# Patient Record
Sex: MALE | Race: WHITE | NOT HISPANIC OR LATINO | Employment: UNEMPLOYED | URBAN - METROPOLITAN AREA
[De-identification: names, ages, dates, MRNs, and addresses within clinical notes are randomized per-mention and may not be internally consistent; named-entity substitution may affect disease eponyms.]

---

## 2020-09-01 ENCOUNTER — OFFICE VISIT (OUTPATIENT)
Dept: OPHTHALMOLOGY | Facility: CLINIC | Age: 40
End: 2020-09-01
Payer: COMMERCIAL

## 2020-09-01 DIAGNOSIS — H52.03 HYPEROPIA, BILATERAL: ICD-10-CM

## 2020-09-01 DIAGNOSIS — Z46.0 ENCOUNTER FOR FITTING OR ADJUSTMENT OF SPECTACLES OR CONTACT LENSES: ICD-10-CM

## 2020-09-01 DIAGNOSIS — Z01.00 ENCOUNTER FOR EYE EXAM: Primary | ICD-10-CM

## 2020-09-01 DIAGNOSIS — H52.4 BILATERAL PRESBYOPIA: ICD-10-CM

## 2020-09-01 PROCEDURE — 99202 OFFICE O/P NEW SF 15 MIN: CPT | Mod: PBBFAC | Performed by: OPTOMETRIST

## 2020-09-01 PROCEDURE — 92004 COMPRE OPH EXAM NEW PT 1/>: CPT | Mod: S$PBB,,, | Performed by: OPTOMETRIST

## 2020-09-01 PROCEDURE — 99999 PR PBB SHADOW E&M-NEW PATIENT-LVL II: CPT | Mod: PBBFAC,,, | Performed by: OPTOMETRIST

## 2020-09-01 PROCEDURE — 92015 PR REFRACTION: ICD-10-PCS | Mod: ,,, | Performed by: OPTOMETRIST

## 2020-09-01 PROCEDURE — 92310 CONTACT LENS FITTING OU: CPT | Mod: CSM,,, | Performed by: OPTOMETRIST

## 2020-09-01 PROCEDURE — 92004 PR EYE EXAM, NEW PATIENT,COMPREHESV: ICD-10-PCS | Mod: S$PBB,,, | Performed by: OPTOMETRIST

## 2020-09-01 PROCEDURE — 92310 PR CONTACT LENS FITTING (NO CHANGE): ICD-10-PCS | Mod: CSM,,, | Performed by: OPTOMETRIST

## 2020-09-01 PROCEDURE — 99999 PR PBB SHADOW E&M-NEW PATIENT-LVL II: ICD-10-PCS | Mod: PBBFAC,,, | Performed by: OPTOMETRIST

## 2020-09-01 PROCEDURE — 92015 DETERMINE REFRACTIVE STATE: CPT | Mod: ,,, | Performed by: OPTOMETRIST

## 2020-09-01 NOTE — PROGRESS NOTES
HPI     No visual complaints.  Out of contact lenses for a year.  Update glasses and contact lenses RX.  Discuss fee to update contact lenses.  New patient last eye exam 2 years ago.    Last edited by Kristina Mendoza on 9/1/2020  1:50 PM. (History)            Assessment /Plan     For exam results, see Encounter Report.    Encounter for eye exam    Encounter for fitting or adjustment of spectacles or contact lenses    Hyperopia, bilateral    Bilateral presbyopia      No pathology.    Discussed CL charges.  Dispense Final Rx for glasses  Note changes CL Rx  RTC 1 year  Discussed above and answered questions.

## 2021-09-01 ENCOUNTER — OFFICE VISIT (OUTPATIENT)
Dept: OPHTHALMOLOGY | Facility: CLINIC | Age: 41
End: 2021-09-01
Payer: COMMERCIAL

## 2021-09-01 DIAGNOSIS — Z01.00 ENCOUNTER FOR EYE EXAM: Primary | ICD-10-CM

## 2021-09-01 DIAGNOSIS — Z46.0 ENCOUNTER FOR FITTING OR ADJUSTMENT OF SPECTACLES OR CONTACT LENSES: ICD-10-CM

## 2021-09-01 DIAGNOSIS — H52.4 BILATERAL PRESBYOPIA: ICD-10-CM

## 2021-09-01 DIAGNOSIS — H52.03 HYPEROPIA, BILATERAL: ICD-10-CM

## 2021-09-01 PROCEDURE — 1159F MED LIST DOCD IN RCRD: CPT | Mod: CPTII,S$GLB,, | Performed by: OPTOMETRIST

## 2021-09-01 PROCEDURE — 92015 PR REFRACTION: ICD-10-PCS | Mod: S$GLB,,, | Performed by: OPTOMETRIST

## 2021-09-01 PROCEDURE — 1159F PR MEDICATION LIST DOCUMENTED IN MEDICAL RECORD: ICD-10-PCS | Mod: CPTII,S$GLB,, | Performed by: OPTOMETRIST

## 2021-09-01 PROCEDURE — 99999 PR PBB SHADOW E&M-EST. PATIENT-LVL II: CPT | Mod: PBBFAC,,, | Performed by: OPTOMETRIST

## 2021-09-01 PROCEDURE — 92014 COMPRE OPH EXAM EST PT 1/>: CPT | Mod: S$GLB,,, | Performed by: OPTOMETRIST

## 2021-09-01 PROCEDURE — 92310 CONTACT LENS FITTING OU: CPT | Mod: CSM,,, | Performed by: OPTOMETRIST

## 2021-09-01 PROCEDURE — 99999 PR PBB SHADOW E&M-EST. PATIENT-LVL II: ICD-10-PCS | Mod: PBBFAC,,, | Performed by: OPTOMETRIST

## 2021-09-01 PROCEDURE — 92310 PR CONTACT LENS FITTING (NO CHANGE): ICD-10-PCS | Mod: CSM,,, | Performed by: OPTOMETRIST

## 2021-09-01 PROCEDURE — 92015 DETERMINE REFRACTIVE STATE: CPT | Mod: S$GLB,,, | Performed by: OPTOMETRIST

## 2021-09-01 PROCEDURE — 92014 PR EYE EXAM, EST PATIENT,COMPREHESV: ICD-10-PCS | Mod: S$GLB,,, | Performed by: OPTOMETRIST

## 2022-05-04 ENCOUNTER — TELEPHONE (OUTPATIENT)
Dept: OPHTHALMOLOGY | Facility: CLINIC | Age: 42
End: 2022-05-04
Payer: COMMERCIAL

## 2022-05-04 NOTE — TELEPHONE ENCOUNTER
Called patient several times, no answer line was just making a lot of noise. The prescription has been updated. Patient can get prescription from MY Chart.

## 2022-05-04 NOTE — TELEPHONE ENCOUNTER
----- Message from Izzy Leavitt sent at 5/4/2022  1:37 PM CDT -----  Regarding: incomplete contact prescription  Contact: mother  Calling regarding the prescription for contacts does not the base number.  Cannot get it filled at [facility]. Taylor Alcazar at [phone]

## 2022-05-04 NOTE — TELEPHONE ENCOUNTER
I called the number listed twice with no answer no voice mail picks up ill try again after 1:00     ----- Message from Manuela Hahn sent at 5/4/2022 10:46 AM CDT -----  Contact: [employer]   Eloise is calling for contact Rx information. Please call and advise.

## 2022-07-12 ENCOUNTER — TELEPHONE (OUTPATIENT)
Dept: OPHTHALMOLOGY | Facility: CLINIC | Age: 42
End: 2022-07-12
Payer: COMMERCIAL

## 2022-07-12 NOTE — TELEPHONE ENCOUNTER
----- Message from Neyda Blanco sent at 7/12/2022  3:28 PM CDT -----  Contact: Pt Mother  .Type:  Needs Medical Advice    Who Called: Pt Mother   Would the patient rather a call back or a response via [facility]? Call   Best Call Back Number: .[phone] (home)    Additional Information: Pt mother is req a call back in regards to having his annual eye exam scheduled for [facility] elisha also she states with the RX the provider gave to the pt was never able to be filled because the rx was not complete.... Thanks AW

## 2022-07-12 NOTE — TELEPHONE ENCOUNTER
Called patient spoke with mother schedule appointment with [facility] for 09/02/2022 to a routine eye exam.

## 2022-09-02 ENCOUNTER — OFFICE VISIT (OUTPATIENT)
Dept: OPHTHALMOLOGY | Facility: CLINIC | Age: 42
End: 2022-09-02
Payer: COMMERCIAL

## 2022-09-02 DIAGNOSIS — Z46.0 ENCOUNTER FOR FITTING OR ADJUSTMENT OF SPECTACLES OR CONTACT LENSES: ICD-10-CM

## 2022-09-02 DIAGNOSIS — H52.4 BILATERAL PRESBYOPIA: ICD-10-CM

## 2022-09-02 DIAGNOSIS — H52.03 HYPEROPIA, BILATERAL: ICD-10-CM

## 2022-09-02 DIAGNOSIS — Z01.00 ENCOUNTER FOR EYE EXAM: Primary | ICD-10-CM

## 2022-09-02 PROCEDURE — 92014 COMPRE OPH EXAM EST PT 1/>: CPT | Mod: S$GLB,,, | Performed by: OPTOMETRIST

## 2022-09-02 PROCEDURE — 99999 PR PBB SHADOW E&M-EST. PATIENT-LVL I: ICD-10-PCS | Mod: PBBFAC,,, | Performed by: OPTOMETRIST

## 2022-09-02 PROCEDURE — 92014 PR EYE EXAM, EST PATIENT,COMPREHESV: ICD-10-PCS | Mod: S$GLB,,, | Performed by: OPTOMETRIST

## 2022-09-02 PROCEDURE — 1159F PR MEDICATION LIST DOCUMENTED IN MEDICAL RECORD: ICD-10-PCS | Mod: CPTII,S$GLB,, | Performed by: OPTOMETRIST

## 2022-09-02 PROCEDURE — 99999 PR PBB SHADOW E&M-EST. PATIENT-LVL I: CPT | Mod: PBBFAC,,, | Performed by: OPTOMETRIST

## 2022-09-02 PROCEDURE — 92015 PR REFRACTION: ICD-10-PCS | Mod: S$GLB,,, | Performed by: OPTOMETRIST

## 2022-09-02 PROCEDURE — 1159F MED LIST DOCD IN RCRD: CPT | Mod: CPTII,S$GLB,, | Performed by: OPTOMETRIST

## 2022-09-02 PROCEDURE — 92015 DETERMINE REFRACTIVE STATE: CPT | Mod: S$GLB,,, | Performed by: OPTOMETRIST

## 2022-09-02 NOTE — PROGRESS NOTES
HPI    Annual Eye Exam   No Visual Complaints   Last edited by Lalitha Kahn MA on 9/2/2022  1:56 PM.            Assessment /Plan     For exam results, see Encounter Report.    Encounter for eye exam    Encounter for fitting or adjustment of spectacles or contact lenses    Hyperopia, bilateral    Bilateral presbyopia      No pathology.     Discussed CL charges.  Dispense Final Rx for glasses  No changes CL Rx  RTC 1 year  Discussed above and answered questions.

## 2023-09-06 ENCOUNTER — OFFICE VISIT (OUTPATIENT)
Dept: OPHTHALMOLOGY | Facility: CLINIC | Age: 43
End: 2023-09-06
Payer: MEDICAID

## 2023-09-06 DIAGNOSIS — H52.7 REFRACTIVE ERRORS: ICD-10-CM

## 2023-09-06 DIAGNOSIS — Z01.00 ENCOUNTER FOR EYE EXAM: Primary | ICD-10-CM

## 2023-09-06 PROCEDURE — 92015 DETERMINE REFRACTIVE STATE: CPT | Mod: ,,, | Performed by: OPTOMETRIST

## 2023-09-06 PROCEDURE — 1159F PR MEDICATION LIST DOCUMENTED IN MEDICAL RECORD: ICD-10-PCS | Mod: CPTII,,, | Performed by: OPTOMETRIST

## 2023-09-06 PROCEDURE — 99213 OFFICE O/P EST LOW 20 MIN: CPT | Mod: PBBFAC | Performed by: OPTOMETRIST

## 2023-09-06 PROCEDURE — 99999 PR PBB SHADOW E&M-EST. PATIENT-LVL III: CPT | Mod: PBBFAC,,, | Performed by: OPTOMETRIST

## 2023-09-06 PROCEDURE — 92015 PR REFRACTION: ICD-10-PCS | Mod: ,,, | Performed by: OPTOMETRIST

## 2023-09-06 PROCEDURE — 92014 COMPRE OPH EXAM EST PT 1/>: CPT | Mod: S$PBB,,, | Performed by: OPTOMETRIST

## 2023-09-06 PROCEDURE — 1159F MED LIST DOCD IN RCRD: CPT | Mod: CPTII,,, | Performed by: OPTOMETRIST

## 2023-09-06 PROCEDURE — 99999 PR PBB SHADOW E&M-EST. PATIENT-LVL III: ICD-10-PCS | Mod: PBBFAC,,, | Performed by: OPTOMETRIST

## 2023-09-06 PROCEDURE — 92014 PR EYE EXAM, EST PATIENT,COMPREHESV: ICD-10-PCS | Mod: S$PBB,,, | Performed by: OPTOMETRIST

## 2023-09-06 NOTE — PROGRESS NOTES
HPI    No visual complaints  Last eye exam 09/02/2022 [facility].  Update glasses RX.  Last dilation 09/02/2022  Last edited by Kristina Mendoza MA on 9/6/2023  3:27 PM.            Assessment /Plan     For exam results, see Encounter Report.    Encounter for eye exam    Refractive errors      No pathology.    Dispense Final Rx for glasses.  RTC 1 year  Discussed above and answered questions.

## 2023-09-17 ENCOUNTER — HOSPITAL ENCOUNTER (INPATIENT)
Facility: HOSPITAL | Age: 43
LOS: 16 days | Discharge: HOME HEALTH | DRG: 217 | End: 2023-10-03
Attending: EMERGENCY MEDICINE | Admitting: HOSPITALIST
Payer: COMMERCIAL

## 2023-09-17 DIAGNOSIS — Z95.2 S/P AVR (AORTIC VALVE REPLACEMENT): Primary | ICD-10-CM

## 2023-09-17 DIAGNOSIS — Z95.1 S/P CABG X 5: ICD-10-CM

## 2023-09-17 DIAGNOSIS — I21.4 NON-ST ELEVATION MYOCARDIAL INFARCTION (NSTEMI): ICD-10-CM

## 2023-09-17 DIAGNOSIS — I21.4 NSTEMI (NON-ST ELEVATION MYOCARDIAL INFARCTION): ICD-10-CM

## 2023-09-17 DIAGNOSIS — Z01.810 PRE-OPERATIVE CARDIOVASCULAR EXAMINATION: ICD-10-CM

## 2023-09-17 DIAGNOSIS — R07.9 CHEST PAIN: ICD-10-CM

## 2023-09-17 DIAGNOSIS — Z98.890 POST-OPERATIVE STATE: ICD-10-CM

## 2023-09-17 DIAGNOSIS — F17.200 SMOKING: ICD-10-CM

## 2023-09-17 DIAGNOSIS — I21.4 NSTEMI (NON-ST ELEVATED MYOCARDIAL INFARCTION): ICD-10-CM

## 2023-09-17 DIAGNOSIS — I25.10 LEFT MAIN CORONARY ARTERY DISEASE: ICD-10-CM

## 2023-09-17 LAB
ABO + RH BLD: NORMAL
ALBUMIN SERPL BCP-MCNC: 4.1 G/DL (ref 3.5–5.2)
ALP SERPL-CCNC: 69 U/L (ref 55–135)
ALT SERPL W/O P-5'-P-CCNC: 16 U/L (ref 10–44)
ANION GAP SERPL CALC-SCNC: 15 MMOL/L (ref 8–16)
APTT PPP: 25.6 SEC (ref 21–32)
AST SERPL-CCNC: 18 U/L (ref 10–40)
BASOPHILS # BLD AUTO: 0.07 K/UL (ref 0–0.2)
BASOPHILS NFR BLD: 0.9 % (ref 0–1.9)
BILIRUB SERPL-MCNC: 0.3 MG/DL (ref 0.1–1)
BLD GP AB SCN CELLS X3 SERPL QL: NORMAL
BUN SERPL-MCNC: 14 MG/DL (ref 6–20)
CALCIUM SERPL-MCNC: 9.6 MG/DL (ref 8.7–10.5)
CHLORIDE SERPL-SCNC: 106 MMOL/L (ref 95–110)
CO2 SERPL-SCNC: 20 MMOL/L (ref 23–29)
CREAT SERPL-MCNC: 1.1 MG/DL (ref 0.5–1.4)
DIFFERENTIAL METHOD: ABNORMAL
EOSINOPHIL # BLD AUTO: 0.2 K/UL (ref 0–0.5)
EOSINOPHIL NFR BLD: 2.2 % (ref 0–8)
ERYTHROCYTE [DISTWIDTH] IN BLOOD BY AUTOMATED COUNT: 12.5 % (ref 11.5–14.5)
EST. GFR  (NO RACE VARIABLE): >60 ML/MIN/1.73 M^2
GLUCOSE SERPL-MCNC: 115 MG/DL (ref 70–110)
HCT VFR BLD AUTO: 43.5 % (ref 40–54)
HCV AB SERPL QL IA: NEGATIVE
HEP C VIRUS HOLD SPECIMEN: NORMAL
HGB BLD-MCNC: 15 G/DL (ref 14–18)
HIV 1+2 AB+HIV1 P24 AG SERPL QL IA: NEGATIVE
IMM GRANULOCYTES # BLD AUTO: 0.02 K/UL (ref 0–0.04)
IMM GRANULOCYTES NFR BLD AUTO: 0.2 % (ref 0–0.5)
INR PPP: 1 (ref 0.8–1.2)
LYMPHOCYTES # BLD AUTO: 2.4 K/UL (ref 1–4.8)
LYMPHOCYTES NFR BLD: 29.9 % (ref 18–48)
MCH RBC QN AUTO: 30.7 PG (ref 27–31)
MCHC RBC AUTO-ENTMCNC: 34.5 G/DL (ref 32–36)
MCV RBC AUTO: 89 FL (ref 82–98)
MONOCYTES # BLD AUTO: 0.7 K/UL (ref 0.3–1)
MONOCYTES NFR BLD: 8.7 % (ref 4–15)
NEUTROPHILS # BLD AUTO: 4.7 K/UL (ref 1.8–7.7)
NEUTROPHILS NFR BLD: 58.1 % (ref 38–73)
NRBC BLD-RTO: 0 /100 WBC
PLATELET # BLD AUTO: 324 K/UL (ref 150–450)
PMV BLD AUTO: 8.8 FL (ref 9.2–12.9)
POTASSIUM SERPL-SCNC: 3.7 MMOL/L (ref 3.5–5.1)
PROT SERPL-MCNC: 7.4 G/DL (ref 6–8.4)
PROTHROMBIN TIME: 10.5 SEC (ref 9–12.5)
RBC # BLD AUTO: 4.89 M/UL (ref 4.6–6.2)
SODIUM SERPL-SCNC: 141 MMOL/L (ref 136–145)
SPECIMEN OUTDATE: NORMAL
TROPONIN I SERPL DL<=0.01 NG/ML-MCNC: 0.6 NG/ML (ref 0–0.03)
TROPONIN I SERPL DL<=0.01 NG/ML-MCNC: 0.71 NG/ML (ref 0–0.03)
WBC # BLD AUTO: 8.04 K/UL (ref 3.9–12.7)

## 2023-09-17 PROCEDURE — 99285 EMERGENCY DEPT VISIT HI MDM: CPT | Mod: 25

## 2023-09-17 PROCEDURE — 83036 HEMOGLOBIN GLYCOSYLATED A1C: CPT | Performed by: EMERGENCY MEDICINE

## 2023-09-17 PROCEDURE — 80053 COMPREHEN METABOLIC PANEL: CPT | Performed by: EMERGENCY MEDICINE

## 2023-09-17 PROCEDURE — 25000003 PHARM REV CODE 250: Performed by: EMERGENCY MEDICINE

## 2023-09-17 PROCEDURE — 84484 ASSAY OF TROPONIN QUANT: CPT | Performed by: NURSE PRACTITIONER

## 2023-09-17 PROCEDURE — 25000003 PHARM REV CODE 250: Performed by: NURSE PRACTITIONER

## 2023-09-17 PROCEDURE — 93005 ELECTROCARDIOGRAM TRACING: CPT

## 2023-09-17 PROCEDURE — 96374 THER/PROPH/DIAG INJ IV PUSH: CPT

## 2023-09-17 PROCEDURE — 85730 THROMBOPLASTIN TIME PARTIAL: CPT | Performed by: EMERGENCY MEDICINE

## 2023-09-17 PROCEDURE — 93010 ELECTROCARDIOGRAM REPORT: CPT | Mod: ,,, | Performed by: INTERNAL MEDICINE

## 2023-09-17 PROCEDURE — 86900 BLOOD TYPING SEROLOGIC ABO: CPT | Performed by: NURSE PRACTITIONER

## 2023-09-17 PROCEDURE — 85025 COMPLETE CBC W/AUTO DIFF WBC: CPT | Performed by: EMERGENCY MEDICINE

## 2023-09-17 PROCEDURE — 86920 COMPATIBILITY TEST SPIN: CPT | Performed by: THORACIC SURGERY (CARDIOTHORACIC VASCULAR SURGERY)

## 2023-09-17 PROCEDURE — 87389 HIV-1 AG W/HIV-1&-2 AB AG IA: CPT | Performed by: EMERGENCY MEDICINE

## 2023-09-17 PROCEDURE — 86920 COMPATIBILITY TEST SPIN: CPT | Performed by: INTERNAL MEDICINE

## 2023-09-17 PROCEDURE — 21400001 HC TELEMETRY ROOM

## 2023-09-17 PROCEDURE — 25000242 PHARM REV CODE 250 ALT 637 W/ HCPCS: Performed by: EMERGENCY MEDICINE

## 2023-09-17 PROCEDURE — 63600175 PHARM REV CODE 636 W HCPCS: Performed by: EMERGENCY MEDICINE

## 2023-09-17 PROCEDURE — 85610 PROTHROMBIN TIME: CPT | Performed by: EMERGENCY MEDICINE

## 2023-09-17 PROCEDURE — 36415 COLL VENOUS BLD VENIPUNCTURE: CPT | Performed by: EMERGENCY MEDICINE

## 2023-09-17 PROCEDURE — 86803 HEPATITIS C AB TEST: CPT | Performed by: EMERGENCY MEDICINE

## 2023-09-17 PROCEDURE — 80061 LIPID PANEL: CPT | Performed by: EMERGENCY MEDICINE

## 2023-09-17 PROCEDURE — 93010 EKG 12-LEAD: ICD-10-PCS | Mod: ,,, | Performed by: INTERNAL MEDICINE

## 2023-09-17 PROCEDURE — 84484 ASSAY OF TROPONIN QUANT: CPT | Mod: 91 | Performed by: EMERGENCY MEDICINE

## 2023-09-17 PROCEDURE — 36415 COLL VENOUS BLD VENIPUNCTURE: CPT | Performed by: NURSE PRACTITIONER

## 2023-09-17 RX ORDER — ISOSORBIDE MONONITRATE 30 MG/1
30 TABLET, EXTENDED RELEASE ORAL DAILY
Status: DISCONTINUED | OUTPATIENT
Start: 2023-09-17 | End: 2023-09-18

## 2023-09-17 RX ORDER — ONDANSETRON 2 MG/ML
4 INJECTION INTRAMUSCULAR; INTRAVENOUS EVERY 6 HOURS PRN
Status: DISCONTINUED | OUTPATIENT
Start: 2023-09-17 | End: 2023-09-19

## 2023-09-17 RX ORDER — NAPROXEN SODIUM 220 MG/1
81 TABLET, FILM COATED ORAL DAILY
Status: DISCONTINUED | OUTPATIENT
Start: 2023-09-18 | End: 2023-09-19

## 2023-09-17 RX ORDER — ATORVASTATIN CALCIUM 40 MG/1
80 TABLET, FILM COATED ORAL DAILY
Status: DISCONTINUED | OUTPATIENT
Start: 2023-09-18 | End: 2023-10-03 | Stop reason: HOSPADM

## 2023-09-17 RX ORDER — DIPHENHYDRAMINE HCL 50 MG
50 CAPSULE ORAL ONCE
Status: COMPLETED | OUTPATIENT
Start: 2023-09-17 | End: 2023-09-17

## 2023-09-17 RX ORDER — NITROGLYCERIN 0.4 MG/1
0.4 TABLET SUBLINGUAL EVERY 5 MIN PRN
Status: DISCONTINUED | OUTPATIENT
Start: 2023-09-17 | End: 2023-09-19 | Stop reason: HOSPADM

## 2023-09-17 RX ORDER — METOPROLOL TARTRATE 25 MG/1
25 TABLET, FILM COATED ORAL 2 TIMES DAILY
Status: DISCONTINUED | OUTPATIENT
Start: 2023-09-17 | End: 2023-09-19

## 2023-09-17 RX ORDER — NITROGLYCERIN 0.4 MG/1
0.4 TABLET SUBLINGUAL
Status: COMPLETED | OUTPATIENT
Start: 2023-09-17 | End: 2023-09-17

## 2023-09-17 RX ORDER — HEPARIN SODIUM,PORCINE/D5W 25000/250
0-40 INTRAVENOUS SOLUTION INTRAVENOUS CONTINUOUS
Status: DISCONTINUED | OUTPATIENT
Start: 2023-09-17 | End: 2023-09-19 | Stop reason: ALTCHOICE

## 2023-09-17 RX ORDER — NAPROXEN SODIUM 220 MG/1
162 TABLET, FILM COATED ORAL
Status: COMPLETED | OUTPATIENT
Start: 2023-09-17 | End: 2023-09-17

## 2023-09-17 RX ADMIN — NITROGLYCERIN 0.4 MG: 0.4 TABLET SUBLINGUAL at 06:09

## 2023-09-17 RX ADMIN — METOPROLOL TARTRATE 25 MG: 25 TABLET, FILM COATED ORAL at 08:09

## 2023-09-17 RX ADMIN — ASPIRIN 81 MG CHEWABLE TABLET 162 MG: 81 TABLET CHEWABLE at 06:09

## 2023-09-17 RX ADMIN — HEPARIN SODIUM 12 UNITS/KG/HR: 10000 INJECTION, SOLUTION INTRAVENOUS at 07:09

## 2023-09-17 RX ADMIN — ISOSORBIDE MONONITRATE 30 MG: 30 TABLET, EXTENDED RELEASE ORAL at 10:09

## 2023-09-17 RX ADMIN — DIPHENHYDRAMINE HYDROCHLORIDE 50 MG: 50 CAPSULE ORAL at 08:09

## 2023-09-17 NOTE — Clinical Note
The catheter was inserted over the wire into the ostial  left coronary artery. Hemodynamics were performed.  An angiography was performed of the left coronary arteries. Multiple views were taken. Inserted over wholey wire

## 2023-09-17 NOTE — Clinical Note
The catheter was inserted over the wire into the aorta. Hemodynamics were performed.  An angiography was performed of the Aortic Root. The angiography was performed via power injection. The injected amount was 20 mL contrast at 15 mL/s.  Over J wire

## 2023-09-17 NOTE — Clinical Note
The catheter was inserted over the wire into the ostium   right coronary artery. Hemodynamics were performed.  An angiography was performed of the right coronary arteries. Multiple views were taken. Exchanged over J wire

## 2023-09-17 NOTE — ED PROVIDER NOTES
SCRIBE #1 NOTE: I, Craighead Filemon, am scribing for, and in the presence of, Elliott Castillo MD. I have scribed the entire note.       History     Chief Complaint   Patient presents with    Chest Pain     Pt c/o anterior chest pain with radiation to both arms x1 hour. Pt denies n/v. Endorses dizziness. Pt appears mildly SOB.      Review of patient's allergies indicates:  No Known Allergies      History of Present Illness     HPI    9/17/2023, 6:23 PM  History obtained from the patient      History of Present Illness: Mikie Alcazar is a 43 y.o. male patient with a PMHx of smoking who presents to the Emergency Department for evaluation of CP which onset gradually about 6 months ago and has been recurrent since. Today, his pain onset 1.5 hours ago after starting yard work. Symptoms are constant and moderate in severity. Pt says that his CP will start upon exertion and will pass when he rests for a while. Today, his associated sxs include nausea, diaphoresis, and a stabbing pain from his chest that radiates to his L arm. Pt has a chronic cough from smoking. Patient denies any v/d, SOB, HA, leg pain, and all other sxs at this time. Prior Tx includes 2 aspirin, he is not sure what mg. He mentions that before he has had his fingers and tongue go numb. No further complaints or concerns at this time.       Arrival mode: Personal vehicle    PCP: No, Primary Doctor        Past Medical History:  No past medical history on file.    Past Surgical History:  No past surgical history on file.      Family History:  Family History   Problem Relation Age of Onset    Strabismus Mother     Hypertension Father     Macular degeneration Father     Cataracts Maternal Aunt     Cataracts Maternal Uncle        Social History:  Social History     Tobacco Use    Smoking status: Every Day     Current packs/day: 1.00     Types: Cigarettes    Smokeless tobacco: Never   Substance and Sexual Activity    Alcohol use: Yes     Comment: rarely    Drug  use: No    Sexual activity: Not on file        Review of Systems     Review of Systems   Constitutional:  Positive for diaphoresis. Negative for fever.   HENT:  Negative for sore throat.    Respiratory:  Positive for cough (Chronic). Negative for shortness of breath.    Cardiovascular:  Positive for chest pain (Stabbing, radiates to L arm).   Gastrointestinal:  Positive for nausea. Negative for diarrhea and vomiting.   Genitourinary:  Negative for dysuria.   Musculoskeletal:  Negative for back pain and myalgias (leg pain).   Skin:  Negative for rash.   Neurological:  Negative for weakness and headaches.   Hematological:  Does not bruise/bleed easily.   All other systems reviewed and are negative.       Physical Exam     Initial Vitals   BP Pulse Resp Temp SpO2   09/17/23 1812 09/17/23 1812 09/17/23 1812 09/17/23 1813 09/17/23 1812   (!) 204/100 (!) 121 20 98.1 °F (36.7 °C) 100 %      MAP       --                 Physical Exam  Nursing Notes and Vital Signs Reviewed.  Constitutional: Patient is in no acute distress. Well-developed and well-nourished.  Head: Atraumatic. Normocephalic.  Eyes: PERRL. EOM intact. Conjunctivae are not pale. No scleral icterus.  ENT: Mucous membranes are moist.   Neck: Supple. Full ROM. No lymphadenopathy.  Cardiovascular: Tachycardic. No murmurs, rubs, or gallops. Distal pulses are 2+ and symmetric.  Pulmonary/Chest: No respiratory distress. Clear to auscultation bilaterally. No wheezing or rales.  Abdominal: Soft and non-distended.  There is no tenderness.  No rebound, guarding, or rigidity.   Genitourinary: No CVA tenderness  Musculoskeletal: Moves all extremities. No obvious deformities. No edema. No unilateral leg width discrepancy.   Skin: Warm and dry.  Neurological:  Alert, awake, and appropriate.  Normal speech.  No acute focal neurological deficits are appreciated.  Psychiatric: Normal affect. Good eye contact. Appropriate in content.     ED Course   Critical Care    Date/Time:  "9/17/2023 6:24 PM    Performed by: Elliott Castillo MD  Authorized by: Elliott Castillo MD  Direct patient critical care time: 15 minutes  Additional history critical care time: 10 minutes  Ordering / reviewing critical care time: 5 minutes  Documentation critical care time: 5 minutes  Consulting other physicians critical care time: 5 minutes  Consult with family critical care time: 5 minutes  Total critical care time (exclusive of procedural time) : 45 minutes  Critical care time was exclusive of separately billable procedures and treating other patients and teaching time.  Critical care was necessary to treat or prevent imminent or life-threatening deterioration of the following conditions: NSTEMI.  Critical care was time spent personally by me on the following activities: blood draw for specimens, development of treatment plan with patient or surrogate, discussions with consultants, interpretation of cardiac output measurements, evaluation of patient's response to treatment, examination of patient, obtaining history from patient or surrogate, ordering and performing treatments and interventions, ordering and review of laboratory studies, ordering and review of radiographic studies, pulse oximetry, re-evaluation of patient's condition and review of old charts.        ED Vital Signs:  Vitals:    09/17/23 1812 09/17/23 1813 09/17/23 1819 09/17/23 1829   BP: (!) 204/100   (!) 173/88   Pulse: (!) 121  (!) 115 107   Resp: 20   17   Temp:  98.1 °F (36.7 °C)     TempSrc:  Oral     SpO2: 100%   97%   Weight: 81.7 kg (180 lb 1.9 oz)      Height: 5' 8" (1.727 m)       09/17/23 1847 09/17/23 1917   BP: (!) 149/75 (!) 165/79   Pulse: 94 72   Resp: 16 16   Temp:     TempSrc:     SpO2: 98% 97%   Weight:     Height:         Abnormal Lab Results:  Labs Reviewed   CBC W/ AUTO DIFFERENTIAL - Abnormal; Notable for the following components:       Result Value    MPV 8.8 (*)     All other components within normal limits   COMPREHENSIVE " METABOLIC PANEL - Abnormal; Notable for the following components:    CO2 20 (*)     Glucose 115 (*)     All other components within normal limits   TROPONIN I - Abnormal; Notable for the following components:    Troponin I 0.600 (*)     All other components within normal limits   APTT   PROTIME-INR   HIV 1 / 2 ANTIBODY    Narrative:     Release to patient->Immediate   HEPATITIS C ANTIBODY    Narrative:     Release to patient->Immediate   HEP C VIRUS HOLD SPECIMEN    Narrative:     Release to patient->Immediate        All Lab Results:  Results for orders placed or performed during the hospital encounter of 09/17/23   CBC auto differential   Result Value Ref Range    WBC 8.04 3.90 - 12.70 K/uL    RBC 4.89 4.60 - 6.20 M/uL    Hemoglobin 15.0 14.0 - 18.0 g/dL    Hematocrit 43.5 40.0 - 54.0 %    MCV 89 82 - 98 fL    MCH 30.7 27.0 - 31.0 pg    MCHC 34.5 32.0 - 36.0 g/dL    RDW 12.5 11.5 - 14.5 %    Platelets 324 150 - 450 K/uL    MPV 8.8 (L) 9.2 - 12.9 fL    Immature Granulocytes 0.2 0.0 - 0.5 %    Gran # (ANC) 4.7 1.8 - 7.7 K/uL    Immature Grans (Abs) 0.02 0.00 - 0.04 K/uL    Lymph # 2.4 1.0 - 4.8 K/uL    Mono # 0.7 0.3 - 1.0 K/uL    Eos # 0.2 0.0 - 0.5 K/uL    Baso # 0.07 0.00 - 0.20 K/uL    nRBC 0 0 /100 WBC    Gran % 58.1 38.0 - 73.0 %    Lymph % 29.9 18.0 - 48.0 %    Mono % 8.7 4.0 - 15.0 %    Eosinophil % 2.2 0.0 - 8.0 %    Basophil % 0.9 0.0 - 1.9 %    Differential Method Automated    Comprehensive metabolic panel   Result Value Ref Range    Sodium 141 136 - 145 mmol/L    Potassium 3.7 3.5 - 5.1 mmol/L    Chloride 106 95 - 110 mmol/L    CO2 20 (L) 23 - 29 mmol/L    Glucose 115 (H) 70 - 110 mg/dL    BUN 14 6 - 20 mg/dL    Creatinine 1.1 0.5 - 1.4 mg/dL    Calcium 9.6 8.7 - 10.5 mg/dL    Total Protein 7.4 6.0 - 8.4 g/dL    Albumin 4.1 3.5 - 5.2 g/dL    Total Bilirubin 0.3 0.1 - 1.0 mg/dL    Alkaline Phosphatase 69 55 - 135 U/L    AST 18 10 - 40 U/L    ALT 16 10 - 44 U/L    eGFR >60 >60 mL/min/1.73 m^2    Anion Gap  15 8 - 16 mmol/L   APTT   Result Value Ref Range    aPTT 25.6 21.0 - 32.0 sec   Troponin I   Result Value Ref Range    Troponin I 0.600 (H) 0.000 - 0.026 ng/mL   Protime-INR   Result Value Ref Range    Prothrombin Time 10.5 9.0 - 12.5 sec    INR 1.0 0.8 - 1.2   HIV 1/2 Ag/Ab (4th Gen)   Result Value Ref Range    HIV 1/2 Ag/Ab Negative Negative   Hepatitis C Antibody   Result Value Ref Range    Hepatitis C Ab Negative Negative   HCV Virus Hold Specimen   Result Value Ref Range    HEP C Virus Hold Specimen Hold for HCV sendout          Imaging Results:  Imaging Results              X-Ray Chest AP Portable (In process)                  X-ray reviewed interpreted by ED provider as No acute cardiopulmonary process    The EKG was ordered, reviewed, and independently interpreted by the ED provider.  Interpretation time: 18:09  Rate: 122 BPM  Rhythm: sinus tachycardia  Interpretation: Normal interval. ST depression T wave inversions. Some ST elevations in V1 and V2. Does not meet STEMI criteria.    The EKG was ordered, reviewed, and independently interpreted by the ED provider.  Interpretation time: 18:40  Rate: 90 BPM  Rhythm: normal sinus rhythm with sinus arrhythmia  Interpretation: Septal infarct, age undetermined. ST & T wave abnormality, consider lateral ischemia. No STEMI.           The Emergency Provider reviewed the vital signs and test results, which are outlined above.     ED Discussion       6:32 PM: Chest pain resolved after 1 dose of nitroglycerin.     7:20 PM: Discussed pt's case with Dr. Campos (cardiology) who recommends continuing heparin drip ACS treatment and to keep npo past midnight for LHC tomorrow and to add statin Bb and Imdur. He adds that if the pt has recurring CP to give more nitroglycerin or paste.    7:34 PM: Discussed case with Supa Reeves NP (Hospital Medicine). Dr. Reeves agrees with current care and management of pt and accepts admission.   Admitting Service:   Admitting  Physician: Dr. Reeves  Admit to: IP med/tele  7:34 PM: Re-evaluated pt. I have discussed test results, shared treatment plan, and the need for admission with patient and family at bedside. Pt and family express understanding at this time and agree with all information. All questions answered. Pt and family have no further questions or concerns at this time. Pt is ready for admit.             Medical Decision Making  43 y.o. M smoker who came in with CP highly suggestive of ACS. ST depression and ST elevation in V1 and V2. It didn't meet STEMI criteria. aspirin and nitro given. His pain completely resolved after nitro. He is CP free now. Repeat EKG much improved. Troponin came back at 0.6 confirming NSTEMI. starting heparin and admitting to medicine with Cardiology consult    Amount and/or Complexity of Data Reviewed  Labs: ordered. Decision-making details documented in ED Course.  Radiology: ordered and independent interpretation performed. Decision-making details documented in ED Course.  ECG/medicine tests: ordered and independent interpretation performed. Decision-making details documented in ED Course.    Risk  OTC drugs.  Prescription drug management.  Decision regarding hospitalization.           Medical Decision Making:   Clinical Tests:   Lab Tests: Ordered and Reviewed  Radiological Study: Ordered and Reviewed  Medical Tests: Ordered and Reviewed      ED Medication(s):  Medications   heparin 25,000 units in dextrose 5% 250 mL (100 units/mL) infusion LOW INTENSITY nomogram - [facility] (12 Units/kg/hr × 81.7 kg Intravenous New Bag 9/17/23 1921)   heparin 25,000 units in dextrose 5% (100 units/ml) IV bolus from bag - ADDITIONAL PRN BOLUS - 60 units/kg (max bolus 4000 units) (has no administration in time range)   heparin 25,000 units in dextrose 5% (100 units/ml) IV bolus from bag - ADDITIONAL PRN BOLUS - 30 units/kg (max bolus 4000 units) (has no administration in time range)   nitroGLYCERIN SL tablet 0.4 mg (0.4  mg Sublingual Given 9/17/23 1823)   aspirin chewable tablet 162 mg (162 mg Oral Given 9/17/23 1836)   heparin 25,000 units in dextrose 5% (100 units/ml) IV bolus from bag INITIAL BOLUS (max bolus 4000 units) (4,000 Units Intravenous Bolus from Bag 9/17/23 1922)       New Prescriptions    No medications on file               Scribe Attestation:   Scribe #1: I performed the above scribed service and the documentation accurately describes the services I performed. I attest to the accuracy of the note.     Attending:   Physician Attestation Statement for Scribe #1: I, Elliott Castillo MD, personally performed the services described in this documentation, as scribed by Ivanna Colbert, in my presence, and it is both accurate and complete.           Clinical Impression       ICD-10-CM ICD-9-CM   1. NSTEMI (non-ST elevated myocardial infarction)  I21.4 410.70   2. Chest pain  R07.9 786.50   3. Smoking  F17.200 305.1       Disposition:   Disposition: Admitted  Condition: Fair         Elliott Castillo MD  09/17/23 1937

## 2023-09-17 NOTE — Clinical Note
The catheter was inserted over the wire into the left subclavian artery LIMA. Hemodynamics were performed.

## 2023-09-18 ENCOUNTER — CLINICAL SUPPORT (OUTPATIENT)
Dept: SMOKING CESSATION | Facility: CLINIC | Age: 43
End: 2023-09-18
Payer: COMMERCIAL

## 2023-09-18 DIAGNOSIS — F17.200 NICOTINE DEPENDENCE: Primary | ICD-10-CM

## 2023-09-18 DIAGNOSIS — F17.200 NEEDS SMOKING CESSATION EDUCATION: ICD-10-CM

## 2023-09-18 PROBLEM — I10 HYPERTENSION: Status: ACTIVE | Noted: 2023-09-18

## 2023-09-18 PROBLEM — F17.210 TOBACCO SMOKER, 1 PACK OF CIGARETTES OR LESS PER DAY: Status: ACTIVE | Noted: 2023-09-18

## 2023-09-18 PROBLEM — I25.10 CAD, MULTIPLE VESSEL: Status: ACTIVE | Noted: 2023-09-18

## 2023-09-18 PROBLEM — I35.1 NONRHEUMATIC AORTIC VALVE INSUFFICIENCY: Status: ACTIVE | Noted: 2023-09-18

## 2023-09-18 LAB
AORTIC ROOT ANNULUS: 2.71 CM
APTT PPP: 45.1 SEC (ref 21–32)
APTT PPP: 46.2 SEC (ref 21–32)
APTT PPP: 47 SEC (ref 21–32)
ASCENDING AORTA: 2.36 CM
AV INDEX (PROSTH): 0.54
AV MEAN GRADIENT: 14 MMHG
AV PEAK GRADIENT: 26 MMHG
AV REGURGITATION PRESSURE HALF TIME: 456.06 MS
AV VALVE AREA BY VELOCITY RATIO: 1.56 CM²
AV VALVE AREA: 1.53 CM²
AV VELOCITY RATIO: 0.54
BASOPHILS # BLD AUTO: 0.08 K/UL (ref 0–0.2)
BASOPHILS NFR BLD: 1.1 % (ref 0–1.9)
BILIRUB UR QL STRIP: NEGATIVE
BNP SERPL-MCNC: 226 PG/ML (ref 0–99)
BSA FOR ECHO PROCEDURE: 1.98 M2
CATH EF QUANTITATIVE: 50 %
CHOLEST SERPL-MCNC: 150 MG/DL (ref 120–199)
CHOLEST/HDLC SERPL: 4.1 {RATIO} (ref 2–5)
CLARITY UR: CLEAR
COLOR UR: COLORLESS
CV ECHO LV RWT: 0.38 CM
DIFFERENTIAL METHOD: ABNORMAL
DOP CALC AO PEAK VEL: 2.54 M/S
DOP CALC AO VTI: 54 CM
DOP CALC LVOT AREA: 2.9 CM2
DOP CALC LVOT DIAMETER: 1.91 CM
DOP CALC LVOT PEAK VEL: 1.38 M/S
DOP CALC LVOT STROKE VOLUME: 82.76 CM3
DOP CALC RVOT PEAK VEL: 0.58 M/S
DOP CALC RVOT VTI: 10.8 CM
DOP CALCLVOT PEAK VEL VTI: 28.9 CM
E WAVE DECELERATION TIME: 200.37 MSEC
E/A RATIO: 1.05
E/E' RATIO: 17.14 M/S
ECHO LV POSTERIOR WALL: 0.98 CM (ref 0.6–1.1)
EOSINOPHIL # BLD AUTO: 0.4 K/UL (ref 0–0.5)
EOSINOPHIL NFR BLD: 5.7 % (ref 0–8)
ERYTHROCYTE [DISTWIDTH] IN BLOOD BY AUTOMATED COUNT: 12.8 % (ref 11.5–14.5)
ESTIMATED AVG GLUCOSE: 105 MG/DL (ref 68–131)
FRACTIONAL SHORTENING: 16 % (ref 28–44)
GLUCOSE UR QL STRIP: ABNORMAL
HBA1C MFR BLD: 5.3 % (ref 4–5.6)
HCT VFR BLD AUTO: 38.4 % (ref 40–54)
HDLC SERPL-MCNC: 37 MG/DL (ref 40–75)
HDLC SERPL: 24.7 % (ref 20–50)
HGB BLD-MCNC: 13.2 G/DL (ref 14–18)
HGB UR QL STRIP: NEGATIVE
IMM GRANULOCYTES # BLD AUTO: 0.02 K/UL (ref 0–0.04)
IMM GRANULOCYTES NFR BLD AUTO: 0.3 % (ref 0–0.5)
INTERVENTRICULAR SEPTUM: 0.88 CM (ref 0.6–1.1)
IVC DIAMETER: 1.57 CM
IVRT: 72.31 MSEC
KETONES UR QL STRIP: NEGATIVE
LA MAJOR: 4.27 CM
LA MINOR: 4.68 CM
LA WIDTH: 3.1 CM
LDLC SERPL CALC-MCNC: 84 MG/DL (ref 63–159)
LEFT ATRIUM SIZE: 3.79 CM
LEFT ATRIUM VOLUME INDEX: 22.9 ML/M2
LEFT ATRIUM VOLUME: 44.6 CM3
LEFT INTERNAL DIMENSION IN SYSTOLE: 4.37 CM (ref 2.1–4)
LEFT VENTRICLE DIASTOLIC VOLUME INDEX: 67.09 ML/M2
LEFT VENTRICLE DIASTOLIC VOLUME: 130.82 ML
LEFT VENTRICLE MASS INDEX: 91 G/M2
LEFT VENTRICLE SYSTOLIC VOLUME INDEX: 44.2 ML/M2
LEFT VENTRICLE SYSTOLIC VOLUME: 86.21 ML
LEFT VENTRICULAR INTERNAL DIMENSION IN DIASTOLE: 5.22 CM (ref 3.5–6)
LEFT VENTRICULAR MASS: 177.53 G
LEUKOCYTE ESTERASE UR QL STRIP: NEGATIVE
LV LATERAL E/E' RATIO: 20 M/S
LV SEPTAL E/E' RATIO: 15 M/S
LVOT MG: 4.99 MMHG
LVOT MV: 1.08 CM/S
LYMPHOCYTES # BLD AUTO: 2.6 K/UL (ref 1–4.8)
LYMPHOCYTES NFR BLD: 35 % (ref 18–48)
MCH RBC QN AUTO: 31.2 PG (ref 27–31)
MCHC RBC AUTO-ENTMCNC: 34.4 G/DL (ref 32–36)
MCV RBC AUTO: 91 FL (ref 82–98)
MONOCYTES # BLD AUTO: 0.7 K/UL (ref 0.3–1)
MONOCYTES NFR BLD: 9.6 % (ref 4–15)
MV PEAK A VEL: 1.14 M/S
MV PEAK E VEL: 1.2 M/S
MV STENOSIS PRESSURE HALF TIME: 58.11 MS
MV VALVE AREA P 1/2 METHOD: 3.79 CM2
NEUTROPHILS # BLD AUTO: 3.6 K/UL (ref 1.8–7.7)
NEUTROPHILS NFR BLD: 48.3 % (ref 38–73)
NITRITE UR QL STRIP: NEGATIVE
NONHDLC SERPL-MCNC: 113 MG/DL
NRBC BLD-RTO: 0 /100 WBC
PH UR STRIP: 6 [PH] (ref 5–8)
PISA AR MAX VEL: 4.34 M/S
PISA MRMAX VEL: 4.42 M/S
PISA TR MAX VEL: 2.33 M/S
PLATELET # BLD AUTO: 300 K/UL (ref 150–450)
PMV BLD AUTO: 9.3 FL (ref 9.2–12.9)
POCT GLUCOSE: 92 MG/DL (ref 70–110)
PROT UR QL STRIP: ABNORMAL
PV MEAN GRADIENT: 1 MMHG
RA MAJOR: 3.31 CM
RA PRESSURE ESTIMATED: 3 MMHG
RA WIDTH: 2.2 CM
RBC # BLD AUTO: 4.23 M/UL (ref 4.6–6.2)
RV TB RVSP: 5 MMHG
SP GR UR STRIP: >1.03 (ref 1–1.03)
STJ: 2.59 CM
TDI LATERAL: 0.06 M/S
TDI SEPTAL: 0.08 M/S
TDI: 0.07 M/S
TR MAX PG: 22 MMHG
TR MEAN GRADIENT: 19 MMHG
TRICUSPID ANNULAR PLANE SYSTOLIC EXCURSION: 1.84 CM
TRIGL SERPL-MCNC: 145 MG/DL (ref 30–150)
TROPONIN I SERPL DL<=0.01 NG/ML-MCNC: 0.71 NG/ML (ref 0–0.03)
TV REST PULMONARY ARTERY PRESSURE: 25 MMHG
URN SPEC COLLECT METH UR: ABNORMAL
UROBILINOGEN UR STRIP-ACNC: NEGATIVE EU/DL
WBC # BLD AUTO: 7.42 K/UL (ref 3.9–12.7)
Z-SCORE OF LEFT VENTRICULAR DIMENSION IN END DIASTOLE: -0.63
Z-SCORE OF LEFT VENTRICULAR DIMENSION IN END SYSTOLE: 1.94

## 2023-09-18 PROCEDURE — 27000221 HC OXYGEN, UP TO 24 HOURS

## 2023-09-18 PROCEDURE — 84484 ASSAY OF TROPONIN QUANT: CPT | Performed by: NURSE PRACTITIONER

## 2023-09-18 PROCEDURE — 93010 ELECTROCARDIOGRAM REPORT: CPT | Mod: ,,, | Performed by: INTERNAL MEDICINE

## 2023-09-18 PROCEDURE — 99406 PT REFUSED TOBACCO CESSATION: ICD-10-PCS | Mod: S$GLB,,,

## 2023-09-18 PROCEDURE — 25000003 PHARM REV CODE 250: Performed by: INTERNAL MEDICINE

## 2023-09-18 PROCEDURE — 25500020 PHARM REV CODE 255: Performed by: INTERNAL MEDICINE

## 2023-09-18 PROCEDURE — 99152 PR MOD CONSCIOUS SEDATION, SAME PHYS, 5+ YRS, FIRST 15 MIN: ICD-10-PCS | Mod: ,,, | Performed by: INTERNAL MEDICINE

## 2023-09-18 PROCEDURE — 81003 URINALYSIS AUTO W/O SCOPE: CPT | Performed by: THORACIC SURGERY (CARDIOTHORACIC VASCULAR SURGERY)

## 2023-09-18 PROCEDURE — C1887 CATHETER, GUIDING: HCPCS | Performed by: INTERNAL MEDICINE

## 2023-09-18 PROCEDURE — 93010 EKG 12-LEAD: ICD-10-PCS | Mod: ,,, | Performed by: INTERNAL MEDICINE

## 2023-09-18 PROCEDURE — 63600175 PHARM REV CODE 636 W HCPCS: Performed by: THORACIC SURGERY (CARDIOTHORACIC VASCULAR SURGERY)

## 2023-09-18 PROCEDURE — C1894 INTRO/SHEATH, NON-LASER: HCPCS | Performed by: INTERNAL MEDICINE

## 2023-09-18 PROCEDURE — 20000000 HC ICU ROOM

## 2023-09-18 PROCEDURE — 85025 COMPLETE CBC W/AUTO DIFF WBC: CPT | Performed by: EMERGENCY MEDICINE

## 2023-09-18 PROCEDURE — 63600175 PHARM REV CODE 636 W HCPCS: Performed by: INTERNAL MEDICINE

## 2023-09-18 PROCEDURE — 99152 MOD SED SAME PHYS/QHP 5/>YRS: CPT | Performed by: INTERNAL MEDICINE

## 2023-09-18 PROCEDURE — 93005 ELECTROCARDIOGRAM TRACING: CPT

## 2023-09-18 PROCEDURE — 25000003 PHARM REV CODE 250: Performed by: NURSE PRACTITIONER

## 2023-09-18 PROCEDURE — 99152 MOD SED SAME PHYS/QHP 5/>YRS: CPT | Mod: ,,, | Performed by: INTERNAL MEDICINE

## 2023-09-18 PROCEDURE — 83880 ASSAY OF NATRIURETIC PEPTIDE: CPT | Performed by: THORACIC SURGERY (CARDIOTHORACIC VASCULAR SURGERY)

## 2023-09-18 PROCEDURE — 99900035 HC TECH TIME PER 15 MIN (STAT)

## 2023-09-18 PROCEDURE — 99223 1ST HOSP IP/OBS HIGH 75: CPT | Mod: 25,,, | Performed by: INTERNAL MEDICINE

## 2023-09-18 PROCEDURE — 63600175 PHARM REV CODE 636 W HCPCS: Performed by: NURSE PRACTITIONER

## 2023-09-18 PROCEDURE — 36215 PLACE CATHETER IN ARTERY: CPT | Mod: LT | Performed by: INTERNAL MEDICINE

## 2023-09-18 PROCEDURE — 36415 COLL VENOUS BLD VENIPUNCTURE: CPT | Performed by: EMERGENCY MEDICINE

## 2023-09-18 PROCEDURE — 36415 COLL VENOUS BLD VENIPUNCTURE: CPT | Performed by: NURSE PRACTITIONER

## 2023-09-18 PROCEDURE — 85730 THROMBOPLASTIN TIME PARTIAL: CPT | Mod: 91 | Performed by: THORACIC SURGERY (CARDIOTHORACIC VASCULAR SURGERY)

## 2023-09-18 PROCEDURE — 93454 CORONARY ARTERY ANGIO S&I: CPT | Performed by: INTERNAL MEDICINE

## 2023-09-18 PROCEDURE — 99223 PR INITIAL HOSPITAL CARE,LEVL III: ICD-10-PCS | Mod: 25,,, | Performed by: INTERNAL MEDICINE

## 2023-09-18 PROCEDURE — 93455 CORONARY ART/GRFT ANGIO S&I: CPT | Mod: 26,,, | Performed by: INTERNAL MEDICINE

## 2023-09-18 PROCEDURE — 99406 BEHAV CHNG SMOKING 3-10 MIN: CPT | Mod: S$GLB,,,

## 2023-09-18 PROCEDURE — 36415 COLL VENOUS BLD VENIPUNCTURE: CPT | Performed by: HOSPITALIST

## 2023-09-18 PROCEDURE — 85730 THROMBOPLASTIN TIME PARTIAL: CPT | Performed by: HOSPITALIST

## 2023-09-18 PROCEDURE — 63600175 PHARM REV CODE 636 W HCPCS: Performed by: EMERGENCY MEDICINE

## 2023-09-18 PROCEDURE — 99153 MOD SED SAME PHYS/QHP EA: CPT | Performed by: INTERNAL MEDICINE

## 2023-09-18 PROCEDURE — 84134 ASSAY OF PREALBUMIN: CPT | Performed by: THORACIC SURGERY (CARDIOTHORACIC VASCULAR SURGERY)

## 2023-09-18 PROCEDURE — C1769 GUIDE WIRE: HCPCS | Performed by: INTERNAL MEDICINE

## 2023-09-18 PROCEDURE — 94799 UNLISTED PULMONARY SVC/PX: CPT

## 2023-09-18 PROCEDURE — 27201423 OPTIME MED/SURG SUP & DEVICES STERILE SUPPLY: Performed by: INTERNAL MEDICINE

## 2023-09-18 PROCEDURE — 36415 COLL VENOUS BLD VENIPUNCTURE: CPT | Performed by: THORACIC SURGERY (CARDIOTHORACIC VASCULAR SURGERY)

## 2023-09-18 PROCEDURE — 99900031 HC PATIENT EDUCATION (STAT)

## 2023-09-18 PROCEDURE — 93567 NJX CAR CTH SPRVLV AORTGRPHY: CPT | Performed by: INTERNAL MEDICINE

## 2023-09-18 PROCEDURE — 93455 PR CATH PLACE/CORONARY ANGIO, IMG SUPER/INTERP, BYPASS ANGIO: ICD-10-PCS | Mod: 26,,, | Performed by: INTERNAL MEDICINE

## 2023-09-18 RX ORDER — CHLORHEXIDINE GLUCONATE ORAL RINSE 1.2 MG/ML
10 SOLUTION DENTAL
Status: DISCONTINUED | OUTPATIENT
Start: 2023-09-18 | End: 2023-09-19 | Stop reason: HOSPADM

## 2023-09-18 RX ORDER — DEXTROSE MONOHYDRATE AND SODIUM CHLORIDE 5; .9 G/100ML; G/100ML
INJECTION, SOLUTION INTRAVENOUS CONTINUOUS
Status: DISCONTINUED | OUTPATIENT
Start: 2023-09-18 | End: 2023-09-18

## 2023-09-18 RX ORDER — NITROGLYCERIN 5 MG/ML
INJECTION, SOLUTION INTRAVENOUS
Status: DISCONTINUED | OUTPATIENT
Start: 2023-09-18 | End: 2023-09-18 | Stop reason: HOSPADM

## 2023-09-18 RX ORDER — ACETAMINOPHEN 325 MG/1
650 TABLET ORAL EVERY 4 HOURS PRN
Status: DISCONTINUED | OUTPATIENT
Start: 2023-09-18 | End: 2023-09-19

## 2023-09-18 RX ORDER — HEPARIN SODIUM 1000 [USP'U]/ML
INJECTION INTRAVENOUS; SUBCUTANEOUS
Status: DISCONTINUED | OUTPATIENT
Start: 2023-09-18 | End: 2023-09-18 | Stop reason: HOSPADM

## 2023-09-18 RX ORDER — NITROGLYCERIN 20 MG/100ML
10 INJECTION INTRAVENOUS CONTINUOUS
Status: DISCONTINUED | OUTPATIENT
Start: 2023-09-18 | End: 2023-09-20

## 2023-09-18 RX ORDER — FENTANYL CITRATE 50 UG/ML
INJECTION, SOLUTION INTRAMUSCULAR; INTRAVENOUS
Status: DISCONTINUED | OUTPATIENT
Start: 2023-09-18 | End: 2023-09-18 | Stop reason: HOSPADM

## 2023-09-18 RX ORDER — METOPROLOL TARTRATE 1 MG/ML
INJECTION, SOLUTION INTRAVENOUS
Status: DISCONTINUED | OUTPATIENT
Start: 2023-09-18 | End: 2023-09-18 | Stop reason: HOSPADM

## 2023-09-18 RX ORDER — SODIUM CHLORIDE 9 MG/ML
INJECTION, SOLUTION INTRAVENOUS CONTINUOUS
Status: ACTIVE | OUTPATIENT
Start: 2023-09-18 | End: 2023-09-19

## 2023-09-18 RX ORDER — CEFAZOLIN SODIUM 2 G/50ML
2 SOLUTION INTRAVENOUS
Status: DISCONTINUED | OUTPATIENT
Start: 2023-09-18 | End: 2023-09-19 | Stop reason: HOSPADM

## 2023-09-18 RX ORDER — VERAPAMIL HYDROCHLORIDE 2.5 MG/ML
INJECTION, SOLUTION INTRAVENOUS
Status: DISCONTINUED | OUTPATIENT
Start: 2023-09-18 | End: 2023-09-18 | Stop reason: HOSPADM

## 2023-09-18 RX ORDER — MIDAZOLAM HYDROCHLORIDE 1 MG/ML
INJECTION, SOLUTION INTRAMUSCULAR; INTRAVENOUS
Status: DISCONTINUED | OUTPATIENT
Start: 2023-09-18 | End: 2023-09-18 | Stop reason: HOSPADM

## 2023-09-18 RX ORDER — TALC
6 POWDER (GRAM) TOPICAL NIGHTLY PRN
Status: DISCONTINUED | OUTPATIENT
Start: 2023-09-18 | End: 2023-10-03 | Stop reason: HOSPADM

## 2023-09-18 RX ORDER — ONDANSETRON 8 MG/1
8 TABLET, ORALLY DISINTEGRATING ORAL EVERY 8 HOURS PRN
Status: DISCONTINUED | OUTPATIENT
Start: 2023-09-18 | End: 2023-09-19

## 2023-09-18 RX ORDER — LIDOCAINE HYDROCHLORIDE 20 MG/ML
INJECTION, SOLUTION EPIDURAL; INFILTRATION; INTRACAUDAL; PERINEURAL
Status: DISCONTINUED | OUTPATIENT
Start: 2023-09-18 | End: 2023-09-18 | Stop reason: HOSPADM

## 2023-09-18 RX ORDER — NITROGLYCERIN 20 MG/100ML
INJECTION INTRAVENOUS
Status: DISCONTINUED | OUTPATIENT
Start: 2023-09-18 | End: 2023-09-19 | Stop reason: HOSPADM

## 2023-09-18 RX ADMIN — ATORVASTATIN CALCIUM 80 MG: 40 TABLET, FILM COATED ORAL at 08:09

## 2023-09-18 RX ADMIN — ASPIRIN 81 MG CHEWABLE TABLET 81 MG: 81 TABLET CHEWABLE at 08:09

## 2023-09-18 RX ADMIN — HEPARIN SODIUM 12 UNITS/KG/HR: 10000 INJECTION, SOLUTION INTRAVENOUS at 09:09

## 2023-09-18 RX ADMIN — DEXTROSE AND SODIUM CHLORIDE: 5; 900 INJECTION, SOLUTION INTRAVENOUS at 01:09

## 2023-09-18 RX ADMIN — NITROGLYCERIN 10 MCG/MIN: 20 INJECTION INTRAVENOUS at 07:09

## 2023-09-18 RX ADMIN — ISOSORBIDE MONONITRATE 30 MG: 30 TABLET, EXTENDED RELEASE ORAL at 08:09

## 2023-09-18 RX ADMIN — HEPARIN SODIUM 12 UNITS/KG/HR: 10000 INJECTION, SOLUTION INTRAVENOUS at 05:09

## 2023-09-18 RX ADMIN — SODIUM CHLORIDE: 9 INJECTION, SOLUTION INTRAVENOUS at 08:09

## 2023-09-18 RX ADMIN — Medication 6 MG: at 08:09

## 2023-09-18 RX ADMIN — METOPROLOL TARTRATE 25 MG: 25 TABLET, FILM COATED ORAL at 08:09

## 2023-09-18 NOTE — ASSESSMENT & PLAN NOTE
"Patient presents with NSTEMI. Chest pain is currently controlled. STUART score is 1. Patient is currently on NSTEMI Pathway.    EKG reviewed. Troponins reviewed and results noted-   Recent Labs   Lab 09/17/23 2134   TROPONINI 0.709*   .     Lipid panel reviewed and shows-     No results found for: "LDLCALC"  No results found for: "TRIG"      Medical management includes; Beta Blocker, Anticoagulation, High Intensity Statin and Nitrate Echo has not been performed. Latest ECHO results are as follows- No results found for this or any previous visit.  .   Consult for cardiac rehab is ordered. Patient counseled on lifestyle modifications- quit smoking, follow a low fat, low cholesterol diet and reduce exposure to stress. Cardiology is consulted. Plan of care reviewed with cardiology team. Continue to monitor patient closely and adjust therapy as needed.      "

## 2023-09-18 NOTE — PLAN OF CARE
Problem: Arrhythmia/Dysrhythmia (Cardiac Catheterization)  Goal: Stable Heart Rate and Rhythm  Outcome: Ongoing, Progressing

## 2023-09-18 NOTE — HOSPITAL COURSE
43 year old male who presented to ED for NSTEMI. Reports onset of chest pain several months ago exacerbated by activity. EKG shows ST and T Wave abnormality. EKG shows mildly reduced systolic function with a visually estimated ejection fraction of 40 - 45%. Grade II diastolic dysfunction. Seen by cardiology who plans for perform LHC  today.

## 2023-09-18 NOTE — ASSESSMENT & PLAN NOTE
Troponin 0.6->-0.709->0.708  Cont hep gtt  EKG with ST T wave abnml  LHC planned for today  All risks, benefits and treatment alternatives explained, all questions answered. Pt agreeable to proceed.   NPO

## 2023-09-18 NOTE — PHARMACY MED REC
"Admission Medication History     The home medication history was taken by Sonam Deleon.    You may go to "Admission" then "Reconcile Home Medications" tabs to review and/or act upon these items.     The home medication list has been updated by the Pharmacy department.   Please read ALL comments highlighted in yellow.   Please address this information as you see fit.    Feel free to contact us if you have any questions or require assistance.      The medications listed below were removed from the home medication list. Please reorder if appropriate:  Patient reports no longer taking the following medication(s):    TORADOL 10MG      Medications listed below were obtained from: Patient/family and Analytic software- [employer]  (Not in a hospital admission)      Sonam Deleon  [id number]      Current Outpatient Medications on File Prior to Encounter   Medication Sig Dispense Refill Last Dose                           .          "

## 2023-09-18 NOTE — CONSULTS
Food & Nutrition Education     Diet Education: Cardiac Nutrition Therapy  Time Spent: 10 minutes  Learners: Patient     Nutrition Education provided with handouts:  Cardiac-TLC Nutrition Therapy, Low-Sodium Nutrition Therapy (nutritioncaremanual.org)     Comments:  43 y.o. male w/o significant medical history on file.  Presented to the ER for evaluation of chest pain which started earlier today shortly after patient began doing yd work around the house.  Patient describes the pain as a sharp stabbing sensation that radiated to his left arm.  Patient states symptoms have been going on for few months intermittently.  Denies any previous cardiac history or any medication treatment to help resolve symptoms prior to arrival to our facility.  Associated symptoms include nausea and sweating.  Patient does report chronic cough which is likely secondary to his 1 pack per day smoking of cigarettes.  Patient states that he did take 2 aspirin prior to arrival common but is unsure of the dosing of the medication.  Denies any palpitations, shortness of breath, headache, lightheadedness, dizziness, fever, aches, chills, sweats, abdominal pain common bladder/bowel complaints, or any other symptoms at this time.  ER workup mostly unremarkable with the exception to elevated troponin of 0.60.     Dietitian educated patient on low sodium, general healthful diet r/t recent hospital diagnosis. Recommended a well-balanced diet with a variety of fresh foods, fruits and vegetables (5 cups/day), whole grains (3 oz/day), and fat-free or low-fat dairy. Discussed reading food packages, food labels, and nutrition facts labels to identify nutrient content of foods.     Discussed the importance of limiting sodium to less than 2,000 mg per day. Recommended salt free seasonings and other herbs and spices in meals to enhance flavor without additional sodium.     Discussed dietary sources of cholesterol, the importance of incorporating healthy fats  into the diet, and avoiding saturated and trans fats for heart health. For a generally healthy diet, aim for total fat less than 25-35% of calories. Discussed alternative vegetable protein options for some of her meal throughout the week (Beans, peas, and lentils)     Discussed the importance of fiber (especially soluble fiber), dietary sources, and a goal intake of >20-30g/day.    The pt remained engaged throughout entire nutrition education. The pt reports he understands how to eat a healthy diet but has chosen not to as of lately. The pt states he plans to incorporate old dietary habits back into weekly dietary pattern. RD will continue to monitor.      NFPE not performed, pt appears well nourished.  All questions and concerns answered.  Provided handout with dietitian's contact information.  *Please re-consult as needed.  Thank You!  Braeden Wade, Registration Eligible, Provisional LDN

## 2023-09-18 NOTE — PATIENT INSTRUCTIONS
Patient is ready to quit smoking and would like a smoking cessation appointment.  Patient refuses nicotine patch at this time.

## 2023-09-18 NOTE — SUBJECTIVE & OBJECTIVE
History reviewed. No pertinent past medical history.    History reviewed. No pertinent surgical history.    Review of patient's allergies indicates:  No Known Allergies    No current facility-administered medications on file prior to encounter.     Current Outpatient Medications on File Prior to Encounter   Medication Sig    [DISCONTINUED] ketorolac (TORADOL) 10 mg tablet Take 1 tablet (10 mg total) by mouth every 6 (six) hours. (Patient not taking: Reported on 9/1/2020)     Family History       Problem Relation (Age of Onset)    Cataracts Maternal Aunt, Maternal Uncle    Hypertension Father    Macular degeneration Father    Strabismus Mother          Tobacco Use    Smoking status: Every Day     Current packs/day: 1.00     Types: Cigarettes    Smokeless tobacco: Never   Substance and Sexual Activity    Alcohol use: Yes     Comment: rarely    Drug use: No    Sexual activity: Not on file     Review of Systems   Respiratory:  Negative for cough, shortness of breath and wheezing.    Cardiovascular:  Positive for chest pain. Negative for palpitations.   All other systems reviewed and are negative.    Objective:     Vital Signs (Most Recent):  Temp: 98.7 °F (37.1 °C) (09/17/23 2045)  Pulse: 63 (09/17/23 2045)  Resp: 16 (09/17/23 2045)  BP: 138/70 (09/17/23 2045)  SpO2: 96 % (09/17/23 2045) Vital Signs (24h Range):  Temp:  [98.1 °F (36.7 °C)-98.7 °F (37.1 °C)] 98.7 °F (37.1 °C)  Pulse:  [] 63  Resp:  [16-20] 16  SpO2:  [96 %-100 %] 96 %  BP: (138-204)/() 138/70     Weight: 81.7 kg (180 lb 1.9 oz)  Body mass index is 27.39 kg/m².     Physical Exam  Vitals and nursing note reviewed.   Constitutional:       General: He is awake. He is not in acute distress.     Appearance: Normal appearance. He is well-developed and well-groomed. He is not ill-appearing, toxic-appearing or diaphoretic.   HENT:      Head: Normocephalic and atraumatic.   Eyes:      Extraocular Movements: Extraocular movements intact.       Conjunctiva/sclera: Conjunctivae normal.      Pupils: Pupils are equal, round, and reactive to light.   Cardiovascular:      Rate and Rhythm: Normal rate and regular rhythm.      Pulses: Normal pulses.      Heart sounds: Normal heart sounds. No murmur heard.  Pulmonary:      Effort: Pulmonary effort is normal.      Breath sounds: Normal breath sounds.   Abdominal:      General: Bowel sounds are normal.      Palpations: Abdomen is soft.      Tenderness: There is no abdominal tenderness.   Musculoskeletal:      Cervical back: Normal range of motion and neck supple.      Comments: 5/5 strength throughout   Skin:     General: Skin is warm and dry.      Capillary Refill: Capillary refill takes less than 2 seconds.   Neurological:      General: No focal deficit present.      Mental Status: He is alert and oriented to person, place, and time. Mental status is at baseline.      GCS: GCS eye subscore is 4. GCS verbal subscore is 5. GCS motor subscore is 6.      Cranial Nerves: Cranial nerves 2-12 are intact.      Sensory: Sensation is intact.      Motor: Motor function is intact.      Coordination: Coordination is intact.   Psychiatric:         Mood and Affect: Mood normal.         Behavior: Behavior normal. Behavior is cooperative.              CRANIAL NERVES     CN III, IV, VI   Pupils are equal, round, and reactive to light.     LABS:  Recent Results (from the past 24 hour(s))   CBC auto differential    Collection Time: 09/17/23  6:27 PM   Result Value Ref Range    WBC 8.04 3.90 - 12.70 K/uL    RBC 4.89 4.60 - 6.20 M/uL    Hemoglobin 15.0 14.0 - 18.0 g/dL    Hematocrit 43.5 40.0 - 54.0 %    MCV 89 82 - 98 fL    MCH 30.7 27.0 - 31.0 pg    MCHC 34.5 32.0 - 36.0 g/dL    RDW 12.5 11.5 - 14.5 %    Platelets 324 150 - 450 K/uL    MPV 8.8 (L) 9.2 - 12.9 fL    Immature Granulocytes 0.2 0.0 - 0.5 %    Gran # (ANC) 4.7 1.8 - 7.7 K/uL    Immature Grans (Abs) 0.02 0.00 - 0.04 K/uL    Lymph # 2.4 1.0 - 4.8 K/uL    Mono # 0.7 0.3 - 1.0  K/uL    Eos # 0.2 0.0 - 0.5 K/uL    Baso # 0.07 0.00 - 0.20 K/uL    nRBC 0 0 /100 WBC    Gran % 58.1 38.0 - 73.0 %    Lymph % 29.9 18.0 - 48.0 %    Mono % 8.7 4.0 - 15.0 %    Eosinophil % 2.2 0.0 - 8.0 %    Basophil % 0.9 0.0 - 1.9 %    Differential Method Automated    Comprehensive metabolic panel    Collection Time: 09/17/23  6:27 PM   Result Value Ref Range    Sodium 141 136 - 145 mmol/L    Potassium 3.7 3.5 - 5.1 mmol/L    Chloride 106 95 - 110 mmol/L    CO2 20 (L) 23 - 29 mmol/L    Glucose 115 (H) 70 - 110 mg/dL    BUN 14 6 - 20 mg/dL    Creatinine 1.1 0.5 - 1.4 mg/dL    Calcium 9.6 8.7 - 10.5 mg/dL    Total Protein 7.4 6.0 - 8.4 g/dL    Albumin 4.1 3.5 - 5.2 g/dL    Total Bilirubin 0.3 0.1 - 1.0 mg/dL    Alkaline Phosphatase 69 55 - 135 U/L    AST 18 10 - 40 U/L    ALT 16 10 - 44 U/L    eGFR >60 >60 mL/min/1.73 m^2    Anion Gap 15 8 - 16 mmol/L   APTT    Collection Time: 09/17/23  6:27 PM   Result Value Ref Range    aPTT 25.6 21.0 - 32.0 sec   Troponin I    Collection Time: 09/17/23  6:27 PM   Result Value Ref Range    Troponin I 0.600 (H) 0.000 - 0.026 ng/mL   Protime-INR    Collection Time: 09/17/23  6:27 PM   Result Value Ref Range    Prothrombin Time 10.5 9.0 - 12.5 sec    INR 1.0 0.8 - 1.2   HIV 1/2 Ag/Ab (4th Gen)    Collection Time: 09/17/23  6:34 PM   Result Value Ref Range    HIV 1/2 Ag/Ab Negative Negative   Hepatitis C Antibody    Collection Time: 09/17/23  6:34 PM   Result Value Ref Range    Hepatitis C Ab Negative Negative   HCV Virus Hold Specimen    Collection Time: 09/17/23  6:34 PM   Result Value Ref Range    HEP C Virus Hold Specimen Hold for HCV sendout    Troponin I    Collection Time: 09/17/23  9:34 PM   Result Value Ref Range    Troponin I 0.709 (H) 0.000 - 0.026 ng/mL   Type & Screen    Collection Time: 09/17/23  9:34 PM   Result Value Ref Range    Group & Rh A POS     Indirect Shawn NEG     Specimen Outdate 09/20/2023 23:59        RADIOLOGY  X-Ray Chest AP Portable    Result Date:  9/17/2023  EXAMINATION: XR CHEST AP PORTABLE CLINICAL HISTORY: chest pain; TECHNIQUE: Single frontal view of the chest was performed. COMPARISON: None FINDINGS: Postsurgical changes along the anterior upper mid chest likely involving the mediastinumthe lungs are clear, with normal appearance of pulmonary vasculature and no pleural effusion or pneumothorax. The cardiac silhouette is prominent.  The hilar and mediastinal contours are unremarkable. Bones are intact.     No acute abnormality. Electronically signed by: Balaji Goode Date:    09/17/2023 Time:    19:57      EKG    MICROBIOLOGY    MDM     Amount and/or Complexity of Data Reviewed  Clinical lab tests: reviewed  Tests in the radiology section of CPT®: reviewed  Tests in the medicine section of CPT®: reviewed  Discussion of test results with the performing providers: yes  Decide to obtain previous medical records or to obtain history from someone other than the patient: yes  Obtain history from someone other than the patient: yes  Review and summarize past medical records: yes  Discuss the patient with other providers: yes  Independent visualization of images, tracings, or specimens: yes

## 2023-09-18 NOTE — HPI
Mikie Alcazar is a 43 y.o. male with a PMH  has no past medical history on file.  Presented to the ER for evaluation of chest pain which started earlier today shortly after patient began doing yd work around the house.  Patient describes the pain as a sharp stabbing sensation that radiated to his left arm.  Patient states symptoms have been going on for few months intermittently.  Denies any previous cardiac history or any medication treatment to help resolve symptoms prior to arrival to our facility.  Associated symptoms include nausea and sweating.  Patient does report chronic cough which is likely secondary to his 1 pack per day smoking of cigarettes.  Patient states that he did take 2 aspirin prior to arrival common but is unsure of the dosing of the medication.  Denies any palpitations, shortness of breath, headache, lightheadedness, dizziness, fever, aches, chills, sweats, abdominal pain common bladder/bowel complaints, or any other symptoms at this time.  ER workup mostly unremarkable with the exception to elevated troponin of 0.60.  Chest x-ray negative for acute cardiopulmonary findings.  EKG revealed normal sinus rhythm with a ventricular rate of 98 beats per minute and a QT/QTC of 390/497.  Patient received 162 mg aspirin and 0.4 mg sublingual nitro in ED which resolved symptoms. Cardiology consulted. Dr. Campos recommended initiation of heparin drip, beta blocker, statin, and Imdur with plans for left heart catheterization tomorrow morning.  Hospital consulted to admit patient to hospital. Patient is in agreement with treatment plan. Patient will be admitted under inpatient status.    Cardiology consulted to assist with management. Pt seen and examined today, resting in bed mother at bedside. He reports his pain started 6+ months ago and worsening in the last few weeks happening daily with associated SOB, dizziness radiating to left arm. He reports his pain is sharp and pressure-like at times. Pt  reports daily use a marijuana, h/o cocaine and other drugs 5-10 years ago. Drinks occasionally. Echo pending cont hep gtt

## 2023-09-18 NOTE — PLAN OF CARE
[facility] - Telemetry (Hospital)  Initial Discharge Assessment       Primary Care Provider: Lissette, Primary Doctor    Admission Diagnosis: Smoking [F17.200]  Non-ST elevation myocardial infarction (NSTEMI) [I21.4]  NSTEMI (non-ST elevation myocardial infarction) [I21.4]  NSTEMI (non-ST elevated myocardial infarction) [I21.4]  Chest pain [R07.9]    Admission Date: 9/17/2023  Expected Discharge Date:     Transition of Care Barriers: Underinsured    Payor: MEDICAID / Plan: [place] / Product Type: Managed Medicaid /     Extended Emergency Contact Information  Primary Emergency Contact: Taylor Alcazar   [facility]  Home Phone: [phone]  Relation: Mother    Discharge Plan A: Home with family       No Pharmacies Listed    Initial Assessment (most recent)       Adult Discharge Assessment - 09/18/23 1111          Discharge Assessment    Assessment Type Discharge Planning Assessment     Confirmed/corrected address, phone number and insurance Yes     Confirmed Demographics Correct on Facesheet     Source of Information patient;family     When was your last doctors appointment? 09/06/23   Myles Harris OD - Ophthalmology    Communicated JOLENE with patient/caregiver Date not available/Unable to determine     Reason For Admission NSTEMI (non-ST elevated myocardial infarction)     People in Home parent(s)     Facility Arrived From: home     Do you expect to return to your current living situation? Yes     Do you have help at home or someone to help you manage your care at home? Yes     Who are your caregiver(s) and their phone number(s)? Taylor Alcazar - mother     Prior to hospitilization cognitive status: Alert/Oriented     Current cognitive status: Alert/Oriented     Walking or Climbing Stairs --   independent    Dressing/Bathing --   independent    Home Accessibility wheelchair accessible     Equipment Currently Used at Home none     Readmission within 30 days? No     Patient currently being  followed by outpatient case management? No     Do you currently have service(s) that help you manage your care at home? No     Do you take prescription medications? Yes     Do you have prescription coverage? Yes     Coverage [employer]     Do you have any problems affording any of your prescribed medications? No     Is the patient taking medications as prescribed? yes     Who is going to help you get home at discharge? Taylor Sebastian mother     How do you get to doctors appointments? car, drives self     Are you on dialysis? No     Do you take coumadin? No     DME Needed Upon Discharge  none     Discharge Plan discussed with: Patient;Parent(s)     Name(s) and Number(s) Taylor hancock     Transition of Care Barriers Underinsured     Discharge Plan A Home with family                   Anticipated DC dispo: home with family   Prior Level of Function: independent with ADLs  People in home: Taylor hancock    Comments:  SW met with patient and mother at bedside to introduce role and discuss discharge planning. Patient's mother, Taylor, will be help at home and can provide transport at time of discharge. Confirmed demographics, insurance ,and emergency contacts. SW updated Patient's whiteboard with contact information and anticipated DC plan. CM discharge needs depends on hospital progress. SW will continue following to assist with other needs.

## 2023-09-18 NOTE — PLAN OF CARE
Patient updated on plan of care. Instructed  patient to use call light, call light within reach. Hourly rounding performed. Fall precautions maintained; bed alarm on. Vitals [id number]. Chart check complete. Education provided, questions encouraged. Rhythm-SR on tele box # [id number]. Awaiting heart cath-scheduled for today.      Problem: Adult Inpatient Plan of Care  Goal: Plan of Care Review  Outcome: Ongoing, Progressing

## 2023-09-18 NOTE — BRIEF OP NOTE
INPATIENT Operative Note         SUMMARY     Surgery Date: 9/18/2023     Surgeon(s) and Role:     * Chelsea Morales MD - Primary    ASSISTANT:none    Pre-op Diagnosis:  nstemi      Post-op Diagnosis: nstemi    Procedure(s) (LRB):  Left heart cath (Left)  ARTERIOGRAM, AORTIC ROOT (N/A)  Angiogram Internal Mammary (Left)  ARTERIOGRAM, SUBCLAVIAN (Left)    COMPLICATION:none    Anesthesia: RN IV Sedation    Findings/Key Components:  Severe lmca disease with timi1 flow in lad   Lcx ostial 50%   Rca prox 80%   Ef 50%   Severe ai    Lima good quality vessel for cabg .    Estimated Blood Loss: < 50 ML.         SPECIMEN: NONE    Devices/Prostetics: None    PLAN:   Routine post cath care

## 2023-09-18 NOTE — ASSESSMENT & PLAN NOTE
Patient presents with NSTEMI. Chest pain is currently controlled. STUART score is 1. Patient is currently on NSTEMI Pathway.    EKG reviewed. Troponins reviewed and results noted-   Recent Labs   Lab 09/18/23  0022   TROPONINI 0.708*   .     Lipid panel reviewed and shows-     Lab Results   Component Value Date    LDLCALC 84.0 09/17/2023     Lab Results   Component Value Date    TRIG 145 09/17/2023         Medical management includes; Beta Blocker, Anticoagulation, High Intensity Statin and Nitrate Echo has not been performed. Latest ECHO results are as follows- No results found for this or any previous visit.  .   Consult for cardiac rehab is ordered. Patient counseled on lifestyle modifications- quit smoking, follow a low fat, low cholesterol diet and reduce exposure to stress. Cardiology is consulted. Plan of care reviewed with cardiology team. Continue to monitor patient closely and adjust therapy as needed.      9/18/23  [facility] planned today. Echocardiogram shows a mildly decreased EF. Continue cardiac meds

## 2023-09-18 NOTE — CONSULTS
[facility] Telemetry ([facility])  Cardiology  Consult Note    Patient Name: Mikie Alcazar  MRN: [id number]  Admission Date: 9/17/2023  Hospital Length of Stay: 1 days  Code Status: Full Code   Attending Provider: Low Gutierrez MD   Consulting Provider: Courtney Prince NP  Primary Care Physician: Lissette, Primary Doctor  Principal Problem:NSTEMI (non-ST elevated myocardial infarction)    Patient information was obtained from patient and ER records.     Inpatient consult to Cardiology  Consult performed by: Courtney Prince NP  Consult ordered by: Supa Reeves NP        Subjective:     Chief Complaint:  Chest pain     HPI:   Mikie Alcazar is a 43 y.o. male with a PMH  has no past medical history on file.  Presented to the ER for evaluation of chest pain which started earlier today shortly after patient began doing yd work around the house.  Patient describes the pain as a sharp stabbing sensation that radiated to his left arm.  Patient states symptoms have been going on for few months intermittently.  Denies any previous cardiac history or any medication treatment to help resolve symptoms prior to arrival to our facility.  Associated symptoms include nausea and sweating.  Patient does report chronic cough which is likely secondary to his 1 pack per day smoking of cigarettes.  Patient states that he did take 2 aspirin prior to arrival common but is unsure of the dosing of the medication.  Denies any palpitations, shortness of breath, headache, lightheadedness, dizziness, fever, aches, chills, sweats, abdominal pain common bladder/bowel complaints, or any other symptoms at this time.  ER workup mostly unremarkable with the exception to elevated troponin of 0.60.  Chest x-ray negative for acute cardiopulmonary findings.  EKG revealed normal sinus rhythm with a ventricular rate of 98 beats per minute and a QT/QTC of 390/497.  Patient received 162 mg aspirin and 0.4 mg sublingual nitro in ED which resolved symptoms.  Cardiology consulted. Dr. Campos recommended initiation of heparin drip, beta blocker, statin, and Imdur with plans for left heart catheterization tomorrow morning.  Hospital consulted to admit patient to hospital. Patient is in agreement with treatment plan. Patient will be admitted under inpatient status.    Cardiology consulted to assist with management. Pt seen and examined today, resting in bed mother at bedside. He reports his pain started 6+ months ago and worsening in the last few weeks happening daily with associated SOB, dizziness radiating to left arm. He reports his pain is sharp and pressure-like at times. Pt reports daily use a marijuana, h/o cocaine and other drugs 5-10 years ago. Drinks occasionally. Echo pending cont hep gtt      History reviewed. No pertinent past medical history.    History reviewed. No pertinent surgical history.    Review of patient's allergies indicates:  No Known Allergies    No current facility-administered medications on file prior to encounter.     No current outpatient medications on file prior to encounter.     Family History       Problem Relation (Age of Onset)    Cataracts Maternal Aunt, Maternal Uncle    Hypertension Father    Macular degeneration Father    Strabismus Mother          Tobacco Use    Smoking status: Every Day     Current packs/day: 1.00     Types: Cigarettes    Smokeless tobacco: Never   Substance and Sexual Activity    Alcohol use: Yes     Comment: rarely    Drug use: No    Sexual activity: Not on file     Review of Systems   Constitutional: Positive for malaise/fatigue.   HENT: Negative.     Eyes: Negative.    Cardiovascular:  Positive for chest pain.   Respiratory:  Positive for shortness of breath.    Skin: Negative.    Musculoskeletal:  Positive for back pain and myalgias.   Gastrointestinal: Negative.    Genitourinary: Negative.    Neurological:  Positive for dizziness.   Psychiatric/Behavioral: Negative.       Objective:     Vital Signs (Most  Recent):  Temp: 97.4 °F (36.3 °C) (09/18/23 1115)  Pulse: 66 (09/18/23 1115)  Resp: 17 (09/18/23 1115)  BP: 106/63 (09/18/23 1115)  SpO2: 97 % (09/18/23 1115) Vital Signs (24h Range):  Temp:  [97.1 °F (36.2 °C)-98.7 °F (37.1 °C)] 97.4 °F (36.3 °C)  Pulse:  [] 66  Resp:  [16-20] 17  SpO2:  [94 %-100 %] 97 %  BP: (106-204)/() 106/63     Weight: 81.6 kg (180 lb)  Body mass index is 27.37 kg/m².    SpO2: 97 %       No intake or output data in the 24 hours ending 09/18/23 1207    Lines/Drains/Airways       Peripheral Intravenous Line  Duration                  Peripheral IV - Single Lumen 09/17/23 1821 20 G Anterior;Left Hand <1 day         Peripheral IV - Single Lumen 09/17/23 1825 18 G Anterior;Proximal;Right Forearm <1 day                     Physical Exam  Vitals and nursing note reviewed.   Constitutional:       Appearance: Normal appearance.   HENT:      Head: Normocephalic and atraumatic.   Eyes:      General:         Right eye: No discharge.         Left eye: No discharge.      Pupils: Pupils are equal, round, and reactive to light.   Cardiovascular:      Rate and Rhythm: Normal rate and regular rhythm.      Heart sounds: S1 normal and S2 normal. No murmur heard.     No friction rub.   Pulmonary:      Effort: Pulmonary effort is normal. No respiratory distress.      Breath sounds: Normal breath sounds. No rales.   Abdominal:      Palpations: Abdomen is soft.      Tenderness: There is no abdominal tenderness.   Musculoskeletal:      Cervical back: Neck supple.      Right lower leg: No edema.      Left lower leg: No edema.   Skin:     General: Skin is warm and dry.   Neurological:      General: No focal deficit present.      Mental Status: He is alert and oriented to person, place, and time.   Psychiatric:         Mood and Affect: Mood normal.         Behavior: Behavior normal.         Thought Content: Thought content normal.          Significant Labs: BMP:   Recent Labs   Lab 09/17/23  1827   *       K 3.7      CO2 20*   BUN 14   CREATININE 1.1   CALCIUM 9.6   , CMP   Recent Labs   Lab 09/17/23 1827      K 3.7      CO2 20*   *   BUN 14   CREATININE 1.1   CALCIUM 9.6   PROT 7.4   ALBUMIN 4.1   BILITOT 0.3   ALKPHOS 69   AST 18   ALT 16   ANIONGAP 15   , CBC   Recent Labs   Lab 09/17/23 1827 09/18/23  0531   WBC 8.04 7.42   HGB 15.0 13.2*   HCT 43.5 38.4*    300   , INR   Recent Labs   Lab 09/17/23 1827   INR 1.0   , Lipid Panel   Recent Labs   Lab 09/17/23 1827   CHOL 150   HDL 37*   LDLCALC 84.0   TRIG 145   CHOLHDL 24.7   , Troponin   Recent Labs   Lab 09/17/23 1827 09/17/23 2134 09/18/23  0022   TROPONINI 0.600* 0.709* 0.708*   , and All pertinent lab results from the last 24 hours have been reviewed.    Significant Imaging: Echocardiogram: Transthoracic echo (TTE) complete (Cupid Only):   Results for orders placed or performed during the hospital encounter of 09/17/23   Echo   Result Value Ref Range    BSA 1.98 m2    LVOT stroke volume 82.76 cm3    LVIDd 5.22 3.5 - 6.0 cm    LV Systolic Volume 86.21 mL    LV Systolic Volume Index 44.2 mL/m2    LVIDs 4.37 (A) 2.1 - 4.0 cm    LV Diastolic Volume 130.82 mL    LV Diastolic Volume Index 67.09 mL/m2    IVS 0.88 0.6 - 1.1 cm    LVOT diameter 1.91 cm    LVOT area 2.9 cm2    FS 16 (A) 28 - 44 %    Left Ventricle Relative Wall Thickness 0.38 cm    Posterior Wall 0.98 0.6 - 1.1 cm    LV mass 177.53 g    LV Mass Index 91 g/m2    MV Peak E Bob 1.20 m/s    TDI LATERAL 0.06 m/s    TDI SEPTAL 0.08 m/s    E/E' ratio 17.14 m/s    MV Peak A Bob 1.14 m/s    TR Max Bob 2.33 m/s    E/A ratio 1.05     IVRT 72.31 msec    E wave deceleration time 200.37 msec    LV SEPTAL E/E' RATIO 15.00 m/s    LV LATERAL E/E' RATIO 20.00 m/s    LVOT peak bob 1.38 m/s    Left Ventricular Outflow Tract Mean Velocity 1.08 cm/s    Left Ventricular Outflow Tract Mean Gradient 4.99 mmHg    LA size 3.79 cm    Left Atrium Minor Axis 4.68 cm    Left Atrium  Major Axis 4.27 cm    RVOT peak VTI 10.8 cm    TAPSE 1.84 cm    RA Major Axis 3.31 cm    AV regurgitation pressure 1/2 time 456.00919508368929 ms    AR Max Bob 4.34 m/s    AV mean gradient 14 mmHg    AV peak gradient 26 mmHg    Ao peak bob 2.54 m/s    Ao VTI 54.00 cm    LVOT peak VTI 28.90 cm    AV valve area 1.53 cm²    AV Velocity Ratio 0.54     AV index (prosthetic) 0.54     SARAH by Velocity Ratio 1.56 cm²    Mr max bob 4.42 m/s    MV stenosis pressure 1/2 time 58.11 ms    MV valve area p 1/2 method 3.79 cm2    TV mean gradient 19 mmHg    Triscuspid Valve Regurgitation Peak Gradient 22 mmHg    PV mean gradient 1 mmHg    RVOT peak bob 0.58 m/s    Ao root annulus 2.71 cm    STJ 2.59 cm    Ascending aorta 2.36 cm    IVC diameter 1.57 cm    Mean e' 0.07 m/s    ZLVIDS 1.94     ZLVIDD -0.63     LA Volume Index 22.9 mL/m2    LA volume 44.60 cm3    LA WIDTH 3.1 cm    RA Width 2.2 cm    TV resting pulmonary artery pressure 25 mmHg    RV TB RVSP 5 mmHg    Est. RA pres 3 mmHg    Narrative      Left Ventricle: The left ventricle is normal in size. Ventricular mass   is normal. Normal wall thickness. regional wall motion abnormalities   present. There is mildly reduced systolic function with a visually   estimated ejection fraction of 40 - 45%. Grade II diastolic dysfunction.    Right Ventricle: Normal right ventricular cavity size. Wall thickness   is normal. Right ventricle wall motion  is normal. Systolic function is   normal.    Aortic Valve: There is mild to moderate aortic regurgitation.    Pulmonary Artery: The estimated pulmonary artery systolic pressure is   25 mmHg.    IVC/SVC: Normal venous pressure at 3 mmHg.     , EKG: reviewed, Stress Test: reviewed, and X-Ray: CXR: X-Ray Chest 1 View (CXR): No results found for this visit on 09/17/23.    Assessment and Plan:     * NSTEMI (non-ST elevated myocardial infarction)  Troponin 0.6->-0.709->0.708  Cont hep gtt  EKG with ST T wave abnml  LHC planned for today  All  risks, benefits and treatment alternatives explained, all questions answered. Pt agreeable to proceed.   NPO    Hypertension  stable        VTE Risk Mitigation (From admission, onward)         Ordered     heparin 25,000 units in dextrose 5% (100 units/ml) IV bolus from bag - ADDITIONAL PRN BOLUS - 60 units/kg (max bolus 4000 units)  As needed (PRN)        Question:  Heparin Infusion Adjustment (DO NOT MODIFY ANSWER)  Answer:  [email or web address]\epic\Images\Pharmacy\HeparinInfusions\heparin LOW INTENSITY nomogram for [facility] [id number].pdf    09/17/23 1911     heparin 25,000 units in dextrose 5% (100 units/ml) IV bolus from bag - ADDITIONAL PRN BOLUS - 30 units/kg (max bolus 4000 units)  As needed (PRN)        Question:  Heparin Infusion Adjustment (DO NOT MODIFY ANSWER)  Answer:  [email or web address]\epic\Images\Pharmacy\HeparinInfusions\heparin LOW INTENSITY nomogram for [facility] [id number].pdf    09/17/23 1911     IP VTE LOW RISK PATIENT  Once         09/17/23 1941     heparin 25,000 units in dextrose 5% 250 mL (100 units/mL) infusion LOW INTENSITY nomogram - [facility]  Continuous        Question Answer Comment   Heparin Infusion Adjustment (DO NOT MODIFY ANSWER) \\[email or web address]\epic\Images\Pharmacy\HeparinInfusions\heparin LOW INTENSITY nomogram for [facility] [id number].pdf    Begin at (in units/kg/hr) 12        09/17/23 1911                Thank you for your consult. I will follow-up with patient. Please contact us if you have any additional questions.    Courtney Prince, NP  Cardiology   [facility] - Telemetry ([facility])

## 2023-09-18 NOTE — H&P (VIEW-ONLY)
[facility] Telemetry ([facility])  Cardiology  Consult Note    Patient Name: Mikie Alcazar  MRN: [id number]  Admission Date: 9/17/2023  Hospital Length of Stay: 1 days  Code Status: Full Code   Attending Provider: Low Gutierrez MD   Consulting Provider: Courtney Prince NP  Primary Care Physician: Lissette, Primary Doctor  Principal Problem:NSTEMI (non-ST elevated myocardial infarction)    Patient information was obtained from patient and ER records.     Inpatient consult to Cardiology  Consult performed by: Courtney Prince NP  Consult ordered by: Supa Reeves NP        Subjective:     Chief Complaint:  Chest pain     HPI:   Mikie Alcazar is a 43 y.o. male with a PMH  has no past medical history on file.  Presented to the ER for evaluation of chest pain which started earlier today shortly after patient began doing yd work around the house.  Patient describes the pain as a sharp stabbing sensation that radiated to his left arm.  Patient states symptoms have been going on for few months intermittently.  Denies any previous cardiac history or any medication treatment to help resolve symptoms prior to arrival to our facility.  Associated symptoms include nausea and sweating.  Patient does report chronic cough which is likely secondary to his 1 pack per day smoking of cigarettes.  Patient states that he did take 2 aspirin prior to arrival common but is unsure of the dosing of the medication.  Denies any palpitations, shortness of breath, headache, lightheadedness, dizziness, fever, aches, chills, sweats, abdominal pain common bladder/bowel complaints, or any other symptoms at this time.  ER workup mostly unremarkable with the exception to elevated troponin of 0.60.  Chest x-ray negative for acute cardiopulmonary findings.  EKG revealed normal sinus rhythm with a ventricular rate of 98 beats per minute and a QT/QTC of 390/497.  Patient received 162 mg aspirin and 0.4 mg sublingual nitro in ED which resolved symptoms.  Cardiology consulted. Dr. Campos recommended initiation of heparin drip, beta blocker, statin, and Imdur with plans for left heart catheterization tomorrow morning.  Hospital consulted to admit patient to hospital. Patient is in agreement with treatment plan. Patient will be admitted under inpatient status.    Cardiology consulted to assist with management. Pt seen and examined today, resting in bed mother at bedside. He reports his pain started 6+ months ago and worsening in the last few weeks happening daily with associated SOB, dizziness radiating to left arm. He reports his pain is sharp and pressure-like at times. Pt reports daily use a marijuana, h/o cocaine and other drugs 5-10 years ago. Drinks occasionally. Echo pending cont hep gtt      History reviewed. No pertinent past medical history.    History reviewed. No pertinent surgical history.    Review of patient's allergies indicates:  No Known Allergies    No current facility-administered medications on file prior to encounter.     No current outpatient medications on file prior to encounter.     Family History       Problem Relation (Age of Onset)    Cataracts Maternal Aunt, Maternal Uncle    Hypertension Father    Macular degeneration Father    Strabismus Mother          Tobacco Use    Smoking status: Every Day     Current packs/day: 1.00     Types: Cigarettes    Smokeless tobacco: Never   Substance and Sexual Activity    Alcohol use: Yes     Comment: rarely    Drug use: No    Sexual activity: Not on file     Review of Systems   Constitutional: Positive for malaise/fatigue.   HENT: Negative.     Eyes: Negative.    Cardiovascular:  Positive for chest pain.   Respiratory:  Positive for shortness of breath.    Skin: Negative.    Musculoskeletal:  Positive for back pain and myalgias.   Gastrointestinal: Negative.    Genitourinary: Negative.    Neurological:  Positive for dizziness.   Psychiatric/Behavioral: Negative.       Objective:     Vital Signs (Most  Recent):  Temp: 97.4 °F (36.3 °C) (09/18/23 1115)  Pulse: 66 (09/18/23 1115)  Resp: 17 (09/18/23 1115)  BP: 106/63 (09/18/23 1115)  SpO2: 97 % (09/18/23 1115) Vital Signs (24h Range):  Temp:  [97.1 °F (36.2 °C)-98.7 °F (37.1 °C)] 97.4 °F (36.3 °C)  Pulse:  [] 66  Resp:  [16-20] 17  SpO2:  [94 %-100 %] 97 %  BP: (106-204)/() 106/63     Weight: 81.6 kg (180 lb)  Body mass index is 27.37 kg/m².    SpO2: 97 %       No intake or output data in the 24 hours ending 09/18/23 1207    Lines/Drains/Airways       Peripheral Intravenous Line  Duration                  Peripheral IV - Single Lumen 09/17/23 1821 20 G Anterior;Left Hand <1 day         Peripheral IV - Single Lumen 09/17/23 1825 18 G Anterior;Proximal;Right Forearm <1 day                     Physical Exam  Vitals and nursing note reviewed.   Constitutional:       Appearance: Normal appearance.   HENT:      Head: Normocephalic and atraumatic.   Eyes:      General:         Right eye: No discharge.         Left eye: No discharge.      Pupils: Pupils are equal, round, and reactive to light.   Cardiovascular:      Rate and Rhythm: Normal rate and regular rhythm.      Heart sounds: S1 normal and S2 normal. No murmur heard.     No friction rub.   Pulmonary:      Effort: Pulmonary effort is normal. No respiratory distress.      Breath sounds: Normal breath sounds. No rales.   Abdominal:      Palpations: Abdomen is soft.      Tenderness: There is no abdominal tenderness.   Musculoskeletal:      Cervical back: Neck supple.      Right lower leg: No edema.      Left lower leg: No edema.   Skin:     General: Skin is warm and dry.   Neurological:      General: No focal deficit present.      Mental Status: He is alert and oriented to person, place, and time.   Psychiatric:         Mood and Affect: Mood normal.         Behavior: Behavior normal.         Thought Content: Thought content normal.          Significant Labs: BMP:   Recent Labs   Lab 09/17/23  1827   *       K 3.7      CO2 20*   BUN 14   CREATININE 1.1   CALCIUM 9.6   , CMP   Recent Labs   Lab 09/17/23 1827      K 3.7      CO2 20*   *   BUN 14   CREATININE 1.1   CALCIUM 9.6   PROT 7.4   ALBUMIN 4.1   BILITOT 0.3   ALKPHOS 69   AST 18   ALT 16   ANIONGAP 15   , CBC   Recent Labs   Lab 09/17/23 1827 09/18/23  0531   WBC 8.04 7.42   HGB 15.0 13.2*   HCT 43.5 38.4*    300   , INR   Recent Labs   Lab 09/17/23 1827   INR 1.0   , Lipid Panel   Recent Labs   Lab 09/17/23 1827   CHOL 150   HDL 37*   LDLCALC 84.0   TRIG 145   CHOLHDL 24.7   , Troponin   Recent Labs   Lab 09/17/23 1827 09/17/23 2134 09/18/23  0022   TROPONINI 0.600* 0.709* 0.708*   , and All pertinent lab results from the last 24 hours have been reviewed.    Significant Imaging: Echocardiogram: Transthoracic echo (TTE) complete ([facility] Only):   Results for orders placed or performed during the hospital encounter of 09/17/23   Echo   Result Value Ref Range    BSA 1.98 m2    LVOT stroke volume 82.76 cm3    LVIDd 5.22 3.5 - 6.0 cm    LV Systolic Volume 86.21 mL    LV Systolic Volume Index 44.2 mL/m2    LVIDs 4.37 (A) 2.1 - 4.0 cm    LV Diastolic Volume 130.82 mL    LV Diastolic Volume Index 67.09 mL/m2    IVS 0.88 0.6 - 1.1 cm    LVOT diameter 1.91 cm    LVOT area 2.9 cm2    FS 16 (A) 28 - 44 %    Left Ventricle Relative Wall Thickness 0.38 cm    Posterior Wall 0.98 0.6 - 1.1 cm    LV mass 177.53 g    LV Mass Index 91 g/m2    MV Peak E Bob 1.20 m/s    TDI LATERAL 0.06 m/s    TDI SEPTAL 0.08 m/s    E/E' ratio 17.14 m/s    MV Peak A Bob 1.14 m/s    TR Max Bob 2.33 m/s    E/A ratio 1.05     IVRT 72.31 msec    E wave deceleration time 200.37 msec    LV SEPTAL E/E' RATIO 15.00 m/s    LV LATERAL E/E' RATIO 20.00 m/s    LVOT peak bob 1.38 m/s    Left Ventricular Outflow Tract Mean Velocity 1.08 cm/s    Left Ventricular Outflow Tract Mean Gradient 4.99 mmHg    LA size 3.79 cm    Left Atrium Minor Axis 4.68 cm    Left Atrium  Major Axis 4.27 cm    RVOT peak VTI 10.8 cm    TAPSE 1.84 cm    RA Major Axis 3.31 cm    AV regurgitation pressure 1/2 time 456.19368834753946 ms    AR Max Bob 4.34 m/s    AV mean gradient 14 mmHg    AV peak gradient 26 mmHg    Ao peak bob 2.54 m/s    Ao VTI 54.00 cm    LVOT peak VTI 28.90 cm    AV valve area 1.53 cm²    AV Velocity Ratio 0.54     AV index (prosthetic) 0.54     SARAH by Velocity Ratio 1.56 cm²    Mr max bob 4.42 m/s    MV stenosis pressure 1/2 time 58.11 ms    MV valve area p 1/2 method 3.79 cm2    TV mean gradient 19 mmHg    Triscuspid Valve Regurgitation Peak Gradient 22 mmHg    PV mean gradient 1 mmHg    RVOT peak bob 0.58 m/s    Ao root annulus 2.71 cm    STJ 2.59 cm    Ascending aorta 2.36 cm    IVC diameter 1.57 cm    Mean e' 0.07 m/s    ZLVIDS 1.94     ZLVIDD -0.63     LA Volume Index 22.9 mL/m2    LA volume 44.60 cm3    LA WIDTH 3.1 cm    RA Width 2.2 cm    TV resting pulmonary artery pressure 25 mmHg    RV TB RVSP 5 mmHg    Est. RA pres 3 mmHg    Narrative      Left Ventricle: The left ventricle is normal in size. Ventricular mass   is normal. Normal wall thickness. regional wall motion abnormalities   present. There is mildly reduced systolic function with a visually   estimated ejection fraction of 40 - 45%. Grade II diastolic dysfunction.    Right Ventricle: Normal right ventricular cavity size. Wall thickness   is normal. Right ventricle wall motion  is normal. Systolic function is   normal.    Aortic Valve: There is mild to moderate aortic regurgitation.    Pulmonary Artery: The estimated pulmonary artery systolic pressure is   25 mmHg.    IVC/SVC: Normal venous pressure at 3 mmHg.     , EKG: reviewed, Stress Test: reviewed, and X-Ray: CXR: X-Ray Chest 1 View (CXR): No results found for this visit on 09/17/23.    Assessment and Plan:     * NSTEMI (non-ST elevated myocardial infarction)  Troponin 0.6->-0.709->0.708  Cont hep gtt  EKG with ST T wave abnml  LHC planned for today  All  risks, benefits and treatment alternatives explained, all questions answered. Pt agreeable to proceed.   NPO    Hypertension  stable        VTE Risk Mitigation (From admission, onward)         Ordered     heparin 25,000 units in dextrose 5% (100 units/ml) IV bolus from bag - ADDITIONAL PRN BOLUS - 60 units/kg (max bolus 4000 units)  As needed (PRN)        Question:  Heparin Infusion Adjustment (DO NOT MODIFY ANSWER)  Answer:  [email or web address]\epic\Images\Pharmacy\HeparinInfusions\heparin LOW INTENSITY nomogram for [facility] [id number].pdf    09/17/23 1911     heparin 25,000 units in dextrose 5% (100 units/ml) IV bolus from bag - ADDITIONAL PRN BOLUS - 30 units/kg (max bolus 4000 units)  As needed (PRN)        Question:  Heparin Infusion Adjustment (DO NOT MODIFY ANSWER)  Answer:  \\[email or web address]\epic\Images\Pharmacy\HeparinInfusions\heparin LOW INTENSITY nomogram for [facility] [id number].pdf    09/17/23 1911     IP VTE LOW RISK PATIENT  Once         09/17/23 1941     heparin 25,000 units in dextrose 5% 250 mL (100 units/mL) infusion LOW INTENSITY nomogram - [facility]  Continuous        Question Answer Comment   Heparin Infusion Adjustment (DO NOT MODIFY ANSWER) [email or web address]\epic\Images\Pharmacy\HeparinInfusions\heparin LOW INTENSITY nomogram for [facility] [id number].pdf    Begin at (in units/kg/hr) 12        09/17/23 1911                Thank you for your consult. I will follow-up with patient. Please contact us if you have any additional questions.    Courtney Prince, NP  Cardiology   [facility] - Telemetry ([facility])

## 2023-09-18 NOTE — H&P
[facility] Medicine  History & Physical    Patient Name: Mikie Alcazar  MRN: [id number]  Patient Class: IP- Inpatient  Admission Date: 9/17/2023  Attending Physician: Manjinder Reeves MD   Primary Care Provider: Lissette Primary Doctor         Patient information was obtained from patient, past medical records and ER records.     Subjective:     Principal Problem:NSTEMI (non-ST elevated myocardial infarction)    Chief Complaint:   Chief Complaint   Patient presents with    Chest Pain     Pt c/o anterior chest pain with radiation to both arms x1 hour. Pt denies n/v. Endorses dizziness. Pt appears mildly SOB.         HPI: Mikie Alcazar is a 43 y.o. male with a PMH  has no past medical history on file.  Presented to the ER for evaluation of chest pain which started earlier today shortly after patient began doing yd work around the house.  Patient describes the pain as a sharp stabbing sensation that radiated to his left arm.  Patient states symptoms have been going on for few months intermittently.  Denies any previous cardiac history or any medication treatment to help resolve symptoms prior to arrival to our facility.  Associated symptoms include nausea and sweating.  Patient does report chronic cough which is likely secondary to his 1 pack per day smoking of cigarettes.  Patient states that he did take 2 aspirin prior to arrival common but is unsure of the dosing of the medication.  Denies any palpitations, shortness of breath, headache, lightheadedness, dizziness, fever, aches, chills, sweats, abdominal pain common bladder/bowel complaints, or any other symptoms at this time.  ER workup mostly unremarkable with the exception to elevated troponin of 0.60.  Chest x-ray negative for acute cardiopulmonary findings.  EKG revealed normal sinus rhythm with a ventricular rate of 98 beats per minute and a QT/QTC of 390/497.  Patient received 162 mg aspirin and 0.4 mg sublingual nitro in ED  which resolved symptoms.  Cardiology consulted.  Dr. Campos recommended initiation of heparin drip, beta blocker, statin, and Imdur with plans for left heart catheterization tomorrow morning.  Hospital consulted to admit patient to hospital.  Patient is in agreement with treatment plan.  Patient will be admitted under inpatient status.    PCP: No, Primary Doctor                      History reviewed. No pertinent past medical history.    History reviewed. No pertinent surgical history.    Review of patient's allergies indicates:  No Known Allergies    No current facility-administered medications on file prior to encounter.     Current Outpatient Medications on File Prior to Encounter   Medication Sig    [DISCONTINUED] ketorolac (TORADOL) 10 mg tablet Take 1 tablet (10 mg total) by mouth every 6 (six) hours. (Patient not taking: Reported on 9/1/2020)     Family History       Problem Relation (Age of Onset)    Cataracts Maternal Aunt, Maternal Uncle    Hypertension Father    Macular degeneration Father    Strabismus Mother          Tobacco Use    Smoking status: Every Day     Current packs/day: 1.00     Types: Cigarettes    Smokeless tobacco: Never   Substance and Sexual Activity    Alcohol use: Yes     Comment: rarely    Drug use: No    Sexual activity: Not on file     Review of Systems   Respiratory:  Negative for cough, shortness of breath and wheezing.    Cardiovascular:  Positive for chest pain. Negative for palpitations.   All other systems reviewed and are negative.    Objective:     Vital Signs (Most Recent):  Temp: 98.7 °F (37.1 °C) (09/17/23 2045)  Pulse: 63 (09/17/23 2045)  Resp: 16 (09/17/23 2045)  BP: 138/70 (09/17/23 2045)  SpO2: 96 % (09/17/23 2045) Vital Signs (24h Range):  Temp:  [98.1 °F (36.7 °C)-98.7 °F (37.1 °C)] 98.7 °F (37.1 °C)  Pulse:  [] 63  Resp:  [16-20] 16  SpO2:  [96 %-100 %] 96 %  BP: (138-204)/() 138/70     Weight: 81.7 kg (180 lb 1.9 oz)  Body mass index is 27.39  kg/m².     Physical Exam  Vitals and nursing note reviewed.   Constitutional:       General: He is awake. He is not in acute distress.     Appearance: Normal appearance. He is well-developed and well-groomed. He is not ill-appearing, toxic-appearing or diaphoretic.   HENT:      Head: Normocephalic and atraumatic.   Eyes:      Extraocular Movements: Extraocular movements intact.      Conjunctiva/sclera: Conjunctivae normal.      Pupils: Pupils are equal, round, and reactive to light.   Cardiovascular:      Rate and Rhythm: Normal rate and regular rhythm.      Pulses: Normal pulses.      Heart sounds: Normal heart sounds. No murmur heard.  Pulmonary:      Effort: Pulmonary effort is normal.      Breath sounds: Normal breath sounds.   Abdominal:      General: Bowel sounds are normal.      Palpations: Abdomen is soft.      Tenderness: There is no abdominal tenderness.   Musculoskeletal:      Cervical back: Normal range of motion and neck supple.      Comments: 5/5 strength throughout   Skin:     General: Skin is warm and dry.      Capillary Refill: Capillary refill takes less than 2 seconds.   Neurological:      General: No focal deficit present.      Mental Status: He is alert and oriented to person, place, and time. Mental status is at baseline.      GCS: GCS eye subscore is 4. GCS verbal subscore is 5. GCS motor subscore is 6.      Cranial Nerves: Cranial nerves 2-12 are intact.      Sensory: Sensation is intact.      Motor: Motor function is intact.      Coordination: Coordination is intact.   Psychiatric:         Mood and Affect: Mood normal.         Behavior: Behavior normal. Behavior is cooperative.              CRANIAL NERVES     CN III, IV, VI   Pupils are equal, round, and reactive to light.     LABS:  Recent Results (from the past 24 hour(s))   CBC auto differential    Collection Time: 09/17/23  6:27 PM   Result Value Ref Range    WBC 8.04 3.90 - 12.70 K/uL    RBC 4.89 4.60 - 6.20 M/uL    Hemoglobin 15.0 14.0  - 18.0 g/dL    Hematocrit 43.5 40.0 - 54.0 %    MCV 89 82 - 98 fL    MCH 30.7 27.0 - 31.0 pg    MCHC 34.5 32.0 - 36.0 g/dL    RDW 12.5 11.5 - 14.5 %    Platelets 324 150 - 450 K/uL    MPV 8.8 (L) 9.2 - 12.9 fL    Immature Granulocytes 0.2 0.0 - 0.5 %    Gran # (ANC) 4.7 1.8 - 7.7 K/uL    Immature Grans (Abs) 0.02 0.00 - 0.04 K/uL    Lymph # 2.4 1.0 - 4.8 K/uL    Mono # 0.7 0.3 - 1.0 K/uL    Eos # 0.2 0.0 - 0.5 K/uL    Baso # 0.07 0.00 - 0.20 K/uL    nRBC 0 0 /100 WBC    Gran % 58.1 38.0 - 73.0 %    Lymph % 29.9 18.0 - 48.0 %    Mono % 8.7 4.0 - 15.0 %    Eosinophil % 2.2 0.0 - 8.0 %    Basophil % 0.9 0.0 - 1.9 %    Differential Method Automated    Comprehensive metabolic panel    Collection Time: 09/17/23  6:27 PM   Result Value Ref Range    Sodium 141 136 - 145 mmol/L    Potassium 3.7 3.5 - 5.1 mmol/L    Chloride 106 95 - 110 mmol/L    CO2 20 (L) 23 - 29 mmol/L    Glucose 115 (H) 70 - 110 mg/dL    BUN 14 6 - 20 mg/dL    Creatinine 1.1 0.5 - 1.4 mg/dL    Calcium 9.6 8.7 - 10.5 mg/dL    Total Protein 7.4 6.0 - 8.4 g/dL    Albumin 4.1 3.5 - 5.2 g/dL    Total Bilirubin 0.3 0.1 - 1.0 mg/dL    Alkaline Phosphatase 69 55 - 135 U/L    AST 18 10 - 40 U/L    ALT 16 10 - 44 U/L    eGFR >60 >60 mL/min/1.73 m^2    Anion Gap 15 8 - 16 mmol/L   APTT    Collection Time: 09/17/23  6:27 PM   Result Value Ref Range    aPTT 25.6 21.0 - 32.0 sec   Troponin I    Collection Time: 09/17/23  6:27 PM   Result Value Ref Range    Troponin I 0.600 (H) 0.000 - 0.026 ng/mL   Protime-INR    Collection Time: 09/17/23  6:27 PM   Result Value Ref Range    Prothrombin Time 10.5 9.0 - 12.5 sec    INR 1.0 0.8 - 1.2   HIV 1/2 Ag/Ab (4th Gen)    Collection Time: 09/17/23  6:34 PM   Result Value Ref Range    HIV 1/2 Ag/Ab Negative Negative   Hepatitis C Antibody    Collection Time: 09/17/23  6:34 PM   Result Value Ref Range    Hepatitis C Ab Negative Negative   HCV Virus Hold Specimen    Collection Time: 09/17/23  6:34 PM   Result Value Ref Range    HEP C  "Virus Hold Specimen Hold for HCV sendout    Troponin I    Collection Time: 09/17/23  9:34 PM   Result Value Ref Range    Troponin I 0.709 (H) 0.000 - 0.026 ng/mL   Type & Screen    Collection Time: 09/17/23  9:34 PM   Result Value Ref Range    Group & Rh A POS     Indirect Shawn NEG     Specimen Outdate 09/20/2023 23:59        RADIOLOGY  X-Ray Chest AP Portable    Result Date: 9/17/2023  EXAMINATION: XR CHEST AP PORTABLE CLINICAL HISTORY: chest pain; TECHNIQUE: Single frontal view of the chest was performed. COMPARISON: None FINDINGS: Postsurgical changes along the anterior upper mid chest likely involving the mediastinumthe lungs are clear, with normal appearance of pulmonary vasculature and no pleural effusion or pneumothorax. The cardiac silhouette is prominent.  The hilar and mediastinal contours are unremarkable. Bones are intact.     No acute abnormality. Electronically signed by: Balaji Goode Date:    09/17/2023 Time:    19:57      EKG    MICROBIOLOGY    MDM     Amount and/or Complexity of Data Reviewed  Clinical lab tests: reviewed  Tests in the radiology section of CPT®: reviewed  Tests in the medicine section of CPT®: reviewed  Discussion of test results with the performing providers: yes  Decide to obtain previous medical records or to obtain history from someone other than the patient: yes  Obtain history from someone other than the patient: yes  Review and summarize past medical records: yes  Discuss the patient with other providers: yes  Independent visualization of images, tracings, or specimens: yes          Assessment/Plan:     * NSTEMI (non-ST elevated myocardial infarction)  Patient presents with NSTEMI. Chest pain is currently controlled. STUART score is 1. Patient is currently on NSTEMI Pathway.    EKG reviewed. Troponins reviewed and results noted-   Recent Labs   Lab 09/17/23 2134   TROPONINI 0.709*   .     Lipid panel reviewed and shows-     No results found for: "LDLCALC"  No results found " "for: "TRIG"      Medical management includes; Beta Blocker, Anticoagulation, High Intensity Statin and Nitrate Echo has not been performed. Latest ECHO results are as follows- No results found for this or any previous visit.  .   Consult for cardiac rehab is ordered. Patient counseled on lifestyle modifications- quit smoking, follow a low fat, low cholesterol diet and reduce exposure to stress. Cardiology is consulted. Plan of care reviewed with cardiology team. Continue to monitor patient closely and adjust therapy as needed.          VTE Risk Mitigation (From admission, onward)         Ordered     heparin 25,000 units in dextrose 5% (100 units/ml) IV bolus from bag - ADDITIONAL PRN BOLUS - 60 units/kg (max bolus 4000 units)  As needed (PRN)        Question:  Heparin Infusion Adjustment (DO NOT MODIFY ANSWER)  Answer:  [email or web address]\[email or web address] LOW INTENSITY nomogram for [facility] [id number]    09/17/23 1911     heparin 25,000 units in dextrose 5% (100 units/ml) IV bolus from bag - ADDITIONAL PRN BOLUS - 30 units/kg (max bolus 4000 units)  As needed (PRN)        Question:  Heparin Infusion Adjustment (DO NOT MODIFY ANSWER)  Answer:  \[email or web address]\[email or web address]\[email or web address] LOW INTENSITY nomogram for [facility] [id number]    09/17/23 1911     IP VTE LOW RISK PATIENT  Once         09/17/23 1941     heparin 25,000 units in dextrose 5% 250 mL (100 units/mL) infusion LOW INTENSITY nomogram - [facility]  Continuous        Question Answer Comment   Heparin Infusion Adjustment (DO NOT MODIFY ANSWER) \\[email or web address]\[email or web address]\heparin LOW INTENSITY nomogram for [facility] [id number]    Begin at (in units/kg/hr) 12        09/17/23 1911                 //Core Measures   -DVT proph: SCDs, heparin  -Code status Full    -Surrogate:none    Components of this note were documented using a voice recognition system and are subject to errors not corrected at the time the document was " proof read. Please contact the author for any clarifications.       Supa Reeves NP  Department of Hospital Medicine  [facility] - Telemetry ([facility])

## 2023-09-18 NOTE — SUBJECTIVE & OBJECTIVE
History reviewed. No pertinent past medical history.    History reviewed. No pertinent surgical history.    Review of patient's allergies indicates:  No Known Allergies    No current facility-administered medications on file prior to encounter.     No current outpatient medications on file prior to encounter.     Family History       Problem Relation (Age of Onset)    Cataracts Maternal Aunt, Maternal Uncle    Hypertension Father    Macular degeneration Father    Strabismus Mother          Tobacco Use    Smoking status: Every Day     Current packs/day: 1.00     Types: Cigarettes    Smokeless tobacco: Never   Substance and Sexual Activity    Alcohol use: Yes     Comment: rarely    Drug use: No    Sexual activity: Not on file     Review of Systems   Constitutional: Positive for malaise/fatigue.   HENT: Negative.     Eyes: Negative.    Cardiovascular:  Positive for chest pain.   Respiratory:  Positive for shortness of breath.    Skin: Negative.    Musculoskeletal:  Positive for back pain and myalgias.   Gastrointestinal: Negative.    Genitourinary: Negative.    Neurological:  Positive for dizziness.   Psychiatric/Behavioral: Negative.       Objective:     Vital Signs (Most Recent):  Temp: 97.4 °F (36.3 °C) (09/18/23 1115)  Pulse: 66 (09/18/23 1115)  Resp: 17 (09/18/23 1115)  BP: 106/63 (09/18/23 1115)  SpO2: 97 % (09/18/23 1115) Vital Signs (24h Range):  Temp:  [97.1 °F (36.2 °C)-98.7 °F (37.1 °C)] 97.4 °F (36.3 °C)  Pulse:  [] 66  Resp:  [16-20] 17  SpO2:  [94 %-100 %] 97 %  BP: (106-204)/() 106/63     Weight: 81.6 kg (180 lb)  Body mass index is 27.37 kg/m².    SpO2: 97 %       No intake or output data in the 24 hours ending 09/18/23 1207    Lines/Drains/Airways       Peripheral Intravenous Line  Duration                  Peripheral IV - Single Lumen 09/17/23 1821 20 G Anterior;Left Hand <1 day         Peripheral IV - Single Lumen 09/17/23 1825 18 G Anterior;Proximal;Right Forearm <1 day                      Physical Exam  Vitals and nursing note reviewed.   Constitutional:       Appearance: Normal appearance.   HENT:      Head: Normocephalic and atraumatic.   Eyes:      General:         Right eye: No discharge.         Left eye: No discharge.      Pupils: Pupils are equal, round, and reactive to light.   Cardiovascular:      Rate and Rhythm: Normal rate and regular rhythm.      Heart sounds: S1 normal and S2 normal. No murmur heard.     No friction rub.   Pulmonary:      Effort: Pulmonary effort is normal. No respiratory distress.      Breath sounds: Normal breath sounds. No rales.   Abdominal:      Palpations: Abdomen is soft.      Tenderness: There is no abdominal tenderness.   Musculoskeletal:      Cervical back: Neck supple.      Right lower leg: No edema.      Left lower leg: No edema.   Skin:     General: Skin is warm and dry.   Neurological:      General: No focal deficit present.      Mental Status: He is alert and oriented to person, place, and time.   Psychiatric:         Mood and Affect: Mood normal.         Behavior: Behavior normal.         Thought Content: Thought content normal.          Significant Labs: BMP:   Recent Labs   Lab 09/17/23 1827   *      K 3.7      CO2 20*   BUN 14   CREATININE 1.1   CALCIUM 9.6   , CMP   Recent Labs   Lab 09/17/23 1827      K 3.7      CO2 20*   *   BUN 14   CREATININE 1.1   CALCIUM 9.6   PROT 7.4   ALBUMIN 4.1   BILITOT 0.3   ALKPHOS 69   AST 18   ALT 16   ANIONGAP 15   , CBC   Recent Labs   Lab 09/17/23 1827 09/18/23  0531   WBC 8.04 7.42   HGB 15.0 13.2*   HCT 43.5 38.4*    300   , INR   Recent Labs   Lab 09/17/23 1827   INR 1.0   , Lipid Panel   Recent Labs   Lab 09/17/23 1827   CHOL 150   HDL 37*   LDLCALC 84.0   TRIG 145   CHOLHDL 24.7   , Troponin   Recent Labs   Lab 09/17/23 1827 09/17/23  2134 09/18/23  0022   TROPONINI 0.600* 0.709* 0.708*   , and All pertinent lab results from the last 24 hours have been  reviewed.    Significant Imaging: Echocardiogram: Transthoracic echo (TTE) complete ([facility] Only):   Results for orders placed or performed during the hospital encounter of 09/17/23   Echo   Result Value Ref Range    BSA 1.98 m2    LVOT stroke volume 82.76 cm3    LVIDd 5.22 3.5 - 6.0 cm    LV Systolic Volume 86.21 mL    LV Systolic Volume Index 44.2 mL/m2    LVIDs 4.37 (A) 2.1 - 4.0 cm    LV Diastolic Volume 130.82 mL    LV Diastolic Volume Index 67.09 mL/m2    IVS 0.88 0.6 - 1.1 cm    LVOT diameter 1.91 cm    LVOT area 2.9 cm2    FS 16 (A) 28 - 44 %    Left Ventricle Relative Wall Thickness 0.38 cm    Posterior Wall 0.98 0.6 - 1.1 cm    LV mass 177.53 g    LV Mass Index 91 g/m2    MV Peak E Bob 1.20 m/s    TDI LATERAL 0.06 m/s    TDI SEPTAL 0.08 m/s    E/E' ratio 17.14 m/s    MV Peak A Bob 1.14 m/s    TR Max Bob 2.33 m/s    E/A ratio 1.05     IVRT 72.31 msec    E wave deceleration time 200.37 msec    LV SEPTAL E/E' RATIO 15.00 m/s    LV LATERAL E/E' RATIO 20.00 m/s    LVOT peak bob 1.38 m/s    Left Ventricular Outflow Tract Mean Velocity 1.08 cm/s    Left Ventricular Outflow Tract Mean Gradient 4.99 mmHg    LA size 3.79 cm    Left Atrium Minor Axis 4.68 cm    Left Atrium Major Axis 4.27 cm    RVOT peak VTI 10.8 cm    TAPSE 1.84 cm    RA Major Axis 3.31 cm    AV regurgitation pressure 1/2 time [id number] ms    AR Max Bob 4.34 m/s    AV mean gradient 14 mmHg    AV peak gradient 26 mmHg    Ao peak bob 2.54 m/s    Ao VTI 54.00 cm    LVOT peak VTI 28.90 cm    AV valve area 1.53 cm²    AV Velocity Ratio 0.54     AV index (prosthetic) 0.54     SARAH by Velocity Ratio 1.56 cm²    Mr max bob 4.42 m/s    MV stenosis pressure 1/2 time 58.11 ms    MV valve area p 1/2 method 3.79 cm2    TV mean gradient 19 mmHg    Triscuspid Valve Regurgitation Peak Gradient 22 mmHg    PV mean gradient 1 mmHg    RVOT peak bob 0.58 m/s    Ao root annulus 2.71 cm    STJ 2.59 cm    Ascending aorta 2.36 cm    IVC diameter 1.57 cm    Mean e'  0.07 m/s    ZLVIDS 1.94     ZLVIDD -0.63     LA Volume Index 22.9 mL/m2    LA volume 44.60 cm3    LA WIDTH 3.1 cm    RA Width 2.2 cm    TV resting pulmonary artery pressure 25 mmHg    RV TB RVSP 5 mmHg    Est. RA pres 3 mmHg    Narrative      Left Ventricle: The left ventricle is normal in size. Ventricular mass   is normal. Normal wall thickness. regional wall motion abnormalities   present. There is mildly reduced systolic function with a visually   estimated ejection fraction of 40 - 45%. Grade II diastolic dysfunction.    Right Ventricle: Normal right ventricular cavity size. Wall thickness   is normal. Right ventricle wall motion  is normal. Systolic function is   normal.    Aortic Valve: There is mild to moderate aortic regurgitation.    Pulmonary Artery: The estimated pulmonary artery systolic pressure is   25 mmHg.    IVC/SVC: Normal venous pressure at 3 mmHg.     , EKG: reviewed, Stress Test: reviewed, and X-Ray: CXR: X-Ray Chest 1 View (CXR): No results found for this visit on 09/17/23.

## 2023-09-18 NOTE — SUBJECTIVE & OBJECTIVE
Interval History: plans for stress test today    Review of Systems   Respiratory:  Negative for cough, shortness of breath and wheezing.    Cardiovascular:  Negative for chest pain (improved) and palpitations.   All other systems reviewed and are negative.    Objective:     Vital Signs (Most Recent):  Temp: 97.3 °F (36.3 °C) (09/18/23 1524)  Pulse: 83 (09/18/23 1524)  Resp: 18 (09/18/23 1524)  BP: 113/63 (09/18/23 1524)  SpO2: (!) 94 % (09/18/23 1524) Vital Signs (24h Range):  Temp:  [97.1 °F (36.2 °C)-98.7 °F (37.1 °C)] 97.3 °F (36.3 °C)  Pulse:  [] 83  Resp:  [16-20] 18  SpO2:  [94 %-100 %] 94 %  BP: (106-204)/() 113/63     Weight: 81.6 kg (180 lb)  Body mass index is 27.37 kg/m².    Intake/Output Summary (Last 24 hours) at 9/18/2023 1550  Last data filed at 9/18/2023 1339  Gross per 24 hour   Intake --   Output 250 ml   Net -250 ml         Physical Exam  Vitals and nursing note reviewed.   Constitutional:       General: He is awake. He is not in acute distress.     Appearance: Normal appearance. He is well-developed and well-groomed. He is not ill-appearing, toxic-appearing or diaphoretic.   HENT:      Head: Normocephalic and atraumatic.   Eyes:      Extraocular Movements: Extraocular movements intact.      Conjunctiva/sclera: Conjunctivae normal.      Pupils: Pupils are equal, round, and reactive to light.   Cardiovascular:      Rate and Rhythm: Normal rate and regular rhythm.      Pulses: Normal pulses.      Heart sounds: Normal heart sounds. No murmur heard.  Pulmonary:      Effort: Pulmonary effort is normal.      Breath sounds: Normal breath sounds.   Abdominal:      General: Bowel sounds are normal.      Palpations: Abdomen is soft.      Tenderness: There is no abdominal tenderness.   Musculoskeletal:      Cervical back: Normal range of motion and neck supple.      Comments: 5/5 strength throughout   Skin:     General: Skin is warm and dry.      Capillary Refill: Capillary refill takes less than  2 seconds.   Neurological:      General: No focal deficit present.      Mental Status: He is alert and oriented to person, place, and time. Mental status is at baseline.      GCS: GCS eye subscore is 4. GCS verbal subscore is 5. GCS motor subscore is 6.      Cranial Nerves: Cranial nerves 2-12 are intact.      Sensory: Sensation is intact.      Motor: Motor function is intact.      Coordination: Coordination is intact.   Psychiatric:         Mood and Affect: Mood normal.         Behavior: Behavior normal. Behavior is cooperative.            Significant Labs: All pertinent labs within the past 24 hours have been reviewed.  CBC:   Recent Labs   Lab 09/17/23  1827 09/18/23  0531   WBC 8.04 7.42   HGB 15.0 13.2*   HCT 43.5 38.4*    300     CMP:   Recent Labs   Lab 09/17/23  1827      K 3.7      CO2 20*   *   BUN 14   CREATININE 1.1   CALCIUM 9.6   PROT 7.4   ALBUMIN 4.1   BILITOT 0.3   ALKPHOS 69   AST 18   ALT 16   ANIONGAP 15       Significant Imaging: I have reviewed all pertinent imaging results/findings within the past 24 hours.

## 2023-09-18 NOTE — INTERVAL H&P NOTE
The patient has been examined and the H&P has been reviewed:    I concur with the findings and no changes have occurred since H&P was written.    Procedure risks, benefits and alternative options discussed and understood by patient/family.          Active Hospital Problems    Diagnosis  POA    *NSTEMI (non-ST elevated myocardial infarction) [I21.4]  Unknown    Hypertension [I10]  Unknown      Resolved Hospital Problems   No resolved problems to display.

## 2023-09-18 NOTE — HPI
Mikie Alcazar is a 43 y.o. male with a PMH  has no past medical history on file.  Presented to the ER for evaluation of chest pain which started earlier today shortly after patient began doing yd work around the house.  Patient describes the pain as a sharp stabbing sensation that radiated to his left arm.  Patient states symptoms have been going on for few months intermittently.  Denies any previous cardiac history or any medication treatment to help resolve symptoms prior to arrival to our facility.  Associated symptoms include nausea and sweating.  Patient does report chronic cough which is likely secondary to his 1 pack per day smoking of cigarettes.  Patient states that he did take 2 aspirin prior to arrival common but is unsure of the dosing of the medication.  Denies any palpitations, shortness of breath, headache, lightheadedness, dizziness, fever, aches, chills, sweats, abdominal pain common bladder/bowel complaints, or any other symptoms at this time.  ER workup mostly unremarkable with the exception to elevated troponin of 0.60.  Chest x-ray negative for acute cardiopulmonary findings.  EKG revealed normal sinus rhythm with a ventricular rate of 98 beats per minute and a QT/QTC of 390/497.  Patient received 162 mg aspirin and 0.4 mg sublingual nitro in ED which resolved symptoms.  Cardiology consulted.  Dr. Campos recommended initiation of heparin drip, beta blocker, statin, and Imdur with plans for left heart catheterization tomorrow morning.  Hospital consulted to admit patient to hospital.  Patient is in agreement with treatment plan.  Patient will be admitted under inpatient status.    PCP: No, Primary Doctor

## 2023-09-18 NOTE — PROGRESS NOTES
[facility] Medicine  Progress Note    Patient Name: Mikie Alcazar  MRN: [id number]  Patient Class: IP- Inpatient   Admission Date: 9/17/2023  Length of Stay: 1 days  Attending Physician: Low Gutierrez MD  Primary Care Provider: Lissette, Primary Doctor    Subjective:     Principal Problem:NSTEMI (non-ST elevated myocardial infarction)        HPI:  Mikie Alcazar is a 43 y.o. male with a PMH  has no past medical history on file.  Presented to the ER for evaluation of chest pain which started earlier today shortly after patient began doing yd work around the house.  Patient describes the pain as a sharp stabbing sensation that radiated to his left arm.  Patient states symptoms have been going on for few months intermittently.  Denies any previous cardiac history or any medication treatment to help resolve symptoms prior to arrival to our facility.  Associated symptoms include nausea and sweating.  Patient does report chronic cough which is likely secondary to his 1 pack per day smoking of cigarettes.  Patient states that he did take 2 aspirin prior to arrival common but is unsure of the dosing of the medication.  Denies any palpitations, shortness of breath, headache, lightheadedness, dizziness, fever, aches, chills, sweats, abdominal pain common bladder/bowel complaints, or any other symptoms at this time.  ER workup mostly unremarkable with the exception to elevated troponin of 0.60.  Chest x-ray negative for acute cardiopulmonary findings.  EKG revealed normal sinus rhythm with a ventricular rate of 98 beats per minute and a QT/QTC of 390/497.  Patient received 162 mg aspirin and 0.4 mg sublingual nitro in ED which resolved symptoms.  Cardiology consulted.  Dr. Campos recommended initiation of heparin drip, beta blocker, statin, and Imdur with plans for left heart catheterization tomorrow morning.  Hospital consulted to admit patient to hospital.  Patient is in agreement with treatment plan.   Patient will be admitted under inpatient status.    PCP: Lissette, Primary Doctor                      Overview/Hospital Course:  43 year old male who presented to ED for NSTEMI. Reports onset of chest pain several months ago exacerbated by activity. EKG shows ST and T Wave abnormality. EKG shows mildly reduced systolic function with a visually estimated ejection fraction of 40 - 45%. Grade II diastolic dysfunction. Seen by cardiology who plans for perform LHC  today.       Interval History: plans for stress test today    Review of Systems   Respiratory:  Negative for cough, shortness of breath and wheezing.    Cardiovascular:  Negative for chest pain (improved) and palpitations.   All other systems reviewed and are negative.    Objective:     Vital Signs (Most Recent):  Temp: 97.3 °F (36.3 °C) (09/18/23 1524)  Pulse: 83 (09/18/23 1524)  Resp: 18 (09/18/23 1524)  BP: 113/63 (09/18/23 1524)  SpO2: (!) 94 % (09/18/23 1524) Vital Signs (24h Range):  Temp:  [97.1 °F (36.2 °C)-98.7 °F (37.1 °C)] 97.3 °F (36.3 °C)  Pulse:  [] 83  Resp:  [16-20] 18  SpO2:  [94 %-100 %] 94 %  BP: (106-204)/() 113/63     Weight: 81.6 kg (180 lb)  Body mass index is 27.37 kg/m².    Intake/Output Summary (Last 24 hours) at 9/18/2023 1550  Last data filed at 9/18/2023 1339  Gross per 24 hour   Intake --   Output 250 ml   Net -250 ml         Physical Exam  Vitals and nursing note reviewed.   Constitutional:       General: He is awake. He is not in acute distress.     Appearance: Normal appearance. He is well-developed and well-groomed. He is not ill-appearing, toxic-appearing or diaphoretic.   HENT:      Head: Normocephalic and atraumatic.   Eyes:      Extraocular Movements: Extraocular movements intact.      Conjunctiva/sclera: Conjunctivae normal.      Pupils: Pupils are equal, round, and reactive to light.   Cardiovascular:      Rate and Rhythm: Normal rate and regular rhythm.      Pulses: Normal pulses.      Heart sounds: Normal heart  sounds. No murmur heard.  Pulmonary:      Effort: Pulmonary effort is normal.      Breath sounds: Normal breath sounds.   Abdominal:      General: Bowel sounds are normal.      Palpations: Abdomen is soft.      Tenderness: There is no abdominal tenderness.   Musculoskeletal:      Cervical back: Normal range of motion and neck supple.      Comments: 5/5 strength throughout   Skin:     General: Skin is warm and dry.      Capillary Refill: Capillary refill takes less than 2 seconds.   Neurological:      General: No focal deficit present.      Mental Status: He is alert and oriented to person, place, and time. Mental status is at baseline.      GCS: GCS eye subscore is 4. GCS verbal subscore is 5. GCS motor subscore is 6.      Cranial Nerves: Cranial nerves 2-12 are intact.      Sensory: Sensation is intact.      Motor: Motor function is intact.      Coordination: Coordination is intact.   Psychiatric:         Mood and Affect: Mood normal.         Behavior: Behavior normal. Behavior is cooperative.            Significant Labs: All pertinent labs within the past 24 hours have been reviewed.  CBC:   Recent Labs   Lab 09/17/23  1827 09/18/23  0531   WBC 8.04 7.42   HGB 15.0 13.2*   HCT 43.5 38.4*    300     CMP:   Recent Labs   Lab 09/17/23  1827      K 3.7      CO2 20*   *   BUN 14   CREATININE 1.1   CALCIUM 9.6   PROT 7.4   ALBUMIN 4.1   BILITOT 0.3   ALKPHOS 69   AST 18   ALT 16   ANIONGAP 15       Significant Imaging: I have reviewed all pertinent imaging results/findings within the past 24 hours.      Assessment/Plan:      * NSTEMI (non-ST elevated myocardial infarction)  Patient presents with NSTEMI. Chest pain is currently controlled. STUART score is 1. Patient is currently on NSTEMI Pathway.    EKG reviewed. Troponins reviewed and results noted-   Recent Labs   Lab 09/18/23  0022   TROPONINI 0.708*   .     Lipid panel reviewed and shows-     Lab Results   Component Value Date    LDLCALC 84.0  09/17/2023     Lab Results   Component Value Date    TRIG 145 09/17/2023         Medical management includes; Beta Blocker, Anticoagulation, High Intensity Statin and Nitrate Echo has not been performed. Latest ECHO results are as follows- No results found for this or any previous visit.  .   Consult for cardiac rehab is ordered. Patient counseled on lifestyle modifications- quit smoking, follow a low fat, low cholesterol diet and reduce exposure to stress. Cardiology is consulted. Plan of care reviewed with cardiology team. Continue to monitor patient closely and adjust therapy as needed.      9/18/23  [facility] planned today. Echocardiogram shows a mildly decreased EF. Continue cardiac meds    Hypertension  Elevated on admission. Has significantly improved after started BB. Currently normotensive.      VTE Risk Mitigation (From admission, onward)         Ordered     heparin 25,000 units in dextrose 5% (100 units/ml) IV bolus from bag - ADDITIONAL PRN BOLUS - 60 units/kg (max bolus 4000 units)  As needed (PRN)        Question:  Heparin Infusion Adjustment (DO NOT MODIFY ANSWER)  Answer:  \[email or web address]\epic\Images\Pharmacy\HeparinInfusions\heparin LOW INTENSITY nomogram for [facility] [id number].pdf    09/17/23 1911     heparin 25,000 units in dextrose 5% (100 units/ml) IV bolus from bag - ADDITIONAL PRN BOLUS - 30 units/kg (max bolus 4000 units)  As needed (PRN)        Question:  Heparin Infusion Adjustment (DO NOT MODIFY ANSWER)  Answer:  \\[email or web address]\epic\Images\Pharmacy\HeparinInfusions\heparin LOW INTENSITY nomogram for [facility] [id number].pdf    09/17/23 1911     IP VTE LOW RISK PATIENT  Once         09/17/23 1941     heparin 25,000 units in dextrose 5% 250 mL (100 units/mL) infusion LOW INTENSITY nomogram - [facility]  Continuous        Question Answer Comment   Heparin Infusion Adjustment (DO NOT MODIFY ANSWER) \\[email or web address]\epic\Images\Pharmacy\HeparinInfusions\heparin LOW INTENSITY nomogram for [facility] [id number].pdf    Begin at (in units/kg/hr)  12        09/17/23 1911                Discharge Planning   JOLENE:      Code Status: Full Code   Is the patient medically ready for discharge?:     Reason for patient still in hospital (select all that apply): Treatment  Discharge Plan A: Home with family        Isabel Farah NP  Department of Hospital Medicine   [facility] ([facility])

## 2023-09-19 ENCOUNTER — ANESTHESIA (OUTPATIENT)
Dept: SURGERY | Facility: HOSPITAL | Age: 43
DRG: 217 | End: 2023-09-19
Payer: COMMERCIAL

## 2023-09-19 ENCOUNTER — ANESTHESIA EVENT (OUTPATIENT)
Dept: SURGERY | Facility: HOSPITAL | Age: 43
DRG: 217 | End: 2023-09-19
Payer: COMMERCIAL

## 2023-09-19 LAB
ALBUMIN SERPL BCP-MCNC: 2.9 G/DL (ref 3.5–5.2)
ALLENS TEST: ABNORMAL
ALLENS TEST: ABNORMAL
ALP SERPL-CCNC: 27 U/L (ref 55–135)
ALT SERPL W/O P-5'-P-CCNC: 9 U/L (ref 10–44)
ANION GAP SERPL CALC-SCNC: 10 MMOL/L (ref 8–16)
ANION GAP SERPL CALC-SCNC: 12 MMOL/L (ref 8–16)
APTT PPP: 43.6 SEC (ref 21–32)
APTT PPP: 46.1 SEC (ref 21–32)
APTT PPP: 92.2 SEC (ref 21–32)
AST SERPL-CCNC: 41 U/L (ref 10–40)
BASOPHILS # BLD AUTO: 0.04 K/UL (ref 0–0.2)
BASOPHILS # BLD AUTO: 0.04 K/UL (ref 0–0.2)
BASOPHILS NFR BLD: 0.6 % (ref 0–1.9)
BASOPHILS NFR BLD: 0.6 % (ref 0–1.9)
BILIRUB SERPL-MCNC: 1.4 MG/DL (ref 0.1–1)
BLD PROD TYP BPU: NORMAL
BLOOD UNIT EXPIRATION DATE: NORMAL
BLOOD UNIT TYPE CODE: 6200
BLOOD UNIT TYPE: NORMAL
BRPFT: ABNORMAL
BUN SERPL-MCNC: 8 MG/DL (ref 6–20)
BUN SERPL-MCNC: 8 MG/DL (ref 6–20)
CALCIUM SERPL-MCNC: 8 MG/DL (ref 8.7–10.5)
CALCIUM SERPL-MCNC: 8.2 MG/DL (ref 8.7–10.5)
CHLORIDE SERPL-SCNC: 108 MMOL/L (ref 95–110)
CHLORIDE SERPL-SCNC: 112 MMOL/L (ref 95–110)
CO2 SERPL-SCNC: 22 MMOL/L (ref 23–29)
CO2 SERPL-SCNC: 24 MMOL/L (ref 23–29)
CODING SYSTEM: NORMAL
CREAT SERPL-MCNC: 0.9 MG/DL (ref 0.5–1.4)
CREAT SERPL-MCNC: 1 MG/DL (ref 0.5–1.4)
CROSSMATCH INTERPRETATION: NORMAL
DELSYS: ABNORMAL
DELSYS: ABNORMAL
DIFFERENTIAL METHOD: ABNORMAL
DIFFERENTIAL METHOD: ABNORMAL
DISPENSE STATUS: NORMAL
EOSINOPHIL # BLD AUTO: 0.2 K/UL (ref 0–0.5)
EOSINOPHIL # BLD AUTO: 0.2 K/UL (ref 0–0.5)
EOSINOPHIL NFR BLD: 2.8 % (ref 0–8)
EOSINOPHIL NFR BLD: 2.8 % (ref 0–8)
ERYTHROCYTE [DISTWIDTH] IN BLOOD BY AUTOMATED COUNT: 12.6 % (ref 11.5–14.5)
ERYTHROCYTE [DISTWIDTH] IN BLOOD BY AUTOMATED COUNT: 12.6 % (ref 11.5–14.5)
ERYTHROCYTE [DISTWIDTH] IN BLOOD BY AUTOMATED COUNT: 17.2 % (ref 11.5–14.5)
ERYTHROCYTE [DISTWIDTH] IN BLOOD BY AUTOMATED COUNT: 17.7 % (ref 11.5–14.5)
ERYTHROCYTE [SEDIMENTATION RATE] IN BLOOD BY WESTERGREN METHOD: 16 MM/H
ERYTHROCYTE [SEDIMENTATION RATE] IN BLOOD BY WESTERGREN METHOD: 24 MM/H
EST. GFR  (NO RACE VARIABLE): >60 ML/MIN/1.73 M^2
EST. GFR  (NO RACE VARIABLE): >60 ML/MIN/1.73 M^2
FEF 25 75 LLN: 2.04
FEF 25 75 PRE REF: 17 %
FEF 25 75 REF: 3.64
FEV1 FVC LLN: 70
FEV1 FVC PRE REF: 69 %
FEV1 FVC REF: 80
FEV1 LLN: 3
FEV1 PRE REF: 43.4 %
FEV1 REF: 3.81
FIBRINOGEN PPP-MCNC: 78 MG/DL (ref 182–400)
FIBRINOGEN PPP-MCNC: 98 MG/DL (ref 182–400)
FIO2: 100
FIO2: 60
FVC LLN: 3.76
FVC PRE REF: 62.6 %
FVC REF: 4.77
GLUCOSE SERPL-MCNC: 123 MG/DL (ref 70–110)
GLUCOSE SERPL-MCNC: 157 MG/DL (ref 70–110)
GLUCOSE SERPL-MCNC: 157 MG/DL (ref 70–110)
GLUCOSE SERPL-MCNC: 159 MG/DL (ref 70–110)
GLUCOSE SERPL-MCNC: 163 MG/DL (ref 70–110)
GLUCOSE SERPL-MCNC: 165 MG/DL (ref 70–110)
GLUCOSE SERPL-MCNC: 172 MG/DL (ref 70–110)
GLUCOSE SERPL-MCNC: 187 MG/DL (ref 70–110)
GLUCOSE SERPL-MCNC: 189 MG/DL (ref 70–110)
GLUCOSE SERPL-MCNC: 198 MG/DL (ref 70–110)
GLUCOSE SERPL-MCNC: 210 MG/DL (ref 70–110)
GLUCOSE SERPL-MCNC: 217 MG/DL (ref 70–110)
GLUCOSE SERPL-MCNC: 218 MG/DL (ref 70–110)
GLUCOSE SERPL-MCNC: 222 MG/DL (ref 70–110)
GLUCOSE SERPL-MCNC: 233 MG/DL (ref 70–110)
GLUCOSE SERPL-MCNC: 240 MG/DL (ref 70–110)
GLUCOSE SERPL-MCNC: 93 MG/DL (ref 70–110)
GLUCOSE SERPL-MCNC: 98 MG/DL (ref 70–110)
HCO3 UR-SCNC: 20.8 MMOL/L (ref 24–28)
HCO3 UR-SCNC: 21.9 MMOL/L (ref 24–28)
HCO3 UR-SCNC: 22.2 MMOL/L (ref 24–28)
HCO3 UR-SCNC: 23.3 MMOL/L (ref 24–28)
HCO3 UR-SCNC: 24 MMOL/L (ref 24–28)
HCO3 UR-SCNC: 24 MMOL/L (ref 24–28)
HCO3 UR-SCNC: 24.3 MMOL/L (ref 24–28)
HCO3 UR-SCNC: 24.4 MMOL/L (ref 24–28)
HCO3 UR-SCNC: 25.1 MMOL/L (ref 24–28)
HCO3 UR-SCNC: 25.2 MMOL/L (ref 24–28)
HCO3 UR-SCNC: 25.5 MMOL/L (ref 24–28)
HCO3 UR-SCNC: 25.8 MMOL/L (ref 24–28)
HCO3 UR-SCNC: 26 MMOL/L (ref 24–28)
HCO3 UR-SCNC: 26.2 MMOL/L (ref 24–28)
HCO3 UR-SCNC: 27.7 MMOL/L (ref 24–28)
HCO3 UR-SCNC: 31.4 MMOL/L (ref 24–28)
HCT VFR BLD AUTO: 26.6 % (ref 40–54)
HCT VFR BLD AUTO: 30.9 % (ref 40–54)
HCT VFR BLD AUTO: 35.2 % (ref 40–54)
HCT VFR BLD AUTO: 35.2 % (ref 40–54)
HCT VFR BLD CALC: 20 %PCV (ref 36–54)
HCT VFR BLD CALC: 24 %PCV (ref 36–54)
HCT VFR BLD CALC: 25 %PCV (ref 36–54)
HCT VFR BLD CALC: 26 %PCV (ref 36–54)
HCT VFR BLD CALC: 26 %PCV (ref 36–54)
HCT VFR BLD CALC: 27 %PCV (ref 36–54)
HCT VFR BLD CALC: 28 %PCV (ref 36–54)
HCT VFR BLD CALC: 28 %PCV (ref 36–54)
HCT VFR BLD CALC: 30 %PCV (ref 36–54)
HCT VFR BLD CALC: 32 %PCV (ref 36–54)
HGB BLD-MCNC: 10.8 G/DL (ref 14–18)
HGB BLD-MCNC: 12.2 G/DL (ref 14–18)
HGB BLD-MCNC: 12.2 G/DL (ref 14–18)
HGB BLD-MCNC: 9.1 G/DL (ref 14–18)
IMM GRANULOCYTES # BLD AUTO: 0.01 K/UL (ref 0–0.04)
IMM GRANULOCYTES # BLD AUTO: 0.01 K/UL (ref 0–0.04)
IMM GRANULOCYTES NFR BLD AUTO: 0.1 % (ref 0–0.5)
IMM GRANULOCYTES NFR BLD AUTO: 0.1 % (ref 0–0.5)
INR PPP: 1.8 (ref 0.8–1.2)
INR PPP: 2.8 (ref 0.8–1.2)
LYMPHOCYTES # BLD AUTO: 1.9 K/UL (ref 1–4.8)
LYMPHOCYTES # BLD AUTO: 1.9 K/UL (ref 1–4.8)
LYMPHOCYTES NFR BLD: 26.4 % (ref 18–48)
LYMPHOCYTES NFR BLD: 26.4 % (ref 18–48)
MAGNESIUM SERPL-MCNC: 4.4 MG/DL (ref 1.6–2.6)
MCH RBC QN AUTO: 29.3 PG (ref 27–31)
MCH RBC QN AUTO: 29.3 PG (ref 27–31)
MCH RBC QN AUTO: 31.3 PG (ref 27–31)
MCH RBC QN AUTO: 31.3 PG (ref 27–31)
MCHC RBC AUTO-ENTMCNC: 34.2 G/DL (ref 32–36)
MCHC RBC AUTO-ENTMCNC: 34.7 G/DL (ref 32–36)
MCHC RBC AUTO-ENTMCNC: 34.7 G/DL (ref 32–36)
MCHC RBC AUTO-ENTMCNC: 35 G/DL (ref 32–36)
MCV RBC AUTO: 84 FL (ref 82–98)
MCV RBC AUTO: 86 FL (ref 82–98)
MCV RBC AUTO: 90 FL (ref 82–98)
MCV RBC AUTO: 90 FL (ref 82–98)
MIN VOL: 8.22
MODE: ABNORMAL
MODE: ABNORMAL
MONOCYTES # BLD AUTO: 0.5 K/UL (ref 0.3–1)
MONOCYTES # BLD AUTO: 0.5 K/UL (ref 0.3–1)
MONOCYTES NFR BLD: 7.1 % (ref 4–15)
MONOCYTES NFR BLD: 7.1 % (ref 4–15)
NEUTROPHILS # BLD AUTO: 4.5 K/UL (ref 1.8–7.7)
NEUTROPHILS # BLD AUTO: 4.5 K/UL (ref 1.8–7.7)
NEUTROPHILS NFR BLD: 63 % (ref 38–73)
NEUTROPHILS NFR BLD: 63 % (ref 38–73)
NRBC BLD-RTO: 0 /100 WBC
NRBC BLD-RTO: 0 /100 WBC
NUM UNITS TRANS PACKED RBC: NORMAL
PCO2 BLDA: 43.4 MMHG (ref 35–45)
PCO2 BLDA: 45.7 MMHG (ref 35–45)
PCO2 BLDA: 45.8 MMHG (ref 35–45)
PCO2 BLDA: 46.1 MMHG (ref 35–45)
PCO2 BLDA: 46.1 MMHG (ref 35–45)
PCO2 BLDA: 46.2 MMHG (ref 35–45)
PCO2 BLDA: 46.4 MMHG (ref 35–45)
PCO2 BLDA: 47 MMHG (ref 35–45)
PCO2 BLDA: 48 MMHG (ref 35–45)
PCO2 BLDA: 48.9 MMHG (ref 35–45)
PCO2 BLDA: 49.2 MMHG (ref 35–45)
PCO2 BLDA: 50.5 MMHG (ref 35–45)
PCO2 BLDA: 52.4 MMHG (ref 35–45)
PCO2 BLDA: 56.6 MMHG (ref 35–45)
PCO2 BLDA: 58 MMHG (ref 35–45)
PCO2 BLDA: 64 MMHG (ref 35–45)
PEEP: 5
PEEP: 5
PEF LLN: 7.4
PEF PRE REF: 42.8 %
PEF REF: 9.59
PH SMN: 7.21 [PH] (ref 7.35–7.45)
PH SMN: 7.23 [PH] (ref 7.35–7.45)
PH SMN: 7.24 [PH] (ref 7.35–7.45)
PH SMN: 7.28 [PH] (ref 7.35–7.45)
PH SMN: 7.29 [PH] (ref 7.35–7.45)
PH SMN: 7.31 [PH] (ref 7.35–7.45)
PH SMN: 7.32 [PH] (ref 7.35–7.45)
PH SMN: 7.33 [PH] (ref 7.35–7.45)
PH SMN: 7.35 [PH] (ref 7.35–7.45)
PH SMN: 7.35 [PH] (ref 7.35–7.45)
PH SMN: 7.36 [PH] (ref 7.35–7.45)
PH SMN: 7.39 [PH] (ref 7.35–7.45)
PIP: 25
PLATELET # BLD AUTO: 277 K/UL (ref 150–450)
PLATELET # BLD AUTO: 277 K/UL (ref 150–450)
PLATELET # BLD AUTO: 41 K/UL (ref 150–450)
PLATELET # BLD AUTO: 74 K/UL (ref 150–450)
PLATELET BLD QL SMEAR: ABNORMAL
PLATELET BLD QL SMEAR: NORMAL
PMV BLD AUTO: 8.7 FL (ref 9.2–12.9)
PMV BLD AUTO: 9.1 FL (ref 9.2–12.9)
PMV BLD AUTO: 9.1 FL (ref 9.2–12.9)
PMV BLD AUTO: 9.2 FL (ref 9.2–12.9)
PO2 BLDA: 132 MMHG (ref 80–100)
PO2 BLDA: 205 MMHG (ref 80–100)
PO2 BLDA: 224 MMHG (ref 80–100)
PO2 BLDA: 246 MMHG (ref 80–100)
PO2 BLDA: 258 MMHG (ref 80–100)
PO2 BLDA: 266 MMHG (ref 80–100)
PO2 BLDA: 277 MMHG (ref 80–100)
PO2 BLDA: 279 MMHG (ref 80–100)
PO2 BLDA: 280 MMHG (ref 80–100)
PO2 BLDA: 312 MMHG (ref 80–100)
PO2 BLDA: 319 MMHG (ref 80–100)
PO2 BLDA: 349 MMHG (ref 80–100)
PO2 BLDA: 374 MMHG (ref 80–100)
PO2 BLDA: 384 MMHG (ref 80–100)
PO2 BLDA: 48 MMHG (ref 40–60)
PO2 BLDA: 54 MMHG (ref 40–60)
POC ACTIVATED CLOTTING TIME K: 107 SEC (ref 74–137)
POC ACTIVATED CLOTTING TIME K: 143 SEC (ref 74–137)
POC ACTIVATED CLOTTING TIME K: 173 SEC (ref 74–137)
POC ACTIVATED CLOTTING TIME K: 570 SEC (ref 74–137)
POC ACTIVATED CLOTTING TIME K: 576 SEC (ref 74–137)
POC ACTIVATED CLOTTING TIME K: 582 SEC (ref 74–137)
POC ACTIVATED CLOTTING TIME K: 588 SEC (ref 74–137)
POC ACTIVATED CLOTTING TIME K: 636 SEC (ref 74–137)
POC ACTIVATED CLOTTING TIME K: 660 SEC (ref 74–137)
POC ACTIVATED CLOTTING TIME K: 708 SEC (ref 74–137)
POC ACTIVATED CLOTTING TIME K: 756 SEC (ref 74–137)
POC ACTIVATED CLOTTING TIME K: 786 SEC (ref 74–137)
POC ACTIVATED CLOTTING TIME K: 798 SEC (ref 74–137)
POC ACTIVATED CLOTTING TIME K: 799 SEC (ref 74–137)
POC BE: -1 MMOL/L
POC BE: -2 MMOL/L
POC BE: -2 MMOL/L
POC BE: -3 MMOL/L
POC BE: -3 MMOL/L
POC BE: -4 MMOL/L
POC BE: -5 MMOL/L
POC BE: -7 MMOL/L
POC BE: 0 MMOL/L
POC BE: 1 MMOL/L
POC BE: 6 MMOL/L
POC IONIZED CALCIUM: 0.9 MMOL/L (ref 1.06–1.42)
POC IONIZED CALCIUM: 0.92 MMOL/L (ref 1.06–1.42)
POC IONIZED CALCIUM: 0.95 MMOL/L (ref 1.06–1.42)
POC IONIZED CALCIUM: 0.97 MMOL/L (ref 1.06–1.42)
POC IONIZED CALCIUM: 0.98 MMOL/L (ref 1.06–1.42)
POC IONIZED CALCIUM: 1.09 MMOL/L (ref 1.06–1.42)
POC IONIZED CALCIUM: 1.1 MMOL/L (ref 1.06–1.42)
POC IONIZED CALCIUM: 1.11 MMOL/L (ref 1.06–1.42)
POC IONIZED CALCIUM: 1.13 MMOL/L (ref 1.06–1.42)
POC IONIZED CALCIUM: 1.13 MMOL/L (ref 1.06–1.42)
POC IONIZED CALCIUM: 1.17 MMOL/L (ref 1.06–1.42)
POC IONIZED CALCIUM: 1.18 MMOL/L (ref 1.06–1.42)
POC IONIZED CALCIUM: 1.22 MMOL/L (ref 1.06–1.42)
POC IONIZED CALCIUM: 1.28 MMOL/L (ref 1.06–1.42)
POC SATURATED O2: 100 % (ref 95–100)
POC SATURATED O2: 80 % (ref 95–100)
POC SATURATED O2: 80 % (ref 95–100)
POC SATURATED O2: 99 % (ref 95–100)
POCT GLUCOSE: 245 MG/DL (ref 70–110)
POCT GLUCOSE: 270 MG/DL (ref 70–110)
POCT GLUCOSE: 287 MG/DL (ref 70–110)
POCT GLUCOSE: 295 MG/DL (ref 70–110)
POOLED CRYOPPT GVN BPU: NORMAL
POOLED CRYOPPT GVN BPU: NORMAL
POTASSIUM BLD-SCNC: 3.6 MMOL/L (ref 3.5–5.1)
POTASSIUM BLD-SCNC: 3.8 MMOL/L (ref 3.5–5.1)
POTASSIUM BLD-SCNC: 3.8 MMOL/L (ref 3.5–5.1)
POTASSIUM BLD-SCNC: 3.9 MMOL/L (ref 3.5–5.1)
POTASSIUM BLD-SCNC: 4 MMOL/L (ref 3.5–5.1)
POTASSIUM BLD-SCNC: 4.4 MMOL/L (ref 3.5–5.1)
POTASSIUM BLD-SCNC: 4.8 MMOL/L (ref 3.5–5.1)
POTASSIUM BLD-SCNC: 4.9 MMOL/L (ref 3.5–5.1)
POTASSIUM BLD-SCNC: 5 MMOL/L (ref 3.5–5.1)
POTASSIUM BLD-SCNC: 5.1 MMOL/L (ref 3.5–5.1)
POTASSIUM BLD-SCNC: 5.3 MMOL/L (ref 3.5–5.1)
POTASSIUM BLD-SCNC: 5.6 MMOL/L (ref 3.5–5.1)
POTASSIUM BLD-SCNC: 5.7 MMOL/L (ref 3.5–5.1)
POTASSIUM BLD-SCNC: 5.8 MMOL/L (ref 3.5–5.1)
POTASSIUM BLD-SCNC: 5.9 MMOL/L (ref 3.5–5.1)
POTASSIUM BLD-SCNC: 6.1 MMOL/L (ref 3.5–5.1)
POTASSIUM SERPL-SCNC: 3.8 MMOL/L (ref 3.5–5.1)
POTASSIUM SERPL-SCNC: 4 MMOL/L (ref 3.5–5.1)
PRE FEF 25 75: 0.62 L/S (ref 2.04–5.7)
PRE FET 100: 11.52 SEC
PRE FEV1 FVC: 55.33 % (ref 69.51–89.23)
PRE FEV1: 1.65 L (ref 3–4.58)
PRE FVC: 2.99 L (ref 3.76–5.79)
PRE PEF: 4.1 L/S (ref 7.4–11.77)
PREALB SERPL-MCNC: 17 MG/DL (ref 20–43)
PROT SERPL-MCNC: 3.7 G/DL (ref 6–8.4)
PROTHROMBIN TIME: 18 SEC (ref 9–12.5)
PROTHROMBIN TIME: 27.8 SEC (ref 9–12.5)
RBC # BLD AUTO: 3.11 M/UL (ref 4.6–6.2)
RBC # BLD AUTO: 3.68 M/UL (ref 4.6–6.2)
RBC # BLD AUTO: 3.9 M/UL (ref 4.6–6.2)
RBC # BLD AUTO: 3.9 M/UL (ref 4.6–6.2)
SAMPLE: ABNORMAL
SAMPLE: NORMAL
SITE: ABNORMAL
SITE: ABNORMAL
SODIUM BLD-SCNC: 134 MMOL/L (ref 136–145)
SODIUM BLD-SCNC: 136 MMOL/L (ref 136–145)
SODIUM BLD-SCNC: 137 MMOL/L (ref 136–145)
SODIUM BLD-SCNC: 139 MMOL/L (ref 136–145)
SODIUM BLD-SCNC: 140 MMOL/L (ref 136–145)
SODIUM BLD-SCNC: 141 MMOL/L (ref 136–145)
SODIUM BLD-SCNC: 143 MMOL/L (ref 136–145)
SODIUM BLD-SCNC: 147 MMOL/L (ref 136–145)
SODIUM SERPL-SCNC: 140 MMOL/L (ref 136–145)
SODIUM SERPL-SCNC: 148 MMOL/L (ref 136–145)
SP02: 100
UNIT NUMBER: NORMAL
VT: 500
VT: 500
WBC # BLD AUTO: 15.24 K/UL (ref 3.9–12.7)
WBC # BLD AUTO: 20.18 K/UL (ref 3.9–12.7)
WBC # BLD AUTO: 7.09 K/UL (ref 3.9–12.7)
WBC # BLD AUTO: 7.09 K/UL (ref 3.9–12.7)

## 2023-09-19 PROCEDURE — P9045 ALBUMIN (HUMAN), 5%, 250 ML: HCPCS | Mod: JZ,JG | Performed by: THORACIC SURGERY (CARDIOTHORACIC VASCULAR SURGERY)

## 2023-09-19 PROCEDURE — P9016 RBC LEUKOCYTES REDUCED: HCPCS | Performed by: INTERNAL MEDICINE

## 2023-09-19 PROCEDURE — 63600175 PHARM REV CODE 636 W HCPCS: Performed by: NURSE ANESTHETIST, CERTIFIED REGISTERED

## 2023-09-19 PROCEDURE — 80053 COMPREHEN METABOLIC PANEL: CPT | Performed by: THORACIC SURGERY (CARDIOTHORACIC VASCULAR SURGERY)

## 2023-09-19 PROCEDURE — 25000003 PHARM REV CODE 250: Performed by: THORACIC SURGERY (CARDIOTHORACIC VASCULAR SURGERY)

## 2023-09-19 PROCEDURE — 99233 PR SUBSEQUENT HOSPITAL CARE,LEVL III: ICD-10-PCS | Mod: 25,,, | Performed by: INTERNAL MEDICINE

## 2023-09-19 PROCEDURE — 27100171 HC OXYGEN HIGH FLOW UP TO 24 HOURS

## 2023-09-19 PROCEDURE — 99233 SBSQ HOSP IP/OBS HIGH 50: CPT | Mod: 25,,, | Performed by: INTERNAL MEDICINE

## 2023-09-19 PROCEDURE — 63600175 PHARM REV CODE 636 W HCPCS: Performed by: THORACIC SURGERY (CARDIOTHORACIC VASCULAR SURGERY)

## 2023-09-19 PROCEDURE — 85730 THROMBOPLASTIN TIME PARTIAL: CPT | Performed by: THORACIC SURGERY (CARDIOTHORACIC VASCULAR SURGERY)

## 2023-09-19 PROCEDURE — 84295 ASSAY OF SERUM SODIUM: CPT

## 2023-09-19 PROCEDURE — 33533 PR CABG, ARTERIAL, SINGLE: ICD-10-PCS | Mod: 51,,, | Performed by: THORACIC SURGERY (CARDIOTHORACIC VASCULAR SURGERY)

## 2023-09-19 PROCEDURE — 83735 ASSAY OF MAGNESIUM: CPT | Performed by: THORACIC SURGERY (CARDIOTHORACIC VASCULAR SURGERY)

## 2023-09-19 PROCEDURE — 93005 ELECTROCARDIOGRAM TRACING: CPT

## 2023-09-19 PROCEDURE — 37000009 HC ANESTHESIA EA ADD 15 MINS: Performed by: THORACIC SURGERY (CARDIOTHORACIC VASCULAR SURGERY)

## 2023-09-19 PROCEDURE — 93010 ELECTROCARDIOGRAM REPORT: CPT | Mod: ,,, | Performed by: INTERNAL MEDICINE

## 2023-09-19 PROCEDURE — 85610 PROTHROMBIN TIME: CPT | Mod: 91 | Performed by: THORACIC SURGERY (CARDIOTHORACIC VASCULAR SURGERY)

## 2023-09-19 PROCEDURE — 85384 FIBRINOGEN ACTIVITY: CPT | Mod: 91 | Performed by: THORACIC SURGERY (CARDIOTHORACIC VASCULAR SURGERY)

## 2023-09-19 PROCEDURE — 88305 TISSUE EXAM BY PATHOLOGIST: CPT | Performed by: PATHOLOGY

## 2023-09-19 PROCEDURE — 33533 CABG ARTERIAL SINGLE: CPT | Mod: 51,,, | Performed by: THORACIC SURGERY (CARDIOTHORACIC VASCULAR SURGERY)

## 2023-09-19 PROCEDURE — 25000003 PHARM REV CODE 250: Performed by: INTERNAL MEDICINE

## 2023-09-19 PROCEDURE — P9016 RBC LEUKOCYTES REDUCED: HCPCS | Performed by: THORACIC SURGERY (CARDIOTHORACIC VASCULAR SURGERY)

## 2023-09-19 PROCEDURE — 94761 N-INVAS EAR/PLS OXIMETRY MLT: CPT

## 2023-09-19 PROCEDURE — C1729 CATH, DRAINAGE: HCPCS | Performed by: THORACIC SURGERY (CARDIOTHORACIC VASCULAR SURGERY)

## 2023-09-19 PROCEDURE — 33521 PR CABG, ARTERY-VEIN, FOUR: ICD-10-PCS | Mod: ,,, | Performed by: THORACIC SURGERY (CARDIOTHORACIC VASCULAR SURGERY)

## 2023-09-19 PROCEDURE — 27201423 OPTIME MED/SURG SUP & DEVICES STERILE SUPPLY: Performed by: THORACIC SURGERY (CARDIOTHORACIC VASCULAR SURGERY)

## 2023-09-19 PROCEDURE — 25000003 PHARM REV CODE 250: Performed by: NURSE ANESTHETIST, CERTIFIED REGISTERED

## 2023-09-19 PROCEDURE — 33508 ENDOSCOPIC VEIN HARVEST: CPT | Mod: 59,,, | Performed by: THORACIC SURGERY (CARDIOTHORACIC VASCULAR SURGERY)

## 2023-09-19 PROCEDURE — 85014 HEMATOCRIT: CPT

## 2023-09-19 PROCEDURE — C1769 GUIDE WIRE: HCPCS | Performed by: THORACIC SURGERY (CARDIOTHORACIC VASCULAR SURGERY)

## 2023-09-19 PROCEDURE — C1887 CATHETER, GUIDING: HCPCS | Performed by: THORACIC SURGERY (CARDIOTHORACIC VASCULAR SURGERY)

## 2023-09-19 PROCEDURE — 36000712 HC OR TIME LEV V 1ST 15 MIN: Performed by: THORACIC SURGERY (CARDIOTHORACIC VASCULAR SURGERY)

## 2023-09-19 PROCEDURE — P9012 CRYOPRECIPITATE EACH UNIT: HCPCS | Performed by: THORACIC SURGERY (CARDIOTHORACIC VASCULAR SURGERY)

## 2023-09-19 PROCEDURE — 93010 EKG 12-LEAD: ICD-10-PCS | Mod: ,,, | Performed by: INTERNAL MEDICINE

## 2023-09-19 PROCEDURE — 27200966 HC CLOSED SUCTION SYSTEM

## 2023-09-19 PROCEDURE — 33405 REPLACEMENT AORTIC VALVE OPN: CPT | Mod: ,,, | Performed by: THORACIC SURGERY (CARDIOTHORACIC VASCULAR SURGERY)

## 2023-09-19 PROCEDURE — 99900026 HC AIRWAY MAINTENANCE (STAT)

## 2023-09-19 PROCEDURE — 33521 CABG ARTERY-VEIN FOUR: CPT | Mod: ,,, | Performed by: THORACIC SURGERY (CARDIOTHORACIC VASCULAR SURGERY)

## 2023-09-19 PROCEDURE — 33405 PR REPLACE AORT VALV,PROSTH VALV: ICD-10-PCS | Mod: ,,, | Performed by: THORACIC SURGERY (CARDIOTHORACIC VASCULAR SURGERY)

## 2023-09-19 PROCEDURE — 82803 BLOOD GASES ANY COMBINATION: CPT

## 2023-09-19 PROCEDURE — 20000000 HC ICU ROOM

## 2023-09-19 PROCEDURE — 94640 AIRWAY INHALATION TREATMENT: CPT

## 2023-09-19 PROCEDURE — C1768 GRAFT, VASCULAR: HCPCS | Performed by: THORACIC SURGERY (CARDIOTHORACIC VASCULAR SURGERY)

## 2023-09-19 PROCEDURE — 82330 ASSAY OF CALCIUM: CPT

## 2023-09-19 PROCEDURE — 37799 UNLISTED PX VASCULAR SURGERY: CPT

## 2023-09-19 PROCEDURE — 85025 COMPLETE CBC W/AUTO DIFF WBC: CPT | Performed by: INTERNAL MEDICINE

## 2023-09-19 PROCEDURE — 36415 COLL VENOUS BLD VENIPUNCTURE: CPT | Performed by: THORACIC SURGERY (CARDIOTHORACIC VASCULAR SURGERY)

## 2023-09-19 PROCEDURE — 25000242 PHARM REV CODE 250 ALT 637 W/ HCPCS: Performed by: THORACIC SURGERY (CARDIOTHORACIC VASCULAR SURGERY)

## 2023-09-19 PROCEDURE — A4216 STERILE WATER/SALINE, 10 ML: HCPCS | Performed by: NURSE ANESTHETIST, CERTIFIED REGISTERED

## 2023-09-19 PROCEDURE — S0017 INJECTION, AMINOCAPROIC ACID: HCPCS | Performed by: NURSE ANESTHETIST, CERTIFIED REGISTERED

## 2023-09-19 PROCEDURE — 37000008 HC ANESTHESIA 1ST 15 MINUTES: Performed by: THORACIC SURGERY (CARDIOTHORACIC VASCULAR SURGERY)

## 2023-09-19 PROCEDURE — 36000713 HC OR TIME LEV V EA ADD 15 MIN: Performed by: THORACIC SURGERY (CARDIOTHORACIC VASCULAR SURGERY)

## 2023-09-19 PROCEDURE — 27200667 HC PACEMAKER, TEMPORARY MONITORING, PER SHIFT

## 2023-09-19 PROCEDURE — 80048 BASIC METABOLIC PNL TOTAL CA: CPT | Mod: XB | Performed by: INTERNAL MEDICINE

## 2023-09-19 PROCEDURE — C1894 INTRO/SHEATH, NON-LASER: HCPCS | Performed by: THORACIC SURGERY (CARDIOTHORACIC VASCULAR SURGERY)

## 2023-09-19 PROCEDURE — 33508 PR ENDOSCOPY W/VIDEO-ASST VEIN HARVEST,CABG: ICD-10-PCS | Mod: 59,,, | Performed by: THORACIC SURGERY (CARDIOTHORACIC VASCULAR SURGERY)

## 2023-09-19 PROCEDURE — 86965 POOLING BLOOD PLATELETS: CPT | Performed by: THORACIC SURGERY (CARDIOTHORACIC VASCULAR SURGERY)

## 2023-09-19 PROCEDURE — C9290 INJ, BUPIVACAINE LIPOSOME: HCPCS | Performed by: THORACIC SURGERY (CARDIOTHORACIC VASCULAR SURGERY)

## 2023-09-19 PROCEDURE — 94002 VENT MGMT INPAT INIT DAY: CPT

## 2023-09-19 PROCEDURE — 88305 TISSUE EXAM BY PATHOLOGIST: CPT | Mod: 26,,, | Performed by: PATHOLOGY

## 2023-09-19 PROCEDURE — 99900035 HC TECH TIME PER 15 MIN (STAT)

## 2023-09-19 PROCEDURE — C9399 UNCLASSIFIED DRUGS OR BIOLOG: HCPCS | Performed by: THORACIC SURGERY (CARDIOTHORACIC VASCULAR SURGERY)

## 2023-09-19 PROCEDURE — 85027 COMPLETE CBC AUTOMATED: CPT | Mod: 91 | Performed by: THORACIC SURGERY (CARDIOTHORACIC VASCULAR SURGERY)

## 2023-09-19 PROCEDURE — 27800903 OPTIME MED/SURG SUP & DEVICES OTHER IMPLANTS: Performed by: THORACIC SURGERY (CARDIOTHORACIC VASCULAR SURGERY)

## 2023-09-19 PROCEDURE — 88305 TISSUE EXAM BY PATHOLOGIST: ICD-10-PCS | Mod: 26,,, | Performed by: PATHOLOGY

## 2023-09-19 PROCEDURE — 84132 ASSAY OF SERUM POTASSIUM: CPT

## 2023-09-19 DEVICE — IMPLANTABLE DEVICE: Type: IMPLANTABLE DEVICE | Site: HEART | Status: FUNCTIONAL

## 2023-09-19 DEVICE — DEVICE COR KNOT MINI COMBO KIT: Type: IMPLANTABLE DEVICE | Site: HEART | Status: FUNCTIONAL

## 2023-09-19 RX ORDER — VASOPRESSIN IN DEXTROSE 5 % 25/250 ML
0.04 PLASTIC BAG, INJECTION (ML) INTRAVENOUS CONTINUOUS
Status: DISCONTINUED | OUTPATIENT
Start: 2023-09-19 | End: 2023-09-21

## 2023-09-19 RX ORDER — ALBUMIN HUMAN 50 G/1000ML
SOLUTION INTRAVENOUS CONTINUOUS PRN
Status: DISCONTINUED | OUTPATIENT
Start: 2023-09-19 | End: 2023-09-19 | Stop reason: HOSPADM

## 2023-09-19 RX ORDER — AMINOCAPROIC ACID 250 MG/ML
INJECTION, SOLUTION INTRAVENOUS
Status: DISCONTINUED | OUTPATIENT
Start: 2023-09-19 | End: 2023-09-19

## 2023-09-19 RX ORDER — BUPIVACAINE HYDROCHLORIDE 2.5 MG/ML
INJECTION, SOLUTION EPIDURAL; INFILTRATION; INTRACAUDAL
Status: DISCONTINUED | OUTPATIENT
Start: 2023-09-19 | End: 2023-09-19 | Stop reason: HOSPADM

## 2023-09-19 RX ORDER — DOCUSATE SODIUM 100 MG/1
100 CAPSULE, LIQUID FILLED ORAL 2 TIMES DAILY
Status: DISCONTINUED | OUTPATIENT
Start: 2023-09-19 | End: 2023-10-03 | Stop reason: HOSPADM

## 2023-09-19 RX ORDER — CALCIUM GLUCONATE 20 MG/ML
2 INJECTION, SOLUTION INTRAVENOUS
Status: DISCONTINUED | OUTPATIENT
Start: 2023-09-19 | End: 2023-10-03 | Stop reason: HOSPADM

## 2023-09-19 RX ORDER — OXYCODONE HYDROCHLORIDE 5 MG/1
10 TABLET ORAL EVERY 4 HOURS PRN
Status: DISCONTINUED | OUTPATIENT
Start: 2023-09-19 | End: 2023-09-21

## 2023-09-19 RX ORDER — PROPOFOL 10 MG/ML
VIAL (ML) INTRAVENOUS
Status: DISCONTINUED | OUTPATIENT
Start: 2023-09-19 | End: 2023-09-19

## 2023-09-19 RX ORDER — ADENOSINE 3 MG/ML
INJECTION INTRAVENOUS
Status: DISCONTINUED | OUTPATIENT
Start: 2023-09-19 | End: 2023-09-19 | Stop reason: HOSPADM

## 2023-09-19 RX ORDER — IPRATROPIUM BROMIDE AND ALBUTEROL SULFATE 2.5; .5 MG/3ML; MG/3ML
3 SOLUTION RESPIRATORY (INHALATION) EVERY 4 HOURS
Status: COMPLETED | OUTPATIENT
Start: 2023-09-19 | End: 2023-09-20

## 2023-09-19 RX ORDER — OXYCODONE HYDROCHLORIDE 5 MG/1
5 TABLET ORAL EVERY 4 HOURS PRN
Status: DISCONTINUED | OUTPATIENT
Start: 2023-09-19 | End: 2023-09-21

## 2023-09-19 RX ORDER — ONDANSETRON 2 MG/ML
INJECTION INTRAMUSCULAR; INTRAVENOUS
Status: DISCONTINUED | OUTPATIENT
Start: 2023-09-19 | End: 2023-09-19 | Stop reason: SDUPTHER

## 2023-09-19 RX ORDER — CLOPIDOGREL BISULFATE 75 MG/1
75 TABLET ORAL DAILY
Status: DISCONTINUED | OUTPATIENT
Start: 2023-09-20 | End: 2023-09-20

## 2023-09-19 RX ORDER — DEXMEDETOMIDINE HYDROCHLORIDE 4 UG/ML
0-1.4 INJECTION INTRAVENOUS CONTINUOUS
Status: DISCONTINUED | OUTPATIENT
Start: 2023-09-19 | End: 2023-09-21

## 2023-09-19 RX ORDER — SODIUM BICARBONATE 1 MEQ/ML
SYRINGE (ML) INTRAVENOUS
Status: DISCONTINUED | OUTPATIENT
Start: 2023-09-19 | End: 2023-09-19

## 2023-09-19 RX ORDER — POTASSIUM CHLORIDE 20 MEQ/1
20 TABLET, EXTENDED RELEASE ORAL EVERY 12 HOURS
Status: DISCONTINUED | OUTPATIENT
Start: 2023-09-20 | End: 2023-09-24

## 2023-09-19 RX ORDER — HEPARIN SODIUM 1000 [USP'U]/ML
INJECTION, SOLUTION INTRAVENOUS; SUBCUTANEOUS
Status: DISCONTINUED | OUTPATIENT
Start: 2023-09-19 | End: 2023-09-19 | Stop reason: HOSPADM

## 2023-09-19 RX ORDER — PANTOPRAZOLE SODIUM 40 MG/10ML
40 INJECTION, POWDER, LYOPHILIZED, FOR SOLUTION INTRAVENOUS DAILY
Status: DISCONTINUED | OUTPATIENT
Start: 2023-09-20 | End: 2023-09-20

## 2023-09-19 RX ORDER — ALBUMIN HUMAN 50 G/1000ML
25 SOLUTION INTRAVENOUS
Status: DISCONTINUED | OUTPATIENT
Start: 2023-09-19 | End: 2023-10-03 | Stop reason: HOSPADM

## 2023-09-19 RX ORDER — POTASSIUM CHLORIDE 14.9 MG/ML
20 INJECTION INTRAVENOUS
Status: DISCONTINUED | OUTPATIENT
Start: 2023-09-19 | End: 2023-10-03 | Stop reason: HOSPADM

## 2023-09-19 RX ORDER — PAPAVERINE HYDROCHLORIDE 30 MG/ML
INJECTION INTRAMUSCULAR; INTRAVENOUS
Status: DISCONTINUED | OUTPATIENT
Start: 2023-09-19 | End: 2023-09-19 | Stop reason: HOSPADM

## 2023-09-19 RX ORDER — POLYETHYLENE GLYCOL 3350 17 G/17G
17 POWDER, FOR SOLUTION ORAL DAILY
Status: DISCONTINUED | OUTPATIENT
Start: 2023-09-20 | End: 2023-10-03 | Stop reason: HOSPADM

## 2023-09-19 RX ORDER — VASOPRESSIN IN DEXTROSE 5 % 25/250 ML
0.01 PLASTIC BAG, INJECTION (ML) INTRAVENOUS CONTINUOUS
Status: DISCONTINUED | OUTPATIENT
Start: 2023-09-19 | End: 2023-09-19

## 2023-09-19 RX ORDER — ADHESIVE BANDAGE
5 BANDAGE TOPICAL 2 TIMES DAILY
Status: DISCONTINUED | OUTPATIENT
Start: 2023-09-20 | End: 2023-10-03 | Stop reason: HOSPADM

## 2023-09-19 RX ORDER — MAGNESIUM SULFATE HEPTAHYDRATE 40 MG/ML
INJECTION, SOLUTION INTRAVENOUS
Status: DISCONTINUED | OUTPATIENT
Start: 2023-09-19 | End: 2023-09-19 | Stop reason: SDUPTHER

## 2023-09-19 RX ORDER — FUROSEMIDE 10 MG/ML
40 INJECTION INTRAMUSCULAR; INTRAVENOUS 2 TIMES DAILY
Status: DISCONTINUED | OUTPATIENT
Start: 2023-09-20 | End: 2023-09-24

## 2023-09-19 RX ORDER — NICARDIPINE HYDROCHLORIDE 0.2 MG/ML
0-15 INJECTION INTRAVENOUS CONTINUOUS
Status: DISCONTINUED | OUTPATIENT
Start: 2023-09-19 | End: 2023-09-21

## 2023-09-19 RX ORDER — FOLIC ACID 1 MG/1
1 TABLET ORAL DAILY
Status: DISCONTINUED | OUTPATIENT
Start: 2023-09-21 | End: 2023-10-03 | Stop reason: HOSPADM

## 2023-09-19 RX ORDER — SODIUM CHLORIDE, SODIUM LACTATE, POTASSIUM CHLORIDE, CALCIUM CHLORIDE 600; 310; 30; 20 MG/100ML; MG/100ML; MG/100ML; MG/100ML
1000 INJECTION, SOLUTION INTRAVENOUS
Status: DISCONTINUED | OUTPATIENT
Start: 2023-09-19 | End: 2023-10-03 | Stop reason: HOSPADM

## 2023-09-19 RX ORDER — ACETAMINOPHEN 10 MG/ML
1000 INJECTION, SOLUTION INTRAVENOUS EVERY 8 HOURS
Status: COMPLETED | OUTPATIENT
Start: 2023-09-19 | End: 2023-09-20

## 2023-09-19 RX ORDER — KETAMINE HCL IN 0.9 % NACL 50 MG/5 ML
SYRINGE (ML) INTRAVENOUS
Status: DISCONTINUED | OUTPATIENT
Start: 2023-09-19 | End: 2023-09-19

## 2023-09-19 RX ORDER — METOPROLOL TARTRATE 1 MG/ML
INJECTION, SOLUTION INTRAVENOUS
Status: DISCONTINUED | OUTPATIENT
Start: 2023-09-19 | End: 2023-09-19

## 2023-09-19 RX ORDER — CYANOCOBALAMIN (VITAMIN B-12) 250 MCG
1000 TABLET ORAL DAILY
Status: DISCONTINUED | OUTPATIENT
Start: 2023-09-21 | End: 2023-10-03 | Stop reason: HOSPADM

## 2023-09-19 RX ORDER — CEFAZOLIN SODIUM 2 G/50ML
2 SOLUTION INTRAVENOUS
Status: COMPLETED | OUTPATIENT
Start: 2023-09-20 | End: 2023-09-20

## 2023-09-19 RX ORDER — CALCIUM GLUCONATE 20 MG/ML
3 INJECTION, SOLUTION INTRAVENOUS
Status: DISCONTINUED | OUTPATIENT
Start: 2023-09-19 | End: 2023-10-03 | Stop reason: HOSPADM

## 2023-09-19 RX ORDER — MAGNESIUM SULFATE HEPTAHYDRATE 40 MG/ML
4 INJECTION, SOLUTION INTRAVENOUS
Status: DISCONTINUED | OUTPATIENT
Start: 2023-09-19 | End: 2023-10-03 | Stop reason: HOSPADM

## 2023-09-19 RX ORDER — METOPROLOL TARTRATE 25 MG/1
25 TABLET, FILM COATED ORAL 2 TIMES DAILY
Status: DISCONTINUED | OUTPATIENT
Start: 2023-09-20 | End: 2023-09-27

## 2023-09-19 RX ORDER — CALCIUM GLUCONATE 20 MG/ML
1 INJECTION, SOLUTION INTRAVENOUS
Status: DISCONTINUED | OUTPATIENT
Start: 2023-09-19 | End: 2023-10-03 | Stop reason: HOSPADM

## 2023-09-19 RX ORDER — CHLORHEXIDINE GLUCONATE ORAL RINSE 1.2 MG/ML
10 SOLUTION DENTAL 2 TIMES DAILY
Status: DISPENSED | OUTPATIENT
Start: 2023-09-19 | End: 2023-09-24

## 2023-09-19 RX ORDER — MUPIROCIN 20 MG/G
OINTMENT TOPICAL 2 TIMES DAILY
Status: DISCONTINUED | OUTPATIENT
Start: 2023-09-19 | End: 2023-09-19 | Stop reason: SDUPTHER

## 2023-09-19 RX ORDER — MIDAZOLAM HYDROCHLORIDE 1 MG/ML
INJECTION, SOLUTION INTRAMUSCULAR; INTRAVENOUS
Status: DISCONTINUED | OUTPATIENT
Start: 2023-09-19 | End: 2023-09-19

## 2023-09-19 RX ORDER — ASPIRIN 81 MG/1
81 TABLET ORAL DAILY
Status: DISCONTINUED | OUTPATIENT
Start: 2023-09-20 | End: 2023-10-03 | Stop reason: HOSPADM

## 2023-09-19 RX ORDER — DEXTROSE, SODIUM CHLORIDE, SODIUM LACTATE, POTASSIUM CHLORIDE, AND CALCIUM CHLORIDE 5; .6; .31; .03; .02 G/100ML; G/100ML; G/100ML; G/100ML; G/100ML
INJECTION, SOLUTION INTRAVENOUS CONTINUOUS
Status: ACTIVE | OUTPATIENT
Start: 2023-09-19 | End: 2023-09-20

## 2023-09-19 RX ORDER — ACETAMINOPHEN 10 MG/ML
1000 INJECTION, SOLUTION INTRAVENOUS ONCE
Status: ACTIVE | OUTPATIENT
Start: 2023-09-19 | End: 2023-09-20

## 2023-09-19 RX ORDER — POTASSIUM CHLORIDE 29.8 MG/ML
40 INJECTION INTRAVENOUS
Status: DISCONTINUED | OUTPATIENT
Start: 2023-09-19 | End: 2023-10-03 | Stop reason: HOSPADM

## 2023-09-19 RX ORDER — ROCURONIUM BROMIDE 10 MG/ML
INJECTION, SOLUTION INTRAVENOUS
Status: DISCONTINUED | OUTPATIENT
Start: 2023-09-19 | End: 2023-09-19

## 2023-09-19 RX ORDER — LANOLIN ALCOHOL/MO/W.PET/CERES
1 CREAM (GRAM) TOPICAL DAILY
Status: DISCONTINUED | OUTPATIENT
Start: 2023-09-20 | End: 2023-10-03 | Stop reason: HOSPADM

## 2023-09-19 RX ORDER — HEPARIN SODIUM 1000 [USP'U]/ML
INJECTION, SOLUTION INTRAVENOUS; SUBCUTANEOUS
Status: DISCONTINUED | OUTPATIENT
Start: 2023-09-19 | End: 2023-09-19

## 2023-09-19 RX ORDER — CEFAZOLIN SODIUM 1 G/3ML
INJECTION, POWDER, FOR SOLUTION INTRAMUSCULAR; INTRAVENOUS
Status: DISCONTINUED | OUTPATIENT
Start: 2023-09-19 | End: 2023-09-19

## 2023-09-19 RX ORDER — ONDANSETRON 2 MG/ML
INJECTION INTRAMUSCULAR; INTRAVENOUS
Status: DISCONTINUED | OUTPATIENT
Start: 2023-09-19 | End: 2023-09-19

## 2023-09-19 RX ORDER — INDOCYANINE GREEN AND WATER 25 MG
KIT INJECTION
Status: DISCONTINUED | OUTPATIENT
Start: 2023-09-19 | End: 2023-09-19 | Stop reason: HOSPADM

## 2023-09-19 RX ORDER — HYDROCODONE BITARTRATE AND ACETAMINOPHEN 500; 5 MG/1; MG/1
TABLET ORAL
Status: DISCONTINUED | OUTPATIENT
Start: 2023-09-19 | End: 2023-10-03 | Stop reason: HOSPADM

## 2023-09-19 RX ORDER — PROTAMINE SULFATE 10 MG/ML
INJECTION, SOLUTION INTRAVENOUS
Status: DISCONTINUED | OUTPATIENT
Start: 2023-09-19 | End: 2023-09-19

## 2023-09-19 RX ORDER — POTASSIUM CHLORIDE 14.9 MG/ML
60 INJECTION INTRAVENOUS
Status: DISCONTINUED | OUTPATIENT
Start: 2023-09-19 | End: 2023-10-03 | Stop reason: HOSPADM

## 2023-09-19 RX ORDER — FENTANYL CITRATE 50 UG/ML
INJECTION, SOLUTION INTRAMUSCULAR; INTRAVENOUS
Status: DISCONTINUED | OUTPATIENT
Start: 2023-09-19 | End: 2023-09-19

## 2023-09-19 RX ORDER — ASCORBIC ACID 500 MG
500 TABLET ORAL 2 TIMES DAILY
Status: DISCONTINUED | OUTPATIENT
Start: 2023-09-20 | End: 2023-10-03 | Stop reason: HOSPADM

## 2023-09-19 RX ORDER — AMIODARONE HYDROCHLORIDE 200 MG/1
200 TABLET ORAL ONCE
Status: COMPLETED | OUTPATIENT
Start: 2023-09-19 | End: 2023-09-19

## 2023-09-19 RX ORDER — FENTANYL CITRATE 50 UG/ML
25 INJECTION, SOLUTION INTRAMUSCULAR; INTRAVENOUS
Status: DISCONTINUED | OUTPATIENT
Start: 2023-09-19 | End: 2023-09-21

## 2023-09-19 RX ORDER — METOCLOPRAMIDE HYDROCHLORIDE 5 MG/ML
5 INJECTION INTRAMUSCULAR; INTRAVENOUS EVERY 6 HOURS PRN
Status: DISCONTINUED | OUTPATIENT
Start: 2023-09-19 | End: 2023-10-03 | Stop reason: HOSPADM

## 2023-09-19 RX ORDER — FENTANYL CITRATE 50 UG/ML
12.5 INJECTION, SOLUTION INTRAMUSCULAR; INTRAVENOUS
Status: DISCONTINUED | OUTPATIENT
Start: 2023-09-19 | End: 2023-09-21

## 2023-09-19 RX ORDER — ONDANSETRON 2 MG/ML
4 INJECTION INTRAMUSCULAR; INTRAVENOUS EVERY 12 HOURS PRN
Status: DISCONTINUED | OUTPATIENT
Start: 2023-09-19 | End: 2023-10-03 | Stop reason: HOSPADM

## 2023-09-19 RX ADMIN — VANCOMYCIN HYDROCHLORIDE 1000 MG: 1 INJECTION, POWDER, LYOPHILIZED, FOR SOLUTION INTRAVENOUS at 07:09

## 2023-09-19 RX ADMIN — METOPROLOL TARTRATE 25 MG: 25 TABLET, FILM COATED ORAL at 06:09

## 2023-09-19 RX ADMIN — PROTAMINE SULFATE 350 MG: 10 INJECTION, SOLUTION INTRAVENOUS at 04:09

## 2023-09-19 RX ADMIN — ROCURONIUM BROMIDE 50 MG: 10 INJECTION, SOLUTION INTRAVENOUS at 10:09

## 2023-09-19 RX ADMIN — CEFAZOLIN 2 G: 330 INJECTION, POWDER, FOR SOLUTION INTRAMUSCULAR; INTRAVENOUS at 08:09

## 2023-09-19 RX ADMIN — AMIODARONE HYDROCHLORIDE 200 MG: 200 TABLET ORAL at 06:09

## 2023-09-19 RX ADMIN — AMINOCAPROIC ACID 5 G: 250 INJECTION, SOLUTION INTRAVENOUS at 04:09

## 2023-09-19 RX ADMIN — INSULIN HUMAN 3 UNITS: 100 INJECTION, SOLUTION PARENTERAL at 11:09

## 2023-09-19 RX ADMIN — IPRATROPIUM BROMIDE AND ALBUTEROL SULFATE 3 ML: 2.5; .5 SOLUTION RESPIRATORY (INHALATION) at 11:09

## 2023-09-19 RX ADMIN — SODIUM CHLORIDE, SODIUM LACTATE, POTASSIUM CHLORIDE, AND CALCIUM CHLORIDE: .6; .31; .03; .02 INJECTION, SOLUTION INTRAVENOUS at 07:09

## 2023-09-19 RX ADMIN — EPINEPHRINE 0.04 MCG/KG/MIN: 1 INJECTION INTRAMUSCULAR; INTRAVENOUS; SUBCUTANEOUS at 01:09

## 2023-09-19 RX ADMIN — PROTAMINE SULFATE 50 MG: 10 INJECTION, SOLUTION INTRAVENOUS at 04:09

## 2023-09-19 RX ADMIN — ALBUMIN HUMAN: 50 SOLUTION INTRAVENOUS at 04:09

## 2023-09-19 RX ADMIN — FENTANYL CITRATE 50 MCG: 50 INJECTION, SOLUTION INTRAMUSCULAR; INTRAVENOUS at 08:09

## 2023-09-19 RX ADMIN — Medication 20 MG: at 11:09

## 2023-09-19 RX ADMIN — DEXMEDETOMIDINE HYDROCHLORIDE 0.8 MCG/KG/HR: 4 INJECTION INTRAVENOUS at 08:09

## 2023-09-19 RX ADMIN — ONDANSETRON 4 MG: 2 INJECTION INTRAMUSCULAR; INTRAVENOUS at 07:09

## 2023-09-19 RX ADMIN — VASOPRESSIN 0.04 UNITS/MIN: 20 INJECTION INTRAVENOUS at 03:09

## 2023-09-19 RX ADMIN — ROCURONIUM BROMIDE 50 MG: 10 INJECTION, SOLUTION INTRAVENOUS at 12:09

## 2023-09-19 RX ADMIN — FENTANYL CITRATE 25 MCG: 50 INJECTION INTRAMUSCULAR; INTRAVENOUS at 09:09

## 2023-09-19 RX ADMIN — POTASSIUM CHLORIDE 20 MEQ: 200 INJECTION, SOLUTION INTRAVENOUS at 09:09

## 2023-09-19 RX ADMIN — SODIUM CHLORIDE, SODIUM LACTATE, POTASSIUM CHLORIDE, AND CALCIUM CHLORIDE: .6; .31; .03; .02 INJECTION, SOLUTION INTRAVENOUS at 08:09

## 2023-09-19 RX ADMIN — AMINOCAPROIC ACID 5 G: 250 INJECTION, SOLUTION INTRAVENOUS at 08:09

## 2023-09-19 RX ADMIN — SODIUM BICARBONATE 150 MEQ: 84 INJECTION INTRAVENOUS at 04:09

## 2023-09-19 RX ADMIN — PROPOFOL 20 MG: 10 INJECTION, EMULSION INTRAVENOUS at 07:09

## 2023-09-19 RX ADMIN — ROCURONIUM BROMIDE 50 MG: 10 INJECTION, SOLUTION INTRAVENOUS at 05:09

## 2023-09-19 RX ADMIN — CEFAZOLIN 2 G: 330 INJECTION, POWDER, FOR SOLUTION INTRAMUSCULAR; INTRAVENOUS at 04:09

## 2023-09-19 RX ADMIN — IPRATROPIUM BROMIDE AND ALBUTEROL SULFATE 3 ML: 2.5; .5 SOLUTION RESPIRATORY (INHALATION) at 07:09

## 2023-09-19 RX ADMIN — INSULIN HUMAN 3 UNITS: 100 INJECTION, SOLUTION PARENTERAL at 12:09

## 2023-09-19 RX ADMIN — FENTANYL CITRATE 50 MCG: 50 INJECTION, SOLUTION INTRAMUSCULAR; INTRAVENOUS at 09:09

## 2023-09-19 RX ADMIN — HEPARIN SODIUM 30000 UNITS: 1000 INJECTION, SOLUTION INTRAVENOUS; SUBCUTANEOUS at 09:09

## 2023-09-19 RX ADMIN — METOPROLOL TARTRATE 2.5 MG: 1 INJECTION, SOLUTION INTRAVENOUS at 08:09

## 2023-09-19 RX ADMIN — SODIUM CHLORIDE 1250 MG: 9 INJECTION, SOLUTION INTRAVENOUS at 08:09

## 2023-09-19 RX ADMIN — MAGNESIUM SULFATE HEPTAHYDRATE 2 G: 2 INJECTION, SOLUTION INTRAVENOUS at 08:09

## 2023-09-19 RX ADMIN — HEPARIN SODIUM 10000 UNITS: 1000 INJECTION, SOLUTION INTRAVENOUS; SUBCUTANEOUS at 10:09

## 2023-09-19 RX ADMIN — INSULIN HUMAN 5 UNITS: 100 INJECTION, SOLUTION PARENTERAL at 02:09

## 2023-09-19 RX ADMIN — PROPOFOL 30 MG: 10 INJECTION, EMULSION INTRAVENOUS at 07:09

## 2023-09-19 RX ADMIN — VASOPRESSIN 0.08 UNITS/MIN: 0.2 INJECTION INTRAVENOUS at 06:09

## 2023-09-19 RX ADMIN — FENTANYL CITRATE 50 MCG: 50 INJECTION, SOLUTION INTRAMUSCULAR; INTRAVENOUS at 07:09

## 2023-09-19 RX ADMIN — CALCIUM CHLORIDE 1 G: 100 INJECTION, SOLUTION INTRAVENOUS at 04:09

## 2023-09-19 RX ADMIN — ROCURONIUM BROMIDE 100 MG: 10 INJECTION, SOLUTION INTRAVENOUS at 07:09

## 2023-09-19 RX ADMIN — DEXMEDETOMIDINE HYDROCHLORIDE 0.4 MCG/KG/HR: 4 INJECTION INTRAVENOUS at 06:09

## 2023-09-19 RX ADMIN — MIDAZOLAM 2 MG: 1 INJECTION INTRAMUSCULAR; INTRAVENOUS at 07:09

## 2023-09-19 RX ADMIN — ALBUMIN (HUMAN) 25 G: 2.5 SOLUTION INTRAVENOUS at 09:09

## 2023-09-19 RX ADMIN — PROPOFOL 100 MG: 10 INJECTION, EMULSION INTRAVENOUS at 07:09

## 2023-09-19 RX ADMIN — NOREPINEPHRINE BITARTRATE 0.06 MCG/KG/MIN: 1 INJECTION, SOLUTION, CONCENTRATE INTRAVENOUS at 08:09

## 2023-09-19 RX ADMIN — DEXMEDETOMIDINE 0.4 MCG/KG/HR: 200 INJECTION, SOLUTION INTRAVENOUS at 11:09

## 2023-09-19 RX ADMIN — EPINEPHRINE 0.06 MCG/KG/MIN: 1 INJECTION INTRAMUSCULAR; INTRAVENOUS; SUBCUTANEOUS at 07:09

## 2023-09-19 RX ADMIN — VASOPRESSIN 0.04 UNITS/MIN: 0.2 INJECTION INTRAVENOUS at 11:09

## 2023-09-19 RX ADMIN — SODIUM CHLORIDE 1 UNITS/HR: 9 INJECTION, SOLUTION INTRAVENOUS at 07:09

## 2023-09-19 RX ADMIN — ACETAMINOPHEN 1000 MG: 10 INJECTION INTRAVENOUS at 09:09

## 2023-09-19 RX ADMIN — SODIUM CHLORIDE, SODIUM LACTATE, POTASSIUM CHLORIDE, CALCIUM CHLORIDE AND DEXTROSE MONOHYDRATE: 5; 600; 310; 30; 20 INJECTION, SOLUTION INTRAVENOUS at 06:09

## 2023-09-19 RX ADMIN — ALBUMIN (HUMAN) 12.5 G: 2.5 SOLUTION INTRAVENOUS at 06:09

## 2023-09-19 RX ADMIN — CHLORHEXIDINE GLUCONATE 0.12% ORAL RINSE 10 ML: 1.2 LIQUID ORAL at 08:09

## 2023-09-19 RX ADMIN — SODIUM CHLORIDE 5 UNITS/HR: 9 INJECTION, SOLUTION INTRAVENOUS at 02:09

## 2023-09-19 RX ADMIN — ROCURONIUM BROMIDE 50 MG: 10 INJECTION, SOLUTION INTRAVENOUS at 04:09

## 2023-09-19 NOTE — PROGRESS NOTES
The patient is a 43-year-old male who was worked up for complaints of chest pain.  Patient's workup showed severe multivessel coronary artery disease with critical stenosis of the left main and RCA.  Patient also has severe aortic valve regurgitation on aortogram.  Patient's ejection fraction was estimated to be 45%.  The patient is a candidate for urgent coronary artery bypass grafting and aortic valve replacement.  The risks and benefits of surgery were explained to the patient and his mother.  The risks include but not limited to death, stroke, bleeding, infection, prolonged intubation, prolonged hospitalization, renal failure myocardial infarction and heart failure.  The patient understand the risks and benefits of surgery and has agreed to proceed with urgent aortic valve replacement and coronary artery bypass grafting.  The patient has elected to have a bioprosthetic valve inserted.  The patient understands that in young patients a bioprosthetic valve has a limited lifespan.  However, the patient will like to avoid chronic anticoagulation.  In addition, due to the patient's reduced ejection fraction in the setting of severe critical coronary artery disease the patient may need a left ventricular assist device or intra-aortic balloon pump to reduce the risk of post cardiotomy cardiogenic shock.

## 2023-09-19 NOTE — HOSPITAL COURSE
9/19: to OR today for multi-vessel CABG and AVR. Remained intubated post-op. On Epi 0.08 and Vasopressin 0.08 for hemodynamic support, precedex for sedation.  9/20: decent amount of bloody output from Cts overnight, slowing this morning- receiving products this morning; insulin gtt just turned off; likely to be extubated soon  9/21: extubated yesterday morning; CT x 3 removed this morning by Dr. Douglas. Patient is on room air- has productive cough of moderately thick, tan sputum. Some surgical site pain, using pillow. Hemodynamics acceptable, off pressors since yesterday. Stable for floor

## 2023-09-19 NOTE — ANESTHESIA PROCEDURE NOTES
Monitor FELICITA    Diagnosis: AI  Patient location during procedure: OR  Procedure start time: 9/19/2023 8:10 AM  Procedure end time: 9/19/2023 8:20 AM  Surgery related to: Heart    Staffing  Authorizing Provider: Gonzalo Gentile MD  Performing Provider: Gonzalo Gentile MD    Staffing  Anesthesiologist Present  Yes  Setup & Induction  Patient preparation: bite block inserted  Probe Insertion: easy      Findings    Post attempted AV repair patient had moderate to severe eccentric AI.  Post AV replacement patient had well functioning valve with washing jets and no perivalvular leaks.    Post CPB EF improved to 50% on epinephrine and vasopressin infusions.  Impression     Left Ventricle - Moderately reduced LV function.  EF 40% with no regional WMA's.  No LVE  Right Ventricle - Normal size and contractility.  Aortic Valve - Severe aortic insufficiency.  Moderate aortic stenosis.  SARAH 1.2 cm2  Aortic Root - Small to normal caliber without calcification.  Sino-tubular diameter 2.0 cm  Mitral Valve - Normal structure and function.  Trace MR  Tricuspid Valve - Normal structure and function  Atria - Normal size  Atrial septum - Normal appearance    Other Findings    Probe Removal     FELICITA probe removed without event.No blood on removal of probe.

## 2023-09-19 NOTE — HPI
The patient is a 43-year-old male tobacco abuser who presented to the ER and was worked up for chest pain.  Patient has been having chest discomfort and low exercise levels for the past few months.  Patient denies any past medical history.  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  The patient does not have details of the procedure.      The patient was worked up with an echocardiogram that shows ejection fraction of 45% and moderate aortic regurgitation.  Cardiac catheterization shows severe coronary artery disease with critical left main stenosis and severe aortic regurgitation.  The patient is currently  in the ICU and is pain-free.

## 2023-09-19 NOTE — CONSULTS
[facility] - Intensive Care [facility])  Cardiothoracic Surgery  Consult Note    Patient Name: Mikie Alcazar  MRN: [id number]  Admission Date: 9/17/2023  Attending Physician: Elliott Wright MD  Referring Provider: Self, Aaareferral    Patient information was obtained from patient, past medical records and ER records.     Consults  Subjective:     Principal Problem: NSTEMI (non-ST elevated myocardial infarction)    History of Present Illness: The patient is a 43-year-old male active tobacco abuser who presented to the ER and was worked up for complaints of chest pain.  Patient had been having chest discomfort at low exercise levels for the past few months.  Patient denies any past medical history.  However, the patient volunteered that he had a thoracic tumor surgically removed through a right thoracotomy while he was a child.  The patient could not provide details of the procedure.      The patient was worked up with an echocardiogram that shows ejection fraction of 45% and moderate aortic regurgitation.  Cardiac catheterization shows severe coronary artery disease with critical left main stenosis and severe aortic regurgitation.  The patient is currently  in the ICU and is pain-free.      No current facility-administered medications on file prior to encounter.     No current outpatient medications on file prior to encounter.       Review of patient's allergies indicates:  No Known Allergies    Past Medical History:   Diagnosis Date    Hypertension 9/18/2023     History reviewed. No pertinent surgical history.  Family History       Problem Relation (Age of Onset)    Cataracts Maternal Aunt, Maternal Uncle    Hypertension Father    Macular degeneration Father    Strabismus Mother          Tobacco Use    Smoking status: Every Day     Current packs/day: 1.00     Types: Cigarettes    Smokeless tobacco: Never   Substance and Sexual Activity    Alcohol use: Yes     Comment: rarely    Drug use: No    Sexual activity: Not on  file     Review of Systems   Constitutional:  Positive for activity change.   HENT: Negative.     Eyes: Negative.    Respiratory:  Positive for chest tightness and shortness of breath.    Cardiovascular:  Positive for chest pain. Negative for leg swelling.   Gastrointestinal: Negative.    Endocrine: Negative.    Genitourinary: Negative.    Musculoskeletal: Negative.    Allergic/Immunologic: Negative.    Neurological: Negative.    Hematological: Negative.    Psychiatric/Behavioral: Negative.       Objective:     Vital Signs (Most Recent):  Temp: 97.7 °F (36.5 °C) (09/18/23 1923)  Pulse: 65 (09/18/23 1923)  Resp: 13 (09/18/23 1923)  BP: 118/61 (09/18/23 1923)  SpO2: 95 % (09/18/23 1923) Vital Signs (24h Range):  Temp:  [97.1 °F (36.2 °C)-98.7 °F (37.1 °C)] 97.7 °F (36.5 °C)  Pulse:  [63-83] 65  Resp:  [13-18] 13  SpO2:  [94 %-97 %] 95 %  BP: (106-138)/(61-79) 118/61     Weight: 81.6 kg (180 lb)  Body mass index is 27.37 kg/m².    SpO2: 95 %        Intake/Output - Last 3 Shifts         09/16 0700  09/17 0659 09/17 0700  09/18 0659 09/18 0700  09/19 0659    Urine (mL/kg/hr)   450 (0.4)    Total Output   450    Net   -450                    Lines/Drains/Airways       Peripheral Intravenous Line  Duration                  Peripheral IV - Single Lumen 09/17/23 1821 20 G Anterior;Left Hand 1 day         Peripheral IV - Single Lumen 09/17/23 1825 18 G Anterior;Proximal;Right Forearm 1 day                     STS Risk Score:   Operative Mortality 2.14%  Morbidity & Mortality 15.6%  Stroke 1.49%  Renal Failure 1.63%  Reoperation 5.39%  Prolonged Ventilation 10.2%  Deep Sternal Wound Infection 0.157%  Long Hospital Stay (>14 days) 4.34%  Short Hospital Stay (<6 days)* 46.8%       Physical Exam  Constitutional:       Appearance: Normal appearance.   HENT:      Head: Normocephalic and atraumatic.      Nose: Nose normal.   Eyes:      Extraocular Movements: Extraocular movements intact.      Conjunctiva/sclera: Conjunctivae normal.       Pupils: Pupils are equal, round, and reactive to light.   Cardiovascular:      Rate and Rhythm: Normal rate and regular rhythm.      Pulses: Normal pulses.      Heart sounds: Normal heart sounds.   Pulmonary:      Effort: Pulmonary effort is normal.      Breath sounds: Normal breath sounds.   Abdominal:      General: Abdomen is flat. Bowel sounds are normal.      Palpations: Abdomen is soft.   Skin:     General: Skin is warm and dry.   Neurological:      General: No focal deficit present.      Mental Status: He is alert and oriented to person, place, and time.   Psychiatric:         Behavior: Behavior normal.            Significant Labs:  All pertinent labs from the last 24 hours have been reviewed.    Significant Diagnostics:  I have reviewed all pertinent imaging results/findings within the past 24 hours.      Assessment/Plan:     NYHA Score: NYHA IV: inability to carry on any physical activity without discomfort    Left main coronary artery disease  The patient is a 43-year-old male with critical left main coronary artery disease and severe aortic regurgitation by cardiac catheterization.  The patient is a candidate for urgent coronary artery bypass grafting and aortic valve replacement.  The risks and benefits of surgery were explained to the patient.  The patient understands the risks and benefits of surgery and has agreed to proceed with urgent aortic valve replacement and coronary artery bypass grafting.  The type of aortic valve to be used was discussed with the patient.  The patient stated a preference for a bioprosthetic valve in order to avoid the need for chronic anticoagulation.  The patient understands that a bioprosthetic valve especially in a young patient has a limited lifespan.  Patient's surgery has been scheduled for 09/19/2023.        Thank you for your consult. I will follow-up with patient. Please contact us if you have any additional questions.    Nishant Douglas MD  Cardiothoracic  Surgery  [facility] - Intensive Care ([facility])

## 2023-09-19 NOTE — ANESTHESIA PROCEDURE NOTES
Intubation    Date/Time: 9/19/2023 7:39 AM    Performed by: Lei Mendoza CRNA  Authorized by: Lei Mendoza CRNA    Intubation:     Induction:  Intravenous    Intubated:  Postinduction    Mask Ventilation:  Easy with oral airway    Attempts:  1    Attempted By:  CRNA    Method of Intubation:  Direct    Blade:  Finnegan 2    Laryngeal View Grade: Grade IIA - cords partially seen      Difficult Airway Encountered?: No      Complications:  None    Airway Device:  Oral endotracheal tube    Airway Device Size:  8.0    Style/Cuff Inflation:  Cuffed    Inflation Amount (mL):  6    Tube secured:  22    Secured at:  The lips    Placement Verified By:  Capnometry    Complicating Factors:  None    Findings Post-Intubation:  BS equal bilateral

## 2023-09-19 NOTE — ASSESSMENT & PLAN NOTE
With HTN and multivessel CAD awaiting eval for CABG  Will need smoking cessation education and planning to optimize comorbid conditions

## 2023-09-19 NOTE — OP NOTE
[facility] - Intensive Care ([facility])  Cardiothoracic Surgery  Operative Note    SUMMARY     Date of Procedure: 9/19/2023     Procedure: Procedure(s) (LRB):  CORONARY ARTERY BYPASS GRAFT (CABG) (N/A)  REPLACEMENT-VALVE-AORTIC (N/A)  SURGICAL PROCUREMENT, VEIN, ENDOSCOPIC (Left)  BLOCK, NERVE, INTERCOSTAL, 2 OR MORE (N/A)  ECHOCARDIOGRAM,TRANSESOPHAGEAL (N/A)      Coronary artery bypass grafting x5 with LIMA to LAD and a reverse saphenous vein graft to diagonal, reverse saphenous vein graft to OM branch and sequence reverse saphenous vein graft to PDA and MYLES    Aortic valve replacement with a 19 mm Saint Dany mechanical valve.    Surgeon(s) and Role:     * Nishant Douglas MD - Primary    Assisting Surgeon: None    Pre-Operative Diagnosis: Left main coronary artery disease [I25.10]    Post-Operative Diagnosis: Post-Op Diagnosis Codes:     * Left main coronary artery disease [I25.10]    Anesthesia: General    Operative Findings (including complications, if any):  The patient had evidence of previous anterior and superior mediastinal surgery with well developed adhesions.  Intraop epiaortic ultrasound was performed which showed the aorta was free of intraluminal plaques and mobile atheroma.  Pre and postop intraop FELICITA shows patient ejection fraction was improved postop.  Aortic valve was well seated without any paravalvular leaks and with good leaflet motion on FELICITA..  The patient's coronaries were significantly calcified.  Intraop indocyanine green angiogram performed shows the grafts were open and perfusing patient's myocardium The patient's aortic valve was trileaflet with apparent sclerosis and shortening of the right coronary leaflet.      Description of Technical Procedures:  The patient was prepped and draped sterilely.  A median sternotomy incision was then performed which was carried down sharply to the sternum was encountered.  In light of the patient's thoracic surgery and on a preop CT scan there was evidence  of prior mediastinal surgery with adherence of the brachiocephalic vein to the posterior table of the sternum.  , The decision was then made to use a oscillating saw to open the mediastinum.      The anterior table of the sternum was then divided with the oscillating saw.  The inferior part of the posterior table of the sternum was then opened with the oscillating saw.  The 2 halves of the sternum were then elevated with retractors.  The rest of the posterior table in the area of the superior part of the anterior mediastinum was then divided under direct vision.  It is apparent that the patient's thymus had been removed and this has been replaced with well vascularized adhesions.  The left half of the sternum was then gently elevated while the adhesions between the mediastinum and the posterior table of the sternum were then lysed.  A mammary retractor was then placed.  The left internal mammary artery was then dissected down as a pedicle.  While this was being performed a 2nd member of the team was harvesting the greater saphenous vein from the patient's left leg using an endoscopic vein harvesting technique.  The patient was then given heparin.  Next the pericardium was then opened.    The innominate vein was then dissected free and retracted away from the aorta.  An epiaortic ultrasound was then performed which showed the aorta was free of intraluminal atherosclerotic plaques.  The aortic cannula pursestring was then placed in the distal ascending aorta.  The venous pursestring was placed in the right atrial appendage.  And the retrograde cardioplegia pursestring was placed in the free wall of the right atrium.  And the antegrade cardioplegia pursestring was placed in the mid ascending aorta.  Therapeutic ACT was then ascertained.  The patient was then cannulated with a 21 Burkinan aortic cannula.  The IVC was cannulated with a 3 stage venous cannula.  The antegrade cardioplegia cannula was placed in the mid  ascending aorta.  And the retrograde cardioplegia cannula was placed in the coronary sinus.  The patient was then started on cardiopulmonary bypass.  The aorta was then crossclamped.  The heart was then arrested with antegrade warm cardioplegia.  Throughout the procedure the patient was given continuous retrograde flow blood and supplemented with antegrade cold blood cardioplegia    Attention was 1st drawn to the inferior wall of the heart.  The PDA was identified.  The proximal 3rd of the PDA was severely calcified and unsuitable for bypass.  However a soft section distal to the calcified segment was identified.  The proximal anastomosis of the reverse sequence saphenous vein graft was then performed to the PDA as an side-to-side anastomosis using 7 0 continuous Prolene sutures..  Next attention was drawn to the MYLES.  The MYLES was opened.  The distal anastomosis of the reverse saphenous vein graft was then performed to the MYLES as an end-to-side anastomosis using 7 0 continuous Prolene sutures..  An intraop indocyanine green angiogram was then performed which showed that both anastomosis was wide open and perfusing the myocardium.  The vein graft was then connected to a perfusion cannula.  And this was used to perfused the myocardium continuously throughout the procedure.  Next attention was drawn to the OM branch.  Which was identified and opened.  The distal anastomosis of the reverse saphenous vein graft was then performed to the OM using 7 0 continuous Prolene sutures as an end-to-side anastomosis.  An indocyanine angiogram showed that the anastomosis was open with well perfused myocardium.  This vein graft was then attached to a perfusion cannula.  And this was perfused continuously.  Next attention was drawn to the diagonal.  The distal anastomosis of the reverse saphenous vein graft was then performed to the diagonal as an end-to-side anastomosis using 7 0 continuous Prolene sutures.  An indocyanine green  angiogram was then performed which showed that the anastomosis was wide open and perfusing the myocardium.      Attention was then drawn to the aorta.  The aorta was opened with a J shaped incision.  Inspection of the aortic valve showed the valve is trileaflet with apparent shortening of the free edge of the right coronary leaflet.  In addition, the patient has a small aortic annulus which sized to about 19 mm.  The size of the annulus was estimated to preclude the insertion of an bioprosthetic aortic valve the will have an adequate effective orifice area to avoid patient prosthetic valve mismatch.  Since the Lee ism of regurgitation was assessed to be the shortened free margin of the right coronary leaflet, the decision was made to attempt a repair by lengthening the free edge of the right coronary leaflet.  The free edge was length deficit was measured to be about 0.75-1 cm.  The right coronary leaflet was then incised vertically in the center of the leaflets.  A triangular shaped patch of bovine pericardial patch was then sutured to the right coronary leaflet such that the free edge was lengthened by about 1 cm.  The aorta was then closed with 4 0 Prolene sutures in 2 layers.    Next attention was then drawn to the LAD.  The LAD was identified in the mid LAD region.  An LAD arteriotomy was then performed in the mid LAD.  The LIMA was then anastomosed to the LAD as an end-to-side anastomosis using 8 0 continuous Prolene sutures.  An indocyanine green angiogram was performed which showed the LAD was wide open with perfusion of the anterior wall of the myocardium.  By this time the patient had been rewarming.  The patient was given hotshot cardioplegia.  Ventilation was then restarted.  The patient was then weaned from cardiopulmonary bypass without any problems.  An intraop julien was then used to interrogate the aortic valve.  The aortic valve had a persistent regurgitation which was now eccentric but still  severe in magnitude.  The repair apparently was unsuccessful.  The decision was then made to replace the aortic valve.    The aorta was then crossclamped.  The heart was arrested with antegrade cardioplegia.  The aorta was then reopened.  The aortic leaflets were then resected.  The aortic valve annulus was sized to a 19 mm mechanical valve.  Pledgeted valve sutures were then placed.  The valve sutures were then passed through the valve sewing ring.  The valve was then seated.  The valve sutures were then secured with cor knots.  The valve was tested and rotated and found to be functioning properly.  The aorta was then closed with 4 0 Prolene in 2 layers.  BioGlue was then applied to the suture line.  By this time the patient was rewarming.  Ventilation was restarted.  The patient was then weaned from cardiopulmonary bypass without any problems.    The patient was then decannulated.  Surgical sites were inspected and were free of bleeding.  Heparin was then reversed with protamine.  The patient's sternum was then closed with figure-of-eight sternal wires.  The fascia was closed with 1. Vicryl sutures.  And the skin was closed with 4 0 Monocryl sutures.    Significant Surgical Tasks Conducted by the Assistant(s), if Applicable:  Endoscopic vein harvesting    Estimated Blood Loss (EBL): * No values recorded between 9/19/2023  8:37 AM and 9/19/2023  5:16 PM *           Implants:   Implant Name Type Inv. Item Serial No.  Lot No. LRB No. Used Action   [employer] GRAFT MARKER   N/A  [id number]  3 Implanted   RAJ/WIRE TEMPORARY CARDIAC PACING WIRE   N/A  [id number]  4 Implanted   GRAFT VAS GUARD 2X9CM BOVINE - SN/A  GRAFT VAS GUARD 2X9CM BOVINE N/A [employer] [id number]  1 Implanted   VALVE AORTIC HI PROFILE HEART - [id number]  VALVE AORTIC HI PROFILE HEART [id number] [employer] N/A  1 Implanted   DEVICE COR KNOT MINI COMBO KIT - SN/A  DEVICE COR KNOT MINI COMBO KIT N/A [employer] [id number]  1  Implanted   COR-KNOT QUICK LOAD UNIT   N/A  [id number]  1 Implanted       Specimens:   Specimen (24h ago, onward)       Start     Ordered    09/19/23 1614  Specimen to Pathology, Surgery Cardiovascular  Once        Comments: Pre-op Diagnosis: Left main coronary artery disease [I25.10]Procedure(s):CORONARY ARTERY BYPASS GRAFT (CABG)REPLACEMENT-VALVE-AORTICSURGICAL PROCUREMENT, VEIN, ENDOSCOPICBLOCK, NERVE, INTERCOSTAL, 2 OR MOREECHOCARDIOGRAM,TRANSESOPHAGEAL Number of specimens: 1Name of specimens:    AORTIC VALVE LEAFLETS - perm     References:    Click here for ordering Quick Tip   Question Answer Comment   Procedure Type: Cardiovascular    Specimen Class: Routine/Screening    Which provider would you like to cc? JUAREZ HARVEY    Release to patient Immediate        09/19/23 1614                            Condition: Stable    Disposition: ICU - intubated and hemodynamically stable.    Attestation: I performed the procedure.

## 2023-09-19 NOTE — ANESTHESIA PROCEDURE NOTES
[place] [facility]    Diagnosis: CAD  Patient location during procedure: done in OR  Procedure start time: 9/19/2023 7:48 AM  Timeout: 9/19/2023 7:48 AM  Procedure end time: 9/19/2023 6:00 PM    Staffing  Authorizing Provider: Gonzalo Gentile MD  Performing Provider: Gonzalo Gentile MD    Staffing  Performed by: Gonzalo Gentile MD  Authorized by: Gonzalo Gentile MD    Anesthesiologist was present at the time of the procedure.  Preanesthetic Checklist  Completed: patient identified, IV checked, site marked, risks and benefits discussed, surgical consent, monitors and equipment checked, pre-op evaluation, timeout performed and anesthesia consent given  [place] [facility]  Skin Prep: chlorhexidine gluconate  Local Infiltration: none  Location: right,  internal jugular vein  Vessel Caliber: large, patent, compressibility normal  Vascular Doppler:  not done  Introducer: 9.5 Fr, manometry not used.  Device: CCO/Oximetric Catheter   Catheter Size: 7.5 Fr  Catheter placement by yes. Heme positive aspiration all ports. PAC floated with balloon up not wedgedSterile sheath used  Locked at: 49 cm.Insertion Attempts: 1  Indication: intravenous therapy, hemodynamic monitoring  Ultrasound Guidance  Needle advanced into vessel with real time Ultrasound guidance.  Guidewire confirmed in vessel.  Sterile sheath used.  Assessment  Central Line Bundle Protocol followed. Hand hygiene before procedure, surgical cap worn, surgical mask worn, sterile surgical gloves worn, large sterile drape used.  Verification: ultrasound and blood return  Dressing: sutured in place and taped and tegaderm  Additional Notes  Biopatch applied

## 2023-09-19 NOTE — SUBJECTIVE & OBJECTIVE
Objective:     Vital Signs (Most Recent):  Temp: 99.1 °F (37.3 °C) (09/19/23 1823)  Pulse: 99 (09/19/23 1823)  Resp: (!) 0 (09/19/23 1823)  BP: (!) 88/39 (09/19/23 1823)  SpO2: 100 % (09/19/23 1823) Vital Signs (24h Range):  Temp:  [97.5 °F (36.4 °C)-99.1 °F (37.3 °C)] 99.1 °F (37.3 °C)  Pulse:  [] 99  Resp:  [0-28] 0  SpO2:  [93 %-100 %] 100 %  BP: ()/(39-92) 88/39     Weight: 81.6 kg (180 lb)  Body mass index is 27.37 kg/m².      Intake/Output Summary (Last 24 hours) at 9/19/2023 1825  Last data filed at 9/19/2023 1721  Gross per 24 hour   Intake 6944.77 ml   Output 3150 ml   Net 3794.77 ml        Physical Exam  Vitals reviewed.   Constitutional:       Appearance: He is ill-appearing.      Interventions: He is sedated, intubated and restrained.   HENT:      Head: Normocephalic and atraumatic.      Mouth/Throat:      Mouth: Mucous membranes are moist.      Pharynx: Oropharynx is clear.   Eyes:      General: No scleral icterus.     Conjunctiva/sclera: Conjunctivae normal.   Cardiovascular:      Rate and Rhythm: Regular rhythm. Tachycardia present.      Pulses: Normal pulses.      Comments: Mediastinal incision with vac in place. Mediastinal CT x 2, pleural CT x 1; [place] R IJ, R femoral A-line  Pulmonary:      Effort: He is intubated.      Breath sounds: No wheezing or rales.      Comments: Synchronous with vent. Mechanical breath sounds but clear  Abdominal:      General: There is no distension.      Palpations: Abdomen is soft.   Musculoskeletal:      Cervical back: Neck supple.      Right lower leg: No edema.      Comments: LLE with ACE wrap dressing in place, DARON x 2- bloody output   Lymphadenopathy:      Cervical: No cervical adenopathy.   Skin:     General: Skin is warm and dry.   Neurological:      Comments: Sedated- unable to assess           Review of Systems   Unable to perform ROS: Intubated       Vents:  Vent Mode: A/C (09/19/23 1823)  Set Rate: 24 BPM (09/19/23 1823)  Vt Set: 500 mL (09/19/23  1823)  PEEP/CPAP: 5 cmH20 (09/19/23 1823)  Oxygen Concentration (%): 60 (09/19/23 1823)  Peak Airway Pressure: 25 cmH20 (09/19/23 1823)  Plateau Pressure: 0 cmH20 (09/19/23 1823)  Total Ve: 12.3 L/m (09/19/23 1823)  F/VT Ratio<105 (RSBI): (!) 0 (09/19/23 1823)    Lines/Drains/Airways       Central Venous Catheter Line  Duration             Pulmonary Artery Catheter Assessment  09/19/23 0748 <1 day              Drain  Duration                  Chest Tube 09/19/23 1618 Tube - 3 Left Pleural <1 day         Closed/Suction Drain 09/19/23 1030 Tube - 1 Left;Proximal Leg Bulb 19 Fr. <1 day         Closed/Suction Drain 09/19/23 1032 Tube - 2 Left;Distal Leg Bulb 19 Fr. <1 day         Urethral Catheter 09/19/23 0730 Silicone;Temperature probe;Straight-tip 16 Fr. <1 day         Y Chest Tube 1 and 2 09/19/23 1617 1 Right Mediastinal 2 Left Mediastinal 24 Fr. <1 day              Airway  Duration                  Airway - Non-Surgical 09/19/23 0739 <1 day              Arterial Line  Duration             Arterial Line 09/19/23 0733 Left Radial <1 day    Arterial Line 09/19/23 1619 Right Femoral <1 day              Peripheral Intravenous Line  Duration                  Peripheral IV - Single Lumen 09/17/23 1821 20 G Anterior;Left Hand 2 days         Peripheral IV - Single Lumen 09/17/23 1825 18 G Anterior;Proximal;Right Forearm 2 days         Peripheral IV - Single Lumen 09/19/23 0745 18 G Right Hand <1 day                    Significant Labs:    CBC/Anemia Profile:  Recent Labs   Lab 09/19/23 0343 09/19/23  1647 09/19/23 1813   WBC 7.09  7.09 15.24* 20.18*   HGB 12.2*  12.2* 9.1* 10.8*   HCT 35.2*  35.2* 26.6* 30.9*     277 41* 74*   MCV 90  90 86 84   RDW 12.6  12.6 17.2* 17.7*        Chemistries:  Recent Labs   Lab 09/17/23 1827 09/19/23 0343    140   K 3.7 3.8    108   CO2 20* 22*   BUN 14 8   CREATININE 1.1 0.9   CALCIUM 9.6 8.2*   ALBUMIN 4.1  --    PROT 7.4  --    BILITOT 0.3  --    ALKPHOS 69   --    ALT 16  --    AST 18  --        All pertinent labs within the past 24 hours have been reviewed.    Significant Imaging:  I have reviewed all pertinent imaging results/findings within the past 24 hours.

## 2023-09-19 NOTE — ANESTHESIA PREPROCEDURE EVALUATION
09/19/2023  Mikie Alcazar is a 43 y.o., male.    Patient Active Problem List   Diagnosis    NSTEMI (non-ST elevated myocardial infarction)    Hypertension    Left main coronary artery disease    Tobacco smoker, 1 pack of cigarettes or less per day    Nonrheumatic aortic valve insufficiency     Pre-op Assessment    I have reviewed the Patient Summary Reports.     I have reviewed the Nursing Notes. I have reviewed the NPO Status.   I have reviewed the Medications.     Review of Systems  Anesthesia Hx:  Denies Family Hx of Anesthesia complications.   Denies Personal Hx of Anesthesia complications.   Social:  Smoker    Hematology/Oncology:     Oncology Normal    -- Anemia:   EENT/Dental:EENT/Dental Normal   Cardiovascular:   Hypertension Valvular problems/Murmurs, AI Past MI (NSTEMI) CAD   ECG has been reviewed.    Pulmonary:  Pulmonary Normal    Renal/:  Renal/ Normal     Hepatic/GI:  Hepatic/GI Normal    Musculoskeletal:  Musculoskeletal Normal    Neurological:  Neurology Normal    Endocrine:  Endocrine Normal    Dermatological:  Skin Normal    Psych:  Psychiatric Normal           Physical Exam  General: Alert and Oriented    Airway:  Mallampati: II   Mouth Opening: Normal  TM Distance: Normal  Tongue: Normal  Neck ROM: Normal ROM        Anesthesia Plan  Type of Anesthesia, risks & benefits discussed:    Anesthesia Type: Gen ETT  Intra-op Monitoring Plan: Standard ASA Monitors, Art Line, Central Line, FELICITA and CO  Induction:  IV  Informed Consent: Informed consent signed with the Patient and all parties understand the risks and agree with anesthesia plan.  All questions answered.   ASA Score: 3  Day of Surgery Review of History & Physical: I have interviewed and examined the patient. I have reviewed the patient's H&P dated:     Ready For Surgery From Anesthesia Perspective.     .

## 2023-09-19 NOTE — SUBJECTIVE & OBJECTIVE
Past Medical History:   Diagnosis Date    Hypertension 9/18/2023       History reviewed. No pertinent surgical history.    Review of patient's allergies indicates:  No Known Allergies    Family History       Problem Relation (Age of Onset)    Cataracts Maternal Aunt, Maternal Uncle    Hypertension Father    Macular degeneration Father    Strabismus Mother          Tobacco Use    Smoking status: Every Day     Current packs/day: 1.00     Types: Cigarettes    Smokeless tobacco: Never   Substance and Sexual Activity    Alcohol use: Yes     Comment: rarely    Drug use: No    Sexual activity: Not on file         Review of Systems   Constitutional:  Negative for chills, fatigue and fever.   HENT:  Negative for trouble swallowing.    Respiratory:  Negative for shortness of breath.    Cardiovascular:  Negative for chest pain, palpitations and leg swelling.   Gastrointestinal:  Negative for nausea.   Genitourinary: Negative.    Skin:  Negative for wound.   Neurological:  Negative for dizziness, syncope and headaches.   Psychiatric/Behavioral:  The patient is not nervous/anxious.      Objective:     Vital Signs (Most Recent):  Temp: 97.7 °F (36.5 °C) (09/18/23 1923)  Pulse: 65 (09/18/23 1923)  Resp: 13 (09/18/23 1923)  BP: 118/61 (09/18/23 1923)  SpO2: 95 % (09/18/23 1923) Vital Signs (24h Range):  Temp:  [97.1 °F (36.2 °C)-98.7 °F (37.1 °C)] 97.7 °F (36.5 °C)  Pulse:  [63-90] 65  Resp:  [13-18] 13  SpO2:  [94 %-98 %] 95 %  BP: (106-162)/(61-88) 118/61     Weight: 81.6 kg (180 lb)  Body mass index is 27.37 kg/m².      Intake/Output Summary (Last 24 hours) at 9/18/2023 1933  Last data filed at 9/18/2023 1715  Gross per 24 hour   Intake --   Output 450 ml   Net -450 ml        Physical Exam  Vitals and nursing note reviewed.   Constitutional:       General: He is sleeping. He is not in acute distress.     Appearance: He is normal weight.   HENT:      Head: Atraumatic.   Eyes:      Conjunctiva/sclera: Conjunctivae normal.   Neck:       Vascular: No JVD.   Cardiovascular:      Rate and Rhythm: Normal rate and regular rhythm.      Pulses:           Radial pulses are 2+ on the right side and 2+ on the left side.        Dorsalis pedis pulses are 2+ on the right side and 2+ on the left side.   Pulmonary:      Effort: Pulmonary effort is normal.      Breath sounds: Normal breath sounds.   Abdominal:      General: Bowel sounds are normal. There is no distension.      Palpations: Abdomen is soft.   Musculoskeletal:      Right lower leg: No edema.      Left lower leg: No edema.   Skin:     General: Skin is warm and dry.      Capillary Refill: Capillary refill takes less than 2 seconds.          Neurological:      Mental Status: He is easily aroused.      GCS: GCS eye subscore is 3. GCS verbal subscore is 5. GCS motor subscore is 6.   Psychiatric:         Behavior: Behavior is cooperative.          Vents:       Lines/Drains/Airways       Peripheral Intravenous Line  Duration                  Peripheral IV - Single Lumen 09/17/23 1821 20 G Anterior;Left Hand 1 day         Peripheral IV - Single Lumen 09/17/23 1825 18 G Anterior;Proximal;Right Forearm 1 day                    Significant Labs:    CBC/Anemia Profile:  Recent Labs   Lab 09/17/23 1827 09/18/23  0531   WBC 8.04 7.42   HGB 15.0 13.2*   HCT 43.5 38.4*    300   MCV 89 91   RDW 12.5 12.8        Chemistries:  Recent Labs   Lab 09/17/23 1827      K 3.7      CO2 20*   BUN 14   CREATININE 1.1   CALCIUM 9.6   ALBUMIN 4.1   PROT 7.4   BILITOT 0.3   ALKPHOS 69   ALT 16   AST 18       All pertinent labs within the past 24 hours have been reviewed.    Significant Imaging:   I have reviewed all pertinent imaging results/findings within the past 24 hours.

## 2023-09-19 NOTE — ASSESSMENT & PLAN NOTE
Normotensive on exam  Will avoid hypotension with severe AI and multivessel CAD  ICU hemodynamic monitoring

## 2023-09-19 NOTE — PLAN OF CARE
POC reviewed with pt. Pt admitted to unit this shift post heart cath for CABG workup, CABG and AVR planned for am. Heparin restarted and then to be stopped along with fluids at 0600 this am. Pt educated on procedure to occur this am. Pre-op orders completed and pt prepped for OR. Report to be given to day shift RN who will assume care.

## 2023-09-19 NOTE — ASSESSMENT & PLAN NOTE
Resume heparin drip per cardiology plan  tridil infusion, discussed with Dr Morales goal CP free, normotension (avoid hypotension), and keep HR < 80  Await  CTS consult  ICU hemodynamic monitoring

## 2023-09-19 NOTE — CONSULTS
[facility] ([facility])  Critical Care Medicine  Consult Note    Patient Name: Mikie Alcazar  MRN: [id number]  Admission Date: 9/17/2023  Hospital Length of Stay: 1 days  Code Status: Full Code  Attending Physician: Low Gutierrez MD   Primary Care Provider: No, Primary Doctor   Principal Problem: NSTEMI (non-ST elevated myocardial infarction)      Subjective:     HPI:  43 year old male with known medical issues of HTN and everyday tobacco and marijuana smoking  Presented to ED on 9/17 with CP, initially reported started while mowing grass but later endorsed intermittent CP x 6m  Troponin 0.6  Admitted for NSTEMI with heparin infusion  Troponin remained flat (max 0.70)  9/18 seen by cardiology and taken for LHC revealing severe AI, multivessel CAD, 50% EF, grade II diastolic dysfunction  CTS consulted to evaluate for CABG  Transferred post cath to ICU on tridil infusion    Critical Care team consulted to assume medical management while in ICU      Hospital/ICU Course:  Seen and examined on ICU arrival, sleeping, arouses easily. Denies CP or SOB.       Past Medical History:   Diagnosis Date    Hypertension 9/18/2023       History reviewed. No pertinent surgical history.    Review of patient's allergies indicates:  No Known Allergies    Family History       Problem Relation (Age of Onset)    Cataracts Maternal Aunt, Maternal Uncle    Hypertension Father    Macular degeneration Father    Strabismus Mother          Tobacco Use    Smoking status: Every Day     Current packs/day: 1.00     Types: Cigarettes    Smokeless tobacco: Never   Substance and Sexual Activity    Alcohol use: Yes     Comment: rarely    Drug use: No    Sexual activity: Not on file         Review of Systems   Constitutional:  Negative for chills, fatigue and fever.   HENT:  Negative for trouble swallowing.    Respiratory:  Negative for shortness of breath.    Cardiovascular:  Negative for chest pain, palpitations and leg swelling.    Gastrointestinal:  Negative for nausea.   Genitourinary: Negative.    Skin:  Negative for wound.   Neurological:  Negative for dizziness, syncope and headaches.   Psychiatric/Behavioral:  The patient is not nervous/anxious.      Objective:     Vital Signs (Most Recent):  Temp: 97.7 °F (36.5 °C) (09/18/23 1923)  Pulse: 65 (09/18/23 1923)  Resp: 13 (09/18/23 1923)  BP: 118/61 (09/18/23 1923)  SpO2: 95 % (09/18/23 1923) Vital Signs (24h Range):  Temp:  [97.1 °F (36.2 °C)-98.7 °F (37.1 °C)] 97.7 °F (36.5 °C)  Pulse:  [63-90] 65  Resp:  [13-18] 13  SpO2:  [94 %-98 %] 95 %  BP: (106-162)/(61-88) 118/61     Weight: 81.6 kg (180 lb)  Body mass index is 27.37 kg/m².      Intake/Output Summary (Last 24 hours) at 9/18/2023 1933  Last data filed at 9/18/2023 1715  Gross per 24 hour   Intake --   Output 450 ml   Net -450 ml        Physical Exam  Vitals and nursing note reviewed.   Constitutional:       General: He is sleeping. He is not in acute distress.     Appearance: He is normal weight.   HENT:      Head: Atraumatic.   Eyes:      Conjunctiva/sclera: Conjunctivae normal.   Neck:      Vascular: No JVD.   Cardiovascular:      Rate and Rhythm: Normal rate and regular rhythm.      Pulses:           Radial pulses are 2+ on the right side and 2+ on the left side.        Dorsalis pedis pulses are 2+ on the right side and 2+ on the left side.   Pulmonary:      Effort: Pulmonary effort is normal.      Breath sounds: Normal breath sounds.   Abdominal:      General: Bowel sounds are normal. There is no distension.      Palpations: Abdomen is soft.   Musculoskeletal:      Right lower leg: No edema.      Left lower leg: No edema.   Skin:     General: Skin is warm and dry.      Capillary Refill: Capillary refill takes less than 2 seconds.          Neurological:      Mental Status: He is easily aroused.      GCS: GCS eye subscore is 3. GCS verbal subscore is 5. GCS motor subscore is 6.   Psychiatric:         Behavior: Behavior is  cooperative.          Vents:       Lines/Drains/Airways       Peripheral Intravenous Line  Duration                  Peripheral IV - Single Lumen 09/17/23 1821 20 G Anterior;Left Hand 1 day         Peripheral IV - Single Lumen 09/17/23 1825 18 G Anterior;Proximal;Right Forearm 1 day                    Significant Labs:    CBC/Anemia Profile:  Recent Labs   Lab 09/17/23 1827 09/18/23  0531   WBC 8.04 7.42   HGB 15.0 13.2*   HCT 43.5 38.4*    300   MCV 89 91   RDW 12.5 12.8        Chemistries:  Recent Labs   Lab 09/17/23 1827      K 3.7      CO2 20*   BUN 14   CREATININE 1.1   CALCIUM 9.6   ALBUMIN 4.1   PROT 7.4   BILITOT 0.3   ALKPHOS 69   ALT 16   AST 18       All pertinent labs within the past 24 hours have been reviewed.    Significant Imaging:   I have reviewed all pertinent imaging results/findings within the past 24 hours.      Assessment/Plan:     Cardiac/Vascular  * NSTEMI (non-ST elevated myocardial infarction)  Resume heparin drip per cardiology plan  tridil infusion, discussed with Dr Morales goal CP free, normotension (avoid hypotension), and keep HR < 80  Await  CTS consult  ICU hemodynamic monitoring    CAD, multiple vessel  Post C eval  CTS consulted to evaluate for CABG  BB, ASA, statin    Hypertension  Normotensive on exam  Will avoid hypotension with severe AI and multivessel CAD  ICU hemodynamic monitoring    Other  Tobacco smoker, 1 pack of cigarettes or less per day  With HTN and multivessel CAD awaiting eval for CABG  Will need smoking cessation education and planning to optimize comorbid conditions        Critical Care Daily Checklist:    A: Awake: RASS Goal/Actual Goal:    Actual:     B: Spontaneous Breathing Trial Performed?     C: SAT & SBT Coordinated?  n/a                      D: Delirium: CAM-ICU     E: Early Mobility Performed? Yes   F: Feeding Goal:    Status:     Current Diet Order   Procedures    Diet NPO      AS: Analgesia/Sedation prn   T: Thromboembolic  Prophylaxis Heparin infusion   H: HOB > 300 Yes   U: Stress Ulcer Prophylaxis (if needed) pepcid   G: Glucose Control Monitor, no DM history   B: Bowel Function     I: Indwelling Catheter (Lines & [place]) Necessity reviewed   D: De-escalation of Antimicrobials/Pharmacotherapies reviewed    Plan for the day/ETD As above    Code Status:  Family/Goals of Care: Full Code  Goal home on discharge     Thank you for your consult. Critical Care team will take over medical management while in ICU     PHILLIP Busch-BC  Critical Care Medicine  [facility] ([facility])

## 2023-09-19 NOTE — SUBJECTIVE & OBJECTIVE
No current facility-administered medications on file prior to encounter.     No current outpatient medications on file prior to encounter.       Review of patient's allergies indicates:  No Known Allergies    Past Medical History:   Diagnosis Date    Hypertension 9/18/2023     History reviewed. No pertinent surgical history.  Family History       Problem Relation (Age of Onset)    Cataracts Maternal Aunt, Maternal Uncle    Hypertension Father    Macular degeneration Father    Strabismus Mother          Tobacco Use    Smoking status: Every Day     Current packs/day: 1.00     Types: Cigarettes    Smokeless tobacco: Never   Substance and Sexual Activity    Alcohol use: Yes     Comment: rarely    Drug use: No    Sexual activity: Not on file     Review of Systems   Constitutional:  Positive for activity change.   HENT: Negative.     Eyes: Negative.    Respiratory:  Positive for chest tightness and shortness of breath.    Cardiovascular:  Positive for chest pain. Negative for leg swelling.   Gastrointestinal: Negative.    Endocrine: Negative.    Genitourinary: Negative.    Musculoskeletal: Negative.    Allergic/Immunologic: Negative.    Neurological: Negative.    Hematological: Negative.    Psychiatric/Behavioral: Negative.       Objective:     Vital Signs (Most Recent):  Temp: 97.7 °F (36.5 °C) (09/18/23 1923)  Pulse: 65 (09/18/23 1923)  Resp: 13 (09/18/23 1923)  BP: 118/61 (09/18/23 1923)  SpO2: 95 % (09/18/23 1923) Vital Signs (24h Range):  Temp:  [97.1 °F (36.2 °C)-98.7 °F (37.1 °C)] 97.7 °F (36.5 °C)  Pulse:  [63-83] 65  Resp:  [13-18] 13  SpO2:  [94 %-97 %] 95 %  BP: (106-138)/(61-79) 118/61     Weight: 81.6 kg (180 lb)  Body mass index is 27.37 kg/m².    SpO2: 95 %        Intake/Output - Last 3 Shifts         09/16 0700  09/17 0659 09/17 0700 09/18 0659 09/18 0700 09/19 0659    Urine (mL/kg/hr)   450 (0.4)    Total Output   450    Net   -450                    Lines/Drains/Airways       Peripheral Intravenous  Line  Duration                  Peripheral IV - Single Lumen 09/17/23 1821 20 G Anterior;Left Hand 1 day         Peripheral IV - Single Lumen 09/17/23 1825 18 G Anterior;Proximal;Right Forearm 1 day                     STS Risk Score: to be calculated     Physical Exam  Constitutional:       Appearance: Normal appearance.   HENT:      Head: Normocephalic and atraumatic.      Nose: Nose normal.   Eyes:      Extraocular Movements: Extraocular movements intact.      Conjunctiva/sclera: Conjunctivae normal.      Pupils: Pupils are equal, round, and reactive to light.   Cardiovascular:      Rate and Rhythm: Normal rate and regular rhythm.      Pulses: Normal pulses.      Heart sounds: Normal heart sounds.   Pulmonary:      Effort: Pulmonary effort is normal.      Breath sounds: Normal breath sounds.   Abdominal:      General: Abdomen is flat. Bowel sounds are normal.      Palpations: Abdomen is soft.   Skin:     General: Skin is warm and dry.   Neurological:      General: No focal deficit present.      Mental Status: He is alert and oriented to person, place, and time.   Psychiatric:         Behavior: Behavior normal.            Significant Labs:  All pertinent labs from the last 24 hours have been reviewed.    Significant Diagnostics:  I have reviewed all pertinent imaging results/findings within the past 24 hours.

## 2023-09-19 NOTE — PROGRESS NOTES
[facility] ([facility])  Critical Care Medicine  Progress Note    Patient Name: Mikie Alcazar  MRN: [id number]  Admission Date: 9/17/2023  Hospital Length of Stay: 2 days  Code Status: Full Code  Attending Provider: Elliott Wright MD  Primary Care Provider: Lissette, Primary Doctor   Principal Problem: NSTEMI (non-ST elevated myocardial infarction)    Subjective:     HPI:  43 year old male with known medical issues of HTN and everyday tobacco and marijuana smoking  Presented to ED on 9/17 with CP, initially reported started while mowing grass but later endorsed intermittent CP x 6m  Troponin 0.6  Admitted for NSTEMI with heparin infusion  Troponin remained flat (max 0.70)  9/18 seen by cardiology and taken for [facility] revealing severe AI, multivessel CAD, 50% EF, grade II diastolic dysfunction  CTS consulted to evaluate for CABG  Transferred post cath to ICU on tridil infusion    Critical Care team consulted to assume medical management while in ICU      Hospital/ICU Course:  9/19: to OR today for multi-vessel CABG and AVR. Remained intubated post-op. On Epi 0.08 and Vasopressin 0.08 for hemodynamic support, precedex for sedation.         Objective:     Vital Signs (Most Recent):  Temp: 99.1 °F (37.3 °C) (09/19/23 1823)  Pulse: 99 (09/19/23 1823)  Resp: (!) 0 (09/19/23 1823)  BP: (!) 88/39 (09/19/23 1823)  SpO2: 100 % (09/19/23 1823) Vital Signs (24h Range):  Temp:  [97.5 °F (36.4 °C)-99.1 °F (37.3 °C)] 99.1 °F (37.3 °C)  Pulse:  [] 99  Resp:  [0-28] 0  SpO2:  [93 %-100 %] 100 %  BP: ()/(39-92) 88/39     Weight: 81.6 kg (180 lb)  Body mass index is 27.37 kg/m².      Intake/Output Summary (Last 24 hours) at 9/19/2023 1825  Last data filed at 9/19/2023 1721  Gross per 24 hour   Intake 6944.77 ml   Output 3150 ml   Net 3794.77 ml        Physical Exam  Vitals reviewed.   Constitutional:       Appearance: He is ill-appearing.      Interventions: He is sedated, intubated and restrained.   HENT:      Head:  Normocephalic and atraumatic.      Mouth/Throat:      Mouth: Mucous membranes are moist.      Pharynx: Oropharynx is clear.   Eyes:      General: No scleral icterus.     Conjunctiva/sclera: Conjunctivae normal.   Cardiovascular:      Rate and Rhythm: Regular rhythm. Tachycardia present.      Pulses: Normal pulses.      Comments: Mediastinal incision with vac in place. Mediastinal CT x 2, pleural CT x 1; [place]land R IJ, R femoral A-line  Pulmonary:      Effort: He is intubated.      Breath sounds: No wheezing or rales.      Comments: Synchronous with vent. Mechanical breath sounds but clear  Abdominal:      General: There is no distension.      Palpations: Abdomen is soft.   Musculoskeletal:      Cervical back: Neck supple.      Right lower leg: No edema.      Comments: LLE with ACE wrap dressing in place, DARON x 2- bloody output   Lymphadenopathy:      Cervical: No cervical adenopathy.   Skin:     General: Skin is warm and dry.   Neurological:      Comments: Sedated- unable to assess           Review of Systems   Unable to perform ROS: Intubated       Vents:  Vent Mode: A/C (09/19/23 1823)  Set Rate: 24 BPM (09/19/23 1823)  Vt Set: 500 mL (09/19/23 1823)  PEEP/CPAP: 5 cmH20 (09/19/23 1823)  Oxygen Concentration (%): 60 (09/19/23 1823)  Peak Airway Pressure: 25 cmH20 (09/19/23 1823)  Plateau Pressure: 0 cmH20 (09/19/23 1823)  Total Ve: 12.3 L/m (09/19/23 1823)  F/VT Ratio<105 (RSBI): (!) 0 (09/19/23 1823)    Lines/Drains/Airways       Central Venous Catheter Line  Duration             Pulmonary Artery Catheter Assessment  09/19/23 0748 <1 day              Drain  Duration                  Chest Tube 09/19/23 1618 Tube - 3 Left Pleural <1 day         Closed/Suction Drain 09/19/23 1030 Tube - 1 Left;Proximal Leg Bulb 19 Fr. <1 day         Closed/Suction Drain 09/19/23 1032 Tube - 2 Left;Distal Leg Bulb 19 Fr. <1 day         Urethral Catheter 09/19/23 0730 Silicone;Temperature probe;Straight-tip 16 Fr. <1 day         Y Chest  Tube 1 and 2 09/19/23 1617 1 Right Mediastinal 2 Left Mediastinal 24 Fr. <1 day              Airway  Duration                  Airway - Non-Surgical 09/19/23 0739 <1 day              Arterial Line  Duration             Arterial Line 09/19/23 0733 Left Radial <1 day    Arterial Line 09/19/23 1619 Right Femoral <1 day              Peripheral Intravenous Line  Duration                  Peripheral IV - Single Lumen 09/17/23 1821 20 G Anterior;Left Hand 2 days         Peripheral IV - Single Lumen 09/17/23 1825 18 G Anterior;Proximal;Right Forearm 2 days         Peripheral IV - Single Lumen 09/19/23 0745 18 G Right Hand <1 day                    Significant Labs:    CBC/Anemia Profile:  Recent Labs   Lab 09/19/23  0343 09/19/23  1647 09/19/23  1813   WBC 7.09  7.09 15.24* 20.18*   HGB 12.2*  12.2* 9.1* 10.8*   HCT 35.2*  35.2* 26.6* 30.9*     277 41* 74*   MCV 90  90 86 84   RDW 12.6  12.6 17.2* 17.7*        Chemistries:  Recent Labs   Lab 09/17/23  1827 09/19/23  0343    140   K 3.7 3.8    108   CO2 20* 22*   BUN 14 8   CREATININE 1.1 0.9   CALCIUM 9.6 8.2*   ALBUMIN 4.1  --    PROT 7.4  --    BILITOT 0.3  --    ALKPHOS 69  --    ALT 16  --    AST 18  --        All pertinent labs within the past 24 hours have been reviewed.    Significant Imaging:  I have reviewed all pertinent imaging results/findings within the past 24 hours.      ABG  Recent Labs   Lab 09/19/23 1816   PH 7.244*   PO2 349*   PCO2 64.0*   HCO3 27.7   BE 0     Assessment/Plan:     Cardiac/Vascular  * NSTEMI (non-ST elevated myocardial infarction)  [facility] 9/18 with multi-vessel disease including critical left main disease   CTS consulted   Taken urgently for CABG and AVR 9/19   See problem: CAD     Nonrheumatic aortic valve insufficiency  S/p AVR 9/19  Post op plan per CTS    CAD, multiple vessel  [facility] 9/18 with multi-vessel disease including left main  S/p CABG and AVR with Dr. Douglas 9/19, awaiting op note   Vent settings  reviewed, VAP prevention bundle in place- follow protocol for extubation   Hemodynamic support - Vasopressin and Epi   Pain control   ASA, plavix, statin  Serial labs- replace electrolytes and give blood products as indicated   Monitor fluid volume status closely   Glucose control- goal 140-180, insulin infusion if needed   Plan per CTS     Hypertension  Holding anti-hypertensives with use of vasoactive agents post-op   Resume as appropriate     Other  Tobacco smoker, 1 pack of cigarettes or less per day  Smoking cessation counseling when appropriate     DVT prophylaxis: chemical when ok with CTS  GI prophylaxis: protonix  Code status: Full   Disposition: cont ICU care     Pritesh Harrison NP  Critical Care Medicine  [facility] (Hospital)

## 2023-09-19 NOTE — NURSING
Patient arrived to ICU bed 19 from OR. Accompanied by OR team. Dr. Douglas at bedside. Placed on bedside monitor. All tubes and lines intact. Midline chest incision is dry, and intact. Vital signs are stable. Safety and fall prevention measures are in place. Resting quietly on the ventilator. Assuming care at this time.

## 2023-09-19 NOTE — ASSESSMENT & PLAN NOTE
[facility] 9/18 with multi-vessel disease including critical left main disease   CTS consulted   Taken urgently for CABG and AVR 9/19   See problem: CAD

## 2023-09-19 NOTE — ASSESSMENT & PLAN NOTE
[facility] 9/18 with multi-vessel disease including left main  S/p CABG and AVR with Dr. Douglas 9/19, awaiting op note   Vent settings reviewed, VAP prevention bundle in place- follow protocol for extubation   Hemodynamic support - Vasopressin and Epi   Pain control   ASA, plavix, statin  Serial labs- replace electrolytes and give blood products as indicated   Monitor fluid volume status closely   Glucose control- goal 140-180, insulin infusion if needed   Plan per CTS

## 2023-09-19 NOTE — HPI
43 year old male with known medical issues of HTN and everyday tobacco and marijuana smoking  Presented to ED on 9/17 with CP, initially reported started while mowing grass but later endorsed intermittent CP x 6m  Troponin 0.6  Admitted for NSTEMI with heparin infusion  Troponin remained flat (max 0.70)  9/18 seen by cardiology and taken for LHC revealing severe AI, multivessel CAD, 50% EF, grade II diastolic dysfunction  CTS consulted to evaluate for CABG  Transferred post cath to ICU on tridil infusion    Critical Care team consulted to assume medical management while in ICU

## 2023-09-19 NOTE — ASSESSMENT & PLAN NOTE
The patient is a 43-year-old male with critical left main coronary artery disease and severe aortic regurgitation by cardiac catheterization.  The patient is a candidate for urgent coronary artery bypass grafting and aortic valve replacement.  The risks and benefits of surgery were explained to the patient.  The patient understands the risks and benefits of surgery and has agreed to proceed with urgent aortic valve replacement and coronary artery bypass grafting.  The type of aortic valve to be used was discussed with the patient.  The patient stated a preference for a bioprosthetic valve in order to avoid the need for chronic anticoagulation.  The patient understands that a bioprosthetic valve especially in a young patient has a limited lifespan.

## 2023-09-19 NOTE — TRANSFER OF CARE
"Anesthesia Transfer of Care Note    Patient: Mikie Alcazar    Procedure(s) Performed: Procedure(s) (LRB):  CORONARY ARTERY BYPASS GRAFT (CABG) (N/A)  REPLACEMENT-VALVE-AORTIC (N/A)  SURGICAL PROCUREMENT, VEIN, ENDOSCOPIC (Left)  BLOCK, NERVE, INTERCOSTAL, 2 OR MORE (N/A)  ECHOCARDIOGRAM,TRANSESOPHAGEAL (N/A)    Patient location: ICU    Anesthesia Type: general    Transport from OR: Continuos invasive BP monitoring in transport. Continuous SpO2 monitoring in transport. Continuous ECG monitoring in transport. Upon arrival to PACU/ICU, patient attached to ventilator and auscultated to confirm bilateral breath sounds and adequate TV. Transported from OR intubated on 100% O2  with adequate ventilation controlled by transport ventilator    Post pain: adequate analgesia    Post assessment: no apparent anesthetic complications and tolerated procedure well    Post vital signs: stable    Level of consciousness: sedated    Nausea/Vomiting: no nausea/vomiting    Complications: none    Transfer of care protocol was followed      Last vitals:   Visit Vitals  BP (!) 169/92   Pulse 91   Temp 36.7 °C (98 °F)   Resp 19   Ht 5' 8" (1.727 m)   Wt 81.6 kg (180 lb)   SpO2 96%   BMI 27.37 kg/m²     "

## 2023-09-19 NOTE — EICU
43 year old male admitted with non ST elevation myocardial infarction.ECHO revealed EF 45% and moderate aortic regurgitation.Cardiac catheterization revealed severe coronary  artery disease and critical left main disease and severe aortic regurgitation.patient scheduled for CABG and aortic valve replacement on 9/19/2023.    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  Echo Left Ventricle: The left ventricle is normal in size. Ventricular mass is normal. Normal wall thickness. regional wall motion abnormalities present. There is mildly reduced systolic function with a visually estimated ejection fraction of 40 - 45%. Grade II diastolic dysfunction.Right Ventricle: Normal right ventricular cavity size. Wall thickness is normal. Right ventricle wall motion  is normal. Systolic function is normal.  Aortic Valve: There is mild to moderate aortic regurgitation    Cardiac catheterization The Ost RCA to Prox RCA lesion was 80% stenosed.    The Mid LM to Ost LAD lesion was 99% stenosed.    The Ost Cx to Prox Cx lesion was 70% stenosed.    The 1st Diag lesion was 80% stenosed.    The Prox LAD to Mid LAD lesion was 50% stenosed.    The ejection fraction was calculated to be 50%.    The estimated blood loss was none.    There was three vessel coronary artery disease. There was left main disease.    There was moderate (3+) aortic regurgitation.      Assessment and plan:  1.NSTEMI-await CABG with aortic valve replacement,on heparin and nitroglycerin infusion,ASA,antilipid and metoprolol,CT surgery recommendations,  2.HTN-maintain strict control.  3.Aortic regurgitation-?etiology-bioprosthetic valve replacement planned.  4.Tobacco  use-cessation counseling .

## 2023-09-20 PROBLEM — Z95.2 S/P AVR (AORTIC VALVE REPLACEMENT): Status: ACTIVE | Noted: 2023-09-20

## 2023-09-20 PROBLEM — Z95.1 S/P CABG X 5: Status: ACTIVE | Noted: 2023-09-20

## 2023-09-20 LAB
ALBUMIN SERPL BCP-MCNC: 3.8 G/DL (ref 3.5–5.2)
ALBUMIN SERPL BCP-MCNC: 4 G/DL (ref 3.5–5.2)
ALLENS TEST: ABNORMAL
ALP SERPL-CCNC: 20 U/L (ref 55–135)
ALP SERPL-CCNC: 28 U/L (ref 55–135)
ALT SERPL W/O P-5'-P-CCNC: 11 U/L (ref 10–44)
ALT SERPL W/O P-5'-P-CCNC: 15 U/L (ref 10–44)
ANION GAP SERPL CALC-SCNC: 8 MMOL/L (ref 8–16)
ANION GAP SERPL CALC-SCNC: 9 MMOL/L (ref 8–16)
ANISOCYTOSIS BLD QL SMEAR: SLIGHT
APTT PPP: 48.7 SEC (ref 21–32)
AST SERPL-CCNC: 47 U/L (ref 10–40)
AST SERPL-CCNC: 51 U/L (ref 10–40)
BASOPHILS # BLD AUTO: 0.02 K/UL (ref 0–0.2)
BASOPHILS # BLD AUTO: 0.03 K/UL (ref 0–0.2)
BASOPHILS NFR BLD: 0.2 % (ref 0–1.9)
BASOPHILS NFR BLD: 0.2 % (ref 0–1.9)
BILIRUB SERPL-MCNC: 1.1 MG/DL (ref 0.1–1)
BILIRUB SERPL-MCNC: 1.2 MG/DL (ref 0.1–1)
BUN SERPL-MCNC: 13 MG/DL (ref 6–20)
BUN SERPL-MCNC: 9 MG/DL (ref 6–20)
BURR CELLS BLD QL SMEAR: ABNORMAL
CALCIUM SERPL-MCNC: 7.7 MG/DL (ref 8.7–10.5)
CALCIUM SERPL-MCNC: 8.2 MG/DL (ref 8.7–10.5)
CHLORIDE SERPL-SCNC: 103 MMOL/L (ref 95–110)
CHLORIDE SERPL-SCNC: 110 MMOL/L (ref 95–110)
CO2 SERPL-SCNC: 26 MMOL/L (ref 23–29)
CO2 SERPL-SCNC: 26 MMOL/L (ref 23–29)
CREAT SERPL-MCNC: 0.9 MG/DL (ref 0.5–1.4)
CREAT SERPL-MCNC: 1.2 MG/DL (ref 0.5–1.4)
DACRYOCYTES BLD QL SMEAR: ABNORMAL
DELSYS: ABNORMAL
DIFFERENTIAL METHOD: ABNORMAL
DIFFERENTIAL METHOD: ABNORMAL
EOSINOPHIL # BLD AUTO: 0 K/UL (ref 0–0.5)
EOSINOPHIL # BLD AUTO: 0 K/UL (ref 0–0.5)
EOSINOPHIL NFR BLD: 0 % (ref 0–8)
EOSINOPHIL NFR BLD: 0 % (ref 0–8)
ERYTHROCYTE [DISTWIDTH] IN BLOOD BY AUTOMATED COUNT: 15.9 % (ref 11.5–14.5)
ERYTHROCYTE [DISTWIDTH] IN BLOOD BY AUTOMATED COUNT: 17.2 % (ref 11.5–14.5)
ERYTHROCYTE [DISTWIDTH] IN BLOOD BY AUTOMATED COUNT: 17.2 % (ref 11.5–14.5)
ERYTHROCYTE [SEDIMENTATION RATE] IN BLOOD BY WESTERGREN METHOD: 24 MM/H
EST. GFR  (NO RACE VARIABLE): >60 ML/MIN/1.73 M^2
EST. GFR  (NO RACE VARIABLE): >60 ML/MIN/1.73 M^2
FIO2: 40
GLUCOSE SERPL-MCNC: 156 MG/DL (ref 70–110)
GLUCOSE SERPL-MCNC: 221 MG/DL (ref 70–110)
GLUCOSE SERPL-MCNC: 221 MG/DL (ref 70–110)
HCO3 UR-SCNC: 26.8 MMOL/L (ref 24–28)
HCT VFR BLD AUTO: 17.1 % (ref 40–54)
HCT VFR BLD AUTO: 17.1 % (ref 40–54)
HCT VFR BLD AUTO: 24.1 % (ref 40–54)
HCT VFR BLD CALC: 16 %PCV (ref 36–54)
HGB BLD-MCNC: 6 G/DL (ref 14–18)
HGB BLD-MCNC: 6 G/DL (ref 14–18)
HGB BLD-MCNC: 8.6 G/DL (ref 14–18)
IMM GRANULOCYTES # BLD AUTO: 0.04 K/UL (ref 0–0.04)
IMM GRANULOCYTES # BLD AUTO: 0.06 K/UL (ref 0–0.04)
IMM GRANULOCYTES NFR BLD AUTO: 0.3 % (ref 0–0.5)
IMM GRANULOCYTES NFR BLD AUTO: 0.6 % (ref 0–0.5)
INR PPP: 1.3 (ref 0.8–1.2)
LYMPHOCYTES # BLD AUTO: 0.9 K/UL (ref 1–4.8)
LYMPHOCYTES # BLD AUTO: 1.6 K/UL (ref 1–4.8)
LYMPHOCYTES NFR BLD: 12.6 % (ref 18–48)
LYMPHOCYTES NFR BLD: 9.9 % (ref 18–48)
MAGNESIUM SERPL-MCNC: 2.3 MG/DL (ref 1.6–2.6)
MAGNESIUM SERPL-MCNC: 2.7 MG/DL (ref 1.6–2.6)
MCH RBC QN AUTO: 28.8 PG (ref 27–31)
MCH RBC QN AUTO: 28.8 PG (ref 27–31)
MCH RBC QN AUTO: 29.8 PG (ref 27–31)
MCHC RBC AUTO-ENTMCNC: 35.1 G/DL (ref 32–36)
MCHC RBC AUTO-ENTMCNC: 35.1 G/DL (ref 32–36)
MCHC RBC AUTO-ENTMCNC: 35.7 G/DL (ref 32–36)
MCV RBC AUTO: 82 FL (ref 82–98)
MCV RBC AUTO: 82 FL (ref 82–98)
MCV RBC AUTO: 83 FL (ref 82–98)
MODE: ABNORMAL
MONOCYTES # BLD AUTO: 0.7 K/UL (ref 0.3–1)
MONOCYTES # BLD AUTO: 1.1 K/UL (ref 0.3–1)
MONOCYTES NFR BLD: 7.3 % (ref 4–15)
MONOCYTES NFR BLD: 8.6 % (ref 4–15)
NEUTROPHILS # BLD AUTO: 10 K/UL (ref 1.8–7.7)
NEUTROPHILS # BLD AUTO: 7.8 K/UL (ref 1.8–7.7)
NEUTROPHILS NFR BLD: 78.3 % (ref 38–73)
NEUTROPHILS NFR BLD: 82 % (ref 38–73)
NRBC BLD-RTO: 0 /100 WBC
NRBC BLD-RTO: 0 /100 WBC
OVALOCYTES BLD QL SMEAR: ABNORMAL
PCO2 BLDA: 41.1 MMHG (ref 35–45)
PEEP: 5
PH SMN: 7.42 [PH] (ref 7.35–7.45)
PLATELET # BLD AUTO: 74 K/UL (ref 150–450)
PLATELET # BLD AUTO: 74 K/UL (ref 150–450)
PLATELET # BLD AUTO: 84 K/UL (ref 150–450)
PLATELET BLD QL SMEAR: ABNORMAL
PMV BLD AUTO: 10.4 FL (ref 9.2–12.9)
PMV BLD AUTO: 10.5 FL (ref 9.2–12.9)
PMV BLD AUTO: 10.5 FL (ref 9.2–12.9)
PO2 BLDA: 133 MMHG (ref 80–100)
POC BE: 2 MMOL/L
POC IONIZED CALCIUM: 1.14 MMOL/L (ref 1.06–1.42)
POC SATURATED O2: 99 % (ref 95–100)
POCT GLUCOSE: 113 MG/DL (ref 70–110)
POCT GLUCOSE: 124 MG/DL (ref 70–110)
POCT GLUCOSE: 149 MG/DL (ref 70–110)
POCT GLUCOSE: 154 MG/DL (ref 70–110)
POCT GLUCOSE: 159 MG/DL (ref 70–110)
POCT GLUCOSE: 196 MG/DL (ref 70–110)
POCT GLUCOSE: 225 MG/DL (ref 70–110)
POCT GLUCOSE: 240 MG/DL (ref 70–110)
POCT GLUCOSE: 281 MG/DL (ref 70–110)
POCT GLUCOSE: 300 MG/DL (ref 70–110)
POCT GLUCOSE: 97 MG/DL (ref 70–110)
POTASSIUM BLD-SCNC: 4.3 MMOL/L (ref 3.5–5.1)
POTASSIUM SERPL-SCNC: 4.4 MMOL/L (ref 3.5–5.1)
POTASSIUM SERPL-SCNC: 4.4 MMOL/L (ref 3.5–5.1)
PROT SERPL-MCNC: 4.5 G/DL (ref 6–8.4)
PROT SERPL-MCNC: 4.9 G/DL (ref 6–8.4)
PROTHROMBIN TIME: 13.7 SEC (ref 9–12.5)
RBC # BLD AUTO: 2.08 M/UL (ref 4.6–6.2)
RBC # BLD AUTO: 2.08 M/UL (ref 4.6–6.2)
RBC # BLD AUTO: 2.89 M/UL (ref 4.6–6.2)
SAMPLE: ABNORMAL
SITE: ABNORMAL
SODIUM BLD-SCNC: 145 MMOL/L (ref 136–145)
SODIUM SERPL-SCNC: 138 MMOL/L (ref 136–145)
SODIUM SERPL-SCNC: 144 MMOL/L (ref 136–145)
VT: 500
WBC # BLD AUTO: 12.82 K/UL (ref 3.9–12.7)
WBC # BLD AUTO: 9.46 K/UL (ref 3.9–12.7)
WBC # BLD AUTO: 9.46 K/UL (ref 3.9–12.7)

## 2023-09-20 PROCEDURE — 27100171 HC OXYGEN HIGH FLOW UP TO 24 HOURS

## 2023-09-20 PROCEDURE — 94003 VENT MGMT INPAT SUBQ DAY: CPT

## 2023-09-20 PROCEDURE — 20000000 HC ICU ROOM

## 2023-09-20 PROCEDURE — P9016 RBC LEUKOCYTES REDUCED: HCPCS | Performed by: INTERNAL MEDICINE

## 2023-09-20 PROCEDURE — 85014 HEMATOCRIT: CPT

## 2023-09-20 PROCEDURE — P9045 ALBUMIN (HUMAN), 5%, 250 ML: HCPCS | Mod: JZ,JG | Performed by: THORACIC SURGERY (CARDIOTHORACIC VASCULAR SURGERY)

## 2023-09-20 PROCEDURE — 85610 PROTHROMBIN TIME: CPT | Performed by: THORACIC SURGERY (CARDIOTHORACIC VASCULAR SURGERY)

## 2023-09-20 PROCEDURE — 27200667 HC PACEMAKER, TEMPORARY MONITORING, PER SHIFT

## 2023-09-20 PROCEDURE — 85025 COMPLETE CBC W/AUTO DIFF WBC: CPT | Performed by: INTERNAL MEDICINE

## 2023-09-20 PROCEDURE — 94640 AIRWAY INHALATION TREATMENT: CPT

## 2023-09-20 PROCEDURE — 99233 SBSQ HOSP IP/OBS HIGH 50: CPT | Mod: ,,, | Performed by: INTERNAL MEDICINE

## 2023-09-20 PROCEDURE — C9399 UNCLASSIFIED DRUGS OR BIOLOG: HCPCS | Performed by: THORACIC SURGERY (CARDIOTHORACIC VASCULAR SURGERY)

## 2023-09-20 PROCEDURE — 85025 COMPLETE CBC W/AUTO DIFF WBC: CPT | Mod: 91 | Performed by: THORACIC SURGERY (CARDIOTHORACIC VASCULAR SURGERY)

## 2023-09-20 PROCEDURE — 25000003 PHARM REV CODE 250: Performed by: THORACIC SURGERY (CARDIOTHORACIC VASCULAR SURGERY)

## 2023-09-20 PROCEDURE — 37799 UNLISTED PX VASCULAR SURGERY: CPT

## 2023-09-20 PROCEDURE — 25000242 PHARM REV CODE 250 ALT 637 W/ HCPCS: Performed by: THORACIC SURGERY (CARDIOTHORACIC VASCULAR SURGERY)

## 2023-09-20 PROCEDURE — 82330 ASSAY OF CALCIUM: CPT

## 2023-09-20 PROCEDURE — 85730 THROMBOPLASTIN TIME PARTIAL: CPT | Performed by: THORACIC SURGERY (CARDIOTHORACIC VASCULAR SURGERY)

## 2023-09-20 PROCEDURE — 94761 N-INVAS EAR/PLS OXIMETRY MLT: CPT

## 2023-09-20 PROCEDURE — 36430 TRANSFUSION BLD/BLD COMPNT: CPT

## 2023-09-20 PROCEDURE — 99900035 HC TECH TIME PER 15 MIN (STAT)

## 2023-09-20 PROCEDURE — 84132 ASSAY OF SERUM POTASSIUM: CPT

## 2023-09-20 PROCEDURE — 84295 ASSAY OF SERUM SODIUM: CPT

## 2023-09-20 PROCEDURE — 99233 PR SUBSEQUENT HOSPITAL CARE,LEVL III: ICD-10-PCS | Mod: ,,, | Performed by: INTERNAL MEDICINE

## 2023-09-20 PROCEDURE — 63600175 PHARM REV CODE 636 W HCPCS: Performed by: THORACIC SURGERY (CARDIOTHORACIC VASCULAR SURGERY)

## 2023-09-20 PROCEDURE — 82803 BLOOD GASES ANY COMBINATION: CPT

## 2023-09-20 PROCEDURE — 83735 ASSAY OF MAGNESIUM: CPT | Performed by: INTERNAL MEDICINE

## 2023-09-20 PROCEDURE — 80053 COMPREHEN METABOLIC PANEL: CPT | Mod: 91 | Performed by: THORACIC SURGERY (CARDIOTHORACIC VASCULAR SURGERY)

## 2023-09-20 RX ORDER — WARFARIN 2.5 MG/1
2.5 TABLET ORAL DAILY
Status: DISCONTINUED | OUTPATIENT
Start: 2023-09-20 | End: 2023-09-22

## 2023-09-20 RX ORDER — INSULIN ASPART 100 [IU]/ML
0-10 INJECTION, SOLUTION INTRAVENOUS; SUBCUTANEOUS EVERY 6 HOURS PRN
Status: DISCONTINUED | OUTPATIENT
Start: 2023-09-20 | End: 2023-10-03 | Stop reason: HOSPADM

## 2023-09-20 RX ORDER — PANTOPRAZOLE SODIUM 40 MG/1
40 TABLET, DELAYED RELEASE ORAL DAILY
Status: DISCONTINUED | OUTPATIENT
Start: 2023-09-20 | End: 2023-10-03 | Stop reason: HOSPADM

## 2023-09-20 RX ORDER — GLUCAGON 1 MG
1 KIT INJECTION
Status: DISCONTINUED | OUTPATIENT
Start: 2023-09-20 | End: 2023-10-03 | Stop reason: HOSPADM

## 2023-09-20 RX ORDER — SODIUM CHLORIDE FOR INHALATION 3 %
4 VIAL, NEBULIZER (ML) INHALATION EVERY 12 HOURS
Status: DISCONTINUED | OUTPATIENT
Start: 2023-09-20 | End: 2023-09-27

## 2023-09-20 RX ORDER — ACETAMINOPHEN 325 MG/1
650 TABLET ORAL EVERY 6 HOURS
Status: DISPENSED | OUTPATIENT
Start: 2023-09-20 | End: 2023-09-23

## 2023-09-20 RX ADMIN — FERROUS SULFATE TAB 325 MG (65 MG ELEMENTAL FE) 1 EACH: 325 (65 FE) TAB at 02:09

## 2023-09-20 RX ADMIN — FUROSEMIDE 40 MG: 10 INJECTION, SOLUTION INTRAMUSCULAR; INTRAVENOUS at 08:09

## 2023-09-20 RX ADMIN — ALBUMIN (HUMAN) 25 G: 2.5 SOLUTION INTRAVENOUS at 04:09

## 2023-09-20 RX ADMIN — FENTANYL CITRATE 25 MCG: 50 INJECTION INTRAMUSCULAR; INTRAVENOUS at 09:09

## 2023-09-20 RX ADMIN — FENTANYL CITRATE 25 MCG: 50 INJECTION INTRAMUSCULAR; INTRAVENOUS at 04:09

## 2023-09-20 RX ADMIN — CHLORHEXIDINE GLUCONATE 0.12% ORAL RINSE 10 ML: 1.2 LIQUID ORAL at 08:09

## 2023-09-20 RX ADMIN — METOCLOPRAMIDE 5 MG: 5 INJECTION, SOLUTION INTRAMUSCULAR; INTRAVENOUS at 08:09

## 2023-09-20 RX ADMIN — FENTANYL CITRATE 25 MCG: 50 INJECTION INTRAMUSCULAR; INTRAVENOUS at 11:09

## 2023-09-20 RX ADMIN — ONDANSETRON 2 G: 2 INJECTION INTRAMUSCULAR; INTRAVENOUS at 08:09

## 2023-09-20 RX ADMIN — DEXMEDETOMIDINE HYDROCHLORIDE 1.4 MCG/KG/HR: 4 INJECTION INTRAVENOUS at 12:09

## 2023-09-20 RX ADMIN — CLOPIDOGREL BISULFATE 75 MG: 75 TABLET ORAL at 02:09

## 2023-09-20 RX ADMIN — FENTANYL CITRATE 25 MCG: 50 INJECTION INTRAMUSCULAR; INTRAVENOUS at 01:09

## 2023-09-20 RX ADMIN — MAGNESIUM SULFATE HEPTAHYDRATE 4 G: 40 INJECTION, SOLUTION INTRAVENOUS at 05:09

## 2023-09-20 RX ADMIN — DEXMEDETOMIDINE HYDROCHLORIDE 1.4 MCG/KG/HR: 4 INJECTION INTRAVENOUS at 06:09

## 2023-09-20 RX ADMIN — ACETAMINOPHEN 650 MG: 325 TABLET ORAL at 11:09

## 2023-09-20 RX ADMIN — IPRATROPIUM BROMIDE AND ALBUTEROL SULFATE 3 ML: 2.5; .5 SOLUTION RESPIRATORY (INHALATION) at 04:09

## 2023-09-20 RX ADMIN — ASPIRIN 81 MG: 81 TABLET, COATED ORAL at 02:09

## 2023-09-20 RX ADMIN — ONDANSETRON 4 MG: 2 INJECTION INTRAMUSCULAR; INTRAVENOUS at 08:09

## 2023-09-20 RX ADMIN — ACETAMINOPHEN 1000 MG: 10 INJECTION INTRAVENOUS at 06:09

## 2023-09-20 RX ADMIN — DEXMEDETOMIDINE HYDROCHLORIDE 0.7 MCG/KG/HR: 4 INJECTION INTRAVENOUS at 09:09

## 2023-09-20 RX ADMIN — DOCUSATE SODIUM 100 MG: 100 CAPSULE, LIQUID FILLED ORAL at 02:09

## 2023-09-20 RX ADMIN — VASOPRESSIN 0.04 UNITS/MIN: 0.2 INJECTION INTRAVENOUS at 10:09

## 2023-09-20 RX ADMIN — ALBUMIN (HUMAN) 25 G: 2.5 SOLUTION INTRAVENOUS at 12:09

## 2023-09-20 RX ADMIN — FENTANYL CITRATE 25 MCG: 50 INJECTION INTRAMUSCULAR; INTRAVENOUS at 07:09

## 2023-09-20 RX ADMIN — VASOPRESSIN 0.04 UNITS/MIN: 0.2 INJECTION INTRAVENOUS at 07:09

## 2023-09-20 RX ADMIN — MAGNESIUM SULFATE HEPTAHYDRATE 2 G: 40 INJECTION, SOLUTION INTRAVENOUS at 06:09

## 2023-09-20 RX ADMIN — WARFARIN SODIUM 2.5 MG: 2.5 TABLET ORAL at 05:09

## 2023-09-20 RX ADMIN — DEXMEDETOMIDINE HYDROCHLORIDE 1.4 MCG/KG/HR: 4 INJECTION INTRAVENOUS at 03:09

## 2023-09-20 RX ADMIN — ACETAMINOPHEN 650 MG: 325 TABLET ORAL at 05:09

## 2023-09-20 RX ADMIN — FENTANYL CITRATE 12.5 MCG: 50 INJECTION INTRAMUSCULAR; INTRAVENOUS at 01:09

## 2023-09-20 RX ADMIN — IPRATROPIUM BROMIDE AND ALBUTEROL SULFATE 3 ML: 2.5; .5 SOLUTION RESPIRATORY (INHALATION) at 07:09

## 2023-09-20 RX ADMIN — SODIUM CHLORIDE 30 MG/ML INHALATION SOLUTION 4 ML: 30 SOLUTION INHALANT at 07:09

## 2023-09-20 RX ADMIN — PANTOPRAZOLE SODIUM 40 MG: 40 TABLET, DELAYED RELEASE ORAL at 02:09

## 2023-09-20 RX ADMIN — IPRATROPIUM BROMIDE AND ALBUTEROL SULFATE 3 ML: 2.5; .5 SOLUTION RESPIRATORY (INHALATION) at 11:09

## 2023-09-20 RX ADMIN — OXYCODONE HYDROCHLORIDE 10 MG: 5 TABLET ORAL at 06:09

## 2023-09-20 RX ADMIN — OXYCODONE HYDROCHLORIDE 5 MG: 5 TABLET ORAL at 02:09

## 2023-09-20 RX ADMIN — ONDANSETRON 1 G: 2 INJECTION INTRAMUSCULAR; INTRAVENOUS at 01:09

## 2023-09-20 RX ADMIN — CEFAZOLIN SODIUM 2 G: 2 SOLUTION INTRAVENOUS at 08:09

## 2023-09-20 RX ADMIN — CEFAZOLIN SODIUM 2 G: 2 SOLUTION INTRAVENOUS at 12:09

## 2023-09-20 NOTE — HOSPITAL COURSE
Cardiology consulted to assist with management. Pt seen and examined today, resting in bed mother at bedside. He reports his pain started 6+ months ago and worsening in the last few weeks happening daily with associated SOB, dizziness radiating to left arm. He reports his pain is sharp and pressure-like at times. Pt reports daily use a marijuana, h/o cocaine and other drugs 5-10 years ago. Drinks occasionally. Echo pending cont hep gtt    09/19/2023. Admitted for NSTEMI/ACS. Echo showed RWMA  The cath done yesterday showed   Severe lmca disease with timi1 flow in lad   Lcx ostial 50%   Rca prox 80%   Ef 50%   Severe AI  09/19/23 s/p CABG x5 and aortic valve replacement with a 19 mm Saint Dany mechanical valve by Dr. Douglas.  In ICU and intubated. Om epi and Vasopressin gtt.    9/20/23  Pt seen and examined today, POD 1 CABGx5 pt still intubated on exam this am, weaning epi. Labs reviewed, H/H 6.0 and 17.1. Plans to extubated today    9/21/23 Pt seen and examined today extubated feels ok chest soreness, chest tubes removed. Labs reviewed, chart reviewed.    9/22/23 Pt seen and examined today, sitting up in bedside chair. Feels good. Walked with PT/OT. Labs reviewed, chart reviewed    09/23 ambulating well. No chest pain dyspnea, the lab reviewed.     09/24. On coumadin rx and heparin bridge. Non chest pain dyspnea, the lab reviewed. VSS    9/25/23-Patient seen and examined today, resting in bed. No AEON. Pain controlled. Working with PT/OT. Labs stable. INR 1.2, on heparin bridge.    9/26/23-Patient seen and examined today, sitting up in bedside chair. Feels ok. More fatigued/weak today. Pain controlled. Labs stable. INR 1.2, remains on heparin bridge.    9/27/23-Patient seen and examined today, resting in bed. Feeling better today. Worked with PT/OT earlier. No AEON. Labs reviewed, INR 1.6.     9/28/23-Patient seen and examined today, resting in bed. Ambulating well. Pain controlled. INR 2.3.     9/29/23-Patient  seen and examined today, lying in bed. Feels ok. Does admit to some fatigue. Pain controlled. Ambulating well with PT/OT. Labs reviewed/stable. INR 1.8.    9/30/23- Pt has no complaints. Continue beta blockers and ambulation.     10/1/23- POD # 12, CABG x5. With AVR, pt currently bridging with heparin and coumadin.     10/2/23 POD 13, CABGx5 and AVR, awaiting therapeutic INR. Recovering well. Labs reviewed chart reviewed    10/3/23 POD 14, CABGx5 and AVR, awaiting DC today with HH, INR 2.5

## 2023-09-20 NOTE — PT/OT/SLP PROGRESS
Physical Therapy      Patient Name:  Mikie Alcazar   MRN:  [id number]    PT eval orders received. Chart review completed. Patient s/p CABG and AVR. Intubated in ICU. Will initiate evaluation s/p extubation.    Vera Ann, PT  9/20/2023  [id number]

## 2023-09-20 NOTE — PROGRESS NOTES
[facility] - Intensive Care ([facility])  Cardiothoracic Surgery  Progress Note    Patient Name: Mikie Alcazar  MRN: [id number]  Admission Date: 9/17/2023  Hospital Length of Stay: 3 days  Code Status: Full Code   Attending Physician: Elliott Wright MD   Referring Provider: Self, Aaareferral  Principal Problem:NSTEMI (non-ST elevated myocardial infarction)            Subjective:     Post-Op Info:  Procedure(s) (LRB):  CORONARY ARTERY BYPASS GRAFT (CABG) (N/A)  REPLACEMENT-VALVE-AORTIC (N/A)  SURGICAL PROCUREMENT, VEIN, ENDOSCOPIC (Left)  BLOCK, NERVE, INTERCOSTAL, 2 OR MORE (N/A)  ECHOCARDIOGRAM,TRANSESOPHAGEAL (N/A)   1 Day Post-Op     Interval History:  The patient is postop day 1 status post coronary artery bypass grafting x5 and aortic valve replacement with a 19 mm Saint Dany mechanical valve    ROS  Medications:  Continuous Infusions:   dexmedeTOMIDine (Precedex) infusion (titrating) 1.4 mcg/kg/hr (09/20/23 0610)    dextrose 5% lactated ringers 25 mL/hr at 09/20/23 0600    EPINEPHrine 0.02 mcg/kg/min (09/20/23 0600)    insulin regular 1 units/mL infusion orderable (CTS POST-OP) 6.7 Units/hr (09/20/23 0600)    niCARdipine      nitroGLYCERIN Stopped (09/19/23 1035)    vasopressin 0.04 Units/min (09/20/23 0600)     Scheduled Meds:   acetaminophen  1,000 mg Intravenous Once    albuterol-ipratropium  3 mL Nebulization Q4H    ascorbic acid (vitamin C)  500 mg Oral BID    aspirin  81 mg Oral Daily    atorvastatin  80 mg Oral Daily    ceFAZolin (ANCEF) IVPB  2 g Intravenous Q8H    chlorhexidine  10 mL Mouth/Throat BID    clopidogreL  75 mg Oral Daily    [START ON 9/21/2023] cyanocobalamin  1,000 mcg Oral Daily    docusate sodium  100 mg Oral BID    ferrous sulfate  1 tablet Oral Daily    [START ON 9/21/2023] folic acid  1 mg Oral Daily    furosemide (LASIX) injection  40 mg Intravenous BID    magnesium hydroxide 400 mg/5 ml  5 mL Oral BID    metoprolol tartrate  25 mg Oral BID    pantoprazole  40  mg Intravenous Daily    polyethylene glycol  17 g Oral Daily    potassium chloride  20 mEq Oral Q12H    sodium chloride 3%  4 mL Nebulization Q12H     PRN Meds:0.9%  NaCl infusion (for blood administration), albumin human 5%, calcium gluconate IVPB, calcium gluconate IVPB, calcium gluconate IVPB, dextrose 10%, dextrose 10%, fentaNYL, fentaNYL, influenza, insulin regular 1 units/mL infusion orderable (CTS POST-OP), lactated ringers, magnesium sulfate IVPB, melatonin, metoclopramide HCl, ondansetron, oxyCODONE, oxyCODONE, pneumoc 20-leslee conj-dip cr(PF), potassium chloride in water, potassium chloride in water, potassium chloride in water     Objective:     Vital Signs (Most Recent):  Temp: (!) 102 °F (38.9 °C) (09/20/23 0645)  Pulse: 89 (09/20/23 0645)  Resp: (!) 24 (09/20/23 0645)  BP: 106/62 (09/20/23 0600)  SpO2: 99 % (09/20/23 0645) Vital Signs (24h Range):  Temp:  [99.1 °F (37.3 °C)-102 °F (38.9 °C)] 102 °F (38.9 °C)  Pulse:  [] 89  Resp:  [13-28] 24  SpO2:  [99 %-100 %] 99 %  BP: ()/(39-72) 106/62  Arterial Line BP: ()/(43-72) 105/53     Weight: 85.9 kg (189 lb 6 oz)  Body mass index is 28.79 kg/m².    SpO2: 99 %       Intake/Output - Last 3 Shifts         09/18 0700 09/19 0659 09/19 0700 09/20 0659 09/20 0700 09/21 0659    I.V. (mL/kg) 1523.8 (18.7) 3044.7 (35.4)     Blood  1340     IV Piggyback  4550.1     Total Intake(mL/kg) 1523.8 (18.7) 8934.8 (104)     Urine (mL/kg/hr) 1100 (0.6) 5235 (2.5)     Drains  15     Other  0     Chest Tube  1630     Total Output 1100 6880     Net +423.8 +2054.8                    Lines/Drains/Airways       Central Venous Catheter Line  Duration             Introducer 09/19/23 0748 Internal Jugular Right <1 day    Pulmonary Artery Catheter Assessment  09/19/23 0748 Internal Jugular Right <1 day              Drain  Duration                  Urethral Catheter 09/19/23 0730 Silicone;Temperature probe;Straight-tip 16 Fr. 1 day         Chest Tube 09/19/23 1618  Tube - 3 Left Pleural <1 day         Closed/Suction Drain 09/19/23 1030 Tube - 1 Left;Proximal Leg Bulb 19 Fr. <1 day         Closed/Suction Drain 09/19/23 1032 Tube - 2 Left;Distal Leg Bulb 19 Fr. <1 day         Y Chest Tube 1 and 2 09/19/23 1617 1 Right Mediastinal 2 Left Mediastinal 24 Fr. <1 day              Airway  Duration                  Airway - Non-Surgical 09/19/23 0739 <1 day              Arterial Line  Duration             Arterial Line 09/19/23 1619 Right Femoral <1 day              Line  Duration                  Pacer Wires 09/19/23  1 day              Peripheral Intravenous Line  Duration                  Peripheral IV - Single Lumen 09/17/23 1825 18 G Anterior;Proximal;Right Forearm 2 days         Peripheral IV - Single Lumen 09/19/23 0745 18 G Right Hand <1 day                     Physical Exam  Constitutional:       Appearance: Normal appearance.   HENT:      Head: Normocephalic and atraumatic.      Nose: Nose normal.   Cardiovascular:      Rate and Rhythm: Normal rate and regular rhythm.      Pulses: Normal pulses.      Heart sounds: Normal heart sounds.   Pulmonary:      Breath sounds: Normal breath sounds.   Abdominal:      General: Abdomen is flat.      Palpations: Abdomen is soft.   Musculoskeletal:      Right lower leg: No edema.      Left lower leg: No edema.   Skin:     General: Skin is warm and dry.   Neurological:      Mental Status: He is alert.      Comments: Patient is intubated and sedated            Significant Labs:  All pertinent labs from the last 24 hours have been reviewed.    Significant Diagnostics:  I have reviewed all pertinent imaging results/findings within the past 24 hours.    Assessment/Plan:     S/P CABG x 5  The patient is postop day 1 status post coronary artery bypass grafting x5 and aortic valve replacement with a 19 mm Saint Dany mechanical valve.  Overall the patient is doing well.      Neuro:  Patient is awake appropriate and follows commands.  Patient is  currently sedated with Precedex.  Will wean Precedex to off. Cardiac:  Patient is hemodynamically stable.  With excellent cardiac output and cardiac index.  Patient is on low-dose vaso active meds.  Wean drips to off.  Will start metoprolol once drips are off.  Respiratory:  Patient is being weaned to extubation.  Patient is an active smoker Start nebs and Mucomyst.  Continue pulmonary toileting.  Continue incentive spirometer.    GI:  Patient is currently NPO.  Will start p.o. intake once extubated.    Renal:  Patient has good urine output and creatinine is 1.2.  Will start Lasix for diuresis.  Endocrine:  Patient required insulin drip for glucose controlled.  Will convert to sliding scale.    Id:  Patient had a T-max of 102°.  Most likely due to stress of surgery.  Will continue to follow.  White count is 9.  Patient is on manny op antibiotics.  Heme: Hematocrit is 17.  Patient is being transfused.  Platelet count is 74.  No evidence of active bleeding.  Will follow platelet count.  Will start patient on Coumadin anticoagulation for mechanical valve.  Activities:  Patient is currently in bed.  Will advance activities as tolerated once extubated.  Line tubes and drains:  Patient has an ETT, right IJ [place] and Cordis, right groin A-line, chest tubes, Ugalde catheter, pacer wires, and saphenectomy site DARON.    CAD, multiple vessel  The patient is a 43-year-old male with critical left main coronary artery disease and severe aortic regurgitation by cardiac catheterization.  The patient is a candidate for urgent coronary artery bypass grafting and aortic valve replacement.  The risks and benefits of surgery were explained to the patient.  The patient understands the risks and benefits of surgery and has agreed to proceed with urgent aortic valve replacement and coronary artery bypass grafting.  The type of aortic valve to be used was discussed with the patient.  The patient stated a preference for a bioprosthetic valve in  order to avoid the need for chronic anticoagulation.  The patient understands that a bioprosthetic valve especially in a young patient has a limited lifespan.        Nishant Douglas MD  Cardiothoracic Surgery  [facility] - Intensive Care ([facility])

## 2023-09-20 NOTE — ASSESSMENT & PLAN NOTE
The patient is postop day 1 status post coronary artery bypass grafting x5 and aortic valve replacement with a 19 mm Saint Dany mechanical valve.  Overall the patient is doing well.      Neuro:  Patient is awake appropriate and follows commands.  Patient is currently sedated with Precedex.  Will wean Precedex to off. Cardiac:  Patient is hemodynamically stable.  With excellent cardiac output and cardiac index.  Patient is on low-dose vaso active meds.  Wean drips to off.  Will start metoprolol once drips are off.  Respiratory:  Patient is being weaned to extubation.  Patient is an active smoker Start nebs and Mucomyst.  Continue pulmonary toileting.  Continue incentive spirometer.    GI:  Patient is currently NPO.  Will start p.o. intake once extubated.    Renal:  Patient has good urine output and creatinine is 1.2.  Will start Lasix for diuresis.  Endocrine:  Patient required insulin drip for glucose controlled.  Will convert to sliding scale.    Id:  Patient had a T-max of 102°.  Most likely due to stress of surgery.  Will continue to follow.  White count is 9.  Patient is on manny op antibiotics.  Heme: Hematocrit is 17.  Patient is being transfused.  Platelet count is 74.  No evidence of active bleeding.  Will follow platelet count.  Will start patient on Coumadin anticoagulation for mechanical valve.  Activities:  Patient is currently in bed.  Will advance activities as tolerated once extubated.  Line tubes and drains:  Patient has an ETT, right IJ [place] and Cord[place], right groin A-line, chest tubes, Ugalde catheter, pacer wires, and saphenectomy site DARON.

## 2023-09-20 NOTE — CONSULTS
Clinical Pharmacy: Coumadin Consult Note    Coumadin consult day # 1  Indication: mechanical AVR (9/19)  New start  Goal INR: 2.0-3.0 (no additional risk factors like Afib)   Today's INR: 1.3   Hgb this AM = 6.0 --> received 2 units pRBCs. Repeat Hgb = 8.6  PLT = 84  Dr. Douglas wants to start warfarin 2.5 mg daily tonight  Daily INR ordered  Pt has hx of CAD & NSTEMI s/p CABG x 5 vessels 9/19 --> will continue low dose aspirin but stop clopidogrel.    Pharmacy will monitor daily INR & make dosage adjustments as needed.    Thank you for allowing us to participate in this patient's care.   Katherine McArdle, Pharm.D. 9/20/2023 3:09 PM

## 2023-09-20 NOTE — ASSESSMENT & PLAN NOTE
Troponin 0.6->-0.709->0.708  Cont hep gtt  EKG with ST T wave abnml  LHC planned for today  All risks, benefits and treatment alternatives explained, all questions answered. Pt agreeable to proceed.   NPO    9/20/23  S/P CABG x5 AVR

## 2023-09-20 NOTE — SUBJECTIVE & OBJECTIVE
Objective:     Vital Signs (Most Recent):  Temp: (!) 101.7 °F (38.7 °C) (09/20/23 0815)  Pulse: 89 (09/20/23 0815)  Resp: 20 (09/20/23 0815)  BP: 116/65 (09/20/23 0800)  SpO2: 100 % (09/20/23 0815) Vital Signs (24h Range):  Temp:  [99.1 °F (37.3 °C)-102 °F (38.9 °C)] 101.7 °F (38.7 °C)  Pulse:  [] 89  Resp:  [6-28] 20  SpO2:  [93 %-100 %] 100 %  BP: ()/(39-72) 116/65  Arterial Line BP: ()/(43-72) 120/59     Weight: 85.9 kg (189 lb 6 oz)  Body mass index is 28.79 kg/m².      Intake/Output Summary (Last 24 hours) at 9/20/2023 0828  Last data filed at 9/20/2023 0645  Gross per 24 hour   Intake 8684.79 ml   Output 6880 ml   Net 1804.79 ml        Physical Exam  Vitals reviewed.   Constitutional:       Interventions: He is sedated, intubated and restrained.   HENT:      Head: Normocephalic and atraumatic.      Mouth/Throat:      Mouth: Mucous membranes are moist.      Pharynx: Oropharynx is clear.   Eyes:      Conjunctiva/sclera: Conjunctivae normal.      Pupils: Pupils are equal, round, and reactive to light.   Cardiovascular:      Comments: Paced. Mediastinal CT x2, pleural CT x1. Pacer in place. Mediastinal incision with VAC in place  Pulmonary:      Effort: He is intubated.      Comments: Synchronous with vent. Mechanical breath sounds but overall clear.  Abdominal:      General: There is no distension.      Palpations: Abdomen is soft.   Musculoskeletal:      Cervical back: Neck supple.      Comments: LLE dressings in place, DARON x2   Skin:     General: Skin is warm and dry.   Neurological:      Mental Status: He is easily aroused.      Comments: Sedated/intubated- unable to assess             Review of Systems   Unable to perform ROS: Intubated       Vents:  Vent Mode: A/C (09/20/23 0733)  Set Rate: 14 BPM (09/20/23 0733)  Vt Set: 500 mL (09/20/23 0733)  PEEP/CPAP: 5 cmH20 (09/20/23 0733)  Oxygen Concentration (%): 30 (09/20/23 0815)  Peak Airway Pressure: 19 cmH20 (09/20/23 0733)  Plateau  Pressure: 0 cmH20 (09/20/23 0733)  Total Ve: 10.2 L/m (09/20/23 0733)  Negative Inspiratory Force (cm H2O): 0 (09/20/23 0506)  F/VT Ratio<105 (RSBI): (!) 11.47 (09/20/23 0733)    Lines/Drains/Airways       Central Venous Catheter Line  Duration             Introducer 09/19/23 0748 Internal Jugular Right 1 day    Pulmonary Artery Catheter Assessment  09/19/23 0748 Internal Jugular Right 1 day              Drain  Duration                  Urethral Catheter 09/19/23 0730 Silicone;Temperature probe;Straight-tip 16 Fr. 1 day         Chest Tube 09/19/23 1618 Tube - 3 Left Pleural <1 day         Closed/Suction Drain 09/19/23 1030 Tube - 1 Left;Proximal Leg Bulb 19 Fr. <1 day         Closed/Suction Drain 09/19/23 1032 Tube - 2 Left;Distal Leg Bulb 19 Fr. <1 day         Y Chest Tube 1 and 2 09/19/23 1617 1 Right Mediastinal 2 Left Mediastinal 24 Fr. <1 day              Airway  Duration                  Airway - Non-Surgical 09/19/23 0739 1 day              Arterial Line  Duration             Arterial Line 09/19/23 1619 Right Femoral <1 day              Line  Duration                  Pacer Wires 09/19/23  1 day              Peripheral Intravenous Line  Duration                  Peripheral IV - Single Lumen 09/17/23 1825 18 G Anterior;Proximal;Right Forearm 2 days         Peripheral IV - Single Lumen 09/19/23 0745 18 G Right Hand 1 day                    Significant Labs:    CBC/Anemia Profile:  Recent Labs   Lab 09/19/23  1647 09/19/23  1813 09/19/23  1816 09/19/23  1904 09/20/23  0414 09/20/23  0415   WBC 15.24* 20.18*  --   --  9.46  9.46  --    HGB 9.1* 10.8*  --   --  6.0*  6.0*  --    HCT 26.6* 30.9*   < > 25* 17.1*  17.1* 16*   PLT 41* 74*  --   --  74*  74*  --    MCV 86 84  --   --  82  82  --    RDW 17.2* 17.7*  --   --  17.2*  17.2*  --     < > = values in this interval not displayed.        Chemistries:  Recent Labs   Lab 09/19/23  0343 09/19/23  1813 09/20/23  0414    148* 144   K 3.8 4.0 4.4     112* 110   CO2 22* 24 26   BUN 8 8 9   CREATININE 0.9 1.0 1.2   CALCIUM 8.2* 8.0* 7.7*   ALBUMIN  --  2.9* 3.8   PROT  --  3.7* 4.5*   BILITOT  --  1.4* 1.2*   ALKPHOS  --  27* 20*   ALT  --  9* 11   AST  --  41* 51*   MG  --  4.4* 2.7*       All pertinent labs within the past 24 hours have been reviewed.    Significant Imaging:  I have reviewed all pertinent imaging results/findings within the past 24 hours.

## 2023-09-20 NOTE — PROGRESS NOTES
8-beat run of vtach on cardiac monitor. VSS at this time. AM electrolytes WNL. Dr. Douglas notified. New orders for 2 gm Mg IVPB over 1 hour.

## 2023-09-20 NOTE — ASSESSMENT & PLAN NOTE
[facility] 9/18 with multi-vessel disease including left main  S/p CABG x 5 and AVR with Dr. Douglas 9/19  Vent settings reviewed, VAP prevention bundle in place  Extubated this morning   Hemodynamic support - Vasopressin and Epi   Pain control   ASA, plavix, statin  Serial labs- replace electrolytes and give blood products as indicated   Monitor fluid volume status closely   Glucose control- goal 140-180, insulin infusion off- Accu checks AcHS with SSI   Plan per CTS

## 2023-09-20 NOTE — ASSESSMENT & PLAN NOTE
09/19/23  S/p CABG x5 and mechanical AVR today  -continue icu supportive care  -continue asa plavix statin BB and lasix  -will f/u

## 2023-09-20 NOTE — ASSESSMENT & PLAN NOTE
09/19/23  S/p CABG x5 and mechanical AVR today  -continue icu supportive care  -continue asa plavix statin BB and lasix  -will f/u    9/20/23  Cont ASA, plavix, statin, BB, lasix  Management per CTS

## 2023-09-20 NOTE — PLAN OF CARE
Nutrition Recs: 9/20  1. Recommend pt diet order is advanced to Cardiac diet when medically appropriate.   2. Recommend pt receive Boost plus with all meals once PO diet is advanced.   3. Recommend to consider alternative nutrition support if the pt PO diet order cannot be advanced within 24 hr.   4. Weigh daily.    Goals:   1. Pt diet order will be advanced within 24 hr.   2. Pt will consume >50% of EEN and EPN prior to RD follow up.  Nutrition Goal Status: new  Communication of RD Recs: other (comment) (POC: Sticky Note)  Braeden Wade, Registration Eligible, Provisional LDN

## 2023-09-20 NOTE — PROGRESS NOTES
[facility] ([facility])  Critical Care Medicine  Progress Note    Patient Name: Mikie Alcazar  MRN: [id number]  Admission Date: 9/17/2023  Hospital Length of Stay: 3 days  Code Status: Full Code  Attending Provider: Elliott Wright MD  Primary Care Provider: No, Primary Doctor   Principal Problem: NSTEMI (non-ST elevated myocardial infarction)    Subjective:     HPI:  43 year old male with known medical issues of HTN and everyday tobacco and marijuana smoking  Presented to ED on 9/17 with CP, initially reported started while mowing grass but later endorsed intermittent CP x 6m  Troponin 0.6  Admitted for NSTEMI with heparin infusion  Troponin remained flat (max 0.70)  9/18 seen by cardiology and taken for [facility] revealing severe AI, multivessel CAD, 50% EF, grade II diastolic dysfunction  CTS consulted to evaluate for CABG  Transferred post cath to ICU on tridil infusion    Critical Care team consulted to assume medical management while in ICU      Hospital/ICU Course:  9/19: to OR today for multi-vessel CABG and AVR. Remained intubated post-op. On Epi 0.08 and Vasopressin 0.08 for hemodynamic support, precedex for sedation.  9/20: decent amount of bloody output from Cts overnight, slowing this morning- receiving products this morning; insulin gtt just turned off; likely to be extubated soon        Objective:     Vital Signs (Most Recent):  Temp: (!) 101.7 °F (38.7 °C) (09/20/23 0815)  Pulse: 89 (09/20/23 0815)  Resp: 20 (09/20/23 0815)  BP: 116/65 (09/20/23 0800)  SpO2: 100 % (09/20/23 0815) Vital Signs (24h Range):  Temp:  [99.1 °F (37.3 °C)-102 °F (38.9 °C)] 101.7 °F (38.7 °C)  Pulse:  [] 89  Resp:  [6-28] 20  SpO2:  [93 %-100 %] 100 %  BP: ()/(39-72) 116/65  Arterial Line BP: ()/(43-72) 120/59     Weight: 85.9 kg (189 lb 6 oz)  Body mass index is 28.79 kg/m².      Intake/Output Summary (Last 24 hours) at 9/20/2023 0828  Last data filed at 9/20/2023 0645  Gross per 24 hour   Intake  8684.79 ml   Output 6880 ml   Net 1804.79 ml        Physical Exam  Vitals reviewed.   Constitutional:       Interventions: He is sedated, intubated and restrained.   HENT:      Head: Normocephalic and atraumatic.      Mouth/Throat:      Mouth: Mucous membranes are moist.      Pharynx: Oropharynx is clear.   Eyes:      Conjunctiva/sclera: Conjunctivae normal.      Pupils: Pupils are equal, round, and reactive to light.   Cardiovascular:      Comments: Paced. Mediastinal CT x2, pleural CT x1. Pacer in place. Mediastinal incision with VAC in place  Pulmonary:      Effort: He is intubated.      Comments: Synchronous with vent. Mechanical breath sounds but overall clear.  Abdominal:      General: There is no distension.      Palpations: Abdomen is soft.   Musculoskeletal:      Cervical back: Neck supple.      Comments: LLE dressings in place, DARON x2   Skin:     General: Skin is warm and dry.   Neurological:      Mental Status: He is easily aroused.      Comments: Sedated/intubated- unable to assess             Review of Systems   Unable to perform ROS: Intubated       Vents:  Vent Mode: A/C (09/20/23 0733)  Set Rate: 14 BPM (09/20/23 0733)  Vt Set: 500 mL (09/20/23 0733)  PEEP/CPAP: 5 cmH20 (09/20/23 0733)  Oxygen Concentration (%): 30 (09/20/23 0815)  Peak Airway Pressure: 19 cmH20 (09/20/23 0733)  Plateau Pressure: 0 cmH20 (09/20/23 0733)  Total Ve: 10.2 L/m (09/20/23 0733)  Negative Inspiratory Force (cm H2O): 0 (09/20/23 0506)  F/VT Ratio<105 (RSBI): (!) 11.47 (09/20/23 0733)    Lines/Drains/Airways       Central Venous Catheter Line  Duration             Introducer 09/19/23 0748 Internal Jugular Right 1 day    Pulmonary Artery Catheter Assessment  09/19/23 0748 Internal Jugular Right 1 day              Drain  Duration                  Urethral Catheter 09/19/23 0730 Silicone;Temperature probe;Straight-tip 16 Fr. 1 day         Chest Tube 09/19/23 1618 Tube - 3 Left Pleural <1 day         Closed/Suction Drain 09/19/23  1030 Tube - 1 Left;Proximal Leg Bulb 19 Fr. <1 day         Closed/Suction Drain 09/19/23 1032 Tube - 2 Left;Distal Leg Bulb 19 Fr. <1 day         Y Chest Tube 1 and 2 09/19/23 1617 1 Right Mediastinal 2 Left Mediastinal 24 Fr. <1 day              Airway  Duration                  Airway - Non-Surgical 09/19/23 0739 1 day              Arterial Line  Duration             Arterial Line 09/19/23 1619 Right Femoral <1 day              Line  Duration                  Pacer Wires 09/19/23  1 day              Peripheral Intravenous Line  Duration                  Peripheral IV - Single Lumen 09/17/23 1825 18 G Anterior;Proximal;Right Forearm 2 days         Peripheral IV - Single Lumen 09/19/23 0745 18 G Right Hand 1 day                    Significant Labs:    CBC/Anemia Profile:  Recent Labs   Lab 09/19/23  1647 09/19/23  1813 09/19/23  1816 09/19/23  1904 09/20/23  0414 09/20/23  0415   WBC 15.24* 20.18*  --   --  9.46  9.46  --    HGB 9.1* 10.8*  --   --  6.0*  6.0*  --    HCT 26.6* 30.9*   < > 25* 17.1*  17.1* 16*   PLT 41* 74*  --   --  74*  74*  --    MCV 86 84  --   --  82  82  --    RDW 17.2* 17.7*  --   --  17.2*  17.2*  --     < > = values in this interval not displayed.        Chemistries:  Recent Labs   Lab 09/19/23  0343 09/19/23  1813 09/20/23  0414    148* 144   K 3.8 4.0 4.4    112* 110   CO2 22* 24 26   BUN 8 8 9   CREATININE 0.9 1.0 1.2   CALCIUM 8.2* 8.0* 7.7*   ALBUMIN  --  2.9* 3.8   PROT  --  3.7* 4.5*   BILITOT  --  1.4* 1.2*   ALKPHOS  --  27* 20*   ALT  --  9* 11   AST  --  41* 51*   MG  --  4.4* 2.7*       All pertinent labs within the past 24 hours have been reviewed.    Significant Imaging:  I have reviewed all pertinent imaging results/findings within the past 24 hours.      ABG  Recent Labs   Lab 09/20/23  0415   PH 7.423   PO2 133*   PCO2 41.1   HCO3 26.8   BE 2     Assessment/Plan:     Cardiac/Vascular  * NSTEMI (non-ST elevated myocardial infarction)  [facility] 9/18 with  multi-vessel disease including critical left main disease   CTS consulted   Taken urgently for CABG x5 and AVR 9/19   See problem: CAD     Nonrheumatic aortic valve insufficiency  S/p AVR 9/19  Post op plan per CTS    CAD, multiple vessel  [facility] 9/18 with multi-vessel disease including left main  S/p CABG x 5 and AVR with Dr. Douglas 9/19  Vent settings reviewed, VAP prevention bundle in place  Extubated this morning   Hemodynamic support - Vasopressin and Epi   Pain control   ASA, plavix, statin  Serial labs- replace electrolytes and give blood products as indicated   Monitor fluid volume status closely   Glucose control- goal 140-180, insulin infusion off- Accu checks AcHS with SSI   Plan per CTS     Hypertension  Holding anti-hypertensives with use of vasoactive agents post-op   Resume as appropriate     Other  Tobacco smoker, 1 pack of cigarettes or less per day  Smoking cessation counseling when appropriate       DVT prophylaxis: per CTS  GI prophylaxis: protonix  Code status: Full  Disposition: cont ICU care    Pritesh Harrison NP  Critical Care Medicine  [facility] ([facility])

## 2023-09-20 NOTE — PROGRESS NOTES
[facility] ([facility])  Cardiology  Progress Note    Patient Name: Mikie Alcazar  MRN: [id number]  Admission Date: 9/17/2023  Hospital Length of Stay: 3 days  Code Status: Full Code   Attending Physician: Elliott Wright MD   Primary Care Physician: Lissette, Primary Doctor  Expected Discharge Date:   Principal Problem:NSTEMI (non-ST elevated myocardial infarction)    Subjective:     Hospital Course:   Cardiology consulted to assist with management. Pt seen and examined today, resting in bed mother at bedside. He reports his pain started 6+ months ago and worsening in the last few weeks happening daily with associated SOB, dizziness radiating to left arm. He reports his pain is sharp and pressure-like at times. Pt reports daily use a marijuana, h/o cocaine and other drugs 5-10 years ago. Drinks occasionally. Echo pending cont hep gtt    09/19/2023. Admitted for NSTEMI/ACS. Echo showed RWMA  The cath done yesterday showed   Severe lmca disease with timi1 flow in lad   Lcx ostial 50%   Rca prox 80%   Ef 50%   Severe AI  Today s/p CABG x5 and mechanical; AVR by Dr. Douglas.  In ICU and intubated. Om epi and Vasopressin gtt.    9/20/23  Pt seen and examined today, POD 1 CABGx5 pt still intubated on exam this am, weaning epi. Labs reviewed, H/H 6.0 and 17.1. Plans to extubated today          Review of Systems   Reason unable to perform ROS: intubated.     Objective:     Vital Signs (Most Recent):  Temp: 99.3 °F (37.4 °C) (09/20/23 1133)  Pulse: 89 (09/20/23 1133)  Resp: (!) 24 (09/20/23 1133)  BP: 132/67 (09/20/23 1100)  SpO2: 99 % (09/20/23 1133) Vital Signs (24h Range):  Temp:  [99.1 °F (37.3 °C)-102 °F (38.9 °C)] 99.3 °F (37.4 °C)  Pulse:  [] 89  Resp:  [6-29] 24  SpO2:  [93 %-100 %] 99 %  BP: ()/(39-72) 132/67  Arterial Line BP: ()/(43-72) 123/63     Weight: 85.9 kg (189 lb 6 oz)  Body mass index is 28.79 kg/m².     SpO2: 99 %         Intake/Output Summary (Last 24 hours) at 9/20/2023  1240  Last data filed at 9/20/2023 1100  Gross per 24 hour   Intake 8313.27 ml   Output 8403 ml   Net -89.73 ml       Lines/Drains/Airways       Central Venous Catheter Line  Duration             Introducer 09/19/23 0748 Internal Jugular Right 1 day    Pulmonary Artery Catheter Assessment  09/19/23 0748 Internal Jugular Right 1 day              Drain  Duration                  Closed/Suction Drain 09/19/23 1030 Tube - 1 Left;Proximal Leg Bulb 19 Fr. 1 day         Closed/Suction Drain 09/19/23 1032 Tube - 2 Left;Distal Leg Bulb 19 Fr. 1 day         Urethral Catheter 09/19/23 0730 Silicone;Temperature probe;Straight-tip 16 Fr. 1 day         Chest Tube 09/19/23 1618 Tube - 3 Left Pleural <1 day         Y Chest Tube 1 and 2 09/19/23 1617 1 Right Mediastinal 2 Left Mediastinal 24 Fr. <1 day              Arterial Line  Duration             Arterial Line 09/19/23 1619 Right Femoral <1 day              Line  Duration                  Pacer Wires 09/19/23  1 day              Peripheral Intravenous Line  Duration                  Peripheral IV - Single Lumen 09/17/23 1825 18 G Anterior;Proximal;Right Forearm 2 days         Peripheral IV - Single Lumen 09/19/23 0745 18 G Right Hand 1 day                       Physical Exam  Vitals and nursing note reviewed.   Constitutional:       Appearance: He is ill-appearing.      Comments: intubated   HENT:      Head: Normocephalic and atraumatic.   Eyes:      General:         Right eye: No discharge.         Left eye: No discharge.      Pupils: Pupils are equal, round, and reactive to light.   Cardiovascular:      Rate and Rhythm: Normal rate and regular rhythm.      Heart sounds: S1 normal and S2 normal. No murmur heard.     No friction rub.      Comments: Chest tubes in place  Pulmonary:      Effort: Pulmonary effort is normal. No respiratory distress.      Breath sounds: Normal breath sounds. No rales.      Comments: intubated  Abdominal:      Palpations: Abdomen is soft.       "Tenderness: There is no abdominal tenderness.   Musculoskeletal:      Cervical back: Neck supple.      Right lower leg: No edema.      Left lower leg: No edema.   Skin:     General: Skin is warm and dry.      Comments: Sternotomy C/D/I   Neurological:      General: No focal deficit present.      Mental Status: He is oriented to person, place, and time.   Psychiatric:         Mood and Affect: Mood normal.         Behavior: Behavior normal.         Thought Content: Thought content normal.            Significant Labs: BMP:   Recent Labs   Lab 09/19/23 0343 09/19/23 1813 09/20/23 0414   GLU 98 189* 221*    148* 144   K 3.8 4.0 4.4    112* 110   CO2 22* 24 26   BUN 8 8 9   CREATININE 0.9 1.0 1.2   CALCIUM 8.2* 8.0* 7.7*   MG  --  4.4* 2.7*   , CMP   Recent Labs   Lab 09/19/23 0343 09/19/23 1813 09/20/23 0414    148* 144   K 3.8 4.0 4.4    112* 110   CO2 22* 24 26   GLU 98 189* 221*   BUN 8 8 9   CREATININE 0.9 1.0 1.2   CALCIUM 8.2* 8.0* 7.7*   PROT  --  3.7* 4.5*   ALBUMIN  --  2.9* 3.8   BILITOT  --  1.4* 1.2*   ALKPHOS  --  27* 20*   AST  --  41* 51*   ALT  --  9* 11   ANIONGAP 10 12 8   , CBC   Recent Labs   Lab 09/19/23 1647 09/19/23 1813 09/19/23 1816 09/20/23 0414 09/20/23 0415   WBC 15.24* 20.18*  --  9.46  9.46  --    HGB 9.1* 10.8*  --  6.0*  6.0*  --    HCT 26.6* 30.9*   < > 17.1*  17.1* 16*   PLT 41* 74*  --  74*  74*  --     < > = values in this interval not displayed.   , INR   Recent Labs   Lab 09/19/23 1647 09/19/23 1813 09/20/23 0414   INR 2.8* 1.8* 1.3*   , Lipid Panel No results for input(s): "CHOL", "HDL", "LDLCALC", "TRIG", "CHOLHDL" in the last 48 hours., Troponin No results for input(s): "TROPONINI" in the last 48 hours., and All pertinent lab results from the last 24 hours have been reviewed.    Significant Imaging: Echocardiogram: Transthoracic echo (TTE) complete ([facility] Only):   Results for orders placed or performed during the hospital encounter of " 09/17/23   Echo   Result Value Ref Range    BSA 1.98 m2    LVOT stroke volume 82.76 cm3    LVIDd 5.22 3.5 - 6.0 cm    LV Systolic Volume 86.21 mL    LV Systolic Volume Index 44.2 mL/m2    LVIDs 4.37 (A) 2.1 - 4.0 cm    LV Diastolic Volume 130.82 mL    LV Diastolic Volume Index 67.09 mL/m2    IVS 0.88 0.6 - 1.1 cm    LVOT diameter 1.91 cm    LVOT area 2.9 cm2    FS 16 (A) 28 - 44 %    Left Ventricle Relative Wall Thickness 0.38 cm    Posterior Wall 0.98 0.6 - 1.1 cm    LV mass 177.53 g    LV Mass Index 91 g/m2    MV Peak E Bob 1.20 m/s    TDI LATERAL 0.06 m/s    TDI SEPTAL 0.08 m/s    E/E' ratio 17.14 m/s    MV Peak A Bob 1.14 m/s    TR Max Bob 2.33 m/s    E/A ratio 1.05     IVRT 72.31 msec    E wave deceleration time 200.37 msec    LV SEPTAL E/E' RATIO 15.00 m/s    LV LATERAL E/E' RATIO 20.00 m/s    LVOT peak bob 1.38 m/s    Left Ventricular Outflow Tract Mean Velocity 1.08 cm/s    Left Ventricular Outflow Tract Mean Gradient 4.99 mmHg    LA size 3.79 cm    Left Atrium Minor Axis 4.68 cm    Left Atrium Major Axis 4.27 cm    RVOT peak VTI 10.8 cm    TAPSE 1.84 cm    RA Major Axis 3.31 cm    AV regurgitation pressure 1/2 time [id number] ms    AR Max Bob 4.34 m/s    AV mean gradient 14 mmHg    AV peak gradient 26 mmHg    Ao peak bob 2.54 m/s    Ao VTI 54.00 cm    LVOT peak VTI 28.90 cm    AV valve area 1.53 cm²    AV Velocity Ratio 0.54     AV index (prosthetic) 0.54     SARAH by Velocity Ratio 1.56 cm²    Mr max bob 4.42 m/s    MV stenosis pressure 1/2 time 58.11 ms    MV valve area p 1/2 method 3.79 cm2    TV mean gradient 19 mmHg    Triscuspid Valve Regurgitation Peak Gradient 22 mmHg    PV mean gradient 1 mmHg    RVOT peak bob 0.58 m/s    Ao root annulus 2.71 cm    STJ 2.59 cm    Ascending aorta 2.36 cm    IVC diameter 1.57 cm    Mean e' 0.07 m/s    ZLVIDS 1.94     ZLVIDD -0.63     LA Volume Index 22.9 mL/m2    LA volume 44.60 cm3    LA WIDTH 3.1 cm    RA Width 2.2 cm    TV resting pulmonary artery pressure  25 mmHg    RV TB RVSP 5 mmHg    Est. RA pres 3 mmHg    Narrative      Left Ventricle: The left ventricle is normal in size. Ventricular mass   is normal. Normal wall thickness. regional wall motion abnormalities   present. There is mildly reduced systolic function with a visually   estimated ejection fraction of 40 - 45%. Grade II diastolic dysfunction.    Right Ventricle: Normal right ventricular cavity size. Wall thickness   is normal. Right ventricle wall motion  is normal. Systolic function is   normal.    Aortic Valve: There is mild to moderate aortic regurgitation.    Pulmonary Artery: The estimated pulmonary artery systolic pressure is   25 mmHg.    IVC/SVC: Normal venous pressure at 3 mmHg.     , EKG: reviewed, Stress Test: reviewed, and X-Ray: CXR: X-Ray Chest 1 View (CXR):   Results for orders placed or performed during the hospital encounter of 09/17/23   X-Ray Chest 1 View    Narrative    EXAMINATION:  XR CHEST 1 VIEW    CLINICAL HISTORY:  Pre Op;    TECHNIQUE:  Single frontal view of the chest was performed.    COMPARISON:  09/17/2023.    FINDINGS:  The lungs are clear, with normal appearance of pulmonary vasculature and no pleural effusion or pneumothorax.    The cardiac silhouette is normal in size. The hilar and mediastinal contours are unremarkable.    Bones are intact.      Impression    No acute abnormality.      Electronically signed by: Mukund Louie  Date:    09/18/2023  Time:    21:09     Assessment and Plan:         * NSTEMI (non-ST elevated myocardial infarction)  Troponin 0.6->-0.709->0.708  Cont hep gtt  EKG with ST T wave abnml  LHC planned for today  All risks, benefits and treatment alternatives explained, all questions answered. Pt agreeable to proceed.   NPO    9/20/23  S/P CABG x5 AVR    S/P CABG x 5  Cont management per CTS    Tobacco smoker, 1 pack of cigarettes or less per day  Smoking cessation    CAD, multiple vessel  09/19/23  S/p CABG x5 and mechanical AVR  today  -continue icu supportive care  -continue asa plavix statin BB and lasix  -will f/u    9/20/23  Cont ASA, plavix, statin, BB, lasix  Management per CTS    Hypertension  stable        VTE Risk Mitigation (From admission, onward)         Ordered     warfarin (COUMADIN) tablet 2.5 mg  Daily         09/20/23 0838     IP VTE LOW RISK PATIENT  Once         09/17/23 1941                Courtney Prince, NP  Cardiology  [facility] ([facility])

## 2023-09-20 NOTE — SUBJECTIVE & OBJECTIVE
Interval History:  The patient is postop day 1 status post coronary artery bypass grafting x5 and aortic valve replacement with a 19 mm [place] mechanical valve    ROS  Medications:  Continuous Infusions:   dexmedeTOMIDine (Precedex) infusion (titrating) 1.4 mcg/kg/hr (09/20/23 0610)    dextrose 5% lactated ringers 25 mL/hr at 09/20/23 0600    EPINEPHrine 0.02 mcg/kg/min (09/20/23 0600)    insulin regular 1 units/mL infusion orderable (CTS POST-OP) 6.7 Units/hr (09/20/23 0600)    niCARdipine      nitroGLYCERIN Stopped (09/19/23 1035)    vasopressin 0.04 Units/min (09/20/23 0600)     Scheduled Meds:   acetaminophen  1,000 mg Intravenous Once    albuterol-ipratropium  3 mL Nebulization Q4H    ascorbic acid (vitamin C)  500 mg Oral BID    aspirin  81 mg Oral Daily    atorvastatin  80 mg Oral Daily    ceFAZolin (ANCEF) IVPB  2 g Intravenous Q8H    chlorhexidine  10 mL Mouth/Throat BID    clopidogreL  75 mg Oral Daily    [START ON 9/21/2023] cyanocobalamin  1,000 mcg Oral Daily    docusate sodium  100 mg Oral BID    ferrous sulfate  1 tablet Oral Daily    [START ON 9/21/2023] folic acid  1 mg Oral Daily    furosemide (LASIX) injection  40 mg Intravenous BID    magnesium hydroxide 400 mg/5 ml  5 mL Oral BID    metoprolol tartrate  25 mg Oral BID    pantoprazole  40 mg Intravenous Daily    polyethylene glycol  17 g Oral Daily    potassium chloride  20 mEq Oral Q12H    sodium chloride 3%  4 mL Nebulization Q12H     PRN Meds:0.9%  NaCl infusion (for blood administration), albumin human 5%, calcium gluconate IVPB, calcium gluconate IVPB, calcium gluconate IVPB, dextrose 10%, dextrose 10%, fentaNYL, fentaNYL, influenza, insulin regular 1 units/mL infusion orderable (CTS POST-OP), lactated ringers, magnesium sulfate IVPB, melatonin, metoclopramide HCl, ondansetron, oxyCODONE, oxyCODONE, pneumoc 20-leslee conj-dip cr(PF), potassium chloride in water, potassium chloride in water, potassium chloride in water     Objective:      Vital Signs (Most Recent):  Temp: (!) 102 °F (38.9 °C) (09/20/23 0645)  Pulse: 89 (09/20/23 0645)  Resp: (!) 24 (09/20/23 0645)  BP: 106/62 (09/20/23 0600)  SpO2: 99 % (09/20/23 0645) Vital Signs (24h Range):  Temp:  [99.1 °F (37.3 °C)-102 °F (38.9 °C)] 102 °F (38.9 °C)  Pulse:  [] 89  Resp:  [13-28] 24  SpO2:  [99 %-100 %] 99 %  BP: ()/(39-72) 106/62  Arterial Line BP: ()/(43-72) 105/53     Weight: 85.9 kg (189 lb 6 oz)  Body mass index is 28.79 kg/m².    SpO2: 99 %       Intake/Output - Last 3 Shifts         09/18 0700 09/19 0659 09/19 0700 09/20 0659 09/20 0700 09/21 0659    I.V. (mL/kg) 1523.8 (18.7) 3044.7 (35.4)     Blood  1340     IV Piggyback  4550.1     Total Intake(mL/kg) 1523.8 (18.7) 8934.8 (104)     Urine (mL/kg/hr) 1100 (0.6) 5235 (2.5)     Drains  15     Other  0     Chest Tube  1630     Total Output 1100 6880     Net +423.8 +2054.8                    Lines/Drains/Airways       Central Venous Catheter Line  Duration             Introducer 09/19/23 0748 Internal Jugular Right <1 day    Pulmonary Artery Catheter Assessment  09/19/23 0748 Internal Jugular Right <1 day              Drain  Duration                  Urethral Catheter 09/19/23 0730 Silicone;Temperature probe;Straight-tip 16 Fr. 1 day         Chest Tube 09/19/23 1618 Tube - 3 Left Pleural <1 day         Closed/Suction Drain 09/19/23 1030 Tube - 1 Left;Proximal Leg Bulb 19 Fr. <1 day         Closed/Suction Drain 09/19/23 1032 Tube - 2 Left;Distal Leg Bulb 19 Fr. <1 day         Y Chest Tube 1 and 2 09/19/23 1617 1 Right Mediastinal 2 Left Mediastinal 24 Fr. <1 day              Airway  Duration                  Airway - Non-Surgical 09/19/23 0739 <1 day              Arterial Line  Duration             Arterial Line 09/19/23 1619 Right Femoral <1 day              Line  Duration                  Pacer Wires 09/19/23  1 day              Peripheral Intravenous Line  Duration                  Peripheral IV - Single Lumen  09/17/23 1825 18 G Anterior;Proximal;Right Forearm 2 days         Peripheral IV - Single Lumen 09/19/23 0745 18 G Right Hand <1 day                     Physical Exam  Constitutional:       Appearance: Normal appearance.   HENT:      Head: Normocephalic and atraumatic.      Nose: Nose normal.   Cardiovascular:      Rate and Rhythm: Normal rate and regular rhythm.      Pulses: Normal pulses.      Heart sounds: Normal heart sounds.   Pulmonary:      Breath sounds: Normal breath sounds.   Abdominal:      General: Abdomen is flat.      Palpations: Abdomen is soft.   Musculoskeletal:      Right lower leg: No edema.      Left lower leg: No edema.   Skin:     General: Skin is warm and dry.   Neurological:      Mental Status: He is alert.      Comments: Patient is intubated and sedated            Significant Labs:  All pertinent labs from the last 24 hours have been reviewed.    Significant Diagnostics:  I have reviewed all pertinent imaging results/findings within the past 24 hours.

## 2023-09-20 NOTE — PLAN OF CARE
Patient remained safe and stable for the duration of the shift. See assessment flowsheets. Currently resting in bed with safety and fall prevention measures in place. Plan of care discussed with the patient and family. All tubes and lines remained patent and intact. Vital signs are stable. Attempted to wean pressors. Continued vasopressin and epinephrine gtts. Remains 100% atrial paced. Encouraged to call should needs arise.

## 2023-09-20 NOTE — SUBJECTIVE & OBJECTIVE
Review of Systems   Unable to perform ROS: Intubated     Objective:     Vital Signs (Most Recent):  Temp: 99.5 °F (37.5 °C) (09/19/23 1909)  Pulse: 93 (09/19/23 1909)  Resp: (!) 24 (09/19/23 1909)  BP: (!) 120/59 (09/19/23 1900)  SpO2: 100 % (09/19/23 1909) Vital Signs (24h Range):  Temp:  [97.5 °F (36.4 °C)-99.5 °F (37.5 °C)] 99.5 °F (37.5 °C)  Pulse:  [] 93  Resp:  [12-28] 24  SpO2:  [93 %-100 %] 100 %  BP: ()/(39-92) 120/59  Arterial Line BP: (93)/(47) 93/47     Weight: 81.6 kg (180 lb)  Body mass index is 27.37 kg/m².     SpO2: 100 %         Intake/Output Summary (Last 24 hours) at 9/19/2023 1919  Last data filed at 9/19/2023 1800  Gross per 24 hour   Intake 6944.77 ml   Output 4440 ml   Net 2504.77 ml       Lines/Drains/Airways       Central Venous Catheter Line  Duration             Pulmonary Artery Catheter Assessment  09/19/23 0748 <1 day              Drain  Duration                  Chest Tube 09/19/23 1618 Tube - 3 Left Pleural <1 day         Closed/Suction Drain 09/19/23 1030 Tube - 1 Left;Proximal Leg Bulb 19 Fr. <1 day         Closed/Suction Drain 09/19/23 1032 Tube - 2 Left;Distal Leg Bulb 19 Fr. <1 day         Urethral Catheter 09/19/23 0730 Silicone;Temperature probe;Straight-tip 16 Fr. <1 day         Y Chest Tube 1 and 2 09/19/23 1617 1 Right Mediastinal 2 Left Mediastinal 24 Fr. <1 day              Airway  Duration                  Airway - Non-Surgical 09/19/23 0739 <1 day              Arterial Line  Duration             Arterial Line 09/19/23 0733 Left Radial <1 day    Arterial Line 09/19/23 1619 Right Femoral <1 day              Peripheral Intravenous Line  Duration                  Peripheral IV - Single Lumen 09/17/23 1821 20 G Anterior;Left Hand 2 days         Peripheral IV - Single Lumen 09/17/23 1825 18 G Anterior;Proximal;Right Forearm 2 days         Peripheral IV - Single Lumen 09/19/23 0745 18 G Right Hand <1 day                       Physical Exam  Constitutional:        "Interventions: He is intubated.   HENT:      Head: Normocephalic.   Neck:      Thyroid: No thyromegaly.      Vascular: Normal carotid pulses. No carotid bruit or JVD.   Cardiovascular:      Rate and Rhythm: Normal rate and regular rhythm. No extrasystoles are present.     Chest Wall: PMI is not displaced.      Pulses: Normal pulses.      Heart sounds: Normal heart sounds. No murmur heard.     No gallop. No S3 sounds.   Pulmonary:      Effort: No respiratory distress. He is intubated.      Breath sounds: Normal air entry. No stridor.   Abdominal:      General: Bowel sounds are normal.      Palpations: Abdomen is soft.      Tenderness: There is no abdominal tenderness. There is no rebound.   Skin:     Findings: No rash.            Significant Labs: ABG:   Recent Labs   Lab 09/19/23  1645 09/19/23 1816 09/19/23 1904   PH 7.352 7.244* 7.324*   PCO2 56.6* 64.0* 46.2*   HCO3 31.4* 27.7 24.0   POCSATURATED 100 100 99   BE 6 0 -2   , Blood Culture: No results for input(s): "LABBLOO" in the last 48 hours., BMP:   Recent Labs   Lab 09/19/23 0343 09/19/23 1813   GLU 98 189*    148*   K 3.8 4.0    112*   CO2 22* 24   BUN 8 8   CREATININE 0.9 1.0   CALCIUM 8.2* 8.0*   MG  --  4.4*   , CMP   Recent Labs   Lab 09/19/23 0343 09/19/23 1813    148*   K 3.8 4.0    112*   CO2 22* 24   GLU 98 189*   BUN 8 8   CREATININE 0.9 1.0   CALCIUM 8.2* 8.0*   PROT  --  3.7*   ALBUMIN  --  2.9*   BILITOT  --  1.4*   ALKPHOS  --  27*   AST  --  41*   ALT  --  9*   ANIONGAP 10 12   , CBC   Recent Labs   Lab 09/19/23  0343 09/19/23  0824 09/19/23  1647 09/19/23 1813 09/19/23 1816 09/19/23  1904   WBC 7.09  7.09  --  15.24* 20.18*  --   --    HGB 12.2*  12.2*  --  9.1* 10.8*  --   --    HCT 35.2*  35.2*   < > 26.6* 30.9*   < > 25*     277  --  41* 74*  --   --     < > = values in this interval not displayed.   , INR   Recent Labs   Lab 09/19/23  1647 09/19/23  1813   INR 2.8* 1.8*   , Lipid Panel No results " "for input(s): "CHOL", "HDL", "LDLCALC", "TRIG", "CHOLHDL" in the last 48 hours., and Troponin   Recent Labs   Lab 09/17/23  2134 09/18/23  0022   TROPONINI 0.709* 0.708*       Significant Imaging: EKG:    "

## 2023-09-20 NOTE — PLAN OF CARE
Pt remains intubated s/p CABG/AVR, sedated w/max precedex gtt for goal RASS 0. Pt able to follow commands. BP controlled w/vaso gtt. CO/CI 2.4-2.9/4.6-5.6. Epi and vaso gtts able to be weaned down over shift. A-paced on monitor. 1 run of Vtach this AM, see prev note. Tmax 102.0. Total 1L albumin given this shift. 1u cryo given in PM; critical H/H 6.0/17.1 this AM; 1 of 2 ordered PRBC transfused. Total OP to CTs ~1.6L since surgery; OP trending down overnight, now 30-60mL/hr and beginning to lighten. 20mEq KCl and 2g Mg given. Voids per mercedes; avg 200mL/hr UOP, but slowing to avg 100/hr this AM. Insulin gtt started, CBGs trending down, last result 154. Pain controlled w/PRN fentanyl x 2. Incisions WNL, Provena in place. Q2 turns; heels floated. CHG bath completed. Fall precautions in place, bed alarm on.   POC discussed w/pt's mother at bs, verbalized understanding.

## 2023-09-20 NOTE — SUBJECTIVE & OBJECTIVE
Review of Systems   Reason unable to perform ROS: intubated.     Objective:     Vital Signs (Most Recent):  Temp: 99.3 °F (37.4 °C) (09/20/23 1133)  Pulse: 89 (09/20/23 1133)  Resp: (!) 24 (09/20/23 1133)  BP: 132/67 (09/20/23 1100)  SpO2: 99 % (09/20/23 1133) Vital Signs (24h Range):  Temp:  [99.1 °F (37.3 °C)-102 °F (38.9 °C)] 99.3 °F (37.4 °C)  Pulse:  [] 89  Resp:  [6-29] 24  SpO2:  [93 %-100 %] 99 %  BP: ()/(39-72) 132/67  Arterial Line BP: ()/(43-72) 123/63     Weight: 85.9 kg (189 lb 6 oz)  Body mass index is 28.79 kg/m².     SpO2: 99 %         Intake/Output Summary (Last 24 hours) at 9/20/2023 1240  Last data filed at 9/20/2023 1100  Gross per 24 hour   Intake 8313.27 ml   Output 8403 ml   Net -89.73 ml       Lines/Drains/Airways       Central Venous Catheter Line  Duration             Introducer 09/19/23 0748 Internal Jugular Right 1 day    Pulmonary Artery Catheter Assessment  09/19/23 0748 Internal Jugular Right 1 day              Drain  Duration                  Closed/Suction Drain 09/19/23 1030 Tube - 1 Left;Proximal Leg Bulb 19 Fr. 1 day         Closed/Suction Drain 09/19/23 1032 Tube - 2 Left;Distal Leg Bulb 19 Fr. 1 day         Urethral Catheter 09/19/23 0730 Silicone;Temperature probe;Straight-tip 16 Fr. 1 day         Chest Tube 09/19/23 1618 Tube - 3 Left Pleural <1 day         Y Chest Tube 1 and 2 09/19/23 1617 1 Right Mediastinal 2 Left Mediastinal 24 Fr. <1 day              Arterial Line  Duration             Arterial Line 09/19/23 1619 Right Femoral <1 day              Line  Duration                  Pacer Wires 09/19/23  1 day              Peripheral Intravenous Line  Duration                  Peripheral IV - Single Lumen 09/17/23 1825 18 G Anterior;Proximal;Right Forearm 2 days         Peripheral IV - Single Lumen 09/19/23 0745 18 G Right Hand 1 day                       Physical Exam  Vitals and nursing note reviewed.   Constitutional:       Appearance: He is  ill-appearing.      Comments: intubated   HENT:      Head: Normocephalic and atraumatic.   Eyes:      General:         Right eye: No discharge.         Left eye: No discharge.      Pupils: Pupils are equal, round, and reactive to light.   Cardiovascular:      Rate and Rhythm: Normal rate and regular rhythm.      Heart sounds: S1 normal and S2 normal. No murmur heard.     No friction rub.      Comments: Chest tubes in place  Pulmonary:      Effort: Pulmonary effort is normal. No respiratory distress.      Breath sounds: Normal breath sounds. No rales.      Comments: intubated  Abdominal:      Palpations: Abdomen is soft.      Tenderness: There is no abdominal tenderness.   Musculoskeletal:      Cervical back: Neck supple.      Right lower leg: No edema.      Left lower leg: No edema.   Skin:     General: Skin is warm and dry.      Comments: Sternotomy C/D/I   Neurological:      General: No focal deficit present.      Mental Status: He is oriented to person, place, and time.   Psychiatric:         Mood and Affect: Mood normal.         Behavior: Behavior normal.         Thought Content: Thought content normal.            Significant Labs: BMP:   Recent Labs   Lab 09/19/23  0343 09/19/23  1813 09/20/23  0414   GLU 98 189* 221*    148* 144   K 3.8 4.0 4.4    112* 110   CO2 22* 24 26   BUN 8 8 9   CREATININE 0.9 1.0 1.2   CALCIUM 8.2* 8.0* 7.7*   MG  --  4.4* 2.7*   , CMP   Recent Labs   Lab 09/19/23  0343 09/19/23  1813 09/20/23  0414    148* 144   K 3.8 4.0 4.4    112* 110   CO2 22* 24 26   GLU 98 189* 221*   BUN 8 8 9   CREATININE 0.9 1.0 1.2   CALCIUM 8.2* 8.0* 7.7*   PROT  --  3.7* 4.5*   ALBUMIN  --  2.9* 3.8   BILITOT  --  1.4* 1.2*   ALKPHOS  --  27* 20*   AST  --  41* 51*   ALT  --  9* 11   ANIONGAP 10 12 8   , CBC   Recent Labs   Lab 09/19/23  1647 09/19/23 1813 09/19/23  1816 09/20/23  0414 09/20/23  0415   WBC 15.24* 20.18*  --  9.46  9.46  --    HGB 9.1* 10.8*  --  6.0*  6.0*  --   "  HCT 26.6* 30.9*   < > 17.1*  17.1* 16*   PLT 41* 74*  --  74*  74*  --     < > = values in this interval not displayed.   , INR   Recent Labs   Lab 09/19/23  1647 09/19/23  1813 09/20/23  0414   INR 2.8* 1.8* 1.3*   , Lipid Panel No results for input(s): "CHOL", "HDL", "LDLCALC", "TRIG", "CHOLHDL" in the last 48 hours., Troponin No results for input(s): "TROPONINI" in the last 48 hours., and All pertinent lab results from the last 24 hours have been reviewed.    Significant Imaging: Echocardiogram: Transthoracic echo (TTE) complete ([facility] Only):   Results for orders placed or performed during the hospital encounter of 09/17/23   Echo   Result Value Ref Range    BSA 1.98 m2    LVOT stroke volume 82.76 cm3    LVIDd 5.22 3.5 - 6.0 cm    LV Systolic Volume 86.21 mL    LV Systolic Volume Index 44.2 mL/m2    LVIDs 4.37 (A) 2.1 - 4.0 cm    LV Diastolic Volume 130.82 mL    LV Diastolic Volume Index 67.09 mL/m2    IVS 0.88 0.6 - 1.1 cm    LVOT diameter 1.91 cm    LVOT area 2.9 cm2    FS 16 (A) 28 - 44 %    Left Ventricle Relative Wall Thickness 0.38 cm    Posterior Wall 0.98 0.6 - 1.1 cm    LV mass 177.53 g    LV Mass Index 91 g/m2    MV Peak E Bob 1.20 m/s    TDI LATERAL 0.06 m/s    TDI SEPTAL 0.08 m/s    E/E' ratio 17.14 m/s    MV Peak A Bob 1.14 m/s    TR Max Bob 2.33 m/s    E/A ratio 1.05     IVRT 72.31 msec    E wave deceleration time 200.37 msec    LV SEPTAL E/E' RATIO 15.00 m/s    LV LATERAL E/E' RATIO 20.00 m/s    LVOT peak bob 1.38 m/s    Left Ventricular Outflow Tract Mean Velocity 1.08 cm/s    Left Ventricular Outflow Tract Mean Gradient 4.99 mmHg    LA size 3.79 cm    Left Atrium Minor Axis 4.68 cm    Left Atrium Major Axis 4.27 cm    RVOT peak VTI 10.8 cm    TAPSE 1.84 cm    RA Major Axis 3.31 cm    AV regurgitation pressure 1/2 time [id number] ms    AR Max Bob 4.34 m/s    AV mean gradient 14 mmHg    AV peak gradient 26 mmHg    Ao peak bob 2.54 m/s    Ao VTI 54.00 cm    LVOT peak VTI 28.90 cm    AV " valve area 1.53 cm²    AV Velocity Ratio 0.54     AV index (prosthetic) 0.54     SARAH by Velocity Ratio 1.56 cm²    Mr max arnel 4.42 m/s    MV stenosis pressure 1/2 time 58.11 ms    MV valve area p 1/2 method 3.79 cm2    TV mean gradient 19 mmHg    Triscuspid Valve Regurgitation Peak Gradient 22 mmHg    PV mean gradient 1 mmHg    RVOT peak arnel 0.58 m/s    Ao root annulus 2.71 cm    STJ 2.59 cm    Ascending aorta 2.36 cm    IVC diameter 1.57 cm    Mean e' 0.07 m/s    ZLVIDS 1.94     ZLVIDD -0.63     LA Volume Index 22.9 mL/m2    LA volume 44.60 cm3    LA WIDTH 3.1 cm    RA Width 2.2 cm    TV resting pulmonary artery pressure 25 mmHg    RV TB RVSP 5 mmHg    Est. RA pres 3 mmHg    Narrative      Left Ventricle: The left ventricle is normal in size. Ventricular mass   is normal. Normal wall thickness. regional wall motion abnormalities   present. There is mildly reduced systolic function with a visually   estimated ejection fraction of 40 - 45%. Grade II diastolic dysfunction.    Right Ventricle: Normal right ventricular cavity size. Wall thickness   is normal. Right ventricle wall motion  is normal. Systolic function is   normal.    Aortic Valve: There is mild to moderate aortic regurgitation.    Pulmonary Artery: The estimated pulmonary artery systolic pressure is   25 mmHg.    IVC/SVC: Normal venous pressure at 3 mmHg.     , EKG: reviewed, Stress Test: reviewed, and X-Ray: CXR: X-Ray Chest 1 View (CXR):   Results for orders placed or performed during the hospital encounter of 09/17/23   X-Ray Chest 1 View    Narrative    EXAMINATION:  XR CHEST 1 VIEW    CLINICAL HISTORY:  Pre Op;    TECHNIQUE:  Single frontal view of the chest was performed.    COMPARISON:  09/17/2023.    FINDINGS:  The lungs are clear, with normal appearance of pulmonary vasculature and no pleural effusion or pneumothorax.    The cardiac silhouette is normal in size. The hilar and mediastinal contours are unremarkable.    Bones are intact.       Impression    No acute abnormality.      Electronically signed by: Mukund Louie  Date:    09/18/2023  Time:    21:09

## 2023-09-20 NOTE — PROGRESS NOTES
[facility] - Intensive Care ([facility])  Cardiology  Progress Note    Patient Name: Mikie Alcazar  MRN: [id number]  Admission Date: 9/17/2023  Hospital Length of Stay: 2 days  Code Status: Full Code   Attending Physician: Elliott Wright MD   Primary Care Physician: Lissette, Primary Doctor  Expected Discharge Date:   Principal Problem:NSTEMI (non-ST elevated myocardial infarction)    Subjective:     Hospital Course:   Cardiology consulted to assist with management. Pt seen and examined today, resting in bed mother at bedside. He reports his pain started 6+ months ago and worsening in the last few weeks happening daily with associated SOB, dizziness radiating to left arm. He reports his pain is sharp and pressure-like at times. Pt reports daily use a marijuana, h/o cocaine and other drugs 5-10 years ago. Drinks occasionally. Echo pending cont hep gtt    09/19/2023. Admitted for NSTEMI/ACS. Echo showed RWMA  The cath done yesterday showed   Severe lmca disease with timi1 flow in lad   Lcx ostial 50%   Rca prox 80%   Ef 50%   Severe AI  Today s/p CABG x5 and mechanical; AVR by Dr. Douglas.  In ICU and intubated. Om epi and Vasopressin gtt.            Review of Systems   Unable to perform ROS: Intubated     Objective:     Vital Signs (Most Recent):  Temp: 99.5 °F (37.5 °C) (09/19/23 1909)  Pulse: 93 (09/19/23 1909)  Resp: (!) 24 (09/19/23 1909)  BP: (!) 120/59 (09/19/23 1900)  SpO2: 100 % (09/19/23 1909) Vital Signs (24h Range):  Temp:  [97.5 °F (36.4 °C)-99.5 °F (37.5 °C)] 99.5 °F (37.5 °C)  Pulse:  [] 93  Resp:  [12-28] 24  SpO2:  [93 %-100 %] 100 %  BP: ()/(39-92) 120/59  Arterial Line BP: (93)/(47) 93/47     Weight: 81.6 kg (180 lb)  Body mass index is 27.37 kg/m².     SpO2: 100 %         Intake/Output Summary (Last 24 hours) at 9/19/2023 1919  Last data filed at 9/19/2023 1800  Gross per 24 hour   Intake 6944.77 ml   Output 4440 ml   Net 2504.77 ml       Lines/Drains/Airways       Central Venous Catheter Line   Duration             Pulmonary Artery Catheter Assessment  09/19/23 0748 <1 day              Drain  Duration                  Chest Tube 09/19/23 1618 Tube - 3 Left Pleural <1 day         Closed/Suction Drain 09/19/23 1030 Tube - 1 Left;Proximal Leg Bulb 19 Fr. <1 day         Closed/Suction Drain 09/19/23 1032 Tube - 2 Left;Distal Leg Bulb 19 Fr. <1 day         Urethral Catheter 09/19/23 0730 Silicone;Temperature probe;Straight-tip 16 Fr. <1 day         Y Chest Tube 1 and 2 09/19/23 1617 1 Right Mediastinal 2 Left Mediastinal 24 Fr. <1 day              Airway  Duration                  Airway - Non-Surgical 09/19/23 0739 <1 day              Arterial Line  Duration             Arterial Line 09/19/23 0733 Left Radial <1 day    Arterial Line 09/19/23 1619 Right Femoral <1 day              Peripheral Intravenous Line  Duration                  Peripheral IV - Single Lumen 09/17/23 1821 20 G Anterior;Left Hand 2 days         Peripheral IV - Single Lumen 09/17/23 1825 18 G Anterior;Proximal;Right Forearm 2 days         Peripheral IV - Single Lumen 09/19/23 0745 18 G Right Hand <1 day                       Physical Exam  Constitutional:       Interventions: He is intubated.   HENT:      Head: Normocephalic.   Neck:      Thyroid: No thyromegaly.      Vascular: Normal carotid pulses. No carotid bruit or JVD.   Cardiovascular:      Rate and Rhythm: Normal rate and regular rhythm. No extrasystoles are present.     Chest Wall: PMI is not displaced.      Pulses: Normal pulses.      Heart sounds: Normal heart sounds. No murmur heard.     No gallop. No S3 sounds.   Pulmonary:      Effort: No respiratory distress. He is intubated.      Breath sounds: Normal air entry. No stridor.   Abdominal:      General: Bowel sounds are normal.      Palpations: Abdomen is soft.      Tenderness: There is no abdominal tenderness. There is no rebound.   Skin:     Findings: No rash.            Significant Labs: ABG:   Recent Labs   Lab  "09/19/23 1645 09/19/23 1816 09/19/23  1904   PH 7.352 7.244* 7.324*   PCO2 56.6* 64.0* 46.2*   HCO3 31.4* 27.7 24.0   POCSATURATED 100 100 99   BE 6 0 -2   , Blood Culture: No results for input(s): "LABBLOO" in the last 48 hours., BMP:   Recent Labs   Lab 09/19/23 0343 09/19/23 1813   GLU 98 189*    148*   K 3.8 4.0    112*   CO2 22* 24   BUN 8 8   CREATININE 0.9 1.0   CALCIUM 8.2* 8.0*   MG  --  4.4*   , CMP   Recent Labs   Lab 09/19/23 0343 09/19/23 1813    148*   K 3.8 4.0    112*   CO2 22* 24   GLU 98 189*   BUN 8 8   CREATININE 0.9 1.0   CALCIUM 8.2* 8.0*   PROT  --  3.7*   ALBUMIN  --  2.9*   BILITOT  --  1.4*   ALKPHOS  --  27*   AST  --  41*   ALT  --  9*   ANIONGAP 10 12   , CBC   Recent Labs   Lab 09/19/23 0343 09/19/23  0824 09/19/23 1647 09/19/23 1813 09/19/23 1816 09/19/23  1904   WBC 7.09  7.09  --  15.24* 20.18*  --   --    HGB 12.2*  12.2*  --  9.1* 10.8*  --   --    HCT 35.2*  35.2*   < > 26.6* 30.9*   < > 25*     277  --  41* 74*  --   --     < > = values in this interval not displayed.   , INR   Recent Labs   Lab 09/19/23 1647 09/19/23 1813   INR 2.8* 1.8*   , Lipid Panel No results for input(s): "CHOL", "HDL", "LDLCALC", "TRIG", "CHOLHDL" in the last 48 hours., and Troponin   Recent Labs   Lab 09/17/23  2134 09/18/23  0022   TROPONINI 0.709* 0.708*       Significant Imaging: EKG:      Assessment and Plan:       * NSTEMI (non-ST elevated myocardial infarction)  Troponin 0.6->-0.709->0.708  Cont hep gtt  EKG with ST T wave abnml  C planned for today  All risks, benefits and treatment alternatives explained, all questions answered. Pt agreeable to proceed.   NPO    CAD, multiple vessel  09/19/23  S/p CABG x5 and mechanical AVR today  -continue icu supportive care  -continue asa plavix statin BB and lasix  -will f/u    Hypertension  stable        VTE Risk Mitigation (From admission, onward)         Ordered     IP VTE LOW RISK PATIENT  Once         " 09/17/23 1941                Fahad Albarran MD  Cardiology  [facility] ([facility])

## 2023-09-20 NOTE — PT/OT/SLP PROGRESS
Occupational Therapy      Patient Name:  Mikie Alcazar   MRN:  [id number]    OT eval orders received. Chart review completed. Patient s/p CABG and AVR. Intubated in ICU. Will initiate evaluation s/p extubation.    Kelsi Schulz OT  9/20/2023  0646

## 2023-09-20 NOTE — CONSULTS
[facility] - Intensive Care ([facility])  Adult Nutrition  Consult Note    SUMMARY     Recommendations  1. Recommend pt diet order is advanced to Cardiac diet when medically appropriate.   2. Recommend pt receive Boost plus with all meals once PO diet is advanced.   3. Recommend to consider alternative nutrition support if the pt PO diet order cannot be advanced within 24 hr.   4. Weigh daily.    Goals:   1. Pt diet order will be advanced within 24 hr.   2. Pt will consume >50% of EEN and EPN prior to RD follow up.  Nutrition Goal Status: new  Communication of RD Recs: other (comment) (POC: Sticky Note)    Assessment and Plan    Nutrition Problem  Inadequate PO intake    Related to (etiology):   Decreased ability to consume sufficient nutrition.    Signs and Symptoms (as evidenced by):   NPO x 4 days    Interventions(treatment strategy):  1. Decreased Fat/ Sodium modified diet  2. Commercial Beverage  3. Collaboration with other providers    Nutrition Diagnosis Status:   New       Malnutrition Assessment     Skin (Micronutrient): pallor, dry (Raphael = 19)                                 Reason for Assessment    Reason For Assessment: consult  Diagnosis: cardiac disease (NSTEMI (non-ST elevated myocardial infarction), S/P CABG x 5)  Relevant Medical History: NSTEMI (non-ST elevated myocardial infarction), HTN, CAD, Tobacco smoker, 1 pack of cigarettes or less per day, Nonrheumatic aortic valve insufficiency, S/P CABG x 5, S/P AVR (aortic valve replacement)  Interdisciplinary Rounds: did not attend  General Information Comments:   9/20: 43 y.o male admitted w/ current principal problem of  NSTEMI. Pt currently has NPO diet order per s/p post-op x 1 day CABG x 5 and recent extubation. The pt has been NPO x 4 days. NFPE not currently appropriate. RD will assess need for NFPE during RD follow up. The pt is currently charted to weigh 189 lbs 6 oz, BMI 28.79 (Overweight). Pt denies feeling nauseated but currently in pain. The  "pt LBM was 9/17. RD will continue to follow.  Nutrition Discharge Planning: Cardiac diet    Wt Readings from Last 20 Encounters:   09/20/23 85.9 kg (189 lb 6 oz)   11/13/15 91.6 kg (202 lb)   ]    Nutrition Risk Screen    Nutrition Risk Screen: no indicators present    Nutrition/Diet History    Spiritual, Cultural Beliefs, Alevism Practices, Values that Affect Care: no  Food Allergies: NKFA  Factors Affecting Nutritional Intake: NPO, pain    Anthropometrics    Temp: 99 °F (37.2 °C)  Height Method: Stated  Height: 5' 8" (172.7 cm)  Height (inches): 68 in  Weight Method: Bed Scale  Weight: 85.9 kg (189 lb 6 oz)  Weight (lb): 189.38 lb  Ideal Body Weight (IBW), Male: 154 lb  % Ideal Body Weight, Male (lb): 122.97 %  BMI (Calculated): 28.8  BMI Grade: 25 - 29.9 - overweight       Lab/Procedures/Meds  BMP  Lab Results   Component Value Date     09/20/2023    K 4.4 09/20/2023     09/20/2023    CO2 26 09/20/2023    BUN 9 09/20/2023    CREATININE 1.2 09/20/2023    CALCIUM 7.7 (L) 09/20/2023    ANIONGAP 8 09/20/2023    EGFRNORACEVR >60 09/20/2023     Lab Results   Component Value Date    CALCIUM 7.7 (L) 09/20/2023     Recent Labs   Lab 09/20/23  1122   POCTGLUCOSE 159*     Lab Results   Component Value Date    ALT 11 09/20/2023    AST 51 (H) 09/20/2023    ALKPHOS 20 (L) 09/20/2023    BILITOT 1.2 (H) 09/20/2023       Pertinent Labs Reviewed: reviewed  Pertinent Medications Reviewed: reviewed  Scheduled Meds:   acetaminophen  650 mg Oral Q6H    albuterol-ipratropium  3 mL Nebulization Q4H    ascorbic acid (vitamin C)  500 mg Oral BID    aspirin  81 mg Oral Daily    atorvastatin  80 mg Oral Daily    chlorhexidine  10 mL Mouth/Throat BID    [START ON 9/21/2023] cyanocobalamin  1,000 mcg Oral Daily    docusate sodium  100 mg Oral BID    ferrous sulfate  1 tablet Oral Daily    [START ON 9/21/2023] folic acid  1 mg Oral Daily    furosemide (LASIX) injection  40 mg Intravenous BID    magnesium hydroxide 400 mg/5 ml  5 " mL Oral BID    metoprolol tartrate  25 mg Oral BID    pantoprazole  40 mg Oral Daily    polyethylene glycol  17 g Oral Daily    potassium chloride  20 mEq Oral Q12H    sodium chloride 3%  4 mL Nebulization Q12H    warfarin  2.5 mg Oral Daily     Continuous Infusions:   dexmedeTOMIDine (Precedex) infusion (titrating) Stopped (09/20/23 1000)    dextrose 5% lactated ringers 25 mL/hr at 09/20/23 0900    EPINEPHrine 0.01 mcg/kg/min (09/20/23 1500)    niCARdipine      vasopressin 0.04 Units/min (09/20/23 1500)     PRN Meds:.0.9%  NaCl infusion (for blood administration), albumin human 5%, calcium gluconate IVPB, calcium gluconate IVPB, calcium gluconate IVPB, dextrose 10%, fentaNYL, fentaNYL, glucagon (human recombinant), influenza, insulin aspart U-100, lactated ringers, magnesium sulfate IVPB, melatonin, metoclopramide HCl, ondansetron, oxyCODONE, oxyCODONE, pneumoc 20-leslee conj-dip cr(PF), potassium chloride in water, potassium chloride in water, potassium chloride in water      Estimated/Assessed Needs    Weight Used For Calorie Calculations: 85.9 kg (189 lb 6 oz)  Energy Calorie Requirements (kcal): 4827-6863 (MSJ: AF 1.2-1.4 kcal/kg per s/p post day 1 CABG x 5)  Energy Need Method: Kcal/kg  Protein Requirements: 69-86 (0.8-1.0 g/kg  per s/p post day 1 CABG x 5)  Weight Used For Protein Calculations: 85.9 kg (189 lb 6 oz)  Fluid Requirements (mL): 7658-3504  Estimated Fluid Requirement Method: RDA Method  RDA Method (mL): 2062  CHO Requirement: 258-301      Nutrition Prescription Ordered    Current Diet Order: NPO    Evaluation of Received Nutrient/Fluid Intake    I/O: +1584 Since admit  Energy Calories Required: not meeting needs  Protein Required: not meeting needs  Fluid Required: meeting needs  Tolerance: not tolerating  % Intake of Estimated Energy Needs: 0 - 25 %  % Meal Intake: NPO x 4 days    Nutrition Risk    Level of Risk/Frequency of Follow-up: high (x2 weekly)       Monitor and Evaluation    Food and  Nutrient Intake: energy intake, food and beverage intake  Food and Nutrient Adminstration: diet order  Knowledge/Beliefs/Attitudes: food and nutrition knowledge/skill, beliefs and attitudes  Physical Activity and Function: nutrition-related ADLs and IADLs, factors affecting access to physical activity  Anthropometric Measurements: weight, body mass index  Biochemical Data, Medical Tests and Procedures: glucose/endocrine profile, inflammatory profile, lipid profile  Nutrition-Focused Physical Findings: overall appearance, skin       Nutrition Follow-Up    RD Follow-up?: Yes  Braeden Wade, Registration Eligible, Provisional LDN

## 2023-09-20 NOTE — ASSESSMENT & PLAN NOTE
[facility] 9/18 with multi-vessel disease including critical left main disease   CTS consulted   Taken urgently for CABG x5 and AVR 9/19   See problem: CAD

## 2023-09-21 LAB
ALBUMIN SERPL BCP-MCNC: 3.8 G/DL (ref 3.5–5.2)
ALP SERPL-CCNC: 34 U/L (ref 55–135)
ALT SERPL W/O P-5'-P-CCNC: 16 U/L (ref 10–44)
ANION GAP SERPL CALC-SCNC: 8 MMOL/L (ref 8–16)
AST SERPL-CCNC: 38 U/L (ref 10–40)
BASOPHILS # BLD AUTO: 0.04 K/UL (ref 0–0.2)
BASOPHILS NFR BLD: 0.2 % (ref 0–1.9)
BILIRUB SERPL-MCNC: 1 MG/DL (ref 0.1–1)
BLD PROD TYP BPU: NORMAL
BLOOD UNIT EXPIRATION DATE: NORMAL
BLOOD UNIT TYPE CODE: 6200
BLOOD UNIT TYPE: NORMAL
BUN SERPL-MCNC: 15 MG/DL (ref 6–20)
CALCIUM SERPL-MCNC: 8.7 MG/DL (ref 8.7–10.5)
CHLORIDE SERPL-SCNC: 99 MMOL/L (ref 95–110)
CO2 SERPL-SCNC: 29 MMOL/L (ref 23–29)
CODING SYSTEM: NORMAL
CREAT SERPL-MCNC: 0.9 MG/DL (ref 0.5–1.4)
CROSSMATCH INTERPRETATION: NORMAL
DIFFERENTIAL METHOD: ABNORMAL
DISPENSE STATUS: NORMAL
EOSINOPHIL # BLD AUTO: 0 K/UL (ref 0–0.5)
EOSINOPHIL NFR BLD: 0.1 % (ref 0–8)
ERYTHROCYTE [DISTWIDTH] IN BLOOD BY AUTOMATED COUNT: 16.5 % (ref 11.5–14.5)
EST. GFR  (NO RACE VARIABLE): >60 ML/MIN/1.73 M^2
GLUCOSE SERPL-MCNC: 113 MG/DL (ref 70–110)
HCT VFR BLD AUTO: 24.7 % (ref 40–54)
HGB BLD-MCNC: 8.5 G/DL (ref 14–18)
IMM GRANULOCYTES # BLD AUTO: 0.1 K/UL (ref 0–0.04)
IMM GRANULOCYTES NFR BLD AUTO: 0.6 % (ref 0–0.5)
INR PPP: 1.5 (ref 0.8–1.2)
LYMPHOCYTES # BLD AUTO: 1 K/UL (ref 1–4.8)
LYMPHOCYTES NFR BLD: 5.8 % (ref 18–48)
MAGNESIUM SERPL-MCNC: 2.6 MG/DL (ref 1.6–2.6)
MCH RBC QN AUTO: 29.3 PG (ref 27–31)
MCHC RBC AUTO-ENTMCNC: 34.4 G/DL (ref 32–36)
MCV RBC AUTO: 85 FL (ref 82–98)
MONOCYTES # BLD AUTO: 1.1 K/UL (ref 0.3–1)
MONOCYTES NFR BLD: 5.9 % (ref 4–15)
NEUTROPHILS # BLD AUTO: 15.6 K/UL (ref 1.8–7.7)
NEUTROPHILS NFR BLD: 87.4 % (ref 38–73)
NRBC BLD-RTO: 0 /100 WBC
NUM UNITS TRANS PACKED RBC: NORMAL
PLATELET # BLD AUTO: 89 K/UL (ref 150–450)
PMV BLD AUTO: 10.2 FL (ref 9.2–12.9)
POCT GLUCOSE: 113 MG/DL (ref 70–110)
POCT GLUCOSE: 121 MG/DL (ref 70–110)
POTASSIUM SERPL-SCNC: 4.3 MMOL/L (ref 3.5–5.1)
PROT SERPL-MCNC: 5.3 G/DL (ref 6–8.4)
PROTHROMBIN TIME: 15.3 SEC (ref 9–12.5)
RBC # BLD AUTO: 2.9 M/UL (ref 4.6–6.2)
SODIUM SERPL-SCNC: 136 MMOL/L (ref 136–145)
WBC # BLD AUTO: 17.82 K/UL (ref 3.9–12.7)

## 2023-09-21 PROCEDURE — 51702 INSERT TEMP BLADDER CATH: CPT

## 2023-09-21 PROCEDURE — 25000003 PHARM REV CODE 250: Performed by: INTERNAL MEDICINE

## 2023-09-21 PROCEDURE — 63600175 PHARM REV CODE 636 W HCPCS: Mod: JG

## 2023-09-21 PROCEDURE — 85025 COMPLETE CBC W/AUTO DIFF WBC: CPT | Performed by: THORACIC SURGERY (CARDIOTHORACIC VASCULAR SURGERY)

## 2023-09-21 PROCEDURE — 27200667 HC PACEMAKER, TEMPORARY MONITORING, PER SHIFT

## 2023-09-21 PROCEDURE — 36573 INSJ PICC RS&I 5 YR+: CPT

## 2023-09-21 PROCEDURE — 25000003 PHARM REV CODE 250: Performed by: THORACIC SURGERY (CARDIOTHORACIC VASCULAR SURGERY)

## 2023-09-21 PROCEDURE — 99233 PR SUBSEQUENT HOSPITAL CARE,LEVL III: ICD-10-PCS | Mod: ,,, | Performed by: INTERNAL MEDICINE

## 2023-09-21 PROCEDURE — 94640 AIRWAY INHALATION TREATMENT: CPT

## 2023-09-21 PROCEDURE — 85610 PROTHROMBIN TIME: CPT | Performed by: INTERNAL MEDICINE

## 2023-09-21 PROCEDURE — 27000221 HC OXYGEN, UP TO 24 HOURS

## 2023-09-21 PROCEDURE — P9045 ALBUMIN (HUMAN), 5%, 250 ML: HCPCS | Mod: JG

## 2023-09-21 PROCEDURE — 80053 COMPREHEN METABOLIC PANEL: CPT | Performed by: THORACIC SURGERY (CARDIOTHORACIC VASCULAR SURGERY)

## 2023-09-21 PROCEDURE — 83735 ASSAY OF MAGNESIUM: CPT | Performed by: INTERNAL MEDICINE

## 2023-09-21 PROCEDURE — C1751 CATH, INF, PER/CENT/MIDLINE: HCPCS

## 2023-09-21 PROCEDURE — 94761 N-INVAS EAR/PLS OXIMETRY MLT: CPT

## 2023-09-21 PROCEDURE — 21400001 HC TELEMETRY ROOM

## 2023-09-21 PROCEDURE — 63600175 PHARM REV CODE 636 W HCPCS: Performed by: THORACIC SURGERY (CARDIOTHORACIC VASCULAR SURGERY)

## 2023-09-21 PROCEDURE — 99233 SBSQ HOSP IP/OBS HIGH 50: CPT | Mod: ,,, | Performed by: INTERNAL MEDICINE

## 2023-09-21 PROCEDURE — 25000003 PHARM REV CODE 250: Performed by: NURSE PRACTITIONER

## 2023-09-21 PROCEDURE — 25000242 PHARM REV CODE 250 ALT 637 W/ HCPCS: Performed by: THORACIC SURGERY (CARDIOTHORACIC VASCULAR SURGERY)

## 2023-09-21 RX ORDER — GUAIFENESIN 600 MG/1
1200 TABLET, EXTENDED RELEASE ORAL 2 TIMES DAILY
Status: DISCONTINUED | OUTPATIENT
Start: 2023-09-21 | End: 2023-10-03 | Stop reason: HOSPADM

## 2023-09-21 RX ORDER — MAG HYDROX/ALUMINUM HYD/SIMETH 200-200-20
30 SUSPENSION, ORAL (FINAL DOSE FORM) ORAL
Status: DISCONTINUED | OUTPATIENT
Start: 2023-09-21 | End: 2023-09-21

## 2023-09-21 RX ORDER — MAG HYDROX/ALUMINUM HYD/SIMETH 200-200-20
30 SUSPENSION, ORAL (FINAL DOSE FORM) ORAL EVERY 6 HOURS PRN
Status: DISCONTINUED | OUTPATIENT
Start: 2023-09-21 | End: 2023-10-03 | Stop reason: HOSPADM

## 2023-09-21 RX ORDER — TRAMADOL HYDROCHLORIDE 50 MG/1
50 TABLET ORAL EVERY 6 HOURS PRN
Status: DISCONTINUED | OUTPATIENT
Start: 2023-09-21 | End: 2023-10-03 | Stop reason: HOSPADM

## 2023-09-21 RX ADMIN — DOCUSATE SODIUM 100 MG: 100 CAPSULE, LIQUID FILLED ORAL at 09:09

## 2023-09-21 RX ADMIN — METOPROLOL TARTRATE 25 MG: 25 TABLET, FILM COATED ORAL at 08:09

## 2023-09-21 RX ADMIN — SODIUM CHLORIDE 30 MG/ML INHALATION SOLUTION 4 ML: 30 SOLUTION INHALANT at 07:09

## 2023-09-21 RX ADMIN — ACETAMINOPHEN 650 MG: 325 TABLET ORAL at 05:09

## 2023-09-21 RX ADMIN — OXYCODONE HYDROCHLORIDE AND ACETAMINOPHEN 500 MG: 500 TABLET ORAL at 08:09

## 2023-09-21 RX ADMIN — FENTANYL CITRATE 25 MCG: 50 INJECTION INTRAMUSCULAR; INTRAVENOUS at 06:09

## 2023-09-21 RX ADMIN — FUROSEMIDE 40 MG: 10 INJECTION, SOLUTION INTRAMUSCULAR; INTRAVENOUS at 09:09

## 2023-09-21 RX ADMIN — OXYCODONE HYDROCHLORIDE AND ACETAMINOPHEN 500 MG: 500 TABLET ORAL at 09:09

## 2023-09-21 RX ADMIN — ACETAMINOPHEN 650 MG: 325 TABLET ORAL at 02:09

## 2023-09-21 RX ADMIN — FENTANYL CITRATE 25 MCG: 50 INJECTION INTRAMUSCULAR; INTRAVENOUS at 05:09

## 2023-09-21 RX ADMIN — DOCUSATE SODIUM 100 MG: 100 CAPSULE, LIQUID FILLED ORAL at 08:09

## 2023-09-21 RX ADMIN — OXYCODONE HYDROCHLORIDE 10 MG: 5 TABLET ORAL at 04:09

## 2023-09-21 RX ADMIN — FOLIC ACID 1 MG: 1 TABLET ORAL at 09:09

## 2023-09-21 RX ADMIN — POLYETHYLENE GLYCOL 3350 17 G: 17 POWDER, FOR SOLUTION ORAL at 09:09

## 2023-09-21 RX ADMIN — TRAMADOL HYDROCHLORIDE 50 MG: 50 TABLET, COATED ORAL at 09:09

## 2023-09-21 RX ADMIN — WARFARIN SODIUM 2.5 MG: 2.5 TABLET ORAL at 04:09

## 2023-09-21 RX ADMIN — MAGNESIUM HYDROXIDE 400 MG: 400 SUSPENSION ORAL at 08:09

## 2023-09-21 RX ADMIN — GUAIFENESIN 1200 MG: 600 TABLET, EXTENDED RELEASE ORAL at 08:09

## 2023-09-21 RX ADMIN — TRAMADOL HYDROCHLORIDE 50 MG: 50 TABLET, COATED ORAL at 06:09

## 2023-09-21 RX ADMIN — ATORVASTATIN CALCIUM 80 MG: 40 TABLET, FILM COATED ORAL at 09:09

## 2023-09-21 RX ADMIN — FENTANYL CITRATE 25 MCG: 50 INJECTION INTRAMUSCULAR; INTRAVENOUS at 04:09

## 2023-09-21 RX ADMIN — PANTOPRAZOLE SODIUM 40 MG: 40 TABLET, DELAYED RELEASE ORAL at 09:09

## 2023-09-21 RX ADMIN — FENTANYL CITRATE 25 MCG: 50 INJECTION INTRAMUSCULAR; INTRAVENOUS at 03:09

## 2023-09-21 RX ADMIN — ONDANSETRON 4 MG: 2 INJECTION INTRAMUSCULAR; INTRAVENOUS at 09:09

## 2023-09-21 RX ADMIN — FERROUS SULFATE TAB 325 MG (65 MG ELEMENTAL FE) 1 EACH: 325 (65 FE) TAB at 09:09

## 2023-09-21 RX ADMIN — ASPIRIN 81 MG: 81 TABLET, COATED ORAL at 09:09

## 2023-09-21 RX ADMIN — ALUMINUM HYDROXIDE, MAGNESIUM HYDROXIDE, AND DIMETHICONE 30 ML: 200; 20; 200 SUSPENSION ORAL at 04:09

## 2023-09-21 RX ADMIN — FUROSEMIDE 40 MG: 10 INJECTION, SOLUTION INTRAMUSCULAR; INTRAVENOUS at 08:09

## 2023-09-21 RX ADMIN — MAGNESIUM HYDROXIDE 400 MG: 400 SUSPENSION ORAL at 09:09

## 2023-09-21 RX ADMIN — POTASSIUM CHLORIDE 20 MEQ: 1500 TABLET, EXTENDED RELEASE ORAL at 08:09

## 2023-09-21 RX ADMIN — CHLORHEXIDINE GLUCONATE 0.12% ORAL RINSE 10 ML: 1.2 LIQUID ORAL at 08:09

## 2023-09-21 RX ADMIN — POTASSIUM CHLORIDE 20 MEQ: 1500 TABLET, EXTENDED RELEASE ORAL at 09:09

## 2023-09-21 RX ADMIN — METOCLOPRAMIDE 5 MG: 5 INJECTION, SOLUTION INTRAMUSCULAR; INTRAVENOUS at 03:09

## 2023-09-21 RX ADMIN — CYANOCOBALAMIN TAB 1000 MCG 1000 MCG: 1000 TAB at 09:09

## 2023-09-21 RX ADMIN — METOPROLOL TARTRATE 25 MG: 25 TABLET, FILM COATED ORAL at 09:09

## 2023-09-21 NOTE — PLAN OF CARE
Spoke with mother at bedside in regards to SSI and medicare application. Advised that is not something case management does in the hospital. Advised Taylor REHMAN can email the SSI dept that handles patient assistance with disability applications.

## 2023-09-21 NOTE — ANESTHESIA POSTPROCEDURE EVALUATION
Anesthesia Post Evaluation    Patient: Mikie Alcazar    Procedure(s) Performed: Procedure(s) (LRB):  CORONARY ARTERY BYPASS GRAFT (CABG) (N/A)  REPLACEMENT-VALVE-AORTIC (N/A)  SURGICAL PROCUREMENT, VEIN, ENDOSCOPIC (Left)  BLOCK, NERVE, INTERCOSTAL, 2 OR MORE (N/A)  ECHOCARDIOGRAM,TRANSESOPHAGEAL (N/A)    Final Anesthesia Type: general      Patient location during evaluation: ICU  Patient participation: Yes- Able to Participate  Level of consciousness: awake  Post-procedure vital signs: reviewed and stable  Pain management: adequate  Airway patency: patent    PONV status at discharge: No PONV  Anesthetic complications: no      Cardiovascular status: hemodynamically stable  Respiratory status: unassisted  Hydration status: euvolemic  Follow-up not needed.          Vitals Value Taken Time   /69 09/21/23 1302   Temp 37 °C (98.6 °F) 09/21/23 0814   Pulse 89 09/21/23 1324   Resp 23 09/21/23 1324   SpO2 99 % 09/21/23 1324   Vitals shown include unvalidated device data.      No case tracking events are documented in the log.      Pain/Zia Score: Pain Rating Prior to Med Admin: 7 (9/21/2023  9:48 AM)  Pain Rating Post Med Admin: 3 (9/21/2023 10:29 AM)

## 2023-09-21 NOTE — PROCEDURES
"Mikie Alcazar is a 43 y.o. male patient.    Temp: 99 °F (37.2 °C) (09/21/23 0600)  Pulse: 98 (09/21/23 0752)  Resp: 18 (09/21/23 0752)  BP: 134/71 (09/21/23 0600)  SpO2: 95 % (09/21/23 0752)  Weight: 82.6 kg (182 lb 1.6 oz) (09/21/23 0600)  Height: 5' 8" (172.7 cm) (09/20/23 1500)    PICC  Date/Time: 9/21/2023 9:30 AM  Performed by: Michael Parish RN  Consent Done: Yes  Time out: Immediately prior to procedure a time out was called to verify the correct patient, procedure, equipment, support staff and site/side marked as required  Indications: med administration and vascular access  Anesthesia: local infiltration  Local anesthetic: lidocaine 1% without epinephrine  Anesthetic Total (mL): 2  Preparation: skin prepped with chlorhexidine (without alcohol)  Skin prep agent dried: skin prep agent completely dried prior to procedure  Sterile barriers: all five maximum sterile barriers used - cap, mask, sterile gown, sterile gloves, and large sterile sheet  Hand hygiene: hand hygiene performed prior to central venous catheter insertion  Location details: left basilic  Catheter type: double lumen  Catheter size: 4 Fr  Catheter Length: 39cm    Ultrasound guidance: yes  Vessel Caliber: large and patent, compressibility normal  Needle advanced into vessel with real time Ultrasound guidance.  Guidewire confirmed in vessel.  Sterile sheath used.  Number of attempts: 1  Post-procedure: blood return through all ports, chlorhexidine patch and sterile dressing applied  Estimated blood loss (mL): 0  Specimens: No  Implants: No  Comments: Awaiting xray for confirmation of placement           Michael Parish RN  9/21/2023    "

## 2023-09-21 NOTE — SUBJECTIVE & OBJECTIVE
Review of Systems   Constitutional: Positive for malaise/fatigue.   HENT: Negative.     Eyes: Negative.    Cardiovascular:  Positive for chest pain.   Respiratory: Negative.     Skin: Negative.    Musculoskeletal: Negative.    Gastrointestinal: Negative.    Genitourinary: Negative.    Neurological:  Positive for weakness.   Psychiatric/Behavioral: Negative.       Objective:     Vital Signs (Most Recent):  Temp: 98.6 °F (37 °C) (09/21/23 0800)  Pulse: 95 (09/21/23 0900)  Resp: 18 (09/21/23 0948)  BP: 129/65 (09/21/23 0900)  SpO2: (!) 93 % (09/21/23 0900) Vital Signs (24h Range):  Temp:  [97.7 °F (36.5 °C)-99.1 °F (37.3 °C)] 98.6 °F (37 °C)  Pulse:  [] 95  Resp:  [8-36] 18  SpO2:  [92 %-100 %] 93 %  BP: ()/(54-74) 129/65  Arterial Line BP: ()/(48-69) 125/60     Weight: 82.6 kg (182 lb 1.6 oz)  Body mass index is 27.69 kg/m².     SpO2: (!) 93 %         Intake/Output Summary (Last 24 hours) at 9/21/2023 1325  Last data filed at 9/21/2023 1029  Gross per 24 hour   Intake 1410.65 ml   Output 2585 ml   Net -1174.35 ml       Lines/Drains/Airways       Peripherally Inserted Central Catheter Line  Duration             PICC Double Lumen 09/21/23 0930 left basilic <1 day              Central Venous Catheter Line  Duration             Introducer 09/19/23 0748 Internal Jugular Right 2 days    Pulmonary Artery Catheter Assessment  09/19/23 0748 Internal Jugular Right 2 days              Drain  Duration                  Urethral Catheter 09/19/23 0730 Silicone;Temperature probe;Straight-tip 16 Fr. 2 days              Line  Duration                  Pacer Wires 09/19/23  2 days              Peripheral Intravenous Line  Duration                  Peripheral IV - Single Lumen 09/17/23 1825 18 G Anterior;Proximal;Right Forearm 3 days         Peripheral IV - Single Lumen 09/19/23 0745 18 G Right Hand 2 days                       Physical Exam  Vitals and nursing note reviewed.   Constitutional:       Appearance:  Normal appearance.   HENT:      Head: Normocephalic and atraumatic.   Eyes:      General:         Right eye: No discharge.         Left eye: No discharge.      Pupils: Pupils are equal, round, and reactive to light.   Cardiovascular:      Rate and Rhythm: Normal rate and regular rhythm.      Heart sounds: S1 normal and S2 normal. No murmur heard.     No friction rub.   Pulmonary:      Effort: Pulmonary effort is normal. No respiratory distress.      Breath sounds: Normal breath sounds. No rales.   Abdominal:      Palpations: Abdomen is soft.      Tenderness: There is no abdominal tenderness.   Musculoskeletal:      Cervical back: Neck supple.      Right lower leg: No edema.      Left lower leg: No edema.   Skin:     General: Skin is warm and dry.      Comments: Sternotomy site C/D/I   Neurological:      General: No focal deficit present.      Mental Status: He is alert and oriented to person, place, and time.   Psychiatric:         Mood and Affect: Mood normal.         Behavior: Behavior normal.         Thought Content: Thought content normal.            Significant Labs: BMP:   Recent Labs   Lab 09/20/23  0414 09/20/23  1652 09/21/23  0512   * 156* 113*    138 136   K 4.4 4.4 4.3    103 99   CO2 26 26 29   BUN 9 13 15   CREATININE 1.2 0.9 0.9   CALCIUM 7.7* 8.2* 8.7   MG 2.7* 2.3 2.6   , CMP   Recent Labs   Lab 09/20/23 0414 09/20/23  1652 09/21/23  0512    138 136   K 4.4 4.4 4.3    103 99   CO2 26 26 29   * 156* 113*   BUN 9 13 15   CREATININE 1.2 0.9 0.9   CALCIUM 7.7* 8.2* 8.7   PROT 4.5* 4.9* 5.3*   ALBUMIN 3.8 4.0 3.8   BILITOT 1.2* 1.1* 1.0   ALKPHOS 20* 28* 34*   AST 51* 47* 38   ALT 11 15 16   ANIONGAP 8 9 8   , CBC   Recent Labs   Lab 09/20/23  0414 09/20/23  0415 09/20/23  1418 09/21/23  0643   WBC 9.46  9.46  --  12.82* 17.82*   HGB 6.0*  6.0*  --  8.6* 8.5*   HCT 17.1*  17.1*   < > 24.1* 24.7*   PLT 74*  74*  --  84* 89*    < > = values in this interval not  "displayed.   , INR   Recent Labs   Lab 09/19/23  1813 09/20/23  0414 09/21/23  0512   INR 1.8* 1.3* 1.5*   , Lipid Panel No results for input(s): "CHOL", "HDL", "LDLCALC", "TRIG", "CHOLHDL" in the last 48 hours., Troponin No results for input(s): "TROPONINI" in the last 48 hours., and All pertinent lab results from the last 24 hours have been reviewed.    Significant Imaging: Cardiac Cath: reviewed, Echocardiogram: Transthoracic echo (TTE) complete (Cupid Only):   Results for orders placed or performed during the hospital encounter of 09/17/23   Echo   Result Value Ref Range    BSA 1.98 m2    LVOT stroke volume 82.76 cm3    LVIDd 5.22 3.5 - 6.0 cm    LV Systolic Volume 86.21 mL    LV Systolic Volume Index 44.2 mL/m2    LVIDs 4.37 (A) 2.1 - 4.0 cm    LV Diastolic Volume 130.82 mL    LV Diastolic Volume Index 67.09 mL/m2    IVS 0.88 0.6 - 1.1 cm    LVOT diameter 1.91 cm    LVOT area 2.9 cm2    FS 16 (A) 28 - 44 %    Left Ventricle Relative Wall Thickness 0.38 cm    Posterior Wall 0.98 0.6 - 1.1 cm    LV mass 177.53 g    LV Mass Index 91 g/m2    MV Peak E Bob 1.20 m/s    TDI LATERAL 0.06 m/s    TDI SEPTAL 0.08 m/s    E/E' ratio 17.14 m/s    MV Peak A Bob 1.14 m/s    TR Max Bob 2.33 m/s    E/A ratio 1.05     IVRT 72.31 msec    E wave deceleration time 200.37 msec    LV SEPTAL E/E' RATIO 15.00 m/s    LV LATERAL E/E' RATIO 20.00 m/s    LVOT peak bob 1.38 m/s    Left Ventricular Outflow Tract Mean Velocity 1.08 cm/s    Left Ventricular Outflow Tract Mean Gradient 4.99 mmHg    LA size 3.79 cm    Left Atrium Minor Axis 4.68 cm    Left Atrium Major Axis 4.27 cm    RVOT peak VTI 10.8 cm    TAPSE 1.84 cm    RA Major Axis 3.31 cm    AV regurgitation pressure 1/2 time [id number] ms    AR Max Bob 4.34 m/s    AV mean gradient 14 mmHg    AV peak gradient 26 mmHg    Ao peak bob 2.54 m/s    Ao VTI 54.00 cm    LVOT peak VTI 28.90 cm    AV valve area 1.53 cm²    AV Velocity Ratio 0.54     AV index (prosthetic) 0.54     SARAH by " Velocity Ratio 1.56 cm²    Mr max arnel 4.42 m/s    MV stenosis pressure 1/2 time 58.11 ms    MV valve area p 1/2 method 3.79 cm2    TV mean gradient 19 mmHg    Triscuspid Valve Regurgitation Peak Gradient 22 mmHg    PV mean gradient 1 mmHg    RVOT peak arnel 0.58 m/s    Ao root annulus 2.71 cm    STJ 2.59 cm    Ascending aorta 2.36 cm    IVC diameter 1.57 cm    Mean e' 0.07 m/s    ZLVIDS 1.94     ZLVIDD -0.63     LA Volume Index 22.9 mL/m2    LA volume 44.60 cm3    LA WIDTH 3.1 cm    RA Width 2.2 cm    TV resting pulmonary artery pressure 25 mmHg    RV TB RVSP 5 mmHg    Est. RA pres 3 mmHg    Narrative      Left Ventricle: The left ventricle is normal in size. Ventricular mass   is normal. Normal wall thickness. regional wall motion abnormalities   present. There is mildly reduced systolic function with a visually   estimated ejection fraction of 40 - 45%. Grade II diastolic dysfunction.    Right Ventricle: Normal right ventricular cavity size. Wall thickness   is normal. Right ventricle wall motion  is normal. Systolic function is   normal.    Aortic Valve: There is mild to moderate aortic regurgitation.    Pulmonary Artery: The estimated pulmonary artery systolic pressure is   25 mmHg.    IVC/SVC: Normal venous pressure at 3 mmHg.     , EKG: reviewed, Stress Test: reviewed, and X-Ray: CXR: X-Ray Chest 1 View (CXR):   Results for orders placed or performed during the hospital encounter of 09/17/23   X-Ray Chest 1 View    Narrative    EXAMINATION:  XR CHEST 1 VIEW    CLINICAL HISTORY:  PICC placement;    TECHNIQUE:  Single frontal view of the chest was performed.    COMPARISON:  09/21/2023    FINDINGS:  Left-sided PICC line tip overlies the SVC in good position.  In comparison to the prior study, there is no adverse interval changes      Impression    In comparison to the prior study, there is no adverse interval changes      Electronically signed by: Elliott España MD  Date:    09/21/2023  Time:    10:10

## 2023-09-21 NOTE — SUBJECTIVE & OBJECTIVE
Objective:     Vital Signs (Most Recent):  Temp: 98 °F (36.7 °C) (09/21/23 1500)  Pulse: 89 (09/21/23 1500)  Resp: (!) 39 (09/21/23 1500)  BP: 115/65 (09/21/23 1500)  SpO2: 100 % (09/21/23 1300) Vital Signs (24h Range):  Temp:  [97.7 °F (36.5 °C)-99 °F (37.2 °C)] 98 °F (36.7 °C)  Pulse:  [] 89  Resp:  [8-39] 39  SpO2:  [92 %-100 %] 100 %  BP: ()/(58-74) 115/65  Arterial Line BP: ()/(50-69) 125/60     Weight: 82.6 kg (182 lb 1.6 oz)  Body mass index is 27.69 kg/m².      Intake/Output Summary (Last 24 hours) at 9/21/2023 1631  Last data filed at 9/21/2023 1449  Gross per 24 hour   Intake 1368.09 ml   Output 3070 ml   Net -1701.91 ml        Physical Exam  Vitals reviewed.   HENT:      Head: Normocephalic and atraumatic.      Mouth/Throat:      Mouth: Mucous membranes are moist.      Pharynx: Oropharynx is clear.   Eyes:      Extraocular Movements: Extraocular movements intact.      Conjunctiva/sclera: Conjunctivae normal.   Cardiovascular:      Rate and Rhythm: Normal rate and regular rhythm.      Pulses: Normal pulses.      Comments: Mechanical valve. Mid sternal incision with vac in place   Pulmonary:      Effort: Pulmonary effort is normal.   Abdominal:      General: Bowel sounds are normal.      Palpations: Abdomen is soft.   Musculoskeletal:         General: No deformity.      Cervical back: Neck supple.      Right lower leg: No edema.      Left lower leg: No edema.      Comments: Left leg dressings in place.    Neurological:      General: No focal deficit present.      Mental Status: He is alert and oriented to person, place, and time.   Psychiatric:         Mood and Affect: Mood normal.         Thought Content: Thought content normal.           Review of Systems   Constitutional:  Negative for chills and fever.   HENT:  Negative for congestion and sore throat.    Eyes:  Negative for photophobia and visual disturbance.   Respiratory:  Positive for cough. Negative for shortness of breath.     Cardiovascular:  Negative for chest pain and leg swelling.        Surgical site pain- midsternal   Gastrointestinal:  Negative for nausea and vomiting.   Endocrine: Negative for polyphagia and polyuria.   Genitourinary:  Negative for difficulty urinating and dysuria.   Musculoskeletal:  Negative for arthralgias and back pain.   Neurological:  Negative for dizziness and headaches.   Psychiatric/Behavioral:  Negative for sleep disturbance. The patient is not nervous/anxious.        Lines/Drains/Airways       Peripherally Inserted Central Catheter Line  Duration             PICC Double Lumen 09/21/23 0930 left basilic <1 day              Line  Duration                  Pacer Wires 09/19/23  2 days              Peripheral Intravenous Line  Duration                  Peripheral IV - Single Lumen 09/17/23 1825 18 G Anterior;Proximal;Right Forearm 3 days         Peripheral IV - Single Lumen 09/19/23 0745 18 G Right Hand 2 days                    Significant Labs:    CBC/Anemia Profile:  Recent Labs   Lab 09/20/23  0414 09/20/23  0415 09/20/23  1418 09/21/23  0643   WBC 9.46  9.46  --  12.82* 17.82*   HGB 6.0*  6.0*  --  8.6* 8.5*   HCT 17.1*  17.1* 16* 24.1* 24.7*   PLT 74*  74*  --  84* 89*   MCV 82  82  --  83 85   RDW 17.2*  17.2*  --  15.9* 16.5*        Chemistries:  Recent Labs   Lab 09/20/23  0414 09/20/23  1652 09/21/23  0512    138 136   K 4.4 4.4 4.3    103 99   CO2 26 26 29   BUN 9 13 15   CREATININE 1.2 0.9 0.9   CALCIUM 7.7* 8.2* 8.7   ALBUMIN 3.8 4.0 3.8   PROT 4.5* 4.9* 5.3*   BILITOT 1.2* 1.1* 1.0   ALKPHOS 20* 28* 34*   ALT 11 15 16   AST 51* 47* 38   MG 2.7* 2.3 2.6       All pertinent labs within the past 24 hours have been reviewed.    Significant Imaging:  I have reviewed all pertinent imaging results/findings within the past 24 hours.

## 2023-09-21 NOTE — PROGRESS NOTES
Clinical Pharmacy: Coumadin Progress Note    Coumadin consult day # 2  Indication: mechanical AVR (9/19)  New start  Goal INR: 2.0-3.0 (no additional risk factors like Afib)   Today's INR: 1.5 (up from 1.3)   H&H stable  PLT = 89, trending up  Dr. Douglas wants to continue warfarin 2.5 mg daily   Daily INR ordered  Added Coumadin restriction to diet order  Pt has hx of CAD & NSTEMI s/p CABG x 5 vessels 9/19 --> will continue low dose aspirin but stop clopidogrel.  No major DDIs     Pharmacy will monitor daily INR & make dosage adjustments as needed.     Thank you for allowing us to participate in this patient's care.   Katherine McArdle, Pharm.D. 9/21/2023 1:25 PM

## 2023-09-21 NOTE — ASSESSMENT & PLAN NOTE
[facility] 9/18 with multi-vessel disease including critical left main disease   CTS consulted   Taken urgently for CABG x5 and AVR 9/19   See problem: CAD

## 2023-09-21 NOTE — SUBJECTIVE & OBJECTIVE
Interval History: The patient is postop day 2 status post coronary artery bypass grafting x5 and aortic valve replacement with a 19 mm Saint Dany mechanical valve.    ROS  Medications:  Continuous Infusions:   dexmedeTOMIDine (Precedex) infusion (titrating) Stopped (09/20/23 1000)    EPINEPHrine Stopped (09/20/23 1953)    niCARdipine      vasopressin Stopped (09/20/23 2150)     Scheduled Meds:   acetaminophen  650 mg Oral Q6H    ascorbic acid (vitamin C)  500 mg Oral BID    aspirin  81 mg Oral Daily    atorvastatin  80 mg Oral Daily    chlorhexidine  10 mL Mouth/Throat BID    cyanocobalamin  1,000 mcg Oral Daily    docusate sodium  100 mg Oral BID    ferrous sulfate  1 tablet Oral Daily    folic acid  1 mg Oral Daily    furosemide (LASIX) injection  40 mg Intravenous BID    magnesium hydroxide 400 mg/5 ml  5 mL Oral BID    metoprolol tartrate  25 mg Oral BID    pantoprazole  40 mg Oral Daily    polyethylene glycol  17 g Oral Daily    potassium chloride  20 mEq Oral Q12H    sodium chloride 3%  4 mL Nebulization Q12H    warfarin  2.5 mg Oral Daily     PRN Meds:0.9%  NaCl infusion (for blood administration), albumin human 5%, calcium gluconate IVPB, calcium gluconate IVPB, calcium gluconate IVPB, dextrose 10%, fentaNYL, fentaNYL, glucagon (human recombinant), influenza, insulin aspart U-100, lactated ringers, magnesium sulfate IVPB, melatonin, metoclopramide HCl, ondansetron, oxyCODONE, oxyCODONE, pneumoc 20-leslee conj-dip cr(PF), potassium chloride in water, potassium chloride in water, potassium chloride in water     Objective:     Vital Signs (Most Recent):  Temp: 99 °F (37.2 °C) (09/21/23 0600)  Pulse: 98 (09/21/23 0752)  Resp: 18 (09/21/23 0752)  BP: 134/71 (09/21/23 0600)  SpO2: 95 % (09/21/23 0752) Vital Signs (24h Range):  Temp:  [97.7 °F (36.5 °C)-101.3 °F (38.5 °C)] 99 °F (37.2 °C)  Pulse:  [] 98  Resp:  [8-36] 18  SpO2:  [92 %-100 %] 95 %  BP: ()/(52-71) 134/71  Arterial Line BP: ()/(43-69)  125/62     Weight: 82.6 kg (182 lb 1.6 oz)  Body mass index is 27.69 kg/m².    SpO2: 95 %       Intake/Output - Last 3 Shifts         09/19 0700 09/20 0659 09/20 0700 09/21 0659 09/21 0700 09/22 0659    P.O.  1100     I.V. (mL/kg) 3044.7 (35.4) 608.4 (7.4)     Blood 1340 275     IV Piggyback 4550.1 190.8     Total Intake(mL/kg) 8934.8 (104) 2174.3 (26.3)     Urine (mL/kg/hr) 5235 (2.5) 3425 (1.7)     Drains 15 423     Other 0      Chest Tube 1630 620     Total Output 6880 4468     Net +2054.8 -2293.8                    Lines/Drains/Airways       Central Venous Catheter Line  Duration             Introducer 09/19/23 0748 Internal Jugular Right 2 days    Pulmonary Artery Catheter Assessment  09/19/23 0748 Internal Jugular Right 2 days              Drain  Duration                  Urethral Catheter 09/19/23 0730 Silicone;Temperature probe;Straight-tip 16 Fr. 2 days         Chest Tube 09/19/23 1618 Tube - 3 Left Pleural 1 day         Y Chest Tube 1 and 2 09/19/23 1617 1 Right Mediastinal 2 Left Mediastinal 24 Fr. 1 day              Arterial Line  Duration             Arterial Line 09/19/23 1619 Right Femoral 1 day              Line  Duration                  Pacer Wires 09/19/23  2 days              Peripheral Intravenous Line  Duration                  Peripheral IV - Single Lumen 09/17/23 1825 18 G Anterior;Proximal;Right Forearm 3 days         Peripheral IV - Single Lumen 09/19/23 0745 18 G Right Hand 2 days                     Physical Exam  Constitutional:       Appearance: Normal appearance.   HENT:      Head: Normocephalic and atraumatic.      Nose: Nose normal.   Cardiovascular:      Rate and Rhythm: Normal rate and regular rhythm.   Pulmonary:      Effort: Pulmonary effort is normal.      Breath sounds: Normal breath sounds.   Abdominal:      General: Abdomen is flat.      Palpations: Abdomen is soft.   Musculoskeletal:      Right lower leg: No edema.      Left lower leg: No edema.   Skin:     General: Skin  is warm and dry.   Neurological:      General: No focal deficit present.      Mental Status: He is alert and oriented to person, place, and time.   Psychiatric:         Behavior: Behavior normal.            Significant Labs:  All pertinent labs from the last 24 hours have been reviewed.    Significant Diagnostics:  I have reviewed all pertinent imaging results/findings within the past 24 hours.

## 2023-09-21 NOTE — PT/OT/SLP PROGRESS
Occupational Therapy      Patient Name:  Mikie Alcazar   MRN:  [id number]    OT eval orders received. Chart review completed. Presented to room from [id number]. Obtained PLOF. After interview, PICC nurse presented to room and evaluation ceased. Will return for completion later this AM.    PLOF/HISTORY: PT LIVES WITH MOTHER IN 1 STORY HOUSE NO STEPS TO ENTER, AMB INDEP COMMUNITY DISTANCES, DRIVES, DOES NOT WORK, INDEP WITH ADL'S.  NO DME USE AT HOME PTA    Kelsi Schulz OT  9/21/2023

## 2023-09-21 NOTE — PROGRESS NOTES
[facility] ([facility])  Critical Care Medicine  Progress Note    Patient Name: Mikie Alcazar  MRN: [id number]  Admission Date: 9/17/2023  Hospital Length of Stay: 4 days  Code Status: Full Code  Attending Provider: Nishant Douglas MD  Primary Care Provider: Lissette, Primary Doctor   Principal Problem: NSTEMI (non-ST elevated myocardial infarction)    Subjective:     HPI:  43 year old male with known medical issues of HTN and everyday tobacco and marijuana smoking  Presented to ED on 9/17 with CP, initially reported started while mowing grass but later endorsed intermittent CP x 6m  Troponin 0.6  Admitted for NSTEMI with heparin infusion  Troponin remained flat (max 0.70)  9/18 seen by cardiology and taken for [facility] revealing severe AI, multivessel CAD, 50% EF, grade II diastolic dysfunction  CTS consulted to evaluate for CABG  Transferred post cath to ICU on tridil infusion    Critical Care team consulted to assume medical management while in ICU      Hospital/ICU Course:  9/19: to OR today for multi-vessel CABG and AVR. Remained intubated post-op. On Epi 0.08 and Vasopressin 0.08 for hemodynamic support, precedex for sedation.  9/20: decent amount of bloody output from Cts overnight, slowing this morning- receiving products this morning; insulin gtt just turned off; likely to be extubated soon  9/21: extubated yesterday morning; CT x 3 removed this morning by Dr. Douglas. Patient is on room air- has productive cough of moderately thick, tan sputum. Some surgical site pain, using pillow. Hemodynamics acceptable, off pressors since yesterday. Stable for floor        Objective:     Vital Signs (Most Recent):  Temp: 98 °F (36.7 °C) (09/21/23 1500)  Pulse: 89 (09/21/23 1500)  Resp: (!) 39 (09/21/23 1500)  BP: 115/65 (09/21/23 1500)  SpO2: 100 % (09/21/23 1300) Vital Signs (24h Range):  Temp:  [97.7 °F (36.5 °C)-99 °F (37.2 °C)] 98 °F (36.7 °C)  Pulse:  [] 89  Resp:  [8-39] 39  SpO2:  [92 %-100 %] 100 %  BP:  ()/(58-74) 115/65  Arterial Line BP: ()/(50-69) 125/60     Weight: 82.6 kg (182 lb 1.6 oz)  Body mass index is 27.69 kg/m².      Intake/Output Summary (Last 24 hours) at 9/21/2023 1631  Last data filed at 9/21/2023 1449  Gross per 24 hour   Intake 1368.09 ml   Output 3070 ml   Net -1701.91 ml        Physical Exam  Vitals reviewed.   HENT:      Head: Normocephalic and atraumatic.      Mouth/Throat:      Mouth: Mucous membranes are moist.      Pharynx: Oropharynx is clear.   Eyes:      Extraocular Movements: Extraocular movements intact.      Conjunctiva/sclera: Conjunctivae normal.   Cardiovascular:      Rate and Rhythm: Normal rate and regular rhythm.      Pulses: Normal pulses.      Comments: Mechanical valve. Mid sternal incision with vac in place   Pulmonary:      Effort: Pulmonary effort is normal.   Abdominal:      General: Bowel sounds are normal.      Palpations: Abdomen is soft.   Musculoskeletal:         General: No deformity.      Cervical back: Neck supple.      Right lower leg: No edema.      Left lower leg: No edema.      Comments: Left leg dressings in place.    Neurological:      General: No focal deficit present.      Mental Status: He is alert and oriented to person, place, and time.   Psychiatric:         Mood and Affect: Mood normal.         Thought Content: Thought content normal.           Review of Systems   Constitutional:  Negative for chills and fever.   HENT:  Negative for congestion and sore throat.    Eyes:  Negative for photophobia and visual disturbance.   Respiratory:  Positive for cough. Negative for shortness of breath.    Cardiovascular:  Negative for chest pain and leg swelling.        Surgical site pain- midsternal   Gastrointestinal:  Negative for nausea and vomiting.   Endocrine: Negative for polyphagia and polyuria.   Genitourinary:  Negative for difficulty urinating and dysuria.   Musculoskeletal:  Negative for arthralgias and back pain.   Neurological:  Negative  for dizziness and headaches.   Psychiatric/Behavioral:  Negative for sleep disturbance. The patient is not nervous/anxious.        Lines/Drains/Airways       Peripherally Inserted Central Catheter Line  Duration             PICC Double Lumen 09/21/23 0930 left basilic <1 day              Line  Duration                  Pacer Wires 09/19/23  2 days              Peripheral Intravenous Line  Duration                  Peripheral IV - Single Lumen 09/17/23 1825 18 G Anterior;Proximal;Right Forearm 3 days         Peripheral IV - Single Lumen 09/19/23 0745 18 G Right Hand 2 days                    Significant Labs:    CBC/Anemia Profile:  Recent Labs   Lab 09/20/23  0414 09/20/23  0415 09/20/23  1418 09/21/23  0643   WBC 9.46  9.46  --  12.82* 17.82*   HGB 6.0*  6.0*  --  8.6* 8.5*   HCT 17.1*  17.1* 16* 24.1* 24.7*   PLT 74*  74*  --  84* 89*   MCV 82  82  --  83 85   RDW 17.2*  17.2*  --  15.9* 16.5*        Chemistries:  Recent Labs   Lab 09/20/23  0414 09/20/23  1652 09/21/23  0512    138 136   K 4.4 4.4 4.3    103 99   CO2 26 26 29   BUN 9 13 15   CREATININE 1.2 0.9 0.9   CALCIUM 7.7* 8.2* 8.7   ALBUMIN 3.8 4.0 3.8   PROT 4.5* 4.9* 5.3*   BILITOT 1.2* 1.1* 1.0   ALKPHOS 20* 28* 34*   ALT 11 15 16   AST 51* 47* 38   MG 2.7* 2.3 2.6       All pertinent labs within the past 24 hours have been reviewed.    Significant Imaging:  I have reviewed all pertinent imaging results/findings within the past 24 hours.      ABG  Recent Labs   Lab 09/20/23 0415   PH 7.423   PO2 133*   PCO2 41.1   HCO3 26.8   BE 2     Assessment/Plan:     Cardiac/Vascular  * NSTEMI (non-ST elevated myocardial infarction)  [facility] 9/18 with multi-vessel disease including critical left main disease   CTS consulted   Taken urgently for CABG x5 and AVR 9/19   See problem: CAD     Nonrheumatic aortic valve insufficiency  S/p AVR 9/19  coumadin  Post op plan per CTS    CAD, multiple vessel  [facility] 9/18 with multi-vessel disease including left  main  S/p CABG x 5 and AVR with Dr. Douglas 9/19  Pain control   ASA, plavix, statin, BB  Serial labs- replace electrolytes and give blood products as indicated   Monitor fluid volume status closely   Glucose control- goal 140-180  Plan per CTS     Hypertension  Holding anti-hypertensives post-op  Resume as appropriate     Other  Tobacco smoker, 1 pack of cigarettes or less per day  Patient reports he will not continue to smoke   Encouraged ongoing cessation      DVT prophylaxis: coumadin  GI prophylaxis: not indicated   Code status: Full   Disposition: transferring out of ICU. Dr. Douglas accepts primary care of patient. Critical care team will sign off.        Pritesh Harrison NP  Critical Care Medicine  [facility] ([facility])

## 2023-09-21 NOTE — PT/OT/SLP PROGRESS
Physical Therapy      Patient Name:  Mikie Alcazar   MRN:  [id number]    RE-ATTEMPTED COMPLETION OF P.T. FAYE, NURSE PING REQUESTS HOLD FOR NOW DUE TO PT WITH LOW BP, DIAPHORETIC, WILL CONTINUE EFFORTS    PLOF/HISTORY: PT LIVES WITH MOTHER IN 1 STORY HOUSE NO STEPS TO ENTER, AMB INDEP COMMUNITY DISTANCES, DRIVES, DOES NOT WORK, INDEP WITH ADL'S.  NO DME USE AT HOME PTA    Vera Ann, PT  9/21/2023  1100

## 2023-09-21 NOTE — PROGRESS NOTES
[facility] ([facility])  Cardiothoracic Surgery  Progress Note    Patient Name: Mikie Alcazar  MRN: [id number]  Admission Date: 9/17/2023  Hospital Length of Stay: 4 days  Code Status: Full Code   Attending Physician: Elliott Wright MD   Referring Provider: Self, Aaareferral  Principal Problem:NSTEMI (non-ST elevated myocardial infarction)            Subjective:     Post-Op Info:  Procedure(s) (LRB):  CORONARY ARTERY BYPASS GRAFT (CABG) (N/A)  REPLACEMENT-VALVE-AORTIC (N/A)  SURGICAL PROCUREMENT, VEIN, ENDOSCOPIC (Left)  BLOCK, NERVE, INTERCOSTAL, 2 OR MORE (N/A)  ECHOCARDIOGRAM,TRANSESOPHAGEAL (N/A)   2 Days Post-Op     Interval History: The patient is postop day 2 status post coronary artery bypass grafting x5 and aortic valve replacement with a 19 mm Saint Dany mechanical valve.    ROS  Medications:  Continuous Infusions:   dexmedeTOMIDine (Precedex) infusion (titrating) Stopped (09/20/23 1000)    EPINEPHrine Stopped (09/20/23 1953)    niCARdipine      vasopressin Stopped (09/20/23 2150)     Scheduled Meds:   acetaminophen  650 mg Oral Q6H    ascorbic acid (vitamin C)  500 mg Oral BID    aspirin  81 mg Oral Daily    atorvastatin  80 mg Oral Daily    chlorhexidine  10 mL Mouth/Throat BID    cyanocobalamin  1,000 mcg Oral Daily    docusate sodium  100 mg Oral BID    ferrous sulfate  1 tablet Oral Daily    folic acid  1 mg Oral Daily    furosemide (LASIX) injection  40 mg Intravenous BID    magnesium hydroxide 400 mg/5 ml  5 mL Oral BID    metoprolol tartrate  25 mg Oral BID    pantoprazole  40 mg Oral Daily    polyethylene glycol  17 g Oral Daily    potassium chloride  20 mEq Oral Q12H    sodium chloride 3%  4 mL Nebulization Q12H    warfarin  2.5 mg Oral Daily     PRN Meds:0.9%  NaCl infusion (for blood administration), albumin human 5%, calcium gluconate IVPB, calcium gluconate IVPB, calcium gluconate IVPB, dextrose 10%, fentaNYL, fentaNYL, glucagon (human recombinant),  influenza, insulin aspart U-100, lactated ringers, magnesium sulfate IVPB, melatonin, metoclopramide HCl, ondansetron, oxyCODONE, oxyCODONE, pneumoc 20-leslee conj-dip cr(PF), potassium chloride in water, potassium chloride in water, potassium chloride in water     Objective:     Vital Signs (Most Recent):  Temp: 99 °F (37.2 °C) (09/21/23 0600)  Pulse: 98 (09/21/23 0752)  Resp: 18 (09/21/23 0752)  BP: 134/71 (09/21/23 0600)  SpO2: 95 % (09/21/23 0752) Vital Signs (24h Range):  Temp:  [97.7 °F (36.5 °C)-101.3 °F (38.5 °C)] 99 °F (37.2 °C)  Pulse:  [] 98  Resp:  [8-36] 18  SpO2:  [92 %-100 %] 95 %  BP: ()/(52-71) 134/71  Arterial Line BP: ()/(43-69) 125/62     Weight: 82.6 kg (182 lb 1.6 oz)  Body mass index is 27.69 kg/m².    SpO2: 95 %       Intake/Output - Last 3 Shifts         09/19 0700 09/20 0659 09/20 0700 09/21 0659 09/21 0700 09/22 0659    P.O.  1100     I.V. (mL/kg) 3044.7 (35.4) 608.4 (7.4)     Blood 1340 275     IV Piggyback 4550.1 190.8     Total Intake(mL/kg) 8934.8 (104) 2174.3 (26.3)     Urine (mL/kg/hr) 5235 (2.5) 3425 (1.7)     Drains 15 423     Other 0      Chest Tube 1630 620     Total Output 6880 4468     Net +2054.8 -2293.8                    Lines/Drains/Airways       Central Venous Catheter Line  Duration             Introducer 09/19/23 0748 Internal Jugular Right 2 days    Pulmonary Artery Catheter Assessment  09/19/23 0748 Internal Jugular Right 2 days              Drain  Duration                  Urethral Catheter 09/19/23 0730 Silicone;Temperature probe;Straight-tip 16 Fr. 2 days         Chest Tube 09/19/23 1618 Tube - 3 Left Pleural 1 day         Y Chest Tube 1 and 2 09/19/23 1617 1 Right Mediastinal 2 Left Mediastinal 24 Fr. 1 day              Arterial Line  Duration             Arterial Line 09/19/23 1619 Right Femoral 1 day              Line  Duration                  Pacer Wires 09/19/23  2 days              Peripheral Intravenous Line  Duration                   Peripheral IV - Single Lumen 09/17/23 1825 18 G Anterior;Proximal;Right Forearm 3 days         Peripheral IV - Single Lumen 09/19/23 0745 18 G Right Hand 2 days                     Physical Exam  Constitutional:       Appearance: Normal appearance.   HENT:      Head: Normocephalic and atraumatic.      Nose: Nose normal.   Cardiovascular:      Rate and Rhythm: Normal rate and regular rhythm.   Pulmonary:      Effort: Pulmonary effort is normal.      Breath sounds: Normal breath sounds.   Abdominal:      General: Abdomen is flat.      Palpations: Abdomen is soft.   Musculoskeletal:      Right lower leg: No edema.      Left lower leg: No edema.   Skin:     General: Skin is warm and dry.   Neurological:      General: No focal deficit present.      Mental Status: He is alert and oriented to person, place, and time.   Psychiatric:         Behavior: Behavior normal.            Significant Labs:  All pertinent labs from the last 24 hours have been reviewed.    Significant Diagnostics:  I have reviewed all pertinent imaging results/findings within the past 24 hours.    Assessment/Plan:     S/P CABG x 5  09/20/2023  The patient is postop day 1 status post coronary artery bypass grafting x5 and aortic valve replacement with a 19 mm Saint Dany mechanical valve.  Overall the patient is doing well.      Neuro:  Patient is awake appropriate and follows commands.  Patient is currently sedated with Precedex.  Will wean Precedex to off.   Cardiac:  Patient is hemodynamically stable.  With excellent cardiac output and cardiac index.  Patient is on low-dose vaso active meds.  Wean drips to off.  Will start metoprolol once drips are off.  Respiratory:  Patient is being weaned to extubation.  Patient is an active smoker Start nebs and Mucomyst.  Continue pulmonary toileting.  Continue incentive spirometer.    GI:  Patient is currently NPO.  Will start p.o. intake once extubated.    Renal:  Patient has good urine output and creatinine is  1.2.  Will start Lasix for diuresis.  Endocrine:  Patient required insulin drip for glucose controlled.  Will convert to sliding scale.    Id:  Patient had a T-max of 102°.  Most likely due to stress of surgery.  Will continue to follow.  White count is 9.  Patient is on manny op antibiotics.  Heme: Hematocrit is 17.  Patient is being transfused.  Platelet count is 74.  No evidence of active bleeding.  Will follow platelet count.  Will start patient on Coumadin anticoagulation for mechanical valve.  Activities:  Patient is currently in bed.  Will advance activities as tolerated once extubated.  Line tubes and drains:  Patient has an ETT, right IJ [place] and Cordis, right groin A-line, chest tubes, Ugalde catheter, pacer wires, and saphenectomy site DARON.    09/21/2023     The patient is postop day 2 status post coronary artery bypass grafting x5 and aortic valve replacement with a 19 mm Saint Dany mechanical valve.  Overall the patient is doing well.    Neuro:  Patient is awake alert and oriented x3.  Pain is well controlled with current pain management.  Cardiac:  The patient is off all drips.  Patient is hemodynamically stable.  Continue metoprolol.  Respiratory:  Patient was extubated yesterday.  Continue pulmonary toileting.  Continue incentive spirometer.   GI:  Patient is tolerating p.o. intake.  Patient had episodes of nausea yesterday which have abated.  Advance patient's diet as tolerated.    Renal:  Patient has good urine output.  Creatinine is 0.9.  Continue Lasix for diuresis.  Endocrine:  Glucose is controlled with sliding scale insulin.    Id:  T-max is 101.8 patient is currently afebrile.  White count is 17.  Most likely due to stress of surgery.  Continue to follow.  Heme:  Hematocrit is 24.5.  Platelet count is 89.  INR is 1.5.  Patient is started on Coumadin for anticoagulation of mechanical valve.  Activities:  Patient will be out of bed to chair.  Advance activities as tolerated.  Line tubes and  drains:  Patient has pacer wires, right IJ Cordis and Ugalde catheter.  Will place PICC line and discontinue Cordis.      CAD, multiple vessel  The patient is a 43-year-old male with critical left main coronary artery disease and severe aortic regurgitation by cardiac catheterization.  The patient is a candidate for urgent coronary artery bypass grafting and aortic valve replacement.  The risks and benefits of surgery were explained to the patient.  The patient understands the risks and benefits of surgery and has agreed to proceed with urgent aortic valve replacement and coronary artery bypass grafting.  The type of aortic valve to be used was discussed with the patient.  The patient stated a preference for a bioprosthetic valve in order to avoid the need for chronic anticoagulation.  The patient understands that a bioprosthetic valve especially in a young patient has a limited lifespan.        Nishant Douglas MD  Cardiothoracic Surgery  [facility] - Intensive Care ([facility])

## 2023-09-21 NOTE — PROGRESS NOTES
[facility] ([facility])  Cardiology  Progress Note    Patient Name: Mikie Alcazar  MRN: [id number]  Admission Date: 9/17/2023  Hospital Length of Stay: 4 days  Code Status: Full Code   Attending Physician: Elliott Wright MD   Primary Care Physician: Lissette, Primary Doctor  Expected Discharge Date:   Principal Problem:NSTEMI (non-ST elevated myocardial infarction)    Subjective:     Hospital Course:   Cardiology consulted to assist with management. Pt seen and examined today, resting in bed mother at bedside. He reports his pain started 6+ months ago and worsening in the last few weeks happening daily with associated SOB, dizziness radiating to left arm. He reports his pain is sharp and pressure-like at times. Pt reports daily use a marijuana, h/o cocaine and other drugs 5-10 years ago. Drinks occasionally. Echo pending cont hep gtt    09/19/2023. Admitted for NSTEMI/ACS. Echo showed RWMA  The cath done yesterday showed   Severe lmca disease with timi1 flow in lad   Lcx ostial 50%   Rca prox 80%   Ef 50%   Severe AI  Today s/p CABG x5 and mechanical; AVR by Dr. Douglas.  In ICU and intubated. Om epi and Vasopressin gtt.    9/20/23  Pt seen and examined today, POD 1 CABGx5 pt still intubated on exam this am, weaning epi. Labs reviewed, H/H 6.0 and 17.1. Plans to extubated today    9/21/23 Pt seen and examined today extubated feels ok chest soreness, chest tubes removed. Labs reviewed, chart reviewed.          Review of Systems   Constitutional: Positive for malaise/fatigue.   HENT: Negative.     Eyes: Negative.    Cardiovascular:  Positive for chest pain.   Respiratory: Negative.     Skin: Negative.    Musculoskeletal: Negative.    Gastrointestinal: Negative.    Genitourinary: Negative.    Neurological:  Positive for weakness.   Psychiatric/Behavioral: Negative.       Objective:     Vital Signs (Most Recent):  Temp: 98.6 °F (37 °C) (09/21/23 0800)  Pulse: 95 (09/21/23 0900)  Resp: 18 (09/21/23 0948)  BP:  129/65 (09/21/23 0900)  SpO2: (!) 93 % (09/21/23 0900) Vital Signs (24h Range):  Temp:  [97.7 °F (36.5 °C)-99.1 °F (37.3 °C)] 98.6 °F (37 °C)  Pulse:  [] 95  Resp:  [8-36] 18  SpO2:  [92 %-100 %] 93 %  BP: ()/(54-74) 129/65  Arterial Line BP: ()/(48-69) 125/60     Weight: 82.6 kg (182 lb 1.6 oz)  Body mass index is 27.69 kg/m².     SpO2: (!) 93 %         Intake/Output Summary (Last 24 hours) at 9/21/2023 1325  Last data filed at 9/21/2023 1029  Gross per 24 hour   Intake 1410.65 ml   Output 2585 ml   Net -1174.35 ml       Lines/Drains/Airways       Peripherally Inserted Central Catheter Line  Duration             PICC Double Lumen 09/21/23 0930 left basilic <1 day              Central Venous Catheter Line  Duration             Introducer 09/19/23 0748 Internal Jugular Right 2 days    Pulmonary Artery Catheter Assessment  09/19/23 0748 Internal Jugular Right 2 days              Drain  Duration                  Urethral Catheter 09/19/23 0730 Silicone;Temperature probe;Straight-tip 16 Fr. 2 days              Line  Duration                  Pacer Wires 09/19/23  2 days              Peripheral Intravenous Line  Duration                  Peripheral IV - Single Lumen 09/17/23 1825 18 G Anterior;Proximal;Right Forearm 3 days         Peripheral IV - Single Lumen 09/19/23 0745 18 G Right Hand 2 days                       Physical Exam  Vitals and nursing note reviewed.   Constitutional:       Appearance: Normal appearance.   HENT:      Head: Normocephalic and atraumatic.   Eyes:      General:         Right eye: No discharge.         Left eye: No discharge.      Pupils: Pupils are equal, round, and reactive to light.   Cardiovascular:      Rate and Rhythm: Normal rate and regular rhythm.      Heart sounds: S1 normal and S2 normal. No murmur heard.     No friction rub.   Pulmonary:      Effort: Pulmonary effort is normal. No respiratory distress.      Breath sounds: Normal breath sounds. No rales.  "  Abdominal:      Palpations: Abdomen is soft.      Tenderness: There is no abdominal tenderness.   Musculoskeletal:      Cervical back: Neck supple.      Right lower leg: No edema.      Left lower leg: No edema.   Skin:     General: Skin is warm and dry.      Comments: Sternotomy site C/D/I   Neurological:      General: No focal deficit present.      Mental Status: He is alert and oriented to person, place, and time.   Psychiatric:         Mood and Affect: Mood normal.         Behavior: Behavior normal.         Thought Content: Thought content normal.            Significant Labs: BMP:   Recent Labs   Lab 09/20/23  0414 09/20/23  1652 09/21/23  0512   * 156* 113*    138 136   K 4.4 4.4 4.3    103 99   CO2 26 26 29   BUN 9 13 15   CREATININE 1.2 0.9 0.9   CALCIUM 7.7* 8.2* 8.7   MG 2.7* 2.3 2.6   , CMP   Recent Labs   Lab 09/20/23  0414 09/20/23  1652 09/21/23  0512    138 136   K 4.4 4.4 4.3    103 99   CO2 26 26 29   * 156* 113*   BUN 9 13 15   CREATININE 1.2 0.9 0.9   CALCIUM 7.7* 8.2* 8.7   PROT 4.5* 4.9* 5.3*   ALBUMIN 3.8 4.0 3.8   BILITOT 1.2* 1.1* 1.0   ALKPHOS 20* 28* 34*   AST 51* 47* 38   ALT 11 15 16   ANIONGAP 8 9 8   , CBC   Recent Labs   Lab 09/20/23  0414 09/20/23  0415 09/20/23  1418 09/21/23  0643   WBC 9.46  9.46  --  12.82* 17.82*   HGB 6.0*  6.0*  --  8.6* 8.5*   HCT 17.1*  17.1*   < > 24.1* 24.7*   PLT 74*  74*  --  84* 89*    < > = values in this interval not displayed.   , INR   Recent Labs   Lab 09/19/23  1813 09/20/23  0414 09/21/23  0512   INR 1.8* 1.3* 1.5*   , Lipid Panel No results for input(s): "CHOL", "HDL", "LDLCALC", "TRIG", "CHOLHDL" in the last 48 hours., Troponin No results for input(s): "TROPONINI" in the last 48 hours., and All pertinent lab results from the last 24 hours have been reviewed.    Significant Imaging: Cardiac Cath: reviewed, Echocardiogram: Transthoracic echo (TTE) complete (Cupid Only):   Results for orders placed or " performed during the hospital encounter of 09/17/23   Echo   Result Value Ref Range    BSA 1.98 m2    LVOT stroke volume 82.76 cm3    LVIDd 5.22 3.5 - 6.0 cm    LV Systolic Volume 86.21 mL    LV Systolic Volume Index 44.2 mL/m2    LVIDs 4.37 (A) 2.1 - 4.0 cm    LV Diastolic Volume 130.82 mL    LV Diastolic Volume Index 67.09 mL/m2    IVS 0.88 0.6 - 1.1 cm    LVOT diameter 1.91 cm    LVOT area 2.9 cm2    FS 16 (A) 28 - 44 %    Left Ventricle Relative Wall Thickness 0.38 cm    Posterior Wall 0.98 0.6 - 1.1 cm    LV mass 177.53 g    LV Mass Index 91 g/m2    MV Peak E Bob 1.20 m/s    TDI LATERAL 0.06 m/s    TDI SEPTAL 0.08 m/s    E/E' ratio 17.14 m/s    MV Peak A Bob 1.14 m/s    TR Max Bob 2.33 m/s    E/A ratio 1.05     IVRT 72.31 msec    E wave deceleration time 200.37 msec    LV SEPTAL E/E' RATIO 15.00 m/s    LV LATERAL E/E' RATIO 20.00 m/s    LVOT peak bob 1.38 m/s    Left Ventricular Outflow Tract Mean Velocity 1.08 cm/s    Left Ventricular Outflow Tract Mean Gradient 4.99 mmHg    LA size 3.79 cm    Left Atrium Minor Axis 4.68 cm    Left Atrium Major Axis 4.27 cm    RVOT peak VTI 10.8 cm    TAPSE 1.84 cm    RA Major Axis 3.31 cm    AV regurgitation pressure 1/2 time 456.13537840488359 ms    AR Max Bob 4.34 m/s    AV mean gradient 14 mmHg    AV peak gradient 26 mmHg    Ao peak bob 2.54 m/s    Ao VTI 54.00 cm    LVOT peak VTI 28.90 cm    AV valve area 1.53 cm²    AV Velocity Ratio 0.54     AV index (prosthetic) 0.54     SARAH by Velocity Ratio 1.56 cm²    Mr max bob 4.42 m/s    MV stenosis pressure 1/2 time 58.11 ms    MV valve area p 1/2 method 3.79 cm2    TV mean gradient 19 mmHg    Triscuspid Valve Regurgitation Peak Gradient 22 mmHg    PV mean gradient 1 mmHg    RVOT peak bob 0.58 m/s    Ao root annulus 2.71 cm    STJ 2.59 cm    Ascending aorta 2.36 cm    IVC diameter 1.57 cm    Mean e' 0.07 m/s    ZLVIDS 1.94     ZLVIDD -0.63     LA Volume Index 22.9 mL/m2    LA volume 44.60 cm3    LA WIDTH 3.1 cm    RA Width 2.2  cm    TV resting pulmonary artery pressure 25 mmHg    RV TB RVSP 5 mmHg    Est. RA pres 3 mmHg    Narrative      Left Ventricle: The left ventricle is normal in size. Ventricular mass   is normal. Normal wall thickness. regional wall motion abnormalities   present. There is mildly reduced systolic function with a visually   estimated ejection fraction of 40 - 45%. Grade II diastolic dysfunction.    Right Ventricle: Normal right ventricular cavity size. Wall thickness   is normal. Right ventricle wall motion  is normal. Systolic function is   normal.    Aortic Valve: There is mild to moderate aortic regurgitation.    Pulmonary Artery: The estimated pulmonary artery systolic pressure is   25 mmHg.    IVC/SVC: Normal venous pressure at 3 mmHg.     , EKG: reviewed, Stress Test: reviewed, and X-Ray: CXR: X-Ray Chest 1 View (CXR):   Results for orders placed or performed during the hospital encounter of 09/17/23   X-Ray Chest 1 View    Narrative    EXAMINATION:  XR CHEST 1 VIEW    CLINICAL HISTORY:  PICC placement;    TECHNIQUE:  Single frontal view of the chest was performed.    COMPARISON:  09/21/2023    FINDINGS:  Left-sided PICC line tip overlies the SVC in good position.  In comparison to the prior study, there is no adverse interval changes      Impression    In comparison to the prior study, there is no adverse interval changes      Electronically signed by: Elliott España MD  Date:    09/21/2023  Time:    10:10     Assessment and Plan:         * NSTEMI (non-ST elevated myocardial infarction)  Troponin 0.6->-0.709->0.708  Cont hep gtt  EKG with ST T wave abnml  LHC planned for today  All risks, benefits and treatment alternatives explained, all questions answered. Pt agreeable to proceed.   NPO    9/20/23  S/P CABG x5 AVR    S/P CABG x 5  Cont management per CTS    Tobacco smoker, 1 pack of cigarettes or less per day  Smoking cessation    CAD, multiple vessel  09/19/23  S/p CABG x5 and mechanical AVR  today  -continue icu supportive care  -continue asa plavix statin BB and lasix  -will f/u    9/20/23  Cont ASA, plavix, statin, BB, lasix  Management per CTS    Hypertension  stable        VTE Risk Mitigation (From admission, onward)         Ordered     warfarin (COUMADIN) tablet 2.5 mg  Daily         09/20/23 0838     IP VTE LOW RISK PATIENT  Once         09/17/23 1941                Courtney Prince, NP  Cardiology  [facility] ([facility])

## 2023-09-21 NOTE — PLAN OF CARE
Transfer to floor. Ugalde and all lines removed. Pain controlled. Diet encouraged. Out of bed as tolerated. Sternal precautions maintained. Incisional precautions with wound vac secured. Call light in hand.

## 2023-09-21 NOTE — PROGRESS NOTES
Care assumed. Dr Douglas at bedside and removed chest tubes this am. Fem art line removed. PICC line placed. Pain controlled. Update to mom. Chart and labs reviewed. POC with ICU team done. Pt supine. Hob elevated. PM secured and checked. Ugalde to gravity. Call light in hand.

## 2023-09-21 NOTE — PROGRESS NOTES
Picc line placed. R IJ swan with cath removed and site wnl. Dressing applied. Ugalde cath removed. Out of bed to chair without difficulty. Pain controlled. Diet encouraged.

## 2023-09-21 NOTE — ASSESSMENT & PLAN NOTE
09/20/2023  The patient is postop day 1 status post coronary artery bypass grafting x5 and aortic valve replacement with a 19 mm [place] mechanical valve.  Overall the patient is doing well.      Neuro:  Patient is awake appropriate and follows commands.  Patient is currently sedated with Precedex.  Will wean Precedex to off.   Cardiac:  Patient is hemodynamically stable.  With excellent cardiac output and cardiac index.  Patient is on low-dose vaso active meds.  Wean drips to off.  Will start metoprolol once drips are off.  Respiratory:  Patient is being weaned to extubation.  Patient is an active smoker Start nebs and Mucomyst.  Continue pulmonary toileting.  Continue incentive spirometer.    GI:  Patient is currently NPO.  Will start p.o. intake once extubated.    Renal:  Patient has good urine output and creatinine is 1.2.  Will start Lasix for diuresis.  Endocrine:  Patient required insulin drip for glucose controlled.  Will convert to sliding scale.    Id:  Patient had a T-max of 102°.  Most likely due to stress of surgery.  Will continue to follow.  White count is 9.  Patient is on manny op antibiotics.  Heme: Hematocrit is 17.  Patient is being transfused.  Platelet count is 74.  No evidence of active bleeding.  Will follow platelet count.  Will start patient on Coumadin anticoagulation for mechanical valve.  Activities:  Patient is currently in bed.  Will advance activities as tolerated once extubated.  Line tubes and drains:  Patient has an ETT, right IJ [place] and Cordis, right groin A-line, chest tubes, Ugalde catheter, pacer wires, and saphenectomy site DARON.    09/21/2023     The patient is postop day 2 status post coronary artery bypass grafting x5 and aortic valve replacement with a 19 mm [place] Dany mechanical valve.  Overall the patient is doing well.    Neuro:  Patient is awake alert and oriented x3.  Pain is well controlled with current pain management.  Cardiac:  The patient is off all drips.   Patient is hemodynamically stable.  Continue metoprolol.  Respiratory:  Patient was extubated yesterday.  Continue pulmonary toileting.  Continue incentive spirometer.   GI:  Patient is tolerating p.o. intake.  Patient had episodes of nausea yesterday which have abated.  Advance patient's diet as tolerated.    Renal:  Patient has good urine output.  Creatinine is 0.9.  Continue Lasix for diuresis.  Endocrine:  Glucose is controlled with sliding scale insulin.    Id:  T-max is 101.8 patient is currently afebrile.  White count is 17.  Most likely due to stress of surgery.  Continue to follow.  Heme:  Hematocrit is 24.5.  Platelet count is 89.  INR is 1.5.  Patient is started on Coumadin for anticoagulation of mechanical valve.  Activities:  Patient will be out of bed to chair.  Advance activities as tolerated.  Line tubes and drains:  Patient has pacer wires, right IJ Cordis and Ugalde catheter.  Will place PICC line and discontinue Cordis.

## 2023-09-21 NOTE — ASSESSMENT & PLAN NOTE
[facility] 9/18 with multi-vessel disease including left main  S/p CABG x 5 and AVR with Dr. Douglas 9/19  Pain control   ASA, plavix, statin, BB  Serial labs- replace electrolytes and give blood products as indicated   Monitor fluid volume status closely   Glucose control- goal 140-180  Plan per CTS

## 2023-09-21 NOTE — PT/OT/SLP PROGRESS
Occupational Therapy      Patient Name:  Mikie Alcazar   MRN:  [id number]    Returned to room at 1100 for completion of evaluation and nurse, jose Myles. Patient diaphoretic and hypotensive. Unable to tolerate activity. Will continue efforts.    Kelsi Schulz OT  9/21/2023

## 2023-09-21 NOTE — PT/OT/SLP PROGRESS
Physical Therapy      Patient Name:  Mikie Alcazar   MRN:  [id number]    KEEGAN MCKINNEY INITIATED THIS AM VIA CHART REVIEW AND PT INTERVIEW, NURSE PING REPORTS PT ABOUT TO HAVE PICC LINE PLACED, WILL ASSESS PT LATER TODAY TO COMPLETE KEEGAN Ann, PT  9/21/2023  0901

## 2023-09-21 NOTE — PLAN OF CARE
Problem: Adult Inpatient Plan of Care  Goal: Plan of Care Review  Outcome: Ongoing, Progressing  Flowsheets (Taken 9/21/2023 0721)  Plan of Care Reviewed With: patient  Goal: Patient-Specific Goal (Individualized)  Outcome: Ongoing, Progressing  Flowsheets (Taken 9/21/2023 0721)  Anxieties, Fears or Concerns: none  Goal: Absence of Hospital-Acquired Illness or Injury  Outcome: Ongoing, Progressing  Intervention: Identify and Manage Fall Risk  Flowsheets (Taken 9/21/2023 0721)  Safety Promotion/Fall Prevention:   side rails raised x 3   nonskid shoes/socks when out of bed   medications reviewed  Goal: Optimal Comfort and Wellbeing  Outcome: Ongoing, Progressing  Goal: Readiness for Transition of Care  Outcome: Ongoing, Progressing      Pt AAO x4 all night, minor anxiety secondary to pain, PRN pain meds given as ordered. HR stable A paced via pacemaker with no complications, intermittent ST secondary to pain, Pressors X2 weaned off over night, BP stable without pressors, CT X3 with serosanguious drainage all shift (see I&O), Midline prevena remains in place with not complications, R IJ with swan in place not complications, R femoral art line with blood return and appropriate waveform all night, DARON x2 removed by MD at beginning of shift, adequate urine output throughout the night, LASIX dose X1 given as ordered, Zofran and Reglan given as ordered for nausea, pt repositioned Q2 hours, plan of care discussed with patient, will continue and update as needed.       sitting

## 2023-09-21 NOTE — HOSPITAL COURSE
09/21/2023   The patient is postop day 2 status post coronary artery bypass grafting x5 and aortic valve replacement with a 19 mm [place] Dany mechanical valve.    09/22/2023   The patient is postop day 3 status post coronary artery bypass grafting x5 and aortic valve replacement with a 19 mm [place] mechanical valve.    09/23/2023   The patient is postop day 4 status post coronary artery bypass grafting x5 and aortic valve replacement with a 19 mm [place]any mechanical valve.    09/24/2023   The patient is postop day 5 status post coronary artery bypass grafting x5 and aortic valve replacement with a 19 mm Saint Dany mechanical valve.    09/25/2023   The patient is postop day 6 status post coronary artery bypass grafting x5 and aortic valve replacement with [place] Dany 19 mm mechanical valve.    09/26/2023   The patient is postop day 7 status post coronary artery bypass grafting x5 and aortic valve replacement with a 19 mm [place]any mechanical valve.    09/27/2023   The patient is postop day 8 status post coronary artery bypass grafting x5 and aortic valve replacement with a 19 mm Saint Dany mechanical valve.    09/28/2023   The patient is postop day 9 status post coronary artery bypass grafting x5 and aortic valve replacement with a 19 mm Saint Dany mechanical valve.    09/29/2023   The patient is postop day 10 status post coronary artery bypass grafting x5 and aortic valve replacement with a 19 mm Saint Dany mechanical valve.    09/30/2023   The patient is postop day 11 status post coronary bypass grafting x5 and aortic valve replacement with a 19 mm Saint Dany mechanical valve.    10/01/2023   Patient is postop day 12 status post coronary artery bypass grafting x5 and aortic valve replacement with a 19 mm [place] Dany mechanical valve    10/02/2023   The patient is postop day 13 status post coronary artery bypass grafting x5 and aortic valve replacement with a 19 mm Saint Dany mechanical valve.    10/03/2023    The patient is postop day 14 status post coronary artery bypass grafting x5 and aortic valve replacement with a 19 mm Saint Dany mechanical valve.

## 2023-09-21 NOTE — PROGRESS NOTES
Pharmacy Brief Progress Note: Coumadin Education    Patient educated on warfarin indication, side effects, and drug interactions. Discussed importance of medication compliance and INR monitoring and reviewed signs of abnormal bleeding. Patient given warfarin educational handouts. Patient expressed understanding and had no further questions.    Thank you for allowing us to participate in this patient's care.   Katherine McArdle, Pharm.D. 9/21/2023 3:20 PM

## 2023-09-22 LAB
ALBUMIN SERPL BCP-MCNC: 3.5 G/DL (ref 3.5–5.2)
ALP SERPL-CCNC: 147 U/L (ref 55–135)
ALP SERPL-CCNC: 147 U/L (ref 55–135)
ALP SERPL-CCNC: 70 U/L (ref 55–135)
ALT SERPL W/O P-5'-P-CCNC: 22 U/L (ref 10–44)
ALT SERPL W/O P-5'-P-CCNC: 32 U/L (ref 10–44)
ALT SERPL W/O P-5'-P-CCNC: 32 U/L (ref 10–44)
ANION GAP SERPL CALC-SCNC: 11 MMOL/L (ref 8–16)
ANION GAP SERPL CALC-SCNC: 11 MMOL/L (ref 8–16)
ANION GAP SERPL CALC-SCNC: 7 MMOL/L (ref 8–16)
AST SERPL-CCNC: 36 U/L (ref 10–40)
AST SERPL-CCNC: 48 U/L (ref 10–40)
AST SERPL-CCNC: 48 U/L (ref 10–40)
BASOPHILS # BLD AUTO: 0.02 K/UL (ref 0–0.2)
BASOPHILS NFR BLD: 0.1 % (ref 0–1.9)
BILIRUB SERPL-MCNC: 0.7 MG/DL (ref 0.1–1)
BILIRUB SERPL-MCNC: 0.7 MG/DL (ref 0.1–1)
BILIRUB SERPL-MCNC: 0.8 MG/DL (ref 0.1–1)
BUN SERPL-MCNC: 19 MG/DL (ref 6–20)
BUN SERPL-MCNC: 21 MG/DL (ref 6–20)
BUN SERPL-MCNC: 21 MG/DL (ref 6–20)
CALCIUM SERPL-MCNC: 8.4 MG/DL (ref 8.7–10.5)
CALCIUM SERPL-MCNC: 8.5 MG/DL (ref 8.7–10.5)
CALCIUM SERPL-MCNC: 8.5 MG/DL (ref 8.7–10.5)
CHLORIDE SERPL-SCNC: 97 MMOL/L (ref 95–110)
CHLORIDE SERPL-SCNC: 97 MMOL/L (ref 95–110)
CHLORIDE SERPL-SCNC: 99 MMOL/L (ref 95–110)
CO2 SERPL-SCNC: 29 MMOL/L (ref 23–29)
CO2 SERPL-SCNC: 29 MMOL/L (ref 23–29)
CO2 SERPL-SCNC: 31 MMOL/L (ref 23–29)
CREAT SERPL-MCNC: 0.9 MG/DL (ref 0.5–1.4)
DIFFERENTIAL METHOD: ABNORMAL
EOSINOPHIL # BLD AUTO: 0 K/UL (ref 0–0.5)
EOSINOPHIL NFR BLD: 0.1 % (ref 0–8)
ERYTHROCYTE [DISTWIDTH] IN BLOOD BY AUTOMATED COUNT: 16.2 % (ref 11.5–14.5)
ERYTHROCYTE [DISTWIDTH] IN BLOOD BY AUTOMATED COUNT: 16.2 % (ref 11.5–14.5)
EST. GFR  (NO RACE VARIABLE): >60 ML/MIN/1.73 M^2
FINAL PATHOLOGIC DIAGNOSIS: NORMAL
GLUCOSE SERPL-MCNC: 107 MG/DL (ref 70–110)
GLUCOSE SERPL-MCNC: 95 MG/DL (ref 70–110)
GLUCOSE SERPL-MCNC: 95 MG/DL (ref 70–110)
GROSS: NORMAL
HCT VFR BLD AUTO: 24.8 % (ref 40–54)
HCT VFR BLD AUTO: 24.8 % (ref 40–54)
HGB BLD-MCNC: 8.4 G/DL (ref 14–18)
HGB BLD-MCNC: 8.4 G/DL (ref 14–18)
IMM GRANULOCYTES # BLD AUTO: 0.1 K/UL (ref 0–0.04)
IMM GRANULOCYTES NFR BLD AUTO: 0.6 % (ref 0–0.5)
INR PPP: 3.4 (ref 0.8–1.2)
LYMPHOCYTES # BLD AUTO: 1.3 K/UL (ref 1–4.8)
LYMPHOCYTES NFR BLD: 8 % (ref 18–48)
Lab: NORMAL
MAGNESIUM SERPL-MCNC: 2.2 MG/DL (ref 1.6–2.6)
MAGNESIUM SERPL-MCNC: 2.3 MG/DL (ref 1.6–2.6)
MAGNESIUM SERPL-MCNC: 2.3 MG/DL (ref 1.6–2.6)
MCH RBC QN AUTO: 29.3 PG (ref 27–31)
MCH RBC QN AUTO: 29.3 PG (ref 27–31)
MCHC RBC AUTO-ENTMCNC: 33.9 G/DL (ref 32–36)
MCHC RBC AUTO-ENTMCNC: 33.9 G/DL (ref 32–36)
MCV RBC AUTO: 86 FL (ref 82–98)
MCV RBC AUTO: 86 FL (ref 82–98)
MONOCYTES # BLD AUTO: 1 K/UL (ref 0.3–1)
MONOCYTES NFR BLD: 6.1 % (ref 4–15)
NEUTROPHILS # BLD AUTO: 13.8 K/UL (ref 1.8–7.7)
NEUTROPHILS NFR BLD: 85.1 % (ref 38–73)
NRBC BLD-RTO: 0 /100 WBC
PLATELET # BLD AUTO: 114 K/UL (ref 150–450)
PLATELET # BLD AUTO: 114 K/UL (ref 150–450)
PMV BLD AUTO: 10.9 FL (ref 9.2–12.9)
PMV BLD AUTO: 10.9 FL (ref 9.2–12.9)
POCT GLUCOSE: 101 MG/DL (ref 70–110)
POCT GLUCOSE: 94 MG/DL (ref 70–110)
POTASSIUM SERPL-SCNC: 4.1 MMOL/L (ref 3.5–5.1)
POTASSIUM SERPL-SCNC: 4.1 MMOL/L (ref 3.5–5.1)
POTASSIUM SERPL-SCNC: 4.9 MMOL/L (ref 3.5–5.1)
PROT SERPL-MCNC: 5.5 G/DL (ref 6–8.4)
PROT SERPL-MCNC: 6 G/DL (ref 6–8.4)
PROT SERPL-MCNC: 6 G/DL (ref 6–8.4)
PROTHROMBIN TIME: 32.9 SEC (ref 9–12.5)
RBC # BLD AUTO: 2.87 M/UL (ref 4.6–6.2)
RBC # BLD AUTO: 2.87 M/UL (ref 4.6–6.2)
SODIUM SERPL-SCNC: 137 MMOL/L (ref 136–145)
WBC # BLD AUTO: 16.22 K/UL (ref 3.9–12.7)
WBC # BLD AUTO: 16.22 K/UL (ref 3.9–12.7)

## 2023-09-22 PROCEDURE — 94640 AIRWAY INHALATION TREATMENT: CPT

## 2023-09-22 PROCEDURE — 83735 ASSAY OF MAGNESIUM: CPT | Performed by: INTERNAL MEDICINE

## 2023-09-22 PROCEDURE — 25000003 PHARM REV CODE 250: Performed by: INTERNAL MEDICINE

## 2023-09-22 PROCEDURE — 80053 COMPREHEN METABOLIC PANEL: CPT | Mod: 91 | Performed by: THORACIC SURGERY (CARDIOTHORACIC VASCULAR SURGERY)

## 2023-09-22 PROCEDURE — 99232 SBSQ HOSP IP/OBS MODERATE 35: CPT | Mod: ,,, | Performed by: INTERNAL MEDICINE

## 2023-09-22 PROCEDURE — 97162 PT EVAL MOD COMPLEX 30 MIN: CPT

## 2023-09-22 PROCEDURE — 25000003 PHARM REV CODE 250: Performed by: THORACIC SURGERY (CARDIOTHORACIC VASCULAR SURGERY)

## 2023-09-22 PROCEDURE — 85025 COMPLETE CBC W/AUTO DIFF WBC: CPT | Performed by: INTERNAL MEDICINE

## 2023-09-22 PROCEDURE — 25000242 PHARM REV CODE 250 ALT 637 W/ HCPCS: Performed by: THORACIC SURGERY (CARDIOTHORACIC VASCULAR SURGERY)

## 2023-09-22 PROCEDURE — 85610 PROTHROMBIN TIME: CPT | Performed by: INTERNAL MEDICINE

## 2023-09-22 PROCEDURE — 25000003 PHARM REV CODE 250: Performed by: NURSE PRACTITIONER

## 2023-09-22 PROCEDURE — 99900035 HC TECH TIME PER 15 MIN (STAT)

## 2023-09-22 PROCEDURE — 97530 THERAPEUTIC ACTIVITIES: CPT

## 2023-09-22 PROCEDURE — 63600175 PHARM REV CODE 636 W HCPCS: Performed by: THORACIC SURGERY (CARDIOTHORACIC VASCULAR SURGERY)

## 2023-09-22 PROCEDURE — 27000221 HC OXYGEN, UP TO 24 HOURS

## 2023-09-22 PROCEDURE — 94761 N-INVAS EAR/PLS OXIMETRY MLT: CPT

## 2023-09-22 PROCEDURE — 83735 ASSAY OF MAGNESIUM: CPT | Mod: 91 | Performed by: INTERNAL MEDICINE

## 2023-09-22 PROCEDURE — 21400001 HC TELEMETRY ROOM

## 2023-09-22 PROCEDURE — 99232 PR SUBSEQUENT HOSPITAL CARE,LEVL II: ICD-10-PCS | Mod: ,,, | Performed by: INTERNAL MEDICINE

## 2023-09-22 PROCEDURE — 80053 COMPREHEN METABOLIC PANEL: CPT | Performed by: THORACIC SURGERY (CARDIOTHORACIC VASCULAR SURGERY)

## 2023-09-22 PROCEDURE — 97166 OT EVAL MOD COMPLEX 45 MIN: CPT

## 2023-09-22 RX ADMIN — PANTOPRAZOLE SODIUM 40 MG: 40 TABLET, DELAYED RELEASE ORAL at 09:09

## 2023-09-22 RX ADMIN — DOCUSATE SODIUM 100 MG: 100 CAPSULE, LIQUID FILLED ORAL at 09:09

## 2023-09-22 RX ADMIN — FUROSEMIDE 40 MG: 10 INJECTION, SOLUTION INTRAMUSCULAR; INTRAVENOUS at 09:09

## 2023-09-22 RX ADMIN — POTASSIUM CHLORIDE 20 MEQ: 1500 TABLET, EXTENDED RELEASE ORAL at 08:09

## 2023-09-22 RX ADMIN — ACETAMINOPHEN 650 MG: 325 TABLET ORAL at 12:09

## 2023-09-22 RX ADMIN — FOLIC ACID 1 MG: 1 TABLET ORAL at 09:09

## 2023-09-22 RX ADMIN — MAGNESIUM SULFATE HEPTAHYDRATE 4 G: 40 INJECTION, SOLUTION INTRAVENOUS at 06:09

## 2023-09-22 RX ADMIN — TRAMADOL HYDROCHLORIDE 50 MG: 50 TABLET, COATED ORAL at 10:09

## 2023-09-22 RX ADMIN — POLYETHYLENE GLYCOL 3350 17 G: 17 POWDER, FOR SOLUTION ORAL at 09:09

## 2023-09-22 RX ADMIN — OXYCODONE HYDROCHLORIDE AND ACETAMINOPHEN 500 MG: 500 TABLET ORAL at 08:09

## 2023-09-22 RX ADMIN — METOPROLOL TARTRATE 25 MG: 25 TABLET, FILM COATED ORAL at 09:09

## 2023-09-22 RX ADMIN — OXYCODONE HYDROCHLORIDE AND ACETAMINOPHEN 500 MG: 500 TABLET ORAL at 09:09

## 2023-09-22 RX ADMIN — SODIUM CHLORIDE 30 MG/ML INHALATION SOLUTION 4 ML: 30 SOLUTION INHALANT at 08:09

## 2023-09-22 RX ADMIN — POTASSIUM CHLORIDE 20 MEQ: 1500 TABLET, EXTENDED RELEASE ORAL at 09:09

## 2023-09-22 RX ADMIN — METOPROLOL TARTRATE 25 MG: 25 TABLET, FILM COATED ORAL at 08:09

## 2023-09-22 RX ADMIN — GUAIFENESIN 1200 MG: 600 TABLET, EXTENDED RELEASE ORAL at 09:09

## 2023-09-22 RX ADMIN — FUROSEMIDE 40 MG: 10 INJECTION, SOLUTION INTRAMUSCULAR; INTRAVENOUS at 08:09

## 2023-09-22 RX ADMIN — MAGNESIUM HYDROXIDE 400 MG: 400 SUSPENSION ORAL at 08:09

## 2023-09-22 RX ADMIN — CYANOCOBALAMIN TAB 1000 MCG 1000 MCG: 1000 TAB at 09:09

## 2023-09-22 RX ADMIN — ACETAMINOPHEN 650 MG: 325 TABLET ORAL at 06:09

## 2023-09-22 RX ADMIN — CHLORHEXIDINE GLUCONATE 0.12% ORAL RINSE 10 ML: 1.2 LIQUID ORAL at 09:09

## 2023-09-22 RX ADMIN — DOCUSATE SODIUM 100 MG: 100 CAPSULE, LIQUID FILLED ORAL at 08:09

## 2023-09-22 RX ADMIN — SODIUM CHLORIDE 30 MG/ML INHALATION SOLUTION 4 ML: 30 SOLUTION INHALANT at 07:09

## 2023-09-22 RX ADMIN — GUAIFENESIN 1200 MG: 600 TABLET, EXTENDED RELEASE ORAL at 08:09

## 2023-09-22 RX ADMIN — MAGNESIUM HYDROXIDE 400 MG: 400 SUSPENSION ORAL at 09:09

## 2023-09-22 RX ADMIN — ASPIRIN 81 MG: 81 TABLET, COATED ORAL at 09:09

## 2023-09-22 RX ADMIN — CHLORHEXIDINE GLUCONATE 0.12% ORAL RINSE 10 ML: 1.2 LIQUID ORAL at 08:09

## 2023-09-22 RX ADMIN — FERROUS SULFATE TAB 325 MG (65 MG ELEMENTAL FE) 1 EACH: 325 (65 FE) TAB at 09:09

## 2023-09-22 RX ADMIN — ATORVASTATIN CALCIUM 80 MG: 40 TABLET, FILM COATED ORAL at 09:09

## 2023-09-22 NOTE — SUBJECTIVE & OBJECTIVE
Review of Systems   Constitutional: Positive for malaise/fatigue.   HENT: Negative.     Eyes: Negative.    Cardiovascular: Negative.    Respiratory: Negative.     Skin: Negative.    Musculoskeletal: Negative.    Gastrointestinal: Negative.    Genitourinary: Negative.    Neurological: Negative.    Psychiatric/Behavioral: Negative.       Objective:     Vital Signs (Most Recent):  Temp: 97.5 °F (36.4 °C) (09/22/23 1139)  Pulse: 89 (09/22/23 1139)  Resp: 18 (09/22/23 1139)  BP: 133/86 (09/22/23 1139)  SpO2: 97 % (09/22/23 1139) Vital Signs (24h Range):  Temp:  [97.3 °F (36.3 °C)-98 °F (36.7 °C)] 97.5 °F (36.4 °C)  Pulse:  [] 89  Resp:  [13-39] 18  SpO2:  [93 %-100 %] 97 %  BP: (103-133)/(56-86) 133/86     Weight: 82.6 kg (182 lb 1.6 oz)  Body mass index is 27.69 kg/m².     SpO2: 97 %         Intake/Output Summary (Last 24 hours) at 9/22/2023 1240  Last data filed at 9/22/2023 0802  Gross per 24 hour   Intake 360 ml   Output 2450 ml   Net -2090 ml       Lines/Drains/Airways       Peripherally Inserted Central Catheter Line  Duration             PICC Double Lumen 09/21/23 0930 left basilic 1 day              Line  Duration                  Pacer Wires 09/19/23  3 days                       Physical Exam  Vitals and nursing note reviewed.   Constitutional:       Appearance: Normal appearance.   HENT:      Head: Normocephalic and atraumatic.   Eyes:      General:         Right eye: No discharge.         Left eye: No discharge.      Pupils: Pupils are equal, round, and reactive to light.   Cardiovascular:      Rate and Rhythm: Normal rate and regular rhythm.      Heart sounds: S1 normal and S2 normal. No murmur heard.     No friction rub.      Comments: Sternotomy site c/d/i  Pulmonary:      Effort: Pulmonary effort is normal. No respiratory distress.      Breath sounds: Normal breath sounds. No rales.   Abdominal:      Palpations: Abdomen is soft.      Tenderness: There is no abdominal tenderness.  "  Musculoskeletal:      Cervical back: Neck supple.      Right lower leg: No edema.      Left lower leg: No edema.   Skin:     General: Skin is warm and dry.   Neurological:      General: No focal deficit present.      Mental Status: He is alert and oriented to person, place, and time.   Psychiatric:         Mood and Affect: Mood normal.         Behavior: Behavior normal.         Thought Content: Thought content normal.            Significant Labs: BMP:   Recent Labs   Lab 09/20/23  1652 09/21/23  0512 09/22/23  0436   * 113* 107    136 137   K 4.4 4.3 4.9    99 99   CO2 26 29 31*   BUN 13 15 19   CREATININE 0.9 0.9 0.9   CALCIUM 8.2* 8.7 8.4*   MG 2.3 2.6 2.2   , CMP   Recent Labs   Lab 09/20/23  1652 09/21/23  0512 09/22/23  0436    136 137   K 4.4 4.3 4.9    99 99   CO2 26 29 31*   * 113* 107   BUN 13 15 19   CREATININE 0.9 0.9 0.9   CALCIUM 8.2* 8.7 8.4*   PROT 4.9* 5.3* 5.5*   ALBUMIN 4.0 3.8 3.5   BILITOT 1.1* 1.0 0.8   ALKPHOS 28* 34* 70   AST 47* 38 36   ALT 15 16 22   ANIONGAP 9 8 7*   , CBC   Recent Labs   Lab 09/20/23  1418 09/21/23  0643 09/22/23  0436   WBC 12.82* 17.82* 16.22*  16.22*   HGB 8.6* 8.5* 8.4*  8.4*   HCT 24.1* 24.7* 24.8*  24.8*   PLT 84* 89* 114*  114*   , INR   Recent Labs   Lab 09/21/23  0512 09/22/23  0436   INR 1.5* 3.4*   , Lipid Panel No results for input(s): "CHOL", "HDL", "LDLCALC", "TRIG", "CHOLHDL" in the last 48 hours., Troponin No results for input(s): "TROPONINI" in the last 48 hours., and All pertinent lab results from the last 24 hours have been reviewed.    Significant Imaging: Echocardiogram: Transthoracic echo (TTE) complete (Cupid Only):   Results for orders placed or performed during the hospital encounter of 09/17/23   Echo   Result Value Ref Range    BSA 1.98 m2    LVOT stroke volume 82.76 cm3    LVIDd 5.22 3.5 - 6.0 cm    LV Systolic Volume 86.21 mL    LV Systolic Volume Index 44.2 mL/m2    LVIDs 4.37 (A) 2.1 - 4.0 cm    LV " Diastolic Volume 130.82 mL    LV Diastolic Volume Index 67.09 mL/m2    IVS 0.88 0.6 - 1.1 cm    LVOT diameter 1.91 cm    LVOT area 2.9 cm2    FS 16 (A) 28 - 44 %    Left Ventricle Relative Wall Thickness 0.38 cm    Posterior Wall 0.98 0.6 - 1.1 cm    LV mass 177.53 g    LV Mass Index 91 g/m2    MV Peak E Bob 1.20 m/s    TDI LATERAL 0.06 m/s    TDI SEPTAL 0.08 m/s    E/E' ratio 17.14 m/s    MV Peak A Bob 1.14 m/s    TR Max Bob 2.33 m/s    E/A ratio 1.05     IVRT 72.31 msec    E wave deceleration time 200.37 msec    LV SEPTAL E/E' RATIO 15.00 m/s    LV LATERAL E/E' RATIO 20.00 m/s    LVOT peak bob 1.38 m/s    Left Ventricular Outflow Tract Mean Velocity 1.08 cm/s    Left Ventricular Outflow Tract Mean Gradient 4.99 mmHg    LA size 3.79 cm    Left Atrium Minor Axis 4.68 cm    Left Atrium Major Axis 4.27 cm    RVOT peak VTI 10.8 cm    TAPSE 1.84 cm    RA Major Axis 3.31 cm    AV regurgitation pressure 1/2 time [id number] ms    AR Max Bob 4.34 m/s    AV mean gradient 14 mmHg    AV peak gradient 26 mmHg    Ao peak bob 2.54 m/s    Ao VTI 54.00 cm    LVOT peak VTI 28.90 cm    AV valve area 1.53 cm²    AV Velocity Ratio 0.54     AV index (prosthetic) 0.54     SARAH by Velocity Ratio 1.56 cm²    Mr max bob 4.42 m/s    MV stenosis pressure 1/2 time 58.11 ms    MV valve area p 1/2 method 3.79 cm2    TV mean gradient 19 mmHg    Triscuspid Valve Regurgitation Peak Gradient 22 mmHg    PV mean gradient 1 mmHg    RVOT peak bob 0.58 m/s    Ao root annulus 2.71 cm    STJ 2.59 cm    Ascending aorta 2.36 cm    IVC diameter 1.57 cm    Mean e' 0.07 m/s    ZLVIDS 1.94     ZLVIDD -0.63     LA Volume Index 22.9 mL/m2    LA volume 44.60 cm3    LA WIDTH 3.1 cm    RA Width 2.2 cm    TV resting pulmonary artery pressure 25 mmHg    RV TB RVSP 5 mmHg    Est. RA pres 3 mmHg    Narrative      Left Ventricle: The left ventricle is normal in size. Ventricular mass   is normal. Normal wall thickness. regional wall motion abnormalities   present.  There is mildly reduced systolic function with a visually   estimated ejection fraction of 40 - 45%. Grade II diastolic dysfunction.    Right Ventricle: Normal right ventricular cavity size. Wall thickness   is normal. Right ventricle wall motion  is normal. Systolic function is   normal.    Aortic Valve: There is mild to moderate aortic regurgitation.    Pulmonary Artery: The estimated pulmonary artery systolic pressure is   25 mmHg.    IVC/SVC: Normal venous pressure at 3 mmHg.     , EKG: reviewed, Stress Test: reviewed, and X-Ray: CXR: X-Ray Chest 1 View (CXR):   Results for orders placed or performed during the hospital encounter of 09/17/23   X-Ray Chest 1 View    Narrative    EXAMINATION:  XR CHEST 1 VIEW    CLINICAL HISTORY:  PICC placement;    TECHNIQUE:  Single frontal view of the chest was performed.    COMPARISON:  09/21/2023    FINDINGS:  Left-sided PICC line tip overlies the SVC in good position.  In comparison to the prior study, there is no adverse interval changes      Impression    In comparison to the prior study, there is no adverse interval changes      Electronically signed by: Elliott España MD  Date:    09/21/2023  Time:    10:10

## 2023-09-22 NOTE — PLAN OF CARE
P.T. EVAL COMPLETE.  PT CURRENTLY REQUIRES SBA FOR SIT<>STAND AND GAIT.  P.T. RECOMMENDS HHPT AT D/C

## 2023-09-22 NOTE — PT/OT/SLP EVAL
Physical Therapy Evaluation and Treatment    Patient Name: Mikie Alcazar   MRN: [id number]  Recent Surgery: Procedure(s) (LRB):  CORONARY ARTERY BYPASS GRAFT (CABG) (N/A)  REPLACEMENT-VALVE-AORTIC (N/A)  SURGICAL PROCUREMENT, VEIN, ENDOSCOPIC (Left)  BLOCK, NERVE, INTERCOSTAL, 2 OR MORE (N/A)  ECHOCARDIOGRAM,TRANSESOPHAGEAL (N/A) 3 Days Post-Op    Recommendations:     Discharge Recommendations: home health PT (WITH ASSIST FROM MOTHER AS NEEDED)   Discharge Equipment Recommendations: shower chair   Barriers to discharge: None    Assessment:     Mikie Alcazar is a 43 y.o. male admitted with a medical diagnosis of NSTEMI (non-ST elevated myocardial infarction). He presents with the following impairments/functional limitations: weakness, impaired endurance, impaired functional mobility, gait instability, impaired balance, decreased safety awareness, decreased coordination, decreased upper extremity function.    Rehab Prognosis: Good; patient would benefit from acute PT services to address these deficits and reach maximum level of function.    Plan:     During this hospitalization, patient to be seen 3 x/week to address the above listed problems via gait training, therapeutic exercises, therapeutic activities    Plan of Care Expires: 10/06/23    Subjective     Chief Complaint: NONE, EAGER TO WALK  Patient Comments/Goals:   Pain/Comfort:  Pain Rating 1: 0/10    Social History:  Living Environment: Patient lives with their mother in a single story home with number of outside stair(s): 0  Prior Level of Function: Prior to admission, patient was independent, driving and not working, and ambulated community distances using no AD  Equipment Used at Home: none  DME owned (not currently used): none  Assistance Upon Discharge:  MOTHER    Objective:     Communicated with NURSE JASMINE prior to session. Patient found up in chair with telemetry, peripheral IV, oxygen, external pacer, wound vac upon PT entry to room.    General  "Precautions: Standard, fall, sternal   Orthopedic Precautions: N/A   Braces: N/A    Respiratory Status: Nasal cannula, flow 2 L/min    Exams:  Cognition: Patient is oriented to Person, Place, Time, Situation  RLE ROM: WFL  RLE Strength: WFL  LLE ROM: WFL  LLE Strength: WFL  Postural Exam: Patient presented with the following abnormalities:    -       No postural abnormalities identified  Sensation:    -       Intact    Functional Mobility:  Gait belt applied - Yes  Bed Mobility  Scooting: stand by assistance  Transfers  Sit to Stand: stand by assistance with no AD  Gait  Patient ambulated 420' with no AD and stand by assistance.GOOD EFFORT, CUES FOR UPRIGHT POSTURE AND DEEP BREATHING, 1 SMALL STANDING REST, NO LOB, SLOW STEADY PACE. All lines remained intact throughout ambulation trail and portable Supplemental O2 2L utilized.  Balance  Sitting: stand by assistance  Standing: stand by assistance  PT EDUCATED IN AND PERFORMED BLE THEREX X 15 REPS: HIP FLEX/EXT, LAQ WITH QUAD SET, AP'S    Therapeutic Activities and Exercises:   PT AND MOTHER EDUCATION:  - ROLE OF P.T. AND POC IN ACUTE CARE HOSPITAL SETTING  - STERNAL PRECAUTIONS, IMPORTANCE OF ACTIVITY PACING  - ENCOURAGED TO INCREASE TIME OOB IN CHAIR TO TOLERANCE   - TO CONTINUE THERAPUETIC EXERCISES THROUGHOUT THE DAY TO INCREASE ACTIVITY TOLERANCE AND DECREASE RISK FOR PNEUMONIA AND BLOOD CLOTS: HIP FLEX/EXT, HIP ABD/ADD, QUAD SET, HEEL SLIDE, AP  - RISK FOR FALLS DUE TO GENERALIZED WEAKNESS, EDUCATED ON "CALL DON'T FALL", ENCOURAGED TO CALL FOR ASSISTANCE WITH ALL NEEDS SUCH AS BED<>CHAIR TRANSFERS OR TRIPS TO BATHROOM, PT AGREEABLE TO SAFETY PRECAUTIONS    AM-PAC 6 CLICK MOBILITY  Total Score:15    Patient left up in chair with all lines intact, call button in reach, and family present.    GOALS:   Multidisciplinary Problems       Physical Therapy Goals          Problem: Physical Therapy    Goal Priority Disciplines Outcome Goal Variances Interventions "   Physical Therapy Goal     PT, PT/OT      Description: LTG'S TO BE MET IN 14 DAYS (10-6-23)  PT WILL BE ARELY FOR BED MOBILITY  PT WILL BE ARELY FOR BED<>CHAIR TF'S  PT WILL  FEET NO AD ARELY  PT WILL INC AMPAC SCORE BY 2 POINTS TO PROGRESS GROSS FUNC MOBILITY                         History:     Past Medical History:   Diagnosis Date    Hypertension 9/18/2023       Past Surgical History:   Procedure Laterality Date    ANGIOGRAPHY OF INTERNAL MAMMARY VESSEL Left 9/18/2023    Procedure: Angiogram Internal Mammary;  Surgeon: Chelsea Morales MD;  Location: [facility] CATH LAB;  Service: Cardiology;  Laterality: Left;    AORTIC VALVE REPLACEMENT N/A 9/19/2023    Procedure: REPLACEMENT-VALVE-AORTIC;  Surgeon: Nishant Douglas MD;  Location: [facility] OR;  Service: Cardiothoracic;  Laterality: N/A;  19 mm MECHANICAL AORTIC VALVE    ARTERIOGRAPHY OF AORTIC ROOT N/A 9/18/2023    Procedure: ARTERIOGRAM, AORTIC ROOT;  Surgeon: Chelsea Morales MD;  Location: [facility] CATH LAB;  Service: Cardiology;  Laterality: N/A;    ARTERIOGRAPHY OF SUBCLAVIAN ARTERY Left 9/18/2023    Procedure: ARTERIOGRAM, SUBCLAVIAN;  Surgeon: Chelsea Morales MD;  Location: [facility] CATH LAB;  Service: Cardiology;  Laterality: Left;    CORONARY ARTERY BYPASS GRAFT (CABG) N/A 9/19/2023    Procedure: CORONARY ARTERY BYPASS GRAFT (CABG);  Surgeon: Nishant Douglas MD;  Location: [facility] OR;  Service: Cardiothoracic;  Laterality: N/A;  5-VESSEL WITH EPI-AORTIC ULTRASOUND    ECHOCARDIOGRAM,TRANSESOPHAGEAL N/A 9/19/2023    Procedure: ECHOCARDIOGRAM,TRANSESOPHAGEAL;  Surgeon: Nishant Douglas MD;  Location: [facility] OR;  Service: Cardiothoracic;  Laterality: N/A;    ENDOSCOPIC HARVEST OF VEIN Left 9/19/2023    Procedure: SURGICAL PROCUREMENT, VEIN, ENDOSCOPIC;  Surgeon: Nishant Douglas MD;  Location: [facility] OR;  Service: Cardiothoracic;  Laterality: Left;    INJECTION OF ANESTHETIC AGENT AROUND MULTIPLE INTERCOSTAL NERVES N/A 9/19/2023    Procedure: BLOCK, NERVE, INTERCOSTAL,  2 OR MORE;  Surgeon: Nishant Douglas MD;  Location: [facility] OR;  Service: Cardiothoracic;  Laterality: N/A;  PARA-STERNAL NERVE BLOCK    LEFT HEART CATHETERIZATION Left 9/18/2023    Procedure: Left heart cath;  Surgeon: Chelsea Morales MD;  Location: [facility] CATH LAB;  Service: Cardiology;  Laterality: Left;       Time Tracking:     PT Received On: 09/22/23  PT Start Time: 0720  PT Stop Time: 0800  PT Total Time (min): 40 min     Billable Minutes: Evaluation 15 and Therapeutic Activity 25    9/22/2023

## 2023-09-22 NOTE — PROGRESS NOTES
[facility] - Telemetry ([facility])  Cardiology  Progress Note    Patient Name: Mikie Alcazar  MRN: [id number]  Admission Date: 9/17/2023  Hospital Length of Stay: 5 days  Code Status: Full Code   Attending Physician: Nishant Douglas MD   Primary Care Physician: No, Primary Doctor  Expected Discharge Date:   Principal Problem:NSTEMI (non-ST elevated myocardial infarction)    Subjective:     Hospital Course:   Cardiology consulted to assist with management. Pt seen and examined today, resting in bed mother at bedside. He reports his pain started 6+ months ago and worsening in the last few weeks happening daily with associated SOB, dizziness radiating to left arm. He reports his pain is sharp and pressure-like at times. Pt reports daily use a marijuana, h/o cocaine and other drugs 5-10 years ago. Drinks occasionally. Echo pending cont hep gtt    09/19/2023. Admitted for NSTEMI/ACS. Echo showed RWMA  The cath done yesterday showed   Severe lmca disease with timi1 flow in lad   Lcx ostial 50%   Rca prox 80%   Ef 50%   Severe AI  Today s/p CABG x5 and mechanical; AVR by Dr. Douglas.  In ICU and intubated. Om epi and Vasopressin gtt.    9/20/23  Pt seen and examined today, POD 1 CABGx5 pt still intubated on exam this am, weaning epi. Labs reviewed, H/H 6.0 and 17.1. Plans to extubated today    9/21/23 Pt seen and examined today extubated feels ok chest soreness, chest tubes removed. Labs reviewed, chart reviewed.    9/22/23 Pt seen and examined today, sitting up in bedside chair. Feels good. Walked with PT/OT. Labs reviewed, chart reviewed          Review of Systems   Constitutional: Positive for malaise/fatigue.   HENT: Negative.     Eyes: Negative.    Cardiovascular: Negative.    Respiratory: Negative.     Skin: Negative.    Musculoskeletal: Negative.    Gastrointestinal: Negative.    Genitourinary: Negative.    Neurological: Negative.    Psychiatric/Behavioral: Negative.       Objective:     Vital Signs (Most  Recent):  Temp: 97.5 °F (36.4 °C) (09/22/23 1139)  Pulse: 89 (09/22/23 1139)  Resp: 18 (09/22/23 1139)  BP: 133/86 (09/22/23 1139)  SpO2: 97 % (09/22/23 1139) Vital Signs (24h Range):  Temp:  [97.3 °F (36.3 °C)-98 °F (36.7 °C)] 97.5 °F (36.4 °C)  Pulse:  [] 89  Resp:  [13-39] 18  SpO2:  [93 %-100 %] 97 %  BP: (103-133)/(56-86) 133/86     Weight: 82.6 kg (182 lb 1.6 oz)  Body mass index is 27.69 kg/m².     SpO2: 97 %         Intake/Output Summary (Last 24 hours) at 9/22/2023 1240  Last data filed at 9/22/2023 0802  Gross per 24 hour   Intake 360 ml   Output 2450 ml   Net -2090 ml       Lines/Drains/Airways       Peripherally Inserted Central Catheter Line  Duration             PICC Double Lumen 09/21/23 0930 left basilic 1 day              Line  Duration                  Pacer Wires 09/19/23  3 days                       Physical Exam  Vitals and nursing note reviewed.   Constitutional:       Appearance: Normal appearance.   HENT:      Head: Normocephalic and atraumatic.   Eyes:      General:         Right eye: No discharge.         Left eye: No discharge.      Pupils: Pupils are equal, round, and reactive to light.   Cardiovascular:      Rate and Rhythm: Normal rate and regular rhythm.      Heart sounds: S1 normal and S2 normal. No murmur heard.     No friction rub.      Comments: Sternotomy site c/d/i  Pulmonary:      Effort: Pulmonary effort is normal. No respiratory distress.      Breath sounds: Normal breath sounds. No rales.   Abdominal:      Palpations: Abdomen is soft.      Tenderness: There is no abdominal tenderness.   Musculoskeletal:      Cervical back: Neck supple.      Right lower leg: No edema.      Left lower leg: No edema.   Skin:     General: Skin is warm and dry.   Neurological:      General: No focal deficit present.      Mental Status: He is alert and oriented to person, place, and time.   Psychiatric:         Mood and Affect: Mood normal.         Behavior: Behavior normal.         Thought  "Content: Thought content normal.            Significant Labs: BMP:   Recent Labs   Lab 09/20/23  1652 09/21/23  0512 09/22/23  0436   * 113* 107    136 137   K 4.4 4.3 4.9    99 99   CO2 26 29 31*   BUN 13 15 19   CREATININE 0.9 0.9 0.9   CALCIUM 8.2* 8.7 8.4*   MG 2.3 2.6 2.2   , CMP   Recent Labs   Lab 09/20/23  1652 09/21/23  0512 09/22/23  0436    136 137   K 4.4 4.3 4.9    99 99   CO2 26 29 31*   * 113* 107   BUN 13 15 19   CREATININE 0.9 0.9 0.9   CALCIUM 8.2* 8.7 8.4*   PROT 4.9* 5.3* 5.5*   ALBUMIN 4.0 3.8 3.5   BILITOT 1.1* 1.0 0.8   ALKPHOS 28* 34* 70   AST 47* 38 36   ALT 15 16 22   ANIONGAP 9 8 7*   , CBC   Recent Labs   Lab 09/20/23  1418 09/21/23  0643 09/22/23  0436   WBC 12.82* 17.82* 16.22*  16.22*   HGB 8.6* 8.5* 8.4*  8.4*   HCT 24.1* 24.7* 24.8*  24.8*   PLT 84* 89* 114*  114*   , INR   Recent Labs   Lab 09/21/23  0512 09/22/23  0436   INR 1.5* 3.4*   , Lipid Panel No results for input(s): "CHOL", "HDL", "LDLCALC", "TRIG", "CHOLHDL" in the last 48 hours., Troponin No results for input(s): "TROPONINI" in the last 48 hours., and All pertinent lab results from the last 24 hours have been reviewed.    Significant Imaging: Echocardiogram: Transthoracic echo (TTE) complete (Cupid Only):   Results for orders placed or performed during the hospital encounter of 09/17/23   Echo   Result Value Ref Range    BSA 1.98 m2    LVOT stroke volume 82.76 cm3    LVIDd 5.22 3.5 - 6.0 cm    LV Systolic Volume 86.21 mL    LV Systolic Volume Index 44.2 mL/m2    LVIDs 4.37 (A) 2.1 - 4.0 cm    LV Diastolic Volume 130.82 mL    LV Diastolic Volume Index 67.09 mL/m2    IVS 0.88 0.6 - 1.1 cm    LVOT diameter 1.91 cm    LVOT area 2.9 cm2    FS 16 (A) 28 - 44 %    Left Ventricle Relative Wall Thickness 0.38 cm    Posterior Wall 0.98 0.6 - 1.1 cm    LV mass 177.53 g    LV Mass Index 91 g/m2    MV Peak E Bob 1.20 m/s    TDI LATERAL 0.06 m/s    TDI SEPTAL 0.08 m/s    E/E' ratio 17.14 m/s "    MV Peak A Bob 1.14 m/s    TR Max Bob 2.33 m/s    E/A ratio 1.05     IVRT 72.31 msec    E wave deceleration time 200.37 msec    LV SEPTAL E/E' RATIO 15.00 m/s    LV LATERAL E/E' RATIO 20.00 m/s    LVOT peak bob 1.38 m/s    Left Ventricular Outflow Tract Mean Velocity 1.08 cm/s    Left Ventricular Outflow Tract Mean Gradient 4.99 mmHg    LA size 3.79 cm    Left Atrium Minor Axis 4.68 cm    Left Atrium Major Axis 4.27 cm    RVOT peak VTI 10.8 cm    TAPSE 1.84 cm    RA Major Axis 3.31 cm    AV regurgitation pressure 1/2 time [id number] ms    AR Max Bob 4.34 m/s    AV mean gradient 14 mmHg    AV peak gradient 26 mmHg    Ao peak bob 2.54 m/s    Ao VTI 54.00 cm    LVOT peak VTI 28.90 cm    AV valve area 1.53 cm²    AV Velocity Ratio 0.54     AV index (prosthetic) 0.54     SARAH by Velocity Ratio 1.56 cm²    Mr max bob 4.42 m/s    MV stenosis pressure 1/2 time 58.11 ms    MV valve area p 1/2 method 3.79 cm2    TV mean gradient 19 mmHg    Triscuspid Valve Regurgitation Peak Gradient 22 mmHg    PV mean gradient 1 mmHg    RVOT peak bob 0.58 m/s    Ao root annulus 2.71 cm    STJ 2.59 cm    Ascending aorta 2.36 cm    IVC diameter 1.57 cm    Mean e' 0.07 m/s    ZLVIDS 1.94     ZLVIDD -0.63     LA Volume Index 22.9 mL/m2    LA volume 44.60 cm3    LA WIDTH 3.1 cm    RA Width 2.2 cm    TV resting pulmonary artery pressure 25 mmHg    RV TB RVSP 5 mmHg    Est. RA pres 3 mmHg    Narrative      Left Ventricle: The left ventricle is normal in size. Ventricular mass   is normal. Normal wall thickness. regional wall motion abnormalities   present. There is mildly reduced systolic function with a visually   estimated ejection fraction of 40 - 45%. Grade II diastolic dysfunction.    Right Ventricle: Normal right ventricular cavity size. Wall thickness   is normal. Right ventricle wall motion  is normal. Systolic function is   normal.    Aortic Valve: There is mild to moderate aortic regurgitation.    Pulmonary Artery: The  estimated pulmonary artery systolic pressure is   25 mmHg.    IVC/SVC: Normal venous pressure at 3 mmHg.     , EKG: reviewed, Stress Test: reviewed, and X-Ray: CXR: X-Ray Chest 1 View (CXR):   Results for orders placed or performed during the hospital encounter of 09/17/23   X-Ray Chest 1 View    Narrative    EXAMINATION:  XR CHEST 1 VIEW    CLINICAL HISTORY:  PICC placement;    TECHNIQUE:  Single frontal view of the chest was performed.    COMPARISON:  09/21/2023    FINDINGS:  Left-sided PICC line tip overlies the SVC in good position.  In comparison to the prior study, there is no adverse interval changes      Impression    In comparison to the prior study, there is no adverse interval changes      Electronically signed by: Elliott España MD  Date:    09/21/2023  Time:    10:10     Assessment and Plan:       * NSTEMI (non-ST elevated myocardial infarction)  Troponin 0.6->-0.709->0.708  Cont hep gtt  EKG with ST T wave abnml  LHC planned for today  All risks, benefits and treatment alternatives explained, all questions answered. Pt agreeable to proceed.   NPO    9/20/23  S/P CABG x5 AVR    S/P CABG x 5  Cont management per CTS    Tobacco smoker, 1 pack of cigarettes or less per day  Smoking cessation    CAD, multiple vessel  09/19/23  S/p CABG x5 and mechanical AVR today  -continue icu supportive care  -continue asa plavix statin BB and lasix  -will f/u    9/20/23  Cont ASA, plavix, statin, BB, lasix  Management per CTS    Hypertension  stable        VTE Risk Mitigation (From admission, onward)         Ordered     warfarin (COUMADIN) tablet 2.5 mg  Daily         09/20/23 0838     IP VTE LOW RISK PATIENT  Once         09/17/23 1941                Courtney Prince NP  Cardiology  [facility] - Telemetry ([facility])

## 2023-09-22 NOTE — PLAN OF CARE
OT mathew completed. Sit>stand SBA, functional mobility x200ft x2 trials with SBA, step>pivot to bedside chair with SBA. Recommending HHOT at d/c.

## 2023-09-22 NOTE — PLAN OF CARE
Patient and mother updated on plan of care. Instructed patient to use call light for assistance, call light in reach. Hourly rounding performed, fall and safety precautions maintained; bed alarm on. Patient OOB and up in chair frequently. Midline incision CDI with wound vac in place, no air leaks observed. Vitals q 4hours. Education provided, questions answered/encouraged. IV's and lines CDI, medication administered as ordered. Chart check complete. Sinus rhythm/ A-paced on demand on tele box #[id number].    Problem: Adult Inpatient Plan of Care  Goal: Plan of Care Review  Outcome: Ongoing, Progressing

## 2023-09-22 NOTE — PROGRESS NOTES
Clinical Pharmacy: Coumadin Progress Note    Coumadin consult day # 3  Indication: mechanical AVR (9/19)  New start  Goal INR: 2.0-3.0 (no additional risk factors like Afib)   Today's INR: 3.4 (up from 1.5), supertherapeutic   H&H stable  PLT = 114, trending up    Daily INR ordered  Added Coumadin restriction to diet order  Pt has hx of CAD & NSTEMI s/p CABG x 5 vessels 9/19 --> will continue low dose aspirin but stop clopidogrel.    No major DDIs     Plan:  - Hold warfarin today due to more than a 2-fold increase in INR in 24 hours.  - Pharmacy will continue to monitor INR daily and make adjustments.

## 2023-09-22 NOTE — PT/OT/SLP EVAL
"Occupational Therapy Evaluation and Treatment    Name: Mikie Alcazar  MRN: [id number]  Admitting Diagnosis: NSTEMI (non-ST elevated myocardial infarction)  Recent Surgery: Procedure(s) (LRB):  CORONARY ARTERY BYPASS GRAFT (CABG) (N/A)  REPLACEMENT-VALVE-AORTIC (N/A)  SURGICAL PROCUREMENT, VEIN, ENDOSCOPIC (Left)  BLOCK, NERVE, INTERCOSTAL, 2 OR MORE (N/A)  ECHOCARDIOGRAM,TRANSESOPHAGEAL (N/A) 3 Days Post-Op    Recommendations:     Discharge Recommendations: home health OT (24/7 SPV and A)  Level of Assistance Recommended: 24 hours significant assistance  Discharge Equipment Recommendations: shower chair  Barriers to discharge: None    Assessment:     Mikie Alcazar is a 43 y.o. male with a medical diagnosis of NSTEMI (non-ST elevated myocardial infarction). He presents with performance deficits affecting function including weakness, impaired endurance, impaired self care skills, impaired functional mobility, impaired balance, impaired cardiopulmonary response to activity, decreased safety awareness (sternal precautions).    Rehab Prognosis: Good; patient would benefit from acute OT services to address these deficits and reach maximum level of function.    Plan:     Patient to be seen 2 x/week to address the above listed problems via self-care/home management, therapeutic activities, therapeutic exercises  Plan of Care Expires: 10/06/23  Plan of Care Reviewed with: patient, mother    Subjective     Chief Complaint: Reported "I am ready to try walking."  Patient Comments/Goals: get better  Pain/Comfort:  Pain Rating 1: 0/10  Pain Rating Post-Intervention 1: 0/10    Social History:  Living Environment: Patient lives with their mother in a single story home with number of outside stair(s): no steps/stairs to enter.  Prior Level of Function: Prior to admission, patient was independent with ADLs and community distance ambulation.  Roles and Routines: Patient was driving and not working prior to admission.  Equipment Used " at Home: none  DME owned (not currently used): none  Assistance Upon Discharge:  mother    Objective:     Communicated with nurse, Consuelo, prior to session. Patient found up in chair with peripheral IV, oxygen, telemetry, external fixator, wound vac upon OT entry to room.    General Precautions: Standard, fall, sternal   Orthopedic Precautions: N/A   Braces: N/A    Respiratory Status: Room air    Occupational Performance    Bed Mobility:   OOB in chair at start and end of treatment session.    Functional Mobility/Transfers:  Sit <> Stand Transfer with stand by assistance with no AD  Bed <> Chair Transfer using Step Transfer technique with stand by assistance with no AD  Functional Mobility: Patient completed x200ft x2 trials functional mobility without AD and SBA to increase dynamic standing balance and activity tolerance needed for ADL completion.    Activities of Daily Living:  Grooming: set up assistance  Upper Body Dressing: minimum assistance    Cognitive/Visual Perceptual:  Cognitive/Psychosocial Skills:    -     Oriented to: Person, Place, Time, Situation  -     Follows Commands/attention: Follows two-step commands    Physical Exam:  Balance:    -     Sitting: supervision  -     Standing: stand by assistance  Upper Extremity Range of Motion:     -       Right Upper Extremity: WFL except shdr to 90* 2/2 sternal precautions  -       Left Upper Extremity: WFL except shdr to 90* 2/2 sternal precautions   Strength:    -       Right Upper Extremity: Deficits: fair  -       Left Upper Extremity: Deficits: fair    AMPAC 6 Click ADL:  AMPAC Total Score: 19    Treatment & Education:  Therapist provided facilitation and instruction of proper body mechanics, energy conservation, and fall prevention strategies during tasks listed above  Patient educated on role of OT, POC, and goals for therapy  Patient educated on importance of OOB activities with staff member assistance and sitting OOB majority of the  day  Educated on sternal precautions and their functional implications. Will require reinforcement.    Patient left up in chair with all lines intact, call button in reach, chair alarm on, and mother present.    GOALS:   Multidisciplinary Problems       Occupational Therapy Goals          Problem: Occupational Therapy    Goal Priority Disciplines Outcome Interventions   Occupational Therapy Goal     OT, PT/OT Ongoing, Progressing    Description: Goals to be met by: 10/6/23     Patient will increase functional independence with ADLs by performing:    Toileting from toilet with Supervision for hygiene and clothing management.   Toilet transfer to toilet with Supervision  Demonstrates 100% functional compliance with sternal precautions.                         History:     Past Medical History:   Diagnosis Date    Hypertension 9/18/2023         Past Surgical History:   Procedure Laterality Date    ANGIOGRAPHY OF INTERNAL MAMMARY VESSEL Left 9/18/2023    Procedure: Angiogram Internal Mammary;  Surgeon: Chelsea Morales MD;  Location: [facility] CATH LAB;  Service: Cardiology;  Laterality: Left;    AORTIC VALVE REPLACEMENT N/A 9/19/2023    Procedure: REPLACEMENT-VALVE-AORTIC;  Surgeon: Nishant Douglas MD;  Location: [facility] OR;  Service: Cardiothoracic;  Laterality: N/A;  19 mm MECHANICAL AORTIC VALVE    ARTERIOGRAPHY OF AORTIC ROOT N/A 9/18/2023    Procedure: ARTERIOGRAM, AORTIC ROOT;  Surgeon: Cheslea Morales MD;  Location: [facility] CATH LAB;  Service: Cardiology;  Laterality: N/A;    ARTERIOGRAPHY OF SUBCLAVIAN ARTERY Left 9/18/2023    Procedure: ARTERIOGRAM, SUBCLAVIAN;  Surgeon: Chelsea Morales MD;  Location: [facility] CATH LAB;  Service: Cardiology;  Laterality: Left;    CORONARY ARTERY BYPASS GRAFT (CABG) N/A 9/19/2023    Procedure: CORONARY ARTERY BYPASS GRAFT (CABG);  Surgeon: Nishant Douglas MD;  Location: [facility] OR;  Service: Cardiothoracic;  Laterality: N/A;  5-VESSEL WITH EPI-AORTIC ULTRASOUND     ECHOCARDIOGRAM,TRANSESOPHAGEAL N/A 9/19/2023    Procedure: ECHOCARDIOGRAM,TRANSESOPHAGEAL;  Surgeon: Nishant Douglas MD;  Location: [facility] OR;  Service: Cardiothoracic;  Laterality: N/A;    ENDOSCOPIC HARVEST OF VEIN Left 9/19/2023    Procedure: SURGICAL PROCUREMENT, VEIN, ENDOSCOPIC;  Surgeon: Nishant Douglas MD;  Location: [facility] OR;  Service: Cardiothoracic;  Laterality: Left;    INJECTION OF ANESTHETIC AGENT AROUND MULTIPLE INTERCOSTAL NERVES N/A 9/19/2023    Procedure: BLOCK, NERVE, INTERCOSTAL, 2 OR MORE;  Surgeon: Nishant Douglas MD;  Location: [facility] OR;  Service: Cardiothoracic;  Laterality: N/A;  PARA-STERNAL NERVE BLOCK    LEFT HEART CATHETERIZATION Left 9/18/2023    Procedure: Left heart cath;  Surgeon: Chelsea Morales MD;  Location: [facility] CATH LAB;  Service: Cardiology;  Laterality: Left;       Time Tracking:     OT Date of Treatment: 09/22/23  OT Start Time: 0715  OT Stop Time: 0740  OT Total Time (min): 25 min    Billable Minutes: Evaluation 15 and Therapeutic Activity 10    Kelsi Schulz, OT  9/22/2023

## 2023-09-23 LAB
BASOPHILS # BLD AUTO: 0.04 K/UL (ref 0–0.2)
BASOPHILS NFR BLD: 0.3 % (ref 0–1.9)
DIFFERENTIAL METHOD: ABNORMAL
EOSINOPHIL # BLD AUTO: 0.1 K/UL (ref 0–0.5)
EOSINOPHIL NFR BLD: 0.7 % (ref 0–8)
ERYTHROCYTE [DISTWIDTH] IN BLOOD BY AUTOMATED COUNT: 16.3 % (ref 11.5–14.5)
HCT VFR BLD AUTO: 27 % (ref 40–54)
HGB BLD-MCNC: 8.9 G/DL (ref 14–18)
IMM GRANULOCYTES # BLD AUTO: 0.07 K/UL (ref 0–0.04)
IMM GRANULOCYTES NFR BLD AUTO: 0.6 % (ref 0–0.5)
INR PPP: 1.9 (ref 0.8–1.2)
LYMPHOCYTES # BLD AUTO: 1.9 K/UL (ref 1–4.8)
LYMPHOCYTES NFR BLD: 15.3 % (ref 18–48)
MAGNESIUM SERPL-MCNC: 2.5 MG/DL (ref 1.6–2.6)
MAGNESIUM SERPL-MCNC: 2.7 MG/DL (ref 1.6–2.6)
MCH RBC QN AUTO: 28.9 PG (ref 27–31)
MCHC RBC AUTO-ENTMCNC: 33 G/DL (ref 32–36)
MCV RBC AUTO: 88 FL (ref 82–98)
MONOCYTES # BLD AUTO: 1.3 K/UL (ref 0.3–1)
MONOCYTES NFR BLD: 10.1 % (ref 4–15)
NEUTROPHILS # BLD AUTO: 9.2 K/UL (ref 1.8–7.7)
NEUTROPHILS NFR BLD: 73 % (ref 38–73)
NRBC BLD-RTO: 0 /100 WBC
PLATELET # BLD AUTO: 186 K/UL (ref 150–450)
PLATELET BLD QL SMEAR: ABNORMAL
PMV BLD AUTO: 10.2 FL (ref 9.2–12.9)
POCT GLUCOSE: 107 MG/DL (ref 70–110)
POCT GLUCOSE: 109 MG/DL (ref 70–110)
POCT GLUCOSE: 115 MG/DL (ref 70–110)
POCT GLUCOSE: 95 MG/DL (ref 70–110)
PROTHROMBIN TIME: 20 SEC (ref 9–12.5)
RBC # BLD AUTO: 3.08 M/UL (ref 4.6–6.2)
WBC # BLD AUTO: 12.59 K/UL (ref 3.9–12.7)

## 2023-09-23 PROCEDURE — 83735 ASSAY OF MAGNESIUM: CPT | Performed by: INTERNAL MEDICINE

## 2023-09-23 PROCEDURE — 21400001 HC TELEMETRY ROOM

## 2023-09-23 PROCEDURE — 94799 UNLISTED PULMONARY SVC/PX: CPT

## 2023-09-23 PROCEDURE — 99232 SBSQ HOSP IP/OBS MODERATE 35: CPT | Mod: ,,, | Performed by: INTERNAL MEDICINE

## 2023-09-23 PROCEDURE — 25000242 PHARM REV CODE 250 ALT 637 W/ HCPCS: Performed by: THORACIC SURGERY (CARDIOTHORACIC VASCULAR SURGERY)

## 2023-09-23 PROCEDURE — 94640 AIRWAY INHALATION TREATMENT: CPT

## 2023-09-23 PROCEDURE — 63600175 PHARM REV CODE 636 W HCPCS: Performed by: THORACIC SURGERY (CARDIOTHORACIC VASCULAR SURGERY)

## 2023-09-23 PROCEDURE — 97116 GAIT TRAINING THERAPY: CPT | Mod: CQ

## 2023-09-23 PROCEDURE — 85610 PROTHROMBIN TIME: CPT | Performed by: INTERNAL MEDICINE

## 2023-09-23 PROCEDURE — 25000003 PHARM REV CODE 250: Performed by: INTERNAL MEDICINE

## 2023-09-23 PROCEDURE — 85025 COMPLETE CBC W/AUTO DIFF WBC: CPT | Performed by: INTERNAL MEDICINE

## 2023-09-23 PROCEDURE — 99232 PR SUBSEQUENT HOSPITAL CARE,LEVL II: ICD-10-PCS | Mod: ,,, | Performed by: INTERNAL MEDICINE

## 2023-09-23 PROCEDURE — 94761 N-INVAS EAR/PLS OXIMETRY MLT: CPT

## 2023-09-23 PROCEDURE — 25000003 PHARM REV CODE 250: Performed by: THORACIC SURGERY (CARDIOTHORACIC VASCULAR SURGERY)

## 2023-09-23 PROCEDURE — 25000003 PHARM REV CODE 250: Performed by: NURSE PRACTITIONER

## 2023-09-23 RX ORDER — BISACODYL 5 MG
10 TABLET, DELAYED RELEASE (ENTERIC COATED) ORAL DAILY PRN
Status: DISCONTINUED | OUTPATIENT
Start: 2023-09-23 | End: 2023-09-25

## 2023-09-23 RX ORDER — WARFARIN 1 MG/1
1 TABLET ORAL DAILY
Status: DISCONTINUED | OUTPATIENT
Start: 2023-09-23 | End: 2023-09-25

## 2023-09-23 RX ORDER — LUBIPROSTONE 24 UG/1
24 CAPSULE ORAL 2 TIMES DAILY WITH MEALS
Status: DISCONTINUED | OUTPATIENT
Start: 2023-09-23 | End: 2023-09-25

## 2023-09-23 RX ADMIN — FOLIC ACID 1 MG: 1 TABLET ORAL at 08:09

## 2023-09-23 RX ADMIN — LUBIPROSTONE 24 MCG: 24 CAPSULE, GELATIN COATED ORAL at 09:09

## 2023-09-23 RX ADMIN — TRAMADOL HYDROCHLORIDE 50 MG: 50 TABLET, COATED ORAL at 08:09

## 2023-09-23 RX ADMIN — OXYCODONE HYDROCHLORIDE AND ACETAMINOPHEN 500 MG: 500 TABLET ORAL at 08:09

## 2023-09-23 RX ADMIN — SODIUM CHLORIDE 30 MG/ML INHALATION SOLUTION 4 ML: 30 SOLUTION INHALANT at 07:09

## 2023-09-23 RX ADMIN — FERROUS SULFATE TAB 325 MG (65 MG ELEMENTAL FE) 1 EACH: 325 (65 FE) TAB at 08:09

## 2023-09-23 RX ADMIN — ASPIRIN 81 MG: 81 TABLET, COATED ORAL at 08:09

## 2023-09-23 RX ADMIN — CYANOCOBALAMIN TAB 1000 MCG 1000 MCG: 1000 TAB at 08:09

## 2023-09-23 RX ADMIN — FUROSEMIDE 40 MG: 10 INJECTION, SOLUTION INTRAMUSCULAR; INTRAVENOUS at 08:09

## 2023-09-23 RX ADMIN — MAGNESIUM HYDROXIDE 400 MG: 400 SUSPENSION ORAL at 08:09

## 2023-09-23 RX ADMIN — ACETAMINOPHEN 650 MG: 325 TABLET ORAL at 11:09

## 2023-09-23 RX ADMIN — METOPROLOL TARTRATE 25 MG: 25 TABLET, FILM COATED ORAL at 08:09

## 2023-09-23 RX ADMIN — DOCUSATE SODIUM 100 MG: 100 CAPSULE, LIQUID FILLED ORAL at 08:09

## 2023-09-23 RX ADMIN — WARFARIN SODIUM 1 MG: 1 TABLET ORAL at 04:09

## 2023-09-23 RX ADMIN — POTASSIUM CHLORIDE 20 MEQ: 1500 TABLET, EXTENDED RELEASE ORAL at 08:09

## 2023-09-23 RX ADMIN — GUAIFENESIN 1200 MG: 600 TABLET, EXTENDED RELEASE ORAL at 08:09

## 2023-09-23 RX ADMIN — ACETAMINOPHEN 650 MG: 325 TABLET ORAL at 05:09

## 2023-09-23 RX ADMIN — PANTOPRAZOLE SODIUM 40 MG: 40 TABLET, DELAYED RELEASE ORAL at 08:09

## 2023-09-23 RX ADMIN — POLYETHYLENE GLYCOL 3350 17 G: 17 POWDER, FOR SOLUTION ORAL at 08:09

## 2023-09-23 RX ADMIN — ACETAMINOPHEN 650 MG: 325 TABLET ORAL at 12:09

## 2023-09-23 RX ADMIN — LUBIPROSTONE 24 MCG: 24 CAPSULE, GELATIN COATED ORAL at 04:09

## 2023-09-23 RX ADMIN — CHLORHEXIDINE GLUCONATE 0.12% ORAL RINSE 10 ML: 1.2 LIQUID ORAL at 08:09

## 2023-09-23 RX ADMIN — ATORVASTATIN CALCIUM 80 MG: 40 TABLET, FILM COATED ORAL at 08:09

## 2023-09-23 NOTE — PT/OT/SLP PROGRESS
Physical Therapy Treatment    Patient Name:  Mikie Alcazar   MRN:  [id number]    Recommendations:     Discharge Recommendations: home health PT  Discharge Equipment Recommendations: shower chair  Barriers to discharge: None    Assessment:     Mikie Alcazar is a 43 y.o. male admitted with a medical diagnosis of NSTEMI (non-ST elevated myocardial infarction).  He presents with the following impairments/functional limitations: weakness, impaired endurance, impaired self care skills, impaired functional mobility, gait instability, impaired balance, decreased upper extremity function, decreased coordination (sternal precautions).    Pt was able to complete gait training without the use of supplemental oxygen on this date. Pt also reports an improvement in B LE edema and discomfort throughout ambulation trial    Rehab Prognosis: Good; patient would benefit from acute skilled PT services to address these deficits and reach maximum level of function.    Recent Surgery: Procedure(s) (LRB):  CORONARY ARTERY BYPASS GRAFT (CABG) (N/A)  REPLACEMENT-VALVE-AORTIC (N/A)  SURGICAL PROCUREMENT, VEIN, ENDOSCOPIC (Left)  BLOCK, NERVE, INTERCOSTAL, 2 OR MORE (N/A)  ECHOCARDIOGRAM,TRANSESOPHAGEAL (N/A) 4 Days Post-Op    Plan:     During this hospitalization, patient to be seen 3 x/week to address the identified rehab impairments via gait training, therapeutic activities, therapeutic exercises and progress toward the following goals:    Plan of Care Expires:  10/06/23    Subjective     Chief Complaint: B ankle edema and mild pain during standing activities  Patient/Family Comments/goals: agreeable to PT tx  Pain/Comfort:  Pain Rating 1: 0/10 (pt reports he feels better since pacer wires are removed)      Objective:     Communicated with pt's nurse prior to session.  Patient found HOB elevated with peripheral IV, telemetry, wound vac upon PT entry to room.     General Precautions: Standard, fall, sternal  Orthopedic Precautions:  N/A  Braces: N/A  Respiratory Status: Room air     Functional Mobility:  Bed Mobility:     Supine <> Sit: stand by assistance  Transfers:     Sit to Stand from EOB: stand by assistance with no AD  Gait: Pt ambulates approximately 400' with SBA to CGA throughout the hallway with no AD and O2 doffed. Pt required intermittent standing rest breaks due to impaired CV endurance. No lightheadedness or dizziness noted. Pt demos fatigue following gait trial.  Balance: Good      AM-PAC 6 CLICK MOBILITY  Turning over in bed (including adjusting bedclothes, sheets and blankets)?: 3  Sitting down on and standing up from a chair with arms (e.g., wheelchair, bedside commode, etc.): 4  Moving from lying on back to sitting on the side of the bed?: 4  Moving to and from a bed to a chair (including a wheelchair)?: 4  Need to walk in hospital room?: 4  Climbing 3-5 steps with a railing?: 1  Basic Mobility Total Score: 20       Treatment & Education:  Pt instructed for sternal precautions, appropriate gait mechanics and rest breaks throughout ambulation trial, and overall safety techniques.    Patient left HOB elevated with all lines intact, call button in reach, and pt's nurse notified.    GOALS:   Multidisciplinary Problems       Physical Therapy Goals          Problem: Physical Therapy    Goal Priority Disciplines Outcome Goal Variances Interventions   Physical Therapy Goal     PT, PT/OT      Description: LTG'S TO BE MET IN 14 DAYS (10-6-23)  PT WILL BE ARELY FOR BED MOBILITY  PT WILL BE ARELY FOR BED<>CHAIR TF'S  PT WILL  FEET NO AD ARELY  PT WILL INC AMPAC SCORE BY 2 POINTS TO PROGRESS GROSS FUNC MOBILITY                         Time Tracking:     PT Received On: 09/23/23  PT Start Time: 1026     PT Stop Time: 1038  PT Total Time (min): 12 min     Billable Minutes: Gait Training 12    Treatment Type: Treatment  PT/PTA: PTA     Number of PTA visits since last PT visit: 1 09/23/2023

## 2023-09-23 NOTE — PROGRESS NOTES
'[place] - Telemetry ([facility])  Cardiology  Progress Note    Patient Name: Mikie Alcazar  MRN: [id number]  Admission Date: 9/17/2023  Hospital Length of Stay: 6 days  Code Status: Full Code   Attending Physician: Nishant Douglas MD   Primary Care Physician: Lissette, Primary Doctor  Expected Discharge Date:   Principal Problem:NSTEMI (non-ST elevated myocardial infarction)    Subjective:     Hospital Course:   Cardiology consulted to assist with management. Pt seen and examined today, resting in bed mother at bedside. He reports his pain started 6+ months ago and worsening in the last few weeks happening daily with associated SOB, dizziness radiating to left arm. He reports his pain is sharp and pressure-like at times. Pt reports daily use a marijuana, h/o cocaine and other drugs 5-10 years ago. Drinks occasionally. Echo pending cont hep gtt    09/19/2023. Admitted for NSTEMI/ACS. Echo showed RWMA  The cath done yesterday showed   Severe lmca disease with timi1 flow in lad   Lcx ostial 50%   Rca prox 80%   Ef 50%   Severe AI  09/19/23 s/p CABG x5 and aortic valve replacement with a 19 mm [place] Dany mechanical valve by Dr. Douglas.  In ICU and intubated. Om epi and Vasopressin gtt.    9/20/23  Pt seen and examined today, POD 1 CABGx5 pt still intubated on exam this am, weaning epi. Labs reviewed, H/H 6.0 and 17.1. Plans to extubated today    9/21/23 Pt seen and examined today extubated feels ok chest soreness, chest tubes removed. Labs reviewed, chart reviewed.    9/22/23 Pt seen and examined today, sitting up in bedside chair. Feels good. Walked with PT/OT. Labs reviewed, chart reviewed    09/23 ambulating well. No chest pain dyspnea, the lab reviewed.         ROS  Objective:     Vital Signs (Most Recent):  Temp: 97.7 °F (36.5 °C) (09/23/23 1211)  Pulse: 89 (09/23/23 1305)  Resp: 18 (09/23/23 1211)  BP: 126/65 (09/23/23 1211)  SpO2: 95 % (09/23/23 1211) Vital Signs (24h Range):  Temp:  [97.3 °F (36.3 °C)-99.1 °F (37.3  "°C)] 97.7 °F (36.5 °C)  Pulse:  [] 89  Resp:  [16-20] 18  SpO2:  [92 %-98 %] 95 %  BP: (104-141)/(61-73) 126/65     Weight: 82.6 kg (182 lb 1.6 oz)  Body mass index is 27.69 kg/m².     SpO2: 95 %         Intake/Output Summary (Last 24 hours) at 9/23/2023 1358  Last data filed at 9/23/2023 1333  Gross per 24 hour   Intake 240 ml   Output 2825 ml   Net -2585 ml       Lines/Drains/Airways       Peripherally Inserted Central Catheter Line  Duration             PICC Double Lumen 09/21/23 0930 left basilic 2 days              Line  Duration                  Pacer Wires 09/19/23  4 days                       Physical Exam  Vitals and nursing note reviewed.   Constitutional:       Appearance: Normal appearance.   HENT:      Head: Normocephalic and atraumatic.   Eyes:      General:         Right eye: No discharge.         Left eye: No discharge.      Pupils: Pupils are equal, round, and reactive to light.   Cardiovascular:      Rate and Rhythm: Normal rate and regular rhythm.      Heart sounds: S1 normal and S2 normal. Murmur heard.      Harsh midsystolic murmur is present at the upper right sternal border radiating to the neck. Mechanical click +      No friction rub.      Comments: Sternotomy site c/d/i  Pulmonary:      Effort: Pulmonary effort is normal. No respiratory distress.      Breath sounds: Normal breath sounds. No rales.   Abdominal:      Palpations: Abdomen is soft.      Tenderness: There is no abdominal tenderness.   Musculoskeletal:      Cervical back: Neck supple.      Right lower leg: No edema.      Left lower leg: No edema.   Skin:     General: Skin is warm and dry.   Neurological:      General: No focal deficit present.      Mental Status: He is alert and oriented to person, place, and time.   Psychiatric:         Mood and Affect: Mood normal.         Behavior: Behavior normal.         Thought Content: Thought content normal.            Significant Labs: ABG: No results for input(s): "PH", "PCO2", " ""HCO3", "POCSATURATED", "BE" in the last 48 hours., Blood Culture: No results for input(s): "LABBLOO" in the last 48 hours., BMP:   Recent Labs   Lab 09/22/23  0436 09/22/23  1711 09/23/23  0500    95  95  --     137  137  --    K 4.9 4.1  4.1  --    CL 99 97  97  --    CO2 31* 29  29  --    BUN 19 21*  21*  --    CREATININE 0.9 0.9  0.9  --    CALCIUM 8.4* 8.5*  8.5*  --    MG 2.2 2.3  2.3 2.7*   , CMP   Recent Labs   Lab 09/22/23  0436 09/22/23  1711    137  137   K 4.9 4.1  4.1   CL 99 97  97   CO2 31* 29  29    95  95   BUN 19 21*  21*   CREATININE 0.9 0.9  0.9   CALCIUM 8.4* 8.5*  8.5*   PROT 5.5* 6.0  6.0   ALBUMIN 3.5 3.5  3.5   BILITOT 0.8 0.7  0.7   ALKPHOS 70 147*  147*   AST 36 48*  48*   ALT 22 32  32   ANIONGAP 7* 11  11   , CBC   Recent Labs   Lab 09/22/23  0436 09/23/23  0500   WBC 16.22*  16.22* 12.59   HGB 8.4*  8.4* 8.9*   HCT 24.8*  24.8* 27.0*   *  114* 186   , INR   Recent Labs   Lab 09/22/23  0436 09/23/23  0500   INR 3.4* 1.9*   , Lipid Panel No results for input(s): "CHOL", "HDL", "LDLCALC", "TRIG", "CHOLHDL" in the last 48 hours., and Troponin No results for input(s): "TROPONINI" in the last 48 hours.    Significant Imaging: EKG:      Assessment and Plan:       * NSTEMI (non-ST elevated myocardial infarction)  Troponin 0.6->-0.709->0.708  Cont hep gtt  EKG with ST T wave abnml  C planned for today  All risks, benefits and treatment alternatives explained, all questions answered. Pt agreeable to proceed.   NPO    9/20/23  S/P CABG x5 AVR    S/P CABG x 5  Cont management per CTS    09/23   Continue asa statin plavix lasix and metoprolol  On coumadin for mechanical AVR    Tobacco smoker, 1 pack of cigarettes or less per day  Smoking cessation    CAD, multiple vessel  09/19/23  S/p CABG x5 and mechanical AVR today  -continue icu supportive care  -continue asa plavix statin BB and lasix  -will f/u    9/20/23  Cont ASA, plavix, statin, " BB, lasix  Management per CTS    Hypertension  stable        VTE Risk Mitigation (From admission, onward)         Ordered     WARFARIN PLACEHOLDER-- DO NOT ADMINISTER  Daily         09/22/23 1256     IP VTE LOW RISK PATIENT  Once         09/17/23 1941                Fahad Albarran MD  Cardiology  [place] - Telemetry ([facility])

## 2023-09-23 NOTE — PROGRESS NOTES
Clinical Pharmacy: Coumadin Progress Note     Coumadin consult day # 3  Indication: mechanical AVR (9/19)  New start  INR = 1.9 trended down from 3.4   Dose was held yesterday bc of the supratherapeutic INR  Patient has been counseled this encounter  Will restart at a lower dose of 1 mg po daily due to supratherapeutic INR after 2 doses of 2.5 mg po daily dosing  DDI's -aspirin can increase risk of bleeding with coumadin   Pharmacy will continue to monitor INR daily and make adjustments.   /Lalitha Mendez [facility] 9/23/2023 2:30 PM

## 2023-09-23 NOTE — SUBJECTIVE & OBJECTIVE
Interval History: The patient is postop day 4 status post coronary artery bypass grafting x5 and aortic valve replacement with a 19 mm Saint Dany mechanical valve.    ROS  Medications:  Continuous Infusions:  Scheduled Meds:   acetaminophen  650 mg Oral Q6H    ascorbic acid (vitamin C)  500 mg Oral BID    aspirin  81 mg Oral Daily    atorvastatin  80 mg Oral Daily    chlorhexidine  10 mL Mouth/Throat BID    cyanocobalamin  1,000 mcg Oral Daily    docusate sodium  100 mg Oral BID    ferrous sulfate  1 tablet Oral Daily    folic acid  1 mg Oral Daily    furosemide (LASIX) injection  40 mg Intravenous BID    guaiFENesin  1,200 mg Oral BID    magnesium hydroxide 400 mg/5 ml  5 mL Oral BID    metoprolol tartrate  25 mg Oral BID    pantoprazole  40 mg Oral Daily    polyethylene glycol  17 g Oral Daily    potassium chloride  20 mEq Oral Q12H    sodium chloride 3%  4 mL Nebulization Q12H    [START ON 9/25/2024] warfarin  0.5 mg Oral Daily     PRN Meds:0.9%  NaCl infusion (for blood administration), albumin human 5%, aluminum-magnesium hydroxide-simethicone, calcium gluconate IVPB, calcium gluconate IVPB, calcium gluconate IVPB, dextrose 10%, glucagon (human recombinant), influenza, insulin aspart U-100, lactated ringers, magnesium sulfate IVPB, melatonin, metoclopramide HCl, ondansetron, pneumoc 20-leslee conj-dip cr(PF), potassium chloride in water, potassium chloride in water, potassium chloride in water, traMADoL     Objective:     Vital Signs (Most Recent):  Temp: 97.3 °F (36.3 °C) (09/23/23 0709)  Pulse: 90 (09/23/23 0720)  Resp: 18 (09/23/23 0720)  BP: 128/72 (09/23/23 0709)  SpO2: 95 % (09/23/23 0720) Vital Signs (24h Range):  Temp:  [97.3 °F (36.3 °C)-99.1 °F (37.3 °C)] 97.3 °F (36.3 °C)  Pulse:  [] 90  Resp:  [16-20] 18  SpO2:  [92 %-98 %] 95 %  BP: (104-141)/(61-86) 128/72     Weight: 82.6 kg (182 lb 1.6 oz)  Body mass index is 27.69 kg/m².    SpO2: 95 %       Intake/Output - Last 3 Shifts         09/21  0700  09/22 0659 09/22 0700  09/23 0659 09/23 0700  09/24 0659    P.O. 60 400     I.V. (mL/kg)       Blood       IV Piggyback       Total Intake(mL/kg) 60 (0.7) 400 (4.8)     Urine (mL/kg/hr) 2250 (1.1) 3950 (2) 100 (0.5)    Drains       Chest Tube       Total Output 2250 3950 100    Net -2190 -3550 -100                   Lines/Drains/Airways       Peripherally Inserted Central Catheter Line  Duration             PICC Double Lumen 09/21/23 0930 left basilic 1 day              Line  Duration                  Pacer Wires 09/19/23  4 days                     Physical Exam  Constitutional:       Appearance: Normal appearance.   HENT:      Head: Normocephalic and atraumatic.      Nose: Nose normal.   Cardiovascular:      Rate and Rhythm: Normal rate and regular rhythm.      Pulses: Normal pulses.      Heart sounds: Normal heart sounds.   Pulmonary:      Effort: Pulmonary effort is normal.      Breath sounds: Normal breath sounds.   Abdominal:      General: Abdomen is flat. Bowel sounds are normal.      Palpations: Abdomen is soft.   Musculoskeletal:      Right lower leg: Edema present.      Left lower leg: Edema present.   Skin:     General: Skin is dry.   Neurological:      General: No focal deficit present.      Mental Status: He is alert and oriented to person, place, and time.   Psychiatric:         Behavior: Behavior normal.            Significant Labs:  All pertinent labs from the last 24 hours have been reviewed.    Significant Diagnostics:  I have reviewed all pertinent imaging results/findings within the past 24 hours.

## 2023-09-23 NOTE — SUBJECTIVE & OBJECTIVE
ROS  Objective:     Vital Signs (Most Recent):  Temp: 97.7 °F (36.5 °C) (09/23/23 1211)  Pulse: 89 (09/23/23 1305)  Resp: 18 (09/23/23 1211)  BP: 126/65 (09/23/23 1211)  SpO2: 95 % (09/23/23 1211) Vital Signs (24h Range):  Temp:  [97.3 °F (36.3 °C)-99.1 °F (37.3 °C)] 97.7 °F (36.5 °C)  Pulse:  [] 89  Resp:  [16-20] 18  SpO2:  [92 %-98 %] 95 %  BP: (104-141)/(61-73) 126/65     Weight: 82.6 kg (182 lb 1.6 oz)  Body mass index is 27.69 kg/m².     SpO2: 95 %         Intake/Output Summary (Last 24 hours) at 9/23/2023 1358  Last data filed at 9/23/2023 1333  Gross per 24 hour   Intake 240 ml   Output 2825 ml   Net -2585 ml       Lines/Drains/Airways       Peripherally Inserted Central Catheter Line  Duration             PICC Double Lumen 09/21/23 0930 left basilic 2 days              Line  Duration                  Pacer Wires 09/19/23  4 days                       Physical Exam  Vitals and nursing note reviewed.   Constitutional:       Appearance: Normal appearance.   HENT:      Head: Normocephalic and atraumatic.   Eyes:      General:         Right eye: No discharge.         Left eye: No discharge.      Pupils: Pupils are equal, round, and reactive to light.   Cardiovascular:      Rate and Rhythm: Normal rate and regular rhythm.      Heart sounds: S1 normal and S2 normal. Murmur heard.      Harsh midsystolic murmur is present at the upper right sternal border radiating to the neck. Mechanical click +      No friction rub.      Comments: Sternotomy site c/d/i  Pulmonary:      Effort: Pulmonary effort is normal. No respiratory distress.      Breath sounds: Normal breath sounds. No rales.   Abdominal:      Palpations: Abdomen is soft.      Tenderness: There is no abdominal tenderness.   Musculoskeletal:      Cervical back: Neck supple.      Right lower leg: No edema.      Left lower leg: No edema.   Skin:     General: Skin is warm and dry.   Neurological:      General: No focal deficit present.      Mental Status:  "He is alert and oriented to person, place, and time.   Psychiatric:         Mood and Affect: Mood normal.         Behavior: Behavior normal.         Thought Content: Thought content normal.            Significant Labs: ABG: No results for input(s): "PH", "PCO2", "HCO3", "POCSATURATED", "BE" in the last 48 hours., Blood Culture: No results for input(s): "LABBLOO" in the last 48 hours., BMP:   Recent Labs   Lab 09/22/23  0436 09/22/23  1711 09/23/23  0500    95  95  --     137  137  --    K 4.9 4.1  4.1  --    CL 99 97  97  --    CO2 31* 29  29  --    BUN 19 21*  21*  --    CREATININE 0.9 0.9  0.9  --    CALCIUM 8.4* 8.5*  8.5*  --    MG 2.2 2.3  2.3 2.7*   , CMP   Recent Labs   Lab 09/22/23  0436 09/22/23  1711    137  137   K 4.9 4.1  4.1   CL 99 97  97   CO2 31* 29  29    95  95   BUN 19 21*  21*   CREATININE 0.9 0.9  0.9   CALCIUM 8.4* 8.5*  8.5*   PROT 5.5* 6.0  6.0   ALBUMIN 3.5 3.5  3.5   BILITOT 0.8 0.7  0.7   ALKPHOS 70 147*  147*   AST 36 48*  48*   ALT 22 32  32   ANIONGAP 7* 11  11   , CBC   Recent Labs   Lab 09/22/23  0436 09/23/23  0500   WBC 16.22*  16.22* 12.59   HGB 8.4*  8.4* 8.9*   HCT 24.8*  24.8* 27.0*   *  114* 186   , INR   Recent Labs   Lab 09/22/23  0436 09/23/23  0500   INR 3.4* 1.9*   , Lipid Panel No results for input(s): "CHOL", "HDL", "LDLCALC", "TRIG", "CHOLHDL" in the last 48 hours., and Troponin No results for input(s): "TROPONINI" in the last 48 hours.    Significant Imaging: EKG:    "

## 2023-09-23 NOTE — SUBJECTIVE & OBJECTIVE
Interval History: The patient is postop day 3 status post coronary artery bypass grafting x5 and aortic valve replacement with a 19 mm [place] mechanical valve.    ROS  Medications:  Continuous Infusions:  Scheduled Meds:   acetaminophen  650 mg Oral Q6H    ascorbic acid (vitamin C)  500 mg Oral BID    aspirin  81 mg Oral Daily    atorvastatin  80 mg Oral Daily    chlorhexidine  10 mL Mouth/Throat BID    cyanocobalamin  1,000 mcg Oral Daily    docusate sodium  100 mg Oral BID    ferrous sulfate  1 tablet Oral Daily    folic acid  1 mg Oral Daily    furosemide (LASIX) injection  40 mg Intravenous BID    guaiFENesin  1,200 mg Oral BID    magnesium hydroxide 400 mg/5 ml  5 mL Oral BID    metoprolol tartrate  25 mg Oral BID    pantoprazole  40 mg Oral Daily    polyethylene glycol  17 g Oral Daily    potassium chloride  20 mEq Oral Q12H    sodium chloride 3%  4 mL Nebulization Q12H    [START ON 9/25/2024] warfarin  0.5 mg Oral Daily     PRN Meds:0.9%  NaCl infusion (for blood administration), albumin human 5%, aluminum-magnesium hydroxide-simethicone, calcium gluconate IVPB, calcium gluconate IVPB, calcium gluconate IVPB, dextrose 10%, glucagon (human recombinant), influenza, insulin aspart U-100, lactated ringers, magnesium sulfate IVPB, melatonin, metoclopramide HCl, ondansetron, pneumoc 20-leslee conj-dip cr(PF), potassium chloride in water, potassium chloride in water, potassium chloride in water, traMADoL     Objective:     Vital Signs (Most Recent):  Temp: 97.3 °F (36.3 °C) (09/23/23 0709)  Pulse: 90 (09/23/23 0720)  Resp: 18 (09/23/23 0720)  BP: 128/72 (09/23/23 0709)  SpO2: 95 % (09/23/23 0720) Vital Signs (24h Range):  Temp:  [97.3 °F (36.3 °C)-99.1 °F (37.3 °C)] 97.3 °F (36.3 °C)  Pulse:  [] 90  Resp:  [16-20] 18  SpO2:  [92 %-98 %] 95 %  BP: (104-141)/(61-86) 128/72     Weight: 82.6 kg (182 lb 1.6 oz)  Body mass index is 27.69 kg/m².    SpO2: 95 %       Intake/Output - Last 3 Shifts         09/21  0700  09/22 0659 09/22 0700  09/23 0659 09/23 0700  09/24 0659    P.O. 60 400     I.V. (mL/kg)       Blood       IV Piggyback       Total Intake(mL/kg) 60 (0.7) 400 (4.8)     Urine (mL/kg/hr) 2250 (1.1) 3950 (2) 100 (0.5)    Drains       Chest Tube       Total Output 2250 3950 100    Net -2190 -3550 -100                   Lines/Drains/Airways       Peripherally Inserted Central Catheter Line  Duration             PICC Double Lumen 09/21/23 0930 left basilic 1 day              Line  Duration                  Pacer Wires 09/19/23  4 days                     Physical Exam  Constitutional:       Appearance: Normal appearance.   HENT:      Head: Normocephalic and atraumatic.      Nose: Nose normal.   Cardiovascular:      Rate and Rhythm: Normal rate and regular rhythm.   Pulmonary:      Effort: Pulmonary effort is normal.      Breath sounds: Normal breath sounds.   Abdominal:      General: Abdomen is flat. Bowel sounds are normal.      Palpations: Abdomen is soft.   Musculoskeletal:      Right lower leg: Edema present.      Left lower leg: Edema present.   Skin:     General: Skin is warm and dry.   Neurological:      General: No focal deficit present.      Mental Status: He is alert and oriented to person, place, and time.   Psychiatric:         Behavior: Behavior normal.            Significant Labs:  All pertinent labs from the last 24 hours have been reviewed.    Significant Diagnostics:  I have reviewed all pertinent imaging results/findings within the past 24 hours.

## 2023-09-23 NOTE — ASSESSMENT & PLAN NOTE
Cont management per CTS    09/23   Continue asa statin plavix lasix and metoprolol  On coumadin for mechanical AVR

## 2023-09-23 NOTE — PROGRESS NOTES
[facility] - Telemetry ([facility])  Cardiothoracic Surgery  Progress Note    Patient Name: Mikie Alcazar  MRN: [id number]  Admission Date: 9/17/2023  Hospital Length of Stay: 6 days  Code Status: Full Code   Attending Physician: Nishant Douglas MD   Referring Provider: Self, Aaareferral  Principal Problem:NSTEMI (non-ST elevated myocardial infarction)            Subjective:     Post-Op Info:  Procedure(s) (LRB):  CORONARY ARTERY BYPASS GRAFT (CABG) (N/A)  REPLACEMENT-VALVE-AORTIC (N/A)  SURGICAL PROCUREMENT, VEIN, ENDOSCOPIC (Left)  BLOCK, NERVE, INTERCOSTAL, 2 OR MORE (N/A)  ECHOCARDIOGRAM,TRANSESOPHAGEAL (N/A)   4 Days Post-Op     Interval History: The patient is postop day 3 status post coronary artery bypass grafting x5 and aortic valve replacement with a 19 mm [place] mechanical valve.    ROS  Medications:  Continuous Infusions:  Scheduled Meds:   acetaminophen  650 mg Oral Q6H    ascorbic acid (vitamin C)  500 mg Oral BID    aspirin  81 mg Oral Daily    atorvastatin  80 mg Oral Daily    chlorhexidine  10 mL Mouth/Throat BID    cyanocobalamin  1,000 mcg Oral Daily    docusate sodium  100 mg Oral BID    ferrous sulfate  1 tablet Oral Daily    folic acid  1 mg Oral Daily    furosemide (LASIX) injection  40 mg Intravenous BID    guaiFENesin  1,200 mg Oral BID    magnesium hydroxide 400 mg/5 ml  5 mL Oral BID    metoprolol tartrate  25 mg Oral BID    pantoprazole  40 mg Oral Daily    polyethylene glycol  17 g Oral Daily    potassium chloride  20 mEq Oral Q12H    sodium chloride 3%  4 mL Nebulization Q12H    [START ON 9/25/2024] warfarin  0.5 mg Oral Daily     PRN Meds:0.9%  NaCl infusion (for blood administration), albumin human 5%, aluminum-magnesium hydroxide-simethicone, calcium gluconate IVPB, calcium gluconate IVPB, calcium gluconate IVPB, dextrose 10%, glucagon (human recombinant), influenza, insulin aspart U-100, lactated ringers, magnesium sulfate IVPB, melatonin, metoclopramide HCl,  ondansetron, pneumoc 20-leslee conj-dip cr(PF), potassium chloride in water, potassium chloride in water, potassium chloride in water, traMADoL     Objective:     Vital Signs (Most Recent):  Temp: 97.3 °F (36.3 °C) (09/23/23 0709)  Pulse: 90 (09/23/23 0720)  Resp: 18 (09/23/23 0720)  BP: 128/72 (09/23/23 0709)  SpO2: 95 % (09/23/23 0720) Vital Signs (24h Range):  Temp:  [97.3 °F (36.3 °C)-99.1 °F (37.3 °C)] 97.3 °F (36.3 °C)  Pulse:  [] 90  Resp:  [16-20] 18  SpO2:  [92 %-98 %] 95 %  BP: (104-141)/(61-86) 128/72     Weight: 82.6 kg (182 lb 1.6 oz)  Body mass index is 27.69 kg/m².    SpO2: 95 %       Intake/Output - Last 3 Shifts         09/21 0700  09/22 0659 09/22 0700 09/23 0659 09/23 0700  09/24 0659    P.O. 60 400     I.V. (mL/kg)       Blood       IV Piggyback       Total Intake(mL/kg) 60 (0.7) 400 (4.8)     Urine (mL/kg/hr) 2250 (1.1) 3950 (2) 100 (0.5)    Drains       Chest Tube       Total Output 2250 3950 100    Net -2190 -3550 -100                   Lines/Drains/Airways       Peripherally Inserted Central Catheter Line  Duration             PICC Double Lumen 09/21/23 0930 left basilic 1 day              Line  Duration                  Pacer Wires 09/19/23  4 days                     Physical Exam  Constitutional:       Appearance: Normal appearance.   HENT:      Head: Normocephalic and atraumatic.      Nose: Nose normal.   Cardiovascular:      Rate and Rhythm: Normal rate and regular rhythm.   Pulmonary:      Effort: Pulmonary effort is normal.      Breath sounds: Normal breath sounds.   Abdominal:      General: Abdomen is flat. Bowel sounds are normal.      Palpations: Abdomen is soft.   Musculoskeletal:      Right lower leg: Edema present.      Left lower leg: Edema present.   Skin:     General: Skin is warm and dry.   Neurological:      General: No focal deficit present.      Mental Status: He is alert and oriented to person, place, and time.   Psychiatric:         Behavior: Behavior normal.             Significant Labs:  All pertinent labs from the last 24 hours have been reviewed.    Significant Diagnostics:  I have reviewed all pertinent imaging results/findings within the past 24 hours.    Assessment/Plan:     S/P CABG x 5  09/20/2023  The patient is postop day 1 status post coronary artery bypass grafting x5 and aortic valve replacement with a 19 mm Saint Dany mechanical valve.  Overall the patient is doing well.      Neuro:  Patient is awake appropriate and follows commands.  Patient is currently sedated with Precedex.  Will wean Precedex to off.   Cardiac:  Patient is hemodynamically stable.  With excellent cardiac output and cardiac index.  Patient is on low-dose vaso active meds.  Wean drips to off.  Will start metoprolol once drips are off.  Respiratory:  Patient is being weaned to extubation.  Patient is an active smoker Start nebs and Mucomyst.  Continue pulmonary toileting.  Continue incentive spirometer.    GI:  Patient is currently NPO.  Will start p.o. intake once extubated.    Renal:  Patient has good urine output and creatinine is 1.2.  Will start Lasix for diuresis.  Endocrine:  Patient required insulin drip for glucose controlled.  Will convert to sliding scale.    Id:  Patient had a T-max of 102°.  Most likely due to stress of surgery.  Will continue to follow.  White count is 9.  Patient is on manny op antibiotics.  Heme: Hematocrit is 17.  Patient is being transfused.  Platelet count is 74.  No evidence of active bleeding.  Will follow platelet count.  Will start patient on Coumadin anticoagulation for mechanical valve.  Activities:  Patient is currently in bed.  Will advance activities as tolerated once extubated.  Line tubes and drains:  Patient has an ETT, right IJ [place] and Cordis, right groin A-line, chest tubes, Ugalde catheter, pacer wires, and saphenectomy site DARON.    09/21/2023     The patient is postop day 2 status post coronary artery bypass grafting x5 and aortic valve  replacement with a 19 mm Saint Dany mechanical valve.  Overall the patient is doing well.    Neuro:  Patient is awake alert and oriented x3.  Pain is well controlled with current pain management.  Cardiac:  The patient is off all drips.  Patient is hemodynamically stable.  Continue metoprolol.  Respiratory:  Patient was extubated yesterday.  Continue pulmonary toileting.  Continue incentive spirometer.   GI:  Patient is tolerating p.o. intake.  Patient had episodes of nausea yesterday which have abated.  Advance patient's diet as tolerated.    Renal:  Patient has good urine output.  Creatinine is 0.9.  Continue Lasix for diuresis.  Endocrine:  Glucose is controlled with sliding scale insulin.    Id:  T-max is 101.8 patient is currently afebrile.  White count is 17.  Most likely due to stress of surgery.  Continue to follow.  Heme:  Hematocrit is 24.5.  Platelet count is 89.  INR is 1.5.  Patient is started on Coumadin for anticoagulation of mechanical valve.  Activities:  Patient will be out of bed to chair.  Advance activities as tolerated.  Line tubes and drains:  Patient has pacer wires, right IJ Cordis and Ugalde catheter.  Will place PICC line and discontinue Cordis.    09/22/2023   The patient is postop day 3 status post coronary artery bypass grafting x5 and aortic valve replacement with a 19 mm Saint Dany mechanical valve.  Overall the patient is doing well.  Patient has been transferred to telemetry.    Neuro:  Patient is awake alert and oriented x3.  Pain is well controlled with current pain regimen.    Cardiac:  Patient is hemodynamically stable.  Continue metoprolol.    Respiratory:  Continue pulmonary toileting.  Continue incentive spirometer.    GI:  Patient is tolerating p.o. intake.  And tolerating a regular diet.    Renal:  Patient has good urine output.  Creatinine is 0.9.  Continue Lasix.    Endocrine:  Glucose is well controlled.    Id:  Patient is afebrile.  White count is 16.  Continue to  follow.    Heme:  Hematocrit is 24 and platelet count is 114.  INR is 3.4.  Patient is currently on Coumadin for anticoagulation.  Activities:  Patient is out of bed and ambulating in the hallways.  Continue to advance as tolerated.  Line tubes and drains:  Patient has a PICC line and pacer wires.      CAD, multiple vessel  The patient is a 43-year-old male with critical left main coronary artery disease and severe aortic regurgitation by cardiac catheterization.  The patient is a candidate for urgent coronary artery bypass grafting and aortic valve replacement.  The risks and benefits of surgery were explained to the patient.  The patient understands the risks and benefits of surgery and has agreed to proceed with urgent aortic valve replacement and coronary artery bypass grafting.  The type of aortic valve to be used was discussed with the patient.  The patient stated a preference for a bioprosthetic valve in order to avoid the need for chronic anticoagulation.  The patient understands that a bioprosthetic valve especially in a young patient has a limited lifespan.        Nishant Douglas MD  Cardiothoracic Surgery  [facility])

## 2023-09-23 NOTE — PLAN OF CARE
Patient updated on plan of care. Instructed patient to use call light for assistance, call light in reach. Hourly rounding performed, fall and safety precautions maintained. Pacer wires removed today by MD. Vitals q 4hours. Education provided, questions answered/encouraged. IV's and lines CDI, medication administered as ordered. Chart check complete. Sinus rhythm on tele box #5125.    Problem: Adult Inpatient Plan of Care  Goal: Plan of Care Review  Outcome: Ongoing, Progressing

## 2023-09-23 NOTE — PLAN OF CARE
Pt oriented x4 VSS  Plan of care reviewed. pt verbalizes understanding.  Patient moving/ turning with assistance.  Patient a paced on monitor. External pacer present. Wound vac intact  Bed low, side rails up x2, wheels locked, call light in reach.  Pt instructed to call for assistance

## 2023-09-23 NOTE — ASSESSMENT & PLAN NOTE
09/20/2023  The patient is postop day 1 status post coronary artery bypass grafting x5 and aortic valve replacement with a 19 mm Saint Dany mechanical valve.  Overall the patient is doing well.      Neuro:  Patient is awake appropriate and follows commands.  Patient is currently sedated with Precedex.  Will wean Precedex to off.   Cardiac:  Patient is hemodynamically stable.  With excellent cardiac output and cardiac index.  Patient is on low-dose vaso active meds.  Wean drips to off.  Will start metoprolol once drips are off.  Respiratory:  Patient is being weaned to extubation.  Patient is an active smoker Start nebs and Mucomyst.  Continue pulmonary toileting.  Continue incentive spirometer.    GI:  Patient is currently NPO.  Will start p.o. intake once extubated.    Renal:  Patient has good urine output and creatinine is 1.2.  Will start Lasix for diuresis.  Endocrine:  Patient required insulin drip for glucose controlled.  Will convert to sliding scale.    Id:  Patient had a T-max of 102°.  Most likely due to stress of surgery.  Will continue to follow.  White count is 9.  Patient is on manny op antibiotics.  Heme: Hematocrit is 17.  Patient is being transfused.  Platelet count is 74.  No evidence of active bleeding.  Will follow platelet count.  Will start patient on Coumadin anticoagulation for mechanical valve.  Activities:  Patient is currently in bed.  Will advance activities as tolerated once extubated.  Line tubes and drains:  Patient has an ETT, right IJ [place] and Cordis, right groin A-line, chest tubes, Ugalde catheter, pacer wires, and saphenectomy site DARON.    09/21/2023     The patient is postop day 2 status post coronary artery bypass grafting x5 and aortic valve replacement with a 19 mm Saint Dany mechanical valve.  Overall the patient is doing well.    Neuro:  Patient is awake alert and oriented x3.  Pain is well controlled with current pain management.  Cardiac:  The patient is off all drips.   Patient is hemodynamically stable.  Continue metoprolol.  Respiratory:  Patient was extubated yesterday.  Continue pulmonary toileting.  Continue incentive spirometer.   GI:  Patient is tolerating p.o. intake.  Patient had episodes of nausea yesterday which have abated.  Advance patient's diet as tolerated.    Renal:  Patient has good urine output.  Creatinine is 0.9.  Continue Lasix for diuresis.  Endocrine:  Glucose is controlled with sliding scale insulin.    Id:  T-max is 101.8 patient is currently afebrile.  White count is 17.  Most likely due to stress of surgery.  Continue to follow.  Heme:  Hematocrit is 24.5.  Platelet count is 89.  INR is 1.5.  Patient is started on Coumadin for anticoagulation of mechanical valve.  Activities:  Patient will be out of bed to chair.  Advance activities as tolerated.  Line tubes and drains:  Patient has pacer wires, right IJ Cordis and Ugalde catheter.  Will place PICC line and discontinue Cordis.    09/22/2023   The patient is postop day 3 status post coronary artery bypass grafting x5 and aortic valve replacement with a 19 mm Saint Dany mechanical valve.  Overall the patient is doing well.  Patient has been transferred to telemetry.    Neuro:  Patient is awake alert and oriented x3.  Pain is well controlled with current pain regimen.    Cardiac:  Patient is hemodynamically stable.  Continue metoprolol.    Respiratory:  Continue pulmonary toileting.  Continue incentive spirometer.    GI:  Patient is tolerating p.o. intake.  And tolerating a regular diet.    Renal:  Patient has good urine output.  Creatinine is 0.9.  Continue Lasix.    Endocrine:  Glucose is well controlled.    Id:  Patient is afebrile.  White count is 16.  Continue to follow.    Heme:  Hematocrit is 24 and platelet count is 114.  INR is 3.4.  Patient is currently on Coumadin for anticoagulation.  Activities:  Patient is out of bed and ambulating in the hallways.  Continue to advance as tolerated.  Line tubes  and drains:  Patient has a PICC line and pacer wires.    09/23/2023   The patient is postop day 4 status post coronary artery bypass grafting x5 and aortic valve replacement with a 19 mm Saint Dany mechanical valve.  Overall the patient is doing well.  Anticipate discharge to home with home health in the next 48-72 hours.    Neuro:  Patient is awake alert and oriented x3 pain is well controlled with pain management.  Neuro exam is nonfocal.    Cardiac:  Patient is hemodynamically stable.  Continue metoprolol.    Respiratory:  Continue pulmonary toileting.  Continue incentive spirometer.  GI patient is tolerating p.o. intake.  Will increase bowel regimen for bowel movements.  Renal: Patient has good urine output.  Creatinine is 0.9.  Patient is still appears fluid overloaded.  Continue Lasix.  Endocrine: Glucose is well controlled.    Id:  Patient is afebrile.  White count is down to 12.  Continue to follow.    Heme:  Hematocrit is 27.  Platelet count is 186 and INR is 1.9.  INR goal is between 2.5 and 3.  Continue Coumadin.    Activities:  Patient is out of bed and ambulating in the hallways.  Advance as tolerated.  Line tubes and drains:  Patient has a PICC line.

## 2023-09-23 NOTE — ASSESSMENT & PLAN NOTE
09/20/2023  The patient is postop day 1 status post coronary artery bypass grafting x5 and aortic valve replacement with a 19 mm Saint Dany mechanical valve.  Overall the patient is doing well.      Neuro:  Patient is awake appropriate and follows commands.  Patient is currently sedated with Precedex.  Will wean Precedex to off.   Cardiac:  Patient is hemodynamically stable.  With excellent cardiac output and cardiac index.  Patient is on low-dose vaso active meds.  Wean drips to off.  Will start metoprolol once drips are off.  Respiratory:  Patient is being weaned to extubation.  Patient is an active smoker Start nebs and Mucomyst.  Continue pulmonary toileting.  Continue incentive spirometer.    GI:  Patient is currently NPO.  Will start p.o. intake once extubated.    Renal:  Patient has good urine output and creatinine is 1.2.  Will start Lasix for diuresis.  Endocrine:  Patient required insulin drip for glucose controlled.  Will convert to sliding scale.    Id:  Patient had a T-max of 102°.  Most likely due to stress of surgery.  Will continue to follow.  White count is 9.  Patient is on manny op antibiotics.  Heme: Hematocrit is 17.  Patient is being transfused.  Platelet count is 74.  No evidence of active bleeding.  Will follow platelet count.  Will start patient on Coumadin anticoagulation for mechanical valve.  Activities:  Patient is currently in bed.  Will advance activities as tolerated once extubated.  Line tubes and drains:  Patient has an ETT, right IJ [place] and Cordis, right groin A-line, chest tubes, Ugalde catheter, pacer wires, and saphenectomy site DARON.    09/21/2023     The patient is postop day 2 status post coronary artery bypass grafting x5 and aortic valve replacement with a 19 mm Saint Dany mechanical valve.  Overall the patient is doing well.    Neuro:  Patient is awake alert and oriented x3.  Pain is well controlled with current pain management.  Cardiac:  The patient is off all drips.   Patient is hemodynamically stable.  Continue metoprolol.  Respiratory:  Patient was extubated yesterday.  Continue pulmonary toileting.  Continue incentive spirometer.   GI:  Patient is tolerating p.o. intake.  Patient had episodes of nausea yesterday which have abated.  Advance patient's diet as tolerated.    Renal:  Patient has good urine output.  Creatinine is 0.9.  Continue Lasix for diuresis.  Endocrine:  Glucose is controlled with sliding scale insulin.    Id:  T-max is 101.8 patient is currently afebrile.  White count is 17.  Most likely due to stress of surgery.  Continue to follow.  Heme:  Hematocrit is 24.5.  Platelet count is 89.  INR is 1.5.  Patient is started on Coumadin for anticoagulation of mechanical valve.  Activities:  Patient will be out of bed to chair.  Advance activities as tolerated.  Line tubes and drains:  Patient has pacer wires, right IJ Cordis and Ugalde catheter.  Will place PICC line and discontinue Cordis.    09/22/2023   The patient is postop day 3 status post coronary artery bypass grafting x5 and aortic valve replacement with a 19 mm Saint Dany mechanical valve.  Overall the patient is doing well.  Patient has been transferred to telemetry.    Neuro:  Patient is awake alert and oriented x3.  Pain is well controlled with current pain regimen.    Cardiac:  Patient is hemodynamically stable.  Continue metoprolol.    Respiratory:  Continue pulmonary toileting.  Continue incentive spirometer.    GI:  Patient is tolerating p.o. intake.  And tolerating a regular diet.    Renal:  Patient has good urine output.  Creatinine is 0.9.  Continue Lasix.    Endocrine:  Glucose is well controlled.    Id:  Patient is afebrile.  White count is 16.  Continue to follow.    Heme:  Hematocrit is 24 and platelet count is 114.  INR is 3.4.  Patient is currently on Coumadin for anticoagulation.  Activities:  Patient is out of bed and ambulating in the hallways.  Continue to advance as tolerated.  Line tubes  and drains:  Patient has a PICC line and pacer wires.

## 2023-09-23 NOTE — PROGRESS NOTES
[facility] - Telemetry ([facility])  Cardiothoracic Surgery  Progress Note    Patient Name: Mikie Alcazar  MRN: [id number]  Admission Date: 9/17/2023  Hospital Length of Stay: 6 days  Code Status: Full Code   Attending Physician: Nishant Douglas MD   Referring Provider: Self, Aaareferral  Principal Problem:NSTEMI (non-ST elevated myocardial infarction)            Subjective:     Post-Op Info:  Procedure(s) (LRB):  CORONARY ARTERY BYPASS GRAFT (CABG) (N/A)  REPLACEMENT-VALVE-AORTIC (N/A)  SURGICAL PROCUREMENT, VEIN, ENDOSCOPIC (Left)  BLOCK, NERVE, INTERCOSTAL, 2 OR MORE (N/A)  ECHOCARDIOGRAM,TRANSESOPHAGEAL (N/A)   4 Days Post-Op     Interval History: The patient is postop day 4 status post coronary artery bypass grafting x5 and aortic valve replacement with a 19 mm Saint Dany mechanical valve.    ROS  Medications:  Continuous Infusions:  Scheduled Meds:   acetaminophen  650 mg Oral Q6H    ascorbic acid (vitamin C)  500 mg Oral BID    aspirin  81 mg Oral Daily    atorvastatin  80 mg Oral Daily    chlorhexidine  10 mL Mouth/Throat BID    cyanocobalamin  1,000 mcg Oral Daily    docusate sodium  100 mg Oral BID    ferrous sulfate  1 tablet Oral Daily    folic acid  1 mg Oral Daily    furosemide (LASIX) injection  40 mg Intravenous BID    guaiFENesin  1,200 mg Oral BID    magnesium hydroxide 400 mg/5 ml  5 mL Oral BID    metoprolol tartrate  25 mg Oral BID    pantoprazole  40 mg Oral Daily    polyethylene glycol  17 g Oral Daily    potassium chloride  20 mEq Oral Q12H    sodium chloride 3%  4 mL Nebulization Q12H    [START ON 9/25/2024] warfarin  0.5 mg Oral Daily     PRN Meds:0.9%  NaCl infusion (for blood administration), albumin human 5%, aluminum-magnesium hydroxide-simethicone, calcium gluconate IVPB, calcium gluconate IVPB, calcium gluconate IVPB, dextrose 10%, glucagon (human recombinant), influenza, insulin aspart U-100, lactated ringers, magnesium sulfate IVPB, melatonin, metoclopramide HCl,  ondansetron, pneumoc 20-leslee conj-dip cr(PF), potassium chloride in water, potassium chloride in water, potassium chloride in water, traMADoL     Objective:     Vital Signs (Most Recent):  Temp: 97.3 °F (36.3 °C) (09/23/23 0709)  Pulse: 90 (09/23/23 0720)  Resp: 18 (09/23/23 0720)  BP: 128/72 (09/23/23 0709)  SpO2: 95 % (09/23/23 0720) Vital Signs (24h Range):  Temp:  [97.3 °F (36.3 °C)-99.1 °F (37.3 °C)] 97.3 °F (36.3 °C)  Pulse:  [] 90  Resp:  [16-20] 18  SpO2:  [92 %-98 %] 95 %  BP: (104-141)/(61-86) 128/72     Weight: 82.6 kg (182 lb 1.6 oz)  Body mass index is 27.69 kg/m².    SpO2: 95 %       Intake/Output - Last 3 Shifts         09/21 0700  09/22 0659 09/22 0700 09/23 0659 09/23 0700  09/24 0659    P.O. 60 400     I.V. (mL/kg)       Blood       IV Piggyback       Total Intake(mL/kg) 60 (0.7) 400 (4.8)     Urine (mL/kg/hr) 2250 (1.1) 3950 (2) 100 (0.5)    Drains       Chest Tube       Total Output 2250 3950 100    Net -2190 -3550 -100                   Lines/Drains/Airways       Peripherally Inserted Central Catheter Line  Duration             PICC Double Lumen 09/21/23 0930 left basilic 1 day              Line  Duration                  Pacer Wires 09/19/23  4 days                     Physical Exam  Constitutional:       Appearance: Normal appearance.   HENT:      Head: Normocephalic and atraumatic.      Nose: Nose normal.   Cardiovascular:      Rate and Rhythm: Normal rate and regular rhythm.      Pulses: Normal pulses.      Heart sounds: Normal heart sounds.   Pulmonary:      Effort: Pulmonary effort is normal.      Breath sounds: Normal breath sounds.   Abdominal:      General: Abdomen is flat. Bowel sounds are normal.      Palpations: Abdomen is soft.   Musculoskeletal:      Right lower leg: Edema present.      Left lower leg: Edema present.   Skin:     General: Skin is dry.   Neurological:      General: No focal deficit present.      Mental Status: He is alert and oriented to person, place, and  time.   Psychiatric:         Behavior: Behavior normal.            Significant Labs:  All pertinent labs from the last 24 hours have been reviewed.    Significant Diagnostics:  I have reviewed all pertinent imaging results/findings within the past 24 hours.    Assessment/Plan:     S/P CABG x 5  09/20/2023  The patient is postop day 1 status post coronary artery bypass grafting x5 and aortic valve replacement with a 19 mm Saint Dany mechanical valve.  Overall the patient is doing well.      Neuro:  Patient is awake appropriate and follows commands.  Patient is currently sedated with Precedex.  Will wean Precedex to off.   Cardiac:  Patient is hemodynamically stable.  With excellent cardiac output and cardiac index.  Patient is on low-dose vaso active meds.  Wean drips to off.  Will start metoprolol once drips are off.  Respiratory:  Patient is being weaned to extubation.  Patient is an active smoker Start nebs and Mucomyst.  Continue pulmonary toileting.  Continue incentive spirometer.    GI:  Patient is currently NPO.  Will start p.o. intake once extubated.    Renal:  Patient has good urine output and creatinine is 1.2.  Will start Lasix for diuresis.  Endocrine:  Patient required insulin drip for glucose controlled.  Will convert to sliding scale.    Id:  Patient had a T-max of 102°.  Most likely due to stress of surgery.  Will continue to follow.  White count is 9.  Patient is on manny op antibiotics.  Heme: Hematocrit is 17.  Patient is being transfused.  Platelet count is 74.  No evidence of active bleeding.  Will follow platelet count.  Will start patient on Coumadin anticoagulation for mechanical valve.  Activities:  Patient is currently in bed.  Will advance activities as tolerated once extubated.  Line tubes and drains:  Patient has an ETT, right IJ [place] and Cordis, right groin A-line, chest tubes, Ugalde catheter, pacer wires, and saphenectomy site DARON.    09/21/2023     The patient is postop day 2 status post  coronary artery bypass grafting x5 and aortic valve replacement with a 19 mm Saint Dany mechanical valve.  Overall the patient is doing well.    Neuro:  Patient is awake alert and oriented x3.  Pain is well controlled with current pain management.  Cardiac:  The patient is off all drips.  Patient is hemodynamically stable.  Continue metoprolol.  Respiratory:  Patient was extubated yesterday.  Continue pulmonary toileting.  Continue incentive spirometer.   GI:  Patient is tolerating p.o. intake.  Patient had episodes of nausea yesterday which have abated.  Advance patient's diet as tolerated.    Renal:  Patient has good urine output.  Creatinine is 0.9.  Continue Lasix for diuresis.  Endocrine:  Glucose is controlled with sliding scale insulin.    Id:  T-max is 101.8 patient is currently afebrile.  White count is 17.  Most likely due to stress of surgery.  Continue to follow.  Heme:  Hematocrit is 24.5.  Platelet count is 89.  INR is 1.5.  Patient is started on Coumadin for anticoagulation of mechanical valve.  Activities:  Patient will be out of bed to chair.  Advance activities as tolerated.  Line tubes and drains:  Patient has pacer wires, right IJ Cordis and Ugalde catheter.  Will place PICC line and discontinue Cordis.    09/22/2023   The patient is postop day 3 status post coronary artery bypass grafting x5 and aortic valve replacement with a 19 mm Saint Dany mechanical valve.  Overall the patient is doing well.  Patient has been transferred to telemetry.    Neuro:  Patient is awake alert and oriented x3.  Pain is well controlled with current pain regimen.    Cardiac:  Patient is hemodynamically stable.  Continue metoprolol.    Respiratory:  Continue pulmonary toileting.  Continue incentive spirometer.    GI:  Patient is tolerating p.o. intake.  And tolerating a regular diet.    Renal:  Patient has good urine output.  Creatinine is 0.9.  Continue Lasix.    Endocrine:  Glucose is well controlled.    Id:  Patient  is afebrile.  White count is 16.  Continue to follow.    Heme:  Hematocrit is 24 and platelet count is 114.  INR is 3.4.  Patient is currently on Coumadin for anticoagulation.  Activities:  Patient is out of bed and ambulating in the hallways.  Continue to advance as tolerated.  Line tubes and drains:  Patient has a PICC line and pacer wires.    09/23/2023   The patient is postop day 4 status post coronary artery bypass grafting x5 and aortic valve replacement with a 19 mm Saint Dany mechanical valve.  Overall the patient is doing well.  Anticipate discharge to home with home health in the next 48-72 hours.    Neuro:  Patient is awake alert and oriented x3 pain is well controlled with pain management.  Neuro exam is nonfocal.    Cardiac:  Patient is hemodynamically stable.  Continue metoprolol.    Respiratory:  Continue pulmonary toileting.  Continue incentive spirometer.  GI patient is tolerating p.o. intake.  Will increase bowel regimen for bowel movements.  Renal: Patient has good urine output.  Creatinine is 0.9.  Patient is still appears fluid overloaded.  Continue Lasix.  Endocrine: Glucose is well controlled.    Id:  Patient is afebrile.  White count is down to 12.  Continue to follow.    Heme:  Hematocrit is 27.  Platelet count is 186 and INR is 1.9.  INR goal is between 2.5 and 3.  Continue Coumadin.    Activities:  Patient is out of bed and ambulating in the hallways.  Advance as tolerated.  Line tubes and drains:  Patient has a PICC line.        CAD, multiple vessel  The patient is a 43-year-old male with critical left main coronary artery disease and severe aortic regurgitation by cardiac catheterization.  The patient is a candidate for urgent coronary artery bypass grafting and aortic valve replacement.  The risks and benefits of surgery were explained to the patient.  The patient understands the risks and benefits of surgery and has agreed to proceed with urgent aortic valve replacement and coronary  artery bypass grafting.  The type of aortic valve to be used was discussed with the patient.  The patient stated a preference for a bioprosthetic valve in order to avoid the need for chronic anticoagulation.  The patient understands that a bioprosthetic valve especially in a young patient has a limited lifespan.        Nishant Douglas MD  Cardiothoracic Surgery  [facility])

## 2023-09-24 LAB
APTT PPP: 31.1 SEC (ref 21–32)
APTT PPP: 31.8 SEC (ref 21–32)
APTT PPP: 34.5 SEC (ref 21–32)
BASOPHILS # BLD AUTO: 0.03 K/UL (ref 0–0.2)
BASOPHILS NFR BLD: 0.3 % (ref 0–1.9)
DIFFERENTIAL METHOD: ABNORMAL
EOSINOPHIL # BLD AUTO: 0.1 K/UL (ref 0–0.5)
EOSINOPHIL NFR BLD: 1.4 % (ref 0–8)
ERYTHROCYTE [DISTWIDTH] IN BLOOD BY AUTOMATED COUNT: 16.2 % (ref 11.5–14.5)
HCT VFR BLD AUTO: 27.6 % (ref 40–54)
HGB BLD-MCNC: 9.1 G/DL (ref 14–18)
IMM GRANULOCYTES # BLD AUTO: 0.09 K/UL (ref 0–0.04)
IMM GRANULOCYTES NFR BLD AUTO: 1 % (ref 0–0.5)
INR PPP: 1.5 (ref 0.8–1.2)
LYMPHOCYTES # BLD AUTO: 1.8 K/UL (ref 1–4.8)
LYMPHOCYTES NFR BLD: 20.1 % (ref 18–48)
MAGNESIUM SERPL-MCNC: 2.3 MG/DL (ref 1.6–2.6)
MAGNESIUM SERPL-MCNC: 2.3 MG/DL (ref 1.6–2.6)
MCH RBC QN AUTO: 29 PG (ref 27–31)
MCHC RBC AUTO-ENTMCNC: 33 G/DL (ref 32–36)
MCV RBC AUTO: 88 FL (ref 82–98)
MONOCYTES # BLD AUTO: 1.3 K/UL (ref 0.3–1)
MONOCYTES NFR BLD: 14 % (ref 4–15)
NEUTROPHILS # BLD AUTO: 5.8 K/UL (ref 1.8–7.7)
NEUTROPHILS NFR BLD: 63.2 % (ref 38–73)
NRBC BLD-RTO: 0 /100 WBC
PLATELET # BLD AUTO: 239 K/UL (ref 150–450)
PMV BLD AUTO: 9.8 FL (ref 9.2–12.9)
POCT GLUCOSE: 111 MG/DL (ref 70–110)
POCT GLUCOSE: 112 MG/DL (ref 70–110)
POCT GLUCOSE: 120 MG/DL (ref 70–110)
POCT GLUCOSE: 122 MG/DL (ref 70–110)
POCT GLUCOSE: 83 MG/DL (ref 70–110)
PROTHROMBIN TIME: 15.5 SEC (ref 9–12.5)
RBC # BLD AUTO: 3.14 M/UL (ref 4.6–6.2)
WBC # BLD AUTO: 9.17 K/UL (ref 3.9–12.7)

## 2023-09-24 PROCEDURE — 25000242 PHARM REV CODE 250 ALT 637 W/ HCPCS: Performed by: THORACIC SURGERY (CARDIOTHORACIC VASCULAR SURGERY)

## 2023-09-24 PROCEDURE — 25000003 PHARM REV CODE 250: Performed by: INTERNAL MEDICINE

## 2023-09-24 PROCEDURE — 85025 COMPLETE CBC W/AUTO DIFF WBC: CPT | Performed by: INTERNAL MEDICINE

## 2023-09-24 PROCEDURE — 85610 PROTHROMBIN TIME: CPT | Performed by: INTERNAL MEDICINE

## 2023-09-24 PROCEDURE — 97116 GAIT TRAINING THERAPY: CPT | Mod: CQ

## 2023-09-24 PROCEDURE — 99232 PR SUBSEQUENT HOSPITAL CARE,LEVL II: ICD-10-PCS | Mod: ,,, | Performed by: INTERNAL MEDICINE

## 2023-09-24 PROCEDURE — 99232 SBSQ HOSP IP/OBS MODERATE 35: CPT | Mod: ,,, | Performed by: INTERNAL MEDICINE

## 2023-09-24 PROCEDURE — 97530 THERAPEUTIC ACTIVITIES: CPT

## 2023-09-24 PROCEDURE — 94640 AIRWAY INHALATION TREATMENT: CPT

## 2023-09-24 PROCEDURE — 94664 DEMO&/EVAL PT USE INHALER: CPT

## 2023-09-24 PROCEDURE — 27000646 HC AEROBIKA DEVICE

## 2023-09-24 PROCEDURE — 85730 THROMBOPLASTIN TIME PARTIAL: CPT | Mod: 91 | Performed by: THORACIC SURGERY (CARDIOTHORACIC VASCULAR SURGERY)

## 2023-09-24 PROCEDURE — 94799 UNLISTED PULMONARY SVC/PX: CPT

## 2023-09-24 PROCEDURE — 25000003 PHARM REV CODE 250: Performed by: NURSE PRACTITIONER

## 2023-09-24 PROCEDURE — 21400001 HC TELEMETRY ROOM

## 2023-09-24 PROCEDURE — 94761 N-INVAS EAR/PLS OXIMETRY MLT: CPT

## 2023-09-24 PROCEDURE — 97530 THERAPEUTIC ACTIVITIES: CPT | Mod: CQ

## 2023-09-24 PROCEDURE — 99900035 HC TECH TIME PER 15 MIN (STAT)

## 2023-09-24 PROCEDURE — 63600175 PHARM REV CODE 636 W HCPCS: Performed by: THORACIC SURGERY (CARDIOTHORACIC VASCULAR SURGERY)

## 2023-09-24 PROCEDURE — 83735 ASSAY OF MAGNESIUM: CPT | Performed by: INTERNAL MEDICINE

## 2023-09-24 PROCEDURE — 25000003 PHARM REV CODE 250: Performed by: THORACIC SURGERY (CARDIOTHORACIC VASCULAR SURGERY)

## 2023-09-24 RX ORDER — HEPARIN SODIUM 10000 [USP'U]/100ML
500 INJECTION, SOLUTION INTRAVENOUS CONTINUOUS
Status: DISCONTINUED | OUTPATIENT
Start: 2023-09-24 | End: 2023-09-25

## 2023-09-24 RX ADMIN — METOPROLOL TARTRATE 25 MG: 25 TABLET, FILM COATED ORAL at 08:09

## 2023-09-24 RX ADMIN — WARFARIN SODIUM 1 MG: 1 TABLET ORAL at 04:09

## 2023-09-24 RX ADMIN — LUBIPROSTONE 24 MCG: 24 CAPSULE, GELATIN COATED ORAL at 08:09

## 2023-09-24 RX ADMIN — LUBIPROSTONE 24 MCG: 24 CAPSULE, GELATIN COATED ORAL at 04:09

## 2023-09-24 RX ADMIN — TRAMADOL HYDROCHLORIDE 50 MG: 50 TABLET, COATED ORAL at 05:09

## 2023-09-24 RX ADMIN — DOCUSATE SODIUM 100 MG: 100 CAPSULE, LIQUID FILLED ORAL at 08:09

## 2023-09-24 RX ADMIN — FERROUS SULFATE TAB 325 MG (65 MG ELEMENTAL FE) 1 EACH: 325 (65 FE) TAB at 08:09

## 2023-09-24 RX ADMIN — MAGNESIUM HYDROXIDE 400 MG: 400 SUSPENSION ORAL at 08:09

## 2023-09-24 RX ADMIN — TRAMADOL HYDROCHLORIDE 50 MG: 50 TABLET, COATED ORAL at 02:09

## 2023-09-24 RX ADMIN — SODIUM CHLORIDE 30 MG/ML INHALATION SOLUTION 4 ML: 30 SOLUTION INHALANT at 07:09

## 2023-09-24 RX ADMIN — GUAIFENESIN 1200 MG: 600 TABLET, EXTENDED RELEASE ORAL at 08:09

## 2023-09-24 RX ADMIN — TRAMADOL HYDROCHLORIDE 50 MG: 50 TABLET, COATED ORAL at 08:09

## 2023-09-24 RX ADMIN — OXYCODONE HYDROCHLORIDE AND ACETAMINOPHEN 500 MG: 500 TABLET ORAL at 08:09

## 2023-09-24 RX ADMIN — ASPIRIN 81 MG: 81 TABLET, COATED ORAL at 08:09

## 2023-09-24 RX ADMIN — MAGNESIUM SULFATE HEPTAHYDRATE 4 G: 40 INJECTION, SOLUTION INTRAVENOUS at 06:09

## 2023-09-24 RX ADMIN — CHLORHEXIDINE GLUCONATE 0.12% ORAL RINSE 10 ML: 1.2 LIQUID ORAL at 08:09

## 2023-09-24 RX ADMIN — PANTOPRAZOLE SODIUM 40 MG: 40 TABLET, DELAYED RELEASE ORAL at 08:09

## 2023-09-24 RX ADMIN — ATORVASTATIN CALCIUM 80 MG: 40 TABLET, FILM COATED ORAL at 08:09

## 2023-09-24 RX ADMIN — MAGNESIUM SULFATE HEPTAHYDRATE 4 G: 40 INJECTION, SOLUTION INTRAVENOUS at 05:09

## 2023-09-24 RX ADMIN — HEPARIN SODIUM 500 UNITS/HR: 10000 INJECTION, SOLUTION INTRAVENOUS at 10:09

## 2023-09-24 RX ADMIN — POLYETHYLENE GLYCOL 3350 17 G: 17 POWDER, FOR SOLUTION ORAL at 08:09

## 2023-09-24 RX ADMIN — FOLIC ACID 1 MG: 1 TABLET ORAL at 08:09

## 2023-09-24 RX ADMIN — CYANOCOBALAMIN TAB 1000 MCG 1000 MCG: 1000 TAB at 08:09

## 2023-09-24 NOTE — PLAN OF CARE
PATIENT WAS SITTING UPON ARRIVAL WITH FAMILY IN THE ROOM    REVIEWED STERNAL PRECAUTIONS WITH PATIENT    SIT<-->STAND SBA    AMBULATED WITH NO (ADDED) WITH CONVERSATION 400' WITH REPORTS OF FEELING GOOD BUT ALSO REPORTING THAT HE GETS REALLY TIRED AFTER HE MOVES.     PTA SUGGESTED FOR HIM TO SIT AND REST DUE TO HIM MAY HAVING TO PERFORM MULTIPLE T/F'S T/O THE DAY AND HIM NEEDING TO CONSERVE SOME OF HIS ENERGY FOR FUTURE USE.

## 2023-09-24 NOTE — SUBJECTIVE & OBJECTIVE
Interval History:  Patient is postop day 5 status post coronary artery bypass grafting x5 and aortic valve replacement with a 19 mm Saint Dany mechanical valve.    ROS  Medications:  Continuous Infusions:  Scheduled Meds:   ascorbic acid (vitamin C)  500 mg Oral BID    aspirin  81 mg Oral Daily    atorvastatin  80 mg Oral Daily    chlorhexidine  10 mL Mouth/Throat BID    cyanocobalamin  1,000 mcg Oral Daily    docusate sodium  100 mg Oral BID    ferrous sulfate  1 tablet Oral Daily    folic acid  1 mg Oral Daily    guaiFENesin  1,200 mg Oral BID    lubiprostone  24 mcg Oral BID WM    magnesium hydroxide 400 mg/5 ml  5 mL Oral BID    metoprolol tartrate  25 mg Oral BID    pantoprazole  40 mg Oral Daily    polyethylene glycol  17 g Oral Daily    sodium chloride 3%  4 mL Nebulization Q12H    warfarin  1 mg Oral Daily     PRN Meds:0.9%  NaCl infusion (for blood administration), albumin human 5%, aluminum-magnesium hydroxide-simethicone, bisacodyL, calcium gluconate IVPB, calcium gluconate IVPB, calcium gluconate IVPB, dextrose 10%, glucagon (human recombinant), influenza, insulin aspart U-100, lactated ringers, magnesium sulfate IVPB, melatonin, metoclopramide HCl, ondansetron, pneumoc 20-leslee conj-dip cr(PF), potassium chloride in water, potassium chloride in water, potassium chloride in water, traMADoL     Objective:     Vital Signs (Most Recent):  Temp: 97.7 °F (36.5 °C) (09/24/23 0727)  Pulse: 109 (09/24/23 0911)  Resp: 16 (09/24/23 0731)  BP: 115/67 (09/24/23 0727)  SpO2: 95 % (09/24/23 0731) Vital Signs (24h Range):  Temp:  [97.3 °F (36.3 °C)-99 °F (37.2 °C)] 97.7 °F (36.5 °C)  Pulse:  [] 109  Resp:  [14-20] 16  SpO2:  [91 %-97 %] 95 %  BP: (114-144)/(60-84) 115/67     Weight: 82.6 kg (182 lb 1.6 oz)  Body mass index is 27.69 kg/m².    SpO2: 95 %       Intake/Output - Last 3 Shifts         09/22 0700 09/23 0659 09/23 0700 09/24 0659 09/24 0700 09/25 0659    P.O. 400 240     Total Intake(mL/kg) 400 (4.8)  240 (2.9)     Urine (mL/kg/hr) 3950 (2) 1250 (0.6) 180 (0.9)    Stool  0     Total Output 3950 1250 180    Net -3550 -1010 -180           Urine Occurrence  1 x     Stool Occurrence  1 x             Lines/Drains/Airways       Peripherally Inserted Central Catheter Line  Duration             PICC Double Lumen 09/21/23 0930 left basilic 2 days              Line  Duration                  Pacer Wires 09/19/23  5 days                     Physical Exam  Constitutional:       Appearance: Normal appearance.   HENT:      Head: Normocephalic and atraumatic.   Cardiovascular:      Rate and Rhythm: Tachycardia present.      Heart sounds: Normal heart sounds.   Pulmonary:      Effort: Pulmonary effort is normal.      Breath sounds: Normal breath sounds.   Abdominal:      General: Abdomen is flat. Bowel sounds are normal.      Palpations: Abdomen is soft.   Musculoskeletal:      Right lower leg: No edema.      Left lower leg: No edema.   Skin:     General: Skin is warm and dry.   Neurological:      General: No focal deficit present.      Mental Status: He is alert and oriented to person, place, and time.   Psychiatric:         Behavior: Behavior normal.            Significant Labs:  All pertinent labs from the last 24 hours have been reviewed.    Significant Diagnostics:  I have reviewed all pertinent imaging results/findings within the past 24 hours.

## 2023-09-24 NOTE — ASSESSMENT & PLAN NOTE
Cont management per CTS    09/23   Continue asa statin plavix lasix and metoprolol  On coumadin for mechanical AVR    09/24  Continue ASA statin BB  Continue supportive care

## 2023-09-24 NOTE — PT/OT/SLP PROGRESS
Occupational Therapy  Treatment    Mikie Alcazar   MRN: [id number]   Admitting Diagnosis: NSTEMI (non-ST elevated myocardial infarction)    OT Date of Treatment: 09/24/23   OT Start Time: 0825  OT Stop Time: 0855  OT Total Time (min): 30 min    Billable Minutes:  Therapeutic Activity 30 minutes    OT/JOHN: OT          General Precautions: Standard, fall, sternal  Orthopedic Precautions: N/A  Braces: N/A  Respiratory Status: Room air         Subjective:  Communicated with nurse and epic chart review prior to session.  Objective:  Patient found with: peripheral IV, telemetry, wound vac     Functional Mobility:    Transfers:  MOD (I) WITH FUNCTIONAL MOBILITY    Functional Ambulation: PT AMBULATED 450 WITH MIN A     Activities of Daily Living:   UE MIN A   AND MAX VC'S   Balance:   Static Sit: GOOD: Takes MODERATE challenges from all directions  Dynamic Sit: GOOD-: Incosistently Maintains balance through MODERATE excursions of active trunk movement,     Static Stand: FAIR+: Takes MINIMAL challenges from all directions  Dynamic stand: FAIR+: Needs CLOSE SUPERVISION during gait and is able to right self with minor LOB    Therapeutic Activities and Exercises:  Patient educated on role of OT in acute setting and benefits of participation. Educated on techniques to use to increase independence and decrease fall risk with functional transfers. Educated on importance of OOB activity and calling for A to transfer back to bed. . Patient stated understanding and in agreement with POC.      AM-PAC 6 CLICK ADL   How much help from another person does this patient currently need?   1 = Unable, Total/Dependent Assistance  2 = A lot, Maximum/Moderate Assistance  3 = A little, Minimum/Contact Guard/Supervision  4 = None, Modified [place]/Independent    Putting on and taking off regular lower body clothing? : 3  Bathing (including washing, rinsing, drying)?: 3  Toileting, which includes using toilet, bedpan, or urinal? : 3  Putting  "on and taking off regular upper body clothing?: 3  Taking care of personal grooming such as brushing teeth?: 4  Eating meals?: 4  Daily Activity Total Score: 20     AM-PAC Raw Score CMS "G-Code Modifier Level of Impairment Assistance   6 % Total / Unable   7 - 8 CM 80 - 100% Maximal Assist   9-13 CL 60 - 80% Moderate Assist   14 - 19 CK 40 - 60% Moderate Assist   20 - 22 CJ 20 - 40% Minimal Assist   23 CI 1-20% SBA / CGA   24 CH 0% Independent/ Mod I       Patient left up in chair with all lines intact, call button in reach, NURSE notified, and SPOUSE present    ASSESSMENT:  Mikie Alcazar is a 43 y.o. male with a medical diagnosis of NSTEMI (non-ST elevated myocardial infarction) and presents with DEBILITY    Rehab identified problem list/impairments:  impaired balance, weakness, decreased safety awareness, impaired endurance, impaired self care skills, impaired functional mobility, decreased upper extremity function, gait instability, decreased lower extremity function    Rehab potential is good.    Activity tolerance: Good    Discharge recommendations: home health PT   Barriers to discharge:      Equipment recommendations: shower chair    GOALS:   Multidisciplinary Problems       Occupational Therapy Goals          Problem: Occupational Therapy    Goal Priority Disciplines Outcome Interventions   Occupational Therapy Goal     OT, PT/OT Ongoing, Progressing    Description: Goals to be met by: 10/6/23     Patient will increase functional independence with ADLs by performing:    Toileting from toilet with Supervision for hygiene and clothing management.   Toilet transfer to toilet with Supervision  Demonstrates 100% functional compliance with sternal precautions.                         Plan:  Patient to be seen 2 x/week to address the above listed problems via self-care/home management, therapeutic activities, therapeutic exercises  Plan of Care expires: 10/06/23  Plan of Care reviewed with: patient     "     09/24/2023

## 2023-09-24 NOTE — PT/OT/SLP PROGRESS
Physical Therapy  Treatment    Mikie Alcazar   MRN: [id number]   Admitting Diagnosis: NSTEMI (non-ST elevated myocardial infarction)       PT Start Time: 0800     PT Stop Time: 0825    PT Total Time (min): 25 min       Billable Minutes:  Gait Training 15 and Therapeutic Activity 10    Treatment Type: Treatment  PT/PTA: PTA     Number of PTA visits since last PT visit: 2       General Precautions: Standard, fall, sternal  Orthopedic Precautions: N/A  Braces: N/A    Spiritual, Cultural Beliefs, Jain Practices, Values that Affect Care: no    Subjective:  Communicated with LEO prior to session.      Pain/Comfort  Pain Rating 1: 0/10  Pain Rating Post-Intervention 1: 0/10    Treatment and Education:    PATIENT WAS SITTING UPON ARRIVAL WITH FAMILY IN THE ROOM    REVIEWED STERNAL PRECAUTIONS WITH PATIENT    SIT<-->STAND SBA    AMBULATED WITH NO (ADDED) WITH CONVERSATION 400' WITH REPORTS OF FEELING GOOD BUT ALSO REPORTING THAT HE GETS REALLY TIRED AFTER HE MOVES.     PTA SUGGESTED FOR HIM TO SIT AND REST DUE TO HIM MAY HAVING TO PERFORM MULTIPLE T/F'S T/O THE DAY AND HIM NEEDING TO CONSERVE SOME OF HIS ENERGY FOR FUTURE USE.      AM-PAC 6 CLICK MOBILITY  How much help from another person does this patient currently need?   1 = Unable, Total/Dependent Assistance  2 = A lot, Maximum/Moderate Assistance  3 = A little, Minimum/Contact Guard/Supervision  4 = None, Modified [place]/Independent    Turning over in bed (including adjusting bedclothes, sheets and blankets)?:  (NA)  Sitting down on and standing up from a chair with arms (e.g., wheelchair, bedside commode, etc.): 4  Moving from lying on back to sitting on the side of the bed?:  (NA)  Moving to and from a bed to a chair (including a wheelchair)?:  (NA)  Need to walk in hospital room?: 4  Climbing 3-5 steps with a railing?:  (NA)    AM-PAC Raw Score CMS G-Code Modifier Level of Impairment Assistance   6 % Total / Unable   7 - 9 CM 80 - 100%  Maximal Assist   10 - 14 CL 60 - 80% Moderate Assist   15 - 19 CK 40 - 60% Moderate Assist   20 - 22 CJ 20 - 40% Minimal Assist   23 CI 1-20% SBA / CGA   24 CH 0% Independent/ Mod I     Patient left up in chair with all lines intact, call button in reach, and MOM present.    Assessment:  Mikie Alcazar is a 43 y.o. male with a medical diagnosis of NSTEMI (non-ST elevated myocardial infarction) and presents with .    Rehab identified problem list/impairments: weakness, gait instability, decreased upper extremity function, decreased ROM, decreased lower extremity function, impaired endurance, decreased safety awareness, impaired self care skills, impaired functional mobility    Rehab potential is good.    Activity tolerance: Good    Discharge recommendations: home health PT      Barriers to discharge:      Equipment recommendations: shower chair     GOALS:   Multidisciplinary Problems       Physical Therapy Goals          Problem: Physical Therapy    Goal Priority Disciplines Outcome Goal Variances Interventions   Physical Therapy Goal     PT, PT/OT      Description: LTG'S TO BE MET IN 14 DAYS (10-6-23)  PT WILL BE AERLY FOR BED MOBILITY  PT WILL BE ARELY FOR BED<>CHAIR TF'S  PT WILL  FEET NO AD ARELY  PT WILL INC AMPAC SCORE BY 2 POINTS TO PROGRESS GROSS FUNC MOBILITY                         PLAN:    Patient to be seen 3 x/week to address the above listed problems via gait training, therapeutic activities, therapeutic exercises  Plan of Care expires: 10/06/23  Plan of Care reviewed with: patient         09/24/2023

## 2023-09-24 NOTE — ASSESSMENT & PLAN NOTE
09/20/2023  The patient is postop day 1 status post coronary artery bypass grafting x5 and aortic valve replacement with a 19 mm Saint Dany mechanical valve.  Overall the patient is doing well.      Neuro:  Patient is awake appropriate and follows commands.  Patient is currently sedated with Precedex.  Will wean Precedex to off.   Cardiac:  Patient is hemodynamically stable.  With excellent cardiac output and cardiac index.  Patient is on low-dose vaso active meds.  Wean drips to off.  Will start metoprolol once drips are off.  Respiratory:  Patient is being weaned to extubation.  Patient is an active smoker Start nebs and Mucomyst.  Continue pulmonary toileting.  Continue incentive spirometer.    GI:  Patient is currently NPO.  Will start p.o. intake once extubated.    Renal:  Patient has good urine output and creatinine is 1.2.  Will start Lasix for diuresis.  Endocrine:  Patient required insulin drip for glucose controlled.  Will convert to sliding scale.    Id:  Patient had a T-max of 102°.  Most likely due to stress of surgery.  Will continue to follow.  White count is 9.  Patient is on manny op antibiotics.  Heme: Hematocrit is 17.  Patient is being transfused.  Platelet count is 74.  No evidence of active bleeding.  Will follow platelet count.  Will start patient on Coumadin anticoagulation for mechanical valve.  Activities:  Patient is currently in bed.  Will advance activities as tolerated once extubated.  Line tubes and drains:  Patient has an ETT, right IJ Kunkle and Cordis, right groin A-line, chest tubes, Ugalde catheter, pacer wires, and saphenectomy site DARON.    09/21/2023     The patient is postop day 2 status post coronary artery bypass grafting x5 and aortic valve replacement with a 19 mm Saint Dany mechanical valve.  Overall the patient is doing well.    Neuro:  Patient is awake alert and oriented x3.  Pain is well controlled with current pain management.  Cardiac:  The patient is off all drips.   Patient is hemodynamically stable.  Continue metoprolol.  Respiratory:  Patient was extubated yesterday.  Continue pulmonary toileting.  Continue incentive spirometer.   GI:  Patient is tolerating p.o. intake.  Patient had episodes of nausea yesterday which have abated.  Advance patient's diet as tolerated.    Renal:  Patient has good urine output.  Creatinine is 0.9.  Continue Lasix for diuresis.  Endocrine:  Glucose is controlled with sliding scale insulin.    Id:  T-max is 101.8 patient is currently afebrile.  White count is 17.  Most likely due to stress of surgery.  Continue to follow.  Heme:  Hematocrit is 24.5.  Platelet count is 89.  INR is 1.5.  Patient is started on Coumadin for anticoagulation of mechanical valve.  Activities:  Patient will be out of bed to chair.  Advance activities as tolerated.  Line tubes and drains:  Patient has pacer wires, right IJ Cordis and Ugalde catheter.  Will place PICC line and discontinue Cordis.    09/22/2023   The patient is postop day 3 status post coronary artery bypass grafting x5 and aortic valve replacement with a 19 mm Saint Dany mechanical valve.  Overall the patient is doing well.  Patient has been transferred to telemetry.    Neuro:  Patient is awake alert and oriented x3.  Pain is well controlled with current pain regimen.    Cardiac:  Patient is hemodynamically stable.  Continue metoprolol.    Respiratory:  Continue pulmonary toileting.  Continue incentive spirometer.    GI:  Patient is tolerating p.o. intake.  And tolerating a regular diet.    Renal:  Patient has good urine output.  Creatinine is 0.9.  Continue Lasix.    Endocrine:  Glucose is well controlled.    Id:  Patient is afebrile.  White count is 16.  Continue to follow.    Heme:  Hematocrit is 24 and platelet count is 114.  INR is 3.4.  Patient is currently on Coumadin for anticoagulation.  Activities:  Patient is out of bed and ambulating in the hallways.  Continue to advance as tolerated.  Line tubes  and drains:  Patient has a PICC line and pacer wires.    09/23/2023   The patient is postop day 4 status post coronary artery bypass grafting x5 and aortic valve replacement with a 19 mm Saint Dany mechanical valve.  Overall the patient is doing well.  Anticipate discharge to home with home health in the next 48-72 hours.    Neuro:  Patient is awake alert and oriented x3 pain is well controlled with pain management.  Neuro exam is nonfocal.    Cardiac:  Patient is hemodynamically stable.  Continue metoprolol.    Respiratory:  Continue pulmonary toileting.  Continue incentive spirometer.  GI patient is tolerating p.o. intake.  Will increase bowel regimen for bowel movements.  Renal: Patient has good urine output.  Creatinine is 0.9.  Patient is still appears fluid overloaded.  Continue Lasix.  Endocrine: Glucose is well controlled.    Id:  Patient is afebrile.  White count is down to 12.  Continue to follow.    Heme:  Hematocrit is 27.  Platelet count is 186 and INR is 1.9.  INR goal is between 2.5 and 3.  Continue Coumadin.    Activities:  Patient is out of bed and ambulating in the hallways.  Advance as tolerated.  Line tubes and drains:  Patient has a PICC line.      09/24/2023   The patient is postop day 5 status post coronary artery bypass grafting x5 and aortic valve replacement with a 19 mm Saint Dany mechanical valve.  Overall the patient is doing well.  Anticipate discharge home in the next 24-48 hours.    Neuro:  Patient is awake alert and oriented x3 pain is well controlled with current pain management.  Neuro exam is nonfocal.    Cardiac:  Patient is hemodynamically stable.  Continue metoprolol  Respiratory:  Continue pulmonary toileting.  Continue incentive spirometer.  Patient continues to have sputum production.  GI:  Patient is tolerating a regular diet.  Patient has had bowel movements.  Renal: Patient has good urine output.  Creatinine is stable.  Patient is appears to be euvolemic.  Will  discontinue Lasix.    Endocrine:  Glucose is well controlled.  Id: Patient is afebrile white count normal.  Heme:  Hematocrit is 27 and platelet count is 229.  INR is subtherapeutic 1.5.  Continue Coumadin.  Goal is a INR level of 2.5-3.  Activities: Patient is out of bed and ambulating in the hallways.  Advance as tolerated.    Line tubes and drains:  Patient has a PICC line.

## 2023-09-24 NOTE — PLAN OF CARE
Problem: Adult Inpatient Plan of Care  Goal: Plan of Care Review  Outcome: Ongoing, Progressing  Goal: Absence of Hospital-Acquired Illness or Injury  Outcome: Ongoing, Progressing  Goal: Optimal Comfort and Wellbeing  Outcome: Ongoing, Progressing  Goal: Readiness for Transition of Care  Outcome: Ongoing, Progressing     Problem: Fall Injury Risk  Goal: Absence of Fall and Fall-Related Injury  Outcome: Ongoing, Progressing     Problem: Skin Injury Risk Increased  Goal: Skin Health and Integrity  Outcome: Ongoing, Progressing

## 2023-09-24 NOTE — PROGRESS NOTES
[facility] - Telemetry ([facility]  Cardiology  Progress Note    Patient Name: Mikie Alcazar  MRN: [id number]  Admission Date: 9/17/2023  Hospital Length of Stay: 7 days  Code Status: Full Code   Attending Physician: Nishant Douglas MD   Primary Care Physician: Lissette, Primary Doctor  Expected Discharge Date:   Principal Problem:NSTEMI (non-ST elevated myocardial infarction)    Subjective:     Hospital Course:   Cardiology consulted to assist with management. Pt seen and examined today, resting in bed mother at bedside. He reports his pain started 6+ months ago and worsening in the last few weeks happening daily with associated SOB, dizziness radiating to left arm. He reports his pain is sharp and pressure-like at times. Pt reports daily use a marijuana, h/o cocaine and other drugs 5-10 years ago. Drinks occasionally. Echo pending cont hep gtt    09/19/2023. Admitted for NSTEMI/ACS. Echo showed RWMA  The cath done yesterday showed   Severe lmca disease with timi1 flow in lad   Lcx ostial 50%   Rca prox 80%   Ef 50%   Severe AI  09/19/23 s/p CABG x5 and aortic valve replacement with a 19 mm Saint Dany mechanical valve by Dr. oDuglas.  In ICU and intubated. Om epi and Vasopressin gtt.    9/20/23  Pt seen and examined today, POD 1 CABGx5 pt still intubated on exam this am, weaning epi. Labs reviewed, H/H 6.0 and 17.1. Plans to extubated today    9/21/23 Pt seen and examined today extubated feels ok chest soreness, chest tubes removed. Labs reviewed, chart reviewed.    9/22/23 Pt seen and examined today, sitting up in bedside chair. Feels good. Walked with PT/OT. Labs reviewed, chart reviewed    09/23 ambulating well. No chest pain dyspnea, the lab reviewed.     09/24. On coumadin rx and heparin bridge. Non chest pain dyspnea, the lab reviewed. VSS        ROS  Objective:     Vital Signs (Most Recent):  Temp: 98.9 °F (37.2 °C) (09/24/23 1224)  Pulse: 95 (09/24/23 1224)  Resp: 16 (09/24/23 1224)  BP: 124/63 (09/24/23 1224)  SpO2:  97 % (09/24/23 1224) Vital Signs (24h Range):  Temp:  [97.3 °F (36.3 °C)-99 °F (37.2 °C)] 98.9 °F (37.2 °C)  Pulse:  [] 95  Resp:  [14-20] 16  SpO2:  [91 %-97 %] 97 %  BP: (114-144)/(60-84) 124/63     Weight: 75.5 kg (166 lb 7.2 oz)  Body mass index is 25.31 kg/m².     SpO2: 97 %         Intake/Output Summary (Last 24 hours) at 9/24/2023 1351  Last data filed at 9/24/2023 1138  Gross per 24 hour   Intake 275.06 ml   Output 983 ml   Net -707.94 ml       Lines/Drains/Airways       Peripherally Inserted Central Catheter Line  Duration             PICC Double Lumen 09/21/23 0930 left basilic 3 days                       Physical Exam  Vitals and nursing note reviewed.   Constitutional:       Appearance: Normal appearance.   HENT:      Head: Normocephalic and atraumatic.   Eyes:      General:         Right eye: No discharge.         Left eye: No discharge.      Pupils: Pupils are equal, round, and reactive to light.   Cardiovascular:      Rate and Rhythm: Normal rate and regular rhythm.      Heart sounds: S1 normal and S2 normal. Murmur heard.      Harsh midsystolic murmur is present at the upper right sternal border radiating to the neck. Mechanical click +      No friction rub.      Comments: Sternotomy site c/d/i  Pulmonary:      Effort: Pulmonary effort is normal. No respiratory distress.      Breath sounds: Normal breath sounds. No rales.   Abdominal:      Palpations: Abdomen is soft.      Tenderness: There is no abdominal tenderness.   Musculoskeletal:      Cervical back: Neck supple.      Right lower leg: No edema.      Left lower leg: No edema.   Skin:     General: Skin is warm and dry.   Neurological:      General: No focal deficit present.      Mental Status: He is alert and oriented to person, place, and time.   Psychiatric:         Mood and Affect: Mood normal.         Behavior: Behavior normal.         Thought Content: Thought content normal.            Significant Labs: ABG: No results for input(s):  ""PH", "PCO2", "HCO3", "POCSATURATED", "BE" in the last 48 hours., Blood Culture: No results for input(s): "LABBLOO" in the last 48 hours., BMP:   Recent Labs   Lab 09/22/23  1711 09/23/23  0500 09/23/23  1637 09/24/23  0443   GLU 95  95  --   --   --      137  --   --   --    K 4.1  4.1  --   --   --    CL 97  97  --   --   --    CO2 29  29  --   --   --    BUN 21*  21*  --   --   --    CREATININE 0.9  0.9  --   --   --    CALCIUM 8.5*  8.5*  --   --   --    MG 2.3  2.3 2.7* 2.5 2.3   , CMP   Recent Labs   Lab 09/22/23  1711     137   K 4.1  4.1   CL 97  97   CO2 29  29   GLU 95  95   BUN 21*  21*   CREATININE 0.9  0.9   CALCIUM 8.5*  8.5*   PROT 6.0  6.0   ALBUMIN 3.5  3.5   BILITOT 0.7  0.7   ALKPHOS 147*  147*   AST 48*  48*   ALT 32  32   ANIONGAP 11  11   , CBC   Recent Labs   Lab 09/23/23  0500 09/24/23  0443   WBC 12.59 9.17   HGB 8.9* 9.1*   HCT 27.0* 27.6*    239   , INR   Recent Labs   Lab 09/23/23  0500 09/24/23  0443   INR 1.9* 1.5*   , Lipid Panel No results for input(s): "CHOL", "HDL", "LDLCALC", "TRIG", "CHOLHDL" in the last 48 hours., and Troponin No results for input(s): "TROPONINI" in the last 48 hours.    Significant Imaging: EKG: .     Assessment and Plan:       * NSTEMI (non-ST elevated myocardial infarction)  Troponin 0.6->-0.709->0.708  Cont hep gtt  EKG with ST T wave abnml  LHC planned for today  All risks, benefits and treatment alternatives explained, all questions answered. Pt agreeable to proceed.   NPO    9/20/23  S/P CABG x5 AVR    S/P AVR (aortic valve replacement)  09/24  On coumadin Rx and heparin gtt as bridge    S/P CABG x 5  Cont management per CTS    09/23   Continue asa statin plavix lasix and metoprolol  On coumadin for mechanical AVR    09/24  Continue ASA statin BB  Continue supportive care    Tobacco smoker, 1 pack of cigarettes or less per day  Smoking cessation    CAD, multiple vessel  09/19/23  S/p CABG x5 and mechanical AVR " today  -continue icu supportive care  -continue asa plavix statin BB and lasix  -will f/u    9/20/23  Cont ASA, plavix, statin, BB, lasix  Management per CTS    Hypertension  stable        VTE Risk Mitigation (From admission, onward)         Ordered     heparin 25,000 units in dextrose 5% 250 mL (100 units/mL) infusion (heparin infusion - NO NOMOGRAM)  Continuous        See Hyperspace for full Linked Orders Report.    09/24/23 0927     warfarin (COUMADIN) tablet 1 mg  Daily         09/23/23 1431     IP VTE LOW RISK PATIENT  Once         09/17/23 1941                Fahad Albarran MD  Cardiology  [facility] - Telemetry ([facility])

## 2023-09-24 NOTE — SUBJECTIVE & OBJECTIVE
ROS  Objective:     Vital Signs (Most Recent):  Temp: 98.9 °F (37.2 °C) (09/24/23 1224)  Pulse: 95 (09/24/23 1224)  Resp: 16 (09/24/23 1224)  BP: 124/63 (09/24/23 1224)  SpO2: 97 % (09/24/23 1224) Vital Signs (24h Range):  Temp:  [97.3 °F (36.3 °C)-99 °F (37.2 °C)] 98.9 °F (37.2 °C)  Pulse:  [] 95  Resp:  [14-20] 16  SpO2:  [91 %-97 %] 97 %  BP: (114-144)/(60-84) 124/63     Weight: 75.5 kg (166 lb 7.2 oz)  Body mass index is 25.31 kg/m².     SpO2: 97 %         Intake/Output Summary (Last 24 hours) at 9/24/2023 1351  Last data filed at 9/24/2023 1138  Gross per 24 hour   Intake 275.06 ml   Output 983 ml   Net -707.94 ml       Lines/Drains/Airways       Peripherally Inserted Central Catheter Line  Duration             PICC Double Lumen 09/21/23 0930 left basilic 3 days                       Physical Exam  Vitals and nursing note reviewed.   Constitutional:       Appearance: Normal appearance.   HENT:      Head: Normocephalic and atraumatic.   Eyes:      General:         Right eye: No discharge.         Left eye: No discharge.      Pupils: Pupils are equal, round, and reactive to light.   Cardiovascular:      Rate and Rhythm: Normal rate and regular rhythm.      Heart sounds: S1 normal and S2 normal. Murmur heard.      Harsh midsystolic murmur is present at the upper right sternal border radiating to the neck. Mechanical click +      No friction rub.      Comments: Sternotomy site c/d/i  Pulmonary:      Effort: Pulmonary effort is normal. No respiratory distress.      Breath sounds: Normal breath sounds. No rales.   Abdominal:      Palpations: Abdomen is soft.      Tenderness: There is no abdominal tenderness.   Musculoskeletal:      Cervical back: Neck supple.      Right lower leg: No edema.      Left lower leg: No edema.   Skin:     General: Skin is warm and dry.   Neurological:      General: No focal deficit present.      Mental Status: He is alert and oriented to person, place, and time.   Psychiatric:     "     Mood and Affect: Mood normal.         Behavior: Behavior normal.         Thought Content: Thought content normal.            Significant Labs: ABG: No results for input(s): "PH", "PCO2", "HCO3", "POCSATURATED", "BE" in the last 48 hours., Blood Culture: No results for input(s): "LABBLOO" in the last 48 hours., BMP:   Recent Labs   Lab 09/22/23  1711 09/23/23  0500 09/23/23  1637 09/24/23  0443   GLU 95  95  --   --   --      137  --   --   --    K 4.1  4.1  --   --   --    CL 97  97  --   --   --    CO2 29  29  --   --   --    BUN 21*  21*  --   --   --    CREATININE 0.9  0.9  --   --   --    CALCIUM 8.5*  8.5*  --   --   --    MG 2.3  2.3 2.7* 2.5 2.3   , CMP   Recent Labs   Lab 09/22/23  1711     137   K 4.1  4.1   CL 97  97   CO2 29  29   GLU 95  95   BUN 21*  21*   CREATININE 0.9  0.9   CALCIUM 8.5*  8.5*   PROT 6.0  6.0   ALBUMIN 3.5  3.5   BILITOT 0.7  0.7   ALKPHOS 147*  147*   AST 48*  48*   ALT 32  32   ANIONGAP 11  11   , CBC   Recent Labs   Lab 09/23/23  0500 09/24/23  0443   WBC 12.59 9.17   HGB 8.9* 9.1*   HCT 27.0* 27.6*    239   , INR   Recent Labs   Lab 09/23/23  0500 09/24/23  0443   INR 1.9* 1.5*   , Lipid Panel No results for input(s): "CHOL", "HDL", "LDLCALC", "TRIG", "CHOLHDL" in the last 48 hours., and Troponin No results for input(s): "TROPONINI" in the last 48 hours.    Significant Imaging: EKG: .   "

## 2023-09-24 NOTE — PROGRESS NOTES
Clinical Pharmacy: Coumadin Progress Note     Coumadin consult day # 5  Indication: mechanical AVR (9/19)  INR today = 1.5 trended down from 1.9 yesterday  Doses Received:  2.5 mg on 9/20 & 9/21 >INR went from 1.5 on 9/21 to 3.4 on 9/22  1 mg recd on 9/23 > INR went from 1.9 on 9/23 to 1.5 on 9/24  Will continue 1 mg po daily today  Heparin gtt with no nomogram added today for bridging per Dr. Douglas  Pharmacy will continue to monitor INR daily and make adjustments.

## 2023-09-24 NOTE — PROGRESS NOTES
[facility] - Telemetry ([facility])  Cardiothoracic Surgery  Progress Note    Patient Name: Mikie Alcazar  MRN: [id number]  Admission Date: 9/17/2023  Hospital Length of Stay: 7 days  Code Status: Full Code   Attending Physician: Nishant Douglas MD   Referring Provider: Self, Aaareferral  Principal Problem:NSTEMI (non-ST elevated myocardial infarction)            Subjective:     Post-Op Info:  Procedure(s) (LRB):  CORONARY ARTERY BYPASS GRAFT (CABG) (N/A)  REPLACEMENT-VALVE-AORTIC (N/A)  SURGICAL PROCUREMENT, VEIN, ENDOSCOPIC (Left)  BLOCK, NERVE, INTERCOSTAL, 2 OR MORE (N/A)  ECHOCARDIOGRAM,TRANSESOPHAGEAL (N/A)   5 Days Post-Op     Interval History:  Patient is postop day 5 status post coronary artery bypass grafting x5 and aortic valve replacement with a 19 mm Saint Dany mechanical valve.    ROS  Medications:  Continuous Infusions:  Scheduled Meds:   ascorbic acid (vitamin C)  500 mg Oral BID    aspirin  81 mg Oral Daily    atorvastatin  80 mg Oral Daily    chlorhexidine  10 mL Mouth/Throat BID    cyanocobalamin  1,000 mcg Oral Daily    docusate sodium  100 mg Oral BID    ferrous sulfate  1 tablet Oral Daily    folic acid  1 mg Oral Daily    guaiFENesin  1,200 mg Oral BID    lubiprostone  24 mcg Oral BID WM    magnesium hydroxide 400 mg/5 ml  5 mL Oral BID    metoprolol tartrate  25 mg Oral BID    pantoprazole  40 mg Oral Daily    polyethylene glycol  17 g Oral Daily    sodium chloride 3%  4 mL Nebulization Q12H    warfarin  1 mg Oral Daily     PRN Meds:0.9%  NaCl infusion (for blood administration), albumin human 5%, aluminum-magnesium hydroxide-simethicone, bisacodyL, calcium gluconate IVPB, calcium gluconate IVPB, calcium gluconate IVPB, dextrose 10%, glucagon (human recombinant), influenza, insulin aspart U-100, lactated ringers, magnesium sulfate IVPB, melatonin, metoclopramide HCl, ondansetron, pneumoc 20-leslee conj-dip cr(PF), potassium chloride in water, potassium chloride in water, potassium  chloride in water, traMADoL     Objective:     Vital Signs (Most Recent):  Temp: 97.7 °F (36.5 °C) (09/24/23 0727)  Pulse: 109 (09/24/23 0911)  Resp: 16 (09/24/23 0731)  BP: 115/67 (09/24/23 0727)  SpO2: 95 % (09/24/23 0731) Vital Signs (24h Range):  Temp:  [97.3 °F (36.3 °C)-99 °F (37.2 °C)] 97.7 °F (36.5 °C)  Pulse:  [] 109  Resp:  [14-20] 16  SpO2:  [91 %-97 %] 95 %  BP: (114-144)/(60-84) 115/67     Weight: 82.6 kg (182 lb 1.6 oz)  Body mass index is 27.69 kg/m².    SpO2: 95 %       Intake/Output - Last 3 Shifts         09/22 0700 09/23 0659 09/23 0700 09/24 0659 09/24 0700 09/25 0659    P.O. 400 240     Total Intake(mL/kg) 400 (4.8) 240 (2.9)     Urine (mL/kg/hr) 3950 (2) 1250 (0.6) 180 (0.9)    Stool  0     Total Output 3950 1250 180    Net -3550 -1010 -180           Urine Occurrence  1 x     Stool Occurrence  1 x             Lines/Drains/Airways       Peripherally Inserted Central Catheter Line  Duration             PICC Double Lumen 09/21/23 0930 left basilic 2 days              Line  Duration                  Pacer Wires 09/19/23  5 days                     Physical Exam  Constitutional:       Appearance: Normal appearance.   HENT:      Head: Normocephalic and atraumatic.   Cardiovascular:      Rate and Rhythm: Tachycardia present.      Heart sounds: Normal heart sounds.   Pulmonary:      Effort: Pulmonary effort is normal.      Breath sounds: Normal breath sounds.   Abdominal:      General: Abdomen is flat. Bowel sounds are normal.      Palpations: Abdomen is soft.   Musculoskeletal:      Right lower leg: No edema.      Left lower leg: No edema.   Skin:     General: Skin is warm and dry.   Neurological:      General: No focal deficit present.      Mental Status: He is alert and oriented to person, place, and time.   Psychiatric:         Behavior: Behavior normal.            Significant Labs:  All pertinent labs from the last 24 hours have been reviewed.    Significant Diagnostics:  I have reviewed  all pertinent imaging results/findings within the past 24 hours.    Assessment/Plan:     S/P CABG x 5  09/20/2023  The patient is postop day 1 status post coronary artery bypass grafting x5 and aortic valve replacement with a 19 mm Saint Dany mechanical valve.  Overall the patient is doing well.      Neuro:  Patient is awake appropriate and follows commands.  Patient is currently sedated with Precedex.  Will wean Precedex to off.   Cardiac:  Patient is hemodynamically stable.  With excellent cardiac output and cardiac index.  Patient is on low-dose vaso active meds.  Wean drips to off.  Will start metoprolol once drips are off.  Respiratory:  Patient is being weaned to extubation.  Patient is an active smoker Start nebs and Mucomyst.  Continue pulmonary toileting.  Continue incentive spirometer.    GI:  Patient is currently NPO.  Will start p.o. intake once extubated.    Renal:  Patient has good urine output and creatinine is 1.2.  Will start Lasix for diuresis.  Endocrine:  Patient required insulin drip for glucose controlled.  Will convert to sliding scale.    Id:  Patient had a T-max of 102°.  Most likely due to stress of surgery.  Will continue to follow.  White count is 9.  Patient is on manny op antibiotics.  Heme: Hematocrit is 17.  Patient is being transfused.  Platelet count is 74.  No evidence of active bleeding.  Will follow platelet count.  Will start patient on Coumadin anticoagulation for mechanical valve.  Activities:  Patient is currently in bed.  Will advance activities as tolerated once extubated.  Line tubes and drains:  Patient has an ETT, right IJ [place] and Cordis, right groin A-line, chest tubes, Ugalde catheter, pacer wires, and saphenectomy site DARON.    09/21/2023     The patient is postop day 2 status post coronary artery bypass grafting x5 and aortic valve replacement with a 19 mm Saint Dany mechanical valve.  Overall the patient is doing well.    Neuro:  Patient is awake alert and oriented x3.   Pain is well controlled with current pain management.  Cardiac:  The patient is off all drips.  Patient is hemodynamically stable.  Continue metoprolol.  Respiratory:  Patient was extubated yesterday.  Continue pulmonary toileting.  Continue incentive spirometer.   GI:  Patient is tolerating p.o. intake.  Patient had episodes of nausea yesterday which have abated.  Advance patient's diet as tolerated.    Renal:  Patient has good urine output.  Creatinine is 0.9.  Continue Lasix for diuresis.  Endocrine:  Glucose is controlled with sliding scale insulin.    Id:  T-max is 101.8 patient is currently afebrile.  White count is 17.  Most likely due to stress of surgery.  Continue to follow.  Heme:  Hematocrit is 24.5.  Platelet count is 89.  INR is 1.5.  Patient is started on Coumadin for anticoagulation of mechanical valve.  Activities:  Patient will be out of bed to chair.  Advance activities as tolerated.  Line tubes and drains:  Patient has pacer wires, right IJ Cordis and Ugalde catheter.  Will place PICC line and discontinue Cordis.    09/22/2023   The patient is postop day 3 status post coronary artery bypass grafting x5 and aortic valve replacement with a 19 mm Saint Dany mechanical valve.  Overall the patient is doing well.  Patient has been transferred to telemetry.    Neuro:  Patient is awake alert and oriented x3.  Pain is well controlled with current pain regimen.    Cardiac:  Patient is hemodynamically stable.  Continue metoprolol.    Respiratory:  Continue pulmonary toileting.  Continue incentive spirometer.    GI:  Patient is tolerating p.o. intake.  And tolerating a regular diet.    Renal:  Patient has good urine output.  Creatinine is 0.9.  Continue Lasix.    Endocrine:  Glucose is well controlled.    Id:  Patient is afebrile.  White count is 16.  Continue to follow.    Heme:  Hematocrit is 24 and platelet count is 114.  INR is 3.4.  Patient is currently on Coumadin for anticoagulation.  Activities:   Patient is out of bed and ambulating in the hallways.  Continue to advance as tolerated.  Line tubes and drains:  Patient has a PICC line and pacer wires.    09/23/2023   The patient is postop day 4 status post coronary artery bypass grafting x5 and aortic valve replacement with a 19 mm Saint Dany mechanical valve.  Overall the patient is doing well.  Anticipate discharge to home with home health in the next 48-72 hours.    Neuro:  Patient is awake alert and oriented x3 pain is well controlled with pain management.  Neuro exam is nonfocal.    Cardiac:  Patient is hemodynamically stable.  Continue metoprolol.    Respiratory:  Continue pulmonary toileting.  Continue incentive spirometer.  GI patient is tolerating p.o. intake.  Will increase bowel regimen for bowel movements.  Renal: Patient has good urine output.  Creatinine is 0.9.  Patient is still appears fluid overloaded.  Continue Lasix.  Endocrine: Glucose is well controlled.    Id:  Patient is afebrile.  White count is down to 12.  Continue to follow.    Heme:  Hematocrit is 27.  Platelet count is 186 and INR is 1.9.  INR goal is between 2.5 and 3.  Continue Coumadin.    Activities:  Patient is out of bed and ambulating in the hallways.  Advance as tolerated.  Line tubes and drains:  Patient has a PICC line.      09/24/2023   The patient is postop day 5 status post coronary artery bypass grafting x5 and aortic valve replacement with a 19 mm Saint Dany mechanical valve.  Overall the patient is doing well.  Anticipate discharge home in the next 24-48 hours.    Neuro:  Patient is awake alert and oriented x3 pain is well controlled with current pain management.  Neuro exam is nonfocal.    Cardiac:  Patient is hemodynamically stable.  Continue metoprolol  Respiratory:  Continue pulmonary toileting.  Continue incentive spirometer.  Patient continues to have sputum production.  GI:  Patient is tolerating a regular diet.  Patient has had bowel movements.  Renal:  Patient has good urine output.  Creatinine is stable.  Patient is appears to be euvolemic.  Will discontinue Lasix.    Endocrine:  Glucose is well controlled.  Id: Patient is afebrile white count normal.  Heme:  Hematocrit is 27 and platelet count is 229.  INR is subtherapeutic 1.5.  Continue Coumadin.  Will add heparin drip until patient's INR is therapeutic Goal is a INR level of 2.5-3.  Activities: Patient is out of bed and ambulating in the hallways.  Advance as tolerated.    Line tubes and drains:  Patient has a PICC line.      CAD, multiple vessel  The patient is a 43-year-old male with critical left main coronary artery disease and severe aortic regurgitation by cardiac catheterization.  The patient is a candidate for urgent coronary artery bypass grafting and aortic valve replacement.  The risks and benefits of surgery were explained to the patient.  The patient understands the risks and benefits of surgery and has agreed to proceed with urgent aortic valve replacement and coronary artery bypass grafting.  The type of aortic valve to be used was discussed with the patient.  The patient stated a preference for a bioprosthetic valve in order to avoid the need for chronic anticoagulation.  The patient understands that a bioprosthetic valve especially in a young patient has a limited lifespan.        Nishant Douglas MD  Cardiothoracic Surgery  '[place] [facility])

## 2023-09-24 NOTE — PLAN OF CARE
Patient updated on plan of care. Instructed patient to use call light for assistance, call light in reach. Hourly rounding performed, fall and safety precautions maintained; bed alarm decline, patient ambulating independently to bathroom. Vitals q 4hours. Education provided, questions answered/encouraged. IV's and midline incision CDI, medication administered as ordered. Heparin gtt initiated. Chart check complete.  Sinus tach on tele box #[id number].    Problem: Adult Inpatient Plan of Care  Goal: Plan of Care Review  Outcome: Ongoing, Progressing

## 2023-09-24 NOTE — NURSING
Patient started on Heparin gtt. Q6hr x 4 occurrences aPTT ordered; first aPTT collected at 1430. Next aptt showed next collection 1800, notified Dr. Douglas that order needs to be modified to reflect q6hr. Dr. Douglas verbal okay to modify aPTT order.

## 2023-09-25 LAB
APTT PPP: 30.7 SEC (ref 21–32)
APTT PPP: 30.8 SEC (ref 21–32)
APTT PPP: 32.1 SEC (ref 21–32)
APTT PPP: 34.5 SEC (ref 21–32)
BASOPHILS # BLD AUTO: 0.03 K/UL (ref 0–0.2)
BASOPHILS NFR BLD: 0.4 % (ref 0–1.9)
DIFFERENTIAL METHOD: ABNORMAL
EOSINOPHIL # BLD AUTO: 0.3 K/UL (ref 0–0.5)
EOSINOPHIL NFR BLD: 3.1 % (ref 0–8)
ERYTHROCYTE [DISTWIDTH] IN BLOOD BY AUTOMATED COUNT: 16.5 % (ref 11.5–14.5)
HCT VFR BLD AUTO: 27 % (ref 40–54)
HGB BLD-MCNC: 9 G/DL (ref 14–18)
IMM GRANULOCYTES # BLD AUTO: 0.07 K/UL (ref 0–0.04)
IMM GRANULOCYTES NFR BLD AUTO: 0.8 % (ref 0–0.5)
INR PPP: 1.2 (ref 0.8–1.2)
LYMPHOCYTES # BLD AUTO: 1.9 K/UL (ref 1–4.8)
LYMPHOCYTES NFR BLD: 22.7 % (ref 18–48)
MAGNESIUM SERPL-MCNC: 2.2 MG/DL (ref 1.6–2.6)
MAGNESIUM SERPL-MCNC: 2.6 MG/DL (ref 1.6–2.6)
MCH RBC QN AUTO: 29.1 PG (ref 27–31)
MCHC RBC AUTO-ENTMCNC: 33.3 G/DL (ref 32–36)
MCV RBC AUTO: 87 FL (ref 82–98)
MONOCYTES # BLD AUTO: 1.2 K/UL (ref 0.3–1)
MONOCYTES NFR BLD: 14.3 % (ref 4–15)
NEUTROPHILS # BLD AUTO: 5 K/UL (ref 1.8–7.7)
NEUTROPHILS NFR BLD: 58.7 % (ref 38–73)
NRBC BLD-RTO: 0 /100 WBC
PLATELET # BLD AUTO: 298 K/UL (ref 150–450)
PMV BLD AUTO: 9.1 FL (ref 9.2–12.9)
POCT GLUCOSE: 103 MG/DL (ref 70–110)
POCT GLUCOSE: 113 MG/DL (ref 70–110)
POCT GLUCOSE: 123 MG/DL (ref 70–110)
PROTHROMBIN TIME: 12.8 SEC (ref 9–12.5)
RBC # BLD AUTO: 3.09 M/UL (ref 4.6–6.2)
WBC # BLD AUTO: 8.52 K/UL (ref 3.9–12.7)

## 2023-09-25 PROCEDURE — 85025 COMPLETE CBC W/AUTO DIFF WBC: CPT | Performed by: INTERNAL MEDICINE

## 2023-09-25 PROCEDURE — 99900035 HC TECH TIME PER 15 MIN (STAT)

## 2023-09-25 PROCEDURE — 25000003 PHARM REV CODE 250: Performed by: INTERNAL MEDICINE

## 2023-09-25 PROCEDURE — 85730 THROMBOPLASTIN TIME PARTIAL: CPT | Mod: 91 | Performed by: THORACIC SURGERY (CARDIOTHORACIC VASCULAR SURGERY)

## 2023-09-25 PROCEDURE — 27000646 HC AEROBIKA DEVICE

## 2023-09-25 PROCEDURE — 94664 DEMO&/EVAL PT USE INHALER: CPT

## 2023-09-25 PROCEDURE — 94640 AIRWAY INHALATION TREATMENT: CPT

## 2023-09-25 PROCEDURE — 99232 SBSQ HOSP IP/OBS MODERATE 35: CPT | Mod: ,,, | Performed by: INTERNAL MEDICINE

## 2023-09-25 PROCEDURE — 83735 ASSAY OF MAGNESIUM: CPT | Mod: 91 | Performed by: INTERNAL MEDICINE

## 2023-09-25 PROCEDURE — 99232 PR SUBSEQUENT HOSPITAL CARE,LEVL II: ICD-10-PCS | Mod: ,,, | Performed by: INTERNAL MEDICINE

## 2023-09-25 PROCEDURE — 25000003 PHARM REV CODE 250: Performed by: THORACIC SURGERY (CARDIOTHORACIC VASCULAR SURGERY)

## 2023-09-25 PROCEDURE — 21400001 HC TELEMETRY ROOM

## 2023-09-25 PROCEDURE — 97116 GAIT TRAINING THERAPY: CPT | Mod: CQ

## 2023-09-25 PROCEDURE — 63600175 PHARM REV CODE 636 W HCPCS: Performed by: THORACIC SURGERY (CARDIOTHORACIC VASCULAR SURGERY)

## 2023-09-25 PROCEDURE — 25000003 PHARM REV CODE 250: Performed by: NURSE PRACTITIONER

## 2023-09-25 PROCEDURE — 85610 PROTHROMBIN TIME: CPT | Performed by: INTERNAL MEDICINE

## 2023-09-25 PROCEDURE — 94799 UNLISTED PULMONARY SVC/PX: CPT

## 2023-09-25 PROCEDURE — 94761 N-INVAS EAR/PLS OXIMETRY MLT: CPT

## 2023-09-25 PROCEDURE — 97530 THERAPEUTIC ACTIVITIES: CPT | Mod: CQ

## 2023-09-25 PROCEDURE — 25000242 PHARM REV CODE 250 ALT 637 W/ HCPCS: Performed by: THORACIC SURGERY (CARDIOTHORACIC VASCULAR SURGERY)

## 2023-09-25 RX ORDER — ACETAMINOPHEN 325 MG/1
650 TABLET ORAL EVERY 6 HOURS PRN
Status: DISCONTINUED | OUTPATIENT
Start: 2023-09-25 | End: 2023-09-26

## 2023-09-25 RX ORDER — WARFARIN 2.5 MG/1
2.5 TABLET ORAL DAILY
Status: DISCONTINUED | OUTPATIENT
Start: 2023-09-25 | End: 2023-09-26

## 2023-09-25 RX ORDER — HEPARIN SODIUM,PORCINE/D5W 25000/250
0-40 INTRAVENOUS SOLUTION INTRAVENOUS CONTINUOUS
Status: DISCONTINUED | OUTPATIENT
Start: 2023-09-25 | End: 2023-09-25

## 2023-09-25 RX ORDER — HEPARIN SODIUM,PORCINE/D5W 25000/250
0-40 INTRAVENOUS SOLUTION INTRAVENOUS CONTINUOUS
Status: DISCONTINUED | OUTPATIENT
Start: 2023-09-25 | End: 2023-10-03 | Stop reason: HOSPADM

## 2023-09-25 RX ADMIN — FERROUS SULFATE TAB 325 MG (65 MG ELEMENTAL FE) 1 EACH: 325 (65 FE) TAB at 08:09

## 2023-09-25 RX ADMIN — MAGNESIUM SULFATE HEPTAHYDRATE 2 G: 40 INJECTION, SOLUTION INTRAVENOUS at 06:09

## 2023-09-25 RX ADMIN — MAGNESIUM HYDROXIDE 400 MG: 400 SUSPENSION ORAL at 08:09

## 2023-09-25 RX ADMIN — CYANOCOBALAMIN TAB 1000 MCG 1000 MCG: 1000 TAB at 08:09

## 2023-09-25 RX ADMIN — ACETAMINOPHEN 650 MG: 325 TABLET ORAL at 03:09

## 2023-09-25 RX ADMIN — METOPROLOL TARTRATE 25 MG: 25 TABLET, FILM COATED ORAL at 08:09

## 2023-09-25 RX ADMIN — OXYCODONE HYDROCHLORIDE AND ACETAMINOPHEN 500 MG: 500 TABLET ORAL at 08:09

## 2023-09-25 RX ADMIN — HEPARIN SODIUM 8 UNITS/KG/HR: 10000 INJECTION, SOLUTION INTRAVENOUS at 10:09

## 2023-09-25 RX ADMIN — OXYCODONE HYDROCHLORIDE AND ACETAMINOPHEN 500 MG: 500 TABLET ORAL at 09:09

## 2023-09-25 RX ADMIN — MAGNESIUM HYDROXIDE 400 MG: 400 SUSPENSION ORAL at 09:09

## 2023-09-25 RX ADMIN — MAGNESIUM SULFATE HEPTAHYDRATE 2 G: 40 INJECTION, SOLUTION INTRAVENOUS at 05:09

## 2023-09-25 RX ADMIN — GUAIFENESIN 1200 MG: 600 TABLET, EXTENDED RELEASE ORAL at 08:09

## 2023-09-25 RX ADMIN — FOLIC ACID 1 MG: 1 TABLET ORAL at 08:09

## 2023-09-25 RX ADMIN — ASPIRIN 81 MG: 81 TABLET, COATED ORAL at 08:09

## 2023-09-25 RX ADMIN — TRAMADOL HYDROCHLORIDE 50 MG: 50 TABLET, COATED ORAL at 06:09

## 2023-09-25 RX ADMIN — METOPROLOL TARTRATE 25 MG: 25 TABLET, FILM COATED ORAL at 09:09

## 2023-09-25 RX ADMIN — GUAIFENESIN 1200 MG: 600 TABLET, EXTENDED RELEASE ORAL at 09:09

## 2023-09-25 RX ADMIN — PANTOPRAZOLE SODIUM 40 MG: 40 TABLET, DELAYED RELEASE ORAL at 08:09

## 2023-09-25 RX ADMIN — POLYETHYLENE GLYCOL 3350 17 G: 17 POWDER, FOR SOLUTION ORAL at 08:09

## 2023-09-25 RX ADMIN — WARFARIN SODIUM 2.5 MG: 2.5 TABLET ORAL at 04:09

## 2023-09-25 RX ADMIN — ACETAMINOPHEN 650 MG: 325 TABLET ORAL at 10:09

## 2023-09-25 RX ADMIN — SODIUM CHLORIDE 30 MG/ML INHALATION SOLUTION 4 ML: 30 SOLUTION INHALANT at 07:09

## 2023-09-25 RX ADMIN — DOCUSATE SODIUM 100 MG: 100 CAPSULE, LIQUID FILLED ORAL at 08:09

## 2023-09-25 RX ADMIN — ATORVASTATIN CALCIUM 80 MG: 40 TABLET, FILM COATED ORAL at 08:09

## 2023-09-25 NOTE — ASSESSMENT & PLAN NOTE
09/19/23  S/p CABG x5 and mechanical AVR today  -continue icu supportive care  -continue asa plavix statin BB and lasix  -will f/u    9/20/23  Cont ASA, plavix, statin, BB, lasix  Management per CTS    See plan under CABG

## 2023-09-25 NOTE — PLAN OF CARE
SW meet with patient at bedside after messaged by nurse. Patient was inquired about short-term disability and having PCP follow up scheduled.  SW stated that disability would most likely be via PCP's office and that establishing with PCP would be most helpful.   Patient stated he wished to [facility] next door upon DC. SW stated she would set him up with a hospital follow up appointment and establish care.   Patient inquired about activation code for [facility] set up. SW stated she could send code to Patient's phone to set up with [facility]. SW generated new activation code and sent text to Patient's phone.    SW will continue to follow and assist as needed.

## 2023-09-25 NOTE — PROGRESS NOTES
Clinical Pharmacy Progress Note: Coumadin Dosing and Monitoring     Indication: mechanical AVR (9/19)  New Start  Goal INR: 2.0-3.0     Lab Results   Component Value Date    INR 1.2 09/25/2023    INR 1.5 (H) 09/24/2023    INR 1.9 (H) 09/23/2023       Patient has been educated by pharmacist this admission.     Recent dosing history:   Pt received Warfarin 2.5 mg on 9/20 & 9/21. INR jumped to 3.4 on 9/22 so dose was held.   Pt received Warfarin 1 mg on 9/23 & 9/24. INR continues to trend down.     Drug interactions: Melatonin, Aspirin, Tylenol, and Tramadol each may increase the risk of bleeding while on warfarin    Lab order for daily PT/INR? Yes  Coumadin diet ordered? Yes    Bridge? Heparin drip minimal intensity nomogram    Plan:   - Give warfarin 2.5 mg today. INR is sub-therapeutic  - Pt had poor dietary intake on the days prior to INR of 3.4. MD reports pt's dietary intake has improved which may allow him to tolerate higher doses of warfarin now.   - Pharmacy will continue to monitor daily PT/INR. Dose adjustments will be made accordingly.      Thank you for allowing us to participate in this patient's care.      Charmaine Oh, Liza 09/25/2023 12:19 PM

## 2023-09-25 NOTE — PT/OT/SLP PROGRESS
"Physical Therapy  Treatment    Mikie Alcazar   MRN: [id number]   Admitting Diagnosis: NSTEMI (non-ST elevated myocardial infarction)    PT Received On: 09/25/23  PT Start Time: 0750     PT Stop Time: 0815    PT Total Time (min): 25 min       Billable Minutes:  Gait Training 15 and Therapeutic Activity 10    Treatment Type: Treatment  PT/PTA: PTA     Number of PTA visits since last PT visit: 3       General Precautions: Standard, fall, sternal  Orthopedic Precautions: N/A  Braces: N/A  Respiratory Status: Room air    Spiritual, Cultural Beliefs, Catholic Practices, Values that Affect Care: no    Subjective:  Communicated with patient's nurse, Vianca, and completed Epic chart review prior to session.  "I wanted to walk farther yesterday but they told me to save my energy."    Pain/Comfort  Pain Rating 1: 3/10 (INTERMITTENT)  Location - Side 1: Left  Location - Orientation 1: lateral  Location 1: chest  Pain Addressed 1: Other (see comments) (ACTIVITY PACING)  Pain Rating Post-Intervention 1: 0/10    Objective:   Patient found with: peripheral IV, telemetry, wound vac    Reviewed sternal precautions and re enforced importance of compliance.     Supine > sit EOB: Modified Independent    Forward scoot towards EOB: Modified Independent    STS from EOB No AD: SPV    800ft No AD SPV    Stand pivot T/F to EOB No AD: SPV  Patient declined sitting up in chair at this time.     Standing AROM TE to BLE x15 reps: calf raises, alt. MIP    Sit > supine: Modified Independent    Educated patient on importance of increased tolerance to upright position and direct impact on CV endurance and strength. Patient encouraged to sit up in chair/ EOB, for a minimum of 2 consecutive hours, 3x per day. Encouraged patient to perform AROM TE to BLE throughout the day within all available planes of motion. Also encouraged patient to request assistance from nursing staff to ambulate a minimum of 2x more to promote recovery of CV endurance. Re " enforced importance of utilizing call light to meet needs in room and not attempt to get up without staff assistance. Patient verbalized understanding and agreed to comply.       AM-PAC 6 CLICK MOBILITY  How much help from another person does this patient currently need?   1 = Unable, Total/Dependent Assistance  2 = A lot, Maximum/Moderate Assistance  3 = A little, Minimum/Contact Guard/Supervision  4 = None, Modified [place]/Independent    Turning over in bed (including adjusting bedclothes, sheets and blankets)?: 4  Sitting down on and standing up from a chair with arms (e.g., wheelchair, bedside commode, etc.): 4  Moving from lying on back to sitting on the side of the bed?: 4  Moving to and from a bed to a chair (including a wheelchair)?: 4  Need to walk in hospital room?: 4  Climbing 3-5 steps with a railing?: 1 (NT)  Basic Mobility Total Score: 21    AM-PAC Raw Score CMS G-Code Modifier Level of Impairment Assistance   6 % Total / Unable   7 - 9 CM 80 - 100% Maximal Assist   10 - 14 CL 60 - 80% Moderate Assist   15 - 19 CK 40 - 60% Moderate Assist   20 - 22 CJ 20 - 40% Minimal Assist   23 CI 1-20% SBA / CGA   24 CH 0% Independent/ Mod I     Patient left HOB elevated with call button in reach and family member present.    Assessment:  Mikie Alcazar is a 43 y.o. male with a medical diagnosis of NSTEMI (non-ST elevated myocardial infarction) and presents with overall decline in functional mobility. Patient would continue to benefit from skilled PT to address functional limitations listed below in order to return to PLOF/decrease caregiver burden. Patient was able to demonstrate good activity tolerance during today's session by significantly increasing gait distance. He was sufficiently challenged by standing strengthening exercises and well fatigued by end of session. Intermittent VC given for patient compliance with sternal precautions. Progressing well towards goals established within PT POC.      Rehab identified problem list/impairments: weakness, impaired self care skills, decreased upper extremity function, pain, impaired cardiopulmonary response to activity, other (comment) (STERNAL PRECAUTIONS)    Rehab potential is good.    Activity tolerance: Good    Discharge recommendations: home health PT      Barriers to discharge:      Equipment recommendations: shower chair     GOALS:   Multidisciplinary Problems       Physical Therapy Goals          Problem: Physical Therapy    Goal Priority Disciplines Outcome Goal Variances Interventions   Physical Therapy Goal     PT, PT/OT      Description: LTG'S TO BE MET IN 14 DAYS (10-6-23)  PT WILL BE ARELY FOR BED MOBILITY  PT WILL BE ARE[facility] FOR BED<>CHAIR TF'S  PT WILL  FEET NO AD ARELY  PT WILL INC AMPAC SCORE BY 2 POINTS TO PROGRESS GROSS FUNC MOBILITY                         PLAN:    Patient to be seen 3 x/week to address the above listed problems via gait training, therapeutic activities, therapeutic exercises  Plan of Care expires: 10/06/23  Plan of Care reviewed with: patient         09/25/2023

## 2023-09-25 NOTE — ASSESSMENT & PLAN NOTE
09/20/2023  The patient is postop day 1 status post coronary artery bypass grafting x5 and aortic valve replacement with a 19 mm Saint Dany mechanical valve.  Overall the patient is doing well.      Neuro:  Patient is awake appropriate and follows commands.  Patient is currently sedated with Precedex.  Will wean Precedex to off.   Cardiac:  Patient is hemodynamically stable.  With excellent cardiac output and cardiac index.  Patient is on low-dose vaso active meds.  Wean drips to off.  Will start metoprolol once drips are off.  Respiratory:  Patient is being weaned to extubation.  Patient is an active smoker Start nebs and Mucomyst.  Continue pulmonary toileting.  Continue incentive spirometer.    GI:  Patient is currently NPO.  Will start p.o. intake once extubated.    Renal:  Patient has good urine output and creatinine is 1.2.  Will start Lasix for diuresis.  Endocrine:  Patient required insulin drip for glucose controlled.  Will convert to sliding scale.    Id:  Patient had a T-max of 102°.  Most likely due to stress of surgery.  Will continue to follow.  White count is 9.  Patient is on manny op antibiotics.  Heme: Hematocrit is 17.  Patient is being transfused.  Platelet count is 74.  No evidence of active bleeding.  Will follow platelet count.  Will start patient on Coumadin anticoagulation for mechanical valve.  Activities:  Patient is currently in bed.  Will advance activities as tolerated once extubated.  Line tubes and drains:  Patient has an ETT, right IJ [place] and Cordis, right groin A-line, chest tubes, Ugalde catheter, pacer wires, and saphenectomy site DARON.    09/21/2023     The patient is postop day 2 status post coronary artery bypass grafting x5 and aortic valve replacement with a 19 mm Saint Dany mechanical valve.  Overall the patient is doing well.    Neuro:  Patient is awake alert and oriented x3.  Pain is well controlled with current pain management.  Cardiac:  The patient is off all drips.   Patient is hemodynamically stable.  Continue metoprolol.  Respiratory:  Patient was extubated yesterday.  Continue pulmonary toileting.  Continue incentive spirometer.   GI:  Patient is tolerating p.o. intake.  Patient had episodes of nausea yesterday which have abated.  Advance patient's diet as tolerated.    Renal:  Patient has good urine output.  Creatinine is 0.9.  Continue Lasix for diuresis.  Endocrine:  Glucose is controlled with sliding scale insulin.    Id:  T-max is 101.8 patient is currently afebrile.  White count is 17.  Most likely due to stress of surgery.  Continue to follow.  Heme:  Hematocrit is 24.5.  Platelet count is 89.  INR is 1.5.  Patient is started on Coumadin for anticoagulation of mechanical valve.  Activities:  Patient will be out of bed to chair.  Advance activities as tolerated.  Line tubes and drains:  Patient has pacer wires, right IJ Cordis and Ugalde catheter.  Will place PICC line and discontinue Cordis.    09/22/2023   The patient is postop day 3 status post coronary artery bypass grafting x5 and aortic valve replacement with a 19 mm Saint Dany mechanical valve.  Overall the patient is doing well.  Patient has been transferred to telemetry.    Neuro:  Patient is awake alert and oriented x3.  Pain is well controlled with current pain regimen.    Cardiac:  Patient is hemodynamically stable.  Continue metoprolol.    Respiratory:  Continue pulmonary toileting.  Continue incentive spirometer.    GI:  Patient is tolerating p.o. intake.  And tolerating a regular diet.    Renal:  Patient has good urine output.  Creatinine is 0.9.  Continue Lasix.    Endocrine:  Glucose is well controlled.    Id:  Patient is afebrile.  White count is 16.  Continue to follow.    Heme:  Hematocrit is 24 and platelet count is 114.  INR is 3.4.  Patient is currently on Coumadin for anticoagulation.  Activities:  Patient is out of bed and ambulating in the hallways.  Continue to advance as tolerated.  Line tubes  and drains:  Patient has a PICC line and pacer wires.    09/23/2023   The patient is postop day 4 status post coronary artery bypass grafting x5 and aortic valve replacement with a 19 mm Saint Dany mechanical valve.  Overall the patient is doing well.  Anticipate discharge to home with home health in the next 48-72 hours.    Neuro:  Patient is awake alert and oriented x3 pain is well controlled with pain management.  Neuro exam is nonfocal.    Cardiac:  Patient is hemodynamically stable.  Continue metoprolol.    Respiratory:  Continue pulmonary toileting.  Continue incentive spirometer.  GI patient is tolerating p.o. intake.  Will increase bowel regimen for bowel movements.  Renal: Patient has good urine output.  Creatinine is 0.9.  Patient is still appears fluid overloaded.  Continue Lasix.  Endocrine: Glucose is well controlled.    Id:  Patient is afebrile.  White count is down to 12.  Continue to follow.    Heme:  Hematocrit is 27.  Platelet count is 186 and INR is 1.9.  INR goal is between 2.5 and 3.  Continue Coumadin.    Activities:  Patient is out of bed and ambulating in the hallways.  Advance as tolerated.  Line tubes and drains:  Patient has a PICC line.      09/24/2023   The patient is postop day 5 status post coronary artery bypass grafting x5 and aortic valve replacement with a 19 mm Saint Dany mechanical valve.  Overall the patient is doing well.  Anticipate discharge home in the next 24-48 hours.    Neuro:  Patient is awake alert and oriented x3 pain is well controlled with current pain management.  Neuro exam is nonfocal.    Cardiac:  Patient is hemodynamically stable.  Continue metoprolol  Respiratory:  Continue pulmonary toileting.  Continue incentive spirometer.  Patient continues to have sputum production.  GI:  Patient is tolerating a regular diet.  Patient has had bowel movements.  Renal: Patient has good urine output.  Creatinine is stable.  Patient is appears to be euvolemic.  Will  discontinue Lasix.    Endocrine:  Glucose is well controlled.  Id: Patient is afebrile white count normal.  Heme:  Hematocrit is 27 and platelet count is 229.  INR is subtherapeutic 1.5.  Continue Coumadin.  Goal is a INR level of 2.5-3.  Activities: Patient is out of bed and ambulating in the hallways.  Advance as tolerated.    Line tubes and drains:  Patient has a PICC line.    09/25/2023   The patient is postop day 5 status post coronary artery bypass grafting x5 and aortic valve replacement with a 19 mm Saint Dany mechanical valve.  Overall the patient is doing well.  Patient is awaiting attainment of a therapeutic INR.  Anticipate discharge in the next 48-72 hours.    Neuro:  Patient is awake alert and oriented x3.  Pain is well controlled current pain management.  Neuro exam is nonfocal.    Cardiac:  Patient is stable.  Continue metoprolol.    Respiratory:  Patient has good sats on room air.  Continue pulmonary toileting.  Continue incentive spirometer.    GI:  Patient is tolerating a regular diet having bowel movements.    Renal:  Patient has good urine output.    Endocrine:  Glucose is well controlled.    Id:  Patient is afebrile white count is normal.    Heme:  Hematocrit is 27 and platelet count is 298.  INR is now normal at 1.2.  Patient is on bridging heparin.  Will titrate heparin to an APTT greater between 40 and 50.  Therapeutic goal of INR is 2.5-3.  Activities: Patient is out of bed and ambulating in the hallways.    Line tubes and drains:  Patient has a PICC line.

## 2023-09-25 NOTE — PROGRESS NOTES
[facility] - Telemetry ([facility])  Adult Nutrition  Progress Note    SUMMARY       Recommendations    Recommendation/Intervention:   1. Recommend pt continues on Cardiac, Coumadin restricted diet   2. Weekly weights     Goals:   1. Pt diet order will be advanced within 24 hr. (Resolved)   2. Pt will continue to consume >75% of EEN and EPN prior to RD follow up  Nutrition Goal Status: resolved/ new   Communication of RD Recs: other (comment) (POC: Sticky Note)    Assessment and Plan    Nutrition Problem  Inadequate PO intake (Resolved)   Excessive mineral (sodium) intake      Related to (etiology):   Decreased ability to consume sufficient nutrition.  Lack of prior exposure to accurate nutrition related information      Signs and Symptoms (as evidenced by):   NPO x 4 days  Demonstrated inability to apply food/nutrition related knowledge as pt was consuming high sodium foods      Interventions(treatment strategy):  1. Decreased Fat/ Sodium modified diet  2. Commercial Beverage  3. Collaboration with other providers     Nutrition Diagnosis Status:   Resolved/ New        Malnutrition Assessment     Skin (Micronutrient): dry, wounds unhealed, edema       Fluid Accumulation (Malnutrition):  (1+ trace)                         Reason for Assessment    Reason For Assessment: consult  Diagnosis: cardiac disease (NSTEMI (non-ST elevated myocardial infarction), S/P CABG x 5)  Relevant Medical History: NSTEMI (non-ST elevated myocardial infarction), HTN, CAD, Tobacco smoker, 1 pack of cigarettes or less per day, Nonrheumatic aortic valve insufficiency, S/P CABG x 5, S/P AVR (aortic valve replacement)  Interdisciplinary Rounds: did not attend  General Information Comments:   43 y.o male admitted w/ current principal problem of  NSTEMI. Pt currently has NPO diet order per s/p S/P 1 day CABG x 5 and recent extubation. The pt has been NPO x 4 days. NFPE not currently appropriate. RD will assess need for NFPE during RD follow up. The pt  "is currently charted to weigh 189 lbs 6 oz, BMI 28.79 (Overweight). Pt denies feeling nauseated but currently in pain. The pt LBM was 9/17. RD will continue to follow.    Nutrition Discharge Planning: Cardiac, Coumadin restricted diet    Follow up:  9/25/23: RD follow up. Visited pt at bedside, pt family member present. Pt reported that he has a great appetite and likes the food, confirmed 100% PO intake of meals. Requested diet nutrition education, provided and discussed "Vitamin K and Medication" and "Heart Healthy Nutrition Therapy" with pt and pt family member, they expressed previous dietary knowledge and understanding and appreciation of nutrition education provided,  RD contact information included on handouts, all questions/concerns answered at this time.  Pt confirmed he is not experiencing any N/V/D, no abdominal pain, denies chewing/swallowing difficulties, no cultural/spiritual/[employer] beliefs that refrain any foods, reported he takes iron supplements at home d/t anemia, UBW between 170-180 PTA and general normal wt. Pt appeared well nourished, no NFPE warranted at this time. Reviewed chart: LBM 9/24; Skin: dry, incision chest/ L leg WNL; Raphael score: 22 (no risk); Edema: 1+ trace bilateral leg/ hand/ wrist, generalized. Labs, meds, weight reviewed. Note warfarin, heparin in D5W, ferrous sulfate. Weight charted 9/25 177 lbs (BMI 27.05, Overweight). RD will continue to monitor.     Nutrition Risk Screen    Nutrition Risk Screen: no indicators present    Nutrition/Diet History    Spiritual, Cultural Beliefs, [employer]al Practices, Values that Affect Care: no  Food Allergies: NKFA  Factors Affecting Nutritional Intake: NPO, pain    Anthropometrics    Temp: 98.1 °F (36.7 °C)  Height Method: Stated  Height: 5' 8" (172.7 cm)  Height (inches): 68 in  Weight Method: Bed Scale  Weight: 80.7 kg (177 lb 14.6 oz)  Weight (lb): 177.91 lb  Ideal Body Weight (IBW), Male: 154 lb  % Ideal Body Weight, Male (lb): 122.97 " %  BMI (Calculated): 27.1  BMI Grade: 25 - 29.9 - overweight     Wt Readings from Last 15 Encounters:   09/25/23 80.7 kg (177 lb 14.6 oz)   11/13/15 91.6 kg (202 lb)     Lab/Procedures/Meds    Pertinent Labs Reviewed: reviewed  Pertinent Medications Reviewed: reviewed  BMP  Lab Results   Component Value Date     09/22/2023     09/22/2023    K 4.1 09/22/2023    K 4.1 09/22/2023    CL 97 09/22/2023    CL 97 09/22/2023    CO2 29 09/22/2023    CO2 29 09/22/2023    BUN 21 (H) 09/22/2023    BUN 21 (H) 09/22/2023    CREATININE 0.9 09/22/2023    CREATININE 0.9 09/22/2023    CALCIUM 8.5 (L) 09/22/2023    CALCIUM 8.5 (L) 09/22/2023    ANIONGAP 11 09/22/2023    ANIONGAP 11 09/22/2023    EGFRNORACEVR >60 09/22/2023    EGFRNORACEVR >60 09/22/2023     Lab Results   Component Value Date    ALT 32 09/22/2023    ALT 32 09/22/2023    AST 48 (H) 09/22/2023    AST 48 (H) 09/22/2023    ALKPHOS 147 (H) 09/22/2023    ALKPHOS 147 (H) 09/22/2023    BILITOT 0.7 09/22/2023    BILITOT 0.7 09/22/2023     Scheduled Meds:   ascorbic acid (vitamin C)  500 mg Oral BID    aspirin  81 mg Oral Daily    atorvastatin  80 mg Oral Daily    cyanocobalamin  1,000 mcg Oral Daily    docusate sodium  100 mg Oral BID    ferrous sulfate  1 tablet Oral Daily    folic acid  1 mg Oral Daily    guaiFENesin  1,200 mg Oral BID    magnesium hydroxide 400 mg/5 ml  5 mL Oral BID    metoprolol tartrate  25 mg Oral BID    pantoprazole  40 mg Oral Daily    polyethylene glycol  17 g Oral Daily    sodium chloride 3%  4 mL Nebulization Q12H    warfarin  2.5 mg Oral Daily     Continuous Infusions:   heparin (porcine) in D5W 8 Units/kg/hr (09/25/23 1004)     PRN Meds:.0.9%  NaCl infusion (for blood administration), acetaminophen, albumin human 5%, aluminum-magnesium hydroxide-simethicone, calcium gluconate IVPB, calcium gluconate IVPB, calcium gluconate IVPB, dextrose 10%, glucagon (human recombinant), influenza, insulin aspart U-100, lactated ringers, magnesium  sulfate IVPB, melatonin, metoclopramide HCl, ondansetron, pneumoc 20-leslee conj-dip cr(PF), potassium chloride in water, potassium chloride in water, potassium chloride in water, traMADoL    Physical Findings/Assessment         Estimated/Assessed Needs    Weight Used For Calorie Calculations: 80.7 kg (177 lb 14.6 oz)  Energy Calorie Requirements (kcal): 9377-3156 kcals (MSJ x 1.2-1.4 AF (CABG)  Energy Need Method: Stafford-St Jeor  Protein Requirements: 64-81 g (0.8-1.0 g/kg ABW (CABG, maintenance)  Weight Used For Protein Calculations: 80.7 kg (177 lb 14.6 oz)  Fluid Requirements (mL): 0026-5578 mL (1 mL/kcal)  Estimated Fluid Requirement Method: RDA Method  RDA Method (mL): 1999  CHO Requirement: 249-292 g (0826-0233 kcals/8)      Nutrition Prescription Ordered    Current Diet Order: Cardiac, Coumadin restricted diet     Evaluation of Received Nutrient/Fluid Intake  I/O: (Net since admit)   9/20: +1584 mL   9/25: -6183.2 mL    Energy Calories Required: meeting needs  Protein Required: meeting needs  Fluid Required: not meeting needs  Total Fluid Intake (mL): 562  Tolerance: tolerating  % Intake of Estimated Energy Needs: 75 - 100 %  % Meal Intake: 100%     Nutrition Risk    Level of Risk/Frequency of Follow-up: Low (F/u x 1 weekly)     Monitor and Evaluation    Food and Nutrient Intake: energy intake, food and beverage intake  Food and Nutrient Adminstration: diet order  Knowledge/Beliefs/Attitudes: food and nutrition knowledge/skill, beliefs and attitudes  Physical Activity and Function: nutrition-related ADLs and IADLs, factors affecting access to physical activity  Anthropometric Measurements: weight, body mass index  Biochemical Data, Medical Tests and Procedures: glucose/endocrine profile, inflammatory profile, lipid profile  Nutrition-Focused Physical Findings: overall appearance, skin     Nutrition Follow-Up    RD Follow-up?: Yes  Nina Rapp, Registration Eligible, Provisional LDN

## 2023-09-25 NOTE — SUBJECTIVE & OBJECTIVE
Interval History: The patient is postop day 6 status post coronary artery bypass grafting x5 and aortic valve replacement with Saint Dany 19 mm mechanical valve.    ROS  Medications:  Continuous Infusions:   heparin (porcine) in D5W 8 Units/kg/hr (09/25/23 1004)     Scheduled Meds:   ascorbic acid (vitamin C)  500 mg Oral BID    aspirin  81 mg Oral Daily    atorvastatin  80 mg Oral Daily    cyanocobalamin  1,000 mcg Oral Daily    docusate sodium  100 mg Oral BID    ferrous sulfate  1 tablet Oral Daily    folic acid  1 mg Oral Daily    guaiFENesin  1,200 mg Oral BID    magnesium hydroxide 400 mg/5 ml  5 mL Oral BID    metoprolol tartrate  25 mg Oral BID    pantoprazole  40 mg Oral Daily    polyethylene glycol  17 g Oral Daily    sodium chloride 3%  4 mL Nebulization Q12H    warfarin  1 mg Oral Daily     PRN Meds:0.9%  NaCl infusion (for blood administration), acetaminophen, albumin human 5%, aluminum-magnesium hydroxide-simethicone, calcium gluconate IVPB, calcium gluconate IVPB, calcium gluconate IVPB, dextrose 10%, glucagon (human recombinant), influenza, insulin aspart U-100, lactated ringers, magnesium sulfate IVPB, melatonin, metoclopramide HCl, ondansetron, pneumoc 20-leslee conj-dip cr(PF), potassium chloride in water, potassium chloride in water, potassium chloride in water, traMADoL     Objective:     Vital Signs (Most Recent):  Temp: 98.2 °F (36.8 °C) (09/25/23 0717)  Pulse: 101 (09/25/23 0952)  Resp: 18 (09/25/23 0728)  BP: 111/68 (09/25/23 0717)  SpO2: 97 % (09/25/23 0728) Vital Signs (24h Range):  Temp:  [98 °F (36.7 °C)-98.9 °F (37.2 °C)] 98.2 °F (36.8 °C)  Pulse:  [] 101  Resp:  [16-19] 18  SpO2:  [93 %-100 %] 97 %  BP: (104-143)/(63-68) 111/68     Weight: 80.7 kg (177 lb 14.6 oz)  Body mass index is 27.05 kg/m².    SpO2: 97 %       Intake/Output - Last 3 Shifts         09/23 0700 09/24 0659 09/24 0700 09/25 0659 09/25 0700 09/26 0659    P.O. 240 480     I.V. (mL/kg)  82 (1)     Total  Intake(mL/kg) 240 (2.9) 562 (7)     Urine (mL/kg/hr) 1250 (0.6) 180 (0.1)     Emesis/NG output  0     Stool 0      Total Output 1250 180     Net -1010 +382            Urine Occurrence 1 x 3 x     Stool Occurrence 1 x 1 x     Emesis Occurrence  1 x             Lines/Drains/Airways       Peripherally Inserted Central Catheter Line  Duration             PICC Double Lumen 09/21/23 0930 left basilic 4 days                     Physical Exam  Constitutional:       Appearance: Normal appearance.   HENT:      Head: Normocephalic and atraumatic.      Nose: Nose normal.   Cardiovascular:      Rate and Rhythm: Normal rate and regular rhythm.      Heart sounds: Normal heart sounds.   Pulmonary:      Effort: Pulmonary effort is normal.      Breath sounds: Normal breath sounds.   Abdominal:      General: Abdomen is flat. Bowel sounds are normal.      Palpations: Abdomen is soft.   Musculoskeletal:      Right lower leg: No edema.      Left lower leg: No edema.   Skin:     General: Skin is warm and dry.   Neurological:      General: No focal deficit present.      Mental Status: He is alert and oriented to person, place, and time.   Psychiatric:         Behavior: Behavior normal.            Significant Labs:  All pertinent labs from the last 24 hours have been reviewed.    Significant Diagnostics:  I have reviewed all pertinent imaging results/findings within the past 24 hours.

## 2023-09-25 NOTE — PLAN OF CARE
Discharge Instructions for Cardioversion    Your healthcare provider performed a procedure called cardioversion. Your healthcare provider used a controlled electric shock or a medicine to briefly stop all electrical activity in your heart. This helped restore your hearts normal rhythm. Here are some instructions to follow while you recover. Home care   Because cardioversion typically requires sedation, you won't be able to drive home. You will need a ride. Wait at least 24 hours before driving a car or operating heavy machinery after receiving sedating medicines.  Dont be alarmed if the skin on your chest is irritated or feels like it is sunburned. Your healthcare provider may prescribe a soothing lotion to relieve this discomfort. These minor symptoms will go away in a few days.  Ask your healthcare provider about medicines to keep your heart rhythm steady.  If you were prescribed medicine, take it as instructed by your healthcare provider. Dont skip doses or take double doses. Cardioversion requires blood thinners for at least 4 weeks to prevent a delayed risk of stroke when treating atrial fibrillation or atrial flutter. Be sure you discuss which medicine you are taking to prevent stroke. Ask when you need to have your medicine levels checked, and whether you may be able to stop taking it in the future or whether it is recommended that you take it for life. Some of these blood-thinning medicines will have the dose adjusted and interact with other medicines or foods. Your healthcare team will give you full instructions on what to watch out for. Report bleeding or symptoms of stroke immediately to your healthcare team and seek emergency medical attention.  Learn to take your own pulse. Keep a record of your results. Ask your healthcare provider when you should seek emergency medical attention. He or she will tell you which pulse rate reading is dangerous.       Keep in mind this procedure may Nutrition recommendations 9/25:  1. Recommend pt continues on Cardiac, Coumadin restricted diet   2. Weekly weights   3. Collaboration with other providers     Goals:   1. Pt diet order will be advanced within 24 hr. (Resolved)   2. Pt will continue to consume >75% of EEN and EPN prior to RD follow up    Nina Rapp, Registration Eligible, Provisional LDN   need to be repeated if the abnormal heart rhythm returns. After the procedure, your healthcare provider will tell you if the treatment worked or if you will need further treatments or medication. Follow-up Care  Make a follow-up appointment, or as directed. Call 911  Call 911 right away if you have:     Chest pain     Shortness of breath     Loss of vision, speech, or strength or coordination in any body part    When to call your healthcare provider  Call your healthcare provider right away if you:     Feel faint, dizzy, or lightheaded     Have chest pain with increased activity     Have irregular heartbeat or fast pulse     Have bleeding issues from blood-thinning medicines. DISCHARGE SUMMARY from Nurse    PATIENT INSTRUCTIONS:    After general anesthesia or intravenous sedation, for 24 hours or while taking prescription Narcotics:  · Limit your activities  · Do not drive and operate hazardous machinery  · Do not make important personal or business decisions  · Do  not drink alcoholic beverages  · If you have not urinated within 8 hours after discharge, please contact your surgeon on call. Report the following to your surgeon:  · Excessive pain, swelling, redness or odor of or around the surgical area  · Temperature over 100.5  · Nausea and vomiting lasting longer than 4 hours or if unable to take medications  · Any signs of decreased circulation or nerve impairment to extremity: change in color, persistent  numbness, tingling, coldness or increase pain  · Any questions    What to do at Home:  Recommended activity: Activity as tolerated and no driving for today. If you experience any of the following symptoms shortness of breath or feel like your heart is racing, please follow up with Dr. Gamboa People office. *  Please give a list of your current medications to your Primary Care Provider.     *  Please update this list whenever your medications are discontinued, doses are      changed, or new medications (including over-the-counter products) are added. *  Please carry medication information at all times in case of emergency situations. These are general instructions for a healthy lifestyle:    No smoking/ No tobacco products/ Avoid exposure to second hand smoke  Surgeon General's Warning:  Quitting smoking now greatly reduces serious risk to your health. Obesity, smoking, and sedentary lifestyle greatly increases your risk for illness    A healthy diet, regular physical exercise & weight monitoring are important for maintaining a healthy lifestyle    You may be retaining fluid if you have a history of heart failure or if you experience any of the following symptoms:  Weight gain of 3 pounds or more overnight or 5 pounds in a week, increased swelling in our hands or feet or shortness of breath while lying flat in bed. Please call your doctor as soon as you notice any of these symptoms; do not wait until your next office visit. The discharge information has been reviewed with the patient. The patient verbalized understanding. Discharge medications reviewed with the patient and appropriate educational materials and side effects teaching were provided.   ___________________________________________________________________________________________________________________________________

## 2023-09-25 NOTE — SUBJECTIVE & OBJECTIVE
Review of Systems   Constitutional: Positive for malaise/fatigue.   HENT: Negative.     Eyes: Negative.    Cardiovascular:  Positive for chest pain (incisional (controlled)).   Respiratory: Negative.     Endocrine: Negative.    Hematologic/Lymphatic: Negative.    Musculoskeletal: Negative.    Gastrointestinal: Negative.    Genitourinary: Negative.    Neurological: Negative.    Psychiatric/Behavioral: Negative.       Objective:     Vital Signs (Most Recent):  Temp: 98.2 °F (36.8 °C) (09/25/23 0717)  Pulse: 90 (09/25/23 0728)  Resp: 18 (09/25/23 0728)  BP: 111/68 (09/25/23 0717)  SpO2: 97 % (09/25/23 0728) Vital Signs (24h Range):  Temp:  [98 °F (36.7 °C)-98.9 °F (37.2 °C)] 98.2 °F (36.8 °C)  Pulse:  [] 90  Resp:  [16-19] 18  SpO2:  [93 %-100 %] 97 %  BP: (104-143)/(63-68) 111/68     Weight: 80.7 kg (177 lb 14.6 oz)  Body mass index is 27.05 kg/m².     SpO2: 97 %         Intake/Output Summary (Last 24 hours) at 9/25/2023 0915  Last data filed at 9/24/2023 1809  Gross per 24 hour   Intake 562 ml   Output --   Net 562 ml       Lines/Drains/Airways       Peripherally Inserted Central Catheter Line  Duration             PICC Double Lumen 09/21/23 0930 left basilic 3 days                       Physical Exam  Vitals and nursing note reviewed.   Constitutional:       General: He is not in acute distress.     Appearance: Normal appearance. He is well-developed. He is not diaphoretic.   HENT:      Head: Normocephalic and atraumatic.   Eyes:      General:         Right eye: No discharge.         Left eye: No discharge.      Pupils: Pupils are equal, round, and reactive to light.   Neck:      Thyroid: No thyromegaly.      Vascular: No JVD.      Trachea: No tracheal deviation.   Cardiovascular:      Rate and Rhythm: Normal rate and regular rhythm.      Heart sounds: Normal heart sounds, S1 normal and S2 normal. No murmur heard.     Comments: Sternotomy site healing well  C/D/I; no bleeding erythema or  "drainage    +mechanical click  Pulmonary:      Effort: Pulmonary effort is normal. No respiratory distress.      Breath sounds: Normal breath sounds. No wheezing or rales.   Abdominal:      General: There is no distension.      Tenderness: There is no rebound.   Musculoskeletal:      Cervical back: Neck supple.      Right lower leg: No edema.      Left lower leg: No edema.   Skin:     General: Skin is warm and dry.      Findings: No erythema.   Neurological:      General: No focal deficit present.      Mental Status: He is alert and oriented to person, place, and time.   Psychiatric:         Mood and Affect: Mood normal.         Behavior: Behavior normal.            Significant Labs: CMP No results for input(s): "NA", "K", "CL", "CO2", "GLU", "BUN", "CREATININE", "CALCIUM", "PROT", "ALBUMIN", "BILITOT", "ALKPHOS", "AST", "ALT", "ANIONGAP", "ESTGFRAFRICA", "EGFRNONAA" in the last 48 hours., CBC   Recent Labs   Lab 09/24/23  0443 09/25/23  0323   WBC 9.17 8.52   HGB 9.1* 9.0*   HCT 27.6* 27.0*    298   , Troponin No results for input(s): "TROPONINI" in the last 48 hours., and All pertinent lab results from the last 24 hours have been reviewed.    Significant Imaging: Echocardiogram: Transthoracic echo (TTE) complete (Cupid Only):   Results for orders placed or performed during the hospital encounter of 09/17/23   Echo   Result Value Ref Range    BSA 1.98 m2    LVOT stroke volume 82.76 cm3    LVIDd 5.22 3.5 - 6.0 cm    LV Systolic Volume 86.21 mL    LV Systolic Volume Index 44.2 mL/m2    LVIDs 4.37 (A) 2.1 - 4.0 cm    LV Diastolic Volume 130.82 mL    LV Diastolic Volume Index 67.09 mL/m2    IVS 0.88 0.6 - 1.1 cm    LVOT diameter 1.91 cm    LVOT area 2.9 cm2    FS 16 (A) 28 - 44 %    Left Ventricle Relative Wall Thickness 0.38 cm    Posterior Wall 0.98 0.6 - 1.1 cm    LV mass 177.53 g    LV Mass Index 91 g/m2    MV Peak E Bob 1.20 m/s    TDI LATERAL 0.06 m/s    TDI SEPTAL 0.08 m/s    E/E' ratio 17.14 m/s    MV " Peak A Bob 1.14 m/s    TR Max Bob 2.33 m/s    E/A ratio 1.05     IVRT 72.31 msec    E wave deceleration time 200.37 msec    LV SEPTAL E/E' RATIO 15.00 m/s    LV LATERAL E/E' RATIO 20.00 m/s    LVOT peak bob 1.38 m/s    Left Ventricular Outflow Tract Mean Velocity 1.08 cm/s    Left Ventricular Outflow Tract Mean Gradient 4.99 mmHg    LA size 3.79 cm    Left Atrium Minor Axis 4.68 cm    Left Atrium Major Axis 4.27 cm    RVOT peak VTI 10.8 cm    TAPSE 1.84 cm    RA Major Axis 3.31 cm    AV regurgitation pressure 1/2 time 456.14341566758735 ms    AR Max Bob 4.34 m/s    AV mean gradient 14 mmHg    AV peak gradient 26 mmHg    Ao peak bob 2.54 m/s    Ao VTI 54.00 cm    LVOT peak VTI 28.90 cm    AV valve area 1.53 cm²    AV Velocity Ratio 0.54     AV index (prosthetic) 0.54     SARAH by Velocity Ratio 1.56 cm²    Mr max bob 4.42 m/s    MV stenosis pressure 1/2 time 58.11 ms    MV valve area p 1/2 method 3.79 cm2    TV mean gradient 19 mmHg    Triscuspid Valve Regurgitation Peak Gradient 22 mmHg    PV mean gradient 1 mmHg    RVOT peak bob 0.58 m/s    Ao root annulus 2.71 cm    STJ 2.59 cm    Ascending aorta 2.36 cm    IVC diameter 1.57 cm    Mean e' 0.07 m/s    ZLVIDS 1.94     ZLVIDD -0.63     LA Volume Index 22.9 mL/m2    LA volume 44.60 cm3    LA WIDTH 3.1 cm    RA Width 2.2 cm    TV resting pulmonary artery pressure 25 mmHg    RV TB RVSP 5 mmHg    Est. RA pres 3 mmHg    Narrative      Left Ventricle: The left ventricle is normal in size. Ventricular mass   is normal. Normal wall thickness. regional wall motion abnormalities   present. There is mildly reduced systolic function with a visually   estimated ejection fraction of 40 - 45%. Grade II diastolic dysfunction.    Right Ventricle: Normal right ventricular cavity size. Wall thickness   is normal. Right ventricle wall motion  is normal. Systolic function is   normal.    Aortic Valve: There is mild to moderate aortic regurgitation.    Pulmonary Artery: The estimated  pulmonary artery systolic pressure is   25 mmHg.    IVC/SVC: Normal venous pressure at 3 mmHg.     , EKG: Reviewed, and X-Ray: CXR: X-Ray Chest 1 View (CXR):   Results for orders placed or performed during the hospital encounter of 09/17/23   X-Ray Chest 1 View    Narrative    EXAMINATION:  XR CHEST 1 VIEW    CLINICAL HISTORY:  PICC placement;    TECHNIQUE:  Single frontal view of the chest was performed.    COMPARISON:  09/21/2023    FINDINGS:  Left-sided PICC line tip overlies the SVC in good position.  In comparison to the prior study, there is no adverse interval changes      Impression    In comparison to the prior study, there is no adverse interval changes      Electronically signed by: Elliott España MD  Date:    09/21/2023  Time:    10:10    and X-Ray Chest PA and Lateral (CXR): No results found for this visit on 09/17/23.

## 2023-09-25 NOTE — PT/OT/SLP PROGRESS
Physical Therapy      Patient Name:  Mikie Alcazar   MRN:  [id number]    0725 a.m.  Patient meeting with MD at this time. Will follow up at next available opportunity.

## 2023-09-25 NOTE — ASSESSMENT & PLAN NOTE
Cont management per CTS    09/23   Continue asa statin plavix lasix and metoprolol  On coumadin for mechanical AVR    09/24  Continue ASA statin BB  Continue supportive care    9/25/23  -Progressing well  -Continue ASA, statin, BB  -IS usage and ambulation

## 2023-09-25 NOTE — PROGRESS NOTES
[facility] - Telemetry ([facility])  Cardiology  Progress Note    Patient Name: Mikie Alcazar  MRN: [id number]  Admission Date: 9/17/2023  Hospital Length of Stay: 8 days  Code Status: Full Code   Attending Physician: Nishant Douglas MD   Primary Care Physician: Lissette, Primary Doctor  Expected Discharge Date:   Principal Problem:NSTEMI (non-ST elevated myocardial infarction)    Subjective:     Hospital Course:   Cardiology consulted to assist with management. Pt seen and examined today, resting in bed mother at bedside. He reports his pain started 6+ months ago and worsening in the last few weeks happening daily with associated SOB, dizziness radiating to left arm. He reports his pain is sharp and pressure-like at times. Pt reports daily use a marijuana, h/o cocaine and other drugs 5-10 years ago. Drinks occasionally. Echo pending cont hep gtt    09/19/2023. Admitted for NSTEMI/ACS. Echo showed RWMA  The cath done yesterday showed   Severe lmca disease with timi1 flow in lad   Lcx ostial 50%   Rca prox 80%   Ef 50%   Severe AI  09/19/23 s/p CABG x5 and aortic valve replacement with a 19 mm [place] mechanical valve by Dr. Douglas.  In ICU and intubated. Om epi and Vasopressin gtt.    9/20/23  Pt seen and examined today, POD 1 CABGx5 pt still intubated on exam this am, weaning epi. Labs reviewed, H/H 6.0 and 17.1. Plans to extubated today    9/21/23 Pt seen and examined today extubated feels ok chest soreness, chest tubes removed. Labs reviewed, chart reviewed.    9/22/23 Pt seen and examined today, sitting up in bedside chair. Feels good. Walked with PT/OT. Labs reviewed, chart reviewed    09/23 ambulating well. No chest pain dyspnea, the lab reviewed.     09/24. On coumadin rx and heparin bridge. Non chest pain dyspnea, the lab reviewed. VSS    9/25/23-Patient seen and examined today, resting in bed. No AEON. Pain controlled. Working with PT/OT. Labs stable. INR 1.2, on heparin bridge.          Review of Systems    Constitutional: Positive for malaise/fatigue.   HENT: Negative.     Eyes: Negative.    Cardiovascular:  Positive for chest pain (incisional (controlled)).   Respiratory: Negative.     Endocrine: Negative.    Hematologic/Lymphatic: Negative.    Musculoskeletal: Negative.    Gastrointestinal: Negative.    Genitourinary: Negative.    Neurological: Negative.    Psychiatric/Behavioral: Negative.       Objective:     Vital Signs (Most Recent):  Temp: 98.2 °F (36.8 °C) (09/25/23 0717)  Pulse: 90 (09/25/23 0728)  Resp: 18 (09/25/23 0728)  BP: 111/68 (09/25/23 0717)  SpO2: 97 % (09/25/23 0728) Vital Signs (24h Range):  Temp:  [98 °F (36.7 °C)-98.9 °F (37.2 °C)] 98.2 °F (36.8 °C)  Pulse:  [] 90  Resp:  [16-19] 18  SpO2:  [93 %-100 %] 97 %  BP: (104-143)/(63-68) 111/68     Weight: 80.7 kg (177 lb 14.6 oz)  Body mass index is 27.05 kg/m².     SpO2: 97 %         Intake/Output Summary (Last 24 hours) at 9/25/2023 0915  Last data filed at 9/24/2023 1809  Gross per 24 hour   Intake 562 ml   Output --   Net 562 ml       Lines/Drains/Airways       Peripherally Inserted Central Catheter Line  Duration             PICC Double Lumen 09/21/23 0930 left basilic 3 days                       Physical Exam  Vitals and nursing note reviewed.   Constitutional:       General: He is not in acute distress.     Appearance: Normal appearance. He is well-developed. He is not diaphoretic.   HENT:      Head: Normocephalic and atraumatic.   Eyes:      General:         Right eye: No discharge.         Left eye: No discharge.      Pupils: Pupils are equal, round, and reactive to light.   Neck:      Thyroid: No thyromegaly.      Vascular: No JVD.      Trachea: No tracheal deviation.   Cardiovascular:      Rate and Rhythm: Normal rate and regular rhythm.      Heart sounds: Normal heart sounds, S1 normal and S2 normal. No murmur heard.     Comments: Sternotomy site healing well  C/D/I; no bleeding erythema or drainage    +mechanical  "click  Pulmonary:      Effort: Pulmonary effort is normal. No respiratory distress.      Breath sounds: Normal breath sounds. No wheezing or rales.   Abdominal:      General: There is no distension.      Tenderness: There is no rebound.   Musculoskeletal:      Cervical back: Neck supple.      Right lower leg: No edema.      Left lower leg: No edema.   Skin:     General: Skin is warm and dry.      Findings: No erythema.   Neurological:      General: No focal deficit present.      Mental Status: He is alert and oriented to person, place, and time.   Psychiatric:         Mood and Affect: Mood normal.         Behavior: Behavior normal.            Significant Labs: CMP No results for input(s): "NA", "K", "CL", "CO2", "GLU", "BUN", "CREATININE", "CALCIUM", "PROT", "ALBUMIN", "BILITOT", "ALKPHOS", "AST", "ALT", "ANIONGAP", "ESTGFRAFRICA", "EGFRNONAA" in the last 48 hours., CBC   Recent Labs   Lab 09/24/23  0443 09/25/23  0323   WBC 9.17 8.52   HGB 9.1* 9.0*   HCT 27.6* 27.0*    298   , Troponin No results for input(s): "TROPONINI" in the last 48 hours., and All pertinent lab results from the last 24 hours have been reviewed.    Significant Imaging: Echocardiogram: Transthoracic echo (TTE) complete (Cupid Only):   Results for orders placed or performed during the hospital encounter of 09/17/23   Echo   Result Value Ref Range    BSA 1.98 m2    LVOT stroke volume 82.76 cm3    LVIDd 5.22 3.5 - 6.0 cm    LV Systolic Volume 86.21 mL    LV Systolic Volume Index 44.2 mL/m2    LVIDs 4.37 (A) 2.1 - 4.0 cm    LV Diastolic Volume 130.82 mL    LV Diastolic Volume Index 67.09 mL/m2    IVS 0.88 0.6 - 1.1 cm    LVOT diameter 1.91 cm    LVOT area 2.9 cm2    FS 16 (A) 28 - 44 %    Left Ventricle Relative Wall Thickness 0.38 cm    Posterior Wall 0.98 0.6 - 1.1 cm    LV mass 177.53 g    LV Mass Index 91 g/m2    MV Peak E Bob 1.20 m/s    TDI LATERAL 0.06 m/s    TDI SEPTAL 0.08 m/s    E/E' ratio 17.14 m/s    MV Peak A Bob 1.14 m/s    TR " Max Bob 2.33 m/s    E/A ratio 1.05     IVRT 72.31 msec    E wave deceleration time 200.37 msec    LV SEPTAL E/E' RATIO 15.00 m/s    LV LATERAL E/E' RATIO 20.00 m/s    LVOT peak bob 1.38 m/s    Left Ventricular Outflow Tract Mean Velocity 1.08 cm/s    Left Ventricular Outflow Tract Mean Gradient 4.99 mmHg    LA size 3.79 cm    Left Atrium Minor Axis 4.68 cm    Left Atrium Major Axis 4.27 cm    RVOT peak VTI 10.8 cm    TAPSE 1.84 cm    RA Major Axis 3.31 cm    AV regurgitation pressure 1/2 time [id number] ms    AR Max Bob 4.34 m/s    AV mean gradient 14 mmHg    AV peak gradient 26 mmHg    Ao peak bob 2.54 m/s    Ao VTI 54.00 cm    LVOT peak VTI 28.90 cm    AV valve area 1.53 cm²    AV Velocity Ratio 0.54     AV index (prosthetic) 0.54     SARAH by Velocity Ratio 1.56 cm²    Mr max bob 4.42 m/s    MV stenosis pressure 1/2 time 58.11 ms    MV valve area p 1/2 method 3.79 cm2    TV mean gradient 19 mmHg    Triscuspid Valve Regurgitation Peak Gradient 22 mmHg    PV mean gradient 1 mmHg    RVOT peak bob 0.58 m/s    Ao root annulus 2.71 cm    STJ 2.59 cm    Ascending aorta 2.36 cm    IVC diameter 1.57 cm    Mean e' 0.07 m/s    ZLVIDS 1.94     ZLVIDD -0.63     LA Volume Index 22.9 mL/m2    LA volume 44.60 cm3    LA WIDTH 3.1 cm    RA Width 2.2 cm    TV resting pulmonary artery pressure 25 mmHg    RV TB RVSP 5 mmHg    Est. RA pres 3 mmHg    Narrative      Left Ventricle: The left ventricle is normal in size. Ventricular mass   is normal. Normal wall thickness. regional wall motion abnormalities   present. There is mildly reduced systolic function with a visually   estimated ejection fraction of 40 - 45%. Grade II diastolic dysfunction.    Right Ventricle: Normal right ventricular cavity size. Wall thickness   is normal. Right ventricle wall motion  is normal. Systolic function is   normal.    Aortic Valve: There is mild to moderate aortic regurgitation.    Pulmonary Artery: The estimated pulmonary artery systolic  pressure is   25 mmHg.    IVC/SVC: Normal venous pressure at 3 mmHg.     , EKG: Reviewed, and X-Ray: CXR: X-Ray Chest 1 View (CXR):   Results for orders placed or performed during the hospital encounter of 09/17/23   X-Ray Chest 1 View    Narrative    EXAMINATION:  XR CHEST 1 VIEW    CLINICAL HISTORY:  PICC placement;    TECHNIQUE:  Single frontal view of the chest was performed.    COMPARISON:  09/21/2023    FINDINGS:  Left-sided PICC line tip overlies the SVC in good position.  In comparison to the prior study, there is no adverse interval changes      Impression    In comparison to the prior study, there is no adverse interval changes      Electronically signed by: Elliott sEpaña MD  Date:    09/21/2023  Time:    10:10    and X-Ray Chest PA and Lateral (CXR): No results found for this visit on 09/17/23.    Assessment and Plan:   Patient who presents with multivessel CAD, severe AI, recovering well s/p CABG x 5 and AVR. Continue current meds/mgmt.     * NSTEMI (non-ST elevated myocardial infarction)  Troponin 0.6->-0.709->0.708  Cont hep gtt  EKG with ST T wave abnml  LHC planned for today  All risks, benefits and treatment alternatives explained, all questions answered. Pt agreeable to proceed.   NPO    9/20/23  S/P CABG x5 AVR    S/P AVR (aortic valve replacement)  09/24  On coumadin Rx and heparin gtt as bridge    S/P CABG x 5  Cont management per CTS    09/23   Continue asa statin plavix lasix and metoprolol  On coumadin for mechanical AVR    09/24  Continue ASA statin BB  Continue supportive care    9/25/23  -Progressing well  -Continue ASA, statin, BB  -IS usage and ambulation    Nonrheumatic aortic valve insufficiency  -s/p mechanical AVR    Tobacco smoker, 1 pack of cigarettes or less per day  Smoking cessation    CAD, multiple vessel  09/19/23  S/p CABG x5 and mechanical AVR today  -continue icu supportive care  -continue asa plavix statin BB and lasix  -will f/u    9/20/23  Cont ASA, plavix, statin, BB,  lasix  Management per CTS    See plan under CABG    Hypertension  stable        VTE Risk Mitigation (From admission, onward)         Ordered     heparin 25,000 units in dextrose 5% 250 ml (100 units/mL) infusion MINIMAL INTENSITY nomogram - [facility]  Continuous        Question Answer Comment   Heparin Infusion Adjustment (DO NOT MODIFY ANSWER) [email or web address] MINIMAL  INTENSITY nomogram for [facility] [email or web address]    Begin at (in units/kg/hr) 8        09/25/23 0855     warfarin (COUMADIN) tablet 1 mg  Daily         09/23/23 1431     IP VTE LOW RISK PATIENT  Once         09/17/23 1941                Natty Borrego PA-C  Cardiology  [facility] - Telemetry ([facility])

## 2023-09-25 NOTE — PROGRESS NOTES
[facility] - Telemetry ([facility])  Cardiothoracic Surgery  Progress Note    Patient Name: Mikie Alcazar  MRN: [id number]  Admission Date: 9/17/2023  Hospital Length of Stay: 8 days  Code Status: Full Code   Attending Physician: Nishant Douglas MD   Referring Provider: Self, Aaareferral  Principal Problem:NSTEMI (non-ST elevated myocardial infarction)            Subjective:     Post-Op Info:  Procedure(s) (LRB):  CORONARY ARTERY BYPASS GRAFT (CABG) (N/A)  REPLACEMENT-VALVE-AORTIC (N/A)  SURGICAL PROCUREMENT, VEIN, ENDOSCOPIC (Left)  BLOCK, NERVE, INTERCOSTAL, 2 OR MORE (N/A)  ECHOCARDIOGRAM,TRANSESOPHAGEAL (N/A)   6 Days Post-Op     Interval History: The patient is postop day 6 status post coronary artery bypass grafting x5 and aortic valve replacement with Saint Dany 19 mm mechanical valve.    ROS  Medications:  Continuous Infusions:   heparin (porcine) in D5W 8 Units/kg/hr (09/25/23 1004)     Scheduled Meds:   ascorbic acid (vitamin C)  500 mg Oral BID    aspirin  81 mg Oral Daily    atorvastatin  80 mg Oral Daily    cyanocobalamin  1,000 mcg Oral Daily    docusate sodium  100 mg Oral BID    ferrous sulfate  1 tablet Oral Daily    folic acid  1 mg Oral Daily    guaiFENesin  1,200 mg Oral BID    magnesium hydroxide 400 mg/5 ml  5 mL Oral BID    metoprolol tartrate  25 mg Oral BID    pantoprazole  40 mg Oral Daily    polyethylene glycol  17 g Oral Daily    sodium chloride 3%  4 mL Nebulization Q12H    warfarin  1 mg Oral Daily     PRN Meds:0.9%  NaCl infusion (for blood administration), acetaminophen, albumin human 5%, aluminum-magnesium hydroxide-simethicone, calcium gluconate IVPB, calcium gluconate IVPB, calcium gluconate IVPB, dextrose 10%, glucagon (human recombinant), influenza, insulin aspart U-100, lactated ringers, magnesium sulfate IVPB, melatonin, metoclopramide HCl, ondansetron, pneumoc 20-leslee conj-dip cr(PF), potassium chloride in water, potassium chloride in water, potassium chloride in  water, traMADoL     Objective:     Vital Signs (Most Recent):  Temp: 98.2 °F (36.8 °C) (09/25/23 0717)  Pulse: 101 (09/25/23 0952)  Resp: 18 (09/25/23 0728)  BP: 111/68 (09/25/23 0717)  SpO2: 97 % (09/25/23 0728) Vital Signs (24h Range):  Temp:  [98 °F (36.7 °C)-98.9 °F (37.2 °C)] 98.2 °F (36.8 °C)  Pulse:  [] 101  Resp:  [16-19] 18  SpO2:  [93 %-100 %] 97 %  BP: (104-143)/(63-68) 111/68     Weight: 80.7 kg (177 lb 14.6 oz)  Body mass index is 27.05 kg/m².    SpO2: 97 %       Intake/Output - Last 3 Shifts         09/23 0700 09/24 0659 09/24 0700 09/25 0659 09/25 0700 09/26 0659    P.O. 240 480     I.V. (mL/kg)  82 (1)     Total Intake(mL/kg) 240 (2.9) 562 (7)     Urine (mL/kg/hr) 1250 (0.6) 180 (0.1)     Emesis/NG output  0     Stool 0      Total Output 1250 180     Net -1010 +382            Urine Occurrence 1 x 3 x     Stool Occurrence 1 x 1 x     Emesis Occurrence  1 x             Lines/Drains/Airways       Peripherally Inserted Central Catheter Line  Duration             PICC Double Lumen 09/21/23 0930 left basilic 4 days                     Physical Exam  Constitutional:       Appearance: Normal appearance.   HENT:      Head: Normocephalic and atraumatic.      Nose: Nose normal.   Cardiovascular:      Rate and Rhythm: Normal rate and regular rhythm.      Heart sounds: Normal heart sounds.   Pulmonary:      Effort: Pulmonary effort is normal.      Breath sounds: Normal breath sounds.   Abdominal:      General: Abdomen is flat. Bowel sounds are normal.      Palpations: Abdomen is soft.   Musculoskeletal:      Right lower leg: No edema.      Left lower leg: No edema.   Skin:     General: Skin is warm and dry.   Neurological:      General: No focal deficit present.      Mental Status: He is alert and oriented to person, place, and time.   Psychiatric:         Behavior: Behavior normal.            Significant Labs:  All pertinent labs from the last 24 hours have been reviewed.    Significant Diagnostics:  I  have reviewed all pertinent imaging results/findings within the past 24 hours.    Assessment/Plan:     S/P CABG x 5  09/20/2023  The patient is postop day 1 status post coronary artery bypass grafting x5 and aortic valve replacement with a 19 mm Saint Dany mechanical valve.  Overall the patient is doing well.      Neuro:  Patient is awake appropriate and follows commands.  Patient is currently sedated with Precedex.  Will wean Precedex to off.   Cardiac:  Patient is hemodynamically stable.  With excellent cardiac output and cardiac index.  Patient is on low-dose vaso active meds.  Wean drips to off.  Will start metoprolol once drips are off.  Respiratory:  Patient is being weaned to extubation.  Patient is an active smoker Start nebs and Mucomyst.  Continue pulmonary toileting.  Continue incentive spirometer.    GI:  Patient is currently NPO.  Will start p.o. intake once extubated.    Renal:  Patient has good urine output and creatinine is 1.2.  Will start Lasix for diuresis.  Endocrine:  Patient required insulin drip for glucose controlled.  Will convert to sliding scale.    Id:  Patient had a T-max of 102°.  Most likely due to stress of surgery.  Will continue to follow.  White count is 9.  Patient is on manny op antibiotics.  Heme: Hematocrit is 17.  Patient is being transfused.  Platelet count is 74.  No evidence of active bleeding.  Will follow platelet count.  Will start patient on Coumadin anticoagulation for mechanical valve.  Activities:  Patient is currently in bed.  Will advance activities as tolerated once extubated.  Line tubes and drains:  Patient has an ETT, right IJ [place] and Cordis, right groin A-line, chest tubes, Ugalde catheter, pacer wires, and saphenectomy site DARON.    09/21/2023     The patient is postop day 2 status post coronary artery bypass grafting x5 and aortic valve replacement with a 19 mm Saint Dany mechanical valve.  Overall the patient is doing well.    Neuro:  Patient is awake alert  and oriented x3.  Pain is well controlled with current pain management.  Cardiac:  The patient is off all drips.  Patient is hemodynamically stable.  Continue metoprolol.  Respiratory:  Patient was extubated yesterday.  Continue pulmonary toileting.  Continue incentive spirometer.   GI:  Patient is tolerating p.o. intake.  Patient had episodes of nausea yesterday which have abated.  Advance patient's diet as tolerated.    Renal:  Patient has good urine output.  Creatinine is 0.9.  Continue Lasix for diuresis.  Endocrine:  Glucose is controlled with sliding scale insulin.    Id:  T-max is 101.8 patient is currently afebrile.  White count is 17.  Most likely due to stress of surgery.  Continue to follow.  Heme:  Hematocrit is 24.5.  Platelet count is 89.  INR is 1.5.  Patient is started on Coumadin for anticoagulation of mechanical valve.  Activities:  Patient will be out of bed to chair.  Advance activities as tolerated.  Line tubes and drains:  Patient has pacer wires, right IJ Cordis and Ugalde catheter.  Will place PICC line and discontinue Cordis.    09/22/2023   The patient is postop day 3 status post coronary artery bypass grafting x5 and aortic valve replacement with a 19 mm Saint Dany mechanical valve.  Overall the patient is doing well.  Patient has been transferred to telemetry.    Neuro:  Patient is awake alert and oriented x3.  Pain is well controlled with current pain regimen.    Cardiac:  Patient is hemodynamically stable.  Continue metoprolol.    Respiratory:  Continue pulmonary toileting.  Continue incentive spirometer.    GI:  Patient is tolerating p.o. intake.  And tolerating a regular diet.    Renal:  Patient has good urine output.  Creatinine is 0.9.  Continue Lasix.    Endocrine:  Glucose is well controlled.    Id:  Patient is afebrile.  White count is 16.  Continue to follow.    Heme:  Hematocrit is 24 and platelet count is 114.  INR is 3.4.  Patient is currently on Coumadin for  anticoagulation.  Activities:  Patient is out of bed and ambulating in the hallways.  Continue to advance as tolerated.  Line tubes and drains:  Patient has a PICC line and pacer wires.    09/23/2023   The patient is postop day 4 status post coronary artery bypass grafting x5 and aortic valve replacement with a 19 mm Saint Dany mechanical valve.  Overall the patient is doing well.  Anticipate discharge to home with home health in the next 48-72 hours.    Neuro:  Patient is awake alert and oriented x3 pain is well controlled with pain management.  Neuro exam is nonfocal.    Cardiac:  Patient is hemodynamically stable.  Continue metoprolol.    Respiratory:  Continue pulmonary toileting.  Continue incentive spirometer.  GI patient is tolerating p.o. intake.  Will increase bowel regimen for bowel movements.  Renal: Patient has good urine output.  Creatinine is 0.9.  Patient is still appears fluid overloaded.  Continue Lasix.  Endocrine: Glucose is well controlled.    Id:  Patient is afebrile.  White count is down to 12.  Continue to follow.    Heme:  Hematocrit is 27.  Platelet count is 186 and INR is 1.9.  INR goal is between 2.5 and 3.  Continue Coumadin.    Activities:  Patient is out of bed and ambulating in the hallways.  Advance as tolerated.  Line tubes and drains:  Patient has a PICC line.      09/24/2023   The patient is postop day 5 status post coronary artery bypass grafting x5 and aortic valve replacement with a 19 mm Saint Dany mechanical valve.  Overall the patient is doing well.  Anticipate discharge home in the next 24-48 hours.    Neuro:  Patient is awake alert and oriented x3 pain is well controlled with current pain management.  Neuro exam is nonfocal.    Cardiac:  Patient is hemodynamically stable.  Continue metoprolol  Respiratory:  Continue pulmonary toileting.  Continue incentive spirometer.  Patient continues to have sputum production.  GI:  Patient is tolerating a regular diet.  Patient has had  bowel movements.  Renal: Patient has good urine output.  Creatinine is stable.  Patient is appears to be euvolemic.  Will discontinue Lasix.    Endocrine:  Glucose is well controlled.  Id: Patient is afebrile white count normal.  Heme:  Hematocrit is 27 and platelet count is 229.  INR is subtherapeutic 1.5.  Continue Coumadin.  Goal is a INR level of 2.5-3.  Activities: Patient is out of bed and ambulating in the hallways.  Advance as tolerated.    Line tubes and drains:  Patient has a PICC line.    09/25/2023   The patient is postop day 5 status post coronary artery bypass grafting x5 and aortic valve replacement with a 19 mm Saint Dany mechanical valve.  Overall the patient is doing well.  Patient is awaiting attainment of a therapeutic INR.  Anticipate discharge in the next 48-72 hours.    Neuro:  Patient is awake alert and oriented x3.  Pain is well controlled current pain management.  Neuro exam is nonfocal.    Cardiac:  Patient is stable.  Continue metoprolol.    Respiratory:  Patient has good sats on room air.  Continue pulmonary toileting.  Continue incentive spirometer.    GI:  Patient is tolerating a regular diet having bowel movements.    Renal:  Patient has good urine output.    Endocrine:  Glucose is well controlled.    Id:  Patient is afebrile white count is normal.    Heme:  Hematocrit is 27 and platelet count is 298.  INR is now normal at 1.2.  Patient is on bridging heparin.  Will titrate heparin to an APTT greater between 40 and 50.  Therapeutic goal of INR is 2.5-3.  Activities: Patient is out of bed and ambulating in the hallways.    Line tubes and drains:  Patient has a PICC line.      CAD, multiple vessel  The patient is a 43-year-old male with critical left main coronary artery disease and severe aortic regurgitation by cardiac catheterization.  The patient is a candidate for urgent coronary artery bypass grafting and aortic valve replacement.  The risks and benefits of surgery were explained  to the patient.  The patient understands the risks and benefits of surgery and has agreed to proceed with urgent aortic valve replacement and coronary artery bypass grafting.  The type of aortic valve to be used was discussed with the patient.  The patient stated a preference for a bioprosthetic valve in order to avoid the need for chronic anticoagulation.  The patient understands that a bioprosthetic valve especially in a young patient has a limited lifespan.        Nishant Douglas MD  Cardiothoracic Surgery  [facility])

## 2023-09-25 NOTE — PLAN OF CARE
POC reviewed with pt. Pt verbalizes understanding of POC. No questions at this time.  AAOx4. NADN.  NSR on cardiac monitor.  Pt remains free of falls. OOB independently.  Provena in place.  POCT glucose monitored.  Minimal intensity Heparin started today.   No complaints at this time.  Safety measures in place. Will continue to monitor.  Informed pt to call for assistance before getting up. Pt verbalizes understanding.   Hourly rounding and chart check complete.

## 2023-09-26 LAB
APTT PPP: 33.3 SEC (ref 21–32)
APTT PPP: 36.1 SEC (ref 21–32)
APTT PPP: 40.4 SEC (ref 21–32)
BASOPHILS # BLD AUTO: 0.03 K/UL (ref 0–0.2)
BASOPHILS NFR BLD: 0.3 % (ref 0–1.9)
DIFFERENTIAL METHOD: ABNORMAL
EOSINOPHIL # BLD AUTO: 0.2 K/UL (ref 0–0.5)
EOSINOPHIL NFR BLD: 2.3 % (ref 0–8)
ERYTHROCYTE [DISTWIDTH] IN BLOOD BY AUTOMATED COUNT: 17.2 % (ref 11.5–14.5)
HCT VFR BLD AUTO: 28.6 % (ref 40–54)
HGB BLD-MCNC: 9.3 G/DL (ref 14–18)
IMM GRANULOCYTES # BLD AUTO: 0.06 K/UL (ref 0–0.04)
IMM GRANULOCYTES NFR BLD AUTO: 0.6 % (ref 0–0.5)
INR PPP: 1.2 (ref 0.8–1.2)
LYMPHOCYTES # BLD AUTO: 1.7 K/UL (ref 1–4.8)
LYMPHOCYTES NFR BLD: 17.1 % (ref 18–48)
MAGNESIUM SERPL-MCNC: 2.4 MG/DL (ref 1.6–2.6)
MAGNESIUM SERPL-MCNC: 2.5 MG/DL (ref 1.6–2.6)
MCH RBC QN AUTO: 29.3 PG (ref 27–31)
MCHC RBC AUTO-ENTMCNC: 32.5 G/DL (ref 32–36)
MCV RBC AUTO: 90 FL (ref 82–98)
MONOCYTES # BLD AUTO: 1.2 K/UL (ref 0.3–1)
MONOCYTES NFR BLD: 12.1 % (ref 4–15)
NEUTROPHILS # BLD AUTO: 6.8 K/UL (ref 1.8–7.7)
NEUTROPHILS NFR BLD: 67.6 % (ref 38–73)
NRBC BLD-RTO: 0 /100 WBC
PLATELET # BLD AUTO: 358 K/UL (ref 150–450)
PMV BLD AUTO: 9 FL (ref 9.2–12.9)
POCT GLUCOSE: 103 MG/DL (ref 70–110)
POCT GLUCOSE: 108 MG/DL (ref 70–110)
POCT GLUCOSE: 113 MG/DL (ref 70–110)
POCT GLUCOSE: 137 MG/DL (ref 70–110)
PROTHROMBIN TIME: 13 SEC (ref 9–12.5)
RBC # BLD AUTO: 3.17 M/UL (ref 4.6–6.2)
WBC # BLD AUTO: 10.11 K/UL (ref 3.9–12.7)

## 2023-09-26 PROCEDURE — 94664 DEMO&/EVAL PT USE INHALER: CPT

## 2023-09-26 PROCEDURE — 99232 PR SUBSEQUENT HOSPITAL CARE,LEVL II: ICD-10-PCS | Mod: ,,, | Performed by: PHYSICIAN ASSISTANT

## 2023-09-26 PROCEDURE — 99232 SBSQ HOSP IP/OBS MODERATE 35: CPT | Mod: ,,, | Performed by: PHYSICIAN ASSISTANT

## 2023-09-26 PROCEDURE — 63600175 PHARM REV CODE 636 W HCPCS: Performed by: THORACIC SURGERY (CARDIOTHORACIC VASCULAR SURGERY)

## 2023-09-26 PROCEDURE — 85730 THROMBOPLASTIN TIME PARTIAL: CPT | Mod: 91 | Performed by: THORACIC SURGERY (CARDIOTHORACIC VASCULAR SURGERY)

## 2023-09-26 PROCEDURE — 21400001 HC TELEMETRY ROOM

## 2023-09-26 PROCEDURE — 94640 AIRWAY INHALATION TREATMENT: CPT

## 2023-09-26 PROCEDURE — 85025 COMPLETE CBC W/AUTO DIFF WBC: CPT | Performed by: INTERNAL MEDICINE

## 2023-09-26 PROCEDURE — 25000003 PHARM REV CODE 250: Performed by: THORACIC SURGERY (CARDIOTHORACIC VASCULAR SURGERY)

## 2023-09-26 PROCEDURE — 97530 THERAPEUTIC ACTIVITIES: CPT

## 2023-09-26 PROCEDURE — 25000003 PHARM REV CODE 250: Performed by: NURSE PRACTITIONER

## 2023-09-26 PROCEDURE — 27000646 HC AEROBIKA DEVICE

## 2023-09-26 PROCEDURE — 85610 PROTHROMBIN TIME: CPT | Performed by: INTERNAL MEDICINE

## 2023-09-26 PROCEDURE — 25000003 PHARM REV CODE 250: Performed by: INTERNAL MEDICINE

## 2023-09-26 PROCEDURE — 99900035 HC TECH TIME PER 15 MIN (STAT)

## 2023-09-26 PROCEDURE — 83735 ASSAY OF MAGNESIUM: CPT | Performed by: INTERNAL MEDICINE

## 2023-09-26 PROCEDURE — 94761 N-INVAS EAR/PLS OXIMETRY MLT: CPT

## 2023-09-26 PROCEDURE — 94760 N-INVAS EAR/PLS OXIMETRY 1: CPT

## 2023-09-26 PROCEDURE — 25000242 PHARM REV CODE 250 ALT 637 W/ HCPCS: Performed by: THORACIC SURGERY (CARDIOTHORACIC VASCULAR SURGERY)

## 2023-09-26 RX ORDER — WARFARIN SODIUM 5 MG/1
5 TABLET ORAL ONCE
Status: COMPLETED | OUTPATIENT
Start: 2023-09-26 | End: 2023-09-26

## 2023-09-26 RX ORDER — ACETAMINOPHEN 325 MG/1
650 TABLET ORAL EVERY 6 HOURS PRN
Status: DISCONTINUED | OUTPATIENT
Start: 2023-09-26 | End: 2023-10-03 | Stop reason: HOSPADM

## 2023-09-26 RX ORDER — WARFARIN 2.5 MG/1
2.5 TABLET ORAL DAILY
Status: DISCONTINUED | OUTPATIENT
Start: 2023-09-27 | End: 2023-09-27

## 2023-09-26 RX ADMIN — PANTOPRAZOLE SODIUM 40 MG: 40 TABLET, DELAYED RELEASE ORAL at 08:09

## 2023-09-26 RX ADMIN — MAGNESIUM HYDROXIDE 400 MG: 400 SUSPENSION ORAL at 08:09

## 2023-09-26 RX ADMIN — GUAIFENESIN 1200 MG: 600 TABLET, EXTENDED RELEASE ORAL at 08:09

## 2023-09-26 RX ADMIN — ACETAMINOPHEN 650 MG: 325 TABLET ORAL at 08:09

## 2023-09-26 RX ADMIN — SODIUM CHLORIDE 30 MG/ML INHALATION SOLUTION 4 ML: 30 SOLUTION INHALANT at 08:09

## 2023-09-26 RX ADMIN — METOPROLOL TARTRATE 25 MG: 25 TABLET, FILM COATED ORAL at 08:09

## 2023-09-26 RX ADMIN — TRAMADOL HYDROCHLORIDE 50 MG: 50 TABLET, COATED ORAL at 12:09

## 2023-09-26 RX ADMIN — SODIUM CHLORIDE 30 MG/ML INHALATION SOLUTION 4 ML: 30 SOLUTION INHALANT at 07:09

## 2023-09-26 RX ADMIN — MAGNESIUM SULFATE HEPTAHYDRATE 2 G: 40 INJECTION, SOLUTION INTRAVENOUS at 07:09

## 2023-09-26 RX ADMIN — MAGNESIUM SULFATE HEPTAHYDRATE 2 G: 40 INJECTION, SOLUTION INTRAVENOUS at 06:09

## 2023-09-26 RX ADMIN — WARFARIN SODIUM 5 MG: 5 TABLET ORAL at 04:09

## 2023-09-26 RX ADMIN — FOLIC ACID 1 MG: 1 TABLET ORAL at 08:09

## 2023-09-26 RX ADMIN — OXYCODONE HYDROCHLORIDE AND ACETAMINOPHEN 500 MG: 500 TABLET ORAL at 08:09

## 2023-09-26 RX ADMIN — ACETAMINOPHEN 650 MG: 325 TABLET ORAL at 01:09

## 2023-09-26 RX ADMIN — HEPARIN SODIUM 11 UNITS/KG/HR: 10000 INJECTION, SOLUTION INTRAVENOUS at 12:09

## 2023-09-26 RX ADMIN — ACETAMINOPHEN 650 MG: 325 TABLET ORAL at 05:09

## 2023-09-26 RX ADMIN — Medication 6 MG: at 08:09

## 2023-09-26 RX ADMIN — FERROUS SULFATE TAB 325 MG (65 MG ELEMENTAL FE) 1 EACH: 325 (65 FE) TAB at 08:09

## 2023-09-26 RX ADMIN — ASPIRIN 81 MG: 81 TABLET, COATED ORAL at 08:09

## 2023-09-26 RX ADMIN — CYANOCOBALAMIN TAB 1000 MCG 1000 MCG: 1000 TAB at 08:09

## 2023-09-26 RX ADMIN — ATORVASTATIN CALCIUM 80 MG: 40 TABLET, FILM COATED ORAL at 08:09

## 2023-09-26 NOTE — SUBJECTIVE & OBJECTIVE
Interval History: The patient is postop day 7 status post coronary artery bypass grafting x5 and aortic valve replacement with a 19 mm Saint Dany mechanical valve.    ROS  Medications:  Continuous Infusions:   heparin (porcine) in D5W 12 Units/kg/hr (09/26/23 0637)     Scheduled Meds:   ascorbic acid (vitamin C)  500 mg Oral BID    aspirin  81 mg Oral Daily    atorvastatin  80 mg Oral Daily    cyanocobalamin  1,000 mcg Oral Daily    docusate sodium  100 mg Oral BID    ferrous sulfate  1 tablet Oral Daily    folic acid  1 mg Oral Daily    guaiFENesin  1,200 mg Oral BID    magnesium hydroxide 400 mg/5 ml  5 mL Oral BID    metoprolol tartrate  25 mg Oral BID    pantoprazole  40 mg Oral Daily    polyethylene glycol  17 g Oral Daily    sodium chloride 3%  4 mL Nebulization Q12H    warfarin  2.5 mg Oral Daily     PRN Meds:0.9%  NaCl infusion (for blood administration), acetaminophen, albumin human 5%, aluminum-magnesium hydroxide-simethicone, calcium gluconate IVPB, calcium gluconate IVPB, calcium gluconate IVPB, dextrose 10%, glucagon (human recombinant), influenza, insulin aspart U-100, lactated ringers, magnesium sulfate IVPB, melatonin, metoclopramide HCl, ondansetron, pneumoc 20-leslee conj-dip cr(PF), potassium chloride in water, potassium chloride in water, potassium chloride in water, traMADoL     Objective:     Vital Signs (Most Recent):  Temp: 98.9 °F (37.2 °C) (09/26/23 1125)  Pulse: 93 (09/26/23 1125)  Resp: 18 (09/26/23 1125)  BP: 99/60 (09/26/23 1125)  SpO2: 100 % (09/26/23 1125) Vital Signs (24h Range):  Temp:  [98.1 °F (36.7 °C)-99.4 °F (37.4 °C)] 98.9 °F (37.2 °C)  Pulse:  [] 93  Resp:  [17-18] 18  SpO2:  [95 %-100 %] 100 %  BP: ()/(60-77) 99/60     Weight: 79.9 kg (176 lb 2.4 oz)  Body mass index is 26.78 kg/m².    SpO2: 100 %       Intake/Output - Last 3 Shifts         09/24 0700 09/25 0659 09/25 0700 09/26 0659 09/26 0700 09/27 0659    P.O. 480  250    I.V. (mL/kg) 82 (1) 161.2 (2)      Total Intake(mL/kg) 562 (7) 161.2 (2) 250 (3.1)    Urine (mL/kg/hr) 180 (0.1)      Emesis/NG output 0      Stool       Total Output 180      Net +382 +161.2 +250           Urine Occurrence 3 x 1 x 1 x    Stool Occurrence 1 x      Emesis Occurrence 1 x              Lines/Drains/Airways       Peripherally Inserted Central Catheter Line  Duration             PICC Double Lumen 09/21/23 0930 left basilic 5 days                     Physical Exam  Constitutional:       Appearance: Normal appearance.   HENT:      Head: Normocephalic and atraumatic.   Cardiovascular:      Rate and Rhythm: Normal rate and regular rhythm.      Heart sounds: Normal heart sounds.   Pulmonary:      Breath sounds: Normal breath sounds.   Abdominal:      General: Abdomen is flat.      Palpations: Abdomen is soft.   Skin:     General: Skin is warm and dry.   Neurological:      General: No focal deficit present.      Mental Status: He is alert and oriented to person, place, and time.   Psychiatric:         Behavior: Behavior normal.            Significant Labs:  All pertinent labs from the last 24 hours have been reviewed.    Significant Diagnostics:  I have reviewed all pertinent imaging results/findings within the past 24 hours.

## 2023-09-26 NOTE — ASSESSMENT & PLAN NOTE
Cont management per CTS    09/23   Continue asa statin plavix lasix and metoprolol  On coumadin for mechanical AVR    09/24  Continue ASA statin BB  Continue supportive care    9/25/23  -Progressing well  -Continue ASA, statin, BB  -IS usage and ambulation    9/26/23  -Stable overnight  -Continue ASA, statin, BB  -IS usage and ambulation

## 2023-09-26 NOTE — ASSESSMENT & PLAN NOTE
09/20/2023  The patient is postop day 1 status post coronary artery bypass grafting x5 and aortic valve replacement with a 19 mm Saint Dany mechanical valve.  Overall the patient is doing well.      Neuro:  Patient is awake appropriate and follows commands.  Patient is currently sedated with Precedex.  Will wean Precedex to off.   Cardiac:  Patient is hemodynamically stable.  With excellent cardiac output and cardiac index.  Patient is on low-dose vaso active meds.  Wean drips to off.  Will start metoprolol once drips are off.  Respiratory:  Patient is being weaned to extubation.  Patient is an active smoker Start nebs and Mucomyst.  Continue pulmonary toileting.  Continue incentive spirometer.    GI:  Patient is currently NPO.  Will start p.o. intake once extubated.    Renal:  Patient has good urine output and creatinine is 1.2.  Will start Lasix for diuresis.  Endocrine:  Patient required insulin drip for glucose controlled.  Will convert to sliding scale.    Id:  Patient had a T-max of 102°.  Most likely due to stress of surgery.  Will continue to follow.  White count is 9.  Patient is on manny op antibiotics.  Heme: Hematocrit is 17.  Patient is being transfused.  Platelet count is 74.  No evidence of active bleeding.  Will follow platelet count.  Will start patient on Coumadin anticoagulation for mechanical valve.  Activities:  Patient is currently in bed.  Will advance activities as tolerated once extubated.  Line tubes and drains:  Patient has an ETT, right IJ [place] and Cordis, right groin A-line, chest tubes, Ugalde catheter, pacer wires, and saphenectomy site DARON.    09/21/2023     The patient is postop day 2 status post coronary artery bypass grafting x5 and aortic valve replacement with a 19 mm Saint Dany mechanical valve.  Overall the patient is doing well.    Neuro:  Patient is awake alert and oriented x3.  Pain is well controlled with current pain management.  Cardiac:  The patient is off all drips.   Patient is hemodynamically stable.  Continue metoprolol.  Respiratory:  Patient was extubated yesterday.  Continue pulmonary toileting.  Continue incentive spirometer.   GI:  Patient is tolerating p.o. intake.  Patient had episodes of nausea yesterday which have abated.  Advance patient's diet as tolerated.    Renal:  Patient has good urine output.  Creatinine is 0.9.  Continue Lasix for diuresis.  Endocrine:  Glucose is controlled with sliding scale insulin.    Id:  T-max is 101.8 patient is currently afebrile.  White count is 17.  Most likely due to stress of surgery.  Continue to follow.  Heme:  Hematocrit is 24.5.  Platelet count is 89.  INR is 1.5.  Patient is started on Coumadin for anticoagulation of mechanical valve.  Activities:  Patient will be out of bed to chair.  Advance activities as tolerated.  Line tubes and drains:  Patient has pacer wires, right IJ Cordis and Ugalde catheter.  Will place PICC line and discontinue Cordis.    09/22/2023   The patient is postop day 3 status post coronary artery bypass grafting x5 and aortic valve replacement with a 19 mm Saint Dany mechanical valve.  Overall the patient is doing well.  Patient has been transferred to telemetry.    Neuro:  Patient is awake alert and oriented x3.  Pain is well controlled with current pain regimen.    Cardiac:  Patient is hemodynamically stable.  Continue metoprolol.    Respiratory:  Continue pulmonary toileting.  Continue incentive spirometer.    GI:  Patient is tolerating p.o. intake.  And tolerating a regular diet.    Renal:  Patient has good urine output.  Creatinine is 0.9.  Continue Lasix.    Endocrine:  Glucose is well controlled.    Id:  Patient is afebrile.  White count is 16.  Continue to follow.    Heme:  Hematocrit is 24 and platelet count is 114.  INR is 3.4.  Patient is currently on Coumadin for anticoagulation.  Activities:  Patient is out of bed and ambulating in the hallways.  Continue to advance as tolerated.  Line tubes  and drains:  Patient has a PICC line and pacer wires.    09/23/2023   The patient is postop day 4 status post coronary artery bypass grafting x5 and aortic valve replacement with a 19 mm Saint Dany mechanical valve.  Overall the patient is doing well.  Anticipate discharge to home with home health in the next 48-72 hours.    Neuro:  Patient is awake alert and oriented x3 pain is well controlled with pain management.  Neuro exam is nonfocal.    Cardiac:  Patient is hemodynamically stable.  Continue metoprolol.    Respiratory:  Continue pulmonary toileting.  Continue incentive spirometer.  GI patient is tolerating p.o. intake.  Will increase bowel regimen for bowel movements.  Renal: Patient has good urine output.  Creatinine is 0.9.  Patient is still appears fluid overloaded.  Continue Lasix.  Endocrine: Glucose is well controlled.    Id:  Patient is afebrile.  White count is down to 12.  Continue to follow.    Heme:  Hematocrit is 27.  Platelet count is 186 and INR is 1.9.  INR goal is between 2.5 and 3.  Continue Coumadin.    Activities:  Patient is out of bed and ambulating in the hallways.  Advance as tolerated.  Line tubes and drains:  Patient has a PICC line.      09/24/2023   The patient is postop day 5 status post coronary artery bypass grafting x5 and aortic valve replacement with a 19 mm Saint Dany mechanical valve.  Overall the patient is doing well.  Anticipate discharge home in the next 24-48 hours.    Neuro:  Patient is awake alert and oriented x3 pain is well controlled with current pain management.  Neuro exam is nonfocal.    Cardiac:  Patient is hemodynamically stable.  Continue metoprolol  Respiratory:  Continue pulmonary toileting.  Continue incentive spirometer.  Patient continues to have sputum production.  GI:  Patient is tolerating a regular diet.  Patient has had bowel movements.  Renal: Patient has good urine output.  Creatinine is stable.  Patient is appears to be euvolemic.  Will  discontinue Lasix.    Endocrine:  Glucose is well controlled.  Id: Patient is afebrile white count normal.  Heme:  Hematocrit is 27 and platelet count is 229.  INR is subtherapeutic 1.5.  Continue Coumadin.  Goal is a INR level of 2.5-3.  Activities: Patient is out of bed and ambulating in the hallways.  Advance as tolerated.    Line tubes and drains:  Patient has a PICC line.    09/25/2023   The patient is postop day 5 status post coronary artery bypass grafting x5 and aortic valve replacement with a 19 mm Saint Dany mechanical valve.  Overall the patient is doing well.  Patient is awaiting attainment of a therapeutic INR.  Anticipate discharge in the next 48-72 hours.    Neuro:  Patient is awake alert and oriented x3.  Pain is well controlled current pain management.  Neuro exam is nonfocal.    Cardiac:  Patient is stable.  Continue metoprolol.    Respiratory:  Patient has good sats on room air.  Continue pulmonary toileting.  Continue incentive spirometer.    GI:  Patient is tolerating a regular diet having bowel movements.    Renal:  Patient has good urine output.    Endocrine:  Glucose is well controlled.    Id:  Patient is afebrile white count is normal.    Heme:  Hematocrit is 27 and platelet count is 298.  INR is now normal at 1.2.  Patient is on bridging heparin.  Will titrate heparin to an APTT greater between 40 and 50.  Therapeutic goal of INR is 2.5-3.  Activities: Patient is out of bed and ambulating in the hallways.    Line tubes and drains:  Patient has a PICC line.    09/26/2023   The patient is postop day 6 status post coronary artery bypass grafting x5 and aortic valve replacement with a 19 mm Saint Dany mechanical valve.  Overall the patient is doing well.  Patient's INR is still subtherapeutic.  Patient is on heparin in the interim.  Anticipate discharge in the next 48-72 hours.  Neuro:  Patient is awake alert and oriented x3.  Pain is well controlled with current pain management.  Neuro exam is  nonfocal.    Cardiac:  Patient remains stable.  Continue metoprolol.    Respiratory: Patient has good sats on room air.  Continue pulmonary toileting.  Continue incentive spirometer.    GI:  Patient is tolerating a regular diet and having bowel movements.    Renal:  Patient has good urine output.  Endocrine:  Glucose is well controlled.    Id:  Patient's white count is normal.    Heme: Hematocrit is 28 and INR is 1.2 patient is on a heparin drip.  Coumadin dose has been increased.  INR goal is 2.5-3.  Activities:  Patient is out of bed and ambulating in the hallways.    Line tubes and drains:  Patient has a PICC line.

## 2023-09-26 NOTE — PLAN OF CARE
POC reviewed with pt. Pt verbalizes understanding of POC. No questions at this time.  AAOx4. NADN.  NSR with intermittent tachycardia on cardiac monitor.  Pt remains free of falls. Independent with ambulation.  Provena wound vac removed.   Heparin infusing at 13 units/kg  No complaints at this time.  Safety measures in place. Will continue to monitor.  Informed pt to call for assistance before getting up. Pt verbalizes understanding.   Hourly rounding and chart check complete.

## 2023-09-26 NOTE — PROGRESS NOTES
[facility] - Telemetry ([facility])  Cardiothoracic Surgery  Progress Note    Patient Name: Mikie Alcazar  MRN: [id number]  Admission Date: 9/17/2023  Hospital Length of Stay: 9 days  Code Status: Full Code   Attending Physician: Nishant Douglas MD   Referring Provider: Self, Aaareferral  Principal Problem:NSTEMI (non-ST elevated myocardial infarction)            Subjective:     Post-Op Info:  Procedure(s) (LRB):  CORONARY ARTERY BYPASS GRAFT (CABG) (N/A)  REPLACEMENT-VALVE-AORTIC (N/A)  SURGICAL PROCUREMENT, VEIN, ENDOSCOPIC (Left)  BLOCK, NERVE, INTERCOSTAL, 2 OR MORE (N/A)  ECHOCARDIOGRAM,TRANSESOPHAGEAL (N/A)   7 Days Post-Op     Interval History: The patient is postop day 7 status post coronary artery bypass grafting x5 and aortic valve replacement with a 19 mm Saint Dany mechanical valve.    ROS  Medications:  Continuous Infusions:   heparin (porcine) in D5W 12 Units/kg/hr (09/26/23 0637)     Scheduled Meds:   ascorbic acid (vitamin C)  500 mg Oral BID    aspirin  81 mg Oral Daily    atorvastatin  80 mg Oral Daily    cyanocobalamin  1,000 mcg Oral Daily    docusate sodium  100 mg Oral BID    ferrous sulfate  1 tablet Oral Daily    folic acid  1 mg Oral Daily    guaiFENesin  1,200 mg Oral BID    magnesium hydroxide 400 mg/5 ml  5 mL Oral BID    metoprolol tartrate  25 mg Oral BID    pantoprazole  40 mg Oral Daily    polyethylene glycol  17 g Oral Daily    sodium chloride 3%  4 mL Nebulization Q12H    warfarin  2.5 mg Oral Daily     PRN Meds:0.9%  NaCl infusion (for blood administration), acetaminophen, albumin human 5%, aluminum-magnesium hydroxide-simethicone, calcium gluconate IVPB, calcium gluconate IVPB, calcium gluconate IVPB, dextrose 10%, glucagon (human recombinant), influenza, insulin aspart U-100, lactated ringers, magnesium sulfate IVPB, melatonin, metoclopramide HCl, ondansetron, pneumoc 20-leslee conj-dip cr(PF), potassium chloride in water, potassium chloride in water, potassium chloride in  water, traMADoL     Objective:     Vital Signs (Most Recent):  Temp: 98.9 °F (37.2 °C) (09/26/23 1125)  Pulse: 93 (09/26/23 1125)  Resp: 18 (09/26/23 1125)  BP: 99/60 (09/26/23 1125)  SpO2: 100 % (09/26/23 1125) Vital Signs (24h Range):  Temp:  [98.1 °F (36.7 °C)-99.4 °F (37.4 °C)] 98.9 °F (37.2 °C)  Pulse:  [] 93  Resp:  [17-18] 18  SpO2:  [95 %-100 %] 100 %  BP: ()/(60-77) 99/60     Weight: 79.9 kg (176 lb 2.4 oz)  Body mass index is 26.78 kg/m².    SpO2: 100 %       Intake/Output - Last 3 Shifts         09/24 0700  09/25 0659 09/25 0700 09/26 0659 09/26 0700  09/27 0659    P.O. 480  250    I.V. (mL/kg) 82 (1) 161.2 (2)     Total Intake(mL/kg) 562 (7) 161.2 (2) 250 (3.1)    Urine (mL/kg/hr) 180 (0.1)      Emesis/NG output 0      Stool       Total Output 180      Net +382 +161.2 +250           Urine Occurrence 3 x 1 x 1 x    Stool Occurrence 1 x      Emesis Occurrence 1 x              Lines/Drains/Airways       Peripherally Inserted Central Catheter Line  Duration             PICC Double Lumen 09/21/23 0930 left basilic 5 days                     Physical Exam  Constitutional:       Appearance: Normal appearance.   HENT:      Head: Normocephalic and atraumatic.   Cardiovascular:      Rate and Rhythm: Normal rate and regular rhythm.      Heart sounds: Normal heart sounds.   Pulmonary:      Breath sounds: Normal breath sounds.   Abdominal:      General: Abdomen is flat.      Palpations: Abdomen is soft.   Skin:     General: Skin is warm and dry.   Neurological:      General: No focal deficit present.      Mental Status: He is alert and oriented to person, place, and time.   Psychiatric:         Behavior: Behavior normal.            Significant Labs:  All pertinent labs from the last 24 hours have been reviewed.    Significant Diagnostics:  I have reviewed all pertinent imaging results/findings within the past 24 hours.    Assessment/Plan:     S/P CABG x 5  09/20/2023  The patient is postop day 1 status  post coronary artery bypass grafting x5 and aortic valve replacement with a 19 mm Saint Dany mechanical valve.  Overall the patient is doing well.      Neuro:  Patient is awake appropriate and follows commands.  Patient is currently sedated with Precedex.  Will wean Precedex to off.   Cardiac:  Patient is hemodynamically stable.  With excellent cardiac output and cardiac index.  Patient is on low-dose vaso active meds.  Wean drips to off.  Will start metoprolol once drips are off.  Respiratory:  Patient is being weaned to extubation.  Patient is an active smoker Start nebs and Mucomyst.  Continue pulmonary toileting.  Continue incentive spirometer.    GI:  Patient is currently NPO.  Will start p.o. intake once extubated.    Renal:  Patient has good urine output and creatinine is 1.2.  Will start Lasix for diuresis.  Endocrine:  Patient required insulin drip for glucose controlled.  Will convert to sliding scale.    Id:  Patient had a T-max of 102°.  Most likely due to stress of surgery.  Will continue to follow.  White count is 9.  Patient is on manny op antibiotics.  Heme: Hematocrit is 17.  Patient is being transfused.  Platelet count is 74.  No evidence of active bleeding.  Will follow platelet count.  Will start patient on Coumadin anticoagulation for mechanical valve.  Activities:  Patient is currently in bed.  Will advance activities as tolerated once extubated.  Line tubes and drains:  Patient has an ETT, right IJ [place] and Cordis, right groin A-line, chest tubes, Ugalde catheter, pacer wires, and saphenectomy site DARON.    09/21/2023     The patient is postop day 2 status post coronary artery bypass grafting x5 and aortic valve replacement with a 19 mm Saint Dany mechanical valve.  Overall the patient is doing well.    Neuro:  Patient is awake alert and oriented x3.  Pain is well controlled with current pain management.  Cardiac:  The patient is off all drips.  Patient is hemodynamically stable.  Continue  metoprolol.  Respiratory:  Patient was extubated yesterday.  Continue pulmonary toileting.  Continue incentive spirometer.   GI:  Patient is tolerating p.o. intake.  Patient had episodes of nausea yesterday which have abated.  Advance patient's diet as tolerated.    Renal:  Patient has good urine output.  Creatinine is 0.9.  Continue Lasix for diuresis.  Endocrine:  Glucose is controlled with sliding scale insulin.    Id:  T-max is 101.8 patient is currently afebrile.  White count is 17.  Most likely due to stress of surgery.  Continue to follow.  Heme:  Hematocrit is 24.5.  Platelet count is 89.  INR is 1.5.  Patient is started on Coumadin for anticoagulation of mechanical valve.  Activities:  Patient will be out of bed to chair.  Advance activities as tolerated.  Line tubes and drains:  Patient has pacer wires, right IJ Cordis and Ugalde catheter.  Will place PICC line and discontinue Cordis.    09/22/2023   The patient is postop day 3 status post coronary artery bypass grafting x5 and aortic valve replacement with a 19 mm Saint Dany mechanical valve.  Overall the patient is doing well.  Patient has been transferred to telemetry.    Neuro:  Patient is awake alert and oriented x3.  Pain is well controlled with current pain regimen.    Cardiac:  Patient is hemodynamically stable.  Continue metoprolol.    Respiratory:  Continue pulmonary toileting.  Continue incentive spirometer.    GI:  Patient is tolerating p.o. intake.  And tolerating a regular diet.    Renal:  Patient has good urine output.  Creatinine is 0.9.  Continue Lasix.    Endocrine:  Glucose is well controlled.    Id:  Patient is afebrile.  White count is 16.  Continue to follow.    Heme:  Hematocrit is 24 and platelet count is 114.  INR is 3.4.  Patient is currently on Coumadin for anticoagulation.  Activities:  Patient is out of bed and ambulating in the hallways.  Continue to advance as tolerated.  Line tubes and drains:  Patient has a PICC line and  pacer wires.    09/23/2023   The patient is postop day 4 status post coronary artery bypass grafting x5 and aortic valve replacement with a 19 mm Saint Dany mechanical valve.  Overall the patient is doing well.  Anticipate discharge to home with home health in the next 48-72 hours.    Neuro:  Patient is awake alert and oriented x3 pain is well controlled with pain management.  Neuro exam is nonfocal.    Cardiac:  Patient is hemodynamically stable.  Continue metoprolol.    Respiratory:  Continue pulmonary toileting.  Continue incentive spirometer.  GI patient is tolerating p.o. intake.  Will increase bowel regimen for bowel movements.  Renal: Patient has good urine output.  Creatinine is 0.9.  Patient is still appears fluid overloaded.  Continue Lasix.  Endocrine: Glucose is well controlled.    Id:  Patient is afebrile.  White count is down to 12.  Continue to follow.    Heme:  Hematocrit is 27.  Platelet count is 186 and INR is 1.9.  INR goal is between 2.5 and 3.  Continue Coumadin.    Activities:  Patient is out of bed and ambulating in the hallways.  Advance as tolerated.  Line tubes and drains:  Patient has a PICC line.      09/24/2023   The patient is postop day 5 status post coronary artery bypass grafting x5 and aortic valve replacement with a 19 mm Saint Dany mechanical valve.  Overall the patient is doing well.  Anticipate discharge home in the next 24-48 hours.    Neuro:  Patient is awake alert and oriented x3 pain is well controlled with current pain management.  Neuro exam is nonfocal.    Cardiac:  Patient is hemodynamically stable.  Continue metoprolol  Respiratory:  Continue pulmonary toileting.  Continue incentive spirometer.  Patient continues to have sputum production.  GI:  Patient is tolerating a regular diet.  Patient has had bowel movements.  Renal: Patient has good urine output.  Creatinine is stable.  Patient is appears to be euvolemic.  Will discontinue Lasix.    Endocrine:  Glucose is well  controlled.  Id: Patient is afebrile white count normal.  Heme:  Hematocrit is 27 and platelet count is 229.  INR is subtherapeutic 1.5.  Continue Coumadin.  Goal is a INR level of 2.5-3.  Activities: Patient is out of bed and ambulating in the hallways.  Advance as tolerated.    Line tubes and drains:  Patient has a PICC line.    09/25/2023   The patient is postop day 5 status post coronary artery bypass grafting x5 and aortic valve replacement with a 19 mm Saint Dany mechanical valve.  Overall the patient is doing well.  Patient is awaiting attainment of a therapeutic INR.  Anticipate discharge in the next 48-72 hours.    Neuro:  Patient is awake alert and oriented x3.  Pain is well controlled current pain management.  Neuro exam is nonfocal.    Cardiac:  Patient is stable.  Continue metoprolol.    Respiratory:  Patient has good sats on room air.  Continue pulmonary toileting.  Continue incentive spirometer.    GI:  Patient is tolerating a regular diet having bowel movements.    Renal:  Patient has good urine output.    Endocrine:  Glucose is well controlled.    Id:  Patient is afebrile white count is normal.    Heme:  Hematocrit is 27 and platelet count is 298.  INR is now normal at 1.2.  Patient is on bridging heparin.  Will titrate heparin to an APTT greater between 40 and 50.  Therapeutic goal of INR is 2.5-3.  Activities: Patient is out of bed and ambulating in the hallways.    Line tubes and drains:  Patient has a PICC line.    09/26/2023   The patient is postop day 6 status post coronary artery bypass grafting x5 and aortic valve replacement with a 19 mm Saint Dany mechanical valve.  Overall the patient is doing well.  Patient's INR is still subtherapeutic.  Patient is on heparin in the interim.  Anticipate discharge in the next 48-72 hours.  Neuro:  Patient is awake alert and oriented x3.  Pain is well controlled with current pain management.  Neuro exam is nonfocal.    Cardiac:  Patient remains stable.   Continue metoprolol.    Respiratory: Patient has good sats on room air.  Continue pulmonary toileting.  Continue incentive spirometer.    GI:  Patient is tolerating a regular diet and having bowel movements.    Renal:  Patient has good urine output.  Endocrine:  Glucose is well controlled.    Id:  Patient's white count is normal.    Heme: Hematocrit is 28 and INR is 1.2 patient is on a heparin drip.  Coumadin dose has been increased.  INR goal is 2.5-3.  Activities:  Patient is out of bed and ambulating in the hallways.    Line tubes and drains:  Patient has a PICC line.      CAD, multiple vessel  The patient is a 43-year-old male with critical left main coronary artery disease and severe aortic regurgitation by cardiac catheterization.  The patient is a candidate for urgent coronary artery bypass grafting and aortic valve replacement.  The risks and benefits of surgery were explained to the patient.  The patient understands the risks and benefits of surgery and has agreed to proceed with urgent aortic valve replacement and coronary artery bypass grafting.  The type of aortic valve to be used was discussed with the patient.  The patient stated a preference for a bioprosthetic valve in order to avoid the need for chronic anticoagulation.  The patient understands that a bioprosthetic valve especially in a young patient has a limited lifespan.        Nishant Douglas MD  Cardiothoracic Surgery  [facility] ([facility])

## 2023-09-26 NOTE — SUBJECTIVE & OBJECTIVE
Review of Systems   Constitutional: Positive for malaise/fatigue.   HENT: Negative.     Eyes: Negative.    Cardiovascular:  Positive for chest pain (incisional).   Respiratory: Negative.     Hematologic/Lymphatic: Bruises/bleeds easily.   Skin: Negative.    Gastrointestinal: Negative.    Genitourinary: Negative.    Neurological:  Positive for weakness.   Psychiatric/Behavioral: Negative.     Allergic/Immunologic: Negative.      Objective:     Vital Signs (Most Recent):  Temp: 98.8 °F (37.1 °C) (09/26/23 0730)  Pulse: 99 (09/26/23 0900)  Resp: 18 (09/26/23 0803)  BP: 137/77 (09/26/23 0730)  SpO2: 98 % (09/26/23 0803) Vital Signs (24h Range):  Temp:  [98.1 °F (36.7 °C)-99.4 °F (37.4 °C)] 98.8 °F (37.1 °C)  Pulse:  [] 99  Resp:  [17-18] 18  SpO2:  [95 %-99 %] 98 %  BP: (113-137)/(63-77) 137/77     Weight: 79.9 kg (176 lb 2.4 oz)  Body mass index is 26.78 kg/m².     SpO2: 98 %         Intake/Output Summary (Last 24 hours) at 9/26/2023 0930  Last data filed at 9/26/2023 0730  Gross per 24 hour   Intake 411.19 ml   Output --   Net 411.19 ml       Lines/Drains/Airways       Peripherally Inserted Central Catheter Line  Duration             PICC Double Lumen 09/21/23 0930 left basilic 5 days                       Physical Exam  Vitals and nursing note reviewed.   Constitutional:       General: He is not in acute distress.     Appearance: Normal appearance. He is well-developed. He is not diaphoretic.   HENT:      Head: Normocephalic and atraumatic.   Eyes:      General:         Right eye: No discharge.         Left eye: No discharge.      Pupils: Pupils are equal, round, and reactive to light.   Neck:      Thyroid: No thyromegaly.      Vascular: No JVD.      Trachea: No tracheal deviation.   Cardiovascular:      Rate and Rhythm: Normal rate and regular rhythm.      Heart sounds: Normal heart sounds, S1 normal and S2 normal. No murmur heard.     Comments: Sternotomy site healing well; incision C/D/I; no bleeding  "erythema or drainage    +metallic S2  Pulmonary:      Effort: Pulmonary effort is normal. No respiratory distress.      Breath sounds: Normal breath sounds. No wheezing or rales.   Abdominal:      General: There is no distension.      Tenderness: There is no rebound.   Musculoskeletal:      Cervical back: Neck supple.      Right lower leg: No edema.      Left lower leg: No edema.   Skin:     General: Skin is warm and dry.      Findings: No erythema.      Comments: SVG site LLE C/D/I; no bleeding erythema or drainage   Neurological:      General: No focal deficit present.      Mental Status: He is alert and oriented to person, place, and time.   Psychiatric:         Mood and Affect: Mood normal.         Behavior: Behavior normal.         Thought Content: Thought content normal.            Significant Labs: CMP No results for input(s): "NA", "K", "CL", "CO2", "GLU", "BUN", "CREATININE", "CALCIUM", "PROT", "ALBUMIN", "BILITOT", "ALKPHOS", "AST", "ALT", "ANIONGAP", "ESTGFRAFRICA", "EGFRNONAA" in the last 48 hours., CBC   Recent Labs   Lab 09/25/23  0323 09/26/23  0513   WBC 8.52 10.11   HGB 9.0* 9.3*   HCT 27.0* 28.6*    358   , Troponin No results for input(s): "TROPONINI" in the last 48 hours., and All pertinent lab results from the last 24 hours have been reviewed.    Significant Imaging: Echocardiogram: Transthoracic echo (TTE) complete (Cupid Only):   Results for orders placed or performed during the hospital encounter of 09/17/23   Echo   Result Value Ref Range    BSA 1.98 m2    LVOT stroke volume 82.76 cm3    LVIDd 5.22 3.5 - 6.0 cm    LV Systolic Volume 86.21 mL    LV Systolic Volume Index 44.2 mL/m2    LVIDs 4.37 (A) 2.1 - 4.0 cm    LV Diastolic Volume 130.82 mL    LV Diastolic Volume Index 67.09 mL/m2    IVS 0.88 0.6 - 1.1 cm    LVOT diameter 1.91 cm    LVOT area 2.9 cm2    FS 16 (A) 28 - 44 %    Left Ventricle Relative Wall Thickness 0.38 cm    Posterior Wall 0.98 0.6 - 1.1 cm    LV mass 177.53 g    LV " Mass Index 91 g/m2    MV Peak E Bob 1.20 m/s    TDI LATERAL 0.06 m/s    TDI SEPTAL 0.08 m/s    E/E' ratio 17.14 m/s    MV Peak A Bob 1.14 m/s    TR Max Bob 2.33 m/s    E/A ratio 1.05     IVRT 72.31 msec    E wave deceleration time 200.37 msec    LV SEPTAL E/E' RATIO 15.00 m/s    LV LATERAL E/E' RATIO 20.00 m/s    LVOT peak bob 1.38 m/s    Left Ventricular Outflow Tract Mean Velocity 1.08 cm/s    Left Ventricular Outflow Tract Mean Gradient 4.99 mmHg    LA size 3.79 cm    Left Atrium Minor Axis 4.68 cm    Left Atrium Major Axis 4.27 cm    RVOT peak VTI 10.8 cm    TAPSE 1.84 cm    RA Major Axis 3.31 cm    AV regurgitation pressure 1/2 time 456.03365637459262 ms    AR Max Bob 4.34 m/s    AV mean gradient 14 mmHg    AV peak gradient 26 mmHg    Ao peak bob 2.54 m/s    Ao VTI 54.00 cm    LVOT peak VTI 28.90 cm    AV valve area 1.53 cm²    AV Velocity Ratio 0.54     AV index (prosthetic) 0.54     SARAH by Velocity Ratio 1.56 cm²    Mr max bob 4.42 m/s    MV stenosis pressure 1/2 time 58.11 ms    MV valve area p 1/2 method 3.79 cm2    TV mean gradient 19 mmHg    Triscuspid Valve Regurgitation Peak Gradient 22 mmHg    PV mean gradient 1 mmHg    RVOT peak bob 0.58 m/s    Ao root annulus 2.71 cm    STJ 2.59 cm    Ascending aorta 2.36 cm    IVC diameter 1.57 cm    Mean e' 0.07 m/s    ZLVIDS 1.94     ZLVIDD -0.63     LA Volume Index 22.9 mL/m2    LA volume 44.60 cm3    LA WIDTH 3.1 cm    RA Width 2.2 cm    TV resting pulmonary artery pressure 25 mmHg    RV TB RVSP 5 mmHg    Est. RA pres 3 mmHg    Narrative      Left Ventricle: The left ventricle is normal in size. Ventricular mass   is normal. Normal wall thickness. regional wall motion abnormalities   present. There is mildly reduced systolic function with a visually   estimated ejection fraction of 40 - 45%. Grade II diastolic dysfunction.    Right Ventricle: Normal right ventricular cavity size. Wall thickness   is normal. Right ventricle wall motion  is normal. Systolic  function is   normal.    Aortic Valve: There is mild to moderate aortic regurgitation.    Pulmonary Artery: The estimated pulmonary artery systolic pressure is   25 mmHg.    IVC/SVC: Normal venous pressure at 3 mmHg.     , EKG: reviewed, and X-Ray: CXR: X-Ray Chest 1 View (CXR):   Results for orders placed or performed during the hospital encounter of 09/17/23   X-Ray Chest 1 View    Narrative    EXAMINATION:  XR CHEST 1 VIEW    CLINICAL HISTORY:  PICC placement;    TECHNIQUE:  Single frontal view of the chest was performed.    COMPARISON:  09/21/2023    FINDINGS:  Left-sided PICC line tip overlies the SVC in good position.  In comparison to the prior study, there is no adverse interval changes      Impression    In comparison to the prior study, there is no adverse interval changes      Electronically signed by: Elliott España MD  Date:    09/21/2023  Time:    10:10    and X-Ray Chest PA and Lateral (CXR): No results found for this visit on 09/17/23.

## 2023-09-26 NOTE — PROGRESS NOTES
[facility] - Telemetry ([facility])  Cardiology  Progress Note    Patient Name: Mikie Alcazar  MRN: [id number]  Admission Date: 9/17/2023  Hospital Length of Stay: 9 days  Code Status: Full Code   Attending Physician: Nishant Douglas MD   Primary Care Physician: Lissette, Primary Doctor  Expected Discharge Date:   Principal Problem:NSTEMI (non-ST elevated myocardial infarction)    Subjective:     Hospital Course:   Cardiology consulted to assist with management. Pt seen and examined today, resting in bed mother at bedside. He reports his pain started 6+ months ago and worsening in the last few weeks happening daily with associated SOB, dizziness radiating to left arm. He reports his pain is sharp and pressure-like at times. Pt reports daily use a marijuana, h/o cocaine and other drugs 5-10 years ago. Drinks occasionally. Echo pending cont hep gtt    09/19/2023. Admitted for NSTEMI/ACS. Echo showed RWMA  The cath done yesterday showed   Severe lmca disease with timi1 flow in lad   Lcx ostial 50%   Rca prox 80%   Ef 50%   Severe AI  09/19/23 s/p CABG x5 and aortic valve replacement with a 19 mm [place] mechanical valve by Dr. Douglas.  In ICU and intubated. Om epi and Vasopressin gtt.    9/20/23  Pt seen and examined today, POD 1 CABGx5 pt still intubated on exam this am, weaning epi. Labs reviewed, H/H 6.0 and 17.1. Plans to extubated today    9/21/23 Pt seen and examined today extubated feels ok chest soreness, chest tubes removed. Labs reviewed, chart reviewed.    9/22/23 Pt seen and examined today, sitting up in bedside chair. Feels good. Walked with PT/OT. Labs reviewed, chart reviewed    09/23 ambulating well. No chest pain dyspnea, the lab reviewed.     09/24. On coumadin rx and heparin bridge. Non chest pain dyspnea, the lab reviewed. VSS    9/25/23-Patient seen and examined today, resting in bed. No AEON. Pain controlled. Working with PT/OT. Labs stable. INR 1.2, on heparin bridge.    9/26/23-Patient seen and  examined today, sitting up in bedside chair. Feels ok. More fatigued/weak today. Pain controlled. Labs stable. INR 1.2, remains on heparin bridge.        Review of Systems   Constitutional: Positive for malaise/fatigue.   HENT: Negative.     Eyes: Negative.    Cardiovascular:  Positive for chest pain (incisional).   Respiratory: Negative.     Hematologic/Lymphatic: Bruises/bleeds easily.   Skin: Negative.    Gastrointestinal: Negative.    Genitourinary: Negative.    Neurological:  Positive for weakness.   Psychiatric/Behavioral: Negative.     Allergic/Immunologic: Negative.      Objective:     Vital Signs (Most Recent):  Temp: 98.8 °F (37.1 °C) (09/26/23 0730)  Pulse: 99 (09/26/23 0900)  Resp: 18 (09/26/23 0803)  BP: 137/77 (09/26/23 0730)  SpO2: 98 % (09/26/23 0803) Vital Signs (24h Range):  Temp:  [98.1 °F (36.7 °C)-99.4 °F (37.4 °C)] 98.8 °F (37.1 °C)  Pulse:  [] 99  Resp:  [17-18] 18  SpO2:  [95 %-99 %] 98 %  BP: (113-137)/(63-77) 137/77     Weight: 79.9 kg (176 lb 2.4 oz)  Body mass index is 26.78 kg/m².     SpO2: 98 %         Intake/Output Summary (Last 24 hours) at 9/26/2023 0930  Last data filed at 9/26/2023 0730  Gross per 24 hour   Intake 411.19 ml   Output --   Net 411.19 ml       Lines/Drains/Airways       Peripherally Inserted Central Catheter Line  Duration             PICC Double Lumen 09/21/23 0930 left basilic 5 days                       Physical Exam  Vitals and nursing note reviewed.   Constitutional:       General: He is not in acute distress.     Appearance: Normal appearance. He is well-developed. He is not diaphoretic.   HENT:      Head: Normocephalic and atraumatic.   Eyes:      General:         Right eye: No discharge.         Left eye: No discharge.      Pupils: Pupils are equal, round, and reactive to light.   Neck:      Thyroid: No thyromegaly.      Vascular: No JVD.      Trachea: No tracheal deviation.   Cardiovascular:      Rate and Rhythm: Normal rate and regular rhythm.       "Heart sounds: Normal heart sounds, S1 normal and S2 normal. No murmur heard.     Comments: Sternotomy site healing well; incision C/D/I; no bleeding erythema or drainage    +metallic S2  Pulmonary:      Effort: Pulmonary effort is normal. No respiratory distress.      Breath sounds: Normal breath sounds. No wheezing or rales.   Abdominal:      General: There is no distension.      Tenderness: There is no rebound.   Musculoskeletal:      Cervical back: Neck supple.      Right lower leg: No edema.      Left lower leg: No edema.   Skin:     General: Skin is warm and dry.      Findings: No erythema.      Comments: SVG site LLE C/D/I; no bleeding erythema or drainage   Neurological:      General: No focal deficit present.      Mental Status: He is alert and oriented to person, place, and time.   Psychiatric:         Mood and Affect: Mood normal.         Behavior: Behavior normal.         Thought Content: Thought content normal.            Significant Labs: CMP No results for input(s): "NA", "K", "CL", "CO2", "GLU", "BUN", "CREATININE", "CALCIUM", "PROT", "ALBUMIN", "BILITOT", "ALKPHOS", "AST", "ALT", "ANIONGAP", "ESTGFRAFRICA", "EGFRNONAA" in the last 48 hours., CBC   Recent Labs   Lab 09/25/23  0323 09/26/23  0513   WBC 8.52 10.11   HGB 9.0* 9.3*   HCT 27.0* 28.6*    358   , Troponin No results for input(s): "TROPONINI" in the last 48 hours., and All pertinent lab results from the last 24 hours have been reviewed.    Significant Imaging: Echocardiogram: Transthoracic echo (TTE) complete (Cupid Only):   Results for orders placed or performed during the hospital encounter of 09/17/23   Echo   Result Value Ref Range    BSA 1.98 m2    LVOT stroke volume 82.76 cm3    LVIDd 5.22 3.5 - 6.0 cm    LV Systolic Volume 86.21 mL    LV Systolic Volume Index 44.2 mL/m2    LVIDs 4.37 (A) 2.1 - 4.0 cm    LV Diastolic Volume 130.82 mL    LV Diastolic Volume Index 67.09 mL/m2    IVS 0.88 0.6 - 1.1 cm    LVOT diameter 1.91 cm    " LVOT area 2.9 cm2    FS 16 (A) 28 - 44 %    Left Ventricle Relative Wall Thickness 0.38 cm    Posterior Wall 0.98 0.6 - 1.1 cm    LV mass 177.53 g    LV Mass Index 91 g/m2    MV Peak E Bob 1.20 m/s    TDI LATERAL 0.06 m/s    TDI SEPTAL 0.08 m/s    E/E' ratio 17.14 m/s    MV Peak A Bob 1.14 m/s    TR Max Bob 2.33 m/s    E/A ratio 1.05     IVRT 72.31 msec    E wave deceleration time 200.37 msec    LV SEPTAL E/E' RATIO 15.00 m/s    LV LATERAL E/E' RATIO 20.00 m/s    LVOT peak bob 1.38 m/s    Left Ventricular Outflow Tract Mean Velocity 1.08 cm/s    Left Ventricular Outflow Tract Mean Gradient 4.99 mmHg    LA size 3.79 cm    Left Atrium Minor Axis 4.68 cm    Left Atrium Major Axis 4.27 cm    RVOT peak VTI 10.8 cm    TAPSE 1.84 cm    RA Major Axis 3.31 cm    AV regurgitation pressure 1/2 time [id number] ms    AR Max Bob 4.34 m/s    AV mean gradient 14 mmHg    AV peak gradient 26 mmHg    Ao peak bob 2.54 m/s    Ao VTI 54.00 cm    LVOT peak VTI 28.90 cm    AV valve area 1.53 cm²    AV Velocity Ratio 0.54     AV index (prosthetic) 0.54     SARAH by Velocity Ratio 1.56 cm²    Mr max bob 4.42 m/s    MV stenosis pressure 1/2 time 58.11 ms    MV valve area p 1/2 method 3.79 cm2    TV mean gradient 19 mmHg    Triscuspid Valve Regurgitation Peak Gradient 22 mmHg    PV mean gradient 1 mmHg    RVOT peak bob 0.58 m/s    Ao root annulus 2.71 cm    STJ 2.59 cm    Ascending aorta 2.36 cm    IVC diameter 1.57 cm    Mean e' 0.07 m/s    ZLVIDS 1.94     ZLVIDD -0.63     LA Volume Index 22.9 mL/m2    LA volume 44.60 cm3    LA WIDTH 3.1 cm    RA Width 2.2 cm    TV resting pulmonary artery pressure 25 mmHg    RV TB RVSP 5 mmHg    Est. RA pres 3 mmHg    Narrative      Left Ventricle: The left ventricle is normal in size. Ventricular mass   is normal. Normal wall thickness. regional wall motion abnormalities   present. There is mildly reduced systolic function with a visually   estimated ejection fraction of 40 - 45%. Grade II diastolic  dysfunction.    Right Ventricle: Normal right ventricular cavity size. Wall thickness   is normal. Right ventricle wall motion  is normal. Systolic function is   normal.    Aortic Valve: There is mild to moderate aortic regurgitation.    Pulmonary Artery: The estimated pulmonary artery systolic pressure is   25 mmHg.    IVC/SVC: Normal venous pressure at 3 mmHg.     , EKG: reviewed, and X-Ray: CXR: X-Ray Chest 1 View (CXR):   Results for orders placed or performed during the hospital encounter of 09/17/23   X-Ray Chest 1 View    Narrative    EXAMINATION:  XR CHEST 1 VIEW    CLINICAL HISTORY:  PICC placement;    TECHNIQUE:  Single frontal view of the chest was performed.    COMPARISON:  09/21/2023    FINDINGS:  Left-sided PICC line tip overlies the SVC in good position.  In comparison to the prior study, there is no adverse interval changes      Impression    In comparison to the prior study, there is no adverse interval changes      Electronically signed by: Elliott España MD  Date:    09/21/2023  Time:    10:10    and X-Ray Chest PA and Lateral (CXR): No results found for this visit on 09/17/23.    Assessment and Plan:   Patient who presents with multivessel CAD and severe AI recovering well s/p CABG x 5 and AVR. Continue current meds/mgmt as per CTS. IS usage and ambulation.    * NSTEMI (non-ST elevated myocardial infarction)  Troponin 0.6->-0.709->0.708  Cont hep gtt  EKG with ST T wave abnml  LHC planned for today  All risks, benefits and treatment alternatives explained, all questions answered. Pt agreeable to proceed.   NPO    9/20/23  S/P CABG x5 AVR    S/P AVR (aortic valve replacement)  09/24  On coumadin Rx and heparin gtt as bridge    S/P CABG x 5  Cont management per CTS    09/23   Continue asa statin plavix lasix and metoprolol  On coumadin for mechanical AVR    09/24  Continue ASA statin BB  Continue supportive care    9/25/23  -Progressing well  -Continue ASA, statin, BB  -IS usage and  ambulation    9/26/23  -Stable overnight  -Continue ASA, statin, BB  -IS usage and ambulation    Nonrheumatic aortic valve insufficiency  -s/p mechanical AVR    Tobacco smoker, 1 pack of cigarettes or less per day  Smoking cessation    CAD, multiple vessel  09/19/23  S/p CABG x5 and mechanical AVR today  -continue icu supportive care  -continue asa plavix statin BB and lasix  -will f/u    9/20/23  Cont ASA, plavix, statin, BB, lasix  Management per CTS    See plan under CABG    Hypertension  stable        VTE Risk Mitigation (From admission, onward)         Ordered     warfarin (COUMADIN) tablet 2.5 mg  Daily         09/25/23 1217     heparin 25,000 units in dextrose 5% 250 ml (100 units/mL) infusion MINIMAL INTENSITY nomogram - [facility]  Continuous        Question Answer Comment   Heparin Infusion Adjustment (DO NOT MODIFY ANSWER) [email or web address]\Images\[email or web address]\HeparinInfusions\heparin MINIMAL  INTENSITY nomogram for [facility] [id number]    Begin at (in units/kg/hr) 8        09/25/23 0855     IP VTE LOW RISK PATIENT  Once         09/17/23 1941                Natty Borrego PA-C  Cardiology  [facility] - Telemetry ([facility])

## 2023-09-26 NOTE — PLAN OF CARE
SIBR rounds completed at bedside with CM, Pt experience, and charge nurse. POC discussed and all questions and concerns addressed.  Currently pending coumadin bridge and stable labs to clear for DC home.  Pt verbalized understanding. No additional needs at this time. Pt instructed to call for any additional questions. Care team will continue to follow.

## 2023-09-26 NOTE — PT/OT/SLP PROGRESS
"Occupational Therapy   Treatment    Name: Mikie Alcazar  MRN: [id number]  Admitting Diagnosis:  NSTEMI (non-ST elevated myocardial infarction)  7 Days Post-Op    Recommendations:     Discharge Recommendations: home health OT  Discharge Equipment Recommendations:  shower chair  Barriers to discharge:  None    Assessment:     Mikie Alcazar is a 43 y.o. male with a medical diagnosis of NSTEMI (non-ST elevated myocardial infarction).  He presents with the following performance deficits affecting function are weakness, impaired endurance, impaired self care skills, impaired functional mobility, impaired balance, impaired cardiopulmonary response to activity (sternal precautions).     Rehab Prognosis:  Good; patient would benefit from acute skilled OT services to address these deficits and reach maximum level of function.       Plan:     Patient to be seen 2 x/week to address the above listed problems via self-care/home management, therapeutic exercises, therapeutic activities  Plan of Care Expires: 10/06/23  Plan of Care Reviewed with: patient    Subjective     Chief Complaint: Reported "I am getting up and going to the bathroom throughout the day."  Patient/Family Comments/goals: go home  Pain/Comfort:  Pain Rating 1: 0/10  Pain Rating Post-Intervention 1: 0/10    Objective:     Communicated with: NurseVianca, prior to session.  Patient found supine with wound vac, telemetry, peripheral IV upon OT entry to room.    General Precautions: Standard, fall, sternal    Orthopedic Precautions:N/A  Braces: N/A  Respiratory Status: Room air     Occupational Performance:     Bed Mobility:    Patient completed Supine to Sit with modified independence     Functional Mobility/Transfers:  Patient completed Sit <> Stand Transfer with modified independence  with  no assistive device   Patient completed Bed <> Chair Transfer using Step Transfer technique with modified independence with no assistive device  Functional Mobility: Patient " completed x250ft functional mobility without AD and SPV to increase dynamic standing balance and activity tolerance needed for ADL completion.    [facility] 6 Click ADL: 22    Treatment & Education:  Patient tolerated intervention well overall. Excellent compliance with sternal precautions throughout session. Minimal cueing for technique with transfers/mobility to increase overall safety and independence. Patient provided with education re: importance of Q hour ambulation, seated showers, DME recommendations, and continued completion of B UE AROM therex HEP within sternal precautions following d/c to continue rehabilitation. Educated on benefits of OOB activity and calling for A to transfer in room. Patient stated understanding and in agreement with POC.    Patient left up in chair with all lines intact and call button in reach    GOALS:   Multidisciplinary Problems       Occupational Therapy Goals          Problem: Occupational Therapy    Goal Priority Disciplines Outcome Interventions   Occupational Therapy Goal     OT, PT/OT Ongoing, Progressing    Description: Goals to be met by: 10/6/23     Patient will increase functional independence with ADLs by performing:    Toileting from toilet with Supervision for hygiene and clothing management.   Toilet transfer to toilet with Supervision  Demonstrates 100% functional compliance with sternal precautions.                         Time Tracking:     OT Date of Treatment: 09/26/23  OT Start Time: 1110  OT Stop Time: 1135  OT Total Time (min): 25 min    Billable Minutes:Therapeutic Activity 25    Kelsi Schulz OT  9/26/2023

## 2023-09-27 LAB
ANION GAP SERPL CALC-SCNC: 13 MMOL/L (ref 8–16)
APTT PPP: 40.7 SEC (ref 21–32)
BASOPHILS # BLD AUTO: 0.04 K/UL (ref 0–0.2)
BASOPHILS NFR BLD: 0.4 % (ref 0–1.9)
BUN SERPL-MCNC: 10 MG/DL (ref 6–20)
CALCIUM SERPL-MCNC: 9.3 MG/DL (ref 8.7–10.5)
CHLORIDE SERPL-SCNC: 105 MMOL/L (ref 95–110)
CO2 SERPL-SCNC: 22 MMOL/L (ref 23–29)
CREAT SERPL-MCNC: 0.9 MG/DL (ref 0.5–1.4)
DIFFERENTIAL METHOD: ABNORMAL
EOSINOPHIL # BLD AUTO: 0.2 K/UL (ref 0–0.5)
EOSINOPHIL NFR BLD: 1.8 % (ref 0–8)
ERYTHROCYTE [DISTWIDTH] IN BLOOD BY AUTOMATED COUNT: 17.7 % (ref 11.5–14.5)
EST. GFR  (NO RACE VARIABLE): >60 ML/MIN/1.73 M^2
GLUCOSE SERPL-MCNC: 120 MG/DL (ref 70–110)
HCT VFR BLD AUTO: 28.6 % (ref 40–54)
HGB BLD-MCNC: 9.3 G/DL (ref 14–18)
IMM GRANULOCYTES # BLD AUTO: 0.06 K/UL (ref 0–0.04)
IMM GRANULOCYTES NFR BLD AUTO: 0.6 % (ref 0–0.5)
INR PPP: 1.6 (ref 0.8–1.2)
LYMPHOCYTES # BLD AUTO: 1.5 K/UL (ref 1–4.8)
LYMPHOCYTES NFR BLD: 15.2 % (ref 18–48)
MCH RBC QN AUTO: 29.6 PG (ref 27–31)
MCHC RBC AUTO-ENTMCNC: 32.5 G/DL (ref 32–36)
MCV RBC AUTO: 91 FL (ref 82–98)
MONOCYTES # BLD AUTO: 1 K/UL (ref 0.3–1)
MONOCYTES NFR BLD: 10.5 % (ref 4–15)
NEUTROPHILS # BLD AUTO: 6.9 K/UL (ref 1.8–7.7)
NEUTROPHILS NFR BLD: 71.5 % (ref 38–73)
NRBC BLD-RTO: 0 /100 WBC
PLATELET # BLD AUTO: 428 K/UL (ref 150–450)
PMV BLD AUTO: 8.8 FL (ref 9.2–12.9)
POCT GLUCOSE: 117 MG/DL (ref 70–110)
POCT GLUCOSE: 124 MG/DL (ref 70–110)
POCT GLUCOSE: 99 MG/DL (ref 70–110)
POTASSIUM SERPL-SCNC: 4.5 MMOL/L (ref 3.5–5.1)
PROTHROMBIN TIME: 16.1 SEC (ref 9–12.5)
RBC # BLD AUTO: 3.14 M/UL (ref 4.6–6.2)
SODIUM SERPL-SCNC: 140 MMOL/L (ref 136–145)
WBC # BLD AUTO: 9.63 K/UL (ref 3.9–12.7)

## 2023-09-27 PROCEDURE — 99232 PR SUBSEQUENT HOSPITAL CARE,LEVL II: ICD-10-PCS | Mod: ,,, | Performed by: PHYSICIAN ASSISTANT

## 2023-09-27 PROCEDURE — 97116 GAIT TRAINING THERAPY: CPT

## 2023-09-27 PROCEDURE — 97530 THERAPEUTIC ACTIVITIES: CPT

## 2023-09-27 PROCEDURE — 25000242 PHARM REV CODE 250 ALT 637 W/ HCPCS: Performed by: THORACIC SURGERY (CARDIOTHORACIC VASCULAR SURGERY)

## 2023-09-27 PROCEDURE — 85610 PROTHROMBIN TIME: CPT | Performed by: INTERNAL MEDICINE

## 2023-09-27 PROCEDURE — 99232 SBSQ HOSP IP/OBS MODERATE 35: CPT | Mod: ,,, | Performed by: PHYSICIAN ASSISTANT

## 2023-09-27 PROCEDURE — 94799 UNLISTED PULMONARY SVC/PX: CPT

## 2023-09-27 PROCEDURE — 25000003 PHARM REV CODE 250: Performed by: NURSE PRACTITIONER

## 2023-09-27 PROCEDURE — 94761 N-INVAS EAR/PLS OXIMETRY MLT: CPT

## 2023-09-27 PROCEDURE — 99900035 HC TECH TIME PER 15 MIN (STAT)

## 2023-09-27 PROCEDURE — 27000646 HC AEROBIKA DEVICE

## 2023-09-27 PROCEDURE — 80048 BASIC METABOLIC PNL TOTAL CA: CPT | Performed by: THORACIC SURGERY (CARDIOTHORACIC VASCULAR SURGERY)

## 2023-09-27 PROCEDURE — 63600175 PHARM REV CODE 636 W HCPCS: Performed by: THORACIC SURGERY (CARDIOTHORACIC VASCULAR SURGERY)

## 2023-09-27 PROCEDURE — 85730 THROMBOPLASTIN TIME PARTIAL: CPT | Performed by: THORACIC SURGERY (CARDIOTHORACIC VASCULAR SURGERY)

## 2023-09-27 PROCEDURE — 25000003 PHARM REV CODE 250: Performed by: INTERNAL MEDICINE

## 2023-09-27 PROCEDURE — 25000003 PHARM REV CODE 250: Performed by: THORACIC SURGERY (CARDIOTHORACIC VASCULAR SURGERY)

## 2023-09-27 PROCEDURE — 94664 DEMO&/EVAL PT USE INHALER: CPT

## 2023-09-27 PROCEDURE — 21400001 HC TELEMETRY ROOM

## 2023-09-27 PROCEDURE — 85025 COMPLETE CBC W/AUTO DIFF WBC: CPT | Performed by: INTERNAL MEDICINE

## 2023-09-27 PROCEDURE — 94640 AIRWAY INHALATION TREATMENT: CPT

## 2023-09-27 RX ORDER — METOPROLOL TARTRATE 25 MG/1
25 TABLET, FILM COATED ORAL ONCE
Status: COMPLETED | OUTPATIENT
Start: 2023-09-27 | End: 2023-09-27

## 2023-09-27 RX ORDER — WARFARIN 2.5 MG/1
2.5 TABLET ORAL DAILY
Status: DISCONTINUED | OUTPATIENT
Start: 2023-09-28 | End: 2023-09-28

## 2023-09-27 RX ORDER — METOPROLOL TARTRATE 50 MG/1
50 TABLET ORAL 2 TIMES DAILY
Status: DISCONTINUED | OUTPATIENT
Start: 2023-09-27 | End: 2023-10-03 | Stop reason: HOSPADM

## 2023-09-27 RX ADMIN — HEPARIN SODIUM 13 UNITS/KG/HR: 10000 INJECTION, SOLUTION INTRAVENOUS at 02:09

## 2023-09-27 RX ADMIN — FOLIC ACID 1 MG: 1 TABLET ORAL at 09:09

## 2023-09-27 RX ADMIN — METOPROLOL TARTRATE 25 MG: 25 TABLET, FILM COATED ORAL at 09:09

## 2023-09-27 RX ADMIN — OXYCODONE HYDROCHLORIDE AND ACETAMINOPHEN 500 MG: 500 TABLET ORAL at 09:09

## 2023-09-27 RX ADMIN — Medication 6 MG: at 08:09

## 2023-09-27 RX ADMIN — OXYCODONE HYDROCHLORIDE AND ACETAMINOPHEN 500 MG: 500 TABLET ORAL at 08:09

## 2023-09-27 RX ADMIN — METOPROLOL TARTRATE 25 MG: 25 TABLET, FILM COATED ORAL at 11:09

## 2023-09-27 RX ADMIN — GUAIFENESIN 1200 MG: 600 TABLET, EXTENDED RELEASE ORAL at 09:09

## 2023-09-27 RX ADMIN — MAGNESIUM HYDROXIDE 400 MG: 400 SUSPENSION ORAL at 08:09

## 2023-09-27 RX ADMIN — METOPROLOL TARTRATE 50 MG: 50 TABLET, FILM COATED ORAL at 08:09

## 2023-09-27 RX ADMIN — TRAMADOL HYDROCHLORIDE 50 MG: 50 TABLET, COATED ORAL at 10:09

## 2023-09-27 RX ADMIN — PANTOPRAZOLE SODIUM 40 MG: 40 TABLET, DELAYED RELEASE ORAL at 09:09

## 2023-09-27 RX ADMIN — WARFARIN SODIUM 3 MG: 2 TABLET ORAL at 04:09

## 2023-09-27 RX ADMIN — CYANOCOBALAMIN TAB 1000 MCG 1000 MCG: 1000 TAB at 09:09

## 2023-09-27 RX ADMIN — ASPIRIN 81 MG: 81 TABLET, COATED ORAL at 09:09

## 2023-09-27 RX ADMIN — DOCUSATE SODIUM 100 MG: 100 CAPSULE, LIQUID FILLED ORAL at 08:09

## 2023-09-27 RX ADMIN — FERROUS SULFATE TAB 325 MG (65 MG ELEMENTAL FE) 1 EACH: 325 (65 FE) TAB at 09:09

## 2023-09-27 RX ADMIN — ATORVASTATIN CALCIUM 80 MG: 40 TABLET, FILM COATED ORAL at 09:09

## 2023-09-27 RX ADMIN — SODIUM CHLORIDE 30 MG/ML INHALATION SOLUTION 4 ML: 30 SOLUTION INHALANT at 07:09

## 2023-09-27 RX ADMIN — GUAIFENESIN 1200 MG: 600 TABLET, EXTENDED RELEASE ORAL at 08:09

## 2023-09-27 RX ADMIN — ACETAMINOPHEN 650 MG: 325 TABLET ORAL at 08:09

## 2023-09-27 RX ADMIN — ACETAMINOPHEN 650 MG: 325 TABLET ORAL at 09:09

## 2023-09-27 NOTE — PT/OT/SLP PROGRESS
Physical Therapy Treatment    Patient Name:  Mikie Alcazar   MRN:  [id number]    Recommendations:     Discharge Recommendations: home health PT  Discharge Equipment Recommendations: shower chair  Barriers to discharge: None    Assessment:     Mikie Alcazar is a 43 y.o. male admitted with a medical diagnosis of NSTEMI (non-ST elevated myocardial infarction).  He presents with the following impairments/functional limitations: impaired endurance, impaired functional mobility, decreased safety awareness, decreased coordination.    Rehab Prognosis: Good; patient would benefit from acute skilled PT services to address these deficits and reach maximum level of function.    Recent Surgery: Procedure(s) (LRB):  CORONARY ARTERY BYPASS GRAFT (CABG) (N/A)  REPLACEMENT-VALVE-AORTIC (N/A)  SURGICAL PROCUREMENT, VEIN, ENDOSCOPIC (Left)  BLOCK, NERVE, INTERCOSTAL, 2 OR MORE (N/A)  ECHOCARDIOGRAM,TRANSESOPHAGEAL (N/A) 8 Days Post-Op    Plan:     During this hospitalization, patient to be seen 3 x/week to address the identified rehab impairments via therapeutic activities, therapeutic exercises, gait training and progress toward the following goals:    Plan of Care Expires:  10/06/23    Subjective     Chief Complaint: NONE, EAGER TO WALK  Patient/Family Comments/goals:   Pain/Comfort:  Pain Rating 1: 0/10      Objective:     Communicated with NURSE AGUIRRE prior to session.  Patient found supine with telemetry, peripheral IV, PICC line upon PT entry to room.     General Precautions: Standard, fall, sternal  Orthopedic Precautions: N/A  Braces: N/A  Respiratory Status: Room air     Functional Mobility:  Bed Mobility:     Scooting: modified independence  Transfers:     Sit to Stand:  supervision with no AD  Gait: PT ' NO AD WITH SPV, NO LOB OR SOB ON ROOM AIR  Balance: GOOD SITTING AND STANDING BALANCE    AM-PAC 6 CLICK MOBILITY  Turning over in bed (including adjusting bedclothes, sheets and blankets)?: 4  Sitting down on and  "standing up from a chair with arms (e.g., wheelchair, bedside commode, etc.): 4  Moving from lying on back to sitting on the side of the bed?: 4  Moving to and from a bed to a chair (including a wheelchair)?: 4  Need to walk in hospital room?: 4  Climbing 3-5 steps with a railing?: 1  Basic Mobility Total Score: 21     Treatment & Education:  PT EDUCATION:  - ROLE OF P.T. AND POC IN ACUTE CARE HOSPITAL SETTING  - REVIEW STERNAL PRECAUTIONS  - ENCOURAGED TO INCREASE TIME OOB IN CHAIR TO TOLERANCE   - TO CONTINUE THERAPUETIC EXERCISES THROUGHOUT THE DAY TO INCREASE ACTIVITY TOLERANCE AND DECREASE RISK FOR PNEUMONIA AND BLOOD CLOTS: HIP FLEX/EXT, HIP ABD/ADD, QUAD SET, HEEL SLIDE, AP  - RISK FOR FALLS DUE TO GENERALIZED WEAKNESS, EDUCATED ON "CALL DON'T FALL", ENCOURAGED TO CALL FOR ASSISTANCE WITH ALL NEEDS SUCH AS BED<>CHAIR TRANSFERS OR TRIPS TO BATHROOM, PT AGREEABLE TO SAFETY PRECAUTIONS    Patient left up in chair with all lines intact, call button in reach, chair alarm on, NURSE notified, and MOTHER present..    GOALS:   Multidisciplinary Problems       Physical Therapy Goals          Problem: Physical Therapy    Goal Priority Disciplines Outcome Goal Variances Interventions   Physical Therapy Goal     PT, PT/OT Ongoing, Progressing     Description: LTG'S TO BE MET IN 14 DAYS (10-6-23)  PT WILL BE ARELY FOR BED MOBILITY  PT WILL BE ARELY FOR BED<>CHAIR TF'S  PT WILL  FEET NO AD ARELY  PT WILL INC AMPAC SCORE BY 2 POINTS TO PROGRESS GROSS FUNC MOBILITY                         Time Tracking:     PT Received On: 09/27/23  PT Start Time: 0810     PT Stop Time: 0835  PT Total Time (min): 25 min     Billable Minutes: Gait Training 10 and Therapeutic Activity 15    Treatment Type: Treatment  PT/PTA: PT     Number of PTA visits since last PT visit: 0     09/27/2023  "

## 2023-09-27 NOTE — PLAN OF CARE
Problem: Adjustment to Illness (Acute Coronary Syndrome)  Goal: Optimal Adaptation to Illness  Outcome: Ongoing, Progressing     Problem: Dysrhythmia (Acute Coronary Syndrome)  Goal: Normalized Cardiac Rhythm  Outcome: Ongoing, Progressing     Problem: Cardiac-Related Pain (Acute Coronary Syndrome)  Goal: Absence of Cardiac-Related Pain  Outcome: Ongoing, Progressing     Problem: Hemodynamic Instability (Acute Coronary Syndrome)  Goal: Effective Cardiac Pump Function  Outcome: Ongoing, Progressing     Problem: Tissue Perfusion (Acute Coronary Syndrome)  Goal: Adequate Tissue Perfusion  Outcome: Ongoing, Progressing     Problem: Arrhythmia/Dysrhythmia (Cardiac Catheterization)  Goal: Stable Heart Rate and Rhythm  Outcome: Ongoing, Progressing     Problem: Bleeding (Cardiac Catheterization)  Goal: Absence of Bleeding  Outcome: Ongoing, Progressing     Problem: Contrast-Induced Injury Risk (Cardiac Catheterization)  Goal: Absence of Contrast-Induced Injury  Outcome: Ongoing, Progressing     Problem: Embolism (Cardiac Catheterization)  Goal: Absence of Embolism Signs and Symptoms  Outcome: Ongoing, Progressing     Problem: Pain (Cardiac Catheterization)  Goal: Acceptable Pain Control  Outcome: Ongoing, Progressing     Problem: Vascular Access Protection (Cardiac Catheterization)  Goal: Absence of Vascular Access Complication  Outcome: Ongoing, Progressing     Problem: Adult Inpatient Plan of Care  Goal: Plan of Care Review  Outcome: Ongoing, Progressing  Goal: Patient-Specific Goal (Individualized)  Outcome: Ongoing, Progressing  Goal: Absence of Hospital-Acquired Illness or Injury  Outcome: Ongoing, Progressing  Goal: Optimal Comfort and Wellbeing  Outcome: Ongoing, Progressing  Goal: Readiness for Transition of Care  Outcome: Ongoing, Progressing     Problem: Chest Pain  Goal: Resolution of Chest Pain Symptoms  Outcome: Ongoing, Progressing     Problem: Fall Injury Risk  Goal: Absence of Fall and Fall-Related  Injury  Outcome: Ongoing, Progressing     Problem: Infection  Goal: Absence of Infection Signs and Symptoms  Outcome: Ongoing, Progressing     Problem: Skin Injury Risk Increased  Goal: Skin Health and Integrity  Outcome: Ongoing, Progressing

## 2023-09-27 NOTE — PT/OT/SLP PROGRESS
"Occupational Therapy   Treatment    Name: Mikie Alcazar  MRN: [id number]  Admitting Diagnosis:  NSTEMI (non-ST elevated myocardial infarction)  8 Days Post-Op    Recommendations:     Discharge Recommendations: home health OT  Discharge Equipment Recommendations:  shower chair  Barriers to discharge:  None    Assessment:     Mikie Alcazar is a 43 y.o. male with a medical diagnosis of NSTEMI (non-ST elevated myocardial infarction).  He presents with the following performance deficits affecting function are weakness, impaired endurance, impaired self care skills, impaired functional mobility, impaired balance, impaired cardiopulmonary response to activity (sternal precautions).     Rehab Prognosis:  Good; patient would benefit from acute skilled OT services to address these deficits and reach maximum level of function.       Plan:     Patient to be seen 2 x/week to address the above listed problems via self-care/home management, therapeutic activities, therapeutic exercises  Plan of Care Expires: 10/06/23  Plan of Care Reviewed with: patient    Subjective     Chief Complaint: Reported "I am feeling better today."  Patient/Family Comments/goals: get better  Pain/Comfort:  Pain Rating 1: 0/10  Pain Rating Post-Intervention 1: 0/10    Objective:     Communicated with: NurseJessica, prior to session.  Patient found up in chair with telemetry, wound vac, PICC line upon OT entry to room.    General Precautions: Standard, fall, sternal    Orthopedic Precautions:N/A  Braces: N/A  Respiratory Status: Room air     Occupational Performance:     Bed Mobility:    OOB in chair at start and end of treatment session    Functional Mobility/Transfers:  Patient completed Sit <> Stand Transfer with modified independence  with  no assistive device   Patient completed Bed <> Chair Transfer using Step Transfer technique with modified independence with no assistive device  Functional Mobility: Patient completed x620ft functional mobility " with SPV and no AD to increase dynamic standing balance and activity tolerance needed for ADL completion.  Minimal to no dyspnea on exertion. Good activity pacing throughout.    [facility] 6 Click ADL: 22    Treatment & Education:  Patient tolerated session well overall. Activity tolerance and dynamic standing balance continues to improve. Required v/c for sternal precautions this date. Reinforced education re: d/c recommendations covered yesterday. Encouraged completion of B UE AROM therex, within sternal precautions, throughout the day to increase functional strength and activity tolerance needed for ADL completion. Patient stated understanding and in agreement with POC.    Patient left up in chair with all lines intact, call button in reach, and mother present    GOALS:   Multidisciplinary Problems       Occupational Therapy Goals          Problem: Occupational Therapy    Goal Priority Disciplines Outcome Interventions   Occupational Therapy Goal     OT, PT/OT Ongoing, Progressing    Description: Goals to be met by: 10/6/23     Patient will increase functional independence with ADLs by performing:    Toileting from toilet with Supervision for hygiene and clothing management.   Toilet transfer to toilet with Supervision  Demonstrates 100% functional compliance with sternal precautions.                         Time Tracking:     OT Date of Treatment: 09/27/23  OT Start Time: 0820  OT Stop Time: 0845  OT Total Time (min): 25 min    Billable Minutes:Therapeutic Activity 25    Kelsi Schulz, BRYCE  9/27/2023

## 2023-09-27 NOTE — SUBJECTIVE & OBJECTIVE
Review of Systems   Constitutional: Positive for malaise/fatigue.   HENT: Negative.     Eyes: Negative.    Cardiovascular:  Positive for chest pain (incisional).   Respiratory: Negative.     Hematologic/Lymphatic: Negative.    Skin: Negative.    Musculoskeletal: Negative.    Gastrointestinal: Negative.    Genitourinary: Negative.    Neurological: Negative.    Psychiatric/Behavioral: Negative.     Allergic/Immunologic: Negative.      Objective:     Vital Signs (Most Recent):  Temp: 98.8 °F (37.1 °C) (09/27/23 0756)  Pulse: (!) 111 (09/27/23 0756)  Resp: 18 (09/27/23 0756)  BP: (!) 142/71 (09/27/23 0756)  SpO2: 100 % (09/27/23 0756) Vital Signs (24h Range):  Temp:  [97.8 °F (36.6 °C)-99.3 °F (37.4 °C)] 98.8 °F (37.1 °C)  Pulse:  [] 111  Resp:  [17-20] 18  SpO2:  [95 %-100 %] 100 %  BP: ()/(60-77) 142/71     Weight: 75.4 kg (166 lb 3.6 oz)  Body mass index is 25.27 kg/m².     SpO2: 100 %         Intake/Output Summary (Last 24 hours) at 9/27/2023 1001  Last data filed at 9/26/2023 1721  Gross per 24 hour   Intake 876.39 ml   Output --   Net 876.39 ml       Lines/Drains/Airways       Peripherally Inserted Central Catheter Line  Duration             PICC Double Lumen 09/21/23 0930 left basilic 6 days                       Physical Exam  Vitals and nursing note reviewed.   Constitutional:       General: He is not in acute distress.     Appearance: Normal appearance. He is well-developed. He is not diaphoretic.   HENT:      Head: Normocephalic and atraumatic.   Eyes:      General:         Right eye: No discharge.         Left eye: No discharge.      Pupils: Pupils are equal, round, and reactive to light.   Neck:      Thyroid: No thyromegaly.      Vascular: No JVD.      Trachea: No tracheal deviation.   Cardiovascular:      Rate and Rhythm: Regular rhythm. Tachycardia present.      Heart sounds: Normal heart sounds, S1 normal and S2 normal. No murmur heard.     Comments: Sternotomy site healing well C/D/I; no  "bleeding erythema or drainage    +mechanical AVR  Pulmonary:      Effort: Pulmonary effort is normal. No respiratory distress.      Breath sounds: Normal breath sounds. No wheezing or rales.   Abdominal:      General: There is no distension.      Tenderness: There is no rebound.   Musculoskeletal:      Cervical back: Neck supple.      Right lower leg: No edema.      Left lower leg: No edema.   Skin:     General: Skin is warm and dry.      Findings: No erythema.   Neurological:      General: No focal deficit present.      Mental Status: He is alert and oriented to person, place, and time.   Psychiatric:         Mood and Affect: Mood normal.         Behavior: Behavior normal.         Thought Content: Thought content normal.            Significant Labs: CMP   Recent Labs   Lab 09/27/23  0512      K 4.5      CO2 22*   *   BUN 10   CREATININE 0.9   CALCIUM 9.3   ANIONGAP 13   , CBC   Recent Labs   Lab 09/26/23  0513 09/27/23  0512   WBC 10.11 9.63   HGB 9.3* 9.3*   HCT 28.6* 28.6*    428   , Troponin No results for input(s): "TROPONINI" in the last 48 hours., and All pertinent lab results from the last 24 hours have been reviewed.    Significant Imaging: Echocardiogram: Transthoracic echo (TTE) complete (Cupid Only):   Results for orders placed or performed during the hospital encounter of 09/17/23   Echo   Result Value Ref Range    BSA 1.98 m2    LVOT stroke volume 82.76 cm3    LVIDd 5.22 3.5 - 6.0 cm    LV Systolic Volume 86.21 mL    LV Systolic Volume Index 44.2 mL/m2    LVIDs 4.37 (A) 2.1 - 4.0 cm    LV Diastolic Volume 130.82 mL    LV Diastolic Volume Index 67.09 mL/m2    IVS 0.88 0.6 - 1.1 cm    LVOT diameter 1.91 cm    LVOT area 2.9 cm2    FS 16 (A) 28 - 44 %    Left Ventricle Relative Wall Thickness 0.38 cm    Posterior Wall 0.98 0.6 - 1.1 cm    LV mass 177.53 g    LV Mass Index 91 g/m2    MV Peak E Bob 1.20 m/s    TDI LATERAL 0.06 m/s    TDI SEPTAL 0.08 m/s    E/E' ratio 17.14 m/s    MV " Peak A Bob 1.14 m/s    TR Max Bob 2.33 m/s    E/A ratio 1.05     IVRT 72.31 msec    E wave deceleration time 200.37 msec    LV SEPTAL E/E' RATIO 15.00 m/s    LV LATERAL E/E' RATIO 20.00 m/s    LVOT peak bob 1.38 m/s    Left Ventricular Outflow Tract Mean Velocity 1.08 cm/s    Left Ventricular Outflow Tract Mean Gradient 4.99 mmHg    LA size 3.79 cm    Left Atrium Minor Axis 4.68 cm    Left Atrium Major Axis 4.27 cm    RVOT peak VTI 10.8 cm    TAPSE 1.84 cm    RA Major Axis 3.31 cm    AV regurgitation pressure 1/2 time [id number] ms    AR Max Bob 4.34 m/s    AV mean gradient 14 mmHg    AV peak gradient 26 mmHg    Ao peak bob 2.54 m/s    Ao VTI 54.00 cm    LVOT peak VTI 28.90 cm    AV valve area 1.53 cm²    AV Velocity Ratio 0.54     AV index (prosthetic) 0.54     SARAH by Velocity Ratio 1.56 cm²    Mr max bob 4.42 m/s    MV stenosis pressure 1/2 time 58.11 ms    MV valve area p 1/2 method 3.79 cm2    TV mean gradient 19 mmHg    Triscuspid Valve Regurgitation Peak Gradient 22 mmHg    PV mean gradient 1 mmHg    RVOT peak bob 0.58 m/s    Ao root annulus 2.71 cm    STJ 2.59 cm    Ascending aorta 2.36 cm    IVC diameter 1.57 cm    Mean e' 0.07 m/s    ZLVIDS 1.94     ZLVIDD -0.63     LA Volume Index 22.9 mL/m2    LA volume 44.60 cm3    LA WIDTH 3.1 cm    RA Width 2.2 cm    TV resting pulmonary artery pressure 25 mmHg    RV TB RVSP 5 mmHg    Est. RA pres 3 mmHg    Narrative      Left Ventricle: The left ventricle is normal in size. Ventricular mass   is normal. Normal wall thickness. regional wall motion abnormalities   present. There is mildly reduced systolic function with a visually   estimated ejection fraction of 40 - 45%. Grade II diastolic dysfunction.    Right Ventricle: Normal right ventricular cavity size. Wall thickness   is normal. Right ventricle wall motion  is normal. Systolic function is   normal.    Aortic Valve: There is mild to moderate aortic regurgitation.    Pulmonary Artery: The estimated  pulmonary artery systolic pressure is   25 mmHg.    IVC/SVC: Normal venous pressure at 3 mmHg.     , EKG: Reviewed, and X-Ray: CXR: X-Ray Chest 1 View (CXR):   Results for orders placed or performed during the hospital encounter of 09/17/23   X-Ray Chest 1 View    Narrative    EXAMINATION:  XR CHEST 1 VIEW    CLINICAL HISTORY:  PICC placement;    TECHNIQUE:  Single frontal view of the chest was performed.    COMPARISON:  09/21/2023    FINDINGS:  Left-sided PICC line tip overlies the SVC in good position.  In comparison to the prior study, there is no adverse interval changes      Impression    In comparison to the prior study, there is no adverse interval changes      Electronically signed by: Elliott España MD  Date:    09/21/2023  Time:    10:10    and X-Ray Chest PA and Lateral (CXR): No results found for this visit on 09/17/23.

## 2023-09-27 NOTE — ASSESSMENT & PLAN NOTE
Cont management per CTS    09/23   Continue asa statin plavix lasix and metoprolol  On coumadin for mechanical AVR    09/24  Continue ASA statin BB  Continue supportive care    9/25/23  -Progressing well  -Continue ASA, statin, BB  -IS usage and ambulation    9/26/23  -Stable overnight  -Continue ASA, statin, BB  -IS usage and ambulation    9/27/23  -No AEON  -Continue ASA, statin, BB  -IS usage   -Ambulating well

## 2023-09-27 NOTE — PROGRESS NOTES
Clinical Pharmacy Progress Note: Coumadin Dosing and Monitoring      Indication: mechanical AVR   Goal INR: 2.0-3.0   INR = 1.6 today, increased from 1.2 yesterday  Will give a 3 mg dose today  Drug interactions: Melatonin, Aspirin, Tylenol, and Tramadol each may increase the risk of bleeding while on warfarin  Bridging with minimal intensity gtt  Patient has been educated by pharmacist this admission.   Pharmacy will continue to monitor daily PT/INR. Dose adjustments will be made accordingly.       Thank you for allowing us to participate in this patient's care.

## 2023-09-27 NOTE — PROGRESS NOTES
[facility] - Tele[facility] ([facility])  Cardiology  Progress Note    Patient Name: Mikie Alcazar  MRN: [id number]  Admission Date: 9/17/2023  Hospital Length of Stay: 10 days  Code Status: Full Code   Attending Physician: Nishant Douglas MD   Primary Care Physician: Lissette, Primary Doctor  Expected Discharge Date:   Principal Problem:NSTEMI (non-ST elevated myocardial infarction)    Subjective:     Hospital Course:   Cardiology consulted to assist with management. Pt seen and examined today, resting in bed mother at bedside. He reports his pain started 6+ months ago and worsening in the last few weeks happening daily with associated SOB, dizziness radiating to left arm. He reports his pain is sharp and pressure-like at times. Pt reports daily use a marijuana, h/o cocaine and other drugs 5-10 years ago. Drinks occasionally. Echo pending cont hep gtt    09/19/2023. Admitted for NSTEMI/ACS. Echo showed RWMA  The cath done yesterday showed   Severe lmca disease with timi1 flow in lad   Lcx ostial 50%   Rca prox 80%   Ef 50%   Severe AI  09/19/23 s/p CABG x5 and aortic valve replacement with a 19 mm Saint Dany mechanical valve by Dr. Douglas.  In ICU and intubated. Om epi and Vasopressin gtt.    9/20/23  Pt seen and examined today, POD 1 CABGx5 pt still intubated on exam this am, weaning epi. Labs reviewed, H/H 6.0 and 17.1. Plans to extubated today    9/21/23 Pt seen and examined today extubated feels ok chest soreness, chest tubes removed. Labs reviewed, chart reviewed.    9/22/23 Pt seen and examined today, sitting up in bedside chair. Feels good. Walked with PT/OT. Labs reviewed, chart reviewed    09/23 ambulating well. No chest pain dyspnea, the lab reviewed.     09/24. On coumadin rx and heparin bridge. Non chest pain dyspnea, the lab reviewed. VSS    9/25/23-Patient seen and examined today, resting in bed. No AEON. Pain controlled. Working with PT/OT. Labs stable. INR 1.2, on heparin bridge.    9/26/23-Patient seen and  examined today, sitting up in bedside chair. Feels ok. More fatigued/weak today. Pain controlled. Labs stable. INR 1.2, remains on heparin bridge.    9/27/23-Patient seen and examined today, resting in bed. Feeling better today. Worked with PT/OT earlier. No AEON. Labs reviewed, INR 1.6.         Review of Systems   Constitutional: Positive for malaise/fatigue.   HENT: Negative.     Eyes: Negative.    Cardiovascular:  Positive for chest pain (incisional).   Respiratory: Negative.     Hematologic/Lymphatic: Negative.    Skin: Negative.    Musculoskeletal: Negative.    Gastrointestinal: Negative.    Genitourinary: Negative.    Neurological: Negative.    Psychiatric/Behavioral: Negative.     Allergic/Immunologic: Negative.      Objective:     Vital Signs (Most Recent):  Temp: 98.8 °F (37.1 °C) (09/27/23 0756)  Pulse: (!) 111 (09/27/23 0756)  Resp: 18 (09/27/23 0756)  BP: (!) 142/71 (09/27/23 0756)  SpO2: 100 % (09/27/23 0756) Vital Signs (24h Range):  Temp:  [97.8 °F (36.6 °C)-99.3 °F (37.4 °C)] 98.8 °F (37.1 °C)  Pulse:  [] 111  Resp:  [17-20] 18  SpO2:  [95 %-100 %] 100 %  BP: ()/(60-77) 142/71     Weight: 75.4 kg (166 lb 3.6 oz)  Body mass index is 25.27 kg/m².     SpO2: 100 %         Intake/Output Summary (Last 24 hours) at 9/27/2023 1001  Last data filed at 9/26/2023 1721  Gross per 24 hour   Intake 876.39 ml   Output --   Net 876.39 ml       Lines/Drains/Airways       Peripherally Inserted Central Catheter Line  Duration             PICC Double Lumen 09/21/23 0930 left basilic 6 days                       Physical Exam  Vitals and nursing note reviewed.   Constitutional:       General: He is not in acute distress.     Appearance: Normal appearance. He is well-developed. He is not diaphoretic.   HENT:      Head: Normocephalic and atraumatic.   Eyes:      General:         Right eye: No discharge.         Left eye: No discharge.      Pupils: Pupils are equal, round, and reactive to light.   Neck:       "Thyroid: No thyromegaly.      Vascular: No JVD.      Trachea: No tracheal deviation.   Cardiovascular:      Rate and Rhythm: Regular rhythm. Tachycardia present.      Heart sounds: Normal heart sounds, S1 normal and S2 normal. No murmur heard.     Comments: Sternotomy site healing well C/D/I; no bleeding erythema or drainage    +mechanical AVR  Pulmonary:      Effort: Pulmonary effort is normal. No respiratory distress.      Breath sounds: Normal breath sounds. No wheezing or rales.   Abdominal:      General: There is no distension.      Tenderness: There is no rebound.   Musculoskeletal:      Cervical back: Neck supple.      Right lower leg: No edema.      Left lower leg: No edema.   Skin:     General: Skin is warm and dry.      Findings: No erythema.   Neurological:      General: No focal deficit present.      Mental Status: He is alert and oriented to person, place, and time.   Psychiatric:         Mood and Affect: Mood normal.         Behavior: Behavior normal.         Thought Content: Thought content normal.            Significant Labs: CMP   Recent Labs   Lab 09/27/23  0512      K 4.5      CO2 22*   *   BUN 10   CREATININE 0.9   CALCIUM 9.3   ANIONGAP 13   , CBC   Recent Labs   Lab 09/26/23  0513 09/27/23  0512   WBC 10.11 9.63   HGB 9.3* 9.3*   HCT 28.6* 28.6*    428   , Troponin No results for input(s): "TROPONINI" in the last 48 hours., and All pertinent lab results from the last 24 hours have been reviewed.    Significant Imaging: Echocardiogram: Transthoracic echo (TTE) complete (Cupid Only):   Results for orders placed or performed during the hospital encounter of 09/17/23   Echo   Result Value Ref Range    BSA 1.98 m2    LVOT stroke volume 82.76 cm3    LVIDd 5.22 3.5 - 6.0 cm    LV Systolic Volume 86.21 mL    LV Systolic Volume Index 44.2 mL/m2    LVIDs 4.37 (A) 2.1 - 4.0 cm    LV Diastolic Volume 130.82 mL    LV Diastolic Volume Index 67.09 mL/m2    IVS 0.88 0.6 - 1.1 cm    " LVOT diameter 1.91 cm    LVOT area 2.9 cm2    FS 16 (A) 28 - 44 %    Left Ventricle Relative Wall Thickness 0.38 cm    Posterior Wall 0.98 0.6 - 1.1 cm    LV mass 177.53 g    LV Mass Index 91 g/m2    MV Peak E Bob 1.20 m/s    TDI LATERAL 0.06 m/s    TDI SEPTAL 0.08 m/s    E/E' ratio 17.14 m/s    MV Peak A Bob 1.14 m/s    TR Max Bob 2.33 m/s    E/A ratio 1.05     IVRT 72.31 msec    E wave deceleration time 200.37 msec    LV SEPTAL E/E' RATIO 15.00 m/s    LV LATERAL E/E' RATIO 20.00 m/s    LVOT peak bob 1.38 m/s    Left Ventricular Outflow Tract Mean Velocity 1.08 cm/s    Left Ventricular Outflow Tract Mean Gradient 4.99 mmHg    LA size 3.79 cm    Left Atrium Minor Axis 4.68 cm    Left Atrium Major Axis 4.27 cm    RVOT peak VTI 10.8 cm    TAPSE 1.84 cm    RA Major Axis 3.31 cm    AV regurgitation pressure 1/2 time [id number] ms    AR Max Bob 4.34 m/s    AV mean gradient 14 mmHg    AV peak gradient 26 mmHg    Ao peak bob 2.54 m/s    Ao VTI 54.00 cm    LVOT peak VTI 28.90 cm    AV valve area 1.53 cm²    AV Velocity Ratio 0.54     AV index (prosthetic) 0.54     ASRAH by Velocity Ratio 1.56 cm²    Mr max bob 4.42 m/s    MV stenosis pressure 1/2 time 58.11 ms    MV valve area p 1/2 method 3.79 cm2    TV mean gradient 19 mmHg    Triscuspid Valve Regurgitation Peak Gradient 22 mmHg    PV mean gradient 1 mmHg    RVOT peak bob 0.58 m/s    Ao root annulus 2.71 cm    STJ 2.59 cm    Ascending aorta 2.36 cm    IVC diameter 1.57 cm    Mean e' 0.07 m/s    ZLVIDS 1.94     ZLVIDD -0.63     LA Volume Index 22.9 mL/m2    LA volume 44.60 cm3    LA WIDTH 3.1 cm    RA Width 2.2 cm    TV resting pulmonary artery pressure 25 mmHg    RV TB RVSP 5 mmHg    Est. RA pres 3 mmHg    Narrative      Left Ventricle: The left ventricle is normal in size. Ventricular mass   is normal. Normal wall thickness. regional wall motion abnormalities   present. There is mildly reduced systolic function with a visually   estimated ejection fraction of 40 -  45%. Grade II diastolic dysfunction.    Right Ventricle: Normal right ventricular cavity size. Wall thickness   is normal. Right ventricle wall motion  is normal. Systolic function is   normal.    Aortic Valve: There is mild to moderate aortic regurgitation.    Pulmonary Artery: The estimated pulmonary artery systolic pressure is   25 mmHg.    IVC/SVC: Normal venous pressure at 3 mmHg.     , EKG: Reviewed, and X-Ray: CXR: X-Ray Chest 1 View (CXR):   Results for orders placed or performed during the hospital encounter of 09/17/23   X-Ray Chest 1 View    Narrative    EXAMINATION:  XR CHEST 1 VIEW    CLINICAL HISTORY:  PICC placement;    TECHNIQUE:  Single frontal view of the chest was performed.    COMPARISON:  09/21/2023    FINDINGS:  Left-sided PICC line tip overlies the SVC in good position.  In comparison to the prior study, there is no adverse interval changes      Impression    In comparison to the prior study, there is no adverse interval changes      Electronically signed by: Elliott España MD  Date:    09/21/2023  Time:    10:10    and X-Ray Chest PA and Lateral (CXR): No results found for this visit on 09/17/23.    Assessment and Plan:   Patient who presents with multivessel CAD/severe AI, recovering post-CABG x 5 and AVR. Continue same meds/mgmt as per CTS.    * NSTEMI (non-ST elevated myocardial infarction)  Troponin 0.6->-0.709->0.708  Cont hep gtt  EKG with ST T wave abnml  LHC planned for today  All risks, benefits and treatment alternatives explained, all questions answered. Pt agreeable to proceed.   NPO    9/20/23  S/P CABG x5 AVR    S/P AVR (aortic valve replacement)  09/24  On coumadin Rx and heparin gtt as bridge    S/P CABG x 5  Cont management per CTS    09/23   Continue asa statin plavix lasix and metoprolol  On coumadin for mechanical AVR    09/24  Continue ASA statin BB  Continue supportive care    9/25/23  -Progressing well  -Continue ASA, statin, BB  -IS usage and  ambulation    9/26/23  -Stable overnight  -Continue ASA, statin, BB  -IS usage and ambulation    9/27/23  -No AEON  -Continue ASA, statin, BB  -IS usage   -Ambulating well    Nonrheumatic aortic valve insufficiency  -s/p mechanical AVR    Tobacco smoker, 1 pack of cigarettes or less per day  -Smoking cessation    CAD, multiple vessel  09/19/23  S/p CABG x5 and mechanical AVR today  -continue icu supportive care  -continue asa plavix statin BB and lasix  -will f/u    9/20/23  Cont ASA, plavix, statin, BB, lasix  Management per CTS    See plan under CABG    Hypertension  stable        VTE Risk Mitigation (From admission, onward)         Ordered     warfarin (COUMADIN) tablet 2.5 mg  Daily         09/26/23 1434     heparin 25,000 units in dextrose 5% 250 ml (100 units/mL) infusion MINIMAL INTENSITY nomogram - [facility]  Continuous        Question Answer Comment   Heparin Infusion Adjustment (DO NOT MODIFY ANSWER) [email or web address]\[email or web address] MINIMAL  INTENSITY nomogram for [facility] [id number]    Begin at (in units/kg/hr) 8        09/25/23 0855     IP VTE LOW RISK PATIENT  Once         09/17/23 1941                Natty Borrego PA-C  Cardiology  [facility] - Telemetry ([facility])

## 2023-09-27 NOTE — ASSESSMENT & PLAN NOTE
09/20/2023  The patient is postop day 1 status post coronary artery bypass grafting x5 and aortic valve replacement with a 19 mm Saint Dany mechanical valve.  Overall the patient is doing well.      Neuro:  Patient is awake appropriate and follows commands.  Patient is currently sedated with Precedex.  Will wean Precedex to off.   Cardiac:  Patient is hemodynamically stable.  With excellent cardiac output and cardiac index.  Patient is on low-dose vaso active meds.  Wean drips to off.  Will start metoprolol once drips are off.  Respiratory:  Patient is being weaned to extubation.  Patient is an active smoker Start nebs and Mucomyst.  Continue pulmonary toileting.  Continue incentive spirometer.    GI:  Patient is currently NPO.  Will start p.o. intake once extubated.    Renal:  Patient has good urine output and creatinine is 1.2.  Will start Lasix for diuresis.  Endocrine:  Patient required insulin drip for glucose controlled.  Will convert to sliding scale.    Id:  Patient had a T-max of 102°.  Most likely due to stress of surgery.  Will continue to follow.  White count is 9.  Patient is on manny op antibiotics.  Heme: Hematocrit is 17.  Patient is being transfused.  Platelet count is 74.  No evidence of active bleeding.  Will follow platelet count.  Will start patient on Coumadin anticoagulation for mechanical valve.  Activities:  Patient is currently in bed.  Will advance activities as tolerated once extubated.  Line tubes and drains:  Patient has an ETT, right IJ Baldwin and Cordis, right groin A-line, chest tubes, Ugalde catheter, pacer wires, and saphenectomy site DARON.    09/21/2023     The patient is postop day 2 status post coronary artery bypass grafting x5 and aortic valve replacement with a 19 mm Saint Dany mechanical valve.  Overall the patient is doing well.    Neuro:  Patient is awake alert and oriented x3.  Pain is well controlled with current pain management.  Cardiac:  The patient is off all drips.   Patient is hemodynamically stable.  Continue metoprolol.  Respiratory:  Patient was extubated yesterday.  Continue pulmonary toileting.  Continue incentive spirometer.   GI:  Patient is tolerating p.o. intake.  Patient had episodes of nausea yesterday which have abated.  Advance patient's diet as tolerated.    Renal:  Patient has good urine output.  Creatinine is 0.9.  Continue Lasix for diuresis.  Endocrine:  Glucose is controlled with sliding scale insulin.    Id:  T-max is 101.8 patient is currently afebrile.  White count is 17.  Most likely due to stress of surgery.  Continue to follow.  Heme:  Hematocrit is 24.5.  Platelet count is 89.  INR is 1.5.  Patient is started on Coumadin for anticoagulation of mechanical valve.  Activities:  Patient will be out of bed to chair.  Advance activities as tolerated.  Line tubes and drains:  Patient has pacer wires, right IJ Cordis and Ugalde catheter.  Will place PICC line and discontinue Cordis.    09/22/2023   The patient is postop day 3 status post coronary artery bypass grafting x5 and aortic valve replacement with a 19 mm Saint Dany mechanical valve.  Overall the patient is doing well.  Patient has been transferred to telemetry.    Neuro:  Patient is awake alert and oriented x3.  Pain is well controlled with current pain regimen.    Cardiac:  Patient is hemodynamically stable.  Continue metoprolol.    Respiratory:  Continue pulmonary toileting.  Continue incentive spirometer.    GI:  Patient is tolerating p.o. intake.  And tolerating a regular diet.    Renal:  Patient has good urine output.  Creatinine is 0.9.  Continue Lasix.    Endocrine:  Glucose is well controlled.    Id:  Patient is afebrile.  White count is 16.  Continue to follow.    Heme:  Hematocrit is 24 and platelet count is 114.  INR is 3.4.  Patient is currently on Coumadin for anticoagulation.  Activities:  Patient is out of bed and ambulating in the hallways.  Continue to advance as tolerated.  Line tubes  and drains:  Patient has a PICC line and pacer wires.    09/23/2023   The patient is postop day 4 status post coronary artery bypass grafting x5 and aortic valve replacement with a 19 mm Saint Dany mechanical valve.  Overall the patient is doing well.  Anticipate discharge to home with home health in the next 48-72 hours.    Neuro:  Patient is awake alert and oriented x3 pain is well controlled with pain management.  Neuro exam is nonfocal.    Cardiac:  Patient is hemodynamically stable.  Continue metoprolol.    Respiratory:  Continue pulmonary toileting.  Continue incentive spirometer.  GI patient is tolerating p.o. intake.  Will increase bowel regimen for bowel movements.  Renal: Patient has good urine output.  Creatinine is 0.9.  Patient is still appears fluid overloaded.  Continue Lasix.  Endocrine: Glucose is well controlled.    Id:  Patient is afebrile.  White count is down to 12.  Continue to follow.    Heme:  Hematocrit is 27.  Platelet count is 186 and INR is 1.9.  INR goal is between 2.5 and 3.  Continue Coumadin.    Activities:  Patient is out of bed and ambulating in the hallways.  Advance as tolerated.  Line tubes and drains:  Patient has a PICC line.      09/24/2023   The patient is postop day 5 status post coronary artery bypass grafting x5 and aortic valve replacement with a 19 mm Saint Dany mechanical valve.  Overall the patient is doing well.  Anticipate discharge home in the next 24-48 hours.    Neuro:  Patient is awake alert and oriented x3 pain is well controlled with current pain management.  Neuro exam is nonfocal.    Cardiac:  Patient is hemodynamically stable.  Continue metoprolol  Respiratory:  Continue pulmonary toileting.  Continue incentive spirometer.  Patient continues to have sputum production.  GI:  Patient is tolerating a regular diet.  Patient has had bowel movements.  Renal: Patient has good urine output.  Creatinine is stable.  Patient is appears to be euvolemic.  Will  discontinue Lasix.    Endocrine:  Glucose is well controlled.  Id: Patient is afebrile white count normal.  Heme:  Hematocrit is 27 and platelet count is 229.  INR is subtherapeutic 1.5.  Continue Coumadin.  Goal is a INR level of 2.5-3.  Activities: Patient is out of bed and ambulating in the hallways.  Advance as tolerated.    Line tubes and drains:  Patient has a PICC line.    09/25/2023   The patient is postop day 6 status post coronary artery bypass grafting x5 and aortic valve replacement with a 19 mm Saint Dany mechanical valve.  Overall the patient is doing well.  Patient is awaiting attainment of a therapeutic INR.  Anticipate discharge in the next 48-72 hours.    Neuro:  Patient is awake alert and oriented x3.  Pain is well controlled current pain management.  Neuro exam is nonfocal.    Cardiac:  Patient is stable.  Continue metoprolol.    Respiratory:  Patient has good sats on room air.  Continue pulmonary toileting.  Continue incentive spirometer.    GI:  Patient is tolerating a regular diet having bowel movements.    Renal:  Patient has good urine output.    Endocrine:  Glucose is well controlled.    Id:  Patient is afebrile white count is normal.    Heme:  Hematocrit is 27 and platelet count is 298.  INR is now normal at 1.2.  Patient is on bridging heparin.  Will titrate heparin to an APTT greater between 40 and 50.  Therapeutic goal of INR is 2.5-3.  Activities: Patient is out of bed and ambulating in the hallways.    Line tubes and drains:  Patient has a PICC line.    09/26/2023   The patient is postop day 7 status post coronary artery bypass grafting x5 and aortic valve replacement with a 19 mm Saint Dany mechanical valve.  Overall the patient is doing well.  Patient's INR is still subtherapeutic.  Patient is on heparin in the interim.  Anticipate discharge in the next 48-72 hours.  Neuro:  Patient is awake alert and oriented x3.  Pain is well controlled with current pain management.  Neuro exam is  nonfocal.    Cardiac:  Patient remains stable.  Continue metoprolol.    Respiratory: Patient has good sats on room air.  Continue pulmonary toileting.  Continue incentive spirometer.    GI:  Patient is tolerating a regular diet and having bowel movements.    Renal:  Patient has good urine output.  Endocrine:  Glucose is well controlled.    Id:  Patient's white count is normal.    Heme: Hematocrit is 28 and INR is 1.2 patient is on a heparin drip.  Coumadin dose has been increased.  INR goal is 2.5-3.  Activities:  Patient is out of bed and ambulating in the hallways.    Line tubes and drains:  Patient has a PICC line.    09/27/2023   The patient is postop [id number] post coronary artery bypass grafting x5 and aortic valve replacement with a 19 mm Saint Dany mechanical valve.  Overall the patient is doing well.  Patient is awaiting INR being therapeutic.  Anticipate discharge in the next 48-72 hours.  Neuro:  Patient is awake alert and oriented x3.  Pain is well controlled with current pain management.  Neuro exam is nonfocal.    Cardiac:  Patient's blood pressure is trending higher.  Will increase metoprolol to 50 mg b.i.d..  Respiratory: Patient has good sats on room air.  Continue pulmonary toileting.  Continue incentive spirometer.  GI:  Patient is tolerating a regular diet and having bowel movements.    Renal:  Patient has good urine output.    Endocrine:  Glucose is well controlled.  Id:  Patient is afebrile white count is normal.    Heme:  Hematocrit is 28.6 and platelet count is 428.  INR is 1.6.  A PTT is therapeutic at 40.  INR goal is 2.5-3.  Activities:  Patient is out of bed and ambulating in the hallways.  Advance as tolerated.    Line tubes and drains:  Patient has a PICC line.

## 2023-09-27 NOTE — SUBJECTIVE & OBJECTIVE
Interval History: The patient is postop day 8 status post coronary artery bypass grafting x5 and aortic valve replacement with a 19 mm Saint Dany mechanical valve.    ROS  Medications:  Continuous Infusions:   heparin (porcine) in D5W 13 Units/kg/hr (09/27/23 0222)     Scheduled Meds:   ascorbic acid (vitamin C)  500 mg Oral BID    aspirin  81 mg Oral Daily    atorvastatin  80 mg Oral Daily    cyanocobalamin  1,000 mcg Oral Daily    docusate sodium  100 mg Oral BID    ferrous sulfate  1 tablet Oral Daily    folic acid  1 mg Oral Daily    guaiFENesin  1,200 mg Oral BID    magnesium hydroxide 400 mg/5 ml  5 mL Oral BID    metoprolol tartrate  25 mg Oral Once    metoprolol tartrate  50 mg Oral BID    pantoprazole  40 mg Oral Daily    polyethylene glycol  17 g Oral Daily    sodium chloride 3%  4 mL Nebulization Q12H    warfarin  2.5 mg Oral Daily     PRN Meds:0.9%  NaCl infusion (for blood administration), acetaminophen, albumin human 5%, aluminum-magnesium hydroxide-simethicone, calcium gluconate IVPB, calcium gluconate IVPB, calcium gluconate IVPB, dextrose 10%, glucagon (human recombinant), influenza, insulin aspart U-100, lactated ringers, magnesium sulfate IVPB, melatonin, metoclopramide HCl, ondansetron, pneumoc 20-leslee conj-dip cr(PF), potassium chloride in water, potassium chloride in water, potassium chloride in water, traMADoL     Objective:     Vital Signs (Most Recent):  Temp: 98.8 °F (37.1 °C) (09/27/23 0756)  Pulse: (!) 111 (09/27/23 0756)  Resp: 18 (09/27/23 0756)  BP: (!) 142/71 (09/27/23 0756)  SpO2: 100 % (09/27/23 0756) Vital Signs (24h Range):  Temp:  [97.8 °F (36.6 °C)-99.3 °F (37.4 °C)] 98.8 °F (37.1 °C)  Pulse:  [] 111  Resp:  [17-20] 18  SpO2:  [95 %-100 %] 100 %  BP: ()/(60-77) 142/71     Weight: 75.4 kg (166 lb 3.6 oz)  Body mass index is 25.27 kg/m².    SpO2: 100 %       Intake/Output - Last 3 Shifts         09/25 0700 09/26 0659 09/26 0700 09/27 0659 09/27 0700 09/28 0659     P.O.  740     I.V. (mL/kg) 161.2 (2) 386.4 (5.1)     Total Intake(mL/kg) 161.2 (2) 1126.4 (14.9)     Urine (mL/kg/hr)       Emesis/NG output       Total Output       Net +161.2 +1126.4            Urine Occurrence 1 x 3 x     Stool Occurrence  1 x             Lines/Drains/Airways       Peripherally Inserted Central Catheter Line  Duration             PICC Double Lumen 09/21/23 0930 left basilic 6 days                     Physical Exam  Constitutional:       Appearance: Normal appearance.   HENT:      Head: Normocephalic and atraumatic.      Nose: Nose normal.   Cardiovascular:      Rate and Rhythm: Normal rate and regular rhythm.      Heart sounds: Normal heart sounds.   Pulmonary:      Breath sounds: Normal breath sounds.   Abdominal:      General: Abdomen is flat. Bowel sounds are normal.      Palpations: Abdomen is soft.   Musculoskeletal:      Right lower leg: No edema.      Left lower leg: No edema.   Skin:     General: Skin is warm and dry.   Neurological:      Mental Status: He is alert and oriented to person, place, and time.   Psychiatric:         Behavior: Behavior normal.            Significant Labs:  All pertinent labs from the last 24 hours have been reviewed.    Significant Diagnostics:  I have reviewed all pertinent imaging results/findings within the past 24 hours.

## 2023-09-27 NOTE — PROGRESS NOTES
[facility] - Telemetry ([facility])  Cardiothoracic Surgery  Progress Note    Patient Name: Mikie Alcazar  MRN: [id number]  Admission Date: 9/17/2023  Hospital Length of Stay: 10 days  Code Status: Full Code   Attending Physician: Nishant Douglas MD   Referring Provider: Self, Aaareferral  Principal Problem:NSTEMI (non-ST elevated myocardial infarction)            Subjective:     Post-Op Info:  Procedure(s) (LRB):  CORONARY ARTERY BYPASS GRAFT (CABG) (N/A)  REPLACEMENT-VALVE-AORTIC (N/A)  SURGICAL PROCUREMENT, VEIN, ENDOSCOPIC (Left)  BLOCK, NERVE, INTERCOSTAL, 2 OR MORE (N/A)  ECHOCARDIOGRAM,TRANSESOPHAGEAL (N/A)   8 Days Post-Op     Interval History: The patient is postop day 8 status post coronary artery bypass grafting x5 and aortic valve replacement with a 19 mm Saint Dany mechanical valve.    ROS  Medications:  Continuous Infusions:   heparin (porcine) in D5W 13 Units/kg/hr (09/27/23 0222)     Scheduled Meds:   ascorbic acid (vitamin C)  500 mg Oral BID    aspirin  81 mg Oral Daily    atorvastatin  80 mg Oral Daily    cyanocobalamin  1,000 mcg Oral Daily    docusate sodium  100 mg Oral BID    ferrous sulfate  1 tablet Oral Daily    folic acid  1 mg Oral Daily    guaiFENesin  1,200 mg Oral BID    magnesium hydroxide 400 mg/5 ml  5 mL Oral BID    metoprolol tartrate  25 mg Oral Once    metoprolol tartrate  50 mg Oral BID    pantoprazole  40 mg Oral Daily    polyethylene glycol  17 g Oral Daily    sodium chloride 3%  4 mL Nebulization Q12H    warfarin  2.5 mg Oral Daily     PRN Meds:0.9%  NaCl infusion (for blood administration), acetaminophen, albumin human 5%, aluminum-magnesium hydroxide-simethicone, calcium gluconate IVPB, calcium gluconate IVPB, calcium gluconate IVPB, dextrose 10%, glucagon (human recombinant), influenza, insulin aspart U-100, lactated ringers, magnesium sulfate IVPB, melatonin, metoclopramide HCl, ondansetron, pneumoc 20-leslee conj-dip cr(PF), potassium chloride in water,  potassium chloride in water, potassium chloride in water, traMADoL     Objective:     Vital Signs (Most Recent):  Temp: 98.8 °F (37.1 °C) (09/27/23 0756)  Pulse: (!) 111 (09/27/23 0756)  Resp: 18 (09/27/23 0756)  BP: (!) 142/71 (09/27/23 0756)  SpO2: 100 % (09/27/23 0756) Vital Signs (24h Range):  Temp:  [97.8 °F (36.6 °C)-99.3 °F (37.4 °C)] 98.8 °F (37.1 °C)  Pulse:  [] 111  Resp:  [17-20] 18  SpO2:  [95 %-100 %] 100 %  BP: ()/(60-77) 142/71     Weight: 75.4 kg (166 lb 3.6 oz)  Body mass index is 25.27 kg/m².    SpO2: 100 %       Intake/Output - Last 3 Shifts         09/25 0700  09/26 0659 09/26 0700 09/27 0659 09/27 0700 09/28 0659    P.O.  740     I.V. (mL/kg) 161.2 (2) 386.4 (5.1)     Total Intake(mL/kg) 161.2 (2) 1126.4 (14.9)     Urine (mL/kg/hr)       Emesis/NG output       Total Output       Net +161.2 +1126.4            Urine Occurrence 1 x 3 x     Stool Occurrence  1 x             Lines/Drains/Airways       Peripherally Inserted Central Catheter Line  Duration             PICC Double Lumen 09/21/23 0930 left basilic 6 days                     Physical Exam  Constitutional:       Appearance: Normal appearance.   HENT:      Head: Normocephalic and atraumatic.      Nose: Nose normal.   Cardiovascular:      Rate and Rhythm: Normal rate and regular rhythm.      Heart sounds: Normal heart sounds.   Pulmonary:      Breath sounds: Normal breath sounds.   Abdominal:      General: Abdomen is flat. Bowel sounds are normal.      Palpations: Abdomen is soft.   Musculoskeletal:      Right lower leg: No edema.      Left lower leg: No edema.   Skin:     General: Skin is warm and dry.   Neurological:      Mental Status: He is alert and oriented to person, place, and time.   Psychiatric:         Behavior: Behavior normal.            Significant Labs:  All pertinent labs from the last 24 hours have been reviewed.    Significant Diagnostics:  I have reviewed all pertinent imaging results/findings within the past  24 hours.    Assessment/Plan:     S/P CABG x 5  09/20/2023  The patient is postop day 1 status post coronary artery bypass grafting x5 and aortic valve replacement with a 19 mm Saint Dany mechanical valve.  Overall the patient is doing well.      Neuro:  Patient is awake appropriate and follows commands.  Patient is currently sedated with Precedex.  Will wean Precedex to off.   Cardiac:  Patient is hemodynamically stable.  With excellent cardiac output and cardiac index.  Patient is on low-dose vaso active meds.  Wean drips to off.  Will start metoprolol once drips are off.  Respiratory:  Patient is being weaned to extubation.  Patient is an active smoker Start nebs and Mucomyst.  Continue pulmonary toileting.  Continue incentive spirometer.    GI:  Patient is currently NPO.  Will start p.o. intake once extubated.    Renal:  Patient has good urine output and creatinine is 1.2.  Will start Lasix for diuresis.  Endocrine:  Patient required insulin drip for glucose controlled.  Will convert to sliding scale.    Id:  Patient had a T-max of 102°.  Most likely due to stress of surgery.  Will continue to follow.  White count is 9.  Patient is on manny op antibiotics.  Heme: Hematocrit is 17.  Patient is being transfused.  Platelet count is 74.  No evidence of active bleeding.  Will follow platelet count.  Will start patient on Coumadin anticoagulation for mechanical valve.  Activities:  Patient is currently in bed.  Will advance activities as tolerated once extubated.  Line tubes and drains:  Patient has an ETT, right IJ [place] and Cordis, right groin A-line, chest tubes, Ugalde catheter, pacer wires, and saphenectomy site DARON.    09/21/2023     The patient is postop day 2 status post coronary artery bypass grafting x5 and aortic valve replacement with a 19 mm Saint Dany mechanical valve.  Overall the patient is doing well.    Neuro:  Patient is awake alert and oriented x3.  Pain is well controlled with current pain  management.  Cardiac:  The patient is off all drips.  Patient is hemodynamically stable.  Continue metoprolol.  Respiratory:  Patient was extubated yesterday.  Continue pulmonary toileting.  Continue incentive spirometer.   GI:  Patient is tolerating p.o. intake.  Patient had episodes of nausea yesterday which have abated.  Advance patient's diet as tolerated.    Renal:  Patient has good urine output.  Creatinine is 0.9.  Continue Lasix for diuresis.  Endocrine:  Glucose is controlled with sliding scale insulin.    Id:  T-max is 101.8 patient is currently afebrile.  White count is 17.  Most likely due to stress of surgery.  Continue to follow.  Heme:  Hematocrit is 24.5.  Platelet count is 89.  INR is 1.5.  Patient is started on Coumadin for anticoagulation of mechanical valve.  Activities:  Patient will be out of bed to chair.  Advance activities as tolerated.  Line tubes and drains:  Patient has pacer wires, right IJ Cordis and Ugalde catheter.  Will place PICC line and discontinue Cordis.    09/22/2023   The patient is postop day 3 status post coronary artery bypass grafting x5 and aortic valve replacement with a 19 mm Saint Dany mechanical valve.  Overall the patient is doing well.  Patient has been transferred to telemetry.    Neuro:  Patient is awake alert and oriented x3.  Pain is well controlled with current pain regimen.    Cardiac:  Patient is hemodynamically stable.  Continue metoprolol.    Respiratory:  Continue pulmonary toileting.  Continue incentive spirometer.    GI:  Patient is tolerating p.o. intake.  And tolerating a regular diet.    Renal:  Patient has good urine output.  Creatinine is 0.9.  Continue Lasix.    Endocrine:  Glucose is well controlled.    Id:  Patient is afebrile.  White count is 16.  Continue to follow.    Heme:  Hematocrit is 24 and platelet count is 114.  INR is 3.4.  Patient is currently on Coumadin for anticoagulation.  Activities:  Patient is out of bed and ambulating in the  hallways.  Continue to advance as tolerated.  Line tubes and drains:  Patient has a PICC line and pacer wires.    09/23/2023   The patient is postop day 4 status post coronary artery bypass grafting x5 and aortic valve replacement with a 19 mm Saint Dany mechanical valve.  Overall the patient is doing well.  Anticipate discharge to home with home health in the next 48-72 hours.    Neuro:  Patient is awake alert and oriented x3 pain is well controlled with pain management.  Neuro exam is nonfocal.    Cardiac:  Patient is hemodynamically stable.  Continue metoprolol.    Respiratory:  Continue pulmonary toileting.  Continue incentive spirometer.  GI patient is tolerating p.o. intake.  Will increase bowel regimen for bowel movements.  Renal: Patient has good urine output.  Creatinine is 0.9.  Patient is still appears fluid overloaded.  Continue Lasix.  Endocrine: Glucose is well controlled.    Id:  Patient is afebrile.  White count is down to 12.  Continue to follow.    Heme:  Hematocrit is 27.  Platelet count is 186 and INR is 1.9.  INR goal is between 2.5 and 3.  Continue Coumadin.    Activities:  Patient is out of bed and ambulating in the hallways.  Advance as tolerated.  Line tubes and drains:  Patient has a PICC line.      09/24/2023   The patient is postop day 5 status post coronary artery bypass grafting x5 and aortic valve replacement with a 19 mm Saint Dany mechanical valve.  Overall the patient is doing well.  Anticipate discharge home in the next 24-48 hours.    Neuro:  Patient is awake alert and oriented x3 pain is well controlled with current pain management.  Neuro exam is nonfocal.    Cardiac:  Patient is hemodynamically stable.  Continue metoprolol  Respiratory:  Continue pulmonary toileting.  Continue incentive spirometer.  Patient continues to have sputum production.  GI:  Patient is tolerating a regular diet.  Patient has had bowel movements.  Renal: Patient has good urine output.  Creatinine is  stable.  Patient is appears to be euvolemic.  Will discontinue Lasix.    Endocrine:  Glucose is well controlled.  Id: Patient is afebrile white count normal.  Heme:  Hematocrit is 27 and platelet count is 229.  INR is subtherapeutic 1.5.  Continue Coumadin.  Goal is a INR level of 2.5-3.  Activities: Patient is out of bed and ambulating in the hallways.  Advance as tolerated.    Line tubes and drains:  Patient has a PICC line.    09/25/2023   The patient is postop day 6 status post coronary artery bypass grafting x5 and aortic valve replacement with a 19 mm Saint Dany mechanical valve.  Overall the patient is doing well.  Patient is awaiting attainment of a therapeutic INR.  Anticipate discharge in the next 48-72 hours.    Neuro:  Patient is awake alert and oriented x3.  Pain is well controlled current pain management.  Neuro exam is nonfocal.    Cardiac:  Patient is stable.  Continue metoprolol.    Respiratory:  Patient has good sats on room air.  Continue pulmonary toileting.  Continue incentive spirometer.    GI:  Patient is tolerating a regular diet having bowel movements.    Renal:  Patient has good urine output.    Endocrine:  Glucose is well controlled.    Id:  Patient is afebrile white count is normal.    Heme:  Hematocrit is 27 and platelet count is 298.  INR is now normal at 1.2.  Patient is on bridging heparin.  Will titrate heparin to an APTT greater between 40 and 50.  Therapeutic goal of INR is 2.5-3.  Activities: Patient is out of bed and ambulating in the hallways.    Line tubes and drains:  Patient has a PICC line.    09/26/2023   The patient is postop day 7 status post coronary artery bypass grafting x5 and aortic valve replacement with a 19 mm Saint Dany mechanical valve.  Overall the patient is doing well.  Patient's INR is still subtherapeutic.  Patient is on heparin in the interim.  Anticipate discharge in the next 48-72 hours.  Neuro:  Patient is awake alert and oriented x3.  Pain is well  controlled with current pain management.  Neuro exam is nonfocal.    Cardiac:  Patient remains stable.  Continue metoprolol.    Respiratory: Patient has good sats on room air.  Continue pulmonary toileting.  Continue incentive spirometer.    GI:  Patient is tolerating a regular diet and having bowel movements.    Renal:  Patient has good urine output.  Endocrine:  Glucose is well controlled.    Id:  Patient's white count is normal.    Heme: Hematocrit is 28 and INR is 1.2 patient is on a heparin drip.  Coumadin dose has been increased.  INR goal is 2.5-3.  Activities:  Patient is out of bed and ambulating in the hallways.    Line tubes and drains:  Patient has a PICC line.    09/27/2023   The patient is postop [id number] post coronary artery bypass grafting x5 and aortic valve replacement with a 19 mm Saint Dany mechanical valve.  Overall the patient is doing well.  Patient is awaiting INR being therapeutic.  Anticipate discharge in the next 48-72 hours.  Neuro:  Patient is awake alert and oriented x3.  Pain is well controlled with current pain management.  Neuro exam is nonfocal.    Cardiac:  Patient's blood pressure is trending higher.  Will increase metoprolol to 50 mg b.i.d..  Respiratory: Patient has good sats on room air.  Continue pulmonary toileting.  Continue incentive spirometer.  GI:  Patient is tolerating a regular diet and having bowel movements.    Renal:  Patient has good urine output.    Endocrine:  Glucose is well controlled.  Id:  Patient is afebrile white count is normal.    Heme:  Hematocrit is 28.6 and platelet count is 428.  INR is 1.6.  A PTT is therapeutic at 40.  INR goal is 2.5-3.  Activities:  Patient is out of bed and ambulating in the hallways.  Advance as tolerated.    Line tubes and drains:  Patient has a PICC line.      CAD, multiple vessel  The patient is a 43-year-old male with critical left main coronary artery disease and severe aortic regurgitation by cardiac catheterization.   The patient is a candidate for urgent coronary artery bypass grafting and aortic valve replacement.  The risks and benefits of surgery were explained to the patient.  The patient understands the risks and benefits of surgery and has agreed to proceed with urgent aortic valve replacement and coronary artery bypass grafting.  The type of aortic valve to be used was discussed with the patient.  The patient stated a preference for a bioprosthetic valve in order to avoid the need for chronic anticoagulation.  The patient understands that a bioprosthetic valve especially in a young patient has a limited lifespan.        Nishant Douglas MD  Cardiothoracic Surgery  [facility] ([facility])

## 2023-09-28 LAB
ANION GAP SERPL CALC-SCNC: 8 MMOL/L (ref 8–16)
APTT PPP: 50.7 SEC (ref 21–32)
BASOPHILS # BLD AUTO: 0.06 K/UL (ref 0–0.2)
BASOPHILS NFR BLD: 0.7 % (ref 0–1.9)
BUN SERPL-MCNC: 12 MG/DL (ref 6–20)
CALCIUM SERPL-MCNC: 9 MG/DL (ref 8.7–10.5)
CHLORIDE SERPL-SCNC: 108 MMOL/L (ref 95–110)
CO2 SERPL-SCNC: 23 MMOL/L (ref 23–29)
CREAT SERPL-MCNC: 0.9 MG/DL (ref 0.5–1.4)
DIFFERENTIAL METHOD: ABNORMAL
EOSINOPHIL # BLD AUTO: 0.3 K/UL (ref 0–0.5)
EOSINOPHIL NFR BLD: 3.7 % (ref 0–8)
ERYTHROCYTE [DISTWIDTH] IN BLOOD BY AUTOMATED COUNT: 17.9 % (ref 11.5–14.5)
EST. GFR  (NO RACE VARIABLE): >60 ML/MIN/1.73 M^2
GLUCOSE SERPL-MCNC: 113 MG/DL (ref 70–110)
HCT VFR BLD AUTO: 28 % (ref 40–54)
HGB BLD-MCNC: 9 G/DL (ref 14–18)
IMM GRANULOCYTES # BLD AUTO: 0.07 K/UL (ref 0–0.04)
IMM GRANULOCYTES NFR BLD AUTO: 0.8 % (ref 0–0.5)
INR PPP: 2.3 (ref 0.8–1.2)
LYMPHOCYTES # BLD AUTO: 1.7 K/UL (ref 1–4.8)
LYMPHOCYTES NFR BLD: 20 % (ref 18–48)
MCH RBC QN AUTO: 29.4 PG (ref 27–31)
MCHC RBC AUTO-ENTMCNC: 32.1 G/DL (ref 32–36)
MCV RBC AUTO: 92 FL (ref 82–98)
MONOCYTES # BLD AUTO: 1 K/UL (ref 0.3–1)
MONOCYTES NFR BLD: 11.2 % (ref 4–15)
NEUTROPHILS # BLD AUTO: 5.4 K/UL (ref 1.8–7.7)
NEUTROPHILS NFR BLD: 63.6 % (ref 38–73)
NRBC BLD-RTO: 0 /100 WBC
PLATELET # BLD AUTO: 452 K/UL (ref 150–450)
PMV BLD AUTO: 8.6 FL (ref 9.2–12.9)
POCT GLUCOSE: 93 MG/DL (ref 70–110)
POCT GLUCOSE: 93 MG/DL (ref 70–110)
POCT GLUCOSE: 96 MG/DL (ref 70–110)
POCT GLUCOSE: 98 MG/DL (ref 70–110)
POTASSIUM SERPL-SCNC: 4.1 MMOL/L (ref 3.5–5.1)
PROTHROMBIN TIME: 23 SEC (ref 9–12.5)
RBC # BLD AUTO: 3.06 M/UL (ref 4.6–6.2)
SODIUM SERPL-SCNC: 139 MMOL/L (ref 136–145)
WBC # BLD AUTO: 8.48 K/UL (ref 3.9–12.7)

## 2023-09-28 PROCEDURE — 99232 PR SUBSEQUENT HOSPITAL CARE,LEVL II: ICD-10-PCS | Mod: ,,, | Performed by: PHYSICIAN ASSISTANT

## 2023-09-28 PROCEDURE — 99232 SBSQ HOSP IP/OBS MODERATE 35: CPT | Mod: ,,, | Performed by: PHYSICIAN ASSISTANT

## 2023-09-28 PROCEDURE — 85025 COMPLETE CBC W/AUTO DIFF WBC: CPT | Performed by: THORACIC SURGERY (CARDIOTHORACIC VASCULAR SURGERY)

## 2023-09-28 PROCEDURE — 25000003 PHARM REV CODE 250: Performed by: NURSE PRACTITIONER

## 2023-09-28 PROCEDURE — 63600175 PHARM REV CODE 636 W HCPCS: Performed by: THORACIC SURGERY (CARDIOTHORACIC VASCULAR SURGERY)

## 2023-09-28 PROCEDURE — 21400001 HC TELEMETRY ROOM

## 2023-09-28 PROCEDURE — 97530 THERAPEUTIC ACTIVITIES: CPT

## 2023-09-28 PROCEDURE — 85610 PROTHROMBIN TIME: CPT | Performed by: INTERNAL MEDICINE

## 2023-09-28 PROCEDURE — 85730 THROMBOPLASTIN TIME PARTIAL: CPT | Performed by: THORACIC SURGERY (CARDIOTHORACIC VASCULAR SURGERY)

## 2023-09-28 PROCEDURE — 80048 BASIC METABOLIC PNL TOTAL CA: CPT | Performed by: THORACIC SURGERY (CARDIOTHORACIC VASCULAR SURGERY)

## 2023-09-28 PROCEDURE — 25000003 PHARM REV CODE 250: Performed by: INTERNAL MEDICINE

## 2023-09-28 PROCEDURE — 97116 GAIT TRAINING THERAPY: CPT | Mod: CQ

## 2023-09-28 PROCEDURE — 25000003 PHARM REV CODE 250: Performed by: THORACIC SURGERY (CARDIOTHORACIC VASCULAR SURGERY)

## 2023-09-28 PROCEDURE — 97530 THERAPEUTIC ACTIVITIES: CPT | Mod: CQ

## 2023-09-28 RX ORDER — WARFARIN 2 MG/1
2 TABLET ORAL DAILY
Status: DISCONTINUED | OUTPATIENT
Start: 2023-09-29 | End: 2023-09-29

## 2023-09-28 RX ORDER — WARFARIN 1 MG/1
1 TABLET ORAL ONCE
Status: COMPLETED | OUTPATIENT
Start: 2023-09-28 | End: 2023-09-28

## 2023-09-28 RX ADMIN — TRAMADOL HYDROCHLORIDE 50 MG: 50 TABLET, COATED ORAL at 09:09

## 2023-09-28 RX ADMIN — HEPARIN SODIUM 13 UNITS/KG/HR: 10000 INJECTION, SOLUTION INTRAVENOUS at 02:09

## 2023-09-28 RX ADMIN — Medication 6 MG: at 09:09

## 2023-09-28 RX ADMIN — CYANOCOBALAMIN TAB 1000 MCG 1000 MCG: 1000 TAB at 09:09

## 2023-09-28 RX ADMIN — GUAIFENESIN 1200 MG: 600 TABLET, EXTENDED RELEASE ORAL at 09:09

## 2023-09-28 RX ADMIN — METOPROLOL TARTRATE 50 MG: 50 TABLET, FILM COATED ORAL at 09:09

## 2023-09-28 RX ADMIN — OXYCODONE HYDROCHLORIDE AND ACETAMINOPHEN 500 MG: 500 TABLET ORAL at 09:09

## 2023-09-28 RX ADMIN — ATORVASTATIN CALCIUM 80 MG: 40 TABLET, FILM COATED ORAL at 09:09

## 2023-09-28 RX ADMIN — ASPIRIN 81 MG: 81 TABLET, COATED ORAL at 09:09

## 2023-09-28 RX ADMIN — FERROUS SULFATE TAB 325 MG (65 MG ELEMENTAL FE) 1 EACH: 325 (65 FE) TAB at 09:09

## 2023-09-28 RX ADMIN — ACETAMINOPHEN 650 MG: 325 TABLET ORAL at 06:09

## 2023-09-28 RX ADMIN — PANTOPRAZOLE SODIUM 40 MG: 40 TABLET, DELAYED RELEASE ORAL at 09:09

## 2023-09-28 RX ADMIN — FOLIC ACID 1 MG: 1 TABLET ORAL at 09:09

## 2023-09-28 RX ADMIN — WARFARIN SODIUM 1 MG: 1 TABLET ORAL at 05:09

## 2023-09-28 RX ADMIN — ACETAMINOPHEN 650 MG: 325 TABLET ORAL at 10:09

## 2023-09-28 NOTE — PLAN OF CARE
Problem: Adjustment to Illness (Acute Coronary Syndrome)  Goal: Optimal Adaptation to Illness  Outcome: Ongoing, Progressing     Problem: Dysrhythmia (Acute Coronary Syndrome)  Goal: Normalized Cardiac Rhythm  Outcome: Ongoing, Progressing     Problem: Cardiac-Related Pain (Acute Coronary Syndrome)  Goal: Absence of Cardiac-Related Pain  Outcome: Ongoing, Progressing     Problem: Hemodynamic Instability (Acute Coronary Syndrome)  Goal: Effective Cardiac Pump Function  Outcome: Ongoing, Progressing     Problem: Tissue Perfusion (Acute Coronary Syndrome)  Goal: Adequate Tissue Perfusion  Outcome: Ongoing, Progressing     Problem: Arrhythmia/Dysrhythmia (Cardiac Catheterization)  Goal: Stable Heart Rate and Rhythm  Outcome: Ongoing, Progressing     Problem: Bleeding (Cardiac Catheterization)  Goal: Absence of Bleeding  Outcome: Ongoing, Progressing     Problem: Contrast-Induced Injury Risk (Cardiac Catheterization)  Goal: Absence of Contrast-Induced Injury  Outcome: Ongoing, Progressing     Problem: Embolism (Cardiac Catheterization)  Goal: Absence of Embolism Signs and Symptoms  Outcome: Ongoing, Progressing     Problem: Pain (Cardiac Catheterization)  Goal: Acceptable Pain Control  Outcome: Ongoing, Progressing     Problem: Vascular Access Protection (Cardiac Catheterization)  Goal: Absence of Vascular Access Complication  Outcome: Ongoing, Progressing     Problem: Adult Inpatient Plan of Care  Goal: Plan of Care Review  Outcome: Ongoing, Progressing  Goal: Patient-Specific Goal (Individualized)  Outcome: Ongoing, Progressing  Goal: Absence of Hospital-Acquired Illness or Injury  Outcome: Ongoing, Progressing  Goal: Optimal Comfort and Wellbeing  Outcome: Ongoing, Progressing  Goal: Readiness for Transition of Care  Outcome: Ongoing, Progressing     Problem: Chest Pain  Goal: Resolution of Chest Pain Symptoms  Outcome: Ongoing, Progressing

## 2023-09-28 NOTE — SUBJECTIVE & OBJECTIVE
Review of Systems   Constitutional: Positive for malaise/fatigue.   HENT: Negative.     Eyes: Negative.    Cardiovascular:  Positive for chest pain (incisional (controlled)).   Respiratory: Negative.     Endocrine: Negative.    Hematologic/Lymphatic: Negative.    Skin: Negative.    Musculoskeletal: Negative.    Gastrointestinal: Negative.    Genitourinary: Negative.    Neurological: Negative.    Psychiatric/Behavioral: Negative.     Allergic/Immunologic: Negative.      Objective:     Vital Signs (Most Recent):  Temp: 98.8 °F (37.1 °C) (09/28/23 0912)  Pulse: 104 (apical) (09/28/23 0912)  Resp: 18 (09/28/23 0711)  BP: 128/70 (09/28/23 0711)  SpO2: 98 % (09/28/23 0711) Vital Signs (24h Range):  Temp:  [97.8 °F (36.6 °C)-99.7 °F (37.6 °C)] 98.8 °F (37.1 °C)  Pulse:  [] 104  Resp:  [16-18] 18  SpO2:  [96 %-98 %] 98 %  BP: (107-152)/(63-75) 128/70     Weight: 78.3 kg (172 lb 9.9 oz)  Body mass index is 26.25 kg/m².     SpO2: 98 %       No intake or output data in the 24 hours ending 09/28/23 0933    Lines/Drains/Airways       Peripherally Inserted Central Catheter Line  Duration             PICC Double Lumen 09/21/23 0930 left basilic 7 days                       Physical Exam  Vitals and nursing note reviewed.   Constitutional:       General: He is not in acute distress.     Appearance: Normal appearance. He is well-developed. He is not diaphoretic.   HENT:      Head: Normocephalic and atraumatic.   Eyes:      General:         Right eye: No discharge.         Left eye: No discharge.      Pupils: Pupils are equal, round, and reactive to light.   Neck:      Thyroid: No thyromegaly.      Vascular: No JVD.      Trachea: No tracheal deviation.   Cardiovascular:      Rate and Rhythm: Regular rhythm. Tachycardia present.      Heart sounds: Normal heart sounds, S1 normal and S2 normal. No murmur heard.     Comments: Sternotomy site C/D/I; healing well, no erythema drainage  Pulmonary:      Effort: Pulmonary effort is  "normal. No respiratory distress.      Breath sounds: Normal breath sounds. No wheezing or rales.   Abdominal:      General: There is no distension.      Tenderness: There is no rebound.   Musculoskeletal:      Cervical back: Neck supple.      Right lower leg: No edema.      Left lower leg: No edema.   Skin:     General: Skin is warm and dry.      Findings: No erythema.   Neurological:      General: No focal deficit present.      Mental Status: He is alert and oriented to person, place, and time.   Psychiatric:         Mood and Affect: Mood normal.         Behavior: Behavior normal.         Thought Content: Thought content normal.            Significant Labs: CMP   Recent Labs   Lab 09/27/23  0512 09/28/23  0522    139   K 4.5 4.1    108   CO2 22* 23   * 113*   BUN 10 12   CREATININE 0.9 0.9   CALCIUM 9.3 9.0   ANIONGAP 13 8   , CBC   Recent Labs   Lab 09/27/23  0512 09/28/23  0522   WBC 9.63 8.48   HGB 9.3* 9.0*   HCT 28.6* 28.0*    452*   , Troponin No results for input(s): "TROPONINI" in the last 48 hours., and All pertinent lab results from the last 24 hours have been reviewed.    Significant Imaging: Echocardiogram: Transthoracic echo (TTE) complete ([facility] Only):   Results for orders placed or performed during the hospital encounter of 09/17/23   Echo   Result Value Ref Range    BSA 1.98 m2    LVOT stroke volume 82.76 cm3    LVIDd 5.22 3.5 - 6.0 cm    LV Systolic Volume 86.21 mL    LV Systolic Volume Index 44.2 mL/m2    LVIDs 4.37 (A) 2.1 - 4.0 cm    LV Diastolic Volume 130.82 mL    LV Diastolic Volume Index 67.09 mL/m2    IVS 0.88 0.6 - 1.1 cm    LVOT diameter 1.91 cm    LVOT area 2.9 cm2    FS 16 (A) 28 - 44 %    Left Ventricle Relative Wall Thickness 0.38 cm    Posterior Wall 0.98 0.6 - 1.1 cm    LV mass 177.53 g    LV Mass Index 91 g/m2    MV Peak E Bob 1.20 m/s    TDI LATERAL 0.06 m/s    TDI SEPTAL 0.08 m/s    E/E' ratio 17.14 m/s    MV Peak A Bob 1.14 m/s    TR Max Bob 2.33 m/s    " E/A ratio 1.05     IVRT 72.31 msec    E wave deceleration time 200.37 msec    LV SEPTAL E/E' RATIO 15.00 m/s    LV LATERAL E/E' RATIO 20.00 m/s    LVOT peak bob 1.38 m/s    Left Ventricular Outflow Tract Mean Velocity 1.08 cm/s    Left Ventricular Outflow Tract Mean Gradient 4.99 mmHg    LA size 3.79 cm    Left Atrium Minor Axis 4.68 cm    Left Atrium Major Axis 4.27 cm    RVOT peak VTI 10.8 cm    TAPSE 1.84 cm    RA Major Axis 3.31 cm    AV regurgitation pressure 1/2 time [id number] ms    AR Max Bob 4.34 m/s    AV mean gradient 14 mmHg    AV peak gradient 26 mmHg    Ao peak bob 2.54 m/s    Ao VTI 54.00 cm    LVOT peak VTI 28.90 cm    AV valve area 1.53 cm²    AV Velocity Ratio 0.54     AV index (prosthetic) 0.54     SARAH by Velocity Ratio 1.56 cm²    Mr max bob 4.42 m/s    MV stenosis pressure 1/2 time 58.11 ms    MV valve area p 1/2 method 3.79 cm2    TV mean gradient 19 mmHg    Triscuspid Valve Regurgitation Peak Gradient 22 mmHg    PV mean gradient 1 mmHg    RVOT peak bob 0.58 m/s    Ao root annulus 2.71 cm    STJ 2.59 cm    Ascending aorta 2.36 cm    IVC diameter 1.57 cm    Mean e' 0.07 m/s    ZLVIDS 1.94     ZLVIDD -0.63     LA Volume Index 22.9 mL/m2    LA volume 44.60 cm3    LA WIDTH 3.1 cm    RA Width 2.2 cm    TV resting pulmonary artery pressure 25 mmHg    RV TB RVSP 5 mmHg    Est. RA pres 3 mmHg    Narrative      Left Ventricle: The left ventricle is normal in size. Ventricular mass   is normal. Normal wall thickness. regional wall motion abnormalities   present. There is mildly reduced systolic function with a visually   estimated ejection fraction of 40 - 45%. Grade II diastolic dysfunction.    Right Ventricle: Normal right ventricular cavity size. Wall thickness   is normal. Right ventricle wall motion  is normal. Systolic function is   normal.    Aortic Valve: There is mild to moderate aortic regurgitation.    Pulmonary Artery: The estimated pulmonary artery systolic pressure is   25 mmHg.     IVC/SVC: Normal venous pressure at 3 mmHg.      and EKG: Reviewed

## 2023-09-28 NOTE — PLAN OF CARE
Problem: Adjustment to Illness (Acute Coronary Syndrome)  Goal: Optimal Adaptation to Illness  Outcome: Ongoing, Progressing     Problem: Dysrhythmia (Acute Coronary Syndrome)  Goal: Normalized Cardiac Rhythm  Outcome: Ongoing, Progressing     Problem: Cardiac-Related Pain (Acute Coronary Syndrome)  Goal: Absence of Cardiac-Related Pain  Outcome: Ongoing, Progressing     Problem: Hemodynamic Instability (Acute Coronary Syndrome)  Goal: Effective Cardiac Pump Function  Outcome: Ongoing, Progressing     Problem: Tissue Perfusion (Acute Coronary Syndrome)  Goal: Adequate Tissue Perfusion  Outcome: Ongoing, Progressing     Problem: Arrhythmia/Dysrhythmia (Cardiac Catheterization)  Goal: Stable Heart Rate and Rhythm  Outcome: Ongoing, Progressing     Problem: Bleeding (Cardiac Catheterization)  Goal: Absence of Bleeding  Outcome: Ongoing, Progressing

## 2023-09-28 NOTE — PROGRESS NOTES
Clinical Pharmacy Progress Note: Coumadin Dosing and Monitoring     Indication: mechanical AVR  Goal INR: 2-3     Lab Results   Component Value Date    INR 2.3 (H) 09/28/2023    INR 1.6 (H) 09/27/2023    INR 1.2 09/26/2023     INR: 2.3, increased from 1.6 (0.7 increment increase)-- INR is therapeutic  H/H: 9.0/28.0, stable  Platelets: 452, stable    Patient has been educated by pharmacist this admission.      Recent dosing history:   2.5 mg on 9/20 & 9/21-- INR increased from 1.5 to 3.4 on 9/22 so dose was held.   1 mg on 9/23 & 9/24.  2.5 mg on 9/25 -- this resulted in no change in INR  5 mg given on 9/26/23-- INR increased appropriately to 1.6  3 mg given on 9/27/23     Potential Drug interactions: Melatonin, Aspirin, Tylenol, and Tramadol each may increase the risk of bleeding while on warfarin    Lab order for daily PT/INR? Yes  Coumadin diet ordered? Yes    Currently on bridge therapy with heparin drip minimal intensity nomogram     Plan:   - Since INR increased by more than 0.5 increment in 24 hours, will give a lower dose of warfarin today.  - Give warfarin 1 mg today.   - Pharmacy will continue to monitor daily PT/INR. Dose adjustments will be made accordingly.      Thank you for allowing us to participate in this patient's care.      Morena Tabor PharmD 09/28/2023 10:49 AM

## 2023-09-28 NOTE — PLAN OF CARE
Tolerated intervention well. Ambulating >1000ft independently. Continued OT intervention not warranted. Recommending home at d/c.

## 2023-09-28 NOTE — SUBJECTIVE & OBJECTIVE
Interval History: The patient is postop day 9 status post coronary artery bypass grafting x5 and aortic valve replacement with a 19 mm [place] mechanical valve.    ROS  Medications:  Continuous Infusions:   heparin (porcine) in D5W 13 Units/kg/hr (09/28/23 0222)     Scheduled Meds:   ascorbic acid (vitamin C)  500 mg Oral BID    aspirin  81 mg Oral Daily    atorvastatin  80 mg Oral Daily    cyanocobalamin  1,000 mcg Oral Daily    docusate sodium  100 mg Oral BID    ferrous sulfate  1 tablet Oral Daily    folic acid  1 mg Oral Daily    guaiFENesin  1,200 mg Oral BID    magnesium hydroxide 400 mg/5 ml  5 mL Oral BID    metoprolol tartrate  50 mg Oral BID    pantoprazole  40 mg Oral Daily    polyethylene glycol  17 g Oral Daily    warfarin  1 mg Oral Once    [START ON 9/29/2023] warfarin  2 mg Oral Daily     PRN Meds:0.9%  NaCl infusion (for blood administration), acetaminophen, albumin human 5%, aluminum-magnesium hydroxide-simethicone, calcium gluconate IVPB, calcium gluconate IVPB, calcium gluconate IVPB, dextrose 10%, glucagon (human recombinant), influenza, insulin aspart U-100, lactated ringers, magnesium sulfate IVPB, melatonin, metoclopramide HCl, ondansetron, pneumoc 20-leslee conj-dip cr(PF), potassium chloride in water, potassium chloride in water, potassium chloride in water, traMADoL     Objective:     Vital Signs (Most Recent):  Temp: 98.6 °F (37 °C) (09/28/23 1126)  Pulse: 92 (09/28/23 1126)  Resp: 18 (09/28/23 1126)  BP: 121/63 (09/28/23 1126)  SpO2: 97 % (09/28/23 1126) Vital Signs (24h Range):  Temp:  [97.8 °F (36.6 °C)-99.7 °F (37.6 °C)] 98.6 °F (37 °C)  Pulse:  [] 92  Resp:  [16-18] 18  SpO2:  [96 %-98 %] 97 %  BP: (107-152)/(63-75) 121/63     Weight: 78.3 kg (172 lb 9.9 oz)  Body mass index is 26.25 kg/m².    SpO2: 97 %       Intake/Output - Last 3 Shifts         09/26 0700 09/27 0659 09/27 0700 09/28 0659 09/28 0700 09/29 0659    P.O. 740      I.V. (mL/kg) 386.4 (5.1)      Total  Intake(mL/kg) 1126.4 (14.9)      Net +1126.4             Urine Occurrence 3 x 2 x     Stool Occurrence 1 x              Lines/Drains/Airways       Peripherally Inserted Central Catheter Line  Duration             PICC Double Lumen 09/21/23 0930 left basilic 7 days                     Physical Exam  Constitutional:       Appearance: Normal appearance.   HENT:      Head: Normocephalic and atraumatic.      Mouth/Throat:      Mouth: Mucous membranes are moist.      Pharynx: Oropharynx is clear.   Cardiovascular:      Rate and Rhythm: Normal rate and regular rhythm.      Heart sounds: Normal heart sounds.   Pulmonary:      Effort: Pulmonary effort is normal.      Breath sounds: Normal breath sounds.   Abdominal:      General: Abdomen is flat.      Palpations: Abdomen is soft.   Musculoskeletal:      Right lower leg: No edema.      Left lower leg: No edema.   Skin:     General: Skin is warm and dry.   Neurological:      Mental Status: He is alert and oriented to person, place, and time.   Psychiatric:         Mood and Affect: Mood normal.         Behavior: Behavior normal.            Significant Labs:  All pertinent labs from the last 24 hours have been reviewed.    Significant Diagnostics:  I have reviewed all pertinent imaging results/findings within the past 24 hours.

## 2023-09-28 NOTE — PT/OT/SLP PROGRESS
"Occupational Therapy   Treatment and Discharge    Name: Mikie Alcazar  MRN: [id number]  Admitting Diagnosis:  NSTEMI (non-ST elevated myocardial infarction)  9 Days Post-Op    Recommendations:     Discharge Recommendations: home  Discharge Equipment Recommendations:  shower chair  Barriers to discharge:  None    Assessment:     Mikie Alcazar is a 43 y.o. male with a medical diagnosis of NSTEMI (non-ST elevated myocardial infarction).      Patient has returned to baseline independent functional status and he is compliant with sternal precautions. Continued skilled OT intervention in acute or post acute not warranted.    Plan:     Discharge OT.  Plan of Care Reviewed with: patient, mother    Subjective     Chief Complaint: Reported "I have been moving around in my room all morning."  Patient/Family Comments/goals: none reported  Pain/Comfort:  Pain Rating 1: 0/10  Pain Rating Post-Intervention 1: 0/10    Objective:     Communicated with: NurseJessica, prior to session.  Patient found up in chair with telemetry, PICC line upon OT entry to room.    General Precautions: Standard, sternal    Orthopedic Precautions:N/A  Braces: N/A  Respiratory Status: Room air     Occupational Performance:     Functional Mobility/Transfers:  Patient completed Sit <> Stand Transfer with independence  with  no assistive device   Patient completed Bed <> Chair Transfer using Step Transfer technique with independence with no assistive device  Functional Mobility: Patient completed >1000ft functional mobility, independently, to increase activity tolerance. Mild tachycardia to 120s while ambulating, but recovers well with seated rest break. Nurse aware.    Activities of Daily Living:  Toileting: modified independence bathroom commode    [facility] 6 Click ADL: 24    Treatment & Education:  Patient has returned to baseline independent functional status. Demonstrates 100% functional compliance with sternal precautions and voices excellent " understanding of their functional implications and adaptations to tasks to remain within their constraints. Completed x10 reps sit>stand independently, and without difficulty. Continued skilled intervention via OT not warranted. Encouraged continued ambulation 3x per day while in acute and Q hour upon d/c home. Patient stated understanding, in agreement with POC, and in agreement to continue HEP.    Patient left up in chair with all lines intact and call button in reach    GOALS:   Multidisciplinary Problems       Occupational Therapy Goals          Problem: Occupational Therapy    Goal Priority Disciplines Outcome Interventions   Occupational Therapy Goal     OT, PT/OT Ongoing, Progressing    Description: Goals to be met by: 10/6/23     Patient will increase functional independence with ADLs by performing:    Toileting from toilet with Supervision for hygiene and clothing management.   Toilet transfer to toilet with Supervision  Demonstrates 100% functional compliance with sternal precautions.                         Time Tracking:     OT Date of Treatment: 09/28/23  OT Start Time: 0800  OT Stop Time: 0825  OT Total Time (min): 25 min    Billable Minutes:Therapeutic Activity 25    Kelsi Schulz OT  9/28/2023

## 2023-09-28 NOTE — PLAN OF CARE
09/28/23 1506   Discharge Reassessment   Assessment Type Discharge Planning Reassessment   Did the patient's condition or plan change since previous assessment? No   Communicated JOLENE with patient/caregiver Date not available/Unable to determine   Discharge Plan A Home Health   Discharge Plan B Home with family   DME Needed Upon Discharge  none   Transition of Care Barriers None   Why the patient remains in the hospital Requires continued medical care       Patient still has heprin drip, attempting to get INR in agreeable range. Patient's INR needs to be 2.5-3.0 and Patient's is 2.3. Patient possibly could DC tomorrow, therapy recommended HH.   SW will meet at bedside tomorrow to discuss HH for Patient.     SW will continue to follow and assist as needed.

## 2023-09-28 NOTE — PROGRESS NOTES
[facility] - Telemetry ([facility])  Cardiothoracic Surgery  Progress Note    Patient Name: Mikie Alcazar  MRN: [id number]  Admission Date: 9/17/2023  Hospital Length of Stay: 11 days  Code Status: Full Code   Attending Physician: Nishant Douglas MD   Referring Provider: Self, Aaareferral  Principal Problem:NSTEMI (non-ST elevated myocardial infarction)            Subjective:     Post-Op Info:  Procedure(s) (LRB):  CORONARY ARTERY BYPASS GRAFT (CABG) (N/A)  REPLACEMENT-VALVE-AORTIC (N/A)  SURGICAL PROCUREMENT, VEIN, ENDOSCOPIC (Left)  BLOCK, NERVE, INTERCOSTAL, 2 OR MORE (N/A)  ECHOCARDIOGRAM,TRANSESOPHAGEAL (N/A)   9 Days Post-Op     Interval History: The patient is postop day 9 status post coronary artery bypass grafting x5 and aortic valve replacement with a 19 mm Saint Dany mechanical valve.    ROS  Medications:  Continuous Infusions:   heparin (porcine) in D5W 13 Units/kg/hr (09/28/23 0222)     Scheduled Meds:   ascorbic acid (vitamin C)  500 mg Oral BID    aspirin  81 mg Oral Daily    atorvastatin  80 mg Oral Daily    cyanocobalamin  1,000 mcg Oral Daily    docusate sodium  100 mg Oral BID    ferrous sulfate  1 tablet Oral Daily    folic acid  1 mg Oral Daily    guaiFENesin  1,200 mg Oral BID    magnesium hydroxide 400 mg/5 ml  5 mL Oral BID    metoprolol tartrate  50 mg Oral BID    pantoprazole  40 mg Oral Daily    polyethylene glycol  17 g Oral Daily    warfarin  1 mg Oral Once    [START ON 9/29/2023] warfarin  2 mg Oral Daily     PRN Meds:0.9%  NaCl infusion (for blood administration), acetaminophen, albumin human 5%, aluminum-magnesium hydroxide-simethicone, calcium gluconate IVPB, calcium gluconate IVPB, calcium gluconate IVPB, dextrose 10%, glucagon (human recombinant), influenza, insulin aspart U-100, lactated ringers, magnesium sulfate IVPB, melatonin, metoclopramide HCl, ondansetron, pneumoc 20-leslee conj-dip cr(PF), potassium chloride in water, potassium chloride in water, potassium chloride  in water, traMADoL     Objective:     Vital Signs (Most Recent):  Temp: 98.6 °F (37 °C) (09/28/23 1126)  Pulse: 92 (09/28/23 1126)  Resp: 18 (09/28/23 1126)  BP: 121/63 (09/28/23 1126)  SpO2: 97 % (09/28/23 1126) Vital Signs (24h Range):  Temp:  [97.8 °F (36.6 °C)-99.7 °F (37.6 °C)] 98.6 °F (37 °C)  Pulse:  [] 92  Resp:  [16-18] 18  SpO2:  [96 %-98 %] 97 %  BP: (107-152)/(63-75) 121/63     Weight: 78.3 kg (172 lb 9.9 oz)  Body mass index is 26.25 kg/m².    SpO2: 97 %       Intake/Output - Last 3 Shifts         09/26 0700  09/27 0659 09/27 0700 09/28 0659 09/28 0700  09/29 0659    P.O. 740      I.V. (mL/kg) 386.4 (5.1)      Total Intake(mL/kg) 1126.4 (14.9)      Net +1126.4             Urine Occurrence 3 x 2 x     Stool Occurrence 1 x              Lines/Drains/Airways       Peripherally Inserted Central Catheter Line  Duration             PICC Double Lumen 09/21/23 0930 left basilic 7 days                     Physical Exam  Constitutional:       Appearance: Normal appearance.   HENT:      Head: Normocephalic and atraumatic.      Mouth/Throat:      Mouth: Mucous membranes are moist.      Pharynx: Oropharynx is clear.   Cardiovascular:      Rate and Rhythm: Normal rate and regular rhythm.      Heart sounds: Normal heart sounds.   Pulmonary:      Effort: Pulmonary effort is normal.      Breath sounds: Normal breath sounds.   Abdominal:      General: Abdomen is flat.      Palpations: Abdomen is soft.   Musculoskeletal:      Right lower leg: No edema.      Left lower leg: No edema.   Skin:     General: Skin is warm and dry.   Neurological:      Mental Status: He is alert and oriented to person, place, and time.   Psychiatric:         Mood and Affect: Mood normal.         Behavior: Behavior normal.            Significant Labs:  All pertinent labs from the last 24 hours have been reviewed.    Significant Diagnostics:  I have reviewed all pertinent imaging results/findings within the past 24 hours.    Assessment/Plan:      S/P CABG x 5  09/20/2023  The patient is postop day 1 status post coronary artery bypass grafting x5 and aortic valve replacement with a 19 mm Saint Dany mechanical valve.  Overall the patient is doing well.      Neuro:  Patient is awake appropriate and follows commands.  Patient is currently sedated with Precedex.  Will wean Precedex to off.   Cardiac:  Patient is hemodynamically stable.  With excellent cardiac output and cardiac index.  Patient is on low-dose vaso active meds.  Wean drips to off.  Will start metoprolol once drips are off.  Respiratory:  Patient is being weaned to extubation.  Patient is an active smoker Start nebs and Mucomyst.  Continue pulmonary toileting.  Continue incentive spirometer.    GI:  Patient is currently NPO.  Will start p.o. intake once extubated.    Renal:  Patient has good urine output and creatinine is 1.2.  Will start Lasix for diuresis.  Endocrine:  Patient required insulin drip for glucose controlled.  Will convert to sliding scale.    Id:  Patient had a T-max of 102°.  Most likely due to stress of surgery.  Will continue to follow.  White count is 9.  Patient is on manny op antibiotics.  Heme: Hematocrit is 17.  Patient is being transfused.  Platelet count is 74.  No evidence of active bleeding.  Will follow platelet count.  Will start patient on Coumadin anticoagulation for mechanical valve.  Activities:  Patient is currently in bed.  Will advance activities as tolerated once extubated.  Line tubes and drains:  Patient has an ETT, right IJ Fields and Cordis, right groin A-line, chest tubes, Ugalde catheter, pacer wires, and saphenectomy site DARON.    09/21/2023     The patient is postop day 2 status post coronary artery bypass grafting x5 and aortic valve replacement with a 19 mm Saint Dany mechanical valve.  Overall the patient is doing well.    Neuro:  Patient is awake alert and oriented x3.  Pain is well controlled with current pain management.  Cardiac:  The patient is off  all drips.  Patient is hemodynamically stable.  Continue metoprolol.  Respiratory:  Patient was extubated yesterday.  Continue pulmonary toileting.  Continue incentive spirometer.   GI:  Patient is tolerating p.o. intake.  Patient had episodes of nausea yesterday which have abated.  Advance patient's diet as tolerated.    Renal:  Patient has good urine output.  Creatinine is 0.9.  Continue Lasix for diuresis.  Endocrine:  Glucose is controlled with sliding scale insulin.    Id:  T-max is 101.8 patient is currently afebrile.  White count is 17.  Most likely due to stress of surgery.  Continue to follow.  Heme:  Hematocrit is 24.5.  Platelet count is 89.  INR is 1.5.  Patient is started on Coumadin for anticoagulation of mechanical valve.  Activities:  Patient will be out of bed to chair.  Advance activities as tolerated.  Line tubes and drains:  Patient has pacer wires, right IJ Cordis and Ugalde catheter.  Will place PICC line and discontinue Cordis.    09/22/2023   The patient is postop day 3 status post coronary artery bypass grafting x5 and aortic valve replacement with a 19 mm Saint Dany mechanical valve.  Overall the patient is doing well.  Patient has been transferred to telemetry.    Neuro:  Patient is awake alert and oriented x3.  Pain is well controlled with current pain regimen.    Cardiac:  Patient is hemodynamically stable.  Continue metoprolol.    Respiratory:  Continue pulmonary toileting.  Continue incentive spirometer.    GI:  Patient is tolerating p.o. intake.  And tolerating a regular diet.    Renal:  Patient has good urine output.  Creatinine is 0.9.  Continue Lasix.    Endocrine:  Glucose is well controlled.    Id:  Patient is afebrile.  White count is 16.  Continue to follow.    Heme:  Hematocrit is 24 and platelet count is 114.  INR is 3.4.  Patient is currently on Coumadin for anticoagulation.  Activities:  Patient is out of bed and ambulating in the hallways.  Continue to advance as  tolerated.  Line tubes and drains:  Patient has a PICC line and pacer wires.    09/23/2023   The patient is postop day 4 status post coronary artery bypass grafting x5 and aortic valve replacement with a 19 mm Saint Dany mechanical valve.  Overall the patient is doing well.  Anticipate discharge to home with home health in the next 48-72 hours.    Neuro:  Patient is awake alert and oriented x3 pain is well controlled with pain management.  Neuro exam is nonfocal.    Cardiac:  Patient is hemodynamically stable.  Continue metoprolol.    Respiratory:  Continue pulmonary toileting.  Continue incentive spirometer.  GI patient is tolerating p.o. intake.  Will increase bowel regimen for bowel movements.  Renal: Patient has good urine output.  Creatinine is 0.9.  Patient is still appears fluid overloaded.  Continue Lasix.  Endocrine: Glucose is well controlled.    Id:  Patient is afebrile.  White count is down to 12.  Continue to follow.    Heme:  Hematocrit is 27.  Platelet count is 186 and INR is 1.9.  INR goal is between 2.5 and 3.  Continue Coumadin.    Activities:  Patient is out of bed and ambulating in the hallways.  Advance as tolerated.  Line tubes and drains:  Patient has a PICC line.      09/24/2023   The patient is postop day 5 status post coronary artery bypass grafting x5 and aortic valve replacement with a 19 mm Saint Dany mechanical valve.  Overall the patient is doing well.  Anticipate discharge home in the next 24-48 hours.    Neuro:  Patient is awake alert and oriented x3 pain is well controlled with current pain management.  Neuro exam is nonfocal.    Cardiac:  Patient is hemodynamically stable.  Continue metoprolol  Respiratory:  Continue pulmonary toileting.  Continue incentive spirometer.  Patient continues to have sputum production.  GI:  Patient is tolerating a regular diet.  Patient has had bowel movements.  Renal: Patient has good urine output.  Creatinine is stable.  Patient is appears to be  euvolemic.  Will discontinue Lasix.    Endocrine:  Glucose is well controlled.  Id: Patient is afebrile white count normal.  Heme:  Hematocrit is 27 and platelet count is 229.  INR is subtherapeutic 1.5.  Continue Coumadin.  Goal is a INR level of 2.5-3.  Activities: Patient is out of bed and ambulating in the hallways.  Advance as tolerated.    Line tubes and drains:  Patient has a PICC line.    09/25/2023   The patient is postop day 6 status post coronary artery bypass grafting x5 and aortic valve replacement with a 19 mm Saint Dany mechanical valve.  Overall the patient is doing well.  Patient is awaiting attainment of a therapeutic INR.  Anticipate discharge in the next 48-72 hours.    Neuro:  Patient is awake alert and oriented x3.  Pain is well controlled current pain management.  Neuro exam is nonfocal.    Cardiac:  Patient is stable.  Continue metoprolol.    Respiratory:  Patient has good sats on room air.  Continue pulmonary toileting.  Continue incentive spirometer.    GI:  Patient is tolerating a regular diet having bowel movements.    Renal:  Patient has good urine output.    Endocrine:  Glucose is well controlled.    Id:  Patient is afebrile white count is normal.    Heme:  Hematocrit is 27 and platelet count is 298.  INR is now normal at 1.2.  Patient is on bridging heparin.  Will titrate heparin to an APTT greater between 40 and 50.  Therapeutic goal of INR is 2.5-3.  Activities: Patient is out of bed and ambulating in the hallways.    Line tubes and drains:  Patient has a PICC line.    09/26/2023   The patient is postop day 7 status post coronary artery bypass grafting x5 and aortic valve replacement with a 19 mm Saint Dany mechanical valve.  Overall the patient is doing well.  Patient's INR is still subtherapeutic.  Patient is on heparin in the interim.  Anticipate discharge in the next 48-72 hours.  Neuro:  Patient is awake alert and oriented x3.  Pain is well controlled with current pain  management.  Neuro exam is nonfocal.    Cardiac:  Patient remains stable.  Continue metoprolol.    Respiratory: Patient has good sats on room air.  Continue pulmonary toileting.  Continue incentive spirometer.    GI:  Patient is tolerating a regular diet and having bowel movements.    Renal:  Patient has good urine output.  Endocrine:  Glucose is well controlled.    Id:  Patient's white count is normal.    Heme: Hematocrit is 28 and INR is 1.2 patient is on a heparin drip.  Coumadin dose has been increased.  INR goal is 2.5-3.  Activities:  Patient is out of bed and ambulating in the hallways.    Line tubes and drains:  Patient has a PICC line.    09/27/2023   The patient is postop [id number] post coronary artery bypass grafting x5 and aortic valve replacement with a 19 mm Saint Dany mechanical valve.  Overall the patient is doing well.  Patient is awaiting INR being therapeutic.  Anticipate discharge in the next 48-72 hours.  Neuro:  Patient is awake alert and oriented x3.  Pain is well controlled with current pain management.  Neuro exam is nonfocal.    Cardiac:  Patient's blood pressure is trending higher.  Will increase metoprolol to 50 mg b.i.d..  Respiratory: Patient has good sats on room air.  Continue pulmonary toileting.  Continue incentive spirometer.  GI:  Patient is tolerating a regular diet and having bowel movements.    Renal:  Patient has good urine output.    Endocrine:  Glucose is well controlled.  Id:  Patient is afebrile white count is normal.    Heme:  Hematocrit is 28.6 and platelet count is 428.  INR is 1.6.  A PTT is therapeutic at 40.  INR goal is 2.5-3.  Activities:  Patient is out of bed and ambulating in the hallways.  Advance as tolerated.    Line tubes and drains:  Patient has a PICC line.    09/28/2023   The patient is postop day 9 status post coronary artery bypass grafting x5 and aortic valve replacement with a 19 mm Saint Dany mechanical valve.  Overall the patient is doing well.   INR is 2.3 today.  Anticipate discharge that is 4872 hours.    Neuro:  Patient is awake alert and oriented x3.  Pain is well controlled.  Neuro exam is nonfocal.    Cardiac:  Patient is hemodynamically stable.  Continue metoprolol 50 mg b.i.d..    Respiratory: Patient has good sats on room air.  Continue pulmonary toileting.  Continue incentive spirometer.    GI:  Patient is tolerating a regular diet and having bowel movements.    Renal: Patient has good urine output.    Endocrine:  Glucose is well controlled.    Id:  Patient is afebrile white count is 8   Heme:  Hematocrit is 28.  INR is 2.3.  Platelet count is 452.  Continue Coumadin.  INR goal is 2.5-3.0.  Patient is currently on bridging heparin.  Activities:  Patient is out of bed and ambulating.  Advance as tolerated.    Line tubes and drains:  Patient has a PICC line.      CAD, multiple vessel  The patient is a 43-year-old male with critical left main coronary artery disease and severe aortic regurgitation by cardiac catheterization.  The patient is a candidate for urgent coronary artery bypass grafting and aortic valve replacement.  The risks and benefits of surgery were explained to the patient.  The patient understands the risks and benefits of surgery and has agreed to proceed with urgent aortic valve replacement and coronary artery bypass grafting.  The type of aortic valve to be used was discussed with the patient.  The patient stated a preference for a bioprosthetic valve in order to avoid the need for chronic anticoagulation.  The patient understands that a bioprosthetic valve especially in a young patient has a limited lifespan.        Nishant Douglas MD  Cardiothoracic Surgery  [facility]

## 2023-09-28 NOTE — PROGRESS NOTES
[facility] - Telemetry ([facility])  Cardiology  Progress Note    Patient Name: Mikie Alcazar  MRN: [id number]  Admission Date: 9/17/2023  Hospital Length of Stay: 11 days  Code Status: Full Code   Attending Physician: Nishant Douglas MD   Primary Care Physician: Lissette, Primary Doctor  Expected Discharge Date:   Principal Problem:NSTEMI (non-ST elevated myocardial infarction)    Subjective:     Hospital Course:   Cardiology consulted to assist with management. Pt seen and examined today, resting in bed mother at bedside. He reports his pain started 6+ months ago and worsening in the last few weeks happening daily with associated SOB, dizziness radiating to left arm. He reports his pain is sharp and pressure-like at times. Pt reports daily use a marijuana, h/o cocaine and other drugs 5-10 years ago. Drinks occasionally. Echo pending cont hep gtt    09/19/2023. Admitted for NSTEMI/ACS. Echo showed RWMA  The cath done yesterday showed   Severe lmca disease with timi1 flow in lad   Lcx ostial 50%   Rca prox 80%   Ef 50%   Severe AI  09/19/23 s/p CABG x5 and aortic valve replacement with a 19 mm [place] mechanical valve by Dr. Douglas.  In ICU and intubated. Om epi and Vasopressin gtt.    9/20/23  Pt seen and examined today, POD 1 CABGx5 pt still intubated on exam this am, weaning epi. Labs reviewed, H/H 6.0 and 17.1. Plans to extubated today    9/21/23 Pt seen and examined today extubated feels ok chest soreness, chest tubes removed. Labs reviewed, chart reviewed.    9/22/23 Pt seen and examined today, sitting up in bedside chair. Feels good. Walked with PT/OT. Labs reviewed, chart reviewed    09/23 ambulating well. No chest pain dyspnea, the lab reviewed.     09/24. On coumadin rx and heparin bridge. Non chest pain dyspnea, the lab reviewed. VSS    9/25/23-Patient seen and examined today, resting in bed. No AEON. Pain controlled. Working with PT/OT. Labs stable. INR 1.2, on heparin bridge.    9/26/23-Patient seen and  examined today, sitting up in bedside chair. Feels ok. More fatigued/weak today. Pain controlled. Labs stable. INR 1.2, remains on heparin bridge.    9/27/23-Patient seen and examined today, resting in bed. Feeling better today. Worked with PT/OT earlier. No AEON. Labs reviewed, INR 1.6.     9/28/23-Patient seen and examined today, resting in bed. Ambulating well. Pain controlled. INR 2.3.           Review of Systems   Constitutional: Positive for malaise/fatigue.   HENT: Negative.     Eyes: Negative.    Cardiovascular:  Positive for chest pain (incisional (controlled)).   Respiratory: Negative.     Endocrine: Negative.    Hematologic/Lymphatic: Negative.    Skin: Negative.    Musculoskeletal: Negative.    Gastrointestinal: Negative.    Genitourinary: Negative.    Neurological: Negative.    Psychiatric/Behavioral: Negative.     Allergic/Immunologic: Negative.      Objective:     Vital Signs (Most Recent):  Temp: 98.8 °F (37.1 °C) (09/28/23 0912)  Pulse: 104 (apical) (09/28/23 0912)  Resp: 18 (09/28/23 0711)  BP: 128/70 (09/28/23 0711)  SpO2: 98 % (09/28/23 0711) Vital Signs (24h Range):  Temp:  [97.8 °F (36.6 °C)-99.7 °F (37.6 °C)] 98.8 °F (37.1 °C)  Pulse:  [] 104  Resp:  [16-18] 18  SpO2:  [96 %-98 %] 98 %  BP: (107-152)/(63-75) 128/70     Weight: 78.3 kg (172 lb 9.9 oz)  Body mass index is 26.25 kg/m².     SpO2: 98 %       No intake or output data in the 24 hours ending 09/28/23 0933    Lines/Drains/Airways       Peripherally Inserted Central Catheter Line  Duration             PICC Double Lumen 09/21/23 0930 left basilic 7 days                       Physical Exam  Vitals and nursing note reviewed.   Constitutional:       General: He is not in acute distress.     Appearance: Normal appearance. He is well-developed. He is not diaphoretic.   HENT:      Head: Normocephalic and atraumatic.   Eyes:      General:         Right eye: No discharge.         Left eye: No discharge.      Pupils: Pupils are equal,  "round, and reactive to light.   Neck:      Thyroid: No thyromegaly.      Vascular: No JVD.      Trachea: No tracheal deviation.   Cardiovascular:      Rate and Rhythm: Regular rhythm. Tachycardia present.      Heart sounds: Normal heart sounds, S1 normal and S2 normal. No murmur heard.     Comments: Sternotomy site C/D/I; healing well, no erythema drainage; mechanical click  Pulmonary:      Effort: Pulmonary effort is normal. No respiratory distress.      Breath sounds: Normal breath sounds. No wheezing or rales.   Abdominal:      General: There is no distension.      Tenderness: There is no rebound.   Musculoskeletal:      Cervical back: Neck supple.      Right lower leg: No edema.      Left lower leg: No edema.   Skin:     General: Skin is warm and dry.      Findings: No erythema.   Neurological:      General: No focal deficit present.      Mental Status: He is alert and oriented to person, place, and time.   Psychiatric:         Mood and Affect: Mood normal.         Behavior: Behavior normal.         Thought Content: Thought content normal.            Significant Labs: CMP   Recent Labs   Lab 09/27/23  0512 09/28/23  0522    139   K 4.5 4.1    108   CO2 22* 23   * 113*   BUN 10 12   CREATININE 0.9 0.9   CALCIUM 9.3 9.0   ANIONGAP 13 8   , CBC   Recent Labs   Lab 09/27/23  0512 09/28/23  0522   WBC 9.63 8.48   HGB 9.3* 9.0*   HCT 28.6* 28.0*    452*   , Troponin No results for input(s): "TROPONINI" in the last 48 hours., and All pertinent lab results from the last 24 hours have been reviewed.    Significant Imaging: Echocardiogram: Transthoracic echo (TTE) complete (Cupid Only):   Results for orders placed or performed during the hospital encounter of 09/17/23   Echo   Result Value Ref Range    BSA 1.98 m2    LVOT stroke volume 82.76 cm3    LVIDd 5.22 3.5 - 6.0 cm    LV Systolic Volume 86.21 mL    LV Systolic Volume Index 44.2 mL/m2    LVIDs 4.37 (A) 2.1 - 4.0 cm    LV Diastolic Volume " 130.82 mL    LV Diastolic Volume Index 67.09 mL/m2    IVS 0.88 0.6 - 1.1 cm    LVOT diameter 1.91 cm    LVOT area 2.9 cm2    FS 16 (A) 28 - 44 %    Left Ventricle Relative Wall Thickness 0.38 cm    Posterior Wall 0.98 0.6 - 1.1 cm    LV mass 177.53 g    LV Mass Index 91 g/m2    MV Peak E Bob 1.20 m/s    TDI LATERAL 0.06 m/s    TDI SEPTAL 0.08 m/s    E/E' ratio 17.14 m/s    MV Peak A Bob 1.14 m/s    TR Max Bob 2.33 m/s    E/A ratio 1.05     IVRT 72.31 msec    E wave deceleration time 200.37 msec    LV SEPTAL E/E' RATIO 15.00 m/s    LV LATERAL E/E' RATIO 20.00 m/s    LVOT peak bob 1.38 m/s    Left Ventricular Outflow Tract Mean Velocity 1.08 cm/s    Left Ventricular Outflow Tract Mean Gradient 4.99 mmHg    LA size 3.79 cm    Left Atrium Minor Axis 4.68 cm    Left Atrium Major Axis 4.27 cm    RVOT peak VTI 10.8 cm    TAPSE 1.84 cm    RA Major Axis 3.31 cm    AV regurgitation pressure 1/2 time [id number] ms    AR Max Bob 4.34 m/s    AV mean gradient 14 mmHg    AV peak gradient 26 mmHg    Ao peak bob 2.54 m/s    Ao VTI 54.00 cm    LVOT peak VTI 28.90 cm    AV valve area 1.53 cm²    AV Velocity Ratio 0.54     AV index (prosthetic) 0.54     SARAH by Velocity Ratio 1.56 cm²    Mr max bob 4.42 m/s    MV stenosis pressure 1/2 time 58.11 ms    MV valve area p 1/2 method 3.79 cm2    TV mean gradient 19 mmHg    Triscuspid Valve Regurgitation Peak Gradient 22 mmHg    PV mean gradient 1 mmHg    RVOT peak bob 0.58 m/s    Ao root annulus 2.71 cm    STJ 2.59 cm    Ascending aorta 2.36 cm    IVC diameter 1.57 cm    Mean e' 0.07 m/s    ZLVIDS 1.94     ZLVIDD -0.63     LA Volume Index 22.9 mL/m2    LA volume 44.60 cm3    LA WIDTH 3.1 cm    RA Width 2.2 cm    TV resting pulmonary artery pressure 25 mmHg    RV TB RVSP 5 mmHg    Est. RA pres 3 mmHg    Narrative      Left Ventricle: The left ventricle is normal in size. Ventricular mass   is normal. Normal wall thickness. regional wall motion abnormalities   present. There is mildly  reduced systolic function with a visually   estimated ejection fraction of 40 - 45%. Grade II diastolic dysfunction.    Right Ventricle: Normal right ventricular cavity size. Wall thickness   is normal. Right ventricle wall motion  is normal. Systolic function is   normal.    Aortic Valve: There is mild to moderate aortic regurgitation.    Pulmonary Artery: The estimated pulmonary artery systolic pressure is   25 mmHg.    IVC/SVC: Normal venous pressure at 3 mmHg.      and EKG: Reviewed    Assessment and Plan:   Patient who presents with multivessel CAD/severe AI recovering s/p CABG and AVR. Progressing well. Continue mgmt as per CTS. INR 2.3 today.    * NSTEMI (non-ST elevated myocardial infarction)  Troponin 0.6->-0.709->0.708  Cont hep gtt  EKG with ST T wave abnml  LHC planned for today  All risks, benefits and treatment alternatives explained, all questions answered. Pt agreeable to proceed.   NPO    9/20/23  S/P CABG x5 AVR    S/P AVR (aortic valve replacement)  09/24  On coumadin Rx and heparin gtt as bridge    9/28/23  -INR 2.3     S/P CABG x 5  Cont management per CTS    09/23   Continue asa statin plavix lasix and metoprolol  On coumadin for mechanical AVR    09/24  Continue ASA statin BB  Continue supportive care    9/25/23  -Progressing well  -Continue ASA, statin, BB  -IS usage and ambulation    9/26/23  -Stable overnight  -Continue ASA, statin, BB  -IS usage and ambulation    9/27/23  -No AEON  -Continue ASA, statin, BB  -IS usage   -Ambulating well    9/28/23  -Stable CV wise  -Continue ASA, statin, BB  -IS usage/ambulation    Nonrheumatic aortic valve insufficiency  -s/p mechanical AVR    Tobacco smoker, 1 pack of cigarettes or less per day  -Smoking cessation    CAD, multiple vessel  09/19/23  S/p CABG x5 and mechanical AVR today  -continue icu supportive care  -continue asa plavix statin BB and lasix  -will f/u    9/20/23  Cont ASA, plavix, statin, BB, lasix  Management per CTS    See plan  under CABG    Hypertension  stable        VTE Risk Mitigation (From admission, onward)         Ordered     warfarin (COUMADIN) tablet 2 mg  Daily         09/28/23 0922     warfarin (COUMADIN) tablet 1 mg  Once         09/28/23 0922     heparin 25,000 units in dextrose 5% 250 ml (100 units/mL) infusion MINIMAL INTENSITY nomogram - [facility]  Continuous        Question Answer Comment   Heparin Infusion Adjustment (DO NOT MODIFY ANSWER) [email or web address]\Images\[email or web address]\[email or web address]\heparin MINIMAL  INTENSITY nomogram for [facility] [id number]    Begin at (in units/kg/hr) 8        09/25/23 0855     IP VTE LOW RISK PATIENT  Once         09/17/23 1941                Natty Borrego PA-C  Cardiology  [facility] - Telemetry ([facility])

## 2023-09-28 NOTE — PT/OT/SLP PROGRESS
Physical Therapy  Treatment    Mikie Alcazar   MRN: [id number]   Admitting Diagnosis: NSTEMI (non-ST elevated myocardial infarction)    PT Received On: 09/28/23  PT Start Time: 0800     PT Stop Time: 0825    PT Total Time (min): 25 min       Billable Minutes:  Gait Training 15 and Therapeutic Activity 10    Treatment Type: Treatment  PT/PTA: PTA     Number of PTA visits since last PT visit: 1       General Precautions: Standard, sternal  Orthopedic Precautions: N/A  Braces: N/A  Respiratory Status: Room air    Spiritual, Cultural Beliefs, Bahai Practices, Values that Affect Care: no    Subjective:  Communicated with patient's nurse, Jessica, and completed Epic chart review prior to session.  Patient agreed to PT session.     Pain/Comfort  Pain Rating 1: 0/10  Pain Rating Post-Intervention 1: 0/10    Objective:   Patient found with: telemetry, PICC line    Patient found sitting up in chair.     Reviewed sternal precautions and re enforced importance of compliance.     STS from EOB: Independent    >1,000ft No AD Independent    Stand pivot T/F to chair No AD: Independent    Successive STS from chair No UE assist x10 reps: Independent    Educated patient on importance of increased tolerance to upright position and direct impact on CV endurance and strength. Patient encouraged to sit up in chair/ EOB, for a minimum of 2 consecutive hours, 3x per day. Encouraged patient to perform AROM TE to BLE throughout the day within all available planes of motion. Re enforced importance of utilizing call light to meet needs in room and not attempt to get up without staff assistance. Patient verbalized understanding and agreed to comply.       AM-PAC 6 CLICK MOBILITY  How much help from another person does this patient currently need?   1 = Unable, Total/Dependent Assistance  2 = A lot, Maximum/Moderate Assistance  3 = A little, Minimum/Contact Guard/Supervision  4 = None, Modified [place]/Independent    Turning over in bed  (including adjusting bedclothes, sheets and blankets)?: 4  Sitting down on and standing up from a chair with arms (e.g., wheelchair, bedside commode, etc.): 4  Moving from lying on back to sitting on the side of the bed?: 4  Moving to and from a bed to a chair (including a wheelchair)?: 4  Need to walk in hospital room?: 4  Climbing 3-5 steps with a railing?: 1 (NT)  Basic Mobility Total Score: 21    AM-PAC Raw Score CMS G-Code Modifier Level of Impairment Assistance   6 % Total / Unable   7 - 9 CM 80 - 100% Maximal Assist   10 - 14 CL 60 - 80% Moderate Assist   15 - 19 CK 40 - 60% Moderate Assist   20 - 22 CJ 20 - 40% Minimal Assist   23 CI 1-20% SBA / CGA   24 CH 0% Independent/ Mod I     Patient left up in chair with call button in reach and mother present.    Assessment:  Mikie Alcazar is a 43 y.o. male with a medical diagnosis of NSTEMI (non-ST elevated myocardial infarction) and presents with overall decline in functional mobility. Patient would continue to benefit from skilled PT to address functional limitations listed below in order to return to PLOF/decrease caregiver burden. At this time, patient has met all goals established within PT POC and is at an independent level of function. Plan to speak with [facility] PT RE: goals met.     Rehab identified problem list/impairments: impaired endurance, impaired functional mobility, decreased safety awareness, decreased coordination    Rehab potential is good.    Activity tolerance: Good    Discharge recommendations: home health PT      Barriers to discharge:      Equipment recommendations: shower chair     GOALS:   Multidisciplinary Problems       Physical Therapy Goals          Problem: Physical Therapy    Goal Priority Disciplines Outcome Goal Variances Interventions   Physical Therapy Goal     PT, PT/OT Ongoing, Progressing     Description: LTG'S TO BE MET IN 14 DAYS (10-6-23)  PT WILL BE ARELY FOR BED MOBILITY  PT WILL BE ARELY FOR BED<>CHAIR TF'S  PT WILL   FEET NO AD ARELY  PT WILL INC AMPAC SCORE BY 2 POINTS TO PROGRESS GROSS FUNC MOBILITY                         PLAN:    Patient to be seen 3 x/week to address the above listed problems via gait training, therapeutic activities, therapeutic exercises  Plan of Care expires: 10/06/23  Plan of Care reviewed with: patient         09/28/2023

## 2023-09-28 NOTE — ASSESSMENT & PLAN NOTE
Cont management per CTS    09/23   Continue asa statin plavix lasix and metoprolol  On coumadin for mechanical AVR    09/24  Continue ASA statin BB  Continue supportive care    9/25/23  -Progressing well  -Continue ASA, statin, BB  -IS usage and ambulation    9/26/23  -Stable overnight  -Continue ASA, statin, BB  -IS usage and ambulation    9/27/23  -No AEON  -Continue ASA, statin, BB  -IS usage   -Ambulating well    9/28/23  -Stable CV wise  -Continue ASA, statin, BB  -IS usage/ambulation

## 2023-09-28 NOTE — ASSESSMENT & PLAN NOTE
09/20/2023  The patient is postop day 1 status post coronary artery bypass grafting x5 and aortic valve replacement with a 19 mm Saint Dany mechanical valve.  Overall the patient is doing well.      Neuro:  Patient is awake appropriate and follows commands.  Patient is currently sedated with Precedex.  Will wean Precedex to off.   Cardiac:  Patient is hemodynamically stable.  With excellent cardiac output and cardiac index.  Patient is on low-dose vaso active meds.  Wean drips to off.  Will start metoprolol once drips are off.  Respiratory:  Patient is being weaned to extubation.  Patient is an active smoker Start nebs and Mucomyst.  Continue pulmonary toileting.  Continue incentive spirometer.    GI:  Patient is currently NPO.  Will start p.o. intake once extubated.    Renal:  Patient has good urine output and creatinine is 1.2.  Will start Lasix for diuresis.  Endocrine:  Patient required insulin drip for glucose controlled.  Will convert to sliding scale.    Id:  Patient had a T-max of 102°.  Most likely due to stress of surgery.  Will continue to follow.  White count is 9.  Patient is on manny op antibiotics.  Heme: Hematocrit is 17.  Patient is being transfused.  Platelet count is 74.  No evidence of active bleeding.  Will follow platelet count.  Will start patient on Coumadin anticoagulation for mechanical valve.  Activities:  Patient is currently in bed.  Will advance activities as tolerated once extubated.  Line tubes and drains:  Patient has an ETT, right IJ Dexter and Cordis, right groin A-line, chest tubes, Ugalde catheter, pacer wires, and saphenectomy site DARON.    09/21/2023     The patient is postop day 2 status post coronary artery bypass grafting x5 and aortic valve replacement with a 19 mm Saint Dany mechanical valve.  Overall the patient is doing well.    Neuro:  Patient is awake alert and oriented x3.  Pain is well controlled with current pain management.  Cardiac:  The patient is off all drips.   Patient is hemodynamically stable.  Continue metoprolol.  Respiratory:  Patient was extubated yesterday.  Continue pulmonary toileting.  Continue incentive spirometer.   GI:  Patient is tolerating p.o. intake.  Patient had episodes of nausea yesterday which have abated.  Advance patient's diet as tolerated.    Renal:  Patient has good urine output.  Creatinine is 0.9.  Continue Lasix for diuresis.  Endocrine:  Glucose is controlled with sliding scale insulin.    Id:  T-max is 101.8 patient is currently afebrile.  White count is 17.  Most likely due to stress of surgery.  Continue to follow.  Heme:  Hematocrit is 24.5.  Platelet count is 89.  INR is 1.5.  Patient is started on Coumadin for anticoagulation of mechanical valve.  Activities:  Patient will be out of bed to chair.  Advance activities as tolerated.  Line tubes and drains:  Patient has pacer wires, right IJ Cordis and Ugalde catheter.  Will place PICC line and discontinue Cordis.    09/22/2023   The patient is postop day 3 status post coronary artery bypass grafting x5 and aortic valve replacement with a 19 mm Saint Dany mechanical valve.  Overall the patient is doing well.  Patient has been transferred to telemetry.    Neuro:  Patient is awake alert and oriented x3.  Pain is well controlled with current pain regimen.    Cardiac:  Patient is hemodynamically stable.  Continue metoprolol.    Respiratory:  Continue pulmonary toileting.  Continue incentive spirometer.    GI:  Patient is tolerating p.o. intake.  And tolerating a regular diet.    Renal:  Patient has good urine output.  Creatinine is 0.9.  Continue Lasix.    Endocrine:  Glucose is well controlled.    Id:  Patient is afebrile.  White count is 16.  Continue to follow.    Heme:  Hematocrit is 24 and platelet count is 114.  INR is 3.4.  Patient is currently on Coumadin for anticoagulation.  Activities:  Patient is out of bed and ambulating in the hallways.  Continue to advance as tolerated.  Line tubes  and drains:  Patient has a PICC line and pacer wires.    09/23/2023   The patient is postop day 4 status post coronary artery bypass grafting x5 and aortic valve replacement with a 19 mm Saint Dany mechanical valve.  Overall the patient is doing well.  Anticipate discharge to home with home health in the next 48-72 hours.    Neuro:  Patient is awake alert and oriented x3 pain is well controlled with pain management.  Neuro exam is nonfocal.    Cardiac:  Patient is hemodynamically stable.  Continue metoprolol.    Respiratory:  Continue pulmonary toileting.  Continue incentive spirometer.  GI patient is tolerating p.o. intake.  Will increase bowel regimen for bowel movements.  Renal: Patient has good urine output.  Creatinine is 0.9.  Patient is still appears fluid overloaded.  Continue Lasix.  Endocrine: Glucose is well controlled.    Id:  Patient is afebrile.  White count is down to 12.  Continue to follow.    Heme:  Hematocrit is 27.  Platelet count is 186 and INR is 1.9.  INR goal is between 2.5 and 3.  Continue Coumadin.    Activities:  Patient is out of bed and ambulating in the hallways.  Advance as tolerated.  Line tubes and drains:  Patient has a PICC line.      09/24/2023   The patient is postop day 5 status post coronary artery bypass grafting x5 and aortic valve replacement with a 19 mm Saint Dany mechanical valve.  Overall the patient is doing well.  Anticipate discharge home in the next 24-48 hours.    Neuro:  Patient is awake alert and oriented x3 pain is well controlled with current pain management.  Neuro exam is nonfocal.    Cardiac:  Patient is hemodynamically stable.  Continue metoprolol  Respiratory:  Continue pulmonary toileting.  Continue incentive spirometer.  Patient continues to have sputum production.  GI:  Patient is tolerating a regular diet.  Patient has had bowel movements.  Renal: Patient has good urine output.  Creatinine is stable.  Patient is appears to be euvolemic.  Will  discontinue Lasix.    Endocrine:  Glucose is well controlled.  Id: Patient is afebrile white count normal.  Heme:  Hematocrit is 27 and platelet count is 229.  INR is subtherapeutic 1.5.  Continue Coumadin.  Goal is a INR level of 2.5-3.  Activities: Patient is out of bed and ambulating in the hallways.  Advance as tolerated.    Line tubes and drains:  Patient has a PICC line.    09/25/2023   The patient is postop day 6 status post coronary artery bypass grafting x5 and aortic valve replacement with a 19 mm Saint Dany mechanical valve.  Overall the patient is doing well.  Patient is awaiting attainment of a therapeutic INR.  Anticipate discharge in the next 48-72 hours.    Neuro:  Patient is awake alert and oriented x3.  Pain is well controlled current pain management.  Neuro exam is nonfocal.    Cardiac:  Patient is stable.  Continue metoprolol.    Respiratory:  Patient has good sats on room air.  Continue pulmonary toileting.  Continue incentive spirometer.    GI:  Patient is tolerating a regular diet having bowel movements.    Renal:  Patient has good urine output.    Endocrine:  Glucose is well controlled.    Id:  Patient is afebrile white count is normal.    Heme:  Hematocrit is 27 and platelet count is 298.  INR is now normal at 1.2.  Patient is on bridging heparin.  Will titrate heparin to an APTT greater between 40 and 50.  Therapeutic goal of INR is 2.5-3.  Activities: Patient is out of bed and ambulating in the hallways.    Line tubes and drains:  Patient has a PICC line.    09/26/2023   The patient is postop day 7 status post coronary artery bypass grafting x5 and aortic valve replacement with a 19 mm Saint Dany mechanical valve.  Overall the patient is doing well.  Patient's INR is still subtherapeutic.  Patient is on heparin in the interim.  Anticipate discharge in the next 48-72 hours.  Neuro:  Patient is awake alert and oriented x3.  Pain is well controlled with current pain management.  Neuro exam is  nonfocal.    Cardiac:  Patient remains stable.  Continue metoprolol.    Respiratory: Patient has good sats on room air.  Continue pulmonary toileting.  Continue incentive spirometer.    GI:  Patient is tolerating a regular diet and having bowel movements.    Renal:  Patient has good urine output.  Endocrine:  Glucose is well controlled.    Id:  Patient's white count is normal.    Heme: Hematocrit is 28 and INR is 1.2 patient is on a heparin drip.  Coumadin dose has been increased.  INR goal is 2.5-3.  Activities:  Patient is out of bed and ambulating in the hallways.    Line tubes and drains:  Patient has a PICC line.    09/27/2023   The patient is postop [id number] post coronary artery bypass grafting x5 and aortic valve replacement with a 19 mm Saint Dany mechanical valve.  Overall the patient is doing well.  Patient is awaiting INR being therapeutic.  Anticipate discharge in the next 48-72 hours.  Neuro:  Patient is awake alert and oriented x3.  Pain is well controlled with current pain management.  Neuro exam is nonfocal.    Cardiac:  Patient's blood pressure is trending higher.  Will increase metoprolol to 50 mg b.i.d..  Respiratory: Patient has good sats on room air.  Continue pulmonary toileting.  Continue incentive spirometer.  GI:  Patient is tolerating a regular diet and having bowel movements.    Renal:  Patient has good urine output.    Endocrine:  Glucose is well controlled.  Id:  Patient is afebrile white count is normal.    Heme:  Hematocrit is 28.6 and platelet count is 428.  INR is 1.6.  A PTT is therapeutic at 40.  INR goal is 2.5-3.  Activities:  Patient is out of bed and ambulating in the hallways.  Advance as tolerated.    Line tubes and drains:  Patient has a PICC line.    09/28/2023   The patient is postop day 9 status post coronary artery bypass grafting x5 and aortic valve replacement with a 19 mm Saint Dany mechanical valve.  Overall the patient is doing well.  INR is 2.3 today.  Anticipate  discharge that is 4872 hours.    Neuro:  Patient is awake alert and oriented x3.  Pain is well controlled.  Neuro exam is nonfocal.    Cardiac:  Patient is hemodynamically stable.  Continue metoprolol 50 mg b.i.d..    Respiratory: Patient has good sats on room air.  Continue pulmonary toileting.  Continue incentive spirometer.    GI:  Patient is tolerating a regular diet and having bowel movements.    Renal: Patient has good urine output.    Endocrine:  Glucose is well controlled.    Id:  Patient is afebrile white count is 8   Heme:  Hematocrit is 28.  INR is 2.3.  Platelet count is 452.  Continue Coumadin.  INR goal is 2.5-3.0.  Patient is currently on bridging heparin.  Activities:  Patient is out of bed and ambulating.  Advance as tolerated.    Line tubes and drains:  Patient has a PICC line.

## 2023-09-29 LAB
ANION GAP SERPL CALC-SCNC: 12 MMOL/L (ref 8–16)
APTT PPP: 48.7 SEC (ref 21–32)
BASOPHILS # BLD AUTO: 0.07 K/UL (ref 0–0.2)
BASOPHILS NFR BLD: 0.7 % (ref 0–1.9)
BUN SERPL-MCNC: 14 MG/DL (ref 6–20)
CALCIUM SERPL-MCNC: 8.9 MG/DL (ref 8.7–10.5)
CHLORIDE SERPL-SCNC: 105 MMOL/L (ref 95–110)
CO2 SERPL-SCNC: 21 MMOL/L (ref 23–29)
CREAT SERPL-MCNC: 0.9 MG/DL (ref 0.5–1.4)
DIFFERENTIAL METHOD: ABNORMAL
EOSINOPHIL # BLD AUTO: 0.4 K/UL (ref 0–0.5)
EOSINOPHIL NFR BLD: 3.6 % (ref 0–8)
ERYTHROCYTE [DISTWIDTH] IN BLOOD BY AUTOMATED COUNT: 17.6 % (ref 11.5–14.5)
EST. GFR  (NO RACE VARIABLE): >60 ML/MIN/1.73 M^2
GLUCOSE SERPL-MCNC: 121 MG/DL (ref 70–110)
HCT VFR BLD AUTO: 28.8 % (ref 40–54)
HGB BLD-MCNC: 9.3 G/DL (ref 14–18)
IMM GRANULOCYTES # BLD AUTO: 0.04 K/UL (ref 0–0.04)
IMM GRANULOCYTES NFR BLD AUTO: 0.4 % (ref 0–0.5)
INR PPP: 1.8 (ref 0.8–1.2)
LYMPHOCYTES # BLD AUTO: 2.1 K/UL (ref 1–4.8)
LYMPHOCYTES NFR BLD: 20.7 % (ref 18–48)
MCH RBC QN AUTO: 29.6 PG (ref 27–31)
MCHC RBC AUTO-ENTMCNC: 32.3 G/DL (ref 32–36)
MCV RBC AUTO: 92 FL (ref 82–98)
MONOCYTES # BLD AUTO: 0.8 K/UL (ref 0.3–1)
MONOCYTES NFR BLD: 7.5 % (ref 4–15)
NEUTROPHILS # BLD AUTO: 6.9 K/UL (ref 1.8–7.7)
NEUTROPHILS NFR BLD: 67.1 % (ref 38–73)
NRBC BLD-RTO: 0 /100 WBC
PLATELET # BLD AUTO: 563 K/UL (ref 150–450)
PMV BLD AUTO: 8.8 FL (ref 9.2–12.9)
POCT GLUCOSE: 106 MG/DL (ref 70–110)
POCT GLUCOSE: 106 MG/DL (ref 70–110)
POTASSIUM SERPL-SCNC: 4.1 MMOL/L (ref 3.5–5.1)
PROTHROMBIN TIME: 19 SEC (ref 9–12.5)
RBC # BLD AUTO: 3.14 M/UL (ref 4.6–6.2)
SODIUM SERPL-SCNC: 138 MMOL/L (ref 136–145)
WBC # BLD AUTO: 10.31 K/UL (ref 3.9–12.7)

## 2023-09-29 PROCEDURE — 97530 THERAPEUTIC ACTIVITIES: CPT

## 2023-09-29 PROCEDURE — 25000003 PHARM REV CODE 250: Performed by: NURSE PRACTITIONER

## 2023-09-29 PROCEDURE — 99232 PR SUBSEQUENT HOSPITAL CARE,LEVL II: ICD-10-PCS | Mod: ,,, | Performed by: PHYSICIAN ASSISTANT

## 2023-09-29 PROCEDURE — 85610 PROTHROMBIN TIME: CPT | Performed by: INTERNAL MEDICINE

## 2023-09-29 PROCEDURE — 25000003 PHARM REV CODE 250: Performed by: INTERNAL MEDICINE

## 2023-09-29 PROCEDURE — 80048 BASIC METABOLIC PNL TOTAL CA: CPT | Performed by: THORACIC SURGERY (CARDIOTHORACIC VASCULAR SURGERY)

## 2023-09-29 PROCEDURE — 85025 COMPLETE CBC W/AUTO DIFF WBC: CPT | Performed by: INTERNAL MEDICINE

## 2023-09-29 PROCEDURE — 63600175 PHARM REV CODE 636 W HCPCS: Performed by: THORACIC SURGERY (CARDIOTHORACIC VASCULAR SURGERY)

## 2023-09-29 PROCEDURE — 97116 GAIT TRAINING THERAPY: CPT

## 2023-09-29 PROCEDURE — 85730 THROMBOPLASTIN TIME PARTIAL: CPT | Performed by: THORACIC SURGERY (CARDIOTHORACIC VASCULAR SURGERY)

## 2023-09-29 PROCEDURE — 94761 N-INVAS EAR/PLS OXIMETRY MLT: CPT

## 2023-09-29 PROCEDURE — 21400001 HC TELEMETRY ROOM

## 2023-09-29 PROCEDURE — 25000003 PHARM REV CODE 250: Performed by: THORACIC SURGERY (CARDIOTHORACIC VASCULAR SURGERY)

## 2023-09-29 PROCEDURE — 99232 SBSQ HOSP IP/OBS MODERATE 35: CPT | Mod: ,,, | Performed by: PHYSICIAN ASSISTANT

## 2023-09-29 RX ORDER — WARFARIN 2 MG/1
2 TABLET ORAL DAILY
Status: DISCONTINUED | OUTPATIENT
Start: 2023-09-30 | End: 2023-09-30

## 2023-09-29 RX ADMIN — TRAMADOL HYDROCHLORIDE 50 MG: 50 TABLET, COATED ORAL at 08:09

## 2023-09-29 RX ADMIN — MAGNESIUM HYDROXIDE 400 MG: 400 SUSPENSION ORAL at 08:09

## 2023-09-29 RX ADMIN — ACETAMINOPHEN 650 MG: 325 TABLET ORAL at 08:09

## 2023-09-29 RX ADMIN — ALUMINUM HYDROXIDE, MAGNESIUM HYDROXIDE, AND DIMETHICONE 30 ML: 200; 20; 200 SUSPENSION ORAL at 11:09

## 2023-09-29 RX ADMIN — OXYCODONE HYDROCHLORIDE AND ACETAMINOPHEN 500 MG: 500 TABLET ORAL at 08:09

## 2023-09-29 RX ADMIN — ACETAMINOPHEN 650 MG: 325 TABLET ORAL at 12:09

## 2023-09-29 RX ADMIN — METOPROLOL TARTRATE 50 MG: 50 TABLET, FILM COATED ORAL at 08:09

## 2023-09-29 RX ADMIN — DOCUSATE SODIUM 100 MG: 100 CAPSULE, LIQUID FILLED ORAL at 11:09

## 2023-09-29 RX ADMIN — PANTOPRAZOLE SODIUM 40 MG: 40 TABLET, DELAYED RELEASE ORAL at 08:09

## 2023-09-29 RX ADMIN — DOCUSATE SODIUM 100 MG: 100 CAPSULE, LIQUID FILLED ORAL at 08:09

## 2023-09-29 RX ADMIN — ATORVASTATIN CALCIUM 80 MG: 40 TABLET, FILM COATED ORAL at 08:09

## 2023-09-29 RX ADMIN — FERROUS SULFATE TAB 325 MG (65 MG ELEMENTAL FE) 1 EACH: 325 (65 FE) TAB at 08:09

## 2023-09-29 RX ADMIN — ACETAMINOPHEN 650 MG: 325 TABLET ORAL at 04:09

## 2023-09-29 RX ADMIN — TRAMADOL HYDROCHLORIDE 50 MG: 50 TABLET, COATED ORAL at 03:09

## 2023-09-29 RX ADMIN — FOLIC ACID 1 MG: 1 TABLET ORAL at 08:09

## 2023-09-29 RX ADMIN — GUAIFENESIN 1200 MG: 600 TABLET, EXTENDED RELEASE ORAL at 08:09

## 2023-09-29 RX ADMIN — Medication 6 MG: at 08:09

## 2023-09-29 RX ADMIN — POLYETHYLENE GLYCOL 3350 17 G: 17 POWDER, FOR SOLUTION ORAL at 11:09

## 2023-09-29 RX ADMIN — WARFARIN SODIUM 3 MG: 1 TABLET ORAL at 04:09

## 2023-09-29 RX ADMIN — HEPARIN SODIUM 13 UNITS/KG/HR: 10000 INJECTION, SOLUTION INTRAVENOUS at 03:09

## 2023-09-29 RX ADMIN — ASPIRIN 81 MG: 81 TABLET, COATED ORAL at 08:09

## 2023-09-29 RX ADMIN — CYANOCOBALAMIN TAB 1000 MCG 1000 MCG: 1000 TAB at 08:09

## 2023-09-29 NOTE — SUBJECTIVE & OBJECTIVE
Interval History:  The patient is postop day 10 status post coronary artery bypass grafting times 5 and aortic valve replacement with a 19 mm Saint Dany mechanical valve.    ROS  Medications:  Continuous Infusions:   heparin (porcine) in D5W 13 Units/kg/hr (09/29/23 0309)     Scheduled Meds:   ascorbic acid (vitamin C)  500 mg Oral BID    aspirin  81 mg Oral Daily    atorvastatin  80 mg Oral Daily    cyanocobalamin  1,000 mcg Oral Daily    docusate sodium  100 mg Oral BID    ferrous sulfate  1 tablet Oral Daily    folic acid  1 mg Oral Daily    guaiFENesin  1,200 mg Oral BID    magnesium hydroxide 400 mg/5 ml  5 mL Oral BID    metoprolol tartrate  50 mg Oral BID    pantoprazole  40 mg Oral Daily    polyethylene glycol  17 g Oral Daily    [START ON 9/30/2023] warfarin  2 mg Oral Daily    warfarin  3 mg Oral Once     PRN Meds:0.9%  NaCl infusion (for blood administration), acetaminophen, albumin human 5%, aluminum-magnesium hydroxide-simethicone, calcium gluconate IVPB, calcium gluconate IVPB, calcium gluconate IVPB, dextrose 10%, glucagon (human recombinant), influenza, insulin aspart U-100, lactated ringers, magnesium sulfate IVPB, melatonin, metoclopramide HCl, ondansetron, pneumoc 20-leslee conj-dip cr(PF), potassium chloride in water, potassium chloride in water, potassium chloride in water, traMADoL     Objective:     Vital Signs (Most Recent):  Temp: 97.7 °F (36.5 °C) (09/29/23 1131)  Pulse: 89 (09/29/23 1131)  Resp: 20 (09/29/23 1131)  BP: 118/77 (09/29/23 1131)  SpO2: 100 % (09/29/23 1131) Vital Signs (24h Range):  Temp:  [97.7 °F (36.5 °C)-98.7 °F (37.1 °C)] 97.7 °F (36.5 °C)  Pulse:  [] 89  Resp:  [16-20] 20  SpO2:  [95 %-100 %] 100 %  BP: (116-129)/(64-77) 118/77     Weight: 78.3 kg (172 lb 9.9 oz)  Body mass index is 26.25 kg/m².    SpO2: 100 %       Intake/Output - Last 3 Shifts         09/27 0700  09/28 0659 09/28 0700  09/29 0659 09/29 0700  09/30 0659    P.O.   240    I.V. (mL/kg)       Total  Intake(mL/kg)   240 (3.1)    Net   +240           Urine Occurrence 2 x              Lines/Drains/Airways       Peripherally Inserted Central Catheter Line  Duration             PICC Double Lumen 09/21/23 0930 left basilic 8 days                     Physical Exam  Constitutional:       Appearance: Normal appearance.   HENT:      Head: Normocephalic and atraumatic.      Nose: Nose normal.   Cardiovascular:      Rate and Rhythm: Tachycardia present.      Pulses: Normal pulses.      Heart sounds: Normal heart sounds.   Pulmonary:      Effort: Pulmonary effort is normal.      Breath sounds: Normal breath sounds.   Abdominal:      General: Abdomen is flat. Bowel sounds are normal.      Palpations: Abdomen is soft.   Musculoskeletal:      Right lower leg: No edema.      Left lower leg: No edema.   Skin:     General: Skin is warm and dry.   Neurological:      Mental Status: He is alert and oriented to person, place, and time.   Psychiatric:         Behavior: Behavior normal.            Significant Labs:  All pertinent labs from the last 24 hours have been reviewed.    Significant Diagnostics:  I have reviewed all pertinent imaging results/findings within the past 24 hours.

## 2023-09-29 NOTE — ASSESSMENT & PLAN NOTE
09/20/2023  The patient is postop day 1 status post coronary artery bypass grafting x5 and aortic valve replacement with a 19 mm Saint Dany mechanical valve.  Overall the patient is doing well.      Neuro:  Patient is awake appropriate and follows commands.  Patient is currently sedated with Precedex.  Will wean Precedex to off.   Cardiac:  Patient is hemodynamically stable.  With excellent cardiac output and cardiac index.  Patient is on low-dose vaso active meds.  Wean drips to off.  Will start metoprolol once drips are off.  Respiratory:  Patient is being weaned to extubation.  Patient is an active smoker Start nebs and Mucomyst.  Continue pulmonary toileting.  Continue incentive spirometer.    GI:  Patient is currently NPO.  Will start p.o. intake once extubated.    Renal:  Patient has good urine output and creatinine is 1.2.  Will start Lasix for diuresis.  Endocrine:  Patient required insulin drip for glucose controlled.  Will convert to sliding scale.    Id:  Patient had a T-max of 102°.  Most likely due to stress of surgery.  Will continue to follow.  White count is 9.  Patient is on manny op antibiotics.  Heme: Hematocrit is 17.  Patient is being transfused.  Platelet count is 74.  No evidence of active bleeding.  Will follow platelet count.  Will start patient on Coumadin anticoagulation for mechanical valve.  Activities:  Patient is currently in bed.  Will advance activities as tolerated once extubated.  Line tubes and drains:  Patient has an ETT, right IJ [place] and Cordis, right groin A-line, chest tubes, Ugalde catheter, pacer wires, and saphenectomy site DARON.    09/21/2023     The patient is postop day 2 status post coronary artery bypass grafting x5 and aortic valve replacement with a 19 mm Saint Dany mechanical valve.  Overall the patient is doing well.    Neuro:  Patient is awake alert and oriented x3.  Pain is well controlled with current pain management.  Cardiac:  The patient is off all drips.   Patient is hemodynamically stable.  Continue metoprolol.  Respiratory:  Patient was extubated yesterday.  Continue pulmonary toileting.  Continue incentive spirometer.   GI:  Patient is tolerating p.o. intake.  Patient had episodes of nausea yesterday which have abated.  Advance patient's diet as tolerated.    Renal:  Patient has good urine output.  Creatinine is 0.9.  Continue Lasix for diuresis.  Endocrine:  Glucose is controlled with sliding scale insulin.    Id:  T-max is 101.8 patient is currently afebrile.  White count is 17.  Most likely due to stress of surgery.  Continue to follow.  Heme:  Hematocrit is 24.5.  Platelet count is 89.  INR is 1.5.  Patient is started on Coumadin for anticoagulation of mechanical valve.  Activities:  Patient will be out of bed to chair.  Advance activities as tolerated.  Line tubes and drains:  Patient has pacer wires, right IJ Cordis and Ugalde catheter.  Will place PICC line and discontinue Cordis.    09/22/2023   The patient is postop day 3 status post coronary artery bypass grafting x5 and aortic valve replacement with a 19 mm Saint Dany mechanical valve.  Overall the patient is doing well.  Patient has been transferred to telemetry.    Neuro:  Patient is awake alert and oriented x3.  Pain is well controlled with current pain regimen.    Cardiac:  Patient is hemodynamically stable.  Continue metoprolol.    Respiratory:  Continue pulmonary toileting.  Continue incentive spirometer.    GI:  Patient is tolerating p.o. intake.  And tolerating a regular diet.    Renal:  Patient has good urine output.  Creatinine is 0.9.  Continue Lasix.    Endocrine:  Glucose is well controlled.    Id:  Patient is afebrile.  White count is 16.  Continue to follow.    Heme:  Hematocrit is 24 and platelet count is 114.  INR is 3.4.  Patient is currently on Coumadin for anticoagulation.  Activities:  Patient is out of bed and ambulating in the hallways.  Continue to advance as tolerated.  Line tubes  and drains:  Patient has a PICC line and pacer wires.    09/23/2023   The patient is postop day 4 status post coronary artery bypass grafting x5 and aortic valve replacement with a 19 mm Saint Dany mechanical valve.  Overall the patient is doing well.  Anticipate discharge to home with home health in the next 48-72 hours.    Neuro:  Patient is awake alert and oriented x3 pain is well controlled with pain management.  Neuro exam is nonfocal.    Cardiac:  Patient is hemodynamically stable.  Continue metoprolol.    Respiratory:  Continue pulmonary toileting.  Continue incentive spirometer.  GI patient is tolerating p.o. intake.  Will increase bowel regimen for bowel movements.  Renal: Patient has good urine output.  Creatinine is 0.9.  Patient is still appears fluid overloaded.  Continue Lasix.  Endocrine: Glucose is well controlled.    Id:  Patient is afebrile.  White count is down to 12.  Continue to follow.    Heme:  Hematocrit is 27.  Platelet count is 186 and INR is 1.9.  INR goal is between 2.5 and 3.  Continue Coumadin.    Activities:  Patient is out of bed and ambulating in the hallways.  Advance as tolerated.  Line tubes and drains:  Patient has a PICC line.      09/24/2023   The patient is postop day 5 status post coronary artery bypass grafting x5 and aortic valve replacement with a 19 mm Saint Dany mechanical valve.  Overall the patient is doing well.  Anticipate discharge home in the next 24-48 hours.    Neuro:  Patient is awake alert and oriented x3 pain is well controlled with current pain management.  Neuro exam is nonfocal.    Cardiac:  Patient is hemodynamically stable.  Continue metoprolol  Respiratory:  Continue pulmonary toileting.  Continue incentive spirometer.  Patient continues to have sputum production.  GI:  Patient is tolerating a regular diet.  Patient has had bowel movements.  Renal: Patient has good urine output.  Creatinine is stable.  Patient is appears to be euvolemic.  Will  discontinue Lasix.    Endocrine:  Glucose is well controlled.  Id: Patient is afebrile white count normal.  Heme:  Hematocrit is 27 and platelet count is 229.  INR is subtherapeutic 1.5.  Continue Coumadin.  Goal is a INR level of 2.5-3.  Activities: Patient is out of bed and ambulating in the hallways.  Advance as tolerated.    Line tubes and drains:  Patient has a PICC line.    09/25/2023   The patient is postop day 6 status post coronary artery bypass grafting x5 and aortic valve replacement with a 19 mm Saint Dany mechanical valve.  Overall the patient is doing well.  Patient is awaiting attainment of a therapeutic INR.  Anticipate discharge in the next 48-72 hours.    Neuro:  Patient is awake alert and oriented x3.  Pain is well controlled current pain management.  Neuro exam is nonfocal.    Cardiac:  Patient is stable.  Continue metoprolol.    Respiratory:  Patient has good sats on room air.  Continue pulmonary toileting.  Continue incentive spirometer.    GI:  Patient is tolerating a regular diet having bowel movements.    Renal:  Patient has good urine output.    Endocrine:  Glucose is well controlled.    Id:  Patient is afebrile white count is normal.    Heme:  Hematocrit is 27 and platelet count is 298.  INR is now normal at 1.2.  Patient is on bridging heparin.  Will titrate heparin to an APTT greater between 40 and 50.  Therapeutic goal of INR is 2.5-3.  Activities: Patient is out of bed and ambulating in the hallways.    Line tubes and drains:  Patient has a PICC line.    09/26/2023   The patient is postop day 7 status post coronary artery bypass grafting x5 and aortic valve replacement with a 19 mm Saint Dany mechanical valve.  Overall the patient is doing well.  Patient's INR is still subtherapeutic.  Patient is on heparin in the interim.  Anticipate discharge in the next 48-72 hours.  Neuro:  Patient is awake alert and oriented x3.  Pain is well controlled with current pain management.  Neuro exam is  nonfocal.    Cardiac:  Patient remains stable.  Continue metoprolol.    Respiratory: Patient has good sats on room air.  Continue pulmonary toileting.  Continue incentive spirometer.    GI:  Patient is tolerating a regular diet and having bowel movements.    Renal:  Patient has good urine output.  Endocrine:  Glucose is well controlled.    Id:  Patient's white count is normal.    Heme: Hematocrit is 28 and INR is 1.2 patient is on a heparin drip.  Coumadin dose has been increased.  INR goal is 2.5-3.  Activities:  Patient is out of bed and ambulating in the hallways.    Line tubes and drains:  Patient has a PICC line.    09/27/2023   The patient is postop [id number] post coronary artery bypass grafting x5 and aortic valve replacement with a 19 mm Saint Dany mechanical valve.  Overall the patient is doing well.  Patient is awaiting INR being therapeutic.  Anticipate discharge in the next 48-72 hours.  Neuro:  Patient is awake alert and oriented x3.  Pain is well controlled with current pain management.  Neuro exam is nonfocal.    Cardiac:  Patient's blood pressure is trending higher.  Will increase metoprolol to 50 mg b.i.d..  Respiratory: Patient has good sats on room air.  Continue pulmonary toileting.  Continue incentive spirometer.  GI:  Patient is tolerating a regular diet and having bowel movements.    Renal:  Patient has good urine output.    Endocrine:  Glucose is well controlled.  Id:  Patient is afebrile white count is normal.    Heme:  Hematocrit is 28.6 and platelet count is 428.  INR is 1.6.  A PTT is therapeutic at 40.  INR goal is 2.5-3.  Activities:  Patient is out of bed and ambulating in the hallways.  Advance as tolerated.    Line tubes and drains:  Patient has a PICC line.    09/28/2023   The patient is postop day 9 status post coronary artery bypass grafting x5 and aortic valve replacement with a 19 mm Saint Dany mechanical valve.  Overall the patient is doing well.  INR is 2.3 today.  Anticipate  discharge that is 4872 hours.    Neuro:  Patient is awake alert and oriented x3.  Pain is well controlled.  Neuro exam is nonfocal.    Cardiac:  Patient is hemodynamically stable.  Continue metoprolol 50 mg b.i.d..    Respiratory: Patient has good sats on room air.  Continue pulmonary toileting.  Continue incentive spirometer.    GI:  Patient is tolerating a regular diet and having bowel movements.    Renal: Patient has good urine output.    Endocrine:  Glucose is well controlled.    Id:  Patient is afebrile white count is 8   Heme:  Hematocrit is 28.  INR is 2.3.  Platelet count is 452.  Continue Coumadin.  INR goal is 2.5-3.0.  Patient is currently on bridging heparin.  Activities:  Patient is out of bed and ambulating.  Advance as tolerated.    Line tubes and drains:  Patient has a PICC line.    09/29/2023   Patient is postop day 10 status post coronary artery bypass grafting x5 and aortic valve replacement with a 19 mm Saint Dany mechanical valve.  Overall patient is doing well.  Patient awaits therapeutic INR prior to discharge.    Neuro:  Patient is awake alert and oriented x3.  Pain is well controlled.  Neuro exam is nonfocal.    Cardiac: Patient is stable.  Continue metoprolol.    Respiratory:  Patient has good sats on room air.  Continue pulmonary toileting.  Continue incentive spirometer.    GI:  Patient is tolerating a regular diet and having bowel movements.    Renal:  Patient has good urine output.  Creatinine is normal.    Endocrine: Glucose is well controlled.  Id:  Patient is afebrile.  White count is 10.3.  Heme:  Hematocrit is 28.8 and platelet count is 563.  INR is 1.8.  Patient needs INR goal of 2.5-3.  Continue Coumadin.  Activities:  Patient is out of bed and ambulating.  Advance as tolerated.  Line tubes and drains:  Patient has a PICC line.

## 2023-09-29 NOTE — PT/OT/SLP PROGRESS
Physical Therapy Treatment    Patient Name:  Mikie Alcazar   MRN:  [id number]    Recommendations:     Discharge Recommendations: home health PT  Discharge Equipment Recommendations: shower chair  Barriers to discharge: None    Assessment:     Mikie Alcazar is a 43 y.o. male admitted with a medical diagnosis of NSTEMI (non-ST elevated myocardial infarction).  He presents with the following impairments/functional limitations: weakness, impaired endurance.    Rehab Prognosis: Good; patient would benefit from acute skilled PT services to address these deficits and reach maximum level of function.    Recent Surgery: Procedure(s) (LRB):  CORONARY ARTERY BYPASS GRAFT (CABG) (N/A)  REPLACEMENT-VALVE-AORTIC (N/A)  SURGICAL PROCUREMENT, VEIN, ENDOSCOPIC (Left)  BLOCK, NERVE, INTERCOSTAL, 2 OR MORE (N/A)  ECHOCARDIOGRAM,TRANSESOPHAGEAL (N/A) 10 Days Post-Op    Plan:     During this hospitalization, patient to be seen 3 x/week to address the identified rehab impairments via gait training, therapeutic activities, therapeutic exercises and progress toward the following goals:    Plan of Care Expires:  10/13/23    Subjective     Chief Complaint: NONE  Patient/Family Comments/goals:   Pain/Comfort:  Pain Rating 1: 0/10      Objective:     Communicated with NURSE prior to session.  Patient found up in chair with telemetry, PICC line upon PT entry to room.     General Precautions: Standard, sternal  Orthopedic Precautions: N/A  Braces: N/A  Respiratory Status: Room air     Functional Mobility:  Bed Mobility:     Scooting: modified independence  Transfers:     Sit to Stand:  modified independence with no AD  Gait: PT ' NO AD ARELY, NO LOB OR SOB ON ROOM AIR.  PT ENCOURAGED TO AMBULATE MULTIPLE TIMES DURING THE DAY WITH FAMILY OR STAFF  Balance: GOOD SITTING AND STANDING BALANCE    AM-PAC 6 CLICK MOBILITY  Turning over in bed (including adjusting bedclothes, sheets and blankets)?: 4  Sitting down on and standing up from a chair  "with arms (e.g., wheelchair, bedside commode, etc.): 4  Moving from lying on back to sitting on the side of the bed?: 4  Moving to and from a bed to a chair (including a wheelchair)?: 4  Need to walk in hospital room?: 4  Climbing 3-5 steps with a railing?: 1  Basic Mobility Total Score: 21     Treatment & Education:  PT EDUCATION:  - ROLE OF P.T. AND POC IN ACUTE CARE HOSPITAL SETTING  - REVIEW STERNAL PRECAUTIONS  - ENCOURAGED TO INCREASE TIME OOB IN CHAIR TO TOLERANCE   - TO CONTINUE THERAPUETIC EXERCISES THROUGHOUT THE DAY TO INCREASE ACTIVITY TOLERANCE AND DECREASE RISK FOR PNEUMONIA AND BLOOD CLOTS: HIP FLEX/EXT, HIP ABD/ADD, QUAD SET, HEEL SLIDE, AP  - RISK FOR FALLS DUE TO GENERALIZED WEAKNESS, EDUCATED ON "CALL DON'T FALL", ENCOURAGED TO CALL FOR ASSISTANCE WITH ALL NEEDS SUCH AS BED<>CHAIR TRANSFERS OR TRIPS TO BATHROOM, PT AGREEABLE TO SAFETY PRECAUTIONS    Patient left up in chair with all lines intact, call button in reach, NURSE notified, and MOTHER present..    GOALS:   Multidisciplinary Problems       Physical Therapy Goals          Problem: Physical Therapy    Goal Priority Disciplines Outcome Goal Variances Interventions   Physical Therapy Goal     PT, PT/OT Ongoing, Progressing     Description: LTG'S TO BE MET IN 14 DAYS (10-13-23)  PT WILL BE ARELY FOR BED MOBILITY  PT WILL BE INDEP FOR BED<>CHAIR TF'S  PT WILL AMB >1000 FEET INDEP  PT WILL INC AMPAC SCORE BY 2 POINTS TO PROGRESS GROSS FUNC MOBILITY                         Time Tracking:     PT Received On: 09/29/23  PT Start Time: 0825     PT Stop Time: 0850  PT Total Time (min): 25 min     Billable Minutes: Gait Training 10 and Therapeutic Activity 15    Treatment Type: Treatment  PT/PTA: PT     Number of PTA visits since last PT visit: 0     09/29/2023  "

## 2023-09-29 NOTE — PLAN OF CARE
Problem: Adjustment to Illness (Acute Coronary Syndrome)  Goal: Optimal Adaptation to Illness  Outcome: Ongoing, Progressing     Problem: Dysrhythmia (Acute Coronary Syndrome)  Goal: Normalized Cardiac Rhythm  Outcome: Ongoing, Progressing     Problem: Cardiac-Related Pain (Acute Coronary Syndrome)  Goal: Absence of Cardiac-Related Pain  Outcome: Ongoing, Progressing     Problem: Hemodynamic Instability (Acute Coronary Syndrome)  Goal: Effective Cardiac Pump Function  Outcome: Ongoing, Progressing     Problem: Tissue Perfusion (Acute Coronary Syndrome)  Goal: Adequate Tissue Perfusion  Outcome: Ongoing, Progressing     Problem: Pain (Cardiac Catheterization)  Goal: Acceptable Pain Control  Outcome: Ongoing, Progressing     Problem: Vascular Access Protection (Cardiac Catheterization)  Goal: Absence of Vascular Access Complication  Outcome: Ongoing, Progressing

## 2023-09-29 NOTE — ASSESSMENT & PLAN NOTE
Cont management per CTS    09/23   Continue asa statin plavix lasix and metoprolol  On coumadin for mechanical AVR    09/24  Continue ASA statin BB  Continue supportive care    9/25/23  -Progressing well  -Continue ASA, statin, BB  -IS usage and ambulation    9/26/23  -Stable overnight  -Continue ASA, statin, BB  -IS usage and ambulation    9/27/23  -No AEON  -Continue ASA, statin, BB  -IS usage   -Ambulating well    9/28/23  -Stable CV wise  -Continue ASA, statin, BB  -IS usage/ambulation    9/29/23  -No AEON  -Continue ASA, statin, BB  -Continue PT/OT  -INR 1.8 today

## 2023-09-29 NOTE — PLAN OF CARE
Problem: Adjustment to Illness (Acute Coronary Syndrome)  Goal: Optimal Adaptation to Illness  Outcome: Ongoing, Progressing     Problem: Dysrhythmia (Acute Coronary Syndrome)  Goal: Normalized Cardiac Rhythm  Outcome: Ongoing, Progressing     Problem: Cardiac-Related Pain (Acute Coronary Syndrome)  Goal: Absence of Cardiac-Related Pain  Outcome: Ongoing, Progressing     Problem: Hemodynamic Instability (Acute Coronary Syndrome)  Goal: Effective Cardiac Pump Function  Outcome: Ongoing, Progressing     Problem: Tissue Perfusion (Acute Coronary Syndrome)  Goal: Adequate Tissue Perfusion  Outcome: Ongoing, Progressing     Problem: Arrhythmia/Dysrhythmia (Cardiac Catheterization)  Goal: Stable Heart Rate and Rhythm  Outcome: Ongoing, Progressing     Problem: Bleeding (Cardiac Catheterization)  Goal: Absence of Bleeding  Outcome: Ongoing, Progressing     Problem: Contrast-Induced Injury Risk (Cardiac Catheterization)  Goal: Absence of Contrast-Induced Injury  Outcome: Ongoing, Progressing     Problem: Embolism (Cardiac Catheterization)  Goal: Absence of Embolism Signs and Symptoms  Outcome: Ongoing, Progressing     Problem: Ongoing Anesthesia/Sedation Effects (Cardiac Catheterization)  Goal: Anesthesia/Sedation Recovery  Outcome: Ongoing, Progressing     Problem: Pain (Cardiac Catheterization)  Goal: Acceptable Pain Control  Outcome: Ongoing, Progressing     Problem: Vascular Access Protection (Cardiac Catheterization)  Goal: Absence of Vascular Access Complication  Outcome: Ongoing, Progressing     Problem: Adult Inpatient Plan of Care  Goal: Plan of Care Review  Outcome: Ongoing, Progressing  Goal: Patient-Specific Goal (Individualized)  Outcome: Ongoing, Progressing  Goal: Absence of Hospital-Acquired Illness or Injury  Outcome: Ongoing, Progressing  Goal: Optimal Comfort and Wellbeing  Outcome: Ongoing, Progressing  Goal: Readiness for Transition of Care  Outcome: Ongoing, Progressing     Problem: Chest Pain  Goal:  Resolution of Chest Pain Symptoms  Outcome: Ongoing, Progressing     Problem: Fall Injury Risk  Goal: Absence of Fall and Fall-Related Injury  Outcome: Ongoing, Progressing     Problem: Infection  Goal: Absence of Infection Signs and Symptoms  Outcome: Ongoing, Progressing     Problem: Skin Injury Risk Increased  Goal: Skin Health and Integrity  Outcome: Ongoing, Progressing

## 2023-09-29 NOTE — PROGRESS NOTES
[facility] ([facility])  Cardiology  Progress Note    Patient Name: Mikie Alcazar  MRN: [id number]  Admission Date: 9/17/2023  Hospital Length of Stay: 12 days  Code Status: Full Code   Attending Physician: Nishant Douglas MD   Primary Care Physician: Lissette, Primary Doctor  Expected Discharge Date:   Principal Problem:NSTEMI (non-ST elevated myocardial infarction)    Subjective:     Hospital Course:   Cardiology consulted to assist with management. Pt seen and examined today, resting in bed mother at bedside. He reports his pain started 6+ months ago and worsening in the last few weeks happening daily with associated SOB, dizziness radiating to left arm. He reports his pain is sharp and pressure-like at times. Pt reports daily use a marijuana, h/o cocaine and other drugs 5-10 years ago. Drinks occasionally. Echo pending cont hep gtt    09/19/2023. Admitted for NSTEMI/ACS. Echo showed RWMA  The cath done yesterday showed   Severe lmca disease with timi1 flow in lad   Lcx ostial 50%   Rca prox 80%   Ef 50%   Severe AI  09/19/23 s/p CABG x5 and aortic valve replacement with a 19 mm [place] mechanical valve by Dr. Douglas.  In ICU and intubated. Om epi and Vasopressin gtt.    9/20/23  Pt seen and examined today, POD 1 CABGx5 pt still intubated on exam this am, weaning epi. Labs reviewed, H/H 6.0 and 17.1. Plans to extubated today    9/21/23 Pt seen and examined today extubated feels ok chest soreness, chest tubes removed. Labs reviewed, chart reviewed.    9/22/23 Pt seen and examined today, sitting up in bedside chair. Feels good. Walked with PT/OT. Labs reviewed, chart reviewed    09/23 ambulating well. No chest pain dyspnea, the lab reviewed.     09/24. On coumadin rx and heparin bridge. Non chest pain dyspnea, the lab reviewed. VSS    9/25/23-Patient seen and examined today, resting in bed. No AEON. Pain controlled. Working with PT/OT. Labs stable. INR 1.2, on heparin bridge.    9/26/23-Patient seen and  examined today, sitting up in bedside chair. Feels ok. More fatigued/weak today. Pain controlled. Labs stable. INR 1.2, remains on heparin bridge.    9/27/23-Patient seen and examined today, resting in bed. Feeling better today. Worked with PT/OT earlier. No AEON. Labs reviewed, INR 1.6.     9/28/23-Patient seen and examined today, resting in bed. Ambulating well. Pain controlled. INR 2.3.     9/29/23-Patient seen and examined today, lying in bed. Feels ok. Does admit to some fatigue. Pain controlled. Ambulating well with PT/OT. Labs reviewed/stable. INR 1.8.        Review of Systems   Constitutional: Positive for malaise/fatigue.   HENT: Negative.     Eyes: Negative.    Cardiovascular:  Positive for chest pain. Claudication: incisional.  Respiratory: Negative.     Endocrine: Negative.    Hematologic/Lymphatic: Bruises/bleeds easily.   Skin: Negative.    Musculoskeletal: Negative.    Gastrointestinal: Negative.    Genitourinary: Negative.    Neurological: Negative.    Psychiatric/Behavioral: Negative.     Allergic/Immunologic: Negative.      Objective:     Vital Signs (Most Recent):  Temp: 98.2 °F (36.8 °C) (09/29/23 0811)  Pulse: 104 (09/29/23 0720)  Resp: 18 (09/29/23 0707)  BP: 120/66 (09/29/23 0707)  SpO2: 95 % (09/29/23 0707) Vital Signs (24h Range):  Temp:  [97.7 °F (36.5 °C)-98.6 °F (37 °C)] 98.2 °F (36.8 °C)  Pulse:  [] 104  Resp:  [16-18] 18  SpO2:  [95 %-98 %] 95 %  BP: (120-129)/(63-71) 120/66     Weight: 78.3 kg (172 lb 9.9 oz)  Body mass index is 26.25 kg/m².     SpO2: 95 %         Intake/Output Summary (Last 24 hours) at 9/29/2023 1021  Last data filed at 9/29/2023 0824  Gross per 24 hour   Intake 240 ml   Output --   Net 240 ml       Lines/Drains/Airways       Peripherally Inserted Central Catheter Line  Duration             PICC Double Lumen 09/21/23 0930 left basilic 8 days                       Physical Exam  Vitals and nursing note reviewed.   Constitutional:       General: He is not in  "acute distress.     Appearance: Normal appearance. He is well-developed. He is not diaphoretic.   HENT:      Head: Normocephalic and atraumatic.   Eyes:      General:         Right eye: No discharge.         Left eye: No discharge.      Pupils: Pupils are equal, round, and reactive to light.   Neck:      Thyroid: No thyromegaly.      Vascular: No JVD.      Trachea: No tracheal deviation.   Cardiovascular:      Rate and Rhythm: Normal rate and regular rhythm.      Heart sounds: Normal heart sounds, S1 normal and S2 normal. No murmur heard.     Comments: Sternotomy C/D/I; no bleeding erythema or drainage    Metallic click  Pulmonary:      Effort: Pulmonary effort is normal. No respiratory distress.      Breath sounds: Normal breath sounds. No wheezing.   Abdominal:      General: There is no distension.      Tenderness: There is no rebound.   Musculoskeletal:      Cervical back: Neck supple.      Right lower leg: No edema.      Left lower leg: No edema.   Skin:     General: Skin is warm and dry.      Findings: No erythema.      Comments: SVG sites C/D/I; no bleeding erythema or drainage   Neurological:      General: No focal deficit present.      Mental Status: He is alert and oriented to person, place, and time.   Psychiatric:         Mood and Affect: Mood normal.         Behavior: Behavior normal.         Thought Content: Thought content normal.            Significant Labs: CMP   Recent Labs   Lab 09/28/23  0522 09/29/23  0532    138   K 4.1 4.1    105   CO2 23 21*   * 121*   BUN 12 14   CREATININE 0.9 0.9   CALCIUM 9.0 8.9   ANIONGAP 8 12   , CBC   Recent Labs   Lab 09/28/23  0522 09/29/23  0532   WBC 8.48 10.31   HGB 9.0* 9.3*   HCT 28.0* 28.8*   * 563*   , Troponin No results for input(s): "TROPONINI" in the last 48 hours., and All pertinent lab results from the last 24 hours have been reviewed.    Significant Imaging: Echocardiogram: Transthoracic echo (TTE) complete (Cupid Only): "   Results for orders placed or performed during the hospital encounter of 09/17/23   Echo   Result Value Ref Range    BSA 1.98 m2    LVOT stroke volume 82.76 cm3    LVIDd 5.22 3.5 - 6.0 cm    LV Systolic Volume 86.21 mL    LV Systolic Volume Index 44.2 mL/m2    LVIDs 4.37 (A) 2.1 - 4.0 cm    LV Diastolic Volume 130.82 mL    LV Diastolic Volume Index 67.09 mL/m2    IVS 0.88 0.6 - 1.1 cm    LVOT diameter 1.91 cm    LVOT area 2.9 cm2    FS 16 (A) 28 - 44 %    Left Ventricle Relative Wall Thickness 0.38 cm    Posterior Wall 0.98 0.6 - 1.1 cm    LV mass 177.53 g    LV Mass Index 91 g/m2    MV Peak E Bob 1.20 m/s    TDI LATERAL 0.06 m/s    TDI SEPTAL 0.08 m/s    E/E' ratio 17.14 m/s    MV Peak A Bob 1.14 m/s    TR Max Bob 2.33 m/s    E/A ratio 1.05     IVRT 72.31 msec    E wave deceleration time 200.37 msec    LV SEPTAL E/E' RATIO 15.00 m/s    LV LATERAL E/E' RATIO 20.00 m/s    LVOT peak bob 1.38 m/s    Left Ventricular Outflow Tract Mean Velocity 1.08 cm/s    Left Ventricular Outflow Tract Mean Gradient 4.99 mmHg    LA size 3.79 cm    Left Atrium Minor Axis 4.68 cm    Left Atrium Major Axis 4.27 cm    RVOT peak VTI 10.8 cm    TAPSE 1.84 cm    RA Major Axis 3.31 cm    AV regurgitation pressure 1/2 time 456.54108128696031 ms    AR Max Bob 4.34 m/s    AV mean gradient 14 mmHg    AV peak gradient 26 mmHg    Ao peak bob 2.54 m/s    Ao VTI 54.00 cm    LVOT peak VTI 28.90 cm    AV valve area 1.53 cm²    AV Velocity Ratio 0.54     AV index (prosthetic) 0.54     SARAH by Velocity Ratio 1.56 cm²    Mr max bob 4.42 m/s    MV stenosis pressure 1/2 time 58.11 ms    MV valve area p 1/2 method 3.79 cm2    TV mean gradient 19 mmHg    Triscuspid Valve Regurgitation Peak Gradient 22 mmHg    PV mean gradient 1 mmHg    RVOT peak bob 0.58 m/s    Ao root annulus 2.71 cm    STJ 2.59 cm    Ascending aorta 2.36 cm    IVC diameter 1.57 cm    Mean e' 0.07 m/s    ZLVIDS 1.94     ZLVIDD -0.63     LA Volume Index 22.9 mL/m2    LA volume 44.60 cm3    LA  WIDTH 3.1 cm    RA Width 2.2 cm    TV resting pulmonary artery pressure 25 mmHg    RV TB RVSP 5 mmHg    Est. RA pres 3 mmHg    Narrative      Left Ventricle: The left ventricle is normal in size. Ventricular mass   is normal. Normal wall thickness. regional wall motion abnormalities   present. There is mildly reduced systolic function with a visually   estimated ejection fraction of 40 - 45%. Grade II diastolic dysfunction.    Right Ventricle: Normal right ventricular cavity size. Wall thickness   is normal. Right ventricle wall motion  is normal. Systolic function is   normal.    Aortic Valve: There is mild to moderate aortic regurgitation.    Pulmonary Artery: The estimated pulmonary artery systolic pressure is   25 mmHg.    IVC/SVC: Normal venous pressure at 3 mmHg.     , EKG: Reviewed, and X-Ray: CXR: X-Ray Chest 1 View (CXR):   Results for orders placed or performed during the hospital encounter of 09/17/23   X-Ray Chest 1 View    Narrative    EXAMINATION:  XR CHEST 1 VIEW    CLINICAL HISTORY:  PICC placement;    TECHNIQUE:  Single frontal view of the chest was performed.    COMPARISON:  09/21/2023    FINDINGS:  Left-sided PICC line tip overlies the SVC in good position.  In comparison to the prior study, there is no adverse interval changes      Impression    In comparison to the prior study, there is no adverse interval changes      Electronically signed by: Elliott España MD  Date:    09/21/2023  Time:    10:10    and X-Ray Chest PA and Lateral (CXR): No results found for this visit on 09/17/23.    Assessment and Plan:   Patient who presents with multivessel CAD and severe AI, recovering well s/p CABG and AVR. Stable CV wise. Continue same meds/mgmt. INR 1.8, CTS addressing.    * NSTEMI (non-ST elevated myocardial infarction)  Troponin 0.6->-0.709->0.708  Cont hep gtt  EKG with ST T wave abnml  LHC planned for today  All risks, benefits and treatment alternatives explained, all questions answered. Pt  agreeable to proceed.   NPO    9/20/23  S/P CABG x5 AVR    S/P AVR (aortic valve replacement)  09/24  On coumadin Rx and heparin gtt as bridge    9/28/23  -INR 2.3     9/29/23  -INR 1.8    S/P CABG x 5  Cont management per CTS    09/23   Continue asa statin plavix lasix and metoprolol  On coumadin for mechanical AVR    09/24  Continue ASA statin BB  Continue supportive care    9/25/23  -Progressing well  -Continue ASA, statin, BB  -IS usage and ambulation    9/26/23  -Stable overnight  -Continue ASA, statin, BB  -IS usage and ambulation    9/27/23  -No AEON  -Continue ASA, statin, BB  -IS usage   -Ambulating well    9/28/23  -Stable CV wise  -Continue ASA, statin, BB  -IS usage/ambulation    9/29/23  -No AEON  -Continue ASA, statin, BB  -Continue PT/OT  -INR 1.8 today    Nonrheumatic aortic valve insufficiency  -s/p mechanical AVR    Tobacco smoker, 1 pack of cigarettes or less per day  -Smoking cessation    CAD, multiple vessel  09/19/23  S/p CABG x5 and mechanical AVR today  -continue icu supportive care  -continue asa plavix statin BB and lasix  -will f/u    9/20/23  Cont ASA, plavix, statin, BB, lasix  Management per CTS    See plan under CABG    Hypertension  stable        VTE Risk Mitigation (From admission, onward)         Ordered     warfarin (COUMADIN) tablet 2 mg  Daily         09/28/23 0922     heparin 25,000 units in dextrose 5% 250 ml (100 units/mL) infusion MINIMAL INTENSITY nomogram - [facility]  Continuous        Question Answer Comment   Heparin Infusion Adjustment (DO NOT MODIFY ANSWER) [email or web address] MINIMAL  INTENSITY nomogram for [facility] [email or web address]    Begin at (in units/kg/hr) 8        09/25/23 0855     IP VTE LOW RISK PATIENT  Once         09/17/23 1941                Natty Borrego PA-C  Cardiology  [facility] - Tele[facility] ([facility])

## 2023-09-29 NOTE — PROGRESS NOTES
Clinical Pharmacy Progress Note: Coumadin Dosing and Monitoring      Indication: mechanical AVR  Goal INR: 2-3      Lab Results   Component Value Date    INR 1.8 (H) 09/29/2023    INR 2.3 (H) 09/28/2023    INR 1.6 (H) 09/27/2023      INR: 1.8, decreased from 2.3-- subtherapeutic  H/H: 9.3/28.8, stable  Platelets: 563, trending up     Patient has been educated by pharmacist this admission.       Recent dosing history:   2.5 mg on 9/20 & 9/21-- INR increased from 1.5 to 3.4 on 9/22 so dose was held.   1 mg on 9/23 & 9/24.  2.5 mg on 9/25 -- this resulted in no change in INR  5 mg given on 9/26/23-- INR increased appropriately to 1.6  3 mg given on 9/27/23  1 mg given on 9/28/23-- INR decreased below therapeutic range     Potential Drug interactions: Melatonin, Aspirin, Tylenol, and Tramadol each may increase the risk of bleeding while on warfarin     Lab order for daily PT/INR? Yes  Coumadin diet ordered? Yes     Currently on bridge therapy with heparin drip minimal intensity nomogram      Plan:     - Give warfarin 3 mg today.  - Pharmacy will continue to monitor daily PT/INR. Dose adjustments will be made accordingly.       Thank you for allowing us to participate in this patient's care.       Morena Tabor, FranciscaD

## 2023-09-29 NOTE — PHYSICIAN QUERY
"PT Name: Mikie Alcazar  MR #: [id number]    DOCUMENTATION CLARIFICATION     CDS/: Isabel Wellington RN            Contact information:[email or web address]  This form is a permanent document in the medical record.     Query Date: September 29, 2023    By submitting this query, we are merely seeking further clarification of documentation. Please utilize your independent clinical judgment when addressing the question(s) below.    The medical record contains the following:   Indicators Supporting Clinical Findings Location in Medical Record    "Thrombocytopenia" documented      x Platelets Fgwzyemu=[phone] Lab 9-17 to 9-22    Acute bleeding, Petechiae, Bruising     x Anticoagulant medication On coumadin rx and heparin bridge Cardiology note 9-29   x Transfusion(s) 1u cryo given in PM; critical H/H 6.0/17.1 this AM; 1 of 2 ordered PRBC transfused.  RN note 9-20    Treatments      Other         Provider, please specify diagnosis or diagnoses associated with above clinical findings.    [ x  ] Unspecified thrombocytopenia   [   ] Other hematological diagnosis (please specify): ________________     Please document in your progress notes daily for the duration of treatment, until resolved, and include in your discharge summary.                                                                                                                                                                                                "

## 2023-09-29 NOTE — SUBJECTIVE & OBJECTIVE
Review of Systems   Constitutional: Positive for malaise/fatigue.   HENT: Negative.     Eyes: Negative.    Cardiovascular:  Positive for chest pain. Claudication: incisional.  Respiratory: Negative.     Endocrine: Negative.    Hematologic/Lymphatic: Bruises/bleeds easily.   Skin: Negative.    Musculoskeletal: Negative.    Gastrointestinal: Negative.    Genitourinary: Negative.    Neurological: Negative.    Psychiatric/Behavioral: Negative.     Allergic/Immunologic: Negative.      Objective:     Vital Signs (Most Recent):  Temp: 98.2 °F (36.8 °C) (09/29/23 0811)  Pulse: 104 (09/29/23 0720)  Resp: 18 (09/29/23 0707)  BP: 120/66 (09/29/23 0707)  SpO2: 95 % (09/29/23 0707) Vital Signs (24h Range):  Temp:  [97.7 °F (36.5 °C)-98.6 °F (37 °C)] 98.2 °F (36.8 °C)  Pulse:  [] 104  Resp:  [16-18] 18  SpO2:  [95 %-98 %] 95 %  BP: (120-129)/(63-71) 120/66     Weight: 78.3 kg (172 lb 9.9 oz)  Body mass index is 26.25 kg/m².     SpO2: 95 %         Intake/Output Summary (Last 24 hours) at 9/29/2023 1021  Last data filed at 9/29/2023 0824  Gross per 24 hour   Intake 240 ml   Output --   Net 240 ml       Lines/Drains/Airways       Peripherally Inserted Central Catheter Line  Duration             PICC Double Lumen 09/21/23 0930 left basilic 8 days                       Physical Exam  Vitals and nursing note reviewed.   Constitutional:       General: He is not in acute distress.     Appearance: Normal appearance. He is well-developed. He is not diaphoretic.   HENT:      Head: Normocephalic and atraumatic.   Eyes:      General:         Right eye: No discharge.         Left eye: No discharge.      Pupils: Pupils are equal, round, and reactive to light.   Neck:      Thyroid: No thyromegaly.      Vascular: No JVD.      Trachea: No tracheal deviation.   Cardiovascular:      Rate and Rhythm: Normal rate and regular rhythm.      Heart sounds: Normal heart sounds, S1 normal and S2 normal. No murmur heard.     Comments: Sternotomy  "C/D/I; no bleeding erythema or drainage    Metallic click  Pulmonary:      Effort: Pulmonary effort is normal. No respiratory distress.      Breath sounds: Normal breath sounds. No wheezing.   Abdominal:      General: There is no distension.      Tenderness: There is no rebound.   Musculoskeletal:      Cervical back: Neck supple.      Right lower leg: No edema.      Left lower leg: No edema.   Skin:     General: Skin is warm and dry.      Findings: No erythema.      Comments: SVG sites C/D/I; no bleeding erythema or drainage   Neurological:      General: No focal deficit present.      Mental Status: He is alert and oriented to person, place, and time.   Psychiatric:         Mood and Affect: Mood normal.         Behavior: Behavior normal.         Thought Content: Thought content normal.            Significant Labs: CMP   Recent Labs   Lab 09/28/23  0522 09/29/23  0532    138   K 4.1 4.1    105   CO2 23 21*   * 121*   BUN 12 14   CREATININE 0.9 0.9   CALCIUM 9.0 8.9   ANIONGAP 8 12   , CBC   Recent Labs   Lab 09/28/23  0522 09/29/23  0532   WBC 8.48 10.31   HGB 9.0* 9.3*   HCT 28.0* 28.8*   * 563*   , Troponin No results for input(s): "TROPONINI" in the last 48 hours., and All pertinent lab results from the last 24 hours have been reviewed.    Significant Imaging: Echocardiogram: Transthoracic echo (TTE) complete (Cupid Only):   Results for orders placed or performed during the hospital encounter of 09/17/23   Echo   Result Value Ref Range    BSA 1.98 m2    LVOT stroke volume 82.76 cm3    LVIDd 5.22 3.5 - 6.0 cm    LV Systolic Volume 86.21 mL    LV Systolic Volume Index 44.2 mL/m2    LVIDs 4.37 (A) 2.1 - 4.0 cm    LV Diastolic Volume 130.82 mL    LV Diastolic Volume Index 67.09 mL/m2    IVS 0.88 0.6 - 1.1 cm    LVOT diameter 1.91 cm    LVOT area 2.9 cm2    FS 16 (A) 28 - 44 %    Left Ventricle Relative Wall Thickness 0.38 cm    Posterior Wall 0.98 0.6 - 1.1 cm    LV mass 177.53 g    LV Mass " Index 91 g/m2    MV Peak E Bob 1.20 m/s    TDI LATERAL 0.06 m/s    TDI SEPTAL 0.08 m/s    E/E' ratio 17.14 m/s    MV Peak A Bob 1.14 m/s    TR Max Bob 2.33 m/s    E/A ratio 1.05     IVRT 72.31 msec    E wave deceleration time 200.37 msec    LV SEPTAL E/E' RATIO 15.00 m/s    LV LATERAL E/E' RATIO 20.00 m/s    LVOT peak bob 1.38 m/s    Left Ventricular Outflow Tract Mean Velocity 1.08 cm/s    Left Ventricular Outflow Tract Mean Gradient 4.99 mmHg    LA size 3.79 cm    Left Atrium Minor Axis 4.68 cm    Left Atrium Major Axis 4.27 cm    RVOT peak VTI 10.8 cm    TAPSE 1.84 cm    RA Major Axis 3.31 cm    AV regurgitation pressure 1/2 time 456.66076972950652 ms    AR Max Bob 4.34 m/s    AV mean gradient 14 mmHg    AV peak gradient 26 mmHg    Ao peak bob 2.54 m/s    Ao VTI 54.00 cm    LVOT peak VTI 28.90 cm    AV valve area 1.53 cm²    AV Velocity Ratio 0.54     AV index (prosthetic) 0.54     SARAH by Velocity Ratio 1.56 cm²    Mr max bob 4.42 m/s    MV stenosis pressure 1/2 time 58.11 ms    MV valve area p 1/2 method 3.79 cm2    TV mean gradient 19 mmHg    Triscuspid Valve Regurgitation Peak Gradient 22 mmHg    PV mean gradient 1 mmHg    RVOT peak bob 0.58 m/s    Ao root annulus 2.71 cm    STJ 2.59 cm    Ascending aorta 2.36 cm    IVC diameter 1.57 cm    Mean e' 0.07 m/s    ZLVIDS 1.94     ZLVIDD -0.63     LA Volume Index 22.9 mL/m2    LA volume 44.60 cm3    LA WIDTH 3.1 cm    RA Width 2.2 cm    TV resting pulmonary artery pressure 25 mmHg    RV TB RVSP 5 mmHg    Est. RA pres 3 mmHg    Narrative      Left Ventricle: The left ventricle is normal in size. Ventricular mass   is normal. Normal wall thickness. regional wall motion abnormalities   present. There is mildly reduced systolic function with a visually   estimated ejection fraction of 40 - 45%. Grade II diastolic dysfunction.    Right Ventricle: Normal right ventricular cavity size. Wall thickness   is normal. Right ventricle wall motion  is normal. Systolic function is    normal.    Aortic Valve: There is mild to moderate aortic regurgitation.    Pulmonary Artery: The estimated pulmonary artery systolic pressure is   25 mmHg.    IVC/SVC: Normal venous pressure at 3 mmHg.     , EKG: Reviewed, and X-Ray: CXR: X-Ray Chest 1 View (CXR):   Results for orders placed or performed during the hospital encounter of 09/17/23   X-Ray Chest 1 View    Narrative    EXAMINATION:  XR CHEST 1 VIEW    CLINICAL HISTORY:  PICC placement;    TECHNIQUE:  Single frontal view of the chest was performed.    COMPARISON:  09/21/2023    FINDINGS:  Left-sided PICC line tip overlies the SVC in good position.  In comparison to the prior study, there is no adverse interval changes      Impression    In comparison to the prior study, there is no adverse interval changes      Electronically signed by: Elliott España MD  Date:    09/21/2023  Time:    10:10    and X-Ray Chest PA and Lateral (CXR): No results found for this visit on 09/17/23.

## 2023-09-29 NOTE — PROGRESS NOTES
[facility] - Telemetry ([facility])  Cardiothoracic Surgery  Progress Note    Patient Name: Mikie Alcazar  MRN: [id number]  Admission Date: 9/17/2023  Hospital Length of Stay: 12 days  Code Status: Full Code   Attending Physician: Nishant Douglas MD   Referring Provider: Self, Aaareferral  Principal Problem:NSTEMI (non-ST elevated myocardial infarction)            Subjective:     Post-Op Info:  Procedure(s) (LRB):  CORONARY ARTERY BYPASS GRAFT (CABG) (N/A)  REPLACEMENT-VALVE-AORTIC (N/A)  SURGICAL PROCUREMENT, VEIN, ENDOSCOPIC (Left)  BLOCK, NERVE, INTERCOSTAL, 2 OR MORE (N/A)  ECHOCARDIOGRAM,TRANSESOPHAGEAL (N/A)   10 Days Post-Op     Interval History:  The patient is postop day 10 status post coronary artery bypass grafting times 5 and aortic valve replacement with a 19 mm Saint Dany mechanical valve.    ROS  Medications:  Continuous Infusions:   heparin (porcine) in D5W 13 Units/kg/hr (09/29/23 0309)     Scheduled Meds:   ascorbic acid (vitamin C)  500 mg Oral BID    aspirin  81 mg Oral Daily    atorvastatin  80 mg Oral Daily    cyanocobalamin  1,000 mcg Oral Daily    docusate sodium  100 mg Oral BID    ferrous sulfate  1 tablet Oral Daily    folic acid  1 mg Oral Daily    guaiFENesin  1,200 mg Oral BID    magnesium hydroxide 400 mg/5 ml  5 mL Oral BID    metoprolol tartrate  50 mg Oral BID    pantoprazole  40 mg Oral Daily    polyethylene glycol  17 g Oral Daily    [START ON 9/30/2023] warfarin  2 mg Oral Daily    warfarin  3 mg Oral Once     PRN Meds:0.9%  NaCl infusion (for blood administration), acetaminophen, albumin human 5%, aluminum-magnesium hydroxide-simethicone, calcium gluconate IVPB, calcium gluconate IVPB, calcium gluconate IVPB, dextrose 10%, glucagon (human recombinant), influenza, insulin aspart U-100, lactated ringers, magnesium sulfate IVPB, melatonin, metoclopramide HCl, ondansetron, pneumoc 20-leslee conj-dip cr(PF), potassium chloride in water, potassium chloride in water, potassium  chloride in water, traMADoL     Objective:     Vital Signs (Most Recent):  Temp: 97.7 °F (36.5 °C) (09/29/23 1131)  Pulse: 89 (09/29/23 1131)  Resp: 20 (09/29/23 1131)  BP: 118/77 (09/29/23 1131)  SpO2: 100 % (09/29/23 1131) Vital Signs (24h Range):  Temp:  [97.7 °F (36.5 °C)-98.7 °F (37.1 °C)] 97.7 °F (36.5 °C)  Pulse:  [] 89  Resp:  [16-20] 20  SpO2:  [95 %-100 %] 100 %  BP: (116-129)/(64-77) 118/77     Weight: 78.3 kg (172 lb 9.9 oz)  Body mass index is 26.25 kg/m².    SpO2: 100 %       Intake/Output - Last 3 Shifts         09/27 0700  09/28 0659 09/28 0700  09/29 0659 09/29 0700  09/30 0659    P.O.   240    I.V. (mL/kg)       Total Intake(mL/kg)   240 (3.1)    Net   +240           Urine Occurrence 2 x              Lines/Drains/Airways       Peripherally Inserted Central Catheter Line  Duration             PICC Double Lumen 09/21/23 0930 left basilic 8 days                     Physical Exam  Constitutional:       Appearance: Normal appearance.   HENT:      Head: Normocephalic and atraumatic.      Nose: Nose normal.   Cardiovascular:      Rate and Rhythm: Tachycardia present.      Pulses: Normal pulses.      Heart sounds: Normal heart sounds.   Pulmonary:      Effort: Pulmonary effort is normal.      Breath sounds: Normal breath sounds.   Abdominal:      General: Abdomen is flat. Bowel sounds are normal.      Palpations: Abdomen is soft.   Musculoskeletal:      Right lower leg: No edema.      Left lower leg: No edema.   Skin:     General: Skin is warm and dry.   Neurological:      Mental Status: He is alert and oriented to person, place, and time.   Psychiatric:         Behavior: Behavior normal.            Significant Labs:  All pertinent labs from the last 24 hours have been reviewed.    Significant Diagnostics:  I have reviewed all pertinent imaging results/findings within the past 24 hours.    Assessment/Plan:     S/P CABG x 5  09/20/2023  The patient is postop day 1 status post coronary artery bypass  grafting x5 and aortic valve replacement with a 19 mm Saint Dany mechanical valve.  Overall the patient is doing well.      Neuro:  Patient is awake appropriate and follows commands.  Patient is currently sedated with Precedex.  Will wean Precedex to off.   Cardiac:  Patient is hemodynamically stable.  With excellent cardiac output and cardiac index.  Patient is on low-dose vaso active meds.  Wean drips to off.  Will start metoprolol once drips are off.  Respiratory:  Patient is being weaned to extubation.  Patient is an active smoker Start nebs and Mucomyst.  Continue pulmonary toileting.  Continue incentive spirometer.    GI:  Patient is currently NPO.  Will start p.o. intake once extubated.    Renal:  Patient has good urine output and creatinine is 1.2.  Will start Lasix for diuresis.  Endocrine:  Patient required insulin drip for glucose controlled.  Will convert to sliding scale.    Id:  Patient had a T-max of 102°.  Most likely due to stress of surgery.  Will continue to follow.  White count is 9.  Patient is on manny op antibiotics.  Heme: Hematocrit is 17.  Patient is being transfused.  Platelet count is 74.  No evidence of active bleeding.  Will follow platelet count.  Will start patient on Coumadin anticoagulation for mechanical valve.  Activities:  Patient is currently in bed.  Will advance activities as tolerated once extubated.  Line tubes and drains:  Patient has an ETT, right IJ [place] and Cordis, right groin A-line, chest tubes, Ugalde catheter, pacer wires, and saphenectomy site DARON.    09/21/2023     The patient is postop day 2 status post coronary artery bypass grafting x5 and aortic valve replacement with a 19 mm Saint Dany mechanical valve.  Overall the patient is doing well.    Neuro:  Patient is awake alert and oriented x3.  Pain is well controlled with current pain management.  Cardiac:  The patient is off all drips.  Patient is hemodynamically stable.  Continue metoprolol.  Respiratory:  Patient  was extubated yesterday.  Continue pulmonary toileting.  Continue incentive spirometer.   GI:  Patient is tolerating p.o. intake.  Patient had episodes of nausea yesterday which have abated.  Advance patient's diet as tolerated.    Renal:  Patient has good urine output.  Creatinine is 0.9.  Continue Lasix for diuresis.  Endocrine:  Glucose is controlled with sliding scale insulin.    Id:  T-max is 101.8 patient is currently afebrile.  White count is 17.  Most likely due to stress of surgery.  Continue to follow.  Heme:  Hematocrit is 24.5.  Platelet count is 89.  INR is 1.5.  Patient is started on Coumadin for anticoagulation of mechanical valve.  Activities:  Patient will be out of bed to chair.  Advance activities as tolerated.  Line tubes and drains:  Patient has pacer wires, right IJ Cordis and Ugalde catheter.  Will place PICC line and discontinue Cordis.    09/22/2023   The patient is postop day 3 status post coronary artery bypass grafting x5 and aortic valve replacement with a 19 mm Saint Dany mechanical valve.  Overall the patient is doing well.  Patient has been transferred to telemetry.    Neuro:  Patient is awake alert and oriented x3.  Pain is well controlled with current pain regimen.    Cardiac:  Patient is hemodynamically stable.  Continue metoprolol.    Respiratory:  Continue pulmonary toileting.  Continue incentive spirometer.    GI:  Patient is tolerating p.o. intake.  And tolerating a regular diet.    Renal:  Patient has good urine output.  Creatinine is 0.9.  Continue Lasix.    Endocrine:  Glucose is well controlled.    Id:  Patient is afebrile.  White count is 16.  Continue to follow.    Heme:  Hematocrit is 24 and platelet count is 114.  INR is 3.4.  Patient is currently on Coumadin for anticoagulation.  Activities:  Patient is out of bed and ambulating in the hallways.  Continue to advance as tolerated.  Line tubes and drains:  Patient has a PICC line and pacer wires.    09/23/2023   The  patient is postop day 4 status post coronary artery bypass grafting x5 and aortic valve replacement with a 19 mm Saint Dany mechanical valve.  Overall the patient is doing well.  Anticipate discharge to home with home health in the next 48-72 hours.    Neuro:  Patient is awake alert and oriented x3 pain is well controlled with pain management.  Neuro exam is nonfocal.    Cardiac:  Patient is hemodynamically stable.  Continue metoprolol.    Respiratory:  Continue pulmonary toileting.  Continue incentive spirometer.  GI patient is tolerating p.o. intake.  Will increase bowel regimen for bowel movements.  Renal: Patient has good urine output.  Creatinine is 0.9.  Patient is still appears fluid overloaded.  Continue Lasix.  Endocrine: Glucose is well controlled.    Id:  Patient is afebrile.  White count is down to 12.  Continue to follow.    Heme:  Hematocrit is 27.  Platelet count is 186 and INR is 1.9.  INR goal is between 2.5 and 3.  Continue Coumadin.    Activities:  Patient is out of bed and ambulating in the hallways.  Advance as tolerated.  Line tubes and drains:  Patient has a PICC line.      09/24/2023   The patient is postop day 5 status post coronary artery bypass grafting x5 and aortic valve replacement with a 19 mm Saint Dany mechanical valve.  Overall the patient is doing well.  Anticipate discharge home in the next 24-48 hours.    Neuro:  Patient is awake alert and oriented x3 pain is well controlled with current pain management.  Neuro exam is nonfocal.    Cardiac:  Patient is hemodynamically stable.  Continue metoprolol  Respiratory:  Continue pulmonary toileting.  Continue incentive spirometer.  Patient continues to have sputum production.  GI:  Patient is tolerating a regular diet.  Patient has had bowel movements.  Renal: Patient has good urine output.  Creatinine is stable.  Patient is appears to be euvolemic.  Will discontinue Lasix.    Endocrine:  Glucose is well controlled.  Id: Patient is  afebrile white count normal.  Heme:  Hematocrit is 27 and platelet count is 229.  INR is subtherapeutic 1.5.  Continue Coumadin.  Goal is a INR level of 2.5-3.  Activities: Patient is out of bed and ambulating in the hallways.  Advance as tolerated.    Line tubes and drains:  Patient has a PICC line.    09/25/2023   The patient is postop day 6 status post coronary artery bypass grafting x5 and aortic valve replacement with a 19 mm Saint Dany mechanical valve.  Overall the patient is doing well.  Patient is awaiting attainment of a therapeutic INR.  Anticipate discharge in the next 48-72 hours.    Neuro:  Patient is awake alert and oriented x3.  Pain is well controlled current pain management.  Neuro exam is nonfocal.    Cardiac:  Patient is stable.  Continue metoprolol.    Respiratory:  Patient has good sats on room air.  Continue pulmonary toileting.  Continue incentive spirometer.    GI:  Patient is tolerating a regular diet having bowel movements.    Renal:  Patient has good urine output.    Endocrine:  Glucose is well controlled.    Id:  Patient is afebrile white count is normal.    Heme:  Hematocrit is 27 and platelet count is 298.  INR is now normal at 1.2.  Patient is on bridging heparin.  Will titrate heparin to an APTT greater between 40 and 50.  Therapeutic goal of INR is 2.5-3.  Activities: Patient is out of bed and ambulating in the hallways.    Line tubes and drains:  Patient has a PICC line.    09/26/2023   The patient is postop day 7 status post coronary artery bypass grafting x5 and aortic valve replacement with a 19 mm Saint Dany mechanical valve.  Overall the patient is doing well.  Patient's INR is still subtherapeutic.  Patient is on heparin in the interim.  Anticipate discharge in the next 48-72 hours.  Neuro:  Patient is awake alert and oriented x3.  Pain is well controlled with current pain management.  Neuro exam is nonfocal.    Cardiac:  Patient remains stable.  Continue metoprolol.     Respiratory: Patient has good sats on room air.  Continue pulmonary toileting.  Continue incentive spirometer.    GI:  Patient is tolerating a regular diet and having bowel movements.    Renal:  Patient has good urine output.  Endocrine:  Glucose is well controlled.    Id:  Patient's white count is normal.    Heme: Hematocrit is 28 and INR is 1.2 patient is on a heparin drip.  Coumadin dose has been increased.  INR goal is 2.5-3.  Activities:  Patient is out of bed and ambulating in the hallways.    Line tubes and drains:  Patient has a PICC line.    09/27/2023   The patient is postop [id number] post coronary artery bypass grafting x5 and aortic valve replacement with a 19 mm Saint Dany mechanical valve.  Overall the patient is doing well.  Patient is awaiting INR being therapeutic.  Anticipate discharge in the next 48-72 hours.  Neuro:  Patient is awake alert and oriented x3.  Pain is well controlled with current pain management.  Neuro exam is nonfocal.    Cardiac:  Patient's blood pressure is trending higher.  Will increase metoprolol to 50 mg b.i.d..  Respiratory: Patient has good sats on room air.  Continue pulmonary toileting.  Continue incentive spirometer.  GI:  Patient is tolerating a regular diet and having bowel movements.    Renal:  Patient has good urine output.    Endocrine:  Glucose is well controlled.  Id:  Patient is afebrile white count is normal.    Heme:  Hematocrit is 28.6 and platelet count is 428.  INR is 1.6.  A PTT is therapeutic at 40.  INR goal is 2.5-3.  Activities:  Patient is out of bed and ambulating in the hallways.  Advance as tolerated.    Line tubes and drains:  Patient has a PICC line.    09/28/2023   The patient is postop day 9 status post coronary artery bypass grafting x5 and aortic valve replacement with a 19 mm Saint Dany mechanical valve.  Overall the patient is doing well.  INR is 2.3 today.  Anticipate discharge that is 4872 hours.    Neuro:  Patient is awake alert and  oriented x3.  Pain is well controlled.  Neuro exam is nonfocal.    Cardiac:  Patient is hemodynamically stable.  Continue metoprolol 50 mg b.i.d..    Respiratory: Patient has good sats on room air.  Continue pulmonary toileting.  Continue incentive spirometer.    GI:  Patient is tolerating a regular diet and having bowel movements.    Renal: Patient has good urine output.    Endocrine:  Glucose is well controlled.    Id:  Patient is afebrile white count is 8   Heme:  Hematocrit is 28.  INR is 2.3.  Platelet count is 452.  Continue Coumadin.  INR goal is 2.5-3.0.  Patient is currently on bridging heparin.  Activities:  Patient is out of bed and ambulating.  Advance as tolerated.    Line tubes and drains:  Patient has a PICC line.    09/29/2023   Patient is postop day 10 status post coronary artery bypass grafting x5 and aortic valve replacement with a 19 mm Saint Dany mechanical valve.  Overall patient is doing well.  Patient awaits therapeutic INR prior to discharge.    Neuro:  Patient is awake alert and oriented x3.  Pain is well controlled.  Neuro exam is nonfocal.    Cardiac: Patient is stable.  Continue metoprolol.    Respiratory:  Patient has good sats on room air.  Continue pulmonary toileting.  Continue incentive spirometer.    GI:  Patient is tolerating a regular diet and having bowel movements.    Renal:  Patient has good urine output.  Creatinine is normal.    Endocrine: Glucose is well controlled.  Id:  Patient is afebrile.  White count is 10.3.  Heme:  Hematocrit is 28.8 and platelet count is 563.  INR is 1.8.  Patient needs INR goal of 2.5-3.  Continue Coumadin.  Activities:  Patient is out of bed and ambulating.  Advance as tolerated.  Line tubes and drains:  Patient has a PICC line.      CAD, multiple vessel  The patient is a 43-year-old male with critical left main coronary artery disease and severe aortic regurgitation by cardiac catheterization.  The patient is a candidate for urgent coronary  artery bypass grafting and aortic valve replacement.  The risks and benefits of surgery were explained to the patient.  The patient understands the risks and benefits of surgery and has agreed to proceed with urgent aortic valve replacement and coronary artery bypass grafting.  The type of aortic valve to be used was discussed with the patient.  The patient stated a preference for a bioprosthetic valve in order to avoid the need for chronic anticoagulation.  The patient understands that a bioprosthetic valve especially in a young patient has a limited lifespan.        Nishant Douglas MD  Cardiothoracic Surgery  [facility]

## 2023-09-30 LAB
ANION GAP SERPL CALC-SCNC: 10 MMOL/L (ref 8–16)
APTT PPP: 38.4 SEC (ref 21–32)
APTT PPP: 44.1 SEC (ref 21–32)
APTT PPP: 50 SEC (ref 21–32)
BASOPHILS # BLD AUTO: 0.06 K/UL (ref 0–0.2)
BASOPHILS NFR BLD: 0.6 % (ref 0–1.9)
BUN SERPL-MCNC: 15 MG/DL (ref 6–20)
CALCIUM SERPL-MCNC: 9 MG/DL (ref 8.7–10.5)
CHLORIDE SERPL-SCNC: 108 MMOL/L (ref 95–110)
CO2 SERPL-SCNC: 23 MMOL/L (ref 23–29)
CREAT SERPL-MCNC: 0.9 MG/DL (ref 0.5–1.4)
DIFFERENTIAL METHOD: ABNORMAL
EOSINOPHIL # BLD AUTO: 0.3 K/UL (ref 0–0.5)
EOSINOPHIL NFR BLD: 2.9 % (ref 0–8)
ERYTHROCYTE [DISTWIDTH] IN BLOOD BY AUTOMATED COUNT: 17.3 % (ref 11.5–14.5)
EST. GFR  (NO RACE VARIABLE): >60 ML/MIN/1.73 M^2
GLUCOSE SERPL-MCNC: 103 MG/DL (ref 70–110)
HCT VFR BLD AUTO: 27.8 % (ref 40–54)
HGB BLD-MCNC: 9 G/DL (ref 14–18)
IMM GRANULOCYTES # BLD AUTO: 0.04 K/UL (ref 0–0.04)
IMM GRANULOCYTES NFR BLD AUTO: 0.4 % (ref 0–0.5)
INR PPP: 1.7 (ref 0.8–1.2)
LYMPHOCYTES # BLD AUTO: 1.9 K/UL (ref 1–4.8)
LYMPHOCYTES NFR BLD: 18 % (ref 18–48)
MCH RBC QN AUTO: 29.8 PG (ref 27–31)
MCHC RBC AUTO-ENTMCNC: 32.4 G/DL (ref 32–36)
MCV RBC AUTO: 92 FL (ref 82–98)
MONOCYTES # BLD AUTO: 0.9 K/UL (ref 0.3–1)
MONOCYTES NFR BLD: 8.5 % (ref 4–15)
NEUTROPHILS # BLD AUTO: 7.2 K/UL (ref 1.8–7.7)
NEUTROPHILS NFR BLD: 69.6 % (ref 38–73)
NRBC BLD-RTO: 0 /100 WBC
PLATELET # BLD AUTO: 570 K/UL (ref 150–450)
PMV BLD AUTO: 8.6 FL (ref 9.2–12.9)
POCT GLUCOSE: 120 MG/DL (ref 70–110)
POCT GLUCOSE: 125 MG/DL (ref 70–110)
POCT GLUCOSE: 93 MG/DL (ref 70–110)
POCT GLUCOSE: 99 MG/DL (ref 70–110)
POTASSIUM SERPL-SCNC: 4.4 MMOL/L (ref 3.5–5.1)
PROTHROMBIN TIME: 17.2 SEC (ref 9–12.5)
RBC # BLD AUTO: 3.02 M/UL (ref 4.6–6.2)
SODIUM SERPL-SCNC: 141 MMOL/L (ref 136–145)
WBC # BLD AUTO: 10.33 K/UL (ref 3.9–12.7)

## 2023-09-30 PROCEDURE — 80048 BASIC METABOLIC PNL TOTAL CA: CPT | Performed by: THORACIC SURGERY (CARDIOTHORACIC VASCULAR SURGERY)

## 2023-09-30 PROCEDURE — 25000003 PHARM REV CODE 250: Performed by: NURSE PRACTITIONER

## 2023-09-30 PROCEDURE — 94761 N-INVAS EAR/PLS OXIMETRY MLT: CPT

## 2023-09-30 PROCEDURE — 99232 SBSQ HOSP IP/OBS MODERATE 35: CPT | Mod: ,,, | Performed by: INTERNAL MEDICINE

## 2023-09-30 PROCEDURE — 63600175 PHARM REV CODE 636 W HCPCS: Performed by: THORACIC SURGERY (CARDIOTHORACIC VASCULAR SURGERY)

## 2023-09-30 PROCEDURE — 85610 PROTHROMBIN TIME: CPT | Performed by: INTERNAL MEDICINE

## 2023-09-30 PROCEDURE — 85730 THROMBOPLASTIN TIME PARTIAL: CPT | Mod: 91 | Performed by: THORACIC SURGERY (CARDIOTHORACIC VASCULAR SURGERY)

## 2023-09-30 PROCEDURE — 97116 GAIT TRAINING THERAPY: CPT | Mod: CQ

## 2023-09-30 PROCEDURE — 85025 COMPLETE CBC W/AUTO DIFF WBC: CPT | Performed by: INTERNAL MEDICINE

## 2023-09-30 PROCEDURE — 21400001 HC TELEMETRY ROOM

## 2023-09-30 PROCEDURE — 99232 PR SUBSEQUENT HOSPITAL CARE,LEVL II: ICD-10-PCS | Mod: ,,, | Performed by: INTERNAL MEDICINE

## 2023-09-30 PROCEDURE — 25000003 PHARM REV CODE 250: Performed by: THORACIC SURGERY (CARDIOTHORACIC VASCULAR SURGERY)

## 2023-09-30 PROCEDURE — 25000003 PHARM REV CODE 250: Performed by: INTERNAL MEDICINE

## 2023-09-30 RX ORDER — WARFARIN 2 MG/1
4 TABLET ORAL ONCE
Status: COMPLETED | OUTPATIENT
Start: 2023-09-30 | End: 2023-09-30

## 2023-09-30 RX ORDER — WARFARIN 2 MG/1
2 TABLET ORAL DAILY
Status: DISCONTINUED | OUTPATIENT
Start: 2023-10-01 | End: 2023-10-01

## 2023-09-30 RX ADMIN — GUAIFENESIN 1200 MG: 600 TABLET, EXTENDED RELEASE ORAL at 08:09

## 2023-09-30 RX ADMIN — PANTOPRAZOLE SODIUM 40 MG: 40 TABLET, DELAYED RELEASE ORAL at 08:09

## 2023-09-30 RX ADMIN — ACETAMINOPHEN 650 MG: 325 TABLET ORAL at 03:09

## 2023-09-30 RX ADMIN — HEPARIN SODIUM 13 UNITS/KG/HR: 10000 INJECTION, SOLUTION INTRAVENOUS at 03:09

## 2023-09-30 RX ADMIN — ATORVASTATIN CALCIUM 80 MG: 40 TABLET, FILM COATED ORAL at 08:09

## 2023-09-30 RX ADMIN — ASPIRIN 81 MG: 81 TABLET, COATED ORAL at 08:09

## 2023-09-30 RX ADMIN — ACETAMINOPHEN 650 MG: 325 TABLET ORAL at 08:09

## 2023-09-30 RX ADMIN — TRAMADOL HYDROCHLORIDE 50 MG: 50 TABLET, COATED ORAL at 11:09

## 2023-09-30 RX ADMIN — DOCUSATE SODIUM 100 MG: 100 CAPSULE, LIQUID FILLED ORAL at 08:09

## 2023-09-30 RX ADMIN — FERROUS SULFATE TAB 325 MG (65 MG ELEMENTAL FE) 1 EACH: 325 (65 FE) TAB at 08:09

## 2023-09-30 RX ADMIN — MAGNESIUM HYDROXIDE 400 MG: 400 SUSPENSION ORAL at 08:09

## 2023-09-30 RX ADMIN — Medication 6 MG: at 08:09

## 2023-09-30 RX ADMIN — METOPROLOL TARTRATE 50 MG: 50 TABLET, FILM COATED ORAL at 08:09

## 2023-09-30 RX ADMIN — OXYCODONE HYDROCHLORIDE AND ACETAMINOPHEN 500 MG: 500 TABLET ORAL at 08:09

## 2023-09-30 RX ADMIN — FOLIC ACID 1 MG: 1 TABLET ORAL at 09:09

## 2023-09-30 RX ADMIN — WARFARIN SODIUM 4 MG: 2 TABLET ORAL at 04:09

## 2023-09-30 RX ADMIN — CYANOCOBALAMIN TAB 1000 MCG 1000 MCG: 1000 TAB at 08:09

## 2023-09-30 RX ADMIN — MAGNESIUM HYDROXIDE 400 MG: 400 SUSPENSION ORAL at 09:09

## 2023-09-30 NOTE — SUBJECTIVE & OBJECTIVE
Interval History:     Review of Systems   Constitutional: Negative. Negative for weight gain.   HENT: Negative.     Eyes: Negative.    Cardiovascular: Negative.  Negative for chest pain, leg swelling and palpitations.   Respiratory: Negative.  Negative for shortness of breath.    Endocrine: Negative.    Hematologic/Lymphatic: Negative.    Skin: Negative.    Musculoskeletal:  Negative for muscle weakness.   Gastrointestinal: Negative.    Genitourinary: Negative.    Neurological: Negative.  Negative for dizziness.   Psychiatric/Behavioral: Negative.     Allergic/Immunologic: Negative.    All other systems reviewed and are negative.    Objective:     Vital Signs (Most Recent):  Temp: 99 °F (37.2 °C) (09/30/23 0731)  Pulse: 98 (09/30/23 0731)  Resp: 18 (09/30/23 0731)  BP: 121/63 (09/30/23 0731)  SpO2: 97 % (09/30/23 0731) Vital Signs (24h Range):  Temp:  [97.7 °F (36.5 °C)-99 °F (37.2 °C)] 99 °F (37.2 °C)  Pulse:  [] 98  Resp:  [16-20] 18  SpO2:  [96 %-100 %] 97 %  BP: (112-125)/(60-77) 121/63     Weight: 78.3 kg (172 lb 9.9 oz)  Body mass index is 26.25 kg/m².     SpO2: 97 %         Intake/Output Summary (Last 24 hours) at 9/30/2023 1057  Last data filed at 9/29/2023 1819  Gross per 24 hour   Intake 360 ml   Output no documentation   Net 360 ml       Lines/Drains/Airways       Peripherally Inserted Central Catheter Line       Name Duration    PICC Double Lumen 09/21/23 0930 left basilic 9 days                       Physical Exam  Vitals and nursing note reviewed.   Constitutional:       Appearance: He is well-developed.   HENT:      Head: Normocephalic and atraumatic.   Eyes:      Conjunctiva/sclera: Conjunctivae normal.      Pupils: Pupils are equal, round, and reactive to light.   Cardiovascular:      Rate and Rhythm: Normal rate and regular rhythm.      Pulses: Intact distal pulses.      Heart sounds: Normal heart sounds.   Pulmonary:      Effort: Pulmonary effort is normal.      Breath sounds: Normal breath  sounds.   Abdominal:      General: Bowel sounds are normal.      Palpations: Abdomen is soft.   Musculoskeletal:      Cervical back: Normal range of motion and neck supple.   Skin:     General: Skin is warm and dry.   Neurological:      Mental Status: He is alert and oriented to person, place, and time.            Significant Labs: All pertinent lab results from the last 24 hours have been reviewed.    Significant Imaging: X-Ray: CXR: X-Ray Chest 1 View (CXR):   Results for orders placed or performed during the hospital encounter of 09/17/23   X-Ray Chest 1 View    Narrative    EXAMINATION:  XR CHEST 1 VIEW    CLINICAL HISTORY:  PICC placement;    TECHNIQUE:  Single frontal view of the chest was performed.    COMPARISON:  09/21/2023    FINDINGS:  Left-sided PICC line tip overlies the SVC in good position.  In comparison to the prior study, there is no adverse interval changes      Impression    In comparison to the prior study, there is no adverse interval changes      Electronically signed by: Elliott España MD  Date:    09/21/2023  Time:    10:10

## 2023-09-30 NOTE — PLAN OF CARE
A208/A208 NAEL Alcazar is a 43 y.o.male admitted on 9/17/2023 for NSTEMI (non-ST elevated myocardial infarction)   Code Status: Full Code MRN: [id number]   Review of patient's allergies indicates:  No Known Allergies  Past Medical History:   Diagnosis Date    Hypertension 9/18/2023      PRN meds    0.9%  NaCl infusion (for blood administration), , Q24H PRN  acetaminophen, 650 mg, Q6H PRN  albumin human 5%, 25 g, PRN  aluminum-magnesium hydroxide-simethicone, 30 mL, Q6H PRN  calcium gluconate IVPB, 1 g, PRN  calcium gluconate IVPB, 2 g, PRN  calcium gluconate IVPB, 3 g, PRN  dextrose 10%, 12.5 g, PRN  glucagon (human recombinant), 1 mg, PRN  influenza, 0.5 mL, vaccine x 1 dose  insulin aspart U-100, 0-10 Units, Q6H PRN  lactated ringers, 1,000 mL, PRN  magnesium sulfate IVPB, 4 g, PRN  melatonin, 6 mg, Nightly PRN  metoclopramide HCl, 5 mg, Q6H PRN  ondansetron, 4 mg, Q12H PRN  pneumoc 20-leslee conj-dip cr(PF), 0.5 mL, vaccine x 1 dose  potassium chloride in water, 20 mEq, PRN  potassium chloride in water, 40 mEq, PRN  potassium chloride in water, 60 mEq, PRN  traMADoL, 50 mg, Q6H PRN      Chart check completed. Will continue plan of care.      Orientation: oriented x 4  Isabel Coma Scale Score: 15     Lead Monitored: Lead II Rhythm: normal sinus rhythm    Cardiac/Telemetry Box Number: [id number]  VTE Required Core Measure: Pharmacological prophylaxis initiated/maintained Last Bowel Movement: 09/29/23  Diet Cardiac [facility]; Coumadin Restriction  Voiding Characteristics: voids spontaneously without difficulty  Raphael Score: 22  Fall Risk Score: 7  Accucheck []   Freq?      Lines/Drains/Airways       Peripherally Inserted Central Catheter Line  Duration             PICC Double Lumen 09/21/23 0930 left basilic 9 days                        Problem: Adjustment to Illness (Acute Coronary Syndrome)  Goal: Optimal Adaptation to Illness  Outcome: Ongoing, Progressing     Problem: Dysrhythmia (Acute Coronary  Syndrome)  Goal: Normalized Cardiac Rhythm  Outcome: Ongoing, Progressing     Problem: Cardiac-Related Pain (Acute Coronary Syndrome)  Goal: Absence of Cardiac-Related Pain  Outcome: Ongoing, Progressing     Problem: Hemodynamic Instability (Acute Coronary Syndrome)  Goal: Effective Cardiac Pump Function  Outcome: Ongoing, Progressing     Problem: Tissue Perfusion (Acute Coronary Syndrome)  Goal: Adequate Tissue Perfusion  Outcome: Ongoing, Progressing     Problem: Arrhythmia/Dysrhythmia (Cardiac Catheterization)  Goal: Stable Heart Rate and Rhythm  Outcome: Ongoing, Progressing     Problem: Bleeding (Cardiac Catheterization)  Goal: Absence of Bleeding  Outcome: Ongoing, Progressing     Problem: Contrast-Induced Injury Risk (Cardiac Catheterization)  Goal: Absence of Contrast-Induced Injury  Outcome: Ongoing, Progressing     Problem: Embolism (Cardiac Catheterization)  Goal: Absence of Embolism Signs and Symptoms  Outcome: Ongoing, Progressing     Problem: Ongoing Anesthesia/Sedation Effects (Cardiac Catheterization)  Goal: Anesthesia/Sedation Recovery  Outcome: Ongoing, Progressing     Problem: Pain (Cardiac Catheterization)  Goal: Acceptable Pain Control  Outcome: Ongoing, Progressing     Problem: Vascular Access Protection (Cardiac Catheterization)  Goal: Absence of Vascular Access Complication  Outcome: Ongoing, Progressing     Problem: Adult Inpatient Plan of Care  Goal: Plan of Care Review  Outcome: Ongoing, Progressing  Goal: Patient-Specific Goal (Individualized)  Outcome: Ongoing, Progressing  Goal: Absence of Hospital-Acquired Illness or Injury  Outcome: Ongoing, Progressing  Goal: Optimal Comfort and Wellbeing  Outcome: Ongoing, Progressing  Goal: Readiness for Transition of Care  Outcome: Ongoing, Progressing     Problem: Chest Pain  Goal: Resolution of Chest Pain Symptoms  Outcome: Ongoing, Progressing     Problem: Fall Injury Risk  Goal: Absence of Fall and Fall-Related Injury  Outcome: Ongoing,  Progressing     Problem: Infection  Goal: Absence of Infection Signs and Symptoms  Outcome: Ongoing, Progressing     Problem: Skin Injury Risk Increased  Goal: Skin Health and Integrity  Outcome: Ongoing, Progressing

## 2023-09-30 NOTE — PROGRESS NOTES
[facility] - Telemetry [facility])  Cardiothoracic Surgery  Progress Note    Patient Name: Mikie Alcazar  MRN: [id number]  Admission Date: 9/17/2023  Hospital Length of Stay: 13 days  Code Status: Full Code   Attending Physician: Nishant Douglas MD   Referring Provider: Self, Aaareferral  Principal Problem:NSTEMI (non-ST elevated myocardial infarction)            Subjective:     Post-Op Info:  Procedure(s) (LRB):  CORONARY ARTERY BYPASS GRAFT (CABG) (N/A)  REPLACEMENT-VALVE-AORTIC (N/A)  SURGICAL PROCUREMENT, VEIN, ENDOSCOPIC (Left)  BLOCK, NERVE, INTERCOSTAL, 2 OR MORE (N/A)  ECHOCARDIOGRAM,TRANSESOPHAGEAL (N/A)   11 Days Post-Op     Interval History: The patient is postop day 11 status post coronary bypass grafting x5 and aortic valve replacement with a 19 mm Saint Dany mechanical valve.    ROS  Medications:  Continuous Infusions:   heparin (porcine) in D5W 14 Units/kg/hr (09/30/23 0715)     Scheduled Meds:   ascorbic acid (vitamin C)  500 mg Oral BID    aspirin  81 mg Oral Daily    atorvastatin  80 mg Oral Daily    cyanocobalamin  1,000 mcg Oral Daily    docusate sodium  100 mg Oral BID    ferrous sulfate  1 tablet Oral Daily    folic acid  1 mg Oral Daily    guaiFENesin  1,200 mg Oral BID    magnesium hydroxide 400 mg/5 ml  5 mL Oral BID    metoprolol tartrate  50 mg Oral BID    pantoprazole  40 mg Oral Daily    polyethylene glycol  17 g Oral Daily    [START ON 10/1/2023] warfarin  2 mg Oral Daily     PRN Meds:0.9%  NaCl infusion (for blood administration), acetaminophen, albumin human 5%, aluminum-magnesium hydroxide-simethicone, calcium gluconate IVPB, calcium gluconate IVPB, calcium gluconate IVPB, dextrose 10%, glucagon (human recombinant), influenza, insulin aspart U-100, lactated ringers, magnesium sulfate IVPB, melatonin, metoclopramide HCl, ondansetron, pneumoc 20-leslee conj-dip cr(PF), potassium chloride in water, potassium chloride in water, potassium chloride in water, traMADoL     Objective:      Vital Signs (Most Recent):  Temp: 98 °F (36.7 °C) (09/30/23 1508)  Pulse: 96 (09/30/23 1508)  Resp: 18 (09/30/23 1508)  BP: 131/70 (09/30/23 1508)  SpO2: 97 % (09/30/23 1508) Vital Signs (24h Range):  Temp:  [97.9 °F (36.6 °C)-99 °F (37.2 °C)] 98 °F (36.7 °C)  Pulse:  [] 96  Resp:  [16-18] 18  SpO2:  [96 %-99 %] 97 %  BP: (112-131)/(60-70) 131/70     Weight: 78.3 kg (172 lb 9.9 oz)  Body mass index is 26.25 kg/m².    SpO2: 97 %       Intake/Output - Last 3 Shifts         09/28 0700  09/29 0659 09/29 0700  09/30 0659 09/30 0700  10/01 0659    P.O.  600     Total Intake(mL/kg)  600 (7.7)     Net  +600            Urine Occurrence  4 x     Stool Occurrence   0 x            Lines/Drains/Airways       Peripherally Inserted Central Catheter Line  Duration             PICC Double Lumen 09/21/23 0930 left basilic 9 days                     Physical Exam  Constitutional:       Appearance: Normal appearance.   HENT:      Head: Normocephalic and atraumatic.      Nose: Nose normal.   Cardiovascular:      Rate and Rhythm: Tachycardia present.      Pulses: Normal pulses.      Heart sounds: Normal heart sounds.   Pulmonary:      Effort: Pulmonary effort is normal.      Breath sounds: Normal breath sounds.   Abdominal:      General: Abdomen is flat. Bowel sounds are normal.      Palpations: Abdomen is soft.   Musculoskeletal:      Right lower leg: No edema.      Left lower leg: No edema.   Skin:     General: Skin is warm and dry.   Neurological:      General: No focal deficit present.      Mental Status: He is alert and oriented to person, place, and time.   Psychiatric:         Behavior: Behavior normal.            Significant Labs:  All pertinent labs from the last 24 hours have been reviewed.    Significant Diagnostics:  I have reviewed all pertinent imaging results/findings within the past 24 hours.    Assessment/Plan:     S/P CABG x 5  09/20/2023  The patient is postop day 1 status post coronary artery bypass grafting  x5 and aortic valve replacement with a 19 mm Saint Dany mechanical valve.  Overall the patient is doing well.      Neuro:  Patient is awake appropriate and follows commands.  Patient is currently sedated with Precedex.  Will wean Precedex to off.   Cardiac:  Patient is hemodynamically stable.  With excellent cardiac output and cardiac index.  Patient is on low-dose vaso active meds.  Wean drips to off.  Will start metoprolol once drips are off.  Respiratory:  Patient is being weaned to extubation.  Patient is an active smoker Start nebs and Mucomyst.  Continue pulmonary toileting.  Continue incentive spirometer.    GI:  Patient is currently NPO.  Will start p.o. intake once extubated.    Renal:  Patient has good urine output and creatinine is 1.2.  Will start Lasix for diuresis.  Endocrine:  Patient required insulin drip for glucose controlled.  Will convert to sliding scale.    Id:  Patient had a T-max of 102°.  Most likely due to stress of surgery.  Will continue to follow.  White count is 9.  Patient is on manny op antibiotics.  Heme: Hematocrit is 17.  Patient is being transfused.  Platelet count is 74.  No evidence of active bleeding.  Will follow platelet count.  Will start patient on Coumadin anticoagulation for mechanical valve.  Activities:  Patient is currently in bed.  Will advance activities as tolerated once extubated.  Line tubes and drains:  Patient has an ETT, right IJ [place] and Cordis, right groin A-line, chest tubes, Ugalde catheter, pacer wires, and saphenectomy site DARON.    09/21/2023     The patient is postop day 2 status post coronary artery bypass grafting x5 and aortic valve replacement with a 19 mm Saint Dany mechanical valve.  Overall the patient is doing well.    Neuro:  Patient is awake alert and oriented x3.  Pain is well controlled with current pain management.  Cardiac:  The patient is off all drips.  Patient is hemodynamically stable.  Continue metoprolol.  Respiratory:  Patient was  extubated yesterday.  Continue pulmonary toileting.  Continue incentive spirometer.   GI:  Patient is tolerating p.o. intake.  Patient had episodes of nausea yesterday which have abated.  Advance patient's diet as tolerated.    Renal:  Patient has good urine output.  Creatinine is 0.9.  Continue Lasix for diuresis.  Endocrine:  Glucose is controlled with sliding scale insulin.    Id:  T-max is 101.8 patient is currently afebrile.  White count is 17.  Most likely due to stress of surgery.  Continue to follow.  Heme:  Hematocrit is 24.5.  Platelet count is 89.  INR is 1.5.  Patient is started on Coumadin for anticoagulation of mechanical valve.  Activities:  Patient will be out of bed to chair.  Advance activities as tolerated.  Line tubes and drains:  Patient has pacer wires, right IJ Cordis and Ugalde catheter.  Will place PICC line and discontinue Cordis.    09/22/2023   The patient is postop day 3 status post coronary artery bypass grafting x5 and aortic valve replacement with a 19 mm Saint Dany mechanical valve.  Overall the patient is doing well.  Patient has been transferred to telemetry.    Neuro:  Patient is awake alert and oriented x3.  Pain is well controlled with current pain regimen.    Cardiac:  Patient is hemodynamically stable.  Continue metoprolol.    Respiratory:  Continue pulmonary toileting.  Continue incentive spirometer.    GI:  Patient is tolerating p.o. intake.  And tolerating a regular diet.    Renal:  Patient has good urine output.  Creatinine is 0.9.  Continue Lasix.    Endocrine:  Glucose is well controlled.    Id:  Patient is afebrile.  White count is 16.  Continue to follow.    Heme:  Hematocrit is 24 and platelet count is 114.  INR is 3.4.  Patient is currently on Coumadin for anticoagulation.  Activities:  Patient is out of bed and ambulating in the hallways.  Continue to advance as tolerated.  Line tubes and drains:  Patient has a PICC line and pacer wires.    09/23/2023   The patient  is postop day 4 status post coronary artery bypass grafting x5 and aortic valve replacement with a 19 mm Saint Dany mechanical valve.  Overall the patient is doing well.  Anticipate discharge to home with home health in the next 48-72 hours.    Neuro:  Patient is awake alert and oriented x3 pain is well controlled with pain management.  Neuro exam is nonfocal.    Cardiac:  Patient is hemodynamically stable.  Continue metoprolol.    Respiratory:  Continue pulmonary toileting.  Continue incentive spirometer.  GI patient is tolerating p.o. intake.  Will increase bowel regimen for bowel movements.  Renal: Patient has good urine output.  Creatinine is 0.9.  Patient is still appears fluid overloaded.  Continue Lasix.  Endocrine: Glucose is well controlled.    Id:  Patient is afebrile.  White count is down to 12.  Continue to follow.    Heme:  Hematocrit is 27.  Platelet count is 186 and INR is 1.9.  INR goal is between 2.5 and 3.  Continue Coumadin.    Activities:  Patient is out of bed and ambulating in the hallways.  Advance as tolerated.  Line tubes and drains:  Patient has a PICC line.      09/24/2023   The patient is postop day 5 status post coronary artery bypass grafting x5 and aortic valve replacement with a 19 mm Saint Dany mechanical valve.  Overall the patient is doing well.  Anticipate discharge home in the next 24-48 hours.    Neuro:  Patient is awake alert and oriented x3 pain is well controlled with current pain management.  Neuro exam is nonfocal.    Cardiac:  Patient is hemodynamically stable.  Continue metoprolol  Respiratory:  Continue pulmonary toileting.  Continue incentive spirometer.  Patient continues to have sputum production.  GI:  Patient is tolerating a regular diet.  Patient has had bowel movements.  Renal: Patient has good urine output.  Creatinine is stable.  Patient is appears to be euvolemic.  Will discontinue Lasix.    Endocrine:  Glucose is well controlled.  Id: Patient is afebrile white  count normal.  Heme:  Hematocrit is 27 and platelet count is 229.  INR is subtherapeutic 1.5.  Continue Coumadin.  Goal is a INR level of 2.5-3.  Activities: Patient is out of bed and ambulating in the hallways.  Advance as tolerated.    Line tubes and drains:  Patient has a PICC line.    09/25/2023   The patient is postop day 6 status post coronary artery bypass grafting x5 and aortic valve replacement with a 19 mm Saint Dany mechanical valve.  Overall the patient is doing well.  Patient is awaiting attainment of a therapeutic INR.  Anticipate discharge in the next 48-72 hours.    Neuro:  Patient is awake alert and oriented x3.  Pain is well controlled current pain management.  Neuro exam is nonfocal.    Cardiac:  Patient is stable.  Continue metoprolol.    Respiratory:  Patient has good sats on room air.  Continue pulmonary toileting.  Continue incentive spirometer.    GI:  Patient is tolerating a regular diet having bowel movements.    Renal:  Patient has good urine output.    Endocrine:  Glucose is well controlled.    Id:  Patient is afebrile white count is normal.    Heme:  Hematocrit is 27 and platelet count is 298.  INR is now normal at 1.2.  Patient is on bridging heparin.  Will titrate heparin to an APTT greater between 40 and 50.  Therapeutic goal of INR is 2.5-3.  Activities: Patient is out of bed and ambulating in the hallways.    Line tubes and drains:  Patient has a PICC line.    09/26/2023   The patient is postop day 7 status post coronary artery bypass grafting x5 and aortic valve replacement with a 19 mm Saint Dany mechanical valve.  Overall the patient is doing well.  Patient's INR is still subtherapeutic.  Patient is on heparin in the interim.  Anticipate discharge in the next 48-72 hours.  Neuro:  Patient is awake alert and oriented x3.  Pain is well controlled with current pain management.  Neuro exam is nonfocal.    Cardiac:  Patient remains stable.  Continue metoprolol.    Respiratory: Patient  has good sats on room air.  Continue pulmonary toileting.  Continue incentive spirometer.    GI:  Patient is tolerating a regular diet and having bowel movements.    Renal:  Patient has good urine output.  Endocrine:  Glucose is well controlled.    Id:  Patient's white count is normal.    Heme: Hematocrit is 28 and INR is 1.2 patient is on a heparin drip.  Coumadin dose has been increased.  INR goal is 2.5-3.  Activities:  Patient is out of bed and ambulating in the hallways.    Line tubes and drains:  Patient has a PICC line.    09/27/2023   The patient is postop [id number] post coronary artery bypass grafting x5 and aortic valve replacement with a 19 mm Saint Dany mechanical valve.  Overall the patient is doing well.  Patient is awaiting INR being therapeutic.  Anticipate discharge in the next 48-72 hours.  Neuro:  Patient is awake alert and oriented x3.  Pain is well controlled with current pain management.  Neuro exam is nonfocal.    Cardiac:  Patient's blood pressure is trending higher.  Will increase metoprolol to 50 mg b.i.d..  Respiratory: Patient has good sats on room air.  Continue pulmonary toileting.  Continue incentive spirometer.  GI:  Patient is tolerating a regular diet and having bowel movements.    Renal:  Patient has good urine output.    Endocrine:  Glucose is well controlled.  Id:  Patient is afebrile white count is normal.    Heme:  Hematocrit is 28.6 and platelet count is 428.  INR is 1.6.  A PTT is therapeutic at 40.  INR goal is 2.5-3.  Activities:  Patient is out of bed and ambulating in the hallways.  Advance as tolerated.    Line tubes and drains:  Patient has a PICC line.    09/28/2023   The patient is postop day 9 status post coronary artery bypass grafting x5 and aortic valve replacement with a 19 mm Saint Dany mechanical valve.  Overall the patient is doing well.  INR is 2.3 today.  Anticipate discharge that is 4872 hours.    Neuro:  Patient is awake alert and oriented x3.  Pain is  well controlled.  Neuro exam is nonfocal.    Cardiac:  Patient is hemodynamically stable.  Continue metoprolol 50 mg b.i.d..    Respiratory: Patient has good sats on room air.  Continue pulmonary toileting.  Continue incentive spirometer.    GI:  Patient is tolerating a regular diet and having bowel movements.    Renal: Patient has good urine output.    Endocrine:  Glucose is well controlled.    Id:  Patient is afebrile white count is 8   Heme:  Hematocrit is 28.  INR is 2.3.  Platelet count is 452.  Continue Coumadin.  INR goal is 2.5-3.0.  Patient is currently on bridging heparin.  Activities:  Patient is out of bed and ambulating.  Advance as tolerated.    Line tubes and drains:  Patient has a PICC line.    09/29/2023   Patient is postop day 10 status post coronary artery bypass grafting x5 and aortic valve replacement with a 19 mm Saint Dany mechanical valve.  Overall patient is doing well.  Patient awaits therapeutic INR prior to discharge.    Neuro:  Patient is awake alert and oriented x3.  Pain is well controlled.  Neuro exam is nonfocal.    Cardiac: Patient is stable.  Continue metoprolol.    Respiratory:  Patient has good sats on room air.  Continue pulmonary toileting.  Continue incentive spirometer.    GI:  Patient is tolerating a regular diet and having bowel movements.    Renal:  Patient has good urine output.  Creatinine is normal.    Endocrine: Glucose is well controlled.  Id:  Patient is afebrile.  White count is 10.3.  Heme:  Hematocrit is 28.8 and platelet count is 563.  INR is 1.8.  Patient needs INR goal of 2.5-3.  Continue Coumadin.  Activities:  Patient is out of bed and ambulating.  Advance as tolerated.  Line tubes and drains:  Patient has a PICC line.    09/30/2023   Patient is postop day 11 status post coronary bypass grafting x5 and aortic valve replacement with a 19 mm Saint Dany mechanical valve.  Overall the patient is doing well patient is awaiting a therapy INR prior to discharge.     Neuro:  Patient is awake alert and oriented x3.  Pain is well controlled.  Neuro exam is nonfocal.    Cardiac:  Continue metoprolol.    Respiratory:  Patient is on room air continue pulmonary toileting.    GI:  Patient is having bowel movements and a regular diet.    Renal:  Patient has good urine output.    Endocrine: Glucose is well controlled  Id white count is normal.    :  INR is 1.7 today continue Coumadin dosing.  Patient is on bridging heparin.    Activities:  Patient is out of bed and ambulating in the hallways.  Line tubes and drains:  Patient has a PICC line      CAD, multiple vessel  The patient is a 43-year-old male with critical left main coronary artery disease and severe aortic regurgitation by cardiac catheterization.  The patient is a candidate for urgent coronary artery bypass grafting and aortic valve replacement.  The risks and benefits of surgery were explained to the patient.  The patient understands the risks and benefits of surgery and has agreed to proceed with urgent aortic valve replacement and coronary artery bypass grafting.  The type of aortic valve to be used was discussed with the patient.  The patient stated a preference for a bioprosthetic valve in order to avoid the need for chronic anticoagulation.  The patient understands that a bioprosthetic valve especially in a young patient has a limited lifespan.        Nishant Douglas MD  Cardiothoracic Surgery  '[facility])

## 2023-09-30 NOTE — ASSESSMENT & PLAN NOTE
09/20/2023  The patient is postop day 1 status post coronary artery bypass grafting x5 and aortic valve replacement with a 19 mm Saint Dany mechanical valve.  Overall the patient is doing well.      Neuro:  Patient is awake appropriate and follows commands.  Patient is currently sedated with Precedex.  Will wean Precedex to off.   Cardiac:  Patient is hemodynamically stable.  With excellent cardiac output and cardiac index.  Patient is on low-dose vaso active meds.  Wean drips to off.  Will start metoprolol once drips are off.  Respiratory:  Patient is being weaned to extubation.  Patient is an active smoker Start nebs and Mucomyst.  Continue pulmonary toileting.  Continue incentive spirometer.    GI:  Patient is currently NPO.  Will start p.o. intake once extubated.    Renal:  Patient has good urine output and creatinine is 1.2.  Will start Lasix for diuresis.  Endocrine:  Patient required insulin drip for glucose controlled.  Will convert to sliding scale.    Id:  Patient had a T-max of 102°.  Most likely due to stress of surgery.  Will continue to follow.  White count is 9.  Patient is on manny op antibiotics.  Heme: Hematocrit is 17.  Patient is being transfused.  Platelet count is 74.  No evidence of active bleeding.  Will follow platelet count.  Will start patient on Coumadin anticoagulation for mechanical valve.  Activities:  Patient is currently in bed.  Will advance activities as tolerated once extubated.  Line tubes and drains:  Patient has an ETT, right IJ [place] and Cordis, right groin A-line, chest tubes, Ugalde catheter, pacer wires, and saphenectomy site DARON.    09/21/2023     The patient is postop day 2 status post coronary artery bypass grafting x5 and aortic valve replacement with a 19 mm Saint Dany mechanical valve.  Overall the patient is doing well.    Neuro:  Patient is awake alert and oriented x3.  Pain is well controlled with current pain management.  Cardiac:  The patient is off all drips.   Patient is hemodynamically stable.  Continue metoprolol.  Respiratory:  Patient was extubated yesterday.  Continue pulmonary toileting.  Continue incentive spirometer.   GI:  Patient is tolerating p.o. intake.  Patient had episodes of nausea yesterday which have abated.  Advance patient's diet as tolerated.    Renal:  Patient has good urine output.  Creatinine is 0.9.  Continue Lasix for diuresis.  Endocrine:  Glucose is controlled with sliding scale insulin.    Id:  T-max is 101.8 patient is currently afebrile.  White count is 17.  Most likely due to stress of surgery.  Continue to follow.  Heme:  Hematocrit is 24.5.  Platelet count is 89.  INR is 1.5.  Patient is started on Coumadin for anticoagulation of mechanical valve.  Activities:  Patient will be out of bed to chair.  Advance activities as tolerated.  Line tubes and drains:  Patient has pacer wires, right IJ Cordis and Ugalde catheter.  Will place PICC line and discontinue Cordis.    09/22/2023   The patient is postop day 3 status post coronary artery bypass grafting x5 and aortic valve replacement with a 19 mm Saint Dany mechanical valve.  Overall the patient is doing well.  Patient has been transferred to telemetry.    Neuro:  Patient is awake alert and oriented x3.  Pain is well controlled with current pain regimen.    Cardiac:  Patient is hemodynamically stable.  Continue metoprolol.    Respiratory:  Continue pulmonary toileting.  Continue incentive spirometer.    GI:  Patient is tolerating p.o. intake.  And tolerating a regular diet.    Renal:  Patient has good urine output.  Creatinine is 0.9.  Continue Lasix.    Endocrine:  Glucose is well controlled.    Id:  Patient is afebrile.  White count is 16.  Continue to follow.    Heme:  Hematocrit is 24 and platelet count is 114.  INR is 3.4.  Patient is currently on Coumadin for anticoagulation.  Activities:  Patient is out of bed and ambulating in the hallways.  Continue to advance as tolerated.  Line tubes  and drains:  Patient has a PICC line and pacer wires.    09/23/2023   The patient is postop day 4 status post coronary artery bypass grafting x5 and aortic valve replacement with a 19 mm Saint Dany mechanical valve.  Overall the patient is doing well.  Anticipate discharge to home with home health in the next 48-72 hours.    Neuro:  Patient is awake alert and oriented x3 pain is well controlled with pain management.  Neuro exam is nonfocal.    Cardiac:  Patient is hemodynamically stable.  Continue metoprolol.    Respiratory:  Continue pulmonary toileting.  Continue incentive spirometer.  GI patient is tolerating p.o. intake.  Will increase bowel regimen for bowel movements.  Renal: Patient has good urine output.  Creatinine is 0.9.  Patient is still appears fluid overloaded.  Continue Lasix.  Endocrine: Glucose is well controlled.    Id:  Patient is afebrile.  White count is down to 12.  Continue to follow.    Heme:  Hematocrit is 27.  Platelet count is 186 and INR is 1.9.  INR goal is between 2.5 and 3.  Continue Coumadin.    Activities:  Patient is out of bed and ambulating in the hallways.  Advance as tolerated.  Line tubes and drains:  Patient has a PICC line.      09/24/2023   The patient is postop day 5 status post coronary artery bypass grafting x5 and aortic valve replacement with a 19 mm Saint Dany mechanical valve.  Overall the patient is doing well.  Anticipate discharge home in the next 24-48 hours.    Neuro:  Patient is awake alert and oriented x3 pain is well controlled with current pain management.  Neuro exam is nonfocal.    Cardiac:  Patient is hemodynamically stable.  Continue metoprolol  Respiratory:  Continue pulmonary toileting.  Continue incentive spirometer.  Patient continues to have sputum production.  GI:  Patient is tolerating a regular diet.  Patient has had bowel movements.  Renal: Patient has good urine output.  Creatinine is stable.  Patient is appears to be euvolemic.  Will  discontinue Lasix.    Endocrine:  Glucose is well controlled.  Id: Patient is afebrile white count normal.  Heme:  Hematocrit is 27 and platelet count is 229.  INR is subtherapeutic 1.5.  Continue Coumadin.  Goal is a INR level of 2.5-3.  Activities: Patient is out of bed and ambulating in the hallways.  Advance as tolerated.    Line tubes and drains:  Patient has a PICC line.    09/25/2023   The patient is postop day 6 status post coronary artery bypass grafting x5 and aortic valve replacement with a 19 mm Saint Dany mechanical valve.  Overall the patient is doing well.  Patient is awaiting attainment of a therapeutic INR.  Anticipate discharge in the next 48-72 hours.    Neuro:  Patient is awake alert and oriented x3.  Pain is well controlled current pain management.  Neuro exam is nonfocal.    Cardiac:  Patient is stable.  Continue metoprolol.    Respiratory:  Patient has good sats on room air.  Continue pulmonary toileting.  Continue incentive spirometer.    GI:  Patient is tolerating a regular diet having bowel movements.    Renal:  Patient has good urine output.    Endocrine:  Glucose is well controlled.    Id:  Patient is afebrile white count is normal.    Heme:  Hematocrit is 27 and platelet count is 298.  INR is now normal at 1.2.  Patient is on bridging heparin.  Will titrate heparin to an APTT greater between 40 and 50.  Therapeutic goal of INR is 2.5-3.  Activities: Patient is out of bed and ambulating in the hallways.    Line tubes and drains:  Patient has a PICC line.    09/26/2023   The patient is postop day 7 status post coronary artery bypass grafting x5 and aortic valve replacement with a 19 mm Saint Dany mechanical valve.  Overall the patient is doing well.  Patient's INR is still subtherapeutic.  Patient is on heparin in the interim.  Anticipate discharge in the next 48-72 hours.  Neuro:  Patient is awake alert and oriented x3.  Pain is well controlled with current pain management.  Neuro exam is  nonfocal.    Cardiac:  Patient remains stable.  Continue metoprolol.    Respiratory: Patient has good sats on room air.  Continue pulmonary toileting.  Continue incentive spirometer.    GI:  Patient is tolerating a regular diet and having bowel movements.    Renal:  Patient has good urine output.  Endocrine:  Glucose is well controlled.    Id:  Patient's white count is normal.    Heme: Hematocrit is 28 and INR is 1.2 patient is on a heparin drip.  Coumadin dose has been increased.  INR goal is 2.5-3.  Activities:  Patient is out of bed and ambulating in the hallways.    Line tubes and drains:  Patient has a PICC line.    09/27/2023   The patient is postop [id number] post coronary artery bypass grafting x5 and aortic valve replacement with a 19 mm Saint Dany mechanical valve.  Overall the patient is doing well.  Patient is awaiting INR being therapeutic.  Anticipate discharge in the next 48-72 hours.  Neuro:  Patient is awake alert and oriented x3.  Pain is well controlled with current pain management.  Neuro exam is nonfocal.    Cardiac:  Patient's blood pressure is trending higher.  Will increase metoprolol to 50 mg b.i.d..  Respiratory: Patient has good sats on room air.  Continue pulmonary toileting.  Continue incentive spirometer.  GI:  Patient is tolerating a regular diet and having bowel movements.    Renal:  Patient has good urine output.    Endocrine:  Glucose is well controlled.  Id:  Patient is afebrile white count is normal.    Heme:  Hematocrit is 28.6 and platelet count is 428.  INR is 1.6.  A PTT is therapeutic at 40.  INR goal is 2.5-3.  Activities:  Patient is out of bed and ambulating in the hallways.  Advance as tolerated.    Line tubes and drains:  Patient has a PICC line.    09/28/2023   The patient is postop day 9 status post coronary artery bypass grafting x5 and aortic valve replacement with a 19 mm Saint Dany mechanical valve.  Overall the patient is doing well.  INR is 2.3 today.  Anticipate  discharge that is 4872 hours.    Neuro:  Patient is awake alert and oriented x3.  Pain is well controlled.  Neuro exam is nonfocal.    Cardiac:  Patient is hemodynamically stable.  Continue metoprolol 50 mg b.i.d..    Respiratory: Patient has good sats on room air.  Continue pulmonary toileting.  Continue incentive spirometer.    GI:  Patient is tolerating a regular diet and having bowel movements.    Renal: Patient has good urine output.    Endocrine:  Glucose is well controlled.    Id:  Patient is afebrile white count is 8   Heme:  Hematocrit is 28.  INR is 2.3.  Platelet count is 452.  Continue Coumadin.  INR goal is 2.5-3.0.  Patient is currently on bridging heparin.  Activities:  Patient is out of bed and ambulating.  Advance as tolerated.    Line tubes and drains:  Patient has a PICC line.    09/29/2023   Patient is postop day 10 status post coronary artery bypass grafting x5 and aortic valve replacement with a 19 mm Saint Dany mechanical valve.  Overall patient is doing well.  Patient awaits therapeutic INR prior to discharge.    Neuro:  Patient is awake alert and oriented x3.  Pain is well controlled.  Neuro exam is nonfocal.    Cardiac: Patient is stable.  Continue metoprolol.    Respiratory:  Patient has good sats on room air.  Continue pulmonary toileting.  Continue incentive spirometer.    GI:  Patient is tolerating a regular diet and having bowel movements.    Renal:  Patient has good urine output.  Creatinine is normal.    Endocrine: Glucose is well controlled.  Id:  Patient is afebrile.  White count is 10.3.  Heme:  Hematocrit is 28.8 and platelet count is 563.  INR is 1.8.  Patient needs INR goal of 2.5-3.  Continue Coumadin.  Activities:  Patient is out of bed and ambulating.  Advance as tolerated.  Line tubes and drains:  Patient has a PICC line.    09/30/2023   Patient is postop day 11 status post coronary bypass grafting x5 and aortic valve replacement with a 19 mm Saint Dany mechanical valve.   Overall the patient is doing well patient is awaiting a therapy INR prior to discharge.    Neuro:  Patient is awake alert and oriented x3.  Pain is well controlled.  Neuro exam is nonfocal.    Cardiac:  Continue metoprolol.    Respiratory:  Patient is on room air continue pulmonary toileting.    GI:  Patient is having bowel movements and a regular diet.    Renal:  Patient has good urine output.    Endocrine: Glucose is well controlled  Id white count is normal.    :  INR is 1.7 today continue Coumadin dosing.  Patient is on bridging heparin.    Activities:  Patient is out of bed and ambulating in the hallways.  Line tubes and drains:  Patient has a PICC line

## 2023-09-30 NOTE — SUBJECTIVE & OBJECTIVE
Interval History: The patient is postop day 11 status post coronary bypass grafting x5 and aortic valve replacement with a 19 mm Saint Dany mechanical valve.    ROS  Medications:  Continuous Infusions:   heparin (porcine) in D5W 14 Units/kg/hr (09/30/23 0715)     Scheduled Meds:   ascorbic acid (vitamin C)  500 mg Oral BID    aspirin  81 mg Oral Daily    atorvastatin  80 mg Oral Daily    cyanocobalamin  1,000 mcg Oral Daily    docusate sodium  100 mg Oral BID    ferrous sulfate  1 tablet Oral Daily    folic acid  1 mg Oral Daily    guaiFENesin  1,200 mg Oral BID    magnesium hydroxide 400 mg/5 ml  5 mL Oral BID    metoprolol tartrate  50 mg Oral BID    pantoprazole  40 mg Oral Daily    polyethylene glycol  17 g Oral Daily    [START ON 10/1/2023] warfarin  2 mg Oral Daily     PRN Meds:0.9%  NaCl infusion (for blood administration), acetaminophen, albumin human 5%, aluminum-magnesium hydroxide-simethicone, calcium gluconate IVPB, calcium gluconate IVPB, calcium gluconate IVPB, dextrose 10%, glucagon (human recombinant), influenza, insulin aspart U-100, lactated ringers, magnesium sulfate IVPB, melatonin, metoclopramide HCl, ondansetron, pneumoc 20-leslee conj-dip cr(PF), potassium chloride in water, potassium chloride in water, potassium chloride in water, traMADoL     Objective:     Vital Signs (Most Recent):  Temp: 98 °F (36.7 °C) (09/30/23 1508)  Pulse: 96 (09/30/23 1508)  Resp: 18 (09/30/23 1508)  BP: 131/70 (09/30/23 1508)  SpO2: 97 % (09/30/23 1508) Vital Signs (24h Range):  Temp:  [97.9 °F (36.6 °C)-99 °F (37.2 °C)] 98 °F (36.7 °C)  Pulse:  [] 96  Resp:  [16-18] 18  SpO2:  [96 %-99 %] 97 %  BP: (112-131)/(60-70) 131/70     Weight: 78.3 kg (172 lb 9.9 oz)  Body mass index is 26.25 kg/m².    SpO2: 97 %       Intake/Output - Last 3 Shifts         09/28 0700  09/29 0659 09/29 0700  09/30 0659 09/30 0700  10/01 0659    P.O.  600     Total Intake(mL/kg)  600 (7.7)     Net  +600            Urine Occurrence  4 x      Stool Occurrence   0 x            Lines/Drains/Airways       Peripherally Inserted Central Catheter Line  Duration             PICC Double Lumen 09/21/23 0930 left basilic 9 days                     Physical Exam  Constitutional:       Appearance: Normal appearance.   HENT:      Head: Normocephalic and atraumatic.      Nose: Nose normal.   Cardiovascular:      Rate and Rhythm: Tachycardia present.      Pulses: Normal pulses.      Heart sounds: Normal heart sounds.   Pulmonary:      Effort: Pulmonary effort is normal.      Breath sounds: Normal breath sounds.   Abdominal:      General: Abdomen is flat. Bowel sounds are normal.      Palpations: Abdomen is soft.   Musculoskeletal:      Right lower leg: No edema.      Left lower leg: No edema.   Skin:     General: Skin is warm and dry.   Neurological:      General: No focal deficit present.      Mental Status: He is alert and oriented to person, place, and time.   Psychiatric:         Behavior: Behavior normal.            Significant Labs:  All pertinent labs from the last 24 hours have been reviewed.    Significant Diagnostics:  I have reviewed all pertinent imaging results/findings within the past 24 hours.

## 2023-09-30 NOTE — PROGRESS NOTES
[facility] - Telemetry ([facility])  Cardiology  Progress Note    Patient Name: Mikie Alcazar  MRN: [id number]  Admission Date: 9/17/2023  Hospital Length of Stay: 13 days  Code Status: Full Code   Attending Physician: Nishant Douglas MD   Primary Care Physician: Lissette, Primary Doctor  Expected Discharge Date:   Principal Problem:NSTEMI (non-ST elevated myocardial infarction)    Subjective:     Hospital Course:   Cardiology consulted to assist with management. Pt seen and examined today, resting in bed mother at bedside. He reports his pain started 6+ months ago and worsening in the last few weeks happening daily with associated SOB, dizziness radiating to left arm. He reports his pain is sharp and pressure-like at times. Pt reports daily use a marijuana, h/o cocaine and other drugs 5-10 years ago. Drinks occasionally. Echo pending cont hep gtt    09/19/2023. Admitted for NSTEMI/ACS. Echo showed RWMA  The cath done yesterday showed   Severe lmca disease with timi1 flow in lad   Lcx ostial 50%   Rca prox 80%   Ef 50%   Severe AI  09/19/23 s/p CABG x5 and aortic valve replacement with a 19 mm Saint Dany mechanical valve by Dr. Douglas.  In ICU and intubated. Om epi and Vasopressin gtt.    9/20/23  Pt seen and examined today, POD 1 CABGx5 pt still intubated on exam this am, weaning epi. Labs reviewed, H/H 6.0 and 17.1. Plans to extubated today    9/21/23 Pt seen and examined today extubated feels ok chest soreness, chest tubes removed. Labs reviewed, chart reviewed.    9/22/23 Pt seen and examined today, sitting up in bedside chair. Feels good. Walked with PT/OT. Labs reviewed, chart reviewed    09/23 ambulating well. No chest pain dyspnea, the lab reviewed.     09/24. On coumadin rx and heparin bridge. Non chest pain dyspnea, the lab reviewed. VSS    9/25/23-Patient seen and examined today, resting in bed. No AEON. Pain controlled. Working with PT/OT. Labs stable. INR 1.2, on heparin bridge.    9/26/23-Patient seen and  examined today, sitting up in bedside chair. Feels ok. More fatigued/weak today. Pain controlled. Labs stable. INR 1.2, remains on heparin bridge.    9/27/23-Patient seen and examined today, resting in bed. Feeling better today. Worked with PT/OT earlier. No AEON. Labs reviewed, INR 1.6.     9/28/23-Patient seen and examined today, resting in bed. Ambulating well. Pain controlled. INR 2.3.     9/29/23-Patient seen and examined today, lying in bed. Feels ok. Does admit to some fatigue. Pain controlled. Ambulating well with PT/OT. Labs reviewed/stable. INR 1.8.    9/30/23- Pt has no complaints. Continue beta blockers and ambulation.       Interval History:     Review of Systems   Constitutional: Negative. Negative for weight gain.   HENT: Negative.     Eyes: Negative.    Cardiovascular: Negative.  Negative for chest pain, leg swelling and palpitations.   Respiratory: Negative.  Negative for shortness of breath.    Endocrine: Negative.    Hematologic/Lymphatic: Negative.    Skin: Negative.    Musculoskeletal:  Negative for muscle weakness.   Gastrointestinal: Negative.    Genitourinary: Negative.    Neurological: Negative.  Negative for dizziness.   Psychiatric/Behavioral: Negative.     Allergic/Immunologic: Negative.    All other systems reviewed and are negative.    Objective:     Vital Signs (Most Recent):  Temp: 99 °F (37.2 °C) (09/30/23 0731)  Pulse: 98 (09/30/23 0731)  Resp: 18 (09/30/23 0731)  BP: 121/63 (09/30/23 0731)  SpO2: 97 % (09/30/23 0731) Vital Signs (24h Range):  Temp:  [97.7 °F (36.5 °C)-99 °F (37.2 °C)] 99 °F (37.2 °C)  Pulse:  [] 98  Resp:  [16-20] 18  SpO2:  [96 %-100 %] 97 %  BP: (112-125)/(60-77) 121/63     Weight: 78.3 kg (172 lb 9.9 oz)  Body mass index is 26.25 kg/m².     SpO2: 97 %         Intake/Output Summary (Last 24 hours) at 9/30/2023 1057  Last data filed at 9/29/2023 1819  Gross per 24 hour   Intake 360 ml   Output no documentation   Net 360 ml       Lines/Drains/Airways        Peripherally Inserted Central Catheter Line       Name Duration    PICC Double Lumen 09/21/23 0930 left basilic 9 days                       Physical Exam  Vitals and nursing note reviewed.   Constitutional:       Appearance: He is well-developed.   HENT:      Head: Normocephalic and atraumatic.   Eyes:      Conjunctiva/sclera: Conjunctivae normal.      Pupils: Pupils are equal, round, and reactive to light.   Cardiovascular:      Rate and Rhythm: Normal rate and regular rhythm.      Pulses: Intact distal pulses.      Heart sounds: Normal heart sounds.   Pulmonary:      Effort: Pulmonary effort is normal.      Breath sounds: Normal breath sounds.   Abdominal:      General: Bowel sounds are normal.      Palpations: Abdomen is soft.   Musculoskeletal:      Cervical back: Normal range of motion and neck supple.   Skin:     General: Skin is warm and dry.   Neurological:      Mental Status: He is alert and oriented to person, place, and time.            Significant Labs: All pertinent lab results from the last 24 hours have been reviewed.    Significant Imaging: X-Ray: CXR: X-Ray Chest 1 View (CXR):   Results for orders placed or performed during the hospital encounter of 09/17/23   X-Ray Chest 1 View    Narrative    EXAMINATION:  XR CHEST 1 VIEW    CLINICAL HISTORY:  PICC placement;    TECHNIQUE:  Single frontal view of the chest was performed.    COMPARISON:  09/21/2023    FINDINGS:  Left-sided PICC line tip overlies the SVC in good position.  In comparison to the prior study, there is no adverse interval changes      Impression    In comparison to the prior study, there is no adverse interval changes      Electronically signed by: Elliott España MD  Date:    09/21/2023  Time:    10:10     Assessment and Plan:     Brief HPI:     * NSTEMI (non-ST elevated myocardial infarction)  Troponin 0.6->-0.709->0.708  Cont hep gtt  EKG with ST T wave abnml  C planned for today  All risks, benefits and treatment alternatives  explained, all questions answered. Pt agreeable to proceed.   NPO    9/20/23  S/P CABG x5 AVR    S/P AVR (aortic valve replacement)  09/24  On coumadin Rx and heparin gtt as bridge    9/28/23  -INR 2.3     9/29/23  -INR 1.8    S/P CABG x 5  Cont management per CTS    09/23   Continue asa statin plavix lasix and metoprolol  On coumadin for mechanical AVR    09/24  Continue ASA statin BB  Continue supportive care    9/25/23  -Progressing well  -Continue ASA, statin, BB  -IS usage and ambulation    9/26/23  -Stable overnight  -Continue ASA, statin, BB  -IS usage and ambulation    9/27/23  -No AEON  -Continue ASA, statin, BB  -IS usage   -Ambulating well    9/28/23  -Stable CV wise  -Continue ASA, statin, BB  -IS usage/ambulation    9/29/23  -No AEON  -Continue ASA, statin, BB  -Continue PT/OT  -INR 1.8 today    Nonrheumatic aortic valve insufficiency  -s/p mechanical AVR    Tobacco smoker, 1 pack of cigarettes or less per day  -Smoking cessation    CAD, multiple vessel  09/19/23  S/p CABG x5 and mechanical AVR today  -continue icu supportive care  -continue asa plavix statin BB and lasix  -will f/u    9/20/23  Cont ASA, plavix, statin, BB, lasix  Management per CTS    See plan under CABG    Hypertension  stable        VTE Risk Mitigation (From admission, onward)         Ordered     warfarin (COUMADIN) tablet 2 mg  Daily         09/29/23 1118     heparin 25,000 units in dextrose 5% 250 ml (100 units/mL) infusion MINIMAL INTENSITY nomogram - [facility]  Continuous        Question Answer Comment   Heparin Infusion Adjustment (DO NOT MODIFY ANSWER) [email or web address] MINIMAL  INTENSITY nomogram for [facility] [id number]    Begin at (in units/kg/hr) 8        09/25/23 0855     IP VTE LOW RISK PATIENT  Once         09/17/23 1941                Shilo Davenport MD  Cardiology  [facility] - Telemetry ([facility])

## 2023-09-30 NOTE — PT/OT/SLP PROGRESS
Physical Therapy Treatment    Patient Name:  Mikie Alcazar   MRN:  [id number]    Recommendations:     Discharge Recommendations: home health PT  Discharge Equipment Recommendations: shower chair  Barriers to discharge: None    Assessment:     Mikie Alcazar is a 43 y.o. male admitted with a medical diagnosis of NSTEMI (non-ST elevated myocardial infarction).  He presents with the following impairments/functional limitations: weakness, impaired endurance.    Pt tolerated session well with increased participation and effort throughout ambulation trial.    Rehab Prognosis: Good; patient would benefit from acute skilled PT services to address these deficits and reach maximum level of function.    Recent Surgery: Procedure(s) (LRB):  CORONARY ARTERY BYPASS GRAFT (CABG) (N/A)  REPLACEMENT-VALVE-AORTIC (N/A)  SURGICAL PROCUREMENT, VEIN, ENDOSCOPIC (Left)  BLOCK, NERVE, INTERCOSTAL, 2 OR MORE (N/A)  ECHOCARDIOGRAM,TRANSESOPHAGEAL (N/A) 11 Days Post-Op    Plan:     During this hospitalization, patient to be seen 3 x/week to address the identified rehab impairments via gait training, therapeutic activities, therapeutic exercises and progress toward the following goals:    Plan of Care Expires:  10/13/23    Subjective     Chief Complaint: none noted by pt  Patient/Family Comments/goals: agreeable to P.T. tx  Pain/Comfort:  Pain Rating 1: 0/10      Objective:     Communicated with pt's nurse prior to session.  Patient found  sitting up in bed  with telemetry, PICC line upon PT entry to room.     General Precautions: Standard, sternal  Orthopedic Precautions: N/A  Braces: N/A  Respiratory Status: Room air     Functional Mobility:  Bed Mobility:     Supine to Sit: independent  Transfers:     Sit to Stand from EOB: modified independence with no AD  Gait: Pt ambulates ~600 ft Mod (I), no AD throughout the hallway. Pt with good emilie, stride length, and activity pacing. Pt demonstrates lateral veering with gait, but no LOB  observed. Pt reports mild fatigue following trial, no SOB noted. PTA managed IV pole.  Balance: Good      AM-PAC 6 CLICK MOBILITY  Turning over in bed (including adjusting bedclothes, sheets and blankets)?: 4  Sitting down on and standing up from a chair with arms (e.g., wheelchair, bedside commode, etc.): 4  Moving from lying on back to sitting on the side of the bed?: 4  Moving to and from a bed to a chair (including a wheelchair)?: 4  Need to walk in hospital room?: 4  Climbing 3-5 steps with a railing?: 1 (NT)  Basic Mobility Total Score: 21       Treatment & Education:  Pt re-educated on the role of physical therapy to prevent functional decline during hospitalization. Pt verbalized understanding. Pt appears motivated.    Patient left  supine with HOB elevated  with all lines intact and call button in reach.    GOALS:   Multidisciplinary Problems       Physical Therapy Goals          Problem: Physical Therapy    Goal Priority Disciplines Outcome Goal Variances Interventions   Physical Therapy Goal     PT, PT/OT Ongoing, Progressing     Description: LTG'S TO BE MET IN 14 DAYS (10-13-23)  PT WILL BE ARELY FOR BED MOBILITY  PT WILL BE INDEP FOR BED<>CHAIR TF'S  PT WILL AMB >1000 FEET INDEP  PT WILL INC AMPAC SCORE BY 2 POINTS TO PROGRESS GROSS FUNC MOBILITY                         Time Tracking:     PT Received On: 09/30/23  PT Start Time: 0934     PT Stop Time: 0944  PT Total Time (min): 10 min     Billable Minutes: Gait Training 10    Treatment Type: Treatment  PT/PTA: PTA     Number of PTA visits since last PT visit: 1 09/30/2023

## 2023-09-30 NOTE — PLAN OF CARE
Problem: Adjustment to Illness (Acute Coronary Syndrome)  Goal: Optimal Adaptation to Illness  Outcome: Ongoing, Progressing     Problem: Cardiac-Related Pain (Acute Coronary Syndrome)  Goal: Absence of Cardiac-Related Pain  Outcome: Ongoing, Progressing     Problem: Tissue Perfusion (Acute Coronary Syndrome)  Goal: Adequate Tissue Perfusion  Outcome: Ongoing, Progressing     Problem: Contrast-Induced Injury Risk (Cardiac Catheterization)  Goal: Absence of Contrast-Induced Injury  Outcome: Ongoing, Progressing     Problem: Embolism (Cardiac Catheterization)  Goal: Absence of Embolism Signs and Symptoms  Outcome: Ongoing, Progressing     Problem: Pain (Cardiac Catheterization)  Goal: Acceptable Pain Control  Outcome: Ongoing, Progressing     Problem: Vascular Access Protection (Cardiac Catheterization)  Goal: Absence of Vascular Access Complication  Outcome: Ongoing, Progressing     Problem: Adult Inpatient Plan of Care  Goal: Patient-Specific Goal (Individualized)  Outcome: Ongoing, Progressing  Goal: Absence of Hospital-Acquired Illness or Injury  Outcome: Ongoing, Progressing     Problem: Chest Pain  Goal: Resolution of Chest Pain Symptoms  Outcome: Ongoing, Progressing     Chart check completed.

## 2023-09-30 NOTE — PROGRESS NOTES
Pharmacy Consult Note: Warfarin     Mikie Alcazar 's Coumadin will be dosed and monitored by Pharmacy.      Target INR goal is 2-3.    INR   Date Value Ref Range Status   09/30/2023 1.7 (H) 0.8 - 1.2 Final     Comment:     Coumadin Therapy:  2.0 - 3.0 for INR for all indicators except mechanical heart valves  and antiphospholipid syndromes which should use 2.5 - 3.5.         Indication: mechanical AVR    Recent dosing history:   2.5 mg on 9/20 & 9/21-- INR increased from 1.5 to 3.4 on 9/22 so dose was held.   1 mg on 9/23 & 9/24.  2.5 mg on 9/25 -- this resulted in no change in INR  5 mg given on 9/26/23-- INR increased appropriately to 1.6  3 mg given on 9/27/23  1 mg given on 9/28/23-- INR decreased below therapeutic range  3 mg given on 9/29/23--- INR decreased from 1.8 to 1.7.        Dose for Today: 4 mg     Potential Drug interactions: Melatonin, Aspirin, Tylenol, and Tramadol each may increase the risk of bleeding while on warfarin    Ascorbic acid may decrease the risk of bleeding while on warfarin.    PT/INR will be monitored daily. Dose adjustments will be made accordingly.      Thank you for allowing us to participate in this patient's care.     Ny Marshall, PharmD 9/30/2023 2:42 PM

## 2023-10-01 LAB
ANION GAP SERPL CALC-SCNC: 10 MMOL/L (ref 8–16)
APTT PPP: 48.5 SEC (ref 21–32)
BASOPHILS # BLD AUTO: 0.07 K/UL (ref 0–0.2)
BASOPHILS NFR BLD: 0.7 % (ref 0–1.9)
BUN SERPL-MCNC: 14 MG/DL (ref 6–20)
CALCIUM SERPL-MCNC: 8.8 MG/DL (ref 8.7–10.5)
CHLORIDE SERPL-SCNC: 105 MMOL/L (ref 95–110)
CO2 SERPL-SCNC: 23 MMOL/L (ref 23–29)
CREAT SERPL-MCNC: 0.9 MG/DL (ref 0.5–1.4)
DIFFERENTIAL METHOD: ABNORMAL
EOSINOPHIL # BLD AUTO: 0.2 K/UL (ref 0–0.5)
EOSINOPHIL NFR BLD: 2.1 % (ref 0–8)
ERYTHROCYTE [DISTWIDTH] IN BLOOD BY AUTOMATED COUNT: 17.2 % (ref 11.5–14.5)
EST. GFR  (NO RACE VARIABLE): >60 ML/MIN/1.73 M^2
GLUCOSE SERPL-MCNC: 107 MG/DL (ref 70–110)
HCT VFR BLD AUTO: 26.6 % (ref 40–54)
HGB BLD-MCNC: 8.5 G/DL (ref 14–18)
IMM GRANULOCYTES # BLD AUTO: 0.05 K/UL (ref 0–0.04)
IMM GRANULOCYTES NFR BLD AUTO: 0.5 % (ref 0–0.5)
INR PPP: 1.9 (ref 0.8–1.2)
LYMPHOCYTES # BLD AUTO: 1.9 K/UL (ref 1–4.8)
LYMPHOCYTES NFR BLD: 19.7 % (ref 18–48)
MCH RBC QN AUTO: 29.8 PG (ref 27–31)
MCHC RBC AUTO-ENTMCNC: 32 G/DL (ref 32–36)
MCV RBC AUTO: 93 FL (ref 82–98)
MONOCYTES # BLD AUTO: 0.8 K/UL (ref 0.3–1)
MONOCYTES NFR BLD: 8.6 % (ref 4–15)
NEUTROPHILS # BLD AUTO: 6.7 K/UL (ref 1.8–7.7)
NEUTROPHILS NFR BLD: 68.4 % (ref 38–73)
NRBC BLD-RTO: 0 /100 WBC
PLATELET # BLD AUTO: 587 K/UL (ref 150–450)
PMV BLD AUTO: 8.6 FL (ref 9.2–12.9)
POCT GLUCOSE: 115 MG/DL (ref 70–110)
POCT GLUCOSE: 128 MG/DL (ref 70–110)
POCT GLUCOSE: 95 MG/DL (ref 70–110)
POCT GLUCOSE: 96 MG/DL (ref 70–110)
POTASSIUM SERPL-SCNC: 4.1 MMOL/L (ref 3.5–5.1)
PROTHROMBIN TIME: 19.3 SEC (ref 9–12.5)
RBC # BLD AUTO: 2.85 M/UL (ref 4.6–6.2)
SODIUM SERPL-SCNC: 138 MMOL/L (ref 136–145)
WBC # BLD AUTO: 9.79 K/UL (ref 3.9–12.7)

## 2023-10-01 PROCEDURE — 63600175 PHARM REV CODE 636 W HCPCS: Performed by: THORACIC SURGERY (CARDIOTHORACIC VASCULAR SURGERY)

## 2023-10-01 PROCEDURE — 94761 N-INVAS EAR/PLS OXIMETRY MLT: CPT

## 2023-10-01 PROCEDURE — 85610 PROTHROMBIN TIME: CPT | Performed by: INTERNAL MEDICINE

## 2023-10-01 PROCEDURE — 25000003 PHARM REV CODE 250: Performed by: THORACIC SURGERY (CARDIOTHORACIC VASCULAR SURGERY)

## 2023-10-01 PROCEDURE — 21400001 HC TELEMETRY ROOM

## 2023-10-01 PROCEDURE — 99232 PR SUBSEQUENT HOSPITAL CARE,LEVL II: ICD-10-PCS | Mod: ,,, | Performed by: INTERNAL MEDICINE

## 2023-10-01 PROCEDURE — 97530 THERAPEUTIC ACTIVITIES: CPT

## 2023-10-01 PROCEDURE — 99232 SBSQ HOSP IP/OBS MODERATE 35: CPT | Mod: ,,, | Performed by: INTERNAL MEDICINE

## 2023-10-01 PROCEDURE — 80048 BASIC METABOLIC PNL TOTAL CA: CPT | Performed by: THORACIC SURGERY (CARDIOTHORACIC VASCULAR SURGERY)

## 2023-10-01 PROCEDURE — 85025 COMPLETE CBC W/AUTO DIFF WBC: CPT | Performed by: THORACIC SURGERY (CARDIOTHORACIC VASCULAR SURGERY)

## 2023-10-01 PROCEDURE — 25000003 PHARM REV CODE 250: Performed by: INTERNAL MEDICINE

## 2023-10-01 PROCEDURE — 97116 GAIT TRAINING THERAPY: CPT

## 2023-10-01 PROCEDURE — 25000003 PHARM REV CODE 250: Performed by: NURSE PRACTITIONER

## 2023-10-01 PROCEDURE — 85730 THROMBOPLASTIN TIME PARTIAL: CPT | Performed by: THORACIC SURGERY (CARDIOTHORACIC VASCULAR SURGERY)

## 2023-10-01 RX ORDER — WARFARIN 2 MG/1
4 TABLET ORAL DAILY
Status: COMPLETED | OUTPATIENT
Start: 2023-10-01 | End: 2023-10-01

## 2023-10-01 RX ADMIN — GUAIFENESIN 1200 MG: 600 TABLET, EXTENDED RELEASE ORAL at 08:10

## 2023-10-01 RX ADMIN — METOPROLOL TARTRATE 50 MG: 50 TABLET, FILM COATED ORAL at 08:10

## 2023-10-01 RX ADMIN — FOLIC ACID 1 MG: 1 TABLET ORAL at 08:10

## 2023-10-01 RX ADMIN — ASPIRIN 81 MG: 81 TABLET, COATED ORAL at 08:10

## 2023-10-01 RX ADMIN — WARFARIN SODIUM 4 MG: 2 TABLET ORAL at 05:10

## 2023-10-01 RX ADMIN — DOCUSATE SODIUM 100 MG: 100 CAPSULE, LIQUID FILLED ORAL at 08:10

## 2023-10-01 RX ADMIN — CYANOCOBALAMIN TAB 1000 MCG 1000 MCG: 1000 TAB at 08:10

## 2023-10-01 RX ADMIN — ATORVASTATIN CALCIUM 80 MG: 40 TABLET, FILM COATED ORAL at 08:10

## 2023-10-01 RX ADMIN — MAGNESIUM HYDROXIDE 400 MG: 400 SUSPENSION ORAL at 08:10

## 2023-10-01 RX ADMIN — OXYCODONE HYDROCHLORIDE AND ACETAMINOPHEN 500 MG: 500 TABLET ORAL at 08:10

## 2023-10-01 RX ADMIN — TRAMADOL HYDROCHLORIDE 50 MG: 50 TABLET, COATED ORAL at 08:10

## 2023-10-01 RX ADMIN — ACETAMINOPHEN 650 MG: 325 TABLET ORAL at 08:10

## 2023-10-01 RX ADMIN — PANTOPRAZOLE SODIUM 40 MG: 40 TABLET, DELAYED RELEASE ORAL at 08:10

## 2023-10-01 RX ADMIN — HEPARIN SODIUM 14 UNITS/KG/HR: 10000 INJECTION, SOLUTION INTRAVENOUS at 10:10

## 2023-10-01 RX ADMIN — HEPARIN SODIUM 14 UNITS/KG/HR: 10000 INJECTION, SOLUTION INTRAVENOUS at 01:10

## 2023-10-01 RX ADMIN — FERROUS SULFATE TAB 325 MG (65 MG ELEMENTAL FE) 1 EACH: 325 (65 FE) TAB at 08:10

## 2023-10-01 RX ADMIN — ACETAMINOPHEN 650 MG: 325 TABLET ORAL at 05:10

## 2023-10-01 NOTE — SUBJECTIVE & OBJECTIVE
Interval History: Patient is postop day 12 status post coronary artery bypass grafting x5 and aortic valve replacement with a 19 mm Saint Dany mechanical valve.    ROS  Medications:  Continuous Infusions:   heparin (porcine) in D5W 14 Units/kg/hr (10/01/23 0131)     Scheduled Meds:   ascorbic acid (vitamin C)  500 mg Oral BID    aspirin  81 mg Oral Daily    atorvastatin  80 mg Oral Daily    cyanocobalamin  1,000 mcg Oral Daily    docusate sodium  100 mg Oral BID    ferrous sulfate  1 tablet Oral Daily    folic acid  1 mg Oral Daily    guaiFENesin  1,200 mg Oral BID    magnesium hydroxide 400 mg/5 ml  5 mL Oral BID    metoprolol tartrate  50 mg Oral BID    pantoprazole  40 mg Oral Daily    polyethylene glycol  17 g Oral Daily    [START ON 10/2/2023] warfarin  3 mg Oral Daily     PRN Meds:0.9%  NaCl infusion (for blood administration), acetaminophen, albumin human 5%, aluminum-magnesium hydroxide-simethicone, calcium gluconate IVPB, calcium gluconate IVPB, calcium gluconate IVPB, dextrose 10%, glucagon (human recombinant), influenza, insulin aspart U-100, lactated ringers, magnesium sulfate IVPB, melatonin, metoclopramide HCl, ondansetron, pneumoc 20-leslee conj-dip cr(PF), potassium chloride in water, potassium chloride in water, potassium chloride in water, traMADoL     Objective:     Vital Signs (Most Recent):  Temp: 98 °F (36.7 °C) (10/01/23 1530)  Pulse: 99 (10/01/23 1530)  Resp: 18 (10/01/23 1530)  BP: 99/67 (10/01/23 1530)  SpO2: 98 % (10/01/23 1530) Vital Signs (24h Range):  Temp:  [98 °F (36.7 °C)-98.5 °F (36.9 °C)] 98 °F (36.7 °C)  Pulse:  [] 99  Resp:  [14-18] 18  SpO2:  [96 %-99 %] 98 %  BP: ()/(60-68) 99/67     Weight: 78.3 kg (172 lb 9.9 oz)  Body mass index is 26.25 kg/m².    SpO2: 98 %       Intake/Output - Last 3 Shifts         09/29 0700  09/30 0659 09/30 0700  10/01 0659 10/01 0700  10/02 0659    P.O. 600      I.V. (mL/kg)  1014.9 (13) 125.1 (1.6)    Total Intake(mL/kg) 600 (7.7) 1014.9  (13) 125.1 (1.6)    Net +600 +1014.9 +125.1           Urine Occurrence 4 x 4 x     Stool Occurrence  0 x 0 x            Lines/Drains/Airways       Peripherally Inserted Central Catheter Line  Duration             PICC Double Lumen 09/21/23 0930 left basilic 10 days                     Physical Exam  Constitutional:       Appearance: Normal appearance.   HENT:      Head: Normocephalic and atraumatic.      Nose: Nose normal.   Cardiovascular:      Rate and Rhythm: Normal rate and regular rhythm.      Heart sounds: Normal heart sounds.   Pulmonary:      Effort: Pulmonary effort is normal.      Breath sounds: Normal breath sounds.   Abdominal:      General: Abdomen is flat. Bowel sounds are normal.      Palpations: Abdomen is soft.   Musculoskeletal:      Right lower leg: No edema.      Left lower leg: No edema.   Skin:     General: Skin is warm and dry.   Neurological:      General: No focal deficit present.      Mental Status: He is alert and oriented to person, place, and time.   Psychiatric:         Behavior: Behavior normal.            Significant Labs:  All pertinent labs from the last 24 hours have been reviewed.    Significant Diagnostics:  I have reviewed all pertinent imaging results/findings within the past 24 hours.

## 2023-10-01 NOTE — ASSESSMENT & PLAN NOTE
09/20/2023  The patient is postop day 1 status post coronary artery bypass grafting x5 and aortic valve replacement with a 19 mm Saint Dany mechanical valve.  Overall the patient is doing well.      Neuro:  Patient is awake appropriate and follows commands.  Patient is currently sedated with Precedex.  Will wean Precedex to off.   Cardiac:  Patient is hemodynamically stable.  With excellent cardiac output and cardiac index.  Patient is on low-dose vaso active meds.  Wean drips to off.  Will start metoprolol once drips are off.  Respiratory:  Patient is being weaned to extubation.  Patient is an active smoker Start nebs and Mucomyst.  Continue pulmonary toileting.  Continue incentive spirometer.    GI:  Patient is currently NPO.  Will start p.o. intake once extubated.    Renal:  Patient has good urine output and creatinine is 1.2.  Will start Lasix for diuresis.  Endocrine:  Patient required insulin drip for glucose controlled.  Will convert to sliding scale.    Id:  Patient had a T-max of 102°.  Most likely due to stress of surgery.  Will continue to follow.  White count is 9.  Patient is on manny op antibiotics.  Heme: Hematocrit is 17.  Patient is being transfused.  Platelet count is 74.  No evidence of active bleeding.  Will follow platelet count.  Will start patient on Coumadin anticoagulation for mechanical valve.  Activities:  Patient is currently in bed.  Will advance activities as tolerated once extubated.  Line tubes and drains:  Patient has an ETT, right IJ Kopperl and Cordis, right groin A-line, chest tubes, Ugalde catheter, pacer wires, and saphenectomy site DARON.    09/21/2023     The patient is postop day 2 status post coronary artery bypass grafting x5 and aortic valve replacement with a 19 mm Saint Dany mechanical valve.  Overall the patient is doing well.    Neuro:  Patient is awake alert and oriented x3.  Pain is well controlled with current pain management.  Cardiac:  The patient is off all drips.   Patient is hemodynamically stable.  Continue metoprolol.  Respiratory:  Patient was extubated yesterday.  Continue pulmonary toileting.  Continue incentive spirometer.   GI:  Patient is tolerating p.o. intake.  Patient had episodes of nausea yesterday which have abated.  Advance patient's diet as tolerated.    Renal:  Patient has good urine output.  Creatinine is 0.9.  Continue Lasix for diuresis.  Endocrine:  Glucose is controlled with sliding scale insulin.    Id:  T-max is 101.8 patient is currently afebrile.  White count is 17.  Most likely due to stress of surgery.  Continue to follow.  Heme:  Hematocrit is 24.5.  Platelet count is 89.  INR is 1.5.  Patient is started on Coumadin for anticoagulation of mechanical valve.  Activities:  Patient will be out of bed to chair.  Advance activities as tolerated.  Line tubes and drains:  Patient has pacer wires, right IJ Cordis and Ugalde catheter.  Will place PICC line and discontinue Cordis.    09/22/2023   The patient is postop day 3 status post coronary artery bypass grafting x5 and aortic valve replacement with a 19 mm Saint Dany mechanical valve.  Overall the patient is doing well.  Patient has been transferred to telemetry.    Neuro:  Patient is awake alert and oriented x3.  Pain is well controlled with current pain regimen.    Cardiac:  Patient is hemodynamically stable.  Continue metoprolol.    Respiratory:  Continue pulmonary toileting.  Continue incentive spirometer.    GI:  Patient is tolerating p.o. intake.  And tolerating a regular diet.    Renal:  Patient has good urine output.  Creatinine is 0.9.  Continue Lasix.    Endocrine:  Glucose is well controlled.    Id:  Patient is afebrile.  White count is 16.  Continue to follow.    Heme:  Hematocrit is 24 and platelet count is 114.  INR is 3.4.  Patient is currently on Coumadin for anticoagulation.  Activities:  Patient is out of bed and ambulating in the hallways.  Continue to advance as tolerated.  Line tubes  and drains:  Patient has a PICC line and pacer wires.    09/23/2023   The patient is postop day 4 status post coronary artery bypass grafting x5 and aortic valve replacement with a 19 mm Saint Dany mechanical valve.  Overall the patient is doing well.  Anticipate discharge to home with home health in the next 48-72 hours.    Neuro:  Patient is awake alert and oriented x3 pain is well controlled with pain management.  Neuro exam is nonfocal.    Cardiac:  Patient is hemodynamically stable.  Continue metoprolol.    Respiratory:  Continue pulmonary toileting.  Continue incentive spirometer.  GI patient is tolerating p.o. intake.  Will increase bowel regimen for bowel movements.  Renal: Patient has good urine output.  Creatinine is 0.9.  Patient is still appears fluid overloaded.  Continue Lasix.  Endocrine: Glucose is well controlled.    Id:  Patient is afebrile.  White count is down to 12.  Continue to follow.    Heme:  Hematocrit is 27.  Platelet count is 186 and INR is 1.9.  INR goal is between 2.5 and 3.  Continue Coumadin.    Activities:  Patient is out of bed and ambulating in the hallways.  Advance as tolerated.  Line tubes and drains:  Patient has a PICC line.      09/24/2023   The patient is postop day 5 status post coronary artery bypass grafting x5 and aortic valve replacement with a 19 mm Saint Dany mechanical valve.  Overall the patient is doing well.  Anticipate discharge home in the next 24-48 hours.    Neuro:  Patient is awake alert and oriented x3 pain is well controlled with current pain management.  Neuro exam is nonfocal.    Cardiac:  Patient is hemodynamically stable.  Continue metoprolol  Respiratory:  Continue pulmonary toileting.  Continue incentive spirometer.  Patient continues to have sputum production.  GI:  Patient is tolerating a regular diet.  Patient has had bowel movements.  Renal: Patient has good urine output.  Creatinine is stable.  Patient is appears to be euvolemic.  Will  discontinue Lasix.    Endocrine:  Glucose is well controlled.  Id: Patient is afebrile white count normal.  Heme:  Hematocrit is 27 and platelet count is 229.  INR is subtherapeutic 1.5.  Continue Coumadin.  Goal is a INR level of 2.5-3.  Activities: Patient is out of bed and ambulating in the hallways.  Advance as tolerated.    Line tubes and drains:  Patient has a PICC line.    09/25/2023   The patient is postop day 6 status post coronary artery bypass grafting x5 and aortic valve replacement with a 19 mm Saint Dany mechanical valve.  Overall the patient is doing well.  Patient is awaiting attainment of a therapeutic INR.  Anticipate discharge in the next 48-72 hours.    Neuro:  Patient is awake alert and oriented x3.  Pain is well controlled current pain management.  Neuro exam is nonfocal.    Cardiac:  Patient is stable.  Continue metoprolol.    Respiratory:  Patient has good sats on room air.  Continue pulmonary toileting.  Continue incentive spirometer.    GI:  Patient is tolerating a regular diet having bowel movements.    Renal:  Patient has good urine output.    Endocrine:  Glucose is well controlled.    Id:  Patient is afebrile white count is normal.    Heme:  Hematocrit is 27 and platelet count is 298.  INR is now normal at 1.2.  Patient is on bridging heparin.  Will titrate heparin to an APTT greater between 40 and 50.  Therapeutic goal of INR is 2.5-3.  Activities: Patient is out of bed and ambulating in the hallways.    Line tubes and drains:  Patient has a PICC line.    09/26/2023   The patient is postop day 7 status post coronary artery bypass grafting x5 and aortic valve replacement with a 19 mm Saint Dany mechanical valve.  Overall the patient is doing well.  Patient's INR is still subtherapeutic.  Patient is on heparin in the interim.  Anticipate discharge in the next 48-72 hours.  Neuro:  Patient is awake alert and oriented x3.  Pain is well controlled with current pain management.  Neuro exam is  nonfocal.    Cardiac:  Patient remains stable.  Continue metoprolol.    Respiratory: Patient has good sats on room air.  Continue pulmonary toileting.  Continue incentive spirometer.    GI:  Patient is tolerating a regular diet and having bowel movements.    Renal:  Patient has good urine output.  Endocrine:  Glucose is well controlled.    Id:  Patient's white count is normal.    Heme: Hematocrit is 28 and INR is 1.2 patient is on a heparin drip.  Coumadin dose has been increased.  INR goal is 2.5-3.  Activities:  Patient is out of bed and ambulating in the hallways.    Line tubes and drains:  Patient has a PICC line.    09/27/2023   The patient is postop [id number] post coronary artery bypass grafting x5 and aortic valve replacement with a 19 mm Saint Dany mechanical valve.  Overall the patient is doing well.  Patient is awaiting INR being therapeutic.  Anticipate discharge in the next 48-72 hours.  Neuro:  Patient is awake alert and oriented x3.  Pain is well controlled with current pain management.  Neuro exam is nonfocal.    Cardiac:  Patient's blood pressure is trending higher.  Will increase metoprolol to 50 mg b.i.d..  Respiratory: Patient has good sats on room air.  Continue pulmonary toileting.  Continue incentive spirometer.  GI:  Patient is tolerating a regular diet and having bowel movements.    Renal:  Patient has good urine output.    Endocrine:  Glucose is well controlled.  Id:  Patient is afebrile white count is normal.    Heme:  Hematocrit is 28.6 and platelet count is 428.  INR is 1.6.  A PTT is therapeutic at 40.  INR goal is 2.5-3.  Activities:  Patient is out of bed and ambulating in the hallways.  Advance as tolerated.    Line tubes and drains:  Patient has a PICC line.    09/28/2023   The patient is postop day 9 status post coronary artery bypass grafting x5 and aortic valve replacement with a 19 mm Saint Dany mechanical valve.  Overall the patient is doing well.  INR is 2.3 today.  Anticipate  discharge that is 4872 hours.    Neuro:  Patient is awake alert and oriented x3.  Pain is well controlled.  Neuro exam is nonfocal.    Cardiac:  Patient is hemodynamically stable.  Continue metoprolol 50 mg b.i.d..    Respiratory: Patient has good sats on room air.  Continue pulmonary toileting.  Continue incentive spirometer.    GI:  Patient is tolerating a regular diet and having bowel movements.    Renal: Patient has good urine output.    Endocrine:  Glucose is well controlled.    Id:  Patient is afebrile white count is 8   Heme:  Hematocrit is 28.  INR is 2.3.  Platelet count is 452.  Continue Coumadin.  INR goal is 2.5-3.0.  Patient is currently on bridging heparin.  Activities:  Patient is out of bed and ambulating.  Advance as tolerated.    Line tubes and drains:  Patient has a PICC line.    09/29/2023   Patient is postop day 10 status post coronary artery bypass grafting x5 and aortic valve replacement with a 19 mm Saint Dany mechanical valve.  Overall patient is doing well.  Patient awaits therapeutic INR prior to discharge.    Neuro:  Patient is awake alert and oriented x3.  Pain is well controlled.  Neuro exam is nonfocal.    Cardiac: Patient is stable.  Continue metoprolol.    Respiratory:  Patient has good sats on room air.  Continue pulmonary toileting.  Continue incentive spirometer.    GI:  Patient is tolerating a regular diet and having bowel movements.    Renal:  Patient has good urine output.  Creatinine is normal.    Endocrine: Glucose is well controlled.  Id:  Patient is afebrile.  White count is 10.3.  Heme:  Hematocrit is 28.8 and platelet count is 563.  INR is 1.8.  Patient needs INR goal of 2.5-3.  Continue Coumadin.  Activities:  Patient is out of bed and ambulating.  Advance as tolerated.  Line tubes and drains:  Patient has a PICC line.    09/30/2023   Patient is postop day 11 status post coronary bypass grafting x5 and aortic valve replacement with a 19 mm Saint Dany mechanical valve.   Overall the patient is doing well patient is awaiting a therapy INR prior to discharge.    Neuro:  Patient is awake alert and oriented x3.  Pain is well controlled.  Neuro exam is nonfocal.    Cardiac:  Continue metoprolol.    Respiratory:  Patient is on room air continue pulmonary toileting.    GI:  Patient is having bowel movements and a regular diet.    Renal:  Patient has good urine output.    Endocrine: Glucose is well controlled  Id white count is normal.    :  INR is 1.7 today continue Coumadin dosing.  Patient is on bridging heparin.    Activities:  Patient is out of bed and ambulating in the hallways.  Line tubes and drains:  Patient has a PICC line    09/30/2023   The patient is postop day 12 status post coronary artery bypass grafting x5 and aortic valve replacement with a 19 mm Saint Dany mechanical valve.  Overall patient is doing well.  Patient is awaiting therapeutic INR prior to discharge.    Neuro:  Patient is awake alert oriented x3.  Pain is well controlled.  Neuro exam is nonfocal.    Cardiac:  Patient is stable.  Continue metoprolol.    Respiratory:  Patient is on room air continue pulmonary toileting.    GI:  Patient is having a bowel movement and tolerating a regular diet.    Renal:  Patient has good urine output.    Endocrine:  Glucose is well controlled.  Id:  Patient is afebrile and white count is normal.    Heme:  INR is 1.  Nine today.  Continue on Coumadin.  INR goal is 2.5-3.  Continue Coumadin.    Line tubes and drains:  Patient has a PICC line.

## 2023-10-01 NOTE — PROGRESS NOTES
[facility] - Telemetry ([facility])  Cardiology  Progress Note    Patient Name: Mikie Alcazar  MRN: [id number]  Admission Date: 9/17/2023  Hospital Length of Stay: 14 days  Code Status: Full Code   Attending Physician: Nishant Douglas MD   Primary Care Physician: Lissette, Primary Doctor  Expected Discharge Date:   Principal Problem:NSTEMI (non-ST elevated myocardial infarction)    Subjective:     Hospital Course:   Cardiology consulted to assist with management. Pt seen and examined today, resting in bed mother at bedside. He reports his pain started 6+ months ago and worsening in the last few weeks happening daily with associated SOB, dizziness radiating to left arm. He reports his pain is sharp and pressure-like at times. Pt reports daily use a marijuana, h/o cocaine and other drugs 5-10 years ago. Drinks occasionally. Echo pending cont hep gtt    09/19/2023. Admitted for NSTEMI/ACS. Echo showed RWMA  The cath done yesterday showed   Severe lmca disease with timi1 flow in lad   Lcx ostial 50%   Rca prox 80%   Ef 50%   Severe AI  09/19/23 s/p CABG x5 and aortic valve replacement with a 19 mm [place] mechanical valve by Dr. Douglas.  In ICU and intubated. Om epi and Vasopressin gtt.    9/20/23  Pt seen and examined today, POD 1 CABGx5 pt still intubated on exam this am, weaning epi. Labs reviewed, H/H 6.0 and 17.1. Plans to extubated today    9/21/23 Pt seen and examined today extubated feels ok chest soreness, chest tubes removed. Labs reviewed, chart reviewed.    9/22/23 Pt seen and examined today, sitting up in bedside chair. Feels good. Walked with PT/OT. Labs reviewed, chart reviewed    09/23 ambulating well. No chest pain dyspnea, the lab reviewed.     09/24. On coumadin rx and heparin bridge. Non chest pain dyspnea, the lab reviewed. VSS    9/25/23-Patient seen and examined today, resting in bed. No AEON. Pain controlled. Working with PT/OT. Labs stable. INR 1.2, on heparin bridge.    9/26/23-Patient seen and  examined today, sitting up in bedside chair. Feels ok. More fatigued/weak today. Pain controlled. Labs stable. INR 1.2, remains on heparin bridge.    9/27/23-Patient seen and examined today, resting in bed. Feeling better today. Worked with PT/OT earlier. No AEON. Labs reviewed, INR 1.6.     9/28/23-Patient seen and examined today, resting in bed. Ambulating well. Pain controlled. INR 2.3.     9/29/23-Patient seen and examined today, lying in bed. Feels ok. Does admit to some fatigue. Pain controlled. Ambulating well with PT/OT. Labs reviewed/stable. INR 1.8.    9/30/23- Pt has no complaints. Continue beta blockers and ambulation.     10/1/23- POD # 12, CABG x5. With AVR, pt currently bridging with heparin and coumadin.       Interval History:     Review of Systems   Constitutional: Negative. Negative for weight gain.   HENT: Negative.     Eyes: Negative.    Cardiovascular: Negative.  Negative for chest pain, leg swelling and palpitations.   Respiratory: Negative.  Negative for shortness of breath.    Endocrine: Negative.    Hematologic/Lymphatic: Negative.    Skin: Negative.    Musculoskeletal:  Negative for muscle weakness.   Gastrointestinal: Negative.    Genitourinary: Negative.    Neurological: Negative.  Negative for dizziness.   Psychiatric/Behavioral: Negative.     Allergic/Immunologic: Negative.    All other systems reviewed and are negative.    Objective:     Vital Signs (Most Recent):  Temp: 98.4 °F (36.9 °C) (10/01/23 0740)  Pulse: 100 (10/01/23 0740)  Resp: 16 (10/01/23 0740)  BP: 118/68 (10/01/23 0740)  SpO2: 97 % (10/01/23 0740) Vital Signs (24h Range):  Temp:  [97.9 °F (36.6 °C)-98.5 °F (36.9 °C)] 98.4 °F (36.9 °C)  Pulse:  [] 100  Resp:  [14-18] 16  SpO2:  [96 %-99 %] 97 %  BP: (113-131)/(61-70) 118/68     Weight: 78.3 kg (172 lb 9.9 oz)  Body mass index is 26.25 kg/m².     SpO2: 97 %         Intake/Output Summary (Last 24 hours) at 10/1/2023 1055  Last data filed at 9/30/2023 1900  Gross per  24 hour   Intake 1014.87 ml   Output no documentation   Net 1014.87 ml       Lines/Drains/Airways       Peripherally Inserted Central Catheter Line       Name Duration    PICC Double Lumen 09/21/23 0930 left basilic 10 days                       Physical Exam  Vitals and nursing note reviewed.   Constitutional:       Appearance: He is well-developed.   HENT:      Head: Normocephalic and atraumatic.   Eyes:      Conjunctiva/sclera: Conjunctivae normal.      Pupils: Pupils are equal, round, and reactive to light.   Cardiovascular:      Rate and Rhythm: Normal rate and regular rhythm.      Pulses: Intact distal pulses.      Heart sounds: Normal heart sounds.   Pulmonary:      Effort: Pulmonary effort is normal.      Breath sounds: Normal breath sounds.   Abdominal:      General: Bowel sounds are normal.      Palpations: Abdomen is soft.   Musculoskeletal:      Cervical back: Normal range of motion and neck supple.   Skin:     General: Skin is warm and dry.   Neurological:      Mental Status: He is alert and oriented to person, place, and time.            Significant Labs: All pertinent lab results from the last 24 hours have been reviewed.    Significant Imaging: X-Ray: CXR: X-Ray Chest 1 View (CXR):   Results for orders placed or performed during the hospital encounter of 09/17/23   X-Ray Chest 1 View    Narrative    EXAMINATION:  XR CHEST 1 VIEW    CLINICAL HISTORY:  PICC placement;    TECHNIQUE:  Single frontal view of the chest was performed.    COMPARISON:  09/21/2023    FINDINGS:  Left-sided PICC line tip overlies the SVC in good position.  In comparison to the prior study, there is no adverse interval changes      Impression    In comparison to the prior study, there is no adverse interval changes      Electronically signed by: Elliott España MD  Date:    09/21/2023  Time:    10:10     Assessment and Plan:     Brief HPI:     * NSTEMI (non-ST elevated myocardial infarction)  Troponin 0.6->-0.709->0.708  Cont hep  gtt  EKG with ST T wave abnml  [facility] planned for today  All risks, benefits and treatment alternatives explained, all questions answered. Pt agreeable to proceed.   NPO    9/20/23  S/P CABG x5 AVR    S/P AVR (aortic valve replacement)  09/24  On coumadin Rx and heparin gtt as bridge    9/28/23  -INR 2.3     9/29/23  -INR 1.8    S/P CABG x 5  Cont management per CTS    09/23   Continue asa statin plavix lasix and metoprolol  On coumadin for mechanical AVR    09/24  Continue ASA statin BB  Continue supportive care    9/25/23  -Progressing well  -Continue ASA, statin, BB  -IS usage and ambulation    9/26/23  -Stable overnight  -Continue ASA, statin, BB  -IS usage and ambulation    9/27/23  -No AEON  -Continue ASA, statin, BB  -IS usage   -Ambulating well    9/28/23  -Stable CV wise  -Continue ASA, statin, BB  -IS usage/ambulation    9/29/23  -No AEON  -Continue ASA, statin, BB  -Continue PT/OT  -INR 1.8 today    Nonrheumatic aortic valve insufficiency  -s/p mechanical AVR    Tobacco smoker, 1 pack of cigarettes or less per day  -Smoking cessation    CAD, multiple vessel  09/19/23  S/p CABG x5 and mechanical AVR today  -continue icu supportive care  -continue asa plavix statin BB and lasix  -will f/u    9/20/23  Cont ASA, plavix, statin, BB, lasix  Management per CTS    See plan under CABG    Hypertension  stable        VTE Risk Mitigation (From admission, onward)         Ordered     warfarin tablet 3 mg  Daily         10/01/23 1040     warfarin (COUMADIN) tablet 4 mg  Daily         10/01/23 1040     heparin 25,000 units in dextrose 5% 250 ml (100 units/mL) infusion MINIMAL INTENSITY nomogram - [facility]  Continuous        Question Answer Comment   Heparin Infusion Adjustment (DO NOT MODIFY ANSWER) [email or web address]\[email or web address]\heparin MINIMAL  INTENSITY nomogram for [facility] [id number]    Begin at (in units/kg/hr) 8        09/25/23 0855     IP VTE LOW RISK PATIENT  Once         09/17/23 1941                 Shilo Davenport MD  Cardiology  [facility] - Telemetry ([facility])

## 2023-10-01 NOTE — PT/OT/SLP PROGRESS
Physical Therapy Treatment    Patient Name:  Mikie Alcazar   MRN:  [id number]    Recommendations:     Discharge Recommendations: home health PT  Discharge Equipment Recommendations: shower chair  Barriers to discharge: None    Assessment:     Mikie Alcazar is a 43 y.o. male admitted with a medical diagnosis of NSTEMI (non-ST elevated myocardial infarction).  He presents with the following impairments/functional limitations: weakness, impaired endurance, decreased safety awareness, decreased coordination.    Rehab Prognosis: Good; patient would benefit from acute skilled PT services to address these deficits and reach maximum level of function.    Recent Surgery: Procedure(s) (LRB):  CORONARY ARTERY BYPASS GRAFT (CABG) (N/A)  REPLACEMENT-VALVE-AORTIC (N/A)  SURGICAL PROCUREMENT, VEIN, ENDOSCOPIC (Left)  BLOCK, NERVE, INTERCOSTAL, 2 OR MORE (N/A)  ECHOCARDIOGRAM,TRANSESOPHAGEAL (N/A) 12 Days Post-Op    Plan:     During this hospitalization, patient to be seen 3 x/week to address the identified rehab impairments via gait training, therapeutic activities, therapeutic exercises and progress toward the following goals:    Plan of Care Expires:  10/13/23    Subjective     Chief Complaint: NONE  Patient/Family Comments/goals:   Pain/Comfort:  Pain Rating 1: 0/10      Objective:     Communicated with NURSE prior to session.  Patient found supine with peripheral IV, PICC line, telemetry upon PT entry to room.     General Precautions: Standard, sternal  Orthopedic Precautions: N/A  Braces: N/A  Respiratory Status: Room air     Functional Mobility:  Bed Mobility:     Rolling Left:  modified independence  Rolling Right: modified independence  Scooting: modified independence  Supine to Sit: modified independence  Sit to Supine: modified independence  Transfers:     Sit to Stand:  supervision with no AD  Toilet Transfer: independence with  no AD  using  Step Transfer  Gait: PT ' NO AD WITH SPV, NO LOB OR SOB ON ROOM  "AIR  Balance: GOOD SITTING AND STANDING BALANCE    AM-PAC 6 CLICK MOBILITY  Turning over in bed (including adjusting bedclothes, sheets and blankets)?: 4  Sitting down on and standing up from a chair with arms (e.g., wheelchair, bedside commode, etc.): 4  Moving from lying on back to sitting on the side of the bed?: 4  Moving to and from a bed to a chair (including a wheelchair)?: 4  Need to walk in hospital room?: 4  Climbing 3-5 steps with a railing?: 1  Basic Mobility Total Score: 21     Treatment & Education:  PT EDUCATION:  - ROLE OF P.T. AND POC IN ACUTE CARE HOSPITAL SETTING  - REVIEW STERNAL PRECAUTIONS  - ENCOURAGED TO INCREASE TIME OOB IN CHAIR TO TOLERANCE   - TO CONTINUE THERAPUETIC EXERCISES THROUGHOUT THE DAY TO INCREASE ACTIVITY TOLERANCE AND DECREASE RISK FOR PNEUMONIA AND BLOOD CLOTS: HIP FLEX/EXT, HIP ABD/ADD, QUAD SET, HEEL SLIDE, AP  - RISK FOR FALLS DUE TO GENERALIZED WEAKNESS, EDUCATED ON "CALL DON'T FALL", ENCOURAGED TO CALL FOR ASSISTANCE WITH ALL NEEDS SUCH AS BED<>CHAIR TRANSFERS OR TRIPS TO BATHROOM, PT AGREEABLE TO SAFETY PRECAUTIONS    Patient left  LONG SITTING IN BED  with all lines intact, call button in reach, and NURSE notified..    GOALS:   Multidisciplinary Problems       Physical Therapy Goals          Problem: Physical Therapy    Goal Priority Disciplines Outcome Goal Variances Interventions   Physical Therapy Goal     PT, PT/OT Ongoing, Progressing     Description: LTG'S TO BE MET IN 14 DAYS (10-13-23)  PT WILL BE ARELY FOR BED MOBILITY  PT WILL BE INDEP FOR BED<>CHAIR TF'S  PT WILL AMB >1000 FEET INDEP  PT WILL INC AMPAC SCORE BY 2 POINTS TO PROGRESS GROSS FUNC MOBILITY                         Time Tracking:     PT Received On: 10/01/23  PT Start Time: 1115     PT Stop Time: 1140  PT Total Time (min): 25 min     Billable Minutes: Gait Training 10 and Therapeutic Activity 15    Treatment Type: Treatment  PT/PTA: PT     Number of PTA visits since last PT visit: 0     10/01/2023  "

## 2023-10-01 NOTE — PLAN OF CARE
Problem: Cardiac-Related Pain (Acute Coronary Syndrome)  Goal: Absence of Cardiac-Related Pain  Outcome: Ongoing, Progressing     Problem: Arrhythmia/Dysrhythmia (Cardiac Catheterization)  Goal: Stable Heart Rate and Rhythm  Outcome: Ongoing, Progressing     Problem: Bleeding (Cardiac Catheterization)  Goal: Absence of Bleeding  Outcome: Ongoing, Progressing     Problem: Pain (Cardiac Catheterization)  Goal: Acceptable Pain Control  Outcome: Ongoing, Progressing     Problem: Adult Inpatient Plan of Care  Goal: Readiness for Transition of Care  Outcome: Ongoing, Progressing     Chart check completed.

## 2023-10-01 NOTE — PROGRESS NOTES
[facility] - Telemetry ([facility])  Cardiothoracic Surgery  Progress Note    Patient Name: Mikie Alcazar  MRN: [id number]  Admission Date: 9/17/2023  Hospital Length of Stay: 14 days  Code Status: Full Code   Attending Physician: Nishant Douglas MD   Referring Provider: Self, Aaareferral  Principal Problem:NSTEMI (non-ST elevated myocardial infarction)            Subjective:     Post-Op Info:  Procedure(s) (LRB):  CORONARY ARTERY BYPASS GRAFT (CABG) (N/A)  REPLACEMENT-VALVE-AORTIC (N/A)  SURGICAL PROCUREMENT, VEIN, ENDOSCOPIC (Left)  BLOCK, NERVE, INTERCOSTAL, 2 OR MORE (N/A)  ECHOCARDIOGRAM,TRANSESOPHAGEAL (N/A)   12 Days Post-Op     Interval History: Patient is postop day 12 status post coronary artery bypass grafting x5 and aortic valve replacement with a 19 mm Saint Dany mechanical valve.    ROS  Medications:  Continuous Infusions:   heparin (porcine) in D5W 14 Units/kg/hr (10/01/23 0131)     Scheduled Meds:   ascorbic acid (vitamin C)  500 mg Oral BID    aspirin  81 mg Oral Daily    atorvastatin  80 mg Oral Daily    cyanocobalamin  1,000 mcg Oral Daily    docusate sodium  100 mg Oral BID    ferrous sulfate  1 tablet Oral Daily    folic acid  1 mg Oral Daily    guaiFENesin  1,200 mg Oral BID    magnesium hydroxide 400 mg/5 ml  5 mL Oral BID    metoprolol tartrate  50 mg Oral BID    pantoprazole  40 mg Oral Daily    polyethylene glycol  17 g Oral Daily    [START ON 10/2/2023] warfarin  3 mg Oral Daily     PRN Meds:0.9%  NaCl infusion (for blood administration), acetaminophen, albumin human 5%, aluminum-magnesium hydroxide-simethicone, calcium gluconate IVPB, calcium gluconate IVPB, calcium gluconate IVPB, dextrose 10%, glucagon (human recombinant), influenza, insulin aspart U-100, lactated ringers, magnesium sulfate IVPB, melatonin, metoclopramide HCl, ondansetron, pneumoc 20-leslee conj-dip cr(PF), potassium chloride in water, potassium chloride in water, potassium chloride in water, traMADoL      Objective:     Vital Signs (Most Recent):  Temp: 98 °F (36.7 °C) (10/01/23 1530)  Pulse: 99 (10/01/23 1530)  Resp: 18 (10/01/23 1530)  BP: 99/67 (10/01/23 1530)  SpO2: 98 % (10/01/23 1530) Vital Signs (24h Range):  Temp:  [98 °F (36.7 °C)-98.5 °F (36.9 °C)] 98 °F (36.7 °C)  Pulse:  [] 99  Resp:  [14-18] 18  SpO2:  [96 %-99 %] 98 %  BP: ()/(60-68) 99/67     Weight: 78.3 kg (172 lb 9.9 oz)  Body mass index is 26.25 kg/m².    SpO2: 98 %       Intake/Output - Last 3 Shifts         09/29 0700  09/30 0659 09/30 0700  10/01 0659 10/01 0700  10/02 0659    P.O. 600      I.V. (mL/kg)  1014.9 (13) 125.1 (1.6)    Total Intake(mL/kg) 600 (7.7) 1014.9 (13) 125.1 (1.6)    Net +600 +1014.9 +125.1           Urine Occurrence 4 x 4 x     Stool Occurrence  0 x 0 x            Lines/Drains/Airways       Peripherally Inserted Central Catheter Line  Duration             PICC Double Lumen 09/21/23 0930 left basilic 10 days                     Physical Exam  Constitutional:       Appearance: Normal appearance.   HENT:      Head: Normocephalic and atraumatic.      Nose: Nose normal.   Cardiovascular:      Rate and Rhythm: Normal rate and regular rhythm.      Heart sounds: Normal heart sounds.   Pulmonary:      Effort: Pulmonary effort is normal.      Breath sounds: Normal breath sounds.   Abdominal:      General: Abdomen is flat. Bowel sounds are normal.      Palpations: Abdomen is soft.   Musculoskeletal:      Right lower leg: No edema.      Left lower leg: No edema.   Skin:     General: Skin is warm and dry.   Neurological:      General: No focal deficit present.      Mental Status: He is alert and oriented to person, place, and time.   Psychiatric:         Behavior: Behavior normal.            Significant Labs:  All pertinent labs from the last 24 hours have been reviewed.    Significant Diagnostics:  I have reviewed all pertinent imaging results/findings within the past 24 hours.    Assessment/Plan:     S/P CABG x  5  09/20/2023  The patient is postop day 1 status post coronary artery bypass grafting x5 and aortic valve replacement with a 19 mm Saint Dany mechanical valve.  Overall the patient is doing well.      Neuro:  Patient is awake appropriate and follows commands.  Patient is currently sedated with Precedex.  Will wean Precedex to off.   Cardiac:  Patient is hemodynamically stable.  With excellent cardiac output and cardiac index.  Patient is on low-dose vaso active meds.  Wean drips to off.  Will start metoprolol once drips are off.  Respiratory:  Patient is being weaned to extubation.  Patient is an active smoker Start nebs and Mucomyst.  Continue pulmonary toileting.  Continue incentive spirometer.    GI:  Patient is currently NPO.  Will start p.o. intake once extubated.    Renal:  Patient has good urine output and creatinine is 1.2.  Will start Lasix for diuresis.  Endocrine:  Patient required insulin drip for glucose controlled.  Will convert to sliding scale.    Id:  Patient had a T-max of 102°.  Most likely due to stress of surgery.  Will continue to follow.  White count is 9.  Patient is on manny op antibiotics.  Heme: Hematocrit is 17.  Patient is being transfused.  Platelet count is 74.  No evidence of active bleeding.  Will follow platelet count.  Will start patient on Coumadin anticoagulation for mechanical valve.  Activities:  Patient is currently in bed.  Will advance activities as tolerated once extubated.  Line tubes and drains:  Patient has an ETT, right IJ [place] and Cordis, right groin A-line, chest tubes, Ugalde catheter, pacer wires, and saphenectomy site DARON.    09/21/2023     The patient is postop day 2 status post coronary artery bypass grafting x5 and aortic valve replacement with a 19 mm Saint Dany mechanical valve.  Overall the patient is doing well.    Neuro:  Patient is awake alert and oriented x3.  Pain is well controlled with current pain management.  Cardiac:  The patient is off all drips.   Patient is hemodynamically stable.  Continue metoprolol.  Respiratory:  Patient was extubated yesterday.  Continue pulmonary toileting.  Continue incentive spirometer.   GI:  Patient is tolerating p.o. intake.  Patient had episodes of nausea yesterday which have abated.  Advance patient's diet as tolerated.    Renal:  Patient has good urine output.  Creatinine is 0.9.  Continue Lasix for diuresis.  Endocrine:  Glucose is controlled with sliding scale insulin.    Id:  T-max is 101.8 patient is currently afebrile.  White count is 17.  Most likely due to stress of surgery.  Continue to follow.  Heme:  Hematocrit is 24.5.  Platelet count is 89.  INR is 1.5.  Patient is started on Coumadin for anticoagulation of mechanical valve.  Activities:  Patient will be out of bed to chair.  Advance activities as tolerated.  Line tubes and drains:  Patient has pacer wires, right IJ Cordis and Ugalde catheter.  Will place PICC line and discontinue Cordis.    09/22/2023   The patient is postop day 3 status post coronary artery bypass grafting x5 and aortic valve replacement with a 19 mm Saint Dany mechanical valve.  Overall the patient is doing well.  Patient has been transferred to telemetry.    Neuro:  Patient is awake alert and oriented x3.  Pain is well controlled with current pain regimen.    Cardiac:  Patient is hemodynamically stable.  Continue metoprolol.    Respiratory:  Continue pulmonary toileting.  Continue incentive spirometer.    GI:  Patient is tolerating p.o. intake.  And tolerating a regular diet.    Renal:  Patient has good urine output.  Creatinine is 0.9.  Continue Lasix.    Endocrine:  Glucose is well controlled.    Id:  Patient is afebrile.  White count is 16.  Continue to follow.    Heme:  Hematocrit is 24 and platelet count is 114.  INR is 3.4.  Patient is currently on Coumadin for anticoagulation.  Activities:  Patient is out of bed and ambulating in the hallways.  Continue to advance as tolerated.  Line tubes  and drains:  Patient has a PICC line and pacer wires.    09/23/2023   The patient is postop day 4 status post coronary artery bypass grafting x5 and aortic valve replacement with a 19 mm Saint Dany mechanical valve.  Overall the patient is doing well.  Anticipate discharge to home with home health in the next 48-72 hours.    Neuro:  Patient is awake alert and oriented x3 pain is well controlled with pain management.  Neuro exam is nonfocal.    Cardiac:  Patient is hemodynamically stable.  Continue metoprolol.    Respiratory:  Continue pulmonary toileting.  Continue incentive spirometer.  GI patient is tolerating p.o. intake.  Will increase bowel regimen for bowel movements.  Renal: Patient has good urine output.  Creatinine is 0.9.  Patient is still appears fluid overloaded.  Continue Lasix.  Endocrine: Glucose is well controlled.    Id:  Patient is afebrile.  White count is down to 12.  Continue to follow.    Heme:  Hematocrit is 27.  Platelet count is 186 and INR is 1.9.  INR goal is between 2.5 and 3.  Continue Coumadin.    Activities:  Patient is out of bed and ambulating in the hallways.  Advance as tolerated.  Line tubes and drains:  Patient has a PICC line.      09/24/2023   The patient is postop day 5 status post coronary artery bypass grafting x5 and aortic valve replacement with a 19 mm Saint Dany mechanical valve.  Overall the patient is doing well.  Anticipate discharge home in the next 24-48 hours.    Neuro:  Patient is awake alert and oriented x3 pain is well controlled with current pain management.  Neuro exam is nonfocal.    Cardiac:  Patient is hemodynamically stable.  Continue metoprolol  Respiratory:  Continue pulmonary toileting.  Continue incentive spirometer.  Patient continues to have sputum production.  GI:  Patient is tolerating a regular diet.  Patient has had bowel movements.  Renal: Patient has good urine output.  Creatinine is stable.  Patient is appears to be euvolemic.  Will  discontinue Lasix.    Endocrine:  Glucose is well controlled.  Id: Patient is afebrile white count normal.  Heme:  Hematocrit is 27 and platelet count is 229.  INR is subtherapeutic 1.5.  Continue Coumadin.  Goal is a INR level of 2.5-3.  Activities: Patient is out of bed and ambulating in the hallways.  Advance as tolerated.    Line tubes and drains:  Patient has a PICC line.    09/25/2023   The patient is postop day 6 status post coronary artery bypass grafting x5 and aortic valve replacement with a 19 mm Saint Dany mechanical valve.  Overall the patient is doing well.  Patient is awaiting attainment of a therapeutic INR.  Anticipate discharge in the next 48-72 hours.    Neuro:  Patient is awake alert and oriented x3.  Pain is well controlled current pain management.  Neuro exam is nonfocal.    Cardiac:  Patient is stable.  Continue metoprolol.    Respiratory:  Patient has good sats on room air.  Continue pulmonary toileting.  Continue incentive spirometer.    GI:  Patient is tolerating a regular diet having bowel movements.    Renal:  Patient has good urine output.    Endocrine:  Glucose is well controlled.    Id:  Patient is afebrile white count is normal.    Heme:  Hematocrit is 27 and platelet count is 298.  INR is now normal at 1.2.  Patient is on bridging heparin.  Will titrate heparin to an APTT greater between 40 and 50.  Therapeutic goal of INR is 2.5-3.  Activities: Patient is out of bed and ambulating in the hallways.    Line tubes and drains:  Patient has a PICC line.    09/26/2023   The patient is postop day 7 status post coronary artery bypass grafting x5 and aortic valve replacement with a 19 mm Saint Dany mechanical valve.  Overall the patient is doing well.  Patient's INR is still subtherapeutic.  Patient is on heparin in the interim.  Anticipate discharge in the next 48-72 hours.  Neuro:  Patient is awake alert and oriented x3.  Pain is well controlled with current pain management.  Neuro exam is  nonfocal.    Cardiac:  Patient remains stable.  Continue metoprolol.    Respiratory: Patient has good sats on room air.  Continue pulmonary toileting.  Continue incentive spirometer.    GI:  Patient is tolerating a regular diet and having bowel movements.    Renal:  Patient has good urine output.  Endocrine:  Glucose is well controlled.    Id:  Patient's white count is normal.    Heme: Hematocrit is 28 and INR is 1.2 patient is on a heparin drip.  Coumadin dose has been increased.  INR goal is 2.5-3.  Activities:  Patient is out of bed and ambulating in the hallways.    Line tubes and drains:  Patient has a PICC line.    09/27/2023   The patient is postop [id number] post coronary artery bypass grafting x5 and aortic valve replacement with a 19 mm Saint Dany mechanical valve.  Overall the patient is doing well.  Patient is awaiting INR being therapeutic.  Anticipate discharge in the next 48-72 hours.  Neuro:  Patient is awake alert and oriented x3.  Pain is well controlled with current pain management.  Neuro exam is nonfocal.    Cardiac:  Patient's blood pressure is trending higher.  Will increase metoprolol to 50 mg b.i.d..  Respiratory: Patient has good sats on room air.  Continue pulmonary toileting.  Continue incentive spirometer.  GI:  Patient is tolerating a regular diet and having bowel movements.    Renal:  Patient has good urine output.    Endocrine:  Glucose is well controlled.  Id:  Patient is afebrile white count is normal.    Heme:  Hematocrit is 28.6 and platelet count is 428.  INR is 1.6.  A PTT is therapeutic at 40.  INR goal is 2.5-3.  Activities:  Patient is out of bed and ambulating in the hallways.  Advance as tolerated.    Line tubes and drains:  Patient has a PICC line.    09/28/2023   The patient is postop day 9 status post coronary artery bypass grafting x5 and aortic valve replacement with a 19 mm Saint Dany mechanical valve.  Overall the patient is doing well.  INR is 2.3 today.  Anticipate  discharge that is 4872 hours.    Neuro:  Patient is awake alert and oriented x3.  Pain is well controlled.  Neuro exam is nonfocal.    Cardiac:  Patient is hemodynamically stable.  Continue metoprolol 50 mg b.i.d..    Respiratory: Patient has good sats on room air.  Continue pulmonary toileting.  Continue incentive spirometer.    GI:  Patient is tolerating a regular diet and having bowel movements.    Renal: Patient has good urine output.    Endocrine:  Glucose is well controlled.    Id:  Patient is afebrile white count is 8   Heme:  Hematocrit is 28.  INR is 2.3.  Platelet count is 452.  Continue Coumadin.  INR goal is 2.5-3.0.  Patient is currently on bridging heparin.  Activities:  Patient is out of bed and ambulating.  Advance as tolerated.    Line tubes and drains:  Patient has a PICC line.    09/29/2023   Patient is postop day 10 status post coronary artery bypass grafting x5 and aortic valve replacement with a 19 mm Saint Dany mechanical valve.  Overall patient is doing well.  Patient awaits therapeutic INR prior to discharge.    Neuro:  Patient is awake alert and oriented x3.  Pain is well controlled.  Neuro exam is nonfocal.    Cardiac: Patient is stable.  Continue metoprolol.    Respiratory:  Patient has good sats on room air.  Continue pulmonary toileting.  Continue incentive spirometer.    GI:  Patient is tolerating a regular diet and having bowel movements.    Renal:  Patient has good urine output.  Creatinine is normal.    Endocrine: Glucose is well controlled.  Id:  Patient is afebrile.  White count is 10.3.  Heme:  Hematocrit is 28.8 and platelet count is 563.  INR is 1.8.  Patient needs INR goal of 2.5-3.  Continue Coumadin.  Activities:  Patient is out of bed and ambulating.  Advance as tolerated.  Line tubes and drains:  Patient has a PICC line.    09/30/2023   Patient is postop day 11 status post coronary bypass grafting x5 and aortic valve replacement with a 19 mm Saint Dany mechanical valve.   Overall the patient is doing well patient is awaiting a therapy INR prior to discharge.    Neuro:  Patient is awake alert and oriented x3.  Pain is well controlled.  Neuro exam is nonfocal.    Cardiac:  Continue metoprolol.    Respiratory:  Patient is on room air continue pulmonary toileting.    GI:  Patient is having bowel movements and a regular diet.    Renal:  Patient has good urine output.    Endocrine: Glucose is well controlled  Id white count is normal.    :  INR is 1.7 today continue Coumadin dosing.  Patient is on bridging heparin.    Activities:  Patient is out of bed and ambulating in the hallways.  Line tubes and drains:  Patient has a PICC line    09/30/2023   The patient is postop day 12 status post coronary artery bypass grafting x5 and aortic valve replacement with a 19 mm Saint Dany mechanical valve.  Overall patient is doing well.  Patient is awaiting therapeutic INR prior to discharge.    Neuro:  Patient is awake alert oriented x3.  Pain is well controlled.  Neuro exam is nonfocal.    Cardiac:  Patient is stable.  Continue metoprolol.    Respiratory:  Patient is on room air continue pulmonary toileting.    GI:  Patient is having a bowel movement and tolerating a regular diet.    Renal:  Patient has good urine output.    Endocrine:  Glucose is well controlled.  Id:  Patient is afebrile and white count is normal.    Heme:  INR is 1.  Nine today.  Continue on Coumadin.  INR goal is 2.5-3.  Continue Coumadin.    Line tubes and drains:  Patient has a PICC line.      CAD, multiple vessel  The patient is a 43-year-old male with critical left main coronary artery disease and severe aortic regurgitation by cardiac catheterization.  The patient is a candidate for urgent coronary artery bypass grafting and aortic valve replacement.  The risks and benefits of surgery were explained to the patient.  The patient understands the risks and benefits of surgery and has agreed to proceed with urgent aortic valve  replacement and coronary artery bypass grafting.  The type of aortic valve to be used was discussed with the patient.  The patient stated a preference for a bioprosthetic valve in order to avoid the need for chronic anticoagulation.  The patient understands that a bioprosthetic valve especially in a young patient has a limited lifespan.        Nishant Douglas MD  Cardiothoracic Surgery  [facility])

## 2023-10-01 NOTE — PLAN OF CARE
A208/A208 NAEL Alcazar is a 43 y.o.male admitted on 9/17/2023 for NSTEMI (non-ST elevated myocardial infarction)   Code Status: Full Code MRN: [id number]   Review of patient's allergies indicates:  No Known Allergies  Past Medical History:   Diagnosis Date    Hypertension 9/18/2023      PRN meds    0.9%  NaCl infusion (for blood administration), , Q24H PRN  acetaminophen, 650 mg, Q6H PRN  albumin human 5%, 25 g, PRN  aluminum-magnesium hydroxide-simethicone, 30 mL, Q6H PRN  calcium gluconate IVPB, 1 g, PRN  calcium gluconate IVPB, 2 g, PRN  calcium gluconate IVPB, 3 g, PRN  dextrose 10%, 12.5 g, PRN  glucagon (human recombinant), 1 mg, PRN  influenza, 0.5 mL, vaccine x 1 dose  insulin aspart U-100, 0-10 Units, Q6H PRN  lactated ringers, 1,000 mL, PRN  magnesium sulfate IVPB, 4 g, PRN  melatonin, 6 mg, Nightly PRN  metoclopramide HCl, 5 mg, Q6H PRN  ondansetron, 4 mg, Q12H PRN  pneumoc 20-leslee conj-dip cr(PF), 0.5 mL, vaccine x 1 dose  potassium chloride in water, 20 mEq, PRN  potassium chloride in water, 40 mEq, PRN  potassium chloride in water, 60 mEq, PRN  traMADoL, 50 mg, Q6H PRN      Chart check completed. Will continue plan of care.      Orientation: oriented x 4  [place] Coma Scale Score: 15     Lead Monitored: Lead II Rhythm: normal sinus rhythm    Cardiac/Telemetry Box Number: [id number]  VTE Required Core Measure: Pharmacological prophylaxis initiated/maintained Last Bowel Movement: 09/29/23  Diet Cardiac [facility]; Coumadin Restriction  Voiding Characteristics: voids spontaneously without difficulty  Raphael Score: 22  Fall Risk Score: 7  Accucheck []   Freq?      Lines/Drains/Airways       Peripherally Inserted Central Catheter Line  Duration             PICC Double Lumen 09/21/23 0930 left basilic 10 days                        Problem: Adjustment to Illness (Acute Coronary Syndrome)  Goal: Optimal Adaptation to Illness  Outcome: Ongoing, Progressing     Problem: Dysrhythmia (Acute Coronary  Syndrome)  Goal: Normalized Cardiac Rhythm  Outcome: Ongoing, Progressing     Problem: Cardiac-Related Pain (Acute Coronary Syndrome)  Goal: Absence of Cardiac-Related Pain  Outcome: Ongoing, Progressing     Problem: Hemodynamic Instability (Acute Coronary Syndrome)  Goal: Effective Cardiac Pump Function  Outcome: Ongoing, Progressing     Problem: Tissue Perfusion (Acute Coronary Syndrome)  Goal: Adequate Tissue Perfusion  Outcome: Ongoing, Progressing     Problem: Arrhythmia/Dysrhythmia (Cardiac Catheterization)  Goal: Stable Heart Rate and Rhythm  Outcome: Ongoing, Progressing     Problem: Bleeding (Cardiac Catheterization)  Goal: Absence of Bleeding  Outcome: Ongoing, Progressing     Problem: Contrast-Induced Injury Risk (Cardiac Catheterization)  Goal: Absence of Contrast-Induced Injury  Outcome: Ongoing, Progressing     Problem: Embolism (Cardiac Catheterization)  Goal: Absence of Embolism Signs and Symptoms  Outcome: Ongoing, Progressing     Problem: Ongoing Anesthesia/Sedation Effects (Cardiac Catheterization)  Goal: Anesthesia/Sedation Recovery  Outcome: Ongoing, Progressing     Problem: Pain (Cardiac Catheterization)  Goal: Acceptable Pain Control  Outcome: Ongoing, Progressing     Problem: Vascular Access Protection (Cardiac Catheterization)  Goal: Absence of Vascular Access Complication  Outcome: Ongoing, Progressing     Problem: Adult Inpatient Plan of Care  Goal: Plan of Care Review  Outcome: Ongoing, Progressing  Goal: Patient-Specific Goal (Individualized)  Outcome: Ongoing, Progressing  Goal: Absence of Hospital-Acquired Illness or Injury  Outcome: Ongoing, Progressing  Goal: Optimal Comfort and Wellbeing  Outcome: Ongoing, Progressing  Goal: Readiness for Transition of Care  Outcome: Ongoing, Progressing     Problem: Chest Pain  Goal: Resolution of Chest Pain Symptoms  Outcome: Ongoing, Progressing     Problem: Fall Injury Risk  Goal: Absence of Fall and Fall-Related Injury  Outcome: Ongoing,  Progressing     Problem: Infection  Goal: Absence of Infection Signs and Symptoms  Outcome: Ongoing, Progressing     Problem: Skin Injury Risk Increased  Goal: Skin Health and Integrity  Outcome: Ongoing, Progressing

## 2023-10-01 NOTE — SUBJECTIVE & OBJECTIVE
Interval History:     Review of Systems   Constitutional: Negative. Negative for weight gain.   HENT: Negative.     Eyes: Negative.    Cardiovascular: Negative.  Negative for chest pain, leg swelling and palpitations.   Respiratory: Negative.  Negative for shortness of breath.    Endocrine: Negative.    Hematologic/Lymphatic: Negative.    Skin: Negative.    Musculoskeletal:  Negative for muscle weakness.   Gastrointestinal: Negative.    Genitourinary: Negative.    Neurological: Negative.  Negative for dizziness.   Psychiatric/Behavioral: Negative.     Allergic/Immunologic: Negative.    All other systems reviewed and are negative.    Objective:     Vital Signs (Most Recent):  Temp: 98.4 °F (36.9 °C) (10/01/23 0740)  Pulse: 100 (10/01/23 0740)  Resp: 16 (10/01/23 0740)  BP: 118/68 (10/01/23 0740)  SpO2: 97 % (10/01/23 0740) Vital Signs (24h Range):  Temp:  [97.9 °F (36.6 °C)-98.5 °F (36.9 °C)] 98.4 °F (36.9 °C)  Pulse:  [] 100  Resp:  [14-18] 16  SpO2:  [96 %-99 %] 97 %  BP: (113-131)/(61-70) 118/68     Weight: 78.3 kg (172 lb 9.9 oz)  Body mass index is 26.25 kg/m².     SpO2: 97 %         Intake/Output Summary (Last 24 hours) at 10/1/2023 1055  Last data filed at 9/30/2023 1900  Gross per 24 hour   Intake 1014.87 ml   Output no documentation   Net 1014.87 ml       Lines/Drains/Airways       Peripherally Inserted Central Catheter Line       Name Duration    PICC Double Lumen 09/21/23 0930 left basilic 10 days                       Physical Exam  Vitals and nursing note reviewed.   Constitutional:       Appearance: He is well-developed.   HENT:      Head: Normocephalic and atraumatic.   Eyes:      Conjunctiva/sclera: Conjunctivae normal.      Pupils: Pupils are equal, round, and reactive to light.   Cardiovascular:      Rate and Rhythm: Normal rate and regular rhythm.      Pulses: Intact distal pulses.      Heart sounds: Normal heart sounds.   Pulmonary:      Effort: Pulmonary effort is normal.      Breath  sounds: Normal breath sounds.   Abdominal:      General: Bowel sounds are normal.      Palpations: Abdomen is soft.   Musculoskeletal:      Cervical back: Normal range of motion and neck supple.   Skin:     General: Skin is warm and dry.   Neurological:      Mental Status: He is alert and oriented to person, place, and time.            Significant Labs: All pertinent lab results from the last 24 hours have been reviewed.    Significant Imaging: X-Ray: CXR: X-Ray Chest 1 View (CXR):   Results for orders placed or performed during the hospital encounter of 09/17/23   X-Ray Chest 1 View    Narrative    EXAMINATION:  XR CHEST 1 VIEW    CLINICAL HISTORY:  PICC placement;    TECHNIQUE:  Single frontal view of the chest was performed.    COMPARISON:  09/21/2023    FINDINGS:  Left-sided PICC line tip overlies the SVC in good position.  In comparison to the prior study, there is no adverse interval changes      Impression    In comparison to the prior study, there is no adverse interval changes      Electronically signed by: Elliott España MD  Date:    09/21/2023  Time:    10:10

## 2023-10-01 NOTE — PROGRESS NOTES
Pharmacy Consult Note: Warfarin     Mikie Alcazar 's Coumadin will be dosed and monitored by Pharmacy.      Target INR goal is 2.5-3.    INR   Date Value Ref Range Status   10/01/2023 1.9 (H) 0.8 - 1.2 Final     Comment:     Coumadin Therapy:  2.0 - 3.0 for INR for all indicators except mechanical heart valves  and antiphospholipid syndromes which should use 2.5 - 3.5.       H/H=8.5/26.6 (trending down)  Nmugakphc=586 (trending up)    Indication: mechanical AVR    Recent dosing history:   2.5 mg on 9/20 & 9/21-- INR increased from 1.5 to 3.4 on 9/22 so dose was held.   1 mg on 9/23 & 9/24.  2.5 mg on 9/25 -- this resulted in no change in INR  5 mg given on 9/26/23-- INR increased appropriately to 1.6  3 mg given on 9/27/23  1 mg given on 9/28/23-- INR decreased below therapeutic range  3 mg given on 9/29/23--- INR decreased from 1.8 to 1.7.  4 mg given on 9/30/23--- INR increased to 1.9     Patient has received an average of ~3mg daily over the last 7 days and would most likely benefit from 3mg daily once INR is at goal. Will give a mild boost dose of 4mg today     Dose for Today: 4 mg      Potential Drug interactions: Melatonin, Aspirin, Tylenol, and Tramadol each may increase the risk of bleeding while on warfarin     Ascorbic acid may decrease the risk of bleeding while on warfarin.     PT/INR will be monitored daily. Dose adjustments will be made accordingly.      Thank you for allowing us to participate in this patient's care.     Ny Marshall, PharmD 10/1/2023 10:45 AM

## 2023-10-02 LAB
ANION GAP SERPL CALC-SCNC: 8 MMOL/L (ref 8–16)
APTT PPP: 52.4 SEC (ref 21–32)
BASOPHILS # BLD AUTO: 0.05 K/UL (ref 0–0.2)
BASOPHILS NFR BLD: 0.5 % (ref 0–1.9)
BUN SERPL-MCNC: 12 MG/DL (ref 6–20)
CALCIUM SERPL-MCNC: 8.6 MG/DL (ref 8.7–10.5)
CHLORIDE SERPL-SCNC: 108 MMOL/L (ref 95–110)
CO2 SERPL-SCNC: 22 MMOL/L (ref 23–29)
CREAT SERPL-MCNC: 0.8 MG/DL (ref 0.5–1.4)
DIFFERENTIAL METHOD: ABNORMAL
EOSINOPHIL # BLD AUTO: 0.2 K/UL (ref 0–0.5)
EOSINOPHIL NFR BLD: 1.9 % (ref 0–8)
ERYTHROCYTE [DISTWIDTH] IN BLOOD BY AUTOMATED COUNT: 17.2 % (ref 11.5–14.5)
EST. GFR  (NO RACE VARIABLE): >60 ML/MIN/1.73 M^2
GLUCOSE SERPL-MCNC: 95 MG/DL (ref 70–110)
HCT VFR BLD AUTO: 26.7 % (ref 40–54)
HGB BLD-MCNC: 8.6 G/DL (ref 14–18)
IMM GRANULOCYTES # BLD AUTO: 0.05 K/UL (ref 0–0.04)
IMM GRANULOCYTES NFR BLD AUTO: 0.5 % (ref 0–0.5)
INR PPP: 2.2 (ref 0.8–1.2)
LYMPHOCYTES # BLD AUTO: 2 K/UL (ref 1–4.8)
LYMPHOCYTES NFR BLD: 20.4 % (ref 18–48)
MCH RBC QN AUTO: 29.8 PG (ref 27–31)
MCHC RBC AUTO-ENTMCNC: 32.2 G/DL (ref 32–36)
MCV RBC AUTO: 92 FL (ref 82–98)
MONOCYTES # BLD AUTO: 0.9 K/UL (ref 0.3–1)
MONOCYTES NFR BLD: 8.8 % (ref 4–15)
NEUTROPHILS # BLD AUTO: 6.6 K/UL (ref 1.8–7.7)
NEUTROPHILS NFR BLD: 67.9 % (ref 38–73)
NRBC BLD-RTO: 0 /100 WBC
PLATELET # BLD AUTO: 579 K/UL (ref 150–450)
PMV BLD AUTO: 8.4 FL (ref 9.2–12.9)
POCT GLUCOSE: 100 MG/DL (ref 70–110)
POCT GLUCOSE: 87 MG/DL (ref 70–110)
POTASSIUM SERPL-SCNC: 4.2 MMOL/L (ref 3.5–5.1)
PROTHROMBIN TIME: 21.7 SEC (ref 9–12.5)
RBC # BLD AUTO: 2.89 M/UL (ref 4.6–6.2)
SODIUM SERPL-SCNC: 138 MMOL/L (ref 136–145)
WBC # BLD AUTO: 9.66 K/UL (ref 3.9–12.7)

## 2023-10-02 PROCEDURE — 85025 COMPLETE CBC W/AUTO DIFF WBC: CPT | Performed by: THORACIC SURGERY (CARDIOTHORACIC VASCULAR SURGERY)

## 2023-10-02 PROCEDURE — 99232 PR SUBSEQUENT HOSPITAL CARE,LEVL II: ICD-10-PCS | Mod: ,,, | Performed by: INTERNAL MEDICINE

## 2023-10-02 PROCEDURE — 25000003 PHARM REV CODE 250: Performed by: THORACIC SURGERY (CARDIOTHORACIC VASCULAR SURGERY)

## 2023-10-02 PROCEDURE — 97116 GAIT TRAINING THERAPY: CPT

## 2023-10-02 PROCEDURE — 25000003 PHARM REV CODE 250: Performed by: NURSE PRACTITIONER

## 2023-10-02 PROCEDURE — 85730 THROMBOPLASTIN TIME PARTIAL: CPT | Performed by: THORACIC SURGERY (CARDIOTHORACIC VASCULAR SURGERY)

## 2023-10-02 PROCEDURE — 63600175 PHARM REV CODE 636 W HCPCS: Performed by: THORACIC SURGERY (CARDIOTHORACIC VASCULAR SURGERY)

## 2023-10-02 PROCEDURE — 97530 THERAPEUTIC ACTIVITIES: CPT

## 2023-10-02 PROCEDURE — 85610 PROTHROMBIN TIME: CPT | Performed by: INTERNAL MEDICINE

## 2023-10-02 PROCEDURE — 21400001 HC TELEMETRY ROOM

## 2023-10-02 PROCEDURE — 99232 SBSQ HOSP IP/OBS MODERATE 35: CPT | Mod: ,,, | Performed by: INTERNAL MEDICINE

## 2023-10-02 PROCEDURE — 25000003 PHARM REV CODE 250: Performed by: INTERNAL MEDICINE

## 2023-10-02 PROCEDURE — 80048 BASIC METABOLIC PNL TOTAL CA: CPT | Performed by: THORACIC SURGERY (CARDIOTHORACIC VASCULAR SURGERY)

## 2023-10-02 RX ORDER — WARFARIN 2 MG/1
4 TABLET ORAL DAILY
Status: DISCONTINUED | OUTPATIENT
Start: 2023-10-02 | End: 2023-10-03 | Stop reason: HOSPADM

## 2023-10-02 RX ORDER — WARFARIN 2 MG/1
4 TABLET ORAL DAILY
Status: DISCONTINUED | OUTPATIENT
Start: 2023-10-02 | End: 2023-10-02

## 2023-10-02 RX ADMIN — FERROUS SULFATE TAB 325 MG (65 MG ELEMENTAL FE) 1 EACH: 325 (65 FE) TAB at 09:10

## 2023-10-02 RX ADMIN — ATORVASTATIN CALCIUM 80 MG: 40 TABLET, FILM COATED ORAL at 09:10

## 2023-10-02 RX ADMIN — METOPROLOL TARTRATE 50 MG: 50 TABLET, FILM COATED ORAL at 09:10

## 2023-10-02 RX ADMIN — FOLIC ACID 1 MG: 1 TABLET ORAL at 09:10

## 2023-10-02 RX ADMIN — OXYCODONE HYDROCHLORIDE AND ACETAMINOPHEN 500 MG: 500 TABLET ORAL at 09:10

## 2023-10-02 RX ADMIN — OXYCODONE HYDROCHLORIDE AND ACETAMINOPHEN 500 MG: 500 TABLET ORAL at 08:10

## 2023-10-02 RX ADMIN — GUAIFENESIN 1200 MG: 600 TABLET, EXTENDED RELEASE ORAL at 08:10

## 2023-10-02 RX ADMIN — WARFARIN SODIUM 4 MG: 2 TABLET ORAL at 05:10

## 2023-10-02 RX ADMIN — MAGNESIUM HYDROXIDE 400 MG: 400 SUSPENSION ORAL at 09:10

## 2023-10-02 RX ADMIN — ACETAMINOPHEN 650 MG: 325 TABLET ORAL at 09:10

## 2023-10-02 RX ADMIN — PANTOPRAZOLE SODIUM 40 MG: 40 TABLET, DELAYED RELEASE ORAL at 09:10

## 2023-10-02 RX ADMIN — ASPIRIN 81 MG: 81 TABLET, COATED ORAL at 09:10

## 2023-10-02 RX ADMIN — HEPARIN SODIUM 14 UNITS/KG/HR: 10000 INJECTION, SOLUTION INTRAVENOUS at 06:10

## 2023-10-02 RX ADMIN — GUAIFENESIN 1200 MG: 600 TABLET, EXTENDED RELEASE ORAL at 09:10

## 2023-10-02 RX ADMIN — CYANOCOBALAMIN TAB 1000 MCG 1000 MCG: 1000 TAB at 09:10

## 2023-10-02 RX ADMIN — ACETAMINOPHEN 650 MG: 325 TABLET ORAL at 08:10

## 2023-10-02 RX ADMIN — DOCUSATE SODIUM 100 MG: 100 CAPSULE, LIQUID FILLED ORAL at 09:10

## 2023-10-02 RX ADMIN — METOPROLOL TARTRATE 50 MG: 50 TABLET, FILM COATED ORAL at 08:10

## 2023-10-02 RX ADMIN — Medication 6 MG: at 08:10

## 2023-10-02 NOTE — PT/OT/SLP PROGRESS
Physical Therapy Treatment    Patient Name:  Mikie Alcazar   MRN:  [id number]    Recommendations:     Discharge Recommendations: home health PT  Discharge Equipment Recommendations: shower chair  Barriers to discharge: None    Assessment:     Mikie Alcazar is a 43 y.o. male admitted with a medical diagnosis of NSTEMI (non-ST elevated myocardial infarction).  He presents with the following impairments/functional limitations: weakness, impaired endurance, impaired functional mobility, gait instability, impaired balance .    Rehab Prognosis: Good; patient would benefit from acute skilled PT services to address these deficits and reach maximum level of function.    Recent Surgery: Procedure(s) (LRB):  CORONARY ARTERY BYPASS GRAFT (CABG) (N/A)  REPLACEMENT-VALVE-AORTIC (N/A)  SURGICAL PROCUREMENT, VEIN, ENDOSCOPIC (Left)  BLOCK, NERVE, INTERCOSTAL, 2 OR MORE (N/A)  ECHOCARDIOGRAM,TRANSESOPHAGEAL (N/A) 13 Days Post-Op    Plan:     During this hospitalization, patient to be seen 3 x/week to address the identified rehab impairments via gait training, therapeutic activities, therapeutic exercises and progress toward the following goals:    Plan of Care Expires:  10/13/23    Subjective     Chief Complaint: AGREEABLE TO TX.  Patient/Family Comments/goals:   Pain/Comfort:  Pain Rating 1: 2/10  Location 1: chest (REPORTS MUSCULAR DISCOMFORT)      Objective:     Communicated with NURSE CHISHOLM prior to session.  Patient found up in chair with telemetry, PICC line upon PT entry to room.     General Precautions: Standard, sternal  Orthopedic Precautions: N/A  Braces: N/A  Respiratory Status: Room air     Functional Mobility:  Bed Mobility:     Scooting: modified independence  Transfers:     Sit to Stand:  supervision with no AD  Gait: PT ' NO AD WITH SPV, NO LOB OR SOB ON ROOM AIR  Balance: GOOD SITTING AND STANDING BALANCE  PT EDUCATED IN AND PERFORMED STANDING BLE THEREX WITH BUE SUPPORT TO RAIL FOR BALANCE ONLY: MIP,  "HEEL LIFT, HIP ABD/ADD, HIP EXT., MINI SQUAT    AM-PAC 6 CLICK MOBILITY  Turning over in bed (including adjusting bedclothes, sheets and blankets)?: 4  Sitting down on and standing up from a chair with arms (e.g., wheelchair, bedside commode, etc.): 4  Moving from lying on back to sitting on the side of the bed?: 4  Moving to and from a bed to a chair (including a wheelchair)?: 4  Need to walk in hospital room?: 4  Climbing 3-5 steps with a railing?: 1  Basic Mobility Total Score: 21     Treatment & Education:  PT EDUCATION:  - ROLE OF P.T. AND POC IN ACUTE CARE HOSPITAL SETTING  - REVIEW STERNAL PRECAUTIONS  - ENCOURAGED TO INCREASE TIME OOB IN CHAIR TO TOLERANCE, ENCOURAGED TO AMBULATE IN HALLWAY WITH STAFF 3-4X A DAY  - TO CONTINUE THERAPUETIC EXERCISES THROUGHOUT THE DAY TO INCREASE ACTIVITY TOLERANCE AND DECREASE RISK FOR PNEUMONIA AND BLOOD CLOTS: HIP FLEX/EXT, HIP ABD/ADD, QUAD SET, HEEL SLIDE, AP  - RISK FOR FALLS DUE TO GENERALIZED WEAKNESS, EDUCATED ON "CALL DON'T FALL", ENCOURAGED TO CALL FOR ASSISTANCE WITH ALL NEEDS    Patient left up in chair with all lines intact, call button in reach, and NURSE notified..    GOALS:   Multidisciplinary Problems       Physical Therapy Goals          Problem: Physical Therapy    Goal Priority Disciplines Outcome Goal Variances Interventions   Physical Therapy Goal     PT, PT/OT Ongoing, Progressing     Description: LTG'S TO BE MET IN 14 DAYS (10-13-23)  PT WILL BE ARELY FOR BED MOBILITY  PT WILL BE INDEP FOR BED<>CHAIR TF'S  PT WILL AMB >1000 FEET INDEP  PT WILL INC AMPAC SCORE BY 2 POINTS TO PROGRESS GROSS FUNC MOBILITY                         Time Tracking:     PT Received On: 10/02/23  PT Start Time: 0750     PT Stop Time: 0815  PT Total Time (min): 25 min     Billable Minutes: Gait Training 10 and Therapeutic Activity 15    Treatment Type: Treatment  PT/PTA: PT     Number of PTA visits since last PT visit: 0     10/02/2023  "

## 2023-10-02 NOTE — PROGRESS NOTES
[facility] ([facility])  Cardiology  Progress Note    Patient Name: Mikie Alcazar  MRN: [id number]  Admission Date: 9/17/2023  Hospital Length of Stay: 15 days  Code Status: Full Code   Attending Physician: Nishant Douglas MD   Primary Care Physician: Lissette, Primary Doctor  Expected Discharge Date:   Principal Problem:NSTEMI (non-ST elevated myocardial infarction)    Subjective:     Hospital Course:   Cardiology consulted to assist with management. Pt seen and examined today, resting in bed mother at bedside. He reports his pain started 6+ months ago and worsening in the last few weeks happening daily with associated SOB, dizziness radiating to left arm. He reports his pain is sharp and pressure-like at times. Pt reports daily use a marijuana, h/o cocaine and other drugs 5-10 years ago. Drinks occasionally. Echo pending cont hep gtt    09/19/2023. Admitted for NSTEMI/ACS. Echo showed RWMA  The cath done yesterday showed   Severe lmca disease with timi1 flow in lad   Lcx ostial 50%   Rca prox 80%   Ef 50%   Severe AI  09/19/23 s/p CABG x5 and aortic valve replacement with a 19 mm [place] mechanical valve by Dr. Douglas.  In ICU and intubated. Om epi and Vasopressin gtt.    9/20/23  Pt seen and examined today, POD 1 CABGx5 pt still intubated on exam this am, weaning epi. Labs reviewed, H/H 6.0 and 17.1. Plans to extubated today    9/21/23 Pt seen and examined today extubated feels ok chest soreness, chest tubes removed. Labs reviewed, chart reviewed.    9/22/23 Pt seen and examined today, sitting up in bedside chair. Feels good. Walked with PT/OT. Labs reviewed, chart reviewed    09/23 ambulating well. No chest pain dyspnea, the lab reviewed.     09/24. On coumadin rx and heparin bridge. Non chest pain dyspnea, the lab reviewed. VSS    9/25/23-Patient seen and examined today, resting in bed. No AEON. Pain controlled. Working with PT/OT. Labs stable. INR 1.2, on heparin bridge.    9/26/23-Patient seen and  examined today, sitting up in bedside chair. Feels ok. More fatigued/weak today. Pain controlled. Labs stable. INR 1.2, remains on heparin bridge.    9/27/23-Patient seen and examined today, resting in bed. Feeling better today. Worked with PT/OT earlier. No AEON. Labs reviewed, INR 1.6.     9/28/23-Patient seen and examined today, resting in bed. Ambulating well. Pain controlled. INR 2.3.     9/29/23-Patient seen and examined today, lying in bed. Feels ok. Does admit to some fatigue. Pain controlled. Ambulating well with PT/OT. Labs reviewed/stable. INR 1.8.    9/30/23- Pt has no complaints. Continue beta blockers and ambulation.     10/1/23- POD # 12, CABG x5. With AVR, pt currently bridging with heparin and coumadin.     10/2/23 POD 13, CABGx5 and AVR, awaiting therapeutic INR. Recovering well. Labs reviewed chart reviewed          Review of Systems   Constitutional: Negative.   HENT: Negative.     Eyes: Negative.    Cardiovascular: Negative.    Respiratory: Negative.     Skin: Negative.    Musculoskeletal: Negative.    Gastrointestinal: Negative.    Genitourinary: Negative.    Neurological: Negative.    Psychiatric/Behavioral: Negative.       Objective:     Vital Signs (Most Recent):  Temp: 98.9 °F (37.2 °C) (10/02/23 0731)  Pulse: (!) 113 (10/02/23 0731)  Resp: 18 (10/02/23 0731)  BP: 123/68 (10/02/23 0731)  SpO2: 96 % (10/02/23 0731) Vital Signs (24h Range):  Temp:  [98 °F (36.7 °C)-99.3 °F (37.4 °C)] 98.9 °F (37.2 °C)  Pulse:  [] 113  Resp:  [16-18] 18  SpO2:  [96 %-99 %] 96 %  BP: ()/(60-70) 123/68     Weight: 79.8 kg (175 lb 14.8 oz)  Body mass index is 26.75 kg/m².     SpO2: 96 %         Intake/Output Summary (Last 24 hours) at 10/2/2023 1034  Last data filed at 10/2/2023 0556  Gross per 24 hour   Intake 261.83 ml   Output --   Net 261.83 ml       Lines/Drains/Airways       Peripherally Inserted Central Catheter Line  Duration             PICC Double Lumen 09/21/23 0930 left basilic 11 days  "                      Physical Exam  Vitals and nursing note reviewed.   Constitutional:       Appearance: Normal appearance.   HENT:      Head: Normocephalic and atraumatic.   Eyes:      General:         Right eye: No discharge.         Left eye: No discharge.      Pupils: Pupils are equal, round, and reactive to light.   Cardiovascular:      Rate and Rhythm: Normal rate and regular rhythm.      Heart sounds: S1 normal and S2 normal. No murmur heard.     No friction rub.   Pulmonary:      Effort: Pulmonary effort is normal. No respiratory distress.      Breath sounds: Normal breath sounds. No rales.   Abdominal:      Palpations: Abdomen is soft.      Tenderness: There is no abdominal tenderness.   Musculoskeletal:      Cervical back: Neck supple.      Right lower leg: No edema.      Left lower leg: No edema.   Skin:     General: Skin is warm and dry.   Neurological:      General: No focal deficit present.      Mental Status: He is alert and oriented to person, place, and time.   Psychiatric:         Mood and Affect: Mood normal.         Behavior: Behavior normal.         Thought Content: Thought content normal.            Significant Labs: BMP:   Recent Labs   Lab 10/01/23  0512 10/02/23  0537    95    138   K 4.1 4.2    108   CO2 23 22*   BUN 14 12   CREATININE 0.9 0.8   CALCIUM 8.8 8.6*   , CMP   Recent Labs   Lab 10/01/23  0512 10/02/23  0537    138   K 4.1 4.2    108   CO2 23 22*    95   BUN 14 12   CREATININE 0.9 0.8   CALCIUM 8.8 8.6*   ANIONGAP 10 8   , CBC   Recent Labs   Lab 10/01/23  0512 10/02/23  0537   WBC 9.79 9.66   HGB 8.5* 8.6*   HCT 26.6* 26.7*   * 579*   , INR   Recent Labs   Lab 10/01/23  0512 10/02/23  0537   INR 1.9* 2.2*   , Lipid Panel No results for input(s): "CHOL", "HDL", "LDLCALC", "TRIG", "CHOLHDL" in the last 48 hours., Troponin No results for input(s): "TROPONINI" in the last 48 hours., and All pertinent lab results from the last 24 hours " have been reviewed.    Significant Imaging: Echocardiogram: Transthoracic echo (TTE) complete (Cupid Only):   Results for orders placed or performed during the hospital encounter of 09/17/23   Echo   Result Value Ref Range    BSA 1.98 m2    LVOT stroke volume 82.76 cm3    LVIDd 5.22 3.5 - 6.0 cm    LV Systolic Volume 86.21 mL    LV Systolic Volume Index 44.2 mL/m2    LVIDs 4.37 (A) 2.1 - 4.0 cm    LV Diastolic Volume 130.82 mL    LV Diastolic Volume Index 67.09 mL/m2    IVS 0.88 0.6 - 1.1 cm    LVOT diameter 1.91 cm    LVOT area 2.9 cm2    FS 16 (A) 28 - 44 %    Left Ventricle Relative Wall Thickness 0.38 cm    Posterior Wall 0.98 0.6 - 1.1 cm    LV mass 177.53 g    LV Mass Index 91 g/m2    MV Peak E Bob 1.20 m/s    TDI LATERAL 0.06 m/s    TDI SEPTAL 0.08 m/s    E/E' ratio 17.14 m/s    MV Peak A Bob 1.14 m/s    TR Max Bob 2.33 m/s    E/A ratio 1.05     IVRT 72.31 msec    E wave deceleration time 200.37 msec    LV SEPTAL E/E' RATIO 15.00 m/s    LV LATERAL E/E' RATIO 20.00 m/s    LVOT peak bob 1.38 m/s    Left Ventricular Outflow Tract Mean Velocity 1.08 cm/s    Left Ventricular Outflow Tract Mean Gradient 4.99 mmHg    LA size 3.79 cm    Left Atrium Minor Axis 4.68 cm    Left Atrium Major Axis 4.27 cm    RVOT peak VTI 10.8 cm    TAPSE 1.84 cm    RA Major Axis 3.31 cm    AV regurgitation pressure 1/2 time [id number] ms    AR Max Bob 4.34 m/s    AV mean gradient 14 mmHg    AV peak gradient 26 mmHg    Ao peak bob 2.54 m/s    Ao VTI 54.00 cm    LVOT peak VTI 28.90 cm    AV valve area 1.53 cm²    AV Velocity Ratio 0.54     AV index (prosthetic) 0.54     SARAH by Velocity Ratio 1.56 cm²    Mr max bob 4.42 m/s    MV stenosis pressure 1/2 time 58.11 ms    MV valve area p 1/2 method 3.79 cm2    TV mean gradient 19 mmHg    Triscuspid Valve Regurgitation Peak Gradient 22 mmHg    PV mean gradient 1 mmHg    RVOT peak bob 0.58 m/s    Ao root annulus 2.71 cm    STJ 2.59 cm    Ascending aorta 2.36 cm    IVC diameter 1.57 cm     Mean e' 0.07 m/s    ZLVIDS 1.94     ZLVIDD -0.63     LA Volume Index 22.9 mL/m2    LA volume 44.60 cm3    LA WIDTH 3.1 cm    RA Width 2.2 cm    TV resting pulmonary artery pressure 25 mmHg    RV TB RVSP 5 mmHg    Est. RA pres 3 mmHg    Narrative      Left Ventricle: The left ventricle is normal in size. Ventricular mass   is normal. Normal wall thickness. regional wall motion abnormalities   present. There is mildly reduced systolic function with a visually   estimated ejection fraction of 40 - 45%. Grade II diastolic dysfunction.    Right Ventricle: Normal right ventricular cavity size. Wall thickness   is normal. Right ventricle wall motion  is normal. Systolic function is   normal.    Aortic Valve: There is mild to moderate aortic regurgitation.    Pulmonary Artery: The estimated pulmonary artery systolic pressure is   25 mmHg.    IVC/SVC: Normal venous pressure at 3 mmHg.     , EKG: reviewed, Stress Test: reviewed, and X-Ray: CXR: X-Ray Chest 1 View (CXR):   Results for orders placed or performed during the hospital encounter of 09/17/23   X-Ray Chest 1 View    Narrative    EXAMINATION:  XR CHEST 1 VIEW    CLINICAL HISTORY:  PICC placement;    TECHNIQUE:  Single frontal view of the chest was performed.    COMPARISON:  09/21/2023    FINDINGS:  Left-sided PICC line tip overlies the SVC in good position.  In comparison to the prior study, there is no adverse interval changes      Impression    In comparison to the prior study, there is no adverse interval changes      Electronically signed by: Elliott España MD  Date:    09/21/2023  Time:    10:10     Assessment and Plan:       * NSTEMI (non-ST elevated myocardial infarction)  Troponin 0.6->-0.709->0.708  Cont hep gtt  EKG with ST T wave abnml  C planned for today  All risks, benefits and treatment alternatives explained, all questions answered. Pt agreeable to proceed.   NPO    9/20/23  S/P CABG x5 AVR    S/P AVR (aortic valve replacement)  09/24  On  coumadin Rx and heparin gtt as bridge    9/28/23  -INR 2.3     9/29/23  -INR 1.8    10/2/23  Awaiting therapeutic INR  Cont management per CTS    S/P CABG x 5  Cont management per CTS    09/23   Continue asa statin plavix lasix and metoprolol  On coumadin for mechanical AVR    09/24  Continue ASA statin BB  Continue supportive care    9/25/23  -Progressing well  -Continue ASA, statin, BB  -IS usage and ambulation    9/26/23  -Stable overnight  -Continue ASA, statin, BB  -IS usage and ambulation    9/27/23  -No AEON  -Continue ASA, statin, BB  -IS usage   -Ambulating well    9/28/23  -Stable CV wise  -Continue ASA, statin, BB  -IS usage/ambulation    9/29/23  -No AEON  -Continue ASA, statin, BB  -Continue PT/OT  -INR 1.8 today    10/2/23  ASA, statin, BB  Cont monitoring INR    Nonrheumatic aortic valve insufficiency  -s/p mechanical AVR    Tobacco smoker, 1 pack of cigarettes or less per day  -Smoking cessation    CAD, multiple vessel  09/19/23  S/p CABG x5 and mechanical AVR today  -continue icu supportive care  -continue asa plavix statin BB and lasix  -will f/u    9/20/23  Cont ASA, plavix, statin, BB, lasix  Management per CTS    See plan under CABG    Hypertension  stable        VTE Risk Mitigation (From admission, onward)         Ordered     warfarin tablet 3 mg  Daily         10/01/23 1040     heparin 25,000 units in dextrose 5% 250 ml (100 units/mL) infusion MINIMAL INTENSITY nomogram - [facility]  Continuous        Question Answer Comment   Heparin Infusion Adjustment (DO NOT MODIFY ANSWER) [email or web address] MINIMAL  INTENSITY nomogram for [facility] [email or web address]    Begin at (in units/kg/hr) 8        09/25/23 0855     IP VTE LOW RISK PATIENT  Once         09/17/23 1941                Courtney Prince, NP  Cardiology  [facility] Tele[facility] ([facility])

## 2023-10-02 NOTE — SUBJECTIVE & OBJECTIVE
Review of Systems   Constitutional: Negative.   HENT: Negative.     Eyes: Negative.    Cardiovascular: Negative.    Respiratory: Negative.     Skin: Negative.    Musculoskeletal: Negative.    Gastrointestinal: Negative.    Genitourinary: Negative.    Neurological: Negative.    Psychiatric/Behavioral: Negative.       Objective:     Vital Signs (Most Recent):  Temp: 98.9 °F (37.2 °C) (10/02/23 0731)  Pulse: (!) 113 (10/02/23 0731)  Resp: 18 (10/02/23 0731)  BP: 123/68 (10/02/23 0731)  SpO2: 96 % (10/02/23 0731) Vital Signs (24h Range):  Temp:  [98 °F (36.7 °C)-99.3 °F (37.4 °C)] 98.9 °F (37.2 °C)  Pulse:  [] 113  Resp:  [16-18] 18  SpO2:  [96 %-99 %] 96 %  BP: ()/(60-70) 123/68     Weight: 79.8 kg (175 lb 14.8 oz)  Body mass index is 26.75 kg/m².     SpO2: 96 %         Intake/Output Summary (Last 24 hours) at 10/2/2023 1034  Last data filed at 10/2/2023 0556  Gross per 24 hour   Intake 261.83 ml   Output --   Net 261.83 ml       Lines/Drains/Airways       Peripherally Inserted Central Catheter Line  Duration             PICC Double Lumen 09/21/23 0930 left basilic 11 days                       Physical Exam  Vitals and nursing note reviewed.   Constitutional:       Appearance: Normal appearance.   HENT:      Head: Normocephalic and atraumatic.   Eyes:      General:         Right eye: No discharge.         Left eye: No discharge.      Pupils: Pupils are equal, round, and reactive to light.   Cardiovascular:      Rate and Rhythm: Normal rate and regular rhythm.      Heart sounds: S1 normal and S2 normal. No murmur heard.     No friction rub.   Pulmonary:      Effort: Pulmonary effort is normal. No respiratory distress.      Breath sounds: Normal breath sounds. No rales.   Abdominal:      Palpations: Abdomen is soft.      Tenderness: There is no abdominal tenderness.   Musculoskeletal:      Cervical back: Neck supple.      Right lower leg: No edema.      Left lower leg: No edema.   Skin:     General: Skin  "is warm and dry.   Neurological:      General: No focal deficit present.      Mental Status: He is alert and oriented to person, place, and time.   Psychiatric:         Mood and Affect: Mood normal.         Behavior: Behavior normal.         Thought Content: Thought content normal.            Significant Labs: BMP:   Recent Labs   Lab 10/01/23  0512 10/02/23  0537    95    138   K 4.1 4.2    108   CO2 23 22*   BUN 14 12   CREATININE 0.9 0.8   CALCIUM 8.8 8.6*   , CMP   Recent Labs   Lab 10/01/23  0512 10/02/23  0537    138   K 4.1 4.2    108   CO2 23 22*    95   BUN 14 12   CREATININE 0.9 0.8   CALCIUM 8.8 8.6*   ANIONGAP 10 8   , CBC   Recent Labs   Lab 10/01/23  0512 10/02/23  0537   WBC 9.79 9.66   HGB 8.5* 8.6*   HCT 26.6* 26.7*   * 579*   , INR   Recent Labs   Lab 10/01/23  0512 10/02/23  0537   INR 1.9* 2.2*   , Lipid Panel No results for input(s): "CHOL", "HDL", "LDLCALC", "TRIG", "CHOLHDL" in the last 48 hours., Troponin No results for input(s): "TROPONINI" in the last 48 hours., and All pertinent lab results from the last 24 hours have been reviewed.    Significant Imaging: Echocardiogram: Transthoracic echo (TTE) complete (Cupid Only):   Results for orders placed or performed during the hospital encounter of 09/17/23   Echo   Result Value Ref Range    BSA 1.98 m2    LVOT stroke volume 82.76 cm3    LVIDd 5.22 3.5 - 6.0 cm    LV Systolic Volume 86.21 mL    LV Systolic Volume Index 44.2 mL/m2    LVIDs 4.37 (A) 2.1 - 4.0 cm    LV Diastolic Volume 130.82 mL    LV Diastolic Volume Index 67.09 mL/m2    IVS 0.88 0.6 - 1.1 cm    LVOT diameter 1.91 cm    LVOT area 2.9 cm2    FS 16 (A) 28 - 44 %    Left Ventricle Relative Wall Thickness 0.38 cm    Posterior Wall 0.98 0.6 - 1.1 cm    LV mass 177.53 g    LV Mass Index 91 g/m2    MV Peak E Bob 1.20 m/s    TDI LATERAL 0.06 m/s    TDI SEPTAL 0.08 m/s    E/E' ratio 17.14 m/s    MV Peak A Bob 1.14 m/s    TR Max Bob 2.33 m/s    " E/A ratio 1.05     IVRT 72.31 msec    E wave deceleration time 200.37 msec    LV SEPTAL E/E' RATIO 15.00 m/s    LV LATERAL E/E' RATIO 20.00 m/s    LVOT peak bob 1.38 m/s    Left Ventricular Outflow Tract Mean Velocity 1.08 cm/s    Left Ventricular Outflow Tract Mean Gradient 4.99 mmHg    LA size 3.79 cm    Left Atrium Minor Axis 4.68 cm    Left Atrium Major Axis 4.27 cm    RVOT peak VTI 10.8 cm    TAPSE 1.84 cm    RA Major Axis 3.31 cm    AV regurgitation pressure 1/2 time [id number] ms    AR Max Bob 4.34 m/s    AV mean gradient 14 mmHg    AV peak gradient 26 mmHg    Ao peak bob 2.54 m/s    Ao VTI 54.00 cm    LVOT peak VTI 28.90 cm    AV valve area 1.53 cm²    AV Velocity Ratio 0.54     AV index (prosthetic) 0.54     SARAH by Velocity Ratio 1.56 cm²    Mr max bob 4.42 m/s    MV stenosis pressure 1/2 time 58.11 ms    MV valve area p 1/2 method 3.79 cm2    TV mean gradient 19 mmHg    Triscuspid Valve Regurgitation Peak Gradient 22 mmHg    PV mean gradient 1 mmHg    RVOT peak bob 0.58 m/s    Ao root annulus 2.71 cm    STJ 2.59 cm    Ascending aorta 2.36 cm    IVC diameter 1.57 cm    Mean e' 0.07 m/s    ZLVIDS 1.94     ZLVIDD -0.63     LA Volume Index 22.9 mL/m2    LA volume 44.60 cm3    LA WIDTH 3.1 cm    RA Width 2.2 cm    TV resting pulmonary artery pressure 25 mmHg    RV TB RVSP 5 mmHg    Est. RA pres 3 mmHg    Narrative      Left Ventricle: The left ventricle is normal in size. Ventricular mass   is normal. Normal wall thickness. regional wall motion abnormalities   present. There is mildly reduced systolic function with a visually   estimated ejection fraction of 40 - 45%. Grade II diastolic dysfunction.    Right Ventricle: Normal right ventricular cavity size. Wall thickness   is normal. Right ventricle wall motion  is normal. Systolic function is   normal.    Aortic Valve: There is mild to moderate aortic regurgitation.    Pulmonary Artery: The estimated pulmonary artery systolic pressure is   25 mmHg.     IVC/SVC: Normal venous pressure at 3 mmHg.     , EKG: reviewed, Stress Test: reviewed, and X-Ray: CXR: X-Ray Chest 1 View (CXR):   Results for orders placed or performed during the hospital encounter of 09/17/23   X-Ray Chest 1 View    Narrative    EXAMINATION:  XR CHEST 1 VIEW    CLINICAL HISTORY:  PICC placement;    TECHNIQUE:  Single frontal view of the chest was performed.    COMPARISON:  09/21/2023    FINDINGS:  Left-sided PICC line tip overlies the SVC in good position.  In comparison to the prior study, there is no adverse interval changes      Impression    In comparison to the prior study, there is no adverse interval changes      Electronically signed by: Elliott España MD  Date:    09/21/2023  Time:    10:10

## 2023-10-02 NOTE — ASSESSMENT & PLAN NOTE
Cont management per CTS    09/23   Continue asa statin plavix lasix and metoprolol  On coumadin for mechanical AVR    09/24  Continue ASA statin BB  Continue supportive care    9/25/23  -Progressing well  -Continue ASA, statin, BB  -IS usage and ambulation    9/26/23  -Stable overnight  -Continue ASA, statin, BB  -IS usage and ambulation    9/27/23  -No AEON  -Continue ASA, statin, BB  -IS usage   -Ambulating well    9/28/23  -Stable CV wise  -Continue ASA, statin, BB  -IS usage/ambulation    9/29/23  -No AEON  -Continue ASA, statin, BB  -Continue PT/OT  -INR 1.8 today    10/2/23  ASA, statin, BB  Cont monitoring INR

## 2023-10-02 NOTE — ASSESSMENT & PLAN NOTE
09/24  On coumadin Rx and heparin gtt as bridge    9/28/23  -INR 2.3     9/29/23  -INR 1.8    10/2/23  Awaiting therapeutic INR  Cont management per CTS

## 2023-10-02 NOTE — PROGRESS NOTES
[facility] - Telemetry ([facility])  Adult Nutrition  Progress Note    SUMMARY       Recommendations    Recommendation/Intervention:   1. Recommend pt continues on Cardiac, Coumadin restricted diet   2. Weekly weights     Goals:   1. Pt will continue to consume >75% of EEN and EPN prior to RD follow up  Nutrition Goal Status: continues   Communication of RD Recs: other (comment) (POC: Sticky Note)    Assessment and Plan    Nutrition Problem  Inadequate PO intake (Resolved)   Excessive mineral (sodium) intake (Resolved)      Related to (etiology):   Decreased ability to consume sufficient nutrition.  Lack of prior exposure to accurate nutrition related information      Signs and Symptoms (as evidenced by):   NPO x 4 days  Demonstrated inability to apply food/nutrition related knowledge as pt was consuming high sodium foods      Interventions(treatment strategy):  1. Sodium and Fat modified, Consistent vitamin K intake diet   2. Collaboration with other providers     Nutrition Diagnosis Status:   Resolved       Malnutrition Assessment     Skin (Micronutrient): wounds unhealed       Fluid Accumulation (Malnutrition):  (1+ trace)                         Reason for Assessment    Reason For Assessment: consult  Diagnosis: cardiac disease (NSTEMI (non-ST elevated myocardial infarction), S/P CABG x 5)  Relevant Medical History: NSTEMI (non-ST elevated myocardial infarction), HTN, CAD, Tobacco smoker, 1 pack of cigarettes or less per day, Nonrheumatic aortic valve insufficiency, S/P CABG x 5, S/P AVR (aortic valve replacement)  Interdisciplinary Rounds: did not attend  General Information Comments:   43 y.o male admitted w/ current principal problem of  NSTEMI. Pt currently has NPO diet order per s/p S/P 1 day CABG x 5 and recent extubation. The pt has been NPO x 4 days. NFPE not currently appropriate. RD will assess need for NFPE during RD follow up. The pt is currently charted to weigh 189 lbs 6 oz, BMI 28.79 (Overweight). Pt  "denies feeling nauseated but currently in pain. The pt LBM was 9/17. RD will continue to follow.    Nutrition Discharge Planning: Cardiac, Coumadin restricted diet    Follow up:  9/25/23: RD follow up. Visited pt at bedside, pt family member present. Pt reported that he has a great appetite and likes the food, confirmed 100% PO intake of meals. Requested diet nutrition education, provided and discussed "Vitamin K and Medication" and "Heart Healthy Nutrition Therapy" with pt and pt family member, they expressed previous dietary knowledge and understanding and appreciation of nutrition education provided,  RD contact information included on handouts, all questions/concerns answered at this time.  Pt confirmed he is not experiencing any N/V/D, no abdominal pain, denies chewing/swallowing difficulties, no cultural/spiritual/Yazdanism beliefs that refrain any foods, reported he takes iron supplements at home d/t anemia, UBW between 170-180 PTA and general normal wt. Pt appeared well nourished, no NFPE warranted at this time. Reviewed chart: LBM 9/24; Skin: dry, incision chest/ L leg WNL; Raphael score: 22 (no risk); Edema: 1+ trace bilateral leg/ hand/ wrist, generalized. Labs, meds, weight reviewed. Note warfarin, heparin in D5W, ferrous sulfate. Weight charted 9/25 177 lbs (BMI 27.05, Overweight). RD will continue to monitor.     10//2: RD follow up. POD #13 CABG x 5 and AVR. Pt currently on Cardiac, Coumadin restriction diet. Visited pt at bedside, pt alert, sitting in chair. Pt stated that he is not a big breakfast person but confirmed does have a good appetite, awaiting lunch d/t being hungry, no N/V/D or abdominal pain. Discussed Boost plus as option for breakfast, pt denied, stated he has snacks from home that he consumes. Pt continued to appear well nourished, no NFPE warranted. Reviewed chart: LBM 10/1; Skin: incision chest/L leg WDL; Raphael score remains 22 (no risk); Edema improved to none. Labs, meds, weight " "reviewed. Weight charted 9/25 177 lbs, 10/2 175 lbs (BMI 26.75, Overweight), -2 lb wt loss x 1 week. RD will continue to monitor.     Nutrition Risk Screen    Nutrition Risk Screen: no indicators present    Nutrition/Diet History    Spiritual, Cultural Beliefs, Judaism Practices, Values that Affect Care: no  Food Allergies: NKFA  Factors Affecting Nutritional Intake: NPO, pain    Anthropometrics    Temp: 98.9 °F (37.2 °C)  Height Method: Stated  Height: 5' 8" (172.7 cm)  Height (inches): 68 in  Weight Method: Bed Scale  Weight: 79.8 kg (175 lb 14.8 oz)  Weight (lb): 175.93 lb  Ideal Body Weight (IBW), Male: 154 lb  % Ideal Body Weight, Male (lb): 122.97 %  BMI (Calculated): 26.8  BMI Grade: 25 - 29.9 - overweight     Wt Readings from Last 15 Encounters:   10/02/23 79.8 kg (175 lb 14.8 oz)   11/13/15 91.6 kg (202 lb)     Lab/Procedures/Meds    Pertinent Labs Reviewed: reviewed  Pertinent Medications Reviewed: reviewed  BMP  Lab Results   Component Value Date     10/02/2023    K 4.2 10/02/2023     10/02/2023    CO2 22 (L) 10/02/2023    BUN 12 10/02/2023    CREATININE 0.8 10/02/2023    CALCIUM 8.6 (L) 10/02/2023    ANIONGAP 8 10/02/2023    EGFRNORACEVR >60 10/02/2023     Scheduled Meds:   ascorbic acid (vitamin C)  500 mg Oral BID    aspirin  81 mg Oral Daily    atorvastatin  80 mg Oral Daily    cyanocobalamin  1,000 mcg Oral Daily    docusate sodium  100 mg Oral BID    ferrous sulfate  1 tablet Oral Daily    folic acid  1 mg Oral Daily    guaiFENesin  1,200 mg Oral BID    magnesium hydroxide 400 mg/5 ml  5 mL Oral BID    metoprolol tartrate  50 mg Oral BID    pantoprazole  40 mg Oral Daily    polyethylene glycol  17 g Oral Daily    warfarin  3 mg Oral Daily     Continuous Infusions:   heparin (porcine) in D5W 14 Units/kg/hr (10/01/23 2234)     PRN Meds:.0.9%  NaCl infusion (for blood administration), acetaminophen, albumin human 5%, aluminum-magnesium hydroxide-simethicone, calcium gluconate IVPB, " calcium gluconate IVPB, calcium gluconate IVPB, dextrose 10%, glucagon (human recombinant), influenza, insulin aspart U-100, lactated ringers, magnesium sulfate IVPB, melatonin, metoclopramide HCl, ondansetron, pneumoc 20-leslee conj-dip cr(PF), potassium chloride in water, potassium chloride in water, potassium chloride in water, traMADoL    Physical Findings/Assessment         Estimated/Assessed Needs    Weight Used For Calorie Calculations: 79.8 kg (175 lb 14.8 oz)  Energy Calorie Requirements (kcal): 8110-3675 kcals (MSJ x 1.2-1.4 AF (CABG, BMI 26.75)  Energy Need Method: Custer-St Jeor  Protein Requirements: 63-80 g (0.8-1.0 g/kg ABW (CABG, maintanice)  Weight Used For Protein Calculations: 79.8 kg (175 lb 14.8 oz)  Fluid Requirements (mL): 1864-8758 mL (1 mL/kcal)  Estimated Fluid Requirement Method: RDA Method  RDA Method (mL): 1989  CHO Requirement: 248-290 g (6915-9716 kcals/8)      Nutrition Prescription Ordered    Current Diet Order: Cardiac, Coumadin restricted diet     Evaluation of Received Nutrient/Fluid Intake  I/O: (Net since admit)   9/20: +1584 mL   9/25: -6183.2 mL  10/2: -3018.9 mL    Energy Calories Required: meeting needs  Protein Required: meeting needs  Fluid Required: not meeting needs  Total Fluid Intake (mL): 261.8  Tolerance: tolerating  % Intake of Estimated Energy Needs: 75 - 100 %  % Meal Intake: 100%     Nutrition Risk    Level of Risk/Frequency of Follow-up: Low (F/u x 1 weekly)     Monitor and Evaluation    Food and Nutrient Intake: energy intake, food and beverage intake  Food and Nutrient Adminstration: diet order  Knowledge/Beliefs/Attitudes: food and nutrition knowledge/skill, beliefs and attitudes  Physical Activity and Function: nutrition-related ADLs and IADLs, factors affecting access to physical activity  Anthropometric Measurements: weight, body mass index  Biochemical Data, Medical Tests and Procedures: glucose/endocrine profile, inflammatory profile, lipid  profile  Nutrition-Focused Physical Findings: overall appearance, skin     Nutrition Follow-Up    RD Follow-up?: Yes  Nina Rapp, Registration Eligible, Provisional LDN

## 2023-10-02 NOTE — CONSULTS
Clinical Pharmacy Consult Note: Coumadin Dosing and Monitoring     Indication: mechanical AVR  Target INR is 2.5 - 3  Lab Results   Component Value Date    INR 2.2 (H) 10/02/2023    INR 1.9 (H) 10/01/2023    INR 1.7 (H) 09/30/2023       Lab order for daily PT/INR? Yes  Coumadin diet ordered? Yes  Counseled? Yes    Drug interactions: Melatonin, Aspirin, Tylenol, and Tramadol each may increase the risk of bleeding while on warfarin     Ascorbic acid may decrease the risk of bleeding while on warfarin.    Bridge? On heparin drip    Plan:  - Patient will be given a dose of 4 mg today.  - PT/INR will be monitored daily. Dose adjustments will be made accordingly.      Thank you for allowing us to participate in this patient's care.     Anil Bowers, PharmD 10/2/2023 11:39 AM

## 2023-10-02 NOTE — PROGRESS NOTES
[facility] - Telemetry [facility])  Cardiothoracic Surgery  Progress Note    Patient Name: Mikie Alcazar  MRN: [id number]  Admission Date: 9/17/2023  Hospital Length of Stay: 15 days  Code Status: Full Code   Attending Physician: Nishant Douglas MD   Referring Provider: Self, Aaareferral  Principal Problem:NSTEMI (non-ST elevated myocardial infarction)            Subjective:     Post-Op Info:  Procedure(s) (LRB):  CORONARY ARTERY BYPASS GRAFT (CABG) (N/A)  REPLACEMENT-VALVE-AORTIC (N/A)  SURGICAL PROCUREMENT, VEIN, ENDOSCOPIC (Left)  BLOCK, NERVE, INTERCOSTAL, 2 OR MORE (N/A)  ECHOCARDIOGRAM,TRANSESOPHAGEAL (N/A)   13 Days Post-Op     Interval History:  The patient is postop day 13 status post coronary artery bypass grafting x5 and aortic valve replacement with a Saint Dany mechanical valve.    ROS  Medications:  Continuous Infusions:   heparin (porcine) in D5W 14 Units/kg/hr (10/01/23 2234)     Scheduled Meds:   ascorbic acid (vitamin C)  500 mg Oral BID    aspirin  81 mg Oral Daily    atorvastatin  80 mg Oral Daily    cyanocobalamin  1,000 mcg Oral Daily    docusate sodium  100 mg Oral BID    ferrous sulfate  1 tablet Oral Daily    folic acid  1 mg Oral Daily    guaiFENesin  1,200 mg Oral BID    magnesium hydroxide 400 mg/5 ml  5 mL Oral BID    metoprolol tartrate  50 mg Oral BID    pantoprazole  40 mg Oral Daily    polyethylene glycol  17 g Oral Daily    warfarin  4 mg Oral Daily     PRN Meds:0.9%  NaCl infusion (for blood administration), acetaminophen, albumin human 5%, aluminum-magnesium hydroxide-simethicone, calcium gluconate IVPB, calcium gluconate IVPB, calcium gluconate IVPB, dextrose 10%, glucagon (human recombinant), influenza, insulin aspart U-100, lactated ringers, magnesium sulfate IVPB, melatonin, metoclopramide HCl, ondansetron, pneumoc 20-leslee conj-dip cr(PF), potassium chloride in water, potassium chloride in water, potassium chloride in water, traMADoL     Objective:     Vital Signs  (Most Recent):  Temp: 99.3 °F (37.4 °C) (10/02/23 1201)  Pulse: 90 (10/02/23 1201)  Resp: 16 (10/02/23 1201)  BP: (!) 110/57 (10/02/23 1201)  SpO2: 97 % (10/02/23 1201) Vital Signs (24h Range):  Temp:  [98 °F (36.7 °C)-99.3 °F (37.4 °C)] 99.3 °F (37.4 °C)  Pulse:  [] 90  Resp:  [16-18] 16  SpO2:  [96 %-99 %] 97 %  BP: ()/(57-70) 110/57     Weight: 79.8 kg (175 lb 14.8 oz)  Body mass index is 26.75 kg/m².    SpO2: 97 %       Intake/Output - Last 3 Shifts         09/30 0700  10/01 0659 10/01 0700  10/02 0659 10/02 0700  10/03 0659    P.O.       I.V. (mL/kg) 1014.9 (13) 261.8 (3.3)     Total Intake(mL/kg) 1014.9 (13) 261.8 (3.3)     Net +1014.9 +261.8            Urine Occurrence 4 x      Stool Occurrence 0 x 0 x             Lines/Drains/Airways       Peripherally Inserted Central Catheter Line  Duration             PICC Double Lumen 09/21/23 0930 left basilic 11 days                     Physical Exam  Constitutional:       Appearance: Normal appearance.   HENT:      Head: Normocephalic and atraumatic.      Nose: Nose normal.   Cardiovascular:      Rate and Rhythm: Normal rate and regular rhythm.      Pulses: Normal pulses.      Heart sounds: Normal heart sounds.   Pulmonary:      Effort: Pulmonary effort is normal.      Breath sounds: Normal breath sounds.   Abdominal:      General: Abdomen is flat. Bowel sounds are normal.      Palpations: Abdomen is soft.   Musculoskeletal:      Right lower leg: No edema.      Left lower leg: No edema.   Skin:     General: Skin is warm and dry.   Neurological:      General: No focal deficit present.      Mental Status: He is alert and oriented to person, place, and time.   Psychiatric:         Behavior: Behavior normal.            Significant Labs:  All pertinent labs from the last 24 hours have been reviewed.    Significant Diagnostics:  I have reviewed all pertinent imaging results/findings within the past 24 hours.    Assessment/Plan:     S/P CABG x 5  09/20/2023  The  patient is postop day 1 status post coronary artery bypass grafting x5 and aortic valve replacement with a 19 mm Saint Dany mechanical valve.  Overall the patient is doing well.      Neuro:  Patient is awake appropriate and follows commands.  Patient is currently sedated with Precedex.  Will wean Precedex to off.   Cardiac:  Patient is hemodynamically stable.  With excellent cardiac output and cardiac index.  Patient is on low-dose vaso active meds.  Wean drips to off.  Will start metoprolol once drips are off.  Respiratory:  Patient is being weaned to extubation.  Patient is an active smoker Start nebs and Mucomyst.  Continue pulmonary toileting.  Continue incentive spirometer.    GI:  Patient is currently NPO.  Will start p.o. intake once extubated.    Renal:  Patient has good urine output and creatinine is 1.2.  Will start Lasix for diuresis.  Endocrine:  Patient required insulin drip for glucose controlled.  Will convert to sliding scale.    Id:  Patient had a T-max of 102°.  Most likely due to stress of surgery.  Will continue to follow.  White count is 9.  Patient is on manny op antibiotics.  Heme: Hematocrit is 17.  Patient is being transfused.  Platelet count is 74.  No evidence of active bleeding.  Will follow platelet count.  Will start patient on Coumadin anticoagulation for mechanical valve.  Activities:  Patient is currently in bed.  Will advance activities as tolerated once extubated.  Line tubes and drains:  Patient has an ETT, right IJ Bronson and Cordis, right groin A-line, chest tubes, Ugalde catheter, pacer wires, and saphenectomy site DARON.    09/21/2023     The patient is postop day 2 status post coronary artery bypass grafting x5 and aortic valve replacement with a 19 mm Saint Dany mechanical valve.  Overall the patient is doing well.    Neuro:  Patient is awake alert and oriented x3.  Pain is well controlled with current pain management.  Cardiac:  The patient is off all drips.  Patient is  hemodynamically stable.  Continue metoprolol.  Respiratory:  Patient was extubated yesterday.  Continue pulmonary toileting.  Continue incentive spirometer.   GI:  Patient is tolerating p.o. intake.  Patient had episodes of nausea yesterday which have abated.  Advance patient's diet as tolerated.    Renal:  Patient has good urine output.  Creatinine is 0.9.  Continue Lasix for diuresis.  Endocrine:  Glucose is controlled with sliding scale insulin.    Id:  T-max is 101.8 patient is currently afebrile.  White count is 17.  Most likely due to stress of surgery.  Continue to follow.  Heme:  Hematocrit is 24.5.  Platelet count is 89.  INR is 1.5.  Patient is started on Coumadin for anticoagulation of mechanical valve.  Activities:  Patient will be out of bed to chair.  Advance activities as tolerated.  Line tubes and drains:  Patient has pacer wires, right IJ Cordis and Ugalde catheter.  Will place PICC line and discontinue Cordis.    09/22/2023   The patient is postop day 3 status post coronary artery bypass grafting x5 and aortic valve replacement with a 19 mm Saint Dany mechanical valve.  Overall the patient is doing well.  Patient has been transferred to telemetry.    Neuro:  Patient is awake alert and oriented x3.  Pain is well controlled with current pain regimen.    Cardiac:  Patient is hemodynamically stable.  Continue metoprolol.    Respiratory:  Continue pulmonary toileting.  Continue incentive spirometer.    GI:  Patient is tolerating p.o. intake.  And tolerating a regular diet.    Renal:  Patient has good urine output.  Creatinine is 0.9.  Continue Lasix.    Endocrine:  Glucose is well controlled.    Id:  Patient is afebrile.  White count is 16.  Continue to follow.    Heme:  Hematocrit is 24 and platelet count is 114.  INR is 3.4.  Patient is currently on Coumadin for anticoagulation.  Activities:  Patient is out of bed and ambulating in the hallways.  Continue to advance as tolerated.  Line tubes and  drains:  Patient has a PICC line and pacer wires.    09/23/2023   The patient is postop day 4 status post coronary artery bypass grafting x5 and aortic valve replacement with a 19 mm Saint Dany mechanical valve.  Overall the patient is doing well.  Anticipate discharge to home with home health in the next 48-72 hours.    Neuro:  Patient is awake alert and oriented x3 pain is well controlled with pain management.  Neuro exam is nonfocal.    Cardiac:  Patient is hemodynamically stable.  Continue metoprolol.    Respiratory:  Continue pulmonary toileting.  Continue incentive spirometer.  GI patient is tolerating p.o. intake.  Will increase bowel regimen for bowel movements.  Renal: Patient has good urine output.  Creatinine is 0.9.  Patient is still appears fluid overloaded.  Continue Lasix.  Endocrine: Glucose is well controlled.    Id:  Patient is afebrile.  White count is down to 12.  Continue to follow.    Heme:  Hematocrit is 27.  Platelet count is 186 and INR is 1.9.  INR goal is between 2.5 and 3.  Continue Coumadin.    Activities:  Patient is out of bed and ambulating in the hallways.  Advance as tolerated.  Line tubes and drains:  Patient has a PICC line.      09/24/2023   The patient is postop day 5 status post coronary artery bypass grafting x5 and aortic valve replacement with a 19 mm Saint Dany mechanical valve.  Overall the patient is doing well.  Anticipate discharge home in the next 24-48 hours.    Neuro:  Patient is awake alert and oriented x3 pain is well controlled with current pain management.  Neuro exam is nonfocal.    Cardiac:  Patient is hemodynamically stable.  Continue metoprolol  Respiratory:  Continue pulmonary toileting.  Continue incentive spirometer.  Patient continues to have sputum production.  GI:  Patient is tolerating a regular diet.  Patient has had bowel movements.  Renal: Patient has good urine output.  Creatinine is stable.  Patient is appears to be euvolemic.  Will discontinue  Lasix.    Endocrine:  Glucose is well controlled.  Id: Patient is afebrile white count normal.  Heme:  Hematocrit is 27 and platelet count is 229.  INR is subtherapeutic 1.5.  Continue Coumadin.  Goal is a INR level of 2.5-3.  Activities: Patient is out of bed and ambulating in the hallways.  Advance as tolerated.    Line tubes and drains:  Patient has a PICC line.    09/25/2023   The patient is postop day 6 status post coronary artery bypass grafting x5 and aortic valve replacement with a 19 mm Saint Dany mechanical valve.  Overall the patient is doing well.  Patient is awaiting attainment of a therapeutic INR.  Anticipate discharge in the next 48-72 hours.    Neuro:  Patient is awake alert and oriented x3.  Pain is well controlled current pain management.  Neuro exam is nonfocal.    Cardiac:  Patient is stable.  Continue metoprolol.    Respiratory:  Patient has good sats on room air.  Continue pulmonary toileting.  Continue incentive spirometer.    GI:  Patient is tolerating a regular diet having bowel movements.    Renal:  Patient has good urine output.    Endocrine:  Glucose is well controlled.    Id:  Patient is afebrile white count is normal.    Heme:  Hematocrit is 27 and platelet count is 298.  INR is now normal at 1.2.  Patient is on bridging heparin.  Will titrate heparin to an APTT greater between 40 and 50.  Therapeutic goal of INR is 2.5-3.  Activities: Patient is out of bed and ambulating in the hallways.    Line tubes and drains:  Patient has a PICC line.    09/26/2023   The patient is postop day 7 status post coronary artery bypass grafting x5 and aortic valve replacement with a 19 mm Saint Dany mechanical valve.  Overall the patient is doing well.  Patient's INR is still subtherapeutic.  Patient is on heparin in the interim.  Anticipate discharge in the next 48-72 hours.  Neuro:  Patient is awake alert and oriented x3.  Pain is well controlled with current pain management.  Neuro exam is nonfocal.     Cardiac:  Patient remains stable.  Continue metoprolol.    Respiratory: Patient has good sats on room air.  Continue pulmonary toileting.  Continue incentive spirometer.    GI:  Patient is tolerating a regular diet and having bowel movements.    Renal:  Patient has good urine output.  Endocrine:  Glucose is well controlled.    Id:  Patient's white count is normal.    Heme: Hematocrit is 28 and INR is 1.2 patient is on a heparin drip.  Coumadin dose has been increased.  INR goal is 2.5-3.  Activities:  Patient is out of bed and ambulating in the hallways.    Line tubes and drains:  Patient has a PICC line.    09/27/2023   The patient is postop [id number] post coronary artery bypass grafting x5 and aortic valve replacement with a 19 mm Saint Dany mechanical valve.  Overall the patient is doing well.  Patient is awaiting INR being therapeutic.  Anticipate discharge in the next 48-72 hours.  Neuro:  Patient is awake alert and oriented x3.  Pain is well controlled with current pain management.  Neuro exam is nonfocal.    Cardiac:  Patient's blood pressure is trending higher.  Will increase metoprolol to 50 mg b.i.d..  Respiratory: Patient has good sats on room air.  Continue pulmonary toileting.  Continue incentive spirometer.  GI:  Patient is tolerating a regular diet and having bowel movements.    Renal:  Patient has good urine output.    Endocrine:  Glucose is well controlled.  Id:  Patient is afebrile white count is normal.    Heme:  Hematocrit is 28.6 and platelet count is 428.  INR is 1.6.  A PTT is therapeutic at 40.  INR goal is 2.5-3.  Activities:  Patient is out of bed and ambulating in the hallways.  Advance as tolerated.    Line tubes and drains:  Patient has a PICC line.    09/28/2023   The patient is postop day 9 status post coronary artery bypass grafting x5 and aortic valve replacement with a 19 mm Saint Dany mechanical valve.  Overall the patient is doing well.  INR is 2.3 today.  Anticipate discharge  that is 4872 hours.    Neuro:  Patient is awake alert and oriented x3.  Pain is well controlled.  Neuro exam is nonfocal.    Cardiac:  Patient is hemodynamically stable.  Continue metoprolol 50 mg b.i.d..    Respiratory: Patient has good sats on room air.  Continue pulmonary toileting.  Continue incentive spirometer.    GI:  Patient is tolerating a regular diet and having bowel movements.    Renal: Patient has good urine output.    Endocrine:  Glucose is well controlled.    Id:  Patient is afebrile white count is 8   Heme:  Hematocrit is 28.  INR is 2.3.  Platelet count is 452.  Continue Coumadin.  INR goal is 2.5-3.0.  Patient is currently on bridging heparin.  Activities:  Patient is out of bed and ambulating.  Advance as tolerated.    Line tubes and drains:  Patient has a PICC line.    09/29/2023   Patient is postop day 10 status post coronary artery bypass grafting x5 and aortic valve replacement with a 19 mm Saint Dany mechanical valve.  Overall patient is doing well.  Patient awaits therapeutic INR prior to discharge.    Neuro:  Patient is awake alert and oriented x3.  Pain is well controlled.  Neuro exam is nonfocal.    Cardiac: Patient is stable.  Continue metoprolol.    Respiratory:  Patient has good sats on room air.  Continue pulmonary toileting.  Continue incentive spirometer.    GI:  Patient is tolerating a regular diet and having bowel movements.    Renal:  Patient has good urine output.  Creatinine is normal.    Endocrine: Glucose is well controlled.  Id:  Patient is afebrile.  White count is 10.3.  Heme:  Hematocrit is 28.8 and platelet count is 563.  INR is 1.8.  Patient needs INR goal of 2.5-3.  Continue Coumadin.  Activities:  Patient is out of bed and ambulating.  Advance as tolerated.  Line tubes and drains:  Patient has a PICC line.    09/30/2023   Patient is postop day 11 status post coronary bypass grafting x5 and aortic valve replacement with a 19 mm Saint Dany mechanical valve.  Overall the  patient is doing well patient is awaiting a therapy INR prior to discharge.    Neuro:  Patient is awake alert and oriented x3.  Pain is well controlled.  Neuro exam is nonfocal.    Cardiac:  Continue metoprolol.    Respiratory:  Patient is on room air continue pulmonary toileting.    GI:  Patient is having bowel movements and a regular diet.    Renal:  Patient has good urine output.    Endocrine: Glucose is well controlled  Id white count is normal.    :  INR is 1.7 today continue Coumadin dosing.  Patient is on bridging heparin.    Activities:  Patient is out of bed and ambulating in the hallways.  Line tubes and drains:  Patient has a PICC line    10/1/2023   The patient is postop day 12 status post coronary artery bypass grafting x5 and aortic valve replacement with a 19 mm Saint Dany mechanical valve.  Overall patient is doing well.  Patient is awaiting therapeutic INR prior to discharge.    Neuro:  Patient is awake alert oriented x3.  Pain is well controlled.  Neuro exam is nonfocal.    Cardiac:  Patient is stable.  Continue metoprolol.    Respiratory:  Patient is on room air continue pulmonary toileting.    GI:  Patient is having a bowel movement and tolerating a regular diet.    Renal:  Patient has good urine output.    Endocrine:  Glucose is well controlled.  Id:  Patient is afebrile and white count is normal.    Heme:  INR is 1.  Nine today.  Continue on Coumadin.  INR goal is 2.5-3.  Continue Coumadin.    Line tubes and drains:  Patient has a PICC line.    10/02/2023   Patient is postop day 13 status post coronary artery bypass grafting x5 and aortic valve replacement with a 19 mm Saint Dany mechanical valve.  Overall the patient is doing well.  Patient's INR is 2.2 today.  Anticipate discharge in the next 24 hours.    Neuro:  Patient is awake alert and oriented x3 pain is well controlled.  Neuro exam is nonfocal.    Cardiac:  Patient is stable.  Continue metoprolol.    Respiratory:  Patient is on room  air.  Continue pulmonary toileting.    GI:  Patient is tolerating a regular diet and having bowel movements.    Renal:  Patient has a good urine output.    Endocrine:  Patient's glucose is well controlled.  Id:  Patient is afebrile white count is normal.    Id:  INR is 2.2.  Continue Coumadin.        CAD, multiple vessel  The patient is a 43-year-old male with critical left main coronary artery disease and severe aortic regurgitation by cardiac catheterization.  The patient is a candidate for urgent coronary artery bypass grafting and aortic valve replacement.  The risks and benefits of surgery were explained to the patient.  The patient understands the risks and benefits of surgery and has agreed to proceed with urgent aortic valve replacement and coronary artery bypass grafting.  The type of aortic valve to be used was discussed with the patient.  The patient stated a preference for a bioprosthetic valve in order to avoid the need for chronic anticoagulation.  The patient understands that a bioprosthetic valve especially in a young patient has a limited lifespan.        Nishant Douglas MD  Cardiothoracic Surgery  [facility])

## 2023-10-02 NOTE — ASSESSMENT & PLAN NOTE
09/20/2023  The patient is postop day 1 status post coronary artery bypass grafting x5 and aortic valve replacement with a 19 mm Saint Dany mechanical valve.  Overall the patient is doing well.      Neuro:  Patient is awake appropriate and follows commands.  Patient is currently sedated with Precedex.  Will wean Precedex to off.   Cardiac:  Patient is hemodynamically stable.  With excellent cardiac output and cardiac index.  Patient is on low-dose vaso active meds.  Wean drips to off.  Will start metoprolol once drips are off.  Respiratory:  Patient is being weaned to extubation.  Patient is an active smoker Start nebs and Mucomyst.  Continue pulmonary toileting.  Continue incentive spirometer.    GI:  Patient is currently NPO.  Will start p.o. intake once extubated.    Renal:  Patient has good urine output and creatinine is 1.2.  Will start Lasix for diuresis.  Endocrine:  Patient required insulin drip for glucose controlled.  Will convert to sliding scale.    Id:  Patient had a T-max of 102°.  Most likely due to stress of surgery.  Will continue to follow.  White count is 9.  Patient is on manny op antibiotics.  Heme: Hematocrit is 17.  Patient is being transfused.  Platelet count is 74.  No evidence of active bleeding.  Will follow platelet count.  Will start patient on Coumadin anticoagulation for mechanical valve.  Activities:  Patient is currently in bed.  Will advance activities as tolerated once extubated.  Line tubes and drains:  Patient has an ETT, right IJ [place] and Cordis, right groin A-line, chest tubes, Ugalde catheter, pacer wires, and saphenectomy site DARON.    09/21/2023     The patient is postop day 2 status post coronary artery bypass grafting x5 and aortic valve replacement with a 19 mm Saint Dany mechanical valve.  Overall the patient is doing well.    Neuro:  Patient is awake alert and oriented x3.  Pain is well controlled with current pain management.  Cardiac:  The patient is off all drips.   Patient is hemodynamically stable.  Continue metoprolol.  Respiratory:  Patient was extubated yesterday.  Continue pulmonary toileting.  Continue incentive spirometer.   GI:  Patient is tolerating p.o. intake.  Patient had episodes of nausea yesterday which have abated.  Advance patient's diet as tolerated.    Renal:  Patient has good urine output.  Creatinine is 0.9.  Continue Lasix for diuresis.  Endocrine:  Glucose is controlled with sliding scale insulin.    Id:  T-max is 101.8 patient is currently afebrile.  White count is 17.  Most likely due to stress of surgery.  Continue to follow.  Heme:  Hematocrit is 24.5.  Platelet count is 89.  INR is 1.5.  Patient is started on Coumadin for anticoagulation of mechanical valve.  Activities:  Patient will be out of bed to chair.  Advance activities as tolerated.  Line tubes and drains:  Patient has pacer wires, right IJ Cordis and Ugalde catheter.  Will place PICC line and discontinue Cordis.    09/22/2023   The patient is postop day 3 status post coronary artery bypass grafting x5 and aortic valve replacement with a 19 mm Saint Dany mechanical valve.  Overall the patient is doing well.  Patient has been transferred to telemetry.    Neuro:  Patient is awake alert and oriented x3.  Pain is well controlled with current pain regimen.    Cardiac:  Patient is hemodynamically stable.  Continue metoprolol.    Respiratory:  Continue pulmonary toileting.  Continue incentive spirometer.    GI:  Patient is tolerating p.o. intake.  And tolerating a regular diet.    Renal:  Patient has good urine output.  Creatinine is 0.9.  Continue Lasix.    Endocrine:  Glucose is well controlled.    Id:  Patient is afebrile.  White count is 16.  Continue to follow.    Heme:  Hematocrit is 24 and platelet count is 114.  INR is 3.4.  Patient is currently on Coumadin for anticoagulation.  Activities:  Patient is out of bed and ambulating in the hallways.  Continue to advance as tolerated.  Line tubes  and drains:  Patient has a PICC line and pacer wires.    09/23/2023   The patient is postop day 4 status post coronary artery bypass grafting x5 and aortic valve replacement with a 19 mm Saint Dany mechanical valve.  Overall the patient is doing well.  Anticipate discharge to home with home health in the next 48-72 hours.    Neuro:  Patient is awake alert and oriented x3 pain is well controlled with pain management.  Neuro exam is nonfocal.    Cardiac:  Patient is hemodynamically stable.  Continue metoprolol.    Respiratory:  Continue pulmonary toileting.  Continue incentive spirometer.  GI patient is tolerating p.o. intake.  Will increase bowel regimen for bowel movements.  Renal: Patient has good urine output.  Creatinine is 0.9.  Patient is still appears fluid overloaded.  Continue Lasix.  Endocrine: Glucose is well controlled.    Id:  Patient is afebrile.  White count is down to 12.  Continue to follow.    Heme:  Hematocrit is 27.  Platelet count is 186 and INR is 1.9.  INR goal is between 2.5 and 3.  Continue Coumadin.    Activities:  Patient is out of bed and ambulating in the hallways.  Advance as tolerated.  Line tubes and drains:  Patient has a PICC line.      09/24/2023   The patient is postop day 5 status post coronary artery bypass grafting x5 and aortic valve replacement with a 19 mm Saint Dany mechanical valve.  Overall the patient is doing well.  Anticipate discharge home in the next 24-48 hours.    Neuro:  Patient is awake alert and oriented x3 pain is well controlled with current pain management.  Neuro exam is nonfocal.    Cardiac:  Patient is hemodynamically stable.  Continue metoprolol  Respiratory:  Continue pulmonary toileting.  Continue incentive spirometer.  Patient continues to have sputum production.  GI:  Patient is tolerating a regular diet.  Patient has had bowel movements.  Renal: Patient has good urine output.  Creatinine is stable.  Patient is appears to be euvolemic.  Will  discontinue Lasix.    Endocrine:  Glucose is well controlled.  Id: Patient is afebrile white count normal.  Heme:  Hematocrit is 27 and platelet count is 229.  INR is subtherapeutic 1.5.  Continue Coumadin.  Goal is a INR level of 2.5-3.  Activities: Patient is out of bed and ambulating in the hallways.  Advance as tolerated.    Line tubes and drains:  Patient has a PICC line.    09/25/2023   The patient is postop day 6 status post coronary artery bypass grafting x5 and aortic valve replacement with a 19 mm Saint Dany mechanical valve.  Overall the patient is doing well.  Patient is awaiting attainment of a therapeutic INR.  Anticipate discharge in the next 48-72 hours.    Neuro:  Patient is awake alert and oriented x3.  Pain is well controlled current pain management.  Neuro exam is nonfocal.    Cardiac:  Patient is stable.  Continue metoprolol.    Respiratory:  Patient has good sats on room air.  Continue pulmonary toileting.  Continue incentive spirometer.    GI:  Patient is tolerating a regular diet having bowel movements.    Renal:  Patient has good urine output.    Endocrine:  Glucose is well controlled.    Id:  Patient is afebrile white count is normal.    Heme:  Hematocrit is 27 and platelet count is 298.  INR is now normal at 1.2.  Patient is on bridging heparin.  Will titrate heparin to an APTT greater between 40 and 50.  Therapeutic goal of INR is 2.5-3.  Activities: Patient is out of bed and ambulating in the hallways.    Line tubes and drains:  Patient has a PICC line.    09/26/2023   The patient is postop day 7 status post coronary artery bypass grafting x5 and aortic valve replacement with a 19 mm Saint Dany mechanical valve.  Overall the patient is doing well.  Patient's INR is still subtherapeutic.  Patient is on heparin in the interim.  Anticipate discharge in the next 48-72 hours.  Neuro:  Patient is awake alert and oriented x3.  Pain is well controlled with current pain management.  Neuro exam is  nonfocal.    Cardiac:  Patient remains stable.  Continue metoprolol.    Respiratory: Patient has good sats on room air.  Continue pulmonary toileting.  Continue incentive spirometer.    GI:  Patient is tolerating a regular diet and having bowel movements.    Renal:  Patient has good urine output.  Endocrine:  Glucose is well controlled.    Id:  Patient's white count is normal.    Heme: Hematocrit is 28 and INR is 1.2 patient is on a heparin drip.  Coumadin dose has been increased.  INR goal is 2.5-3.  Activities:  Patient is out of bed and ambulating in the hallways.    Line tubes and drains:  Patient has a PICC line.    09/27/2023   The patient is postop [id number] post coronary artery bypass grafting x5 and aortic valve replacement with a 19 mm Saint Dany mechanical valve.  Overall the patient is doing well.  Patient is awaiting INR being therapeutic.  Anticipate discharge in the next 48-72 hours.  Neuro:  Patient is awake alert and oriented x3.  Pain is well controlled with current pain management.  Neuro exam is nonfocal.    Cardiac:  Patient's blood pressure is trending higher.  Will increase metoprolol to 50 mg b.i.d..  Respiratory: Patient has good sats on room air.  Continue pulmonary toileting.  Continue incentive spirometer.  GI:  Patient is tolerating a regular diet and having bowel movements.    Renal:  Patient has good urine output.    Endocrine:  Glucose is well controlled.  Id:  Patient is afebrile white count is normal.    Heme:  Hematocrit is 28.6 and platelet count is 428.  INR is 1.6.  A PTT is therapeutic at 40.  INR goal is 2.5-3.  Activities:  Patient is out of bed and ambulating in the hallways.  Advance as tolerated.    Line tubes and drains:  Patient has a PICC line.    09/28/2023   The patient is postop day 9 status post coronary artery bypass grafting x5 and aortic valve replacement with a 19 mm Saint Dany mechanical valve.  Overall the patient is doing well.  INR is 2.3 today.  Anticipate  discharge that is 4872 hours.    Neuro:  Patient is awake alert and oriented x3.  Pain is well controlled.  Neuro exam is nonfocal.    Cardiac:  Patient is hemodynamically stable.  Continue metoprolol 50 mg b.i.d..    Respiratory: Patient has good sats on room air.  Continue pulmonary toileting.  Continue incentive spirometer.    GI:  Patient is tolerating a regular diet and having bowel movements.    Renal: Patient has good urine output.    Endocrine:  Glucose is well controlled.    Id:  Patient is afebrile white count is 8   Heme:  Hematocrit is 28.  INR is 2.3.  Platelet count is 452.  Continue Coumadin.  INR goal is 2.5-3.0.  Patient is currently on bridging heparin.  Activities:  Patient is out of bed and ambulating.  Advance as tolerated.    Line tubes and drains:  Patient has a PICC line.    09/29/2023   Patient is postop day 10 status post coronary artery bypass grafting x5 and aortic valve replacement with a 19 mm Saint Dany mechanical valve.  Overall patient is doing well.  Patient awaits therapeutic INR prior to discharge.    Neuro:  Patient is awake alert and oriented x3.  Pain is well controlled.  Neuro exam is nonfocal.    Cardiac: Patient is stable.  Continue metoprolol.    Respiratory:  Patient has good sats on room air.  Continue pulmonary toileting.  Continue incentive spirometer.    GI:  Patient is tolerating a regular diet and having bowel movements.    Renal:  Patient has good urine output.  Creatinine is normal.    Endocrine: Glucose is well controlled.  Id:  Patient is afebrile.  White count is 10.3.  Heme:  Hematocrit is 28.8 and platelet count is 563.  INR is 1.8.  Patient needs INR goal of 2.5-3.  Continue Coumadin.  Activities:  Patient is out of bed and ambulating.  Advance as tolerated.  Line tubes and drains:  Patient has a PICC line.    09/30/2023   Patient is postop day 11 status post coronary bypass grafting x5 and aortic valve replacement with a 19 mm Saint Dany mechanical valve.   Overall the patient is doing well patient is awaiting a therapy INR prior to discharge.    Neuro:  Patient is awake alert and oriented x3.  Pain is well controlled.  Neuro exam is nonfocal.    Cardiac:  Continue metoprolol.    Respiratory:  Patient is on room air continue pulmonary toileting.    GI:  Patient is having bowel movements and a regular diet.    Renal:  Patient has good urine output.    Endocrine: Glucose is well controlled  Id white count is normal.    :  INR is 1.7 today continue Coumadin dosing.  Patient is on bridging heparin.    Activities:  Patient is out of bed and ambulating in the hallways.  Line tubes and drains:  Patient has a PICC line    10/1/2023   The patient is postop day 12 status post coronary artery bypass grafting x5 and aortic valve replacement with a 19 mm Saint Dany mechanical valve.  Overall patient is doing well.  Patient is awaiting therapeutic INR prior to discharge.    Neuro:  Patient is awake alert oriented x3.  Pain is well controlled.  Neuro exam is nonfocal.    Cardiac:  Patient is stable.  Continue metoprolol.    Respiratory:  Patient is on room air continue pulmonary toileting.    GI:  Patient is having a bowel movement and tolerating a regular diet.    Renal:  Patient has good urine output.    Endocrine:  Glucose is well controlled.  Id:  Patient is afebrile and white count is normal.    Heme:  INR is 1.  Nine today.  Continue on Coumadin.  INR goal is 2.5-3.  Continue Coumadin.    Line tubes and drains:  Patient has a PICC line.    10/02/2023   Patient is postop day 13 status post coronary artery bypass grafting x5 and aortic valve replacement with a 19 mm Saint Dany mechanical valve.  Overall the patient is doing well.  Patient's INR is 2.2 today.  Anticipate discharge in the next 24 hours.    Neuro:  Patient is awake alert and oriented x3 pain is well controlled.  Neuro exam is nonfocal.    Cardiac:  Patient is stable.  Continue metoprolol.    Respiratory:  Patient is  on room air.  Continue pulmonary toileting.    GI:  Patient is tolerating a regular diet and having bowel movements.    Renal:  Patient has a good urine output.    Endocrine:  Patient's glucose is well controlled.  Id:  Patient is afebrile white count is normal.    Id:  INR is 2.2.  Continue Coumadin.

## 2023-10-02 NOTE — SUBJECTIVE & OBJECTIVE
Interval History:  The patient is postop day 13 status post coronary artery bypass grafting x5 and aortic valve replacement with a Saint Dany mechanical valve.    ROS  Medications:  Continuous Infusions:   heparin (porcine) in D5W 14 Units/kg/hr (10/01/23 2234)     Scheduled Meds:   ascorbic acid (vitamin C)  500 mg Oral BID    aspirin  81 mg Oral Daily    atorvastatin  80 mg Oral Daily    cyanocobalamin  1,000 mcg Oral Daily    docusate sodium  100 mg Oral BID    ferrous sulfate  1 tablet Oral Daily    folic acid  1 mg Oral Daily    guaiFENesin  1,200 mg Oral BID    magnesium hydroxide 400 mg/5 ml  5 mL Oral BID    metoprolol tartrate  50 mg Oral BID    pantoprazole  40 mg Oral Daily    polyethylene glycol  17 g Oral Daily    warfarin  4 mg Oral Daily     PRN Meds:0.9%  NaCl infusion (for blood administration), acetaminophen, albumin human 5%, aluminum-magnesium hydroxide-simethicone, calcium gluconate IVPB, calcium gluconate IVPB, calcium gluconate IVPB, dextrose 10%, glucagon (human recombinant), influenza, insulin aspart U-100, lactated ringers, magnesium sulfate IVPB, melatonin, metoclopramide HCl, ondansetron, pneumoc 20-leslee conj-dip cr(PF), potassium chloride in water, potassium chloride in water, potassium chloride in water, traMADoL     Objective:     Vital Signs (Most Recent):  Temp: 99.3 °F (37.4 °C) (10/02/23 1201)  Pulse: 90 (10/02/23 1201)  Resp: 16 (10/02/23 1201)  BP: (!) 110/57 (10/02/23 1201)  SpO2: 97 % (10/02/23 1201) Vital Signs (24h Range):  Temp:  [98 °F (36.7 °C)-99.3 °F (37.4 °C)] 99.3 °F (37.4 °C)  Pulse:  [] 90  Resp:  [16-18] 16  SpO2:  [96 %-99 %] 97 %  BP: ()/(57-70) 110/57     Weight: 79.8 kg (175 lb 14.8 oz)  Body mass index is 26.75 kg/m².    SpO2: 97 %       Intake/Output - Last 3 Shifts         09/30 0700  10/01 0659 10/01 0700  10/02 0659 10/02 0700  10/03 0659    P.O.       I.V. (mL/kg) 1014.9 (13) 261.8 (3.3)     Total Intake(mL/kg) 1014.9 (13) 261.8 (3.3)     Net  +1014.9 +261.8            Urine Occurrence 4 x      Stool Occurrence 0 x 0 x             Lines/Drains/Airways       Peripherally Inserted Central Catheter Line  Duration             PICC Double Lumen 09/21/23 0930 left basilic 11 days                     Physical Exam  Constitutional:       Appearance: Normal appearance.   HENT:      Head: Normocephalic and atraumatic.      Nose: Nose normal.   Cardiovascular:      Rate and Rhythm: Normal rate and regular rhythm.      Pulses: Normal pulses.      Heart sounds: Normal heart sounds.   Pulmonary:      Effort: Pulmonary effort is normal.      Breath sounds: Normal breath sounds.   Abdominal:      General: Abdomen is flat. Bowel sounds are normal.      Palpations: Abdomen is soft.   Musculoskeletal:      Right lower leg: No edema.      Left lower leg: No edema.   Skin:     General: Skin is warm and dry.   Neurological:      General: No focal deficit present.      Mental Status: He is alert and oriented to person, place, and time.   Psychiatric:         Behavior: Behavior normal.            Significant Labs:  All pertinent labs from the last 24 hours have been reviewed.    Significant Diagnostics:  I have reviewed all pertinent imaging results/findings within the past 24 hours.

## 2023-10-03 ENCOUNTER — ANTI-COAG VISIT (OUTPATIENT)
Dept: CARDIOLOGY | Facility: CLINIC | Age: 43
End: 2023-10-03
Payer: COMMERCIAL

## 2023-10-03 VITALS
RESPIRATION RATE: 18 BRPM | HEIGHT: 68 IN | OXYGEN SATURATION: 97 % | SYSTOLIC BLOOD PRESSURE: 116 MMHG | HEART RATE: 105 BPM | DIASTOLIC BLOOD PRESSURE: 70 MMHG | WEIGHT: 175.94 LBS | BODY MASS INDEX: 26.66 KG/M2 | TEMPERATURE: 99 F

## 2023-10-03 DIAGNOSIS — Z95.1 S/P CABG X 5: ICD-10-CM

## 2023-10-03 DIAGNOSIS — Z95.2 S/P AVR (AORTIC VALVE REPLACEMENT): Primary | ICD-10-CM

## 2023-10-03 LAB
ANION GAP SERPL CALC-SCNC: 12 MMOL/L (ref 8–16)
APTT PPP: 67.5 SEC (ref 21–32)
BASOPHILS # BLD AUTO: 0.04 K/UL (ref 0–0.2)
BASOPHILS NFR BLD: 0.5 % (ref 0–1.9)
BUN SERPL-MCNC: 12 MG/DL (ref 6–20)
CALCIUM SERPL-MCNC: 8.9 MG/DL (ref 8.7–10.5)
CHLORIDE SERPL-SCNC: 105 MMOL/L (ref 95–110)
CO2 SERPL-SCNC: 22 MMOL/L (ref 23–29)
CREAT SERPL-MCNC: 0.9 MG/DL (ref 0.5–1.4)
DIFFERENTIAL METHOD: ABNORMAL
EOSINOPHIL # BLD AUTO: 0.2 K/UL (ref 0–0.5)
EOSINOPHIL NFR BLD: 1.9 % (ref 0–8)
ERYTHROCYTE [DISTWIDTH] IN BLOOD BY AUTOMATED COUNT: 17.2 % (ref 11.5–14.5)
EST. GFR  (NO RACE VARIABLE): >60 ML/MIN/1.73 M^2
GLUCOSE SERPL-MCNC: 146 MG/DL (ref 70–110)
HCT VFR BLD AUTO: 27.1 % (ref 40–54)
HGB BLD-MCNC: 8.6 G/DL (ref 14–18)
IMM GRANULOCYTES # BLD AUTO: 0.02 K/UL (ref 0–0.04)
IMM GRANULOCYTES NFR BLD AUTO: 0.2 % (ref 0–0.5)
INR PPP: 2.5 (ref 0.8–1.2)
LYMPHOCYTES # BLD AUTO: 1.8 K/UL (ref 1–4.8)
LYMPHOCYTES NFR BLD: 21.9 % (ref 18–48)
MCH RBC QN AUTO: 29.5 PG (ref 27–31)
MCHC RBC AUTO-ENTMCNC: 31.7 G/DL (ref 32–36)
MCV RBC AUTO: 93 FL (ref 82–98)
MONOCYTES # BLD AUTO: 0.7 K/UL (ref 0.3–1)
MONOCYTES NFR BLD: 8.2 % (ref 4–15)
NEUTROPHILS # BLD AUTO: 5.4 K/UL (ref 1.8–7.7)
NEUTROPHILS NFR BLD: 67.3 % (ref 38–73)
NRBC BLD-RTO: 0 /100 WBC
PLATELET # BLD AUTO: 583 K/UL (ref 150–450)
PMV BLD AUTO: 8.4 FL (ref 9.2–12.9)
POCT GLUCOSE: 140 MG/DL (ref 70–110)
POTASSIUM SERPL-SCNC: 3.9 MMOL/L (ref 3.5–5.1)
PROTHROMBIN TIME: 25.1 SEC (ref 9–12.5)
RBC # BLD AUTO: 2.92 M/UL (ref 4.6–6.2)
SODIUM SERPL-SCNC: 139 MMOL/L (ref 136–145)
WBC # BLD AUTO: 8.05 K/UL (ref 3.9–12.7)

## 2023-10-03 PROCEDURE — 85730 THROMBOPLASTIN TIME PARTIAL: CPT | Performed by: THORACIC SURGERY (CARDIOTHORACIC VASCULAR SURGERY)

## 2023-10-03 PROCEDURE — 85610 PROTHROMBIN TIME: CPT | Performed by: INTERNAL MEDICINE

## 2023-10-03 PROCEDURE — 85025 COMPLETE CBC W/AUTO DIFF WBC: CPT | Performed by: THORACIC SURGERY (CARDIOTHORACIC VASCULAR SURGERY)

## 2023-10-03 PROCEDURE — 99232 SBSQ HOSP IP/OBS MODERATE 35: CPT | Mod: ,,, | Performed by: INTERNAL MEDICINE

## 2023-10-03 PROCEDURE — 25000003 PHARM REV CODE 250: Performed by: NURSE PRACTITIONER

## 2023-10-03 PROCEDURE — 99232 PR SUBSEQUENT HOSPITAL CARE,LEVL II: ICD-10-PCS | Mod: ,,, | Performed by: INTERNAL MEDICINE

## 2023-10-03 PROCEDURE — 25000003 PHARM REV CODE 250: Performed by: THORACIC SURGERY (CARDIOTHORACIC VASCULAR SURGERY)

## 2023-10-03 PROCEDURE — 80048 BASIC METABOLIC PNL TOTAL CA: CPT | Performed by: THORACIC SURGERY (CARDIOTHORACIC VASCULAR SURGERY)

## 2023-10-03 PROCEDURE — 25000003 PHARM REV CODE 250: Performed by: INTERNAL MEDICINE

## 2023-10-03 RX ORDER — ATORVASTATIN CALCIUM 80 MG/1
80 TABLET, FILM COATED ORAL DAILY
Qty: 90 TABLET | Refills: 3 | Status: SHIPPED | OUTPATIENT
Start: 2023-10-04 | End: 2024-10-03

## 2023-10-03 RX ORDER — ACETAMINOPHEN 325 MG/1
650 TABLET ORAL EVERY 6 HOURS PRN
Qty: 20 TABLET | Refills: 0 | Status: SHIPPED | OUTPATIENT
Start: 2023-10-03

## 2023-10-03 RX ORDER — WARFARIN 4 MG/1
4 TABLET ORAL DAILY
Qty: 30 TABLET | Refills: 11 | Status: SHIPPED | OUTPATIENT
Start: 2023-10-03 | End: 2024-10-02

## 2023-10-03 RX ORDER — DOCUSATE SODIUM 100 MG/1
100 CAPSULE, LIQUID FILLED ORAL 2 TIMES DAILY
Qty: 60 CAPSULE | Refills: 0 | Status: SHIPPED | OUTPATIENT
Start: 2023-10-03 | End: 2023-11-02

## 2023-10-03 RX ORDER — ASCORBIC ACID 500 MG
500 TABLET ORAL 2 TIMES DAILY
Qty: 60 TABLET | Refills: 0 | Status: SHIPPED | OUTPATIENT
Start: 2023-10-03 | End: 2023-11-02

## 2023-10-03 RX ORDER — METOPROLOL TARTRATE 50 MG/1
50 TABLET ORAL 2 TIMES DAILY
Qty: 180 TABLET | Refills: 3 | Status: SHIPPED | OUTPATIENT
Start: 2023-10-03 | End: 2024-10-02

## 2023-10-03 RX ORDER — ASPIRIN 81 MG/1
81 TABLET ORAL DAILY
Qty: 360 TABLET | Refills: 0 | Status: SHIPPED | OUTPATIENT
Start: 2023-10-04 | End: 2024-10-03

## 2023-10-03 RX ORDER — LANOLIN ALCOHOL/MO/W.PET/CERES
1000 CREAM (GRAM) TOPICAL DAILY
Qty: 30 TABLET | Refills: 0 | Status: SHIPPED | OUTPATIENT
Start: 2023-10-04 | End: 2023-11-03

## 2023-10-03 RX ORDER — FOLIC ACID 1 MG/1
1 TABLET ORAL DAILY
Qty: 30 TABLET | Refills: 0 | Status: SHIPPED | OUTPATIENT
Start: 2023-10-04 | End: 2023-12-22

## 2023-10-03 RX ORDER — FERROUS SULFATE 325(65) MG
325 TABLET, DELAYED RELEASE (ENTERIC COATED) ORAL DAILY
Qty: 30 TABLET | Refills: 0 | Status: SHIPPED | OUTPATIENT
Start: 2023-10-03 | End: 2023-11-02

## 2023-10-03 RX ORDER — TRAMADOL HYDROCHLORIDE 50 MG/1
50 TABLET ORAL EVERY 6 HOURS PRN
Qty: 5 TABLET | Refills: 0 | Status: SHIPPED | OUTPATIENT
Start: 2023-10-03 | End: 2023-10-08

## 2023-10-03 RX ORDER — POLYETHYLENE GLYCOL 3350 17 G/17G
17 POWDER, FOR SOLUTION ORAL DAILY
Qty: 510 G | Refills: 0 | Status: SHIPPED | OUTPATIENT
Start: 2023-10-04 | End: 2023-11-03

## 2023-10-03 RX ADMIN — FERROUS SULFATE TAB 325 MG (65 MG ELEMENTAL FE) 1 EACH: 325 (65 FE) TAB at 08:10

## 2023-10-03 RX ADMIN — METOPROLOL TARTRATE 50 MG: 50 TABLET, FILM COATED ORAL at 08:10

## 2023-10-03 RX ADMIN — PANTOPRAZOLE SODIUM 40 MG: 40 TABLET, DELAYED RELEASE ORAL at 08:10

## 2023-10-03 RX ADMIN — ACETAMINOPHEN 650 MG: 325 TABLET ORAL at 05:10

## 2023-10-03 RX ADMIN — ATORVASTATIN CALCIUM 80 MG: 40 TABLET, FILM COATED ORAL at 08:10

## 2023-10-03 RX ADMIN — OXYCODONE HYDROCHLORIDE AND ACETAMINOPHEN 500 MG: 500 TABLET ORAL at 08:10

## 2023-10-03 RX ADMIN — CYANOCOBALAMIN TAB 1000 MCG 1000 MCG: 1000 TAB at 08:10

## 2023-10-03 RX ADMIN — ASPIRIN 81 MG: 81 TABLET, COATED ORAL at 08:10

## 2023-10-03 RX ADMIN — GUAIFENESIN 1200 MG: 600 TABLET, EXTENDED RELEASE ORAL at 08:10

## 2023-10-03 RX ADMIN — FOLIC ACID 1 MG: 1 TABLET ORAL at 08:10

## 2023-10-03 NOTE — ASSESSMENT & PLAN NOTE
09/20/2023  The patient is postop day 1 status post coronary artery bypass grafting x5 and aortic valve replacement with a 19 mm [employer] Dany mechanical valve.  Overall the patient is doing well.      Neuro:  Patient is awake appropriate and follows commands.  Patient is currently sedated with Precedex.  Will wean Precedex to off.   Cardiac:  Patient is hemodynamically stable.  With excellent cardiac output and cardiac index.  Patient is on low-dose vaso active meds.  Wean drips to off.  Will start metoprolol once drips are off.  Respiratory:  Patient is being weaned to extubation.  Patient is an active smoker Start nebs and Mucomyst.  Continue pulmonary toileting.  Continue incentive spirometer.    GI:  Patient is currently NPO.  Will start p.o. intake once extubated.    Renal:  Patient has good urine output and creatinine is 1.2.  Will start Lasix for diuresis.  Endocrine:  Patient required insulin drip for glucose controlled.  Will convert to sliding scale.    Id:  Patient had a T-max of 102°.  Most likely due to stress of surgery.  Will continue to follow.  White count is 9.  Patient is on manny op antibiotics.  Heme: Hematocrit is 17.  Patient is being transfused.  Platelet count is 74.  No evidence of active bleeding.  Will follow platelet count.  Will start patient on Coumadin anticoagulation for mechanical valve.  Activities:  Patient is currently in bed.  Will advance activities as tolerated once extubated.  Line tubes and drains:  Patient has an ETT, right IJ [place] and Cordis, right groin A-line, chest tubes, Ugalde catheter, pacer wires, and saphenectomy site DARON.    09/21/2023     The patient is postop day 2 status post coronary artery bypass grafting x5 and aortic valve replacement with a 19 mm Saint Dany mechanical valve.  Overall the patient is doing well.    Neuro:  Patient is awake alert and oriented x3.  Pain is well controlled with current pain management.  Cardiac:  The patient is off all drips.   Patient is hemodynamically stable.  Continue metoprolol.  Respiratory:  Patient was extubated yesterday.  Continue pulmonary toileting.  Continue incentive spirometer.   GI:  Patient is tolerating p.o. intake.  Patient had episodes of nausea yesterday which have abated.  Advance patient's diet as tolerated.    Renal:  Patient has good urine output.  Creatinine is 0.9.  Continue Lasix for diuresis.  Endocrine:  Glucose is controlled with sliding scale insulin.    Id:  T-max is 101.8 patient is currently afebrile.  White count is 17.  Most likely due to stress of surgery.  Continue to follow.  Heme:  Hematocrit is 24.5.  Platelet count is 89.  INR is 1.5.  Patient is started on Coumadin for anticoagulation of mechanical valve.  Activities:  Patient will be out of bed to chair.  Advance activities as tolerated.  Line tubes and drains:  Patient has pacer wires, right IJ Cordis and Ugalde catheter.  Will place PICC line and discontinue Cordis.    09/22/2023   The patient is postop day 3 status post coronary artery bypass grafting x5 and aortic valve replacement with a 19 mm Saint Dany mechanical valve.  Overall the patient is doing well.  Patient has been transferred to telemetry.    Neuro:  Patient is awake alert and oriented x3.  Pain is well controlled with current pain regimen.    Cardiac:  Patient is hemodynamically stable.  Continue metoprolol.    Respiratory:  Continue pulmonary toileting.  Continue incentive spirometer.    GI:  Patient is tolerating p.o. intake.  And tolerating a regular diet.    Renal:  Patient has good urine output.  Creatinine is 0.9.  Continue Lasix.    Endocrine:  Glucose is well controlled.    Id:  Patient is afebrile.  White count is 16.  Continue to follow.    Heme:  Hematocrit is 24 and platelet count is 114.  INR is 3.4.  Patient is currently on Coumadin for anticoagulation.  Activities:  Patient is out of bed and ambulating in the hallways.  Continue to advance as tolerated.  Line tubes  and drains:  Patient has a PICC line and pacer wires.    09/23/2023   The patient is postop day 4 status post coronary artery bypass grafting x5 and aortic valve replacement with a 19 mm Saint Dany mechanical valve.  Overall the patient is doing well.  Anticipate discharge to home with home health in the next 48-72 hours.    Neuro:  Patient is awake alert and oriented x3 pain is well controlled with pain management.  Neuro exam is nonfocal.    Cardiac:  Patient is hemodynamically stable.  Continue metoprolol.    Respiratory:  Continue pulmonary toileting.  Continue incentive spirometer.  GI patient is tolerating p.o. intake.  Will increase bowel regimen for bowel movements.  Renal: Patient has good urine output.  Creatinine is 0.9.  Patient is still appears fluid overloaded.  Continue Lasix.  Endocrine: Glucose is well controlled.    Id:  Patient is afebrile.  White count is down to 12.  Continue to follow.    Heme:  Hematocrit is 27.  Platelet count is 186 and INR is 1.9.  INR goal is between 2.5 and 3.  Continue Coumadin.    Activities:  Patient is out of bed and ambulating in the hallways.  Advance as tolerated.  Line tubes and drains:  Patient has a PICC line.      09/24/2023   The patient is postop day 5 status post coronary artery bypass grafting x5 and aortic valve replacement with a 19 mm Saint Dany mechanical valve.  Overall the patient is doing well.  Anticipate discharge home in the next 24-48 hours.    Neuro:  Patient is awake alert and oriented x3 pain is well controlled with current pain management.  Neuro exam is nonfocal.    Cardiac:  Patient is hemodynamically stable.  Continue metoprolol  Respiratory:  Continue pulmonary toileting.  Continue incentive spirometer.  Patient continues to have sputum production.  GI:  Patient is tolerating a regular diet.  Patient has had bowel movements.  Renal: Patient has good urine output.  Creatinine is stable.  Patient is appears to be euvolemic.  Will  discontinue Lasix.    Endocrine:  Glucose is well controlled.  Id: Patient is afebrile white count normal.  Heme:  Hematocrit is 27 and platelet count is 229.  INR is subtherapeutic 1.5.  Continue Coumadin.  Goal is a INR level of 2.5-3.  Activities: Patient is out of bed and ambulating in the hallways.  Advance as tolerated.    Line tubes and drains:  Patient has a PICC line.    09/25/2023   The patient is postop day 6 status post coronary artery bypass grafting x5 and aortic valve replacement with a 19 mm Saint Dany mechanical valve.  Overall the patient is doing well.  Patient is awaiting attainment of a therapeutic INR.  Anticipate discharge in the next 48-72 hours.    Neuro:  Patient is awake alert and oriented x3.  Pain is well controlled current pain management.  Neuro exam is nonfocal.    Cardiac:  Patient is stable.  Continue metoprolol.    Respiratory:  Patient has good sats on room air.  Continue pulmonary toileting.  Continue incentive spirometer.    GI:  Patient is tolerating a regular diet having bowel movements.    Renal:  Patient has good urine output.    Endocrine:  Glucose is well controlled.    Id:  Patient is afebrile white count is normal.    Heme:  Hematocrit is 27 and platelet count is 298.  INR is now normal at 1.2.  Patient is on bridging heparin.  Will titrate heparin to an APTT greater between 40 and 50.  Therapeutic goal of INR is 2.5-3.  Activities: Patient is out of bed and ambulating in the hallways.    Line tubes and drains:  Patient has a PICC line.    09/26/2023   The patient is postop day 7 status post coronary artery bypass grafting x5 and aortic valve replacement with a 19 mm Saint Dany mechanical valve.  Overall the patient is doing well.  Patient's INR is still subtherapeutic.  Patient is on heparin in the interim.  Anticipate discharge in the next 48-72 hours.  Neuro:  Patient is awake alert and oriented x3.  Pain is well controlled with current pain management.  Neuro exam is  nonfocal.    Cardiac:  Patient remains stable.  Continue metoprolol.    Respiratory: Patient has good sats on room air.  Continue pulmonary toileting.  Continue incentive spirometer.    GI:  Patient is tolerating a regular diet and having bowel movements.    Renal:  Patient has good urine output.  Endocrine:  Glucose is well controlled.    Id:  Patient's white count is normal.    Heme: Hematocrit is 28 and INR is 1.2 patient is on a heparin drip.  Coumadin dose has been increased.  INR goal is 2.5-3.  Activities:  Patient is out of bed and ambulating in the hallways.    Line tubes and drains:  Patient has a PICC line.    09/27/2023   The patient is postop [id number] post coronary artery bypass grafting x5 and aortic valve replacement with a 19 mm Saint Dany mechanical valve.  Overall the patient is doing well.  Patient is awaiting INR being therapeutic.  Anticipate discharge in the next 48-72 hours.  Neuro:  Patient is awake alert and oriented x3.  Pain is well controlled with current pain management.  Neuro exam is nonfocal.    Cardiac:  Patient's blood pressure is trending higher.  Will increase metoprolol to 50 mg b.i.d..  Respiratory: Patient has good sats on room air.  Continue pulmonary toileting.  Continue incentive spirometer.  GI:  Patient is tolerating a regular diet and having bowel movements.    Renal:  Patient has good urine output.    Endocrine:  Glucose is well controlled.  Id:  Patient is afebrile white count is normal.    Heme:  Hematocrit is 28.6 and platelet count is 428.  INR is 1.6.  A PTT is therapeutic at 40.  INR goal is 2.5-3.  Activities:  Patient is out of bed and ambulating in the hallways.  Advance as tolerated.    Line tubes and drains:  Patient has a PICC line.    09/28/2023   The patient is postop day 9 status post coronary artery bypass grafting x5 and aortic valve replacement with a 19 mm Saint Dany mechanical valve.  Overall the patient is doing well.  INR is 2.3 today.  Anticipate  discharge that is 4872 hours.    Neuro:  Patient is awake alert and oriented x3.  Pain is well controlled.  Neuro exam is nonfocal.    Cardiac:  Patient is hemodynamically stable.  Continue metoprolol 50 mg b.i.d..    Respiratory: Patient has good sats on room air.  Continue pulmonary toileting.  Continue incentive spirometer.    GI:  Patient is tolerating a regular diet and having bowel movements.    Renal: Patient has good urine output.    Endocrine:  Glucose is well controlled.    Id:  Patient is afebrile white count is 8   Heme:  Hematocrit is 28.  INR is 2.3.  Platelet count is 452.  Continue Coumadin.  INR goal is 2.5-3.0.  Patient is currently on bridging heparin.  Activities:  Patient is out of bed and ambulating.  Advance as tolerated.    Line tubes and drains:  Patient has a PICC line.    09/29/2023   Patient is postop day 10 status post coronary artery bypass grafting x5 and aortic valve replacement with a 19 mm Saint Dany mechanical valve.  Overall patient is doing well.  Patient awaits therapeutic INR prior to discharge.    Neuro:  Patient is awake alert and oriented x3.  Pain is well controlled.  Neuro exam is nonfocal.    Cardiac: Patient is stable.  Continue metoprolol.    Respiratory:  Patient has good sats on room air.  Continue pulmonary toileting.  Continue incentive spirometer.    GI:  Patient is tolerating a regular diet and having bowel movements.    Renal:  Patient has good urine output.  Creatinine is normal.    Endocrine: Glucose is well controlled.  Id:  Patient is afebrile.  White count is 10.3.  Heme:  Hematocrit is 28.8 and platelet count is 563.  INR is 1.8.  Patient needs INR goal of 2.5-3.  Continue Coumadin.  Activities:  Patient is out of bed and ambulating.  Advance as tolerated.  Line tubes and drains:  Patient has a PICC line.    09/30/2023   Patient is postop day 11 status post coronary bypass grafting x5 and aortic valve replacement with a 19 mm Saint Dany mechanical valve.   Overall the patient is doing well patient is awaiting a therapy INR prior to discharge.    Neuro:  Patient is awake alert and oriented x3.  Pain is well controlled.  Neuro exam is nonfocal.    Cardiac:  Continue metoprolol.    Respiratory:  Patient is on room air continue pulmonary toileting.    GI:  Patient is having bowel movements and a regular diet.    Renal:  Patient has good urine output.    Endocrine: Glucose is well controlled  Id white count is normal.    :  INR is 1.7 today continue Coumadin dosing.  Patient is on bridging heparin.    Activities:  Patient is out of bed and ambulating in the hallways.  Line tubes and drains:  Patient has a PICC line    10/1/2023   The patient is postop day 12 status post coronary artery bypass grafting x5 and aortic valve replacement with a 19 mm Saint Dany mechanical valve.  Overall patient is doing well.  Patient is awaiting therapeutic INR prior to discharge.    Neuro:  Patient is awake alert oriented x3.  Pain is well controlled.  Neuro exam is nonfocal.    Cardiac:  Patient is stable.  Continue metoprolol.    Respiratory:  Patient is on room air continue pulmonary toileting.    GI:  Patient is having a bowel movement and tolerating a regular diet.    Renal:  Patient has good urine output.    Endocrine:  Glucose is well controlled.  Id:  Patient is afebrile and white count is normal.    Heme:  INR is 1.  Nine today.  Continue on Coumadin.  INR goal is 2.5-3.  Continue Coumadin.    Line tubes and drains:  Patient has a PICC line.    10/02/2023   Patient is postop day 13 status post coronary artery bypass grafting x5 and aortic valve replacement with a 19 mm Saint Dany mechanical valve.  Overall the patient is doing well.  Patient's INR is 2.2 today.  Anticipate discharge in the next 24 hours.    Neuro:  Patient is awake alert and oriented x3 pain is well controlled.  Neuro exam is nonfocal.    Cardiac:  Patient is stable.  Continue metoprolol.    Respiratory:  Patient is  on room air.  Continue pulmonary toileting.    GI:  Patient is tolerating a regular diet and having bowel movements.    Renal:  Patient has a good urine output.    Endocrine:  Patient's glucose is well controlled.  Id:  Patient is afebrile white count is normal.    Heme:  INR is 2.2.  Continue Coumadin.    10/03/2023   The patient is postop day 14 status post coronary artery bypass grafting x5 and aortic valve replacement with a 19 mm Saint Dany mechanical valve.  Overall the patient is doing well.  INR is 2.5 today.  Patient will be discharged to home with home health.  Patient will have outpatient follow-up [facility].  Neuro:  Patient is awake alert and oriented x3.  Neuro exam is nonfocal.    Cardiac:  Patient is stable.  Continue metoprolol.  Respiratory:  Patient is tolerating room air.  GI:  Patient is tolerating a diet and having bowel movements.    Renal:  Patient has good urine output.    Endocrine:  Glucose is well controlled.  Id:  Patient is afebrile white is normal.  Heme:  INR is 2.5.  Will continue Coumadin as outpatient.  Line tubes and drains:  Patient has a PICC line which will be discontinued prior to discharge.

## 2023-10-03 NOTE — PROGRESS NOTES
[facility] ([facility])  Cardiology  Progress Note    Patient Name: Mikie Alcazar  MRN: [id number]  Admission Date: 9/17/2023  Hospital Length of Stay: 16 days  Code Status: Full Code   Attending Physician: Nishant Douglas MD   Primary Care Physician: Lissette, Primary Doctor  Expected Discharge Date: 10/3/2023  Principal Problem:NSTEMI (non-ST elevated myocardial infarction)    Subjective:     Hospital Course:   Cardiology consulted to assist with management. Pt seen and examined today, resting in bed mother at bedside. He reports his pain started 6+ months ago and worsening in the last few weeks happening daily with associated SOB, dizziness radiating to left arm. He reports his pain is sharp and pressure-like at times. Pt reports daily use a marijuana, h/o cocaine and other drugs 5-10 years ago. Drinks occasionally. Echo pending cont hep gtt    09/19/2023. Admitted for NSTEMI/ACS. Echo showed RWMA  The cath done yesterday showed   Severe lmca disease with timi1 flow in lad   Lcx ostial 50%   Rca prox 80%   Ef 50%   Severe AI  09/19/23 s/p CABG x5 and aortic valve replacement with a 19 mm [employer] D[employer] mechanical valve by Dr. Douglas.  In ICU and intubated. Om epi and Vasopressin gtt.    9/20/23  Pt seen and examined today, POD 1 CABGx5 pt still intubated on exam this am, weaning epi. Labs reviewed, H/H 6.0 and 17.1. Plans to extubated today    9/21/23 Pt seen and examined today extubated feels ok chest soreness, chest tubes removed. Labs reviewed, chart reviewed.    9/22/23 Pt seen and examined today, sitting up in bedside chair. Feels good. Walked with PT/OT. Labs reviewed, chart reviewed    09/23 ambulating well. No chest pain dyspnea, the lab reviewed.     09/24. On coumadin rx and heparin bridge. Non chest pain dyspnea, the lab reviewed. VSS    9/25/23-Patient seen and examined today, resting in bed. No AEON. Pain controlled. Working with PT/OT. Labs stable. INR 1.2, on heparin bridge.    9/26/23-Patient seen  and examined today, sitting up in bedside chair. Feels ok. More fatigued/weak today. Pain controlled. Labs stable. INR 1.2, remains on heparin bridge.    9/27/23-Patient seen and examined today, resting in bed. Feeling better today. Worked with PT/OT earlier. No AEON. Labs reviewed, INR 1.6.     9/28/23-Patient seen and examined today, resting in bed. Ambulating well. Pain controlled. INR 2.3.     9/29/23-Patient seen and examined today, lying in bed. Feels ok. Does admit to some fatigue. Pain controlled. Ambulating well with PT/OT. Labs reviewed/stable. INR 1.8.    9/30/23- Pt has no complaints. Continue beta blockers and ambulation.     10/1/23- POD # 12, CABG x5. With AVR, pt currently bridging with heparin and coumadin.     10/2/23 POD 13, CABGx5 and AVR, awaiting therapeutic INR. Recovering well. Labs reviewed chart reviewed    10/3/23 POD 14, CABGx5 and AVR, awaiting DC today with HH, INR 2.5          Review of Systems   Constitutional: Negative.   HENT: Negative.     Eyes: Negative.    Cardiovascular: Negative.    Respiratory: Negative.     Skin: Negative.    Musculoskeletal: Negative.    Gastrointestinal: Negative.    Genitourinary: Negative.    Neurological: Negative.    Psychiatric/Behavioral: Negative.       Objective:     Vital Signs (Most Recent):  Temp: 98.8 °F (37.1 °C) (10/03/23 0704)  Pulse: 105 (10/03/23 0704)  Resp: 18 (10/03/23 0704)  BP: 116/70 (10/03/23 0704)  SpO2: 97 % (10/03/23 0704) Vital Signs (24h Range):  Temp:  [98.5 °F (36.9 °C)-99.6 °F (37.6 °C)] 98.8 °F (37.1 °C)  Pulse:  [] 105  Resp:  [16-18] 18  SpO2:  [97 %-100 %] 97 %  BP: (105-135)/(63-78) 116/70     Weight: 79.8 kg (175 lb 14.8 oz)  Body mass index is 26.75 kg/m².     SpO2: 97 %       No intake or output data in the 24 hours ending 10/03/23 1210    Lines/Drains/Airways       Peripherally Inserted Central Catheter Line  Duration             PICC Double Lumen 09/21/23 0930 left basilic 12 days                      "  Physical Exam  Vitals and nursing note reviewed.   Constitutional:       Appearance: Normal appearance.   HENT:      Head: Normocephalic and atraumatic.   Eyes:      General:         Right eye: No discharge.         Left eye: No discharge.      Pupils: Pupils are equal, round, and reactive to light.   Cardiovascular:      Rate and Rhythm: Normal rate and regular rhythm.      Heart sounds: S1 normal and S2 normal. No murmur heard.     No friction rub.   Pulmonary:      Effort: Pulmonary effort is normal. No respiratory distress.      Breath sounds: Normal breath sounds. No rales.   Abdominal:      Palpations: Abdomen is soft.      Tenderness: There is no abdominal tenderness.   Musculoskeletal:      Cervical back: Neck supple.      Right lower leg: No edema.      Left lower leg: No edema.   Skin:     General: Skin is warm and dry.   Neurological:      General: No focal deficit present.      Mental Status: He is alert and oriented to person, place, and time.   Psychiatric:         Mood and Affect: Mood normal.         Behavior: Behavior normal.         Thought Content: Thought content normal.            Significant Labs: BMP:   Recent Labs   Lab 10/02/23  0537 10/03/23  0546   GLU 95 146*    139   K 4.2 3.9    105   CO2 22* 22*   BUN 12 12   CREATININE 0.8 0.9   CALCIUM 8.6* 8.9   , CMP   Recent Labs   Lab 10/02/23  0537 10/03/23  0546    139   K 4.2 3.9    105   CO2 22* 22*   GLU 95 146*   BUN 12 12   CREATININE 0.8 0.9   CALCIUM 8.6* 8.9   ANIONGAP 8 12   , CBC   Recent Labs   Lab 10/02/23  0537 10/03/23  0546   WBC 9.66 8.05   HGB 8.6* 8.6*   HCT 26.7* 27.1*   * 583*   , INR   Recent Labs   Lab 10/02/23  0537 10/03/23  0546   INR 2.2* 2.5*   , Lipid Panel No results for input(s): "CHOL", "HDL", "LDLCALC", "TRIG", "CHOLHDL" in the last 48 hours., Troponin No results for input(s): "TROPONINI" in the last 48 hours., and All pertinent lab results from the last 24 hours have been " reviewed.    Significant Imaging: Echocardiogram: Transthoracic echo (TTE) complete (Cupid Only):   Results for orders placed or performed during the hospital encounter of 09/17/23   Echo   Result Value Ref Range    BSA 1.98 m2    LVOT stroke volume 82.76 cm3    LVIDd 5.22 3.5 - 6.0 cm    LV Systolic Volume 86.21 mL    LV Systolic Volume Index 44.2 mL/m2    LVIDs 4.37 (A) 2.1 - 4.0 cm    LV Diastolic Volume 130.82 mL    LV Diastolic Volume Index 67.09 mL/m2    IVS 0.88 0.6 - 1.1 cm    LVOT diameter 1.91 cm    LVOT area 2.9 cm2    FS 16 (A) 28 - 44 %    Left Ventricle Relative Wall Thickness 0.38 cm    Posterior Wall 0.98 0.6 - 1.1 cm    LV mass 177.53 g    LV Mass Index 91 g/m2    MV Peak E Bob 1.20 m/s    TDI LATERAL 0.06 m/s    TDI SEPTAL 0.08 m/s    E/E' ratio 17.14 m/s    MV Peak A Bob 1.14 m/s    TR Max Bob 2.33 m/s    E/A ratio 1.05     IVRT 72.31 msec    E wave deceleration time 200.37 msec    LV SEPTAL E/E' RATIO 15.00 m/s    LV LATERAL E/E' RATIO 20.00 m/s    LVOT peak bob 1.38 m/s    Left Ventricular Outflow Tract Mean Velocity 1.08 cm/s    Left Ventricular Outflow Tract Mean Gradient 4.99 mmHg    LA size 3.79 cm    Left Atrium Minor Axis 4.68 cm    Left Atrium Major Axis 4.27 cm    RVOT peak VTI 10.8 cm    TAPSE 1.84 cm    RA Major Axis 3.31 cm    AV regurgitation pressure 1/2 time 456.84117562393154 ms    AR Max Bob 4.34 m/s    AV mean gradient 14 mmHg    AV peak gradient 26 mmHg    Ao peak bob 2.54 m/s    Ao VTI 54.00 cm    LVOT peak VTI 28.90 cm    AV valve area 1.53 cm²    AV Velocity Ratio 0.54     AV index (prosthetic) 0.54     SARAH by Velocity Ratio 1.56 cm²    Mr max bob 4.42 m/s    MV stenosis pressure 1/2 time 58.11 ms    MV valve area p 1/2 method 3.79 cm2    TV mean gradient 19 mmHg    Triscuspid Valve Regurgitation Peak Gradient 22 mmHg    PV mean gradient 1 mmHg    RVOT peak bob 0.58 m/s    Ao root annulus 2.71 cm    STJ 2.59 cm    Ascending aorta 2.36 cm    IVC diameter 1.57 cm    Mean e'  0.07 m/s    ZLVIDS 1.94     ZLVIDD -0.63     LA Volume Index 22.9 mL/m2    LA volume 44.60 cm3    LA WIDTH 3.1 cm    RA Width 2.2 cm    TV resting pulmonary artery pressure 25 mmHg    RV TB RVSP 5 mmHg    Est. RA pres 3 mmHg    Narrative      Left Ventricle: The left ventricle is normal in size. Ventricular mass   is normal. Normal wall thickness. regional wall motion abnormalities   present. There is mildly reduced systolic function with a visually   estimated ejection fraction of 40 - 45%. Grade II diastolic dysfunction.    Right Ventricle: Normal right ventricular cavity size. Wall thickness   is normal. Right ventricle wall motion  is normal. Systolic function is   normal.    Aortic Valve: There is mild to moderate aortic regurgitation.    Pulmonary Artery: The estimated pulmonary artery systolic pressure is   25 mmHg.    IVC/SVC: Normal venous pressure at 3 mmHg.     , EKG: reviewed, Stress Test: reviewed, and X-Ray: CXR: X-Ray Chest 1 View (CXR):   Results for orders placed or performed during the hospital encounter of 09/17/23   X-Ray Chest 1 View    Narrative    EXAMINATION:  XR CHEST 1 VIEW    CLINICAL HISTORY:  PICC placement;    TECHNIQUE:  Single frontal view of the chest was performed.    COMPARISON:  09/21/2023    FINDINGS:  Left-sided PICC line tip overlies the SVC in good position.  In comparison to the prior study, there is no adverse interval changes      Impression    In comparison to the prior study, there is no adverse interval changes      Electronically signed by: Elliott España MD  Date:    09/21/2023  Time:    10:10     Assessment and Plan:       * NSTEMI (non-ST elevated myocardial infarction)  Troponin 0.6->-0.709->0.708  Cont hep gtt  EKG with ST T wave abnml  LHC planned for today  All risks, benefits and treatment alternatives explained, all questions answered. Pt agreeable to proceed.   NPO    9/20/23  S/P CABG x5 AVR    S/P AVR (aortic valve replacement)  09/24  On coumadin Rx and  heparin gtt as bridge    9/28/23  -INR 2.3     9/29/23  -INR 1.8    10/2/23  Awaiting therapeutic INR  Cont management per CTS    10/3/23  INR 2.5 today  CTS planning to DC with HH    S/P CABG x 5  Cont management per CTS    09/23   Continue asa statin plavix lasix and metoprolol  On coumadin for mechanical AVR    09/24  Continue ASA statin BB  Continue supportive care    9/25/23  -Progressing well  -Continue ASA, statin, BB  -IS usage and ambulation    9/26/23  -Stable overnight  -Continue ASA, statin, BB  -IS usage and ambulation    9/27/23  -No AEON  -Continue ASA, statin, BB  -IS usage   -Ambulating well    9/28/23  -Stable CV wise  -Continue ASA, statin, BB  -IS usage/ambulation    9/29/23  -No AEON  -Continue ASA, statin, BB  -Continue PT/OT  -INR 1.8 today    10/2/23  ASA, statin, BB  Cont monitoring INR    Nonrheumatic aortic valve insufficiency  -s/p mechanical AVR    Tobacco smoker, 1 pack of cigarettes or less per day  -Smoking cessation    CAD, multiple vessel  09/19/23  S/p CABG x5 and mechanical AVR today  -continue icu supportive care  -continue asa plavix statin BB and lasix  -will f/u    9/20/23  Cont ASA, plavix, statin, BB, lasix  Management per CTS    See plan under CABG    Hypertension  stable        VTE Risk Mitigation (From admission, onward)         Ordered     warfarin (COUMADIN) tablet 4 mg  Daily         10/02/23 1142     heparin 25,000 units in dextrose 5% 250 ml (100 units/mL) infusion MINIMAL INTENSITY nomogram - [facility]  Continuous        Question Answer Comment   Heparin Infusion Adjustment (DO NOT MODIFY ANSWER) [email or web address]\[email or web address] MINIMAL  INTENSITY nomogram for [facility] [email or web address]    Begin at (in units/kg/hr) 8        09/25/23 0855     IP VTE LOW RISK PATIENT  Once         09/17/23 1941                Courtney Prince, NP  Cardiology  [facility] ([facility])

## 2023-10-03 NOTE — PROGRESS NOTES
Clinical Pharmacy Progress Note: Coumadin Dosing and Monitoring     Indication: mechanical AVR  Goal INR: 2.0-3.0     Lab Results   Component Value Date    INR 2.5 (H) 10/03/2023    INR 2.2 (H) 10/02/2023    INR 1.9 (H) 10/01/2023   H/H: 8.6/27.1 (low but stable)  Plt: 583 (trending up from 579 on 10/2/23)    Patient has been educated by pharmacist this admission.     Recent dosing history:   2.5 mg on 9/20 & 9/21-- INR increased from 1.5 to 3.4 on 9/22 so dose was held.   1 mg on 9/23 & 9/24.  2.5 mg on 9/25 -- this resulted in no change in INR  5 mg given on 9/26/23-- INR increased appropriately to 1.6  3 mg given on 9/27/23-- INR increased to 2.3  1 mg given on 9/28/23-- INR decreased below therapeutic range  3 mg given on 9/29/23--- INR decreased from 1.8 to 1.7.  4 mg given on 9/30/23, 10/1/23, & 10/2/23--- INR increased from 1.9 --> 2.2 --> 2.5 respectively    Drug interactions: Melatonin, Aspirin, Tylenol, and Tramadol each may increase the risk of bleeding while on warfarin    Lab order for daily PT/INR? Yes  Coumadin diet ordered? Yes    Bridge? Heparin drip minimal intensity nomogram    Plan:   - Give warfarin 4 mg today. INR is therapeutic.  - Pt has follow-up apt with Coumadin clinic on Thursday (10/5/23)  - Pharmacy will continue to monitor daily PT/INR. Dose adjustments will be made accordingly.      Thank you for allowing us to participate in this patient's care.      Charmaine Oh, Liza 10/03/2023 11:23 AM

## 2023-10-03 NOTE — PROGRESS NOTES
44 y/o male referred to coumadin clinic for monitoring.  He is The patient is postop day 14 as of 10/3/23- status post coronary artery bypass grafting x5 and aortic valve replacement with a 19 mm [employer] Dany mechanical valve.  PMHX includes, HTN, CAD.  See calendar for inpatient dosing.  Pt will d/c on 4 mg daily per inpatient PharmD.  Further anticoagulation education will take place at his first visit on 10/5/23..  Appt scheduled  [facility].

## 2023-10-03 NOTE — PLAN OF CARE
Problem: Adjustment to Illness (Acute Coronary Syndrome)  Goal: Optimal Adaptation to Illness  Outcome: Met     Problem: Dysrhythmia (Acute Coronary Syndrome)  Goal: Normalized Cardiac Rhythm  Outcome: Met     Problem: Cardiac-Related Pain (Acute Coronary Syndrome)  Goal: Absence of Cardiac-Related Pain  Outcome: Met     Problem: Hemodynamic Instability (Acute Coronary Syndrome)  Goal: Effective Cardiac Pump Function  Outcome: Met     Problem: Tissue Perfusion (Acute Coronary Syndrome)  Goal: Adequate Tissue Perfusion  Outcome: Met     Problem: Arrhythmia/Dysrhythmia (Cardiac Catheterization)  Goal: Stable Heart Rate and Rhythm  Outcome: Met     Problem: Bleeding (Cardiac Catheterization)  Goal: Absence of Bleeding  Outcome: Met     Problem: Contrast-Induced Injury Risk (Cardiac Catheterization)  Goal: Absence of Contrast-Induced Injury  Outcome: Met     Problem: Embolism (Cardiac Catheterization)  Goal: Absence of Embolism Signs and Symptoms  Outcome: Met     Problem: Ongoing Anesthesia/Sedation Effects (Cardiac Catheterization)  Goal: Anesthesia/Sedation Recovery  Outcome: Met     Problem: Pain (Cardiac Catheterization)  Goal: Acceptable Pain Control  Outcome: Met     Problem: Vascular Access Protection (Cardiac Catheterization)  Goal: Absence of Vascular Access Complication  Outcome: Met     Problem: Adult Inpatient Plan of Care  Goal: Plan of Care Review  Outcome: Met  Goal: Patient-Specific Goal (Individualized)  Outcome: Met  Goal: Absence of Hospital-Acquired Illness or Injury  Outcome: Met  Goal: Optimal Comfort and Wellbeing  Outcome: Met  Goal: Readiness for Transition of Care  Outcome: Met     Problem: Chest Pain  Goal: Resolution of Chest Pain Symptoms  Outcome: Met     Problem: Fall Injury Risk  Goal: Absence of Fall and Fall-Related Injury  Outcome: Met     Problem: Infection  Goal: Absence of Infection Signs and Symptoms  Outcome: Met     Problem: Skin Injury Risk Increased  Goal: Skin Health and  Integrity  Outcome: Met

## 2023-10-03 NOTE — PLAN OF CARE
Multiple referrals sent out to home health agencies.  Unable to locate an accepting home health agency.  Patient's [employer] does not have a home health benefit.  The few [facility] agencies are unable to accept another medicaid patient.  Notified Dr. Douglas via secure chat.

## 2023-10-03 NOTE — PT/OT/SLP PROGRESS
Physical Therapy      Patient Name:  Mikie Alcazar   MRN:  [id number]    ATTEMPTED P.T. TX THIS AM, PT DECLINED TX DUE TO ABOUT TO D/C HOME, ENCOURAGED PT TO CONTINUE WITH MOBILITY AT HOME, PT AGREEABLE    Vera Ann, PT  10/3/2023  [id number]

## 2023-10-03 NOTE — PLAN OF CARE
Problem: Adjustment to Illness (Acute Coronary Syndrome)  Goal: Optimal Adaptation to Illness  Outcome: Ongoing, Progressing     Problem: Cardiac-Related Pain (Acute Coronary Syndrome)  Goal: Absence of Cardiac-Related Pain  Outcome: Ongoing, Progressing     Problem: Tissue Perfusion (Acute Coronary Syndrome)  Goal: Adequate Tissue Perfusion  Outcome: Ongoing, Progressing     Problem: Arrhythmia/Dysrhythmia (Cardiac Catheterization)  Goal: Stable Heart Rate and Rhythm  Outcome: Ongoing, Progressing     Problem: Ongoing Anesthesia/Sedation Effects (Cardiac Catheterization)  Goal: Anesthesia/Sedation Recovery  Outcome: Ongoing, Progressing     Problem: Pain (Cardiac Catheterization)  Goal: Acceptable Pain Control  Outcome: Ongoing, Progressing     Problem: Vascular Access Protection (Cardiac Catheterization)  Goal: Absence of Vascular Access Complication  Outcome: Ongoing, Progressing

## 2023-10-03 NOTE — PROGRESS NOTES
[facility] - Telemetry [facility])  Cardiothoracic Surgery  Progress Note    Patient Name: Mikie Alcazar  MRN: [id number]  Admission Date: 9/17/2023  Hospital Length of Stay: 16 days  Code Status: Full Code   Attending Physician: Nishant Douglas MD   Referring Provider: Self, Aaareferral  Principal Problem:NSTEMI (non-ST elevated myocardial infarction)            Subjective:     Post-Op Info:  Procedure(s) (LRB):  CORONARY ARTERY BYPASS GRAFT (CABG) (N/A)  REPLACEMENT-VALVE-AORTIC (N/A)  SURGICAL PROCUREMENT, VEIN, ENDOSCOPIC (Left)  BLOCK, NERVE, INTERCOSTAL, 2 OR MORE (N/A)  ECHOCARDIOGRAM,TRANSESOPHAGEAL (N/A)   14 Days Post-Op     Interval History: The patient is postop day 14 status post coronary artery bypass grafting x5 and aortic valve replacement with a 19 mm Saint Dany mechanical valve.    ROS  Medications:  Continuous Infusions:   heparin (porcine) in D5W 12 Units/kg/hr (10/03/23 0653)     Scheduled Meds:   ascorbic acid (vitamin C)  500 mg Oral BID    aspirin  81 mg Oral Daily    atorvastatin  80 mg Oral Daily    cyanocobalamin  1,000 mcg Oral Daily    docusate sodium  100 mg Oral BID    ferrous sulfate  1 tablet Oral Daily    folic acid  1 mg Oral Daily    guaiFENesin  1,200 mg Oral BID    magnesium hydroxide 400 mg/5 ml  5 mL Oral BID    metoprolol tartrate  50 mg Oral BID    pantoprazole  40 mg Oral Daily    polyethylene glycol  17 g Oral Daily    warfarin  4 mg Oral Daily     PRN Meds:0.9%  NaCl infusion (for blood administration), acetaminophen, albumin human 5%, aluminum-magnesium hydroxide-simethicone, calcium gluconate IVPB, calcium gluconate IVPB, calcium gluconate IVPB, dextrose 10%, glucagon (human recombinant), influenza, insulin aspart U-100, lactated ringers, magnesium sulfate IVPB, melatonin, metoclopramide HCl, ondansetron, pneumoc 20-leslee conj-dip cr(PF), potassium chloride in water, potassium chloride in water, potassium chloride in water, traMADoL     Objective:     Vital  Signs (Most Recent):  Temp: 98.8 °F (37.1 °C) (10/03/23 0704)  Pulse: 105 (10/03/23 0704)  Resp: 18 (10/03/23 0704)  BP: 116/70 (10/03/23 0704)  SpO2: 97 % (10/03/23 0704) Vital Signs (24h Range):  Temp:  [98.5 °F (36.9 °C)-99.6 °F (37.6 °C)] 98.8 °F (37.1 °C)  Pulse:  [] 105  Resp:  [16-18] 18  SpO2:  [97 %-100 %] 97 %  BP: (105-135)/(57-78) 116/70     Weight: 79.8 kg (175 lb 14.8 oz)  Body mass index is 26.75 kg/m².    SpO2: 97 %       Intake/Output - Last 3 Shifts         10/01 0700  10/02 0659 10/02 0700  10/03 0659 10/03 0700  10/04 0659    I.V. (mL/kg) 261.8 (3.3)      Total Intake(mL/kg) 261.8 (3.3)      Net +261.8             Stool Occurrence 0 x 1 x             Lines/Drains/Airways       Peripherally Inserted Central Catheter Line  Duration             PICC Double Lumen 09/21/23 0930 left basilic 11 days                     Physical Exam  Constitutional:       Appearance: Normal appearance.   HENT:      Head: Normocephalic and atraumatic.      Nose: Nose normal.   Cardiovascular:      Rate and Rhythm: Normal rate and regular rhythm.      Heart sounds: Normal heart sounds.   Pulmonary:      Effort: Pulmonary effort is normal.      Breath sounds: Normal breath sounds.   Abdominal:      General: Abdomen is flat. Bowel sounds are normal.      Palpations: Abdomen is soft.   Musculoskeletal:      Right lower leg: No edema.      Left lower leg: No edema.   Skin:     General: Skin is warm and dry.   Neurological:      Mental Status: He is alert and oriented to person, place, and time.   Psychiatric:         Mood and Affect: Mood normal.         Behavior: Behavior normal.            Significant Labs:  All pertinent labs from the last 24 hours have been reviewed.    Significant Diagnostics:  I have reviewed all pertinent imaging results/findings within the past 24 hours.    Assessment/Plan:     S/P CABG x 5  09/20/2023  The patient is postop day 1 status post coronary artery bypass grafting x5 and aortic valve  replacement with a 19 mm Saint Dany mechanical valve.  Overall the patient is doing well.      Neuro:  Patient is awake appropriate and follows commands.  Patient is currently sedated with Precedex.  Will wean Precedex to off.   Cardiac:  Patient is hemodynamically stable.  With excellent cardiac output and cardiac index.  Patient is on low-dose vaso active meds.  Wean drips to off.  Will start metoprolol once drips are off.  Respiratory:  Patient is being weaned to extubation.  Patient is an active smoker Start nebs and Mucomyst.  Continue pulmonary toileting.  Continue incentive spirometer.    GI:  Patient is currently NPO.  Will start p.o. intake once extubated.    Renal:  Patient has good urine output and creatinine is 1.2.  Will start Lasix for diuresis.  Endocrine:  Patient required insulin drip for glucose controlled.  Will convert to sliding scale.    Id:  Patient had a T-max of 102°.  Most likely due to stress of surgery.  Will continue to follow.  White count is 9.  Patient is on manny op antibiotics.  Heme: Hematocrit is 17.  Patient is being transfused.  Platelet count is 74.  No evidence of active bleeding.  Will follow platelet count.  Will start patient on Coumadin anticoagulation for mechanical valve.  Activities:  Patient is currently in bed.  Will advance activities as tolerated once extubated.  Line tubes and drains:  Patient has an ETT, right IJ [place] and Cordis, right groin A-line, chest tubes, Ugalde catheter, pacer wires, and saphenectomy site DARON.    09/21/2023     The patient is postop day 2 status post coronary artery bypass grafting x5 and aortic valve replacement with a 19 mm Saint Dany mechanical valve.  Overall the patient is doing well.    Neuro:  Patient is awake alert and oriented x3.  Pain is well controlled with current pain management.  Cardiac:  The patient is off all drips.  Patient is hemodynamically stable.  Continue metoprolol.  Respiratory:  Patient was extubated yesterday.   Continue pulmonary toileting.  Continue incentive spirometer.   GI:  Patient is tolerating p.o. intake.  Patient had episodes of nausea yesterday which have abated.  Advance patient's diet as tolerated.    Renal:  Patient has good urine output.  Creatinine is 0.9.  Continue Lasix for diuresis.  Endocrine:  Glucose is controlled with sliding scale insulin.    Id:  T-max is 101.8 patient is currently afebrile.  White count is 17.  Most likely due to stress of surgery.  Continue to follow.  Heme:  Hematocrit is 24.5.  Platelet count is 89.  INR is 1.5.  Patient is started on Coumadin for anticoagulation of mechanical valve.  Activities:  Patient will be out of bed to chair.  Advance activities as tolerated.  Line tubes and drains:  Patient has pacer wires, right IJ Cordis and Ugalde catheter.  Will place PICC line and discontinue Cordis.    09/22/2023   The patient is postop day 3 status post coronary artery bypass grafting x5 and aortic valve replacement with a 19 mm Saint Dany mechanical valve.  Overall the patient is doing well.  Patient has been transferred to telemetry.    Neuro:  Patient is awake alert and oriented x3.  Pain is well controlled with current pain regimen.    Cardiac:  Patient is hemodynamically stable.  Continue metoprolol.    Respiratory:  Continue pulmonary toileting.  Continue incentive spirometer.    GI:  Patient is tolerating p.o. intake.  And tolerating a regular diet.    Renal:  Patient has good urine output.  Creatinine is 0.9.  Continue Lasix.    Endocrine:  Glucose is well controlled.    Id:  Patient is afebrile.  White count is 16.  Continue to follow.    Heme:  Hematocrit is 24 and platelet count is 114.  INR is 3.4.  Patient is currently on Coumadin for anticoagulation.  Activities:  Patient is out of bed and ambulating in the hallways.  Continue to advance as tolerated.  Line tubes and drains:  Patient has a PICC line and pacer wires.    09/23/2023   The patient is postop day 4 status  post coronary artery bypass grafting x5 and aortic valve replacement with a 19 mm Saint Dany mechanical valve.  Overall the patient is doing well.  Anticipate discharge to home with home health in the next 48-72 hours.    Neuro:  Patient is awake alert and oriented x3 pain is well controlled with pain management.  Neuro exam is nonfocal.    Cardiac:  Patient is hemodynamically stable.  Continue metoprolol.    Respiratory:  Continue pulmonary toileting.  Continue incentive spirometer.  GI patient is tolerating p.o. intake.  Will increase bowel regimen for bowel movements.  Renal: Patient has good urine output.  Creatinine is 0.9.  Patient is still appears fluid overloaded.  Continue Lasix.  Endocrine: Glucose is well controlled.    Id:  Patient is afebrile.  White count is down to 12.  Continue to follow.    Heme:  Hematocrit is 27.  Platelet count is 186 and INR is 1.9.  INR goal is between 2.5 and 3.  Continue Coumadin.    Activities:  Patient is out of bed and ambulating in the hallways.  Advance as tolerated.  Line tubes and drains:  Patient has a PICC line.      09/24/2023   The patient is postop day 5 status post coronary artery bypass grafting x5 and aortic valve replacement with a 19 mm Saint Dany mechanical valve.  Overall the patient is doing well.  Anticipate discharge home in the next 24-48 hours.    Neuro:  Patient is awake alert and oriented x3 pain is well controlled with current pain management.  Neuro exam is nonfocal.    Cardiac:  Patient is hemodynamically stable.  Continue metoprolol  Respiratory:  Continue pulmonary toileting.  Continue incentive spirometer.  Patient continues to have sputum production.  GI:  Patient is tolerating a regular diet.  Patient has had bowel movements.  Renal: Patient has good urine output.  Creatinine is stable.  Patient is appears to be euvolemic.  Will discontinue Lasix.    Endocrine:  Glucose is well controlled.  Id: Patient is afebrile white count normal.  Heme:   Hematocrit is 27 and platelet count is 229.  INR is subtherapeutic 1.5.  Continue Coumadin.  Goal is a INR level of 2.5-3.  Activities: Patient is out of bed and ambulating in the hallways.  Advance as tolerated.    Line tubes and drains:  Patient has a PICC line.    09/25/2023   The patient is postop day 6 status post coronary artery bypass grafting x5 and aortic valve replacement with a 19 mm Saint Dany mechanical valve.  Overall the patient is doing well.  Patient is awaiting attainment of a therapeutic INR.  Anticipate discharge in the next 48-72 hours.    Neuro:  Patient is awake alert and oriented x3.  Pain is well controlled current pain management.  Neuro exam is nonfocal.    Cardiac:  Patient is stable.  Continue metoprolol.    Respiratory:  Patient has good sats on room air.  Continue pulmonary toileting.  Continue incentive spirometer.    GI:  Patient is tolerating a regular diet having bowel movements.    Renal:  Patient has good urine output.    Endocrine:  Glucose is well controlled.    Id:  Patient is afebrile white count is normal.    Heme:  Hematocrit is 27 and platelet count is 298.  INR is now normal at 1.2.  Patient is on bridging heparin.  Will titrate heparin to an APTT greater between 40 and 50.  Therapeutic goal of INR is 2.5-3.  Activities: Patient is out of bed and ambulating in the hallways.    Line tubes and drains:  Patient has a PICC line.    09/26/2023   The patient is postop day 7 status post coronary artery bypass grafting x5 and aortic valve replacement with a 19 mm Saint Dany mechanical valve.  Overall the patient is doing well.  Patient's INR is still subtherapeutic.  Patient is on heparin in the interim.  Anticipate discharge in the next 48-72 hours.  Neuro:  Patient is awake alert and oriented x3.  Pain is well controlled with current pain management.  Neuro exam is nonfocal.    Cardiac:  Patient remains stable.  Continue metoprolol.    Respiratory: Patient has good sats on room  air.  Continue pulmonary toileting.  Continue incentive spirometer.    GI:  Patient is tolerating a regular diet and having bowel movements.    Renal:  Patient has good urine output.  Endocrine:  Glucose is well controlled.    Id:  Patient's white count is normal.    Heme: Hematocrit is 28 and INR is 1.2 patient is on a heparin drip.  Coumadin dose has been increased.  INR goal is 2.5-3.  Activities:  Patient is out of bed and ambulating in the hallways.    Line tubes and drains:  Patient has a PICC line.    09/27/2023   The patient is postop [id number] post coronary artery bypass grafting x5 and aortic valve replacement with a 19 mm Saint Dany mechanical valve.  Overall the patient is doing well.  Patient is awaiting INR being therapeutic.  Anticipate discharge in the next 48-72 hours.  Neuro:  Patient is awake alert and oriented x3.  Pain is well controlled with current pain management.  Neuro exam is nonfocal.    Cardiac:  Patient's blood pressure is trending higher.  Will increase metoprolol to 50 mg b.i.d..  Respiratory: Patient has good sats on room air.  Continue pulmonary toileting.  Continue incentive spirometer.  GI:  Patient is tolerating a regular diet and having bowel movements.    Renal:  Patient has good urine output.    Endocrine:  Glucose is well controlled.  Id:  Patient is afebrile white count is normal.    Heme:  Hematocrit is 28.6 and platelet count is 428.  INR is 1.6.  A PTT is therapeutic at 40.  INR goal is 2.5-3.  Activities:  Patient is out of bed and ambulating in the hallways.  Advance as tolerated.    Line tubes and drains:  Patient has a PICC line.    09/28/2023   The patient is postop day 9 status post coronary artery bypass grafting x5 and aortic valve replacement with a 19 mm Saint Dany mechanical valve.  Overall the patient is doing well.  INR is 2.3 today.  Anticipate discharge that is 4872 hours.    Neuro:  Patient is awake alert and oriented x3.  Pain is well controlled.  Neuro  exam is nonfocal.    Cardiac:  Patient is hemodynamically stable.  Continue metoprolol 50 mg b.i.d..    Respiratory: Patient has good sats on room air.  Continue pulmonary toileting.  Continue incentive spirometer.    GI:  Patient is tolerating a regular diet and having bowel movements.    Renal: Patient has good urine output.    Endocrine:  Glucose is well controlled.    Id:  Patient is afebrile white count is 8   Heme:  Hematocrit is 28.  INR is 2.3.  Platelet count is 452.  Continue Coumadin.  INR goal is 2.5-3.0.  Patient is currently on bridging heparin.  Activities:  Patient is out of bed and ambulating.  Advance as tolerated.    Line tubes and drains:  Patient has a PICC line.    09/29/2023   Patient is postop day 10 status post coronary artery bypass grafting x5 and aortic valve replacement with a 19 mm Saint Dany mechanical valve.  Overall patient is doing well.  Patient awaits therapeutic INR prior to discharge.    Neuro:  Patient is awake alert and oriented x3.  Pain is well controlled.  Neuro exam is nonfocal.    Cardiac: Patient is stable.  Continue metoprolol.    Respiratory:  Patient has good sats on room air.  Continue pulmonary toileting.  Continue incentive spirometer.    GI:  Patient is tolerating a regular diet and having bowel movements.    Renal:  Patient has good urine output.  Creatinine is normal.    Endocrine: Glucose is well controlled.  Id:  Patient is afebrile.  White count is 10.3.  Heme:  Hematocrit is 28.8 and platelet count is 563.  INR is 1.8.  Patient needs INR goal of 2.5-3.  Continue Coumadin.  Activities:  Patient is out of bed and ambulating.  Advance as tolerated.  Line tubes and drains:  Patient has a PICC line.    09/30/2023   Patient is postop day 11 status post coronary bypass grafting x5 and aortic valve replacement with a 19 mm Saint Dany mechanical valve.  Overall the patient is doing well patient is awaiting a therapy INR prior to discharge.    Neuro:  Patient is awake  alert and oriented x3.  Pain is well controlled.  Neuro exam is nonfocal.    Cardiac:  Continue metoprolol.    Respiratory:  Patient is on room air continue pulmonary toileting.    GI:  Patient is having bowel movements and a regular diet.    Renal:  Patient has good urine output.    Endocrine: Glucose is well controlled  Id white count is normal.    :  INR is 1.7 today continue Coumadin dosing.  Patient is on bridging heparin.    Activities:  Patient is out of bed and ambulating in the hallways.  Line tubes and drains:  Patient has a PICC line    10/1/2023   The patient is postop day 12 status post coronary artery bypass grafting x5 and aortic valve replacement with a 19 mm Saint Dany mechanical valve.  Overall patient is doing well.  Patient is awaiting therapeutic INR prior to discharge.    Neuro:  Patient is awake alert oriented x3.  Pain is well controlled.  Neuro exam is nonfocal.    Cardiac:  Patient is stable.  Continue metoprolol.    Respiratory:  Patient is on room air continue pulmonary toileting.    GI:  Patient is having a bowel movement and tolerating a regular diet.    Renal:  Patient has good urine output.    Endocrine:  Glucose is well controlled.  Id:  Patient is afebrile and white count is normal.    Heme:  INR is 1.  Nine today.  Continue on Coumadin.  INR goal is 2.5-3.  Continue Coumadin.    Line tubes and drains:  Patient has a PICC line.    10/02/2023   Patient is postop day 13 status post coronary artery bypass grafting x5 and aortic valve replacement with a 19 mm Saint Dany mechanical valve.  Overall the patient is doing well.  Patient's INR is 2.2 today.  Anticipate discharge in the next 24 hours.    Neuro:  Patient is awake alert and oriented x3 pain is well controlled.  Neuro exam is nonfocal.    Cardiac:  Patient is stable.  Continue metoprolol.    Respiratory:  Patient is on room air.  Continue pulmonary toileting.    GI:  Patient is tolerating a regular diet and having bowel  movements.    Renal:  Patient has a good urine output.    Endocrine:  Patient's glucose is well controlled.  Id:  Patient is afebrile white count is normal.    Heme:  INR is 2.2.  Continue Coumadin.    10/03/2023   The patient is postop day 14 status post coronary artery bypass grafting x5 and aortic valve replacement with a 19 mm Saint Dany mechanical valve.  Overall the patient is doing well.  INR is 2.5 today.  Patient will be discharged to home with home health.  Patient will have outpatient follow-up with [facility].  Neuro:  Patient is awake alert and oriented x3.  Neuro exam is nonfocal.    Cardiac:  Patient is stable.  Continue metoprolol.  Respiratory:  Patient is tolerating room air.  GI:  Patient is tolerating a diet and having bowel movements.    Renal:  Patient has good urine output.    Endocrine:  Glucose is well controlled.  Id:  Patient is afebrile white is normal.  Heme:  INR is 2.5.  Will continue Coumadin as outpatient.  Line tubes and drains:  Patient has a PICC line which will be discontinued prior to discharge.        CAD, multiple vessel  The patient is a 43-year-old male with critical left main coronary artery disease and severe aortic regurgitation by cardiac catheterization.  The patient is a candidate for urgent coronary artery bypass grafting and aortic valve replacement.  The risks and benefits of surgery were explained to the patient.  The patient understands the risks and benefits of surgery and has agreed to proceed with urgent aortic valve replacement and coronary artery bypass grafting.  The type of aortic valve to be used was discussed with the patient.  The patient stated a preference for a bioprosthetic valve in order to avoid the need for chronic anticoagulation.  The patient understands that a bioprosthetic valve especially in a young patient has a limited lifespan.        Nsihant Douglas MD  Cardiothoracic Surgery  [facility])

## 2023-10-03 NOTE — ASSESSMENT & PLAN NOTE
09/24  On coumadin Rx and heparin gtt as bridge    9/28/23  -INR 2.3     9/29/23  -INR 1.8    10/2/23  Awaiting therapeutic INR  Cont management per CTS    10/3/23  INR 2.5 today  CTS planning to DC with [facility]

## 2023-10-03 NOTE — SUBJECTIVE & OBJECTIVE
Review of Systems   Constitutional: Negative.   HENT: Negative.     Eyes: Negative.    Cardiovascular: Negative.    Respiratory: Negative.     Skin: Negative.    Musculoskeletal: Negative.    Gastrointestinal: Negative.    Genitourinary: Negative.    Neurological: Negative.    Psychiatric/Behavioral: Negative.       Objective:     Vital Signs (Most Recent):  Temp: 98.8 °F (37.1 °C) (10/03/23 0704)  Pulse: 105 (10/03/23 0704)  Resp: 18 (10/03/23 0704)  BP: 116/70 (10/03/23 0704)  SpO2: 97 % (10/03/23 0704) Vital Signs (24h Range):  Temp:  [98.5 °F (36.9 °C)-99.6 °F (37.6 °C)] 98.8 °F (37.1 °C)  Pulse:  [] 105  Resp:  [16-18] 18  SpO2:  [97 %-100 %] 97 %  BP: (105-135)/(63-78) 116/70     Weight: 79.8 kg (175 lb 14.8 oz)  Body mass index is 26.75 kg/m².     SpO2: 97 %       No intake or output data in the 24 hours ending 10/03/23 1210    Lines/Drains/Airways       Peripherally Inserted Central Catheter Line  Duration             PICC Double Lumen 09/21/23 0930 left basilic 12 days                       Physical Exam  Vitals and nursing note reviewed.   Constitutional:       Appearance: Normal appearance.   HENT:      Head: Normocephalic and atraumatic.   Eyes:      General:         Right eye: No discharge.         Left eye: No discharge.      Pupils: Pupils are equal, round, and reactive to light.   Cardiovascular:      Rate and Rhythm: Normal rate and regular rhythm.      Heart sounds: S1 normal and S2 normal. No murmur heard.     No friction rub.   Pulmonary:      Effort: Pulmonary effort is normal. No respiratory distress.      Breath sounds: Normal breath sounds. No rales.   Abdominal:      Palpations: Abdomen is soft.      Tenderness: There is no abdominal tenderness.   Musculoskeletal:      Cervical back: Neck supple.      Right lower leg: No edema.      Left lower leg: No edema.   Skin:     General: Skin is warm and dry.   Neurological:      General: No focal deficit present.      Mental Status: He is  "alert and oriented to person, place, and time.   Psychiatric:         Mood and Affect: Mood normal.         Behavior: Behavior normal.         Thought Content: Thought content normal.            Significant Labs: BMP:   Recent Labs   Lab 10/02/23  0537 10/03/23  0546   GLU 95 146*    139   K 4.2 3.9    105   CO2 22* 22*   BUN 12 12   CREATININE 0.8 0.9   CALCIUM 8.6* 8.9   , CMP   Recent Labs   Lab 10/02/23  0537 10/03/23  0546    139   K 4.2 3.9    105   CO2 22* 22*   GLU 95 146*   BUN 12 12   CREATININE 0.8 0.9   CALCIUM 8.6* 8.9   ANIONGAP 8 12   , CBC   Recent Labs   Lab 10/02/23  0537 10/03/23  0546   WBC 9.66 8.05   HGB 8.6* 8.6*   HCT 26.7* 27.1*   * 583*   , INR   Recent Labs   Lab 10/02/23  0537 10/03/23  0546   INR 2.2* 2.5*   , Lipid Panel No results for input(s): "CHOL", "HDL", "LDLCALC", "TRIG", "CHOLHDL" in the last 48 hours., Troponin No results for input(s): "TROPONINI" in the last 48 hours., and All pertinent lab results from the last 24 hours have been reviewed.    Significant Imaging: Echocardiogram: Transthoracic echo (TTE) complete (Cupid Only):   Results for orders placed or performed during the hospital encounter of 09/17/23   Echo   Result Value Ref Range    BSA 1.98 m2    LVOT stroke volume 82.76 cm3    LVIDd 5.22 3.5 - 6.0 cm    LV Systolic Volume 86.21 mL    LV Systolic Volume Index 44.2 mL/m2    LVIDs 4.37 (A) 2.1 - 4.0 cm    LV Diastolic Volume 130.82 mL    LV Diastolic Volume Index 67.09 mL/m2    IVS 0.88 0.6 - 1.1 cm    LVOT diameter 1.91 cm    LVOT area 2.9 cm2    FS 16 (A) 28 - 44 %    Left Ventricle Relative Wall Thickness 0.38 cm    Posterior Wall 0.98 0.6 - 1.1 cm    LV mass 177.53 g    LV Mass Index 91 g/m2    MV Peak E Bob 1.20 m/s    TDI LATERAL 0.06 m/s    TDI SEPTAL 0.08 m/s    E/E' ratio 17.14 m/s    MV Peak A Bob 1.14 m/s    TR Max Bob 2.33 m/s    E/A ratio 1.05     IVRT 72.31 msec    E wave deceleration time 200.37 msec    LV SEPTAL E/E' " RATIO 15.00 m/s    LV LATERAL E/E' RATIO 20.00 m/s    LVOT peak bob 1.38 m/s    Left Ventricular Outflow Tract Mean Velocity 1.08 cm/s    Left Ventricular Outflow Tract Mean Gradient 4.99 mmHg    LA size 3.79 cm    Left Atrium Minor Axis 4.68 cm    Left Atrium Major Axis 4.27 cm    RVOT peak VTI 10.8 cm    TAPSE 1.84 cm    RA Major Axis 3.31 cm    AV regurgitation pressure 1/2 time [id number] ms    AR Max Bob 4.34 m/s    AV mean gradient 14 mmHg    AV peak gradient 26 mmHg    Ao peak bob 2.54 m/s    Ao VTI 54.00 cm    LVOT peak VTI 28.90 cm    AV valve area 1.53 cm²    AV Velocity Ratio 0.54     AV index (prosthetic) 0.54     SARAH by Velocity Ratio 1.56 cm²    Mr max bob 4.42 m/s    MV stenosis pressure 1/2 time 58.11 ms    MV valve area p 1/2 method 3.79 cm2    TV mean gradient 19 mmHg    Triscuspid Valve Regurgitation Peak Gradient 22 mmHg    PV mean gradient 1 mmHg    RVOT peak bob 0.58 m/s    Ao root annulus 2.71 cm    STJ 2.59 cm    Ascending aorta 2.36 cm    IVC diameter 1.57 cm    Mean e' 0.07 m/s    ZLVIDS 1.94     ZLVIDD -0.63     LA Volume Index 22.9 mL/m2    LA volume 44.60 cm3    LA WIDTH 3.1 cm    RA Width 2.2 cm    TV resting pulmonary artery pressure 25 mmHg    RV TB RVSP 5 mmHg    Est. RA pres 3 mmHg    Narrative      Left Ventricle: The left ventricle is normal in size. Ventricular mass   is normal. Normal wall thickness. regional wall motion abnormalities   present. There is mildly reduced systolic function with a visually   estimated ejection fraction of 40 - 45%. Grade II diastolic dysfunction.    Right Ventricle: Normal right ventricular cavity size. Wall thickness   is normal. Right ventricle wall motion  is normal. Systolic function is   normal.    Aortic Valve: There is mild to moderate aortic regurgitation.    Pulmonary Artery: The estimated pulmonary artery systolic pressure is   25 mmHg.    IVC/SVC: Normal venous pressure at 3 mmHg.     , EKG: reviewed, Stress Test: reviewed, and  X-Ray: CXR: X-Ray Chest 1 View (CXR):   Results for orders placed or performed during the hospital encounter of 09/17/23   X-Ray Chest 1 View    Narrative    EXAMINATION:  XR CHEST 1 VIEW    CLINICAL HISTORY:  PICC placement;    TECHNIQUE:  Single frontal view of the chest was performed.    COMPARISON:  09/21/2023    FINDINGS:  Left-sided PICC line tip overlies the SVC in good position.  In comparison to the prior study, there is no adverse interval changes      Impression    In comparison to the prior study, there is no adverse interval changes      Electronically signed by: Elliott España MD  Date:    09/21/2023  Time:    10:10

## 2023-10-03 NOTE — PLAN OF CARE
[facility] - Telemetry (Hospital)  Discharge Final Note    Primary Care Provider: Lissette Primary Doctor    Expected Discharge Date: 10/3/2023    Final Discharge Note (most recent)       Final Note - 10/03/23 1509          Final Note    Assessment Type Final Discharge Note (P)      Anticipated Discharge Disposition Home or Self Care (P)      Hospital Resources/Appts/Education Provided Appointments scheduled and added to AVS (P)         Post-Acute Status    Post-Acute Authorization Home Health (P)      Home Health Status Discharge Plan Changed (P)    unable to find an accepting agency    Discharge Delays None known at this time (P)                      Important Message from Medicare             Contact Info       No, Primary Doctor   Relationship: PCP - General        Next Steps: Follow up in 1 week(s)    Chelsea Morales MD   Specialty: Interventional Cardiology, Cardiology    [address] [facility]  [place] [address]   Phone: [phone]       Next Steps: Follow up in 2 week(s)    Nishant Douglas MD   Specialty: Cardiothoracic Surgery    [address] [place] [address] [address] [place] [address]   Phone: [phone]       Next Steps: Follow up on 10/18/2023          Message tp Jillian Davidson at Dr. Morales's office for 2 weeks hospital follow up.  No response yet.  Patient would like to discharge.  Advised to check my chart for the appointment and call Dr. Morales's office in 2 days if they don't hear from his office.

## 2023-10-03 NOTE — SUBJECTIVE & OBJECTIVE
Interval History: The patient is postop day 14 status post coronary artery bypass grafting x5 and aortic valve replacement with a 19 mm [place] mechanical valve.    ROS  Medications:  Continuous Infusions:   heparin (porcine) in D5W 12 Units/kg/hr (10/03/23 0653)     Scheduled Meds:   ascorbic acid (vitamin C)  500 mg Oral BID    aspirin  81 mg Oral Daily    atorvastatin  80 mg Oral Daily    cyanocobalamin  1,000 mcg Oral Daily    docusate sodium  100 mg Oral BID    ferrous sulfate  1 tablet Oral Daily    folic acid  1 mg Oral Daily    guaiFENesin  1,200 mg Oral BID    magnesium hydroxide 400 mg/5 ml  5 mL Oral BID    metoprolol tartrate  50 mg Oral BID    pantoprazole  40 mg Oral Daily    polyethylene glycol  17 g Oral Daily    warfarin  4 mg Oral Daily     PRN Meds:0.9%  NaCl infusion (for blood administration), acetaminophen, albumin human 5%, aluminum-magnesium hydroxide-simethicone, calcium gluconate IVPB, calcium gluconate IVPB, calcium gluconate IVPB, dextrose 10%, glucagon (human recombinant), influenza, insulin aspart U-100, lactated ringers, magnesium sulfate IVPB, melatonin, metoclopramide HCl, ondansetron, pneumoc 20-leslee conj-dip cr(PF), potassium chloride in water, potassium chloride in water, potassium chloride in water, traMADoL     Objective:     Vital Signs (Most Recent):  Temp: 98.8 °F (37.1 °C) (10/03/23 0704)  Pulse: 105 (10/03/23 0704)  Resp: 18 (10/03/23 0704)  BP: 116/70 (10/03/23 0704)  SpO2: 97 % (10/03/23 0704) Vital Signs (24h Range):  Temp:  [98.5 °F (36.9 °C)-99.6 °F (37.6 °C)] 98.8 °F (37.1 °C)  Pulse:  [] 105  Resp:  [16-18] 18  SpO2:  [97 %-100 %] 97 %  BP: (105-135)/(57-78) 116/70     Weight: 79.8 kg (175 lb 14.8 oz)  Body mass index is 26.75 kg/m².    SpO2: 97 %       Intake/Output - Last 3 Shifts         10/01 0700  10/02 0659 10/02 0700  10/03 0659 10/03 0700  10/04 0659    I.V. (mL/kg) 261.8 (3.3)      Total Intake(mL/kg) 261.8 (3.3)      Net +261.8             Stool  Occurrence 0 x 1 x             Lines/Drains/Airways       Peripherally Inserted Central Catheter Line  Duration             PICC Double Lumen 09/21/23 0930 left basilic 11 days                     Physical Exam  Constitutional:       Appearance: Normal appearance.   HENT:      Head: Normocephalic and atraumatic.      Nose: Nose normal.   Cardiovascular:      Rate and Rhythm: Normal rate and regular rhythm.      Heart sounds: Normal heart sounds.   Pulmonary:      Effort: Pulmonary effort is normal.      Breath sounds: Normal breath sounds.   Abdominal:      General: Abdomen is flat. Bowel sounds are normal.      Palpations: Abdomen is soft.   Musculoskeletal:      Right lower leg: No edema.      Left lower leg: No edema.   Skin:     General: Skin is warm and dry.   Neurological:      Mental Status: He is alert and oriented to person, place, and time.   Psychiatric:         Mood and Affect: Mood normal.         Behavior: Behavior normal.            Significant Labs:  All pertinent labs from the last 24 hours have been reviewed.    Significant Diagnostics:  I have reviewed all pertinent imaging results/findings within the past 24 hours.

## 2023-10-04 NOTE — PROGRESS NOTES
Spoke with patient's mother.  Confirmed appointment date and location of coumadin clinic.  Verbalizes understanding.

## 2023-10-05 ENCOUNTER — ANTI-COAG VISIT (OUTPATIENT)
Dept: CARDIOLOGY | Facility: CLINIC | Age: 43
End: 2023-10-05
Payer: MEDICAID

## 2023-10-05 DIAGNOSIS — Z95.1 S/P CABG X 5: ICD-10-CM

## 2023-10-05 DIAGNOSIS — Z95.2 S/P AVR (AORTIC VALVE REPLACEMENT): ICD-10-CM

## 2023-10-05 DIAGNOSIS — Z79.01 LONG TERM (CURRENT) USE OF ANTICOAGULANTS: Primary | ICD-10-CM

## 2023-10-05 LAB — INR PPP: 3.2 (ref 2.5–3)

## 2023-10-05 PROCEDURE — 99999PBSHW POCT INR: Mod: PBBFAC,,,

## 2023-10-05 PROCEDURE — 99999PBSHW POCT INR: ICD-10-PCS | Mod: PBBFAC,,,

## 2023-10-05 PROCEDURE — 93793 ANTICOAG MGMT PT WARFARIN: CPT | Mod: ,,,

## 2023-10-05 PROCEDURE — 85610 PROTHROMBIN TIME: CPT | Mod: PBBFAC

## 2023-10-05 PROCEDURE — 93793 PR ANTICOAGULANT MGMT FOR PT TAKING WARFARIN: ICD-10-PCS | Mod: ,,,

## 2023-10-05 NOTE — PROGRESS NOTES
New patient to the Coumadin Clinic.  INR: 3.2 - slightly above goal.  Patient reports warfarin 4 mg daily is taken as directed post hospital discharge.  Coumadin education - given.  Questions answered.  No bleeding issues reported.  Bleeding precautions given - ED for any issues.  Will send to PharmD for further dosing instructions.    Taking Coumadin   Coumadin (warfarin) helps keep your blood from clotting. But it also increases your risk for bleeding. Because of this, it must be taken exactly as directed. You also need to protect yourself from injury.     Follow These Tips   Take Coumadin at the same time each day. If you miss a dose, take it as soon as you remember (if less then 4 hours); otherwise, if it's almost time for your next dose, skip the missed dose. Do not take a double dose.   Go for your blood (protime/INR) tests as often as directed. Note that diet and   medication can affect your protime/INR level.             REMEMBER:   When value decreases from therapeutic range, the blood                                           gets thicker and when value increases, the blood gets thinner.                                       Dont take any other medications without checking with your healthcare provider first. This includes aspirin, vitamins, and herbal and other dietary supplements.   Tell all healthcare providers that you take Coumadin. Its also a good idea to carry a medical ID card or wear a medical-alert bracelet.   Use a soft toothbrush and an electric razor.   Dont go barefoot. And dont trim corns or calluses yourself.    When to Call Your Healthcare Provider   Call your healthcare provider right away before you take your next dose of Coumadin if you have any of these problems:   Bleeding that doesnt stop in 10 minutes   A heavier-than-normal period or bleeding between periods   Coughing or throwing up blood   Diarrhea or bleeding hemorrhoids   Dark urine or black stools   Red or black-and-blue  Recheck CT angiogram  Cont present care marks on the skin that get larger   A fever or an illness that gets worse   Dizziness or fatigue   Chest pain or trouble breathing   A serious fall or a blow to the head    Keep Your Diet Steady   Keep your diet pretty much the same each day. Thats because many foods contain vitamin K. Vitamin K helps your blood clot. So eating foods that contain vitamin K can affect the way Coumadin works. You dont need to avoid foods that have vitamin K. But you do need to keep the amount of them you eat steady (about the same day to day). If you change your diet for any reason, such as due to illness or to lose weight, be sure to tell your doctor.     Examples of foods high in vitamin K are brussels sprouts, avocado, broccoli, cabbage, coleslaw, mustard greens, anjum greens, turnip greens, kale, spinach, jean carlos lettuce, and some other leafy green vegetables. Foods that are mixed with mayonnaise, such as tuna, chicken, and potato salads.  Oils, such as soybean, canola, and Vegetable oils, are also high in vitamin K.       Other food products can affect the way Coumadin works in your body:   Food products that may affect blood or clotting include cranberries and cranberry juice, grapefruit juice, liver, fish oil supplements, garlic, jose, licorice, and turmeric.   Herbs used in herbal teas or supplements can also affect blood clotting. Keep the amount of herbal teas and supplements you use steady.   Alcohol can increase the effect of Coumadin in your body.

## 2023-10-05 NOTE — PROGRESS NOTES
INR not at goal. Medications, chart, and patient findings reviewed. See calendar for adjustments to dose and follow up plan.  INR is just above goal. We will lower his dose today to 2 mg then repeat INR on Monday.  I feel that his diet should be improving at home.  We will leave on 4 mg for the rest of the week and weekend with a SMALL < 1/2 cup of greens (broccoli) this weekend as well.

## 2023-10-07 NOTE — PHYSICIAN QUERY
PT Name: Mikie Alcazar  MR #: [id number]    DOCUMENTATION CLARIFICATION      CDS/: Isabel Wellington RN             Contact information:[email or web address]    This form is a permanent document in the medical record.      Query Date: October 7, 2023    By submitting this query, we are merely seeking further clarification of documentation. Please utilize your independent clinical judgment when addressing the question(s) below.    The Medical Record contains the following:   Indicators  Supporting Clinical Findings Location in Medical Record   x Anemia documented Anemia Anesthesiology note 9-19   x H&H Hgb=9.1--10.8--6.0--8.6  Hct=26.6--30.9--17.1--24.1 Lab 9-19 to 9-20    BP                    HR      Bleeding     x Procedure/Surgery Performed/EBL Coronary artery bypass grafting x5 with LIMA to LAD and a reverse saphenous vein graft to diagonal, reverse saphenous vein graft to OM branch and sequence reverse saphenous vein graft to PDA and MYLES   Op note 9-19   x Transfusion(s) 1 of 2 ordered PRBC transfused. Total OP to CTs ~1.6L since surgery;    RN note 9-20    Acute/Chronic illness      Treatments      Other       Provider, please specify diagnosis or diagnoses associated with above clinical findings.   [   ] Acute blood loss anemia    [   X] Acute blood loss anemia expected post-operatively    [   ] Other Hematological Diagnosis (please specify): _________________        Please document in your progress notes daily for the duration of treatment, until resolved, and include in your discharge summary.    Form No. [id number]

## 2023-10-07 NOTE — PHYSICIAN QUERY
PT Name: Mikie Alcazar  MR #: [id number]    DOCUMENTATION CLARIFICATION      CDS/: Isabel Wellington               Contact information:jerry    This form is a permanent document in the medical record.      Query Date: October 7, 2023    By submitting this query, we are merely seeking further clarification of documentation. Please utilize your independent clinical judgment when addressing the question(s) below.    The Medical Record contains the following:   Indicators  Supporting Clinical Findings Location in Medical Record    Anemia documented      H&H      BP                    HR      Bleeding      Procedure/Surgery Performed/EBL      Transfusion(s)                             n this shift. 1u cryo given in PM; critical H/H 6.0/17.1 this AM; 1 of 2 ordered PRBC transfused. Total OP to CTs ~1.6L since surgery; OP trending down overnight, now 30-60mL/hr and beginning to lighten. 20mEq KCl and 2g Mg given. Voids per mercedes; avg 200mL/hr UOP, but slowing to avg 100/hr this AM. Insulin gtt started, CBGs trending down, last result 154. Pain controlled w/PRN fentanyl x 2. Incisions WNL, Provena in place. Q2 turns; heels floated. CHG bath completed. Fall precautions in place, bed alarm on.   POC discussed w/pt's mother at bs, verbalized understanding.       Electronically signed by Corrie Lobo, RN at 9/20/2023  7:37 AM  ED to Hosp-Admission (Discharged) on 9/17/2023    ED to Hosp-Admission (Discharged) on 9/17/2023      Detailed Report    Note shared with patient  Care Timeline    09/17   Admitted from ED 2034 09/18   Left heart cath      Transferred to Critical access hospital Intensive [facility] ([facility]) 1916 09/19   CORONARY ARTERY BYPASS GRAFT (CABG)   09/21   Transferred out of Critical access hospital Intensive Care ([facility]) 2113   10/03   Discharged 1733      Corrie Lobo, RN   Registered Nurse  Intensive Care  Plan of Care     Signed  Creation Time:  9/20/2023  7:29 AM                           Pt remains intubated s/p  CABG/AVR, sedated w/max precedex gtt for goal RASS 0. Pt able to follow commands. BP controlled w/vaso gtt. CO/CI 2.4-2.9/4.6-5.6. Epi and vaso gtts able to be weaned down over shift. A-paced on monitor. 1 run of Vtach this AM, see prev note. Tmax 102.0. Total 1L albumin given this shift. 1u cryo given in PM; critical H/H 6.0/17.1 this AM; 1 of 2 ordered PRBC transfused. Total OP to CTs ~1.6L since surgery; OP trending down overnight, now 30-60mL/hr and beginning to lighten. 20mEq KCl and 2g Mg given. Voids per mercedes; avg 200mL/hr UOP, but slowing to avg 100/hr this AM. Insulin gtt started, CBGs trending down, last result 154. Pain controlled w/PRN fentanyl x 2. Incisions WNL, Provena in place. Q2 turns; heels floated. CHG bath completed. Fall precautions in place, bed alarm on.   POC discussed w/pt's mother at , verbalized understanding.       Electronically signed by Corrie Lobo RN at 9/20/2023  7:37 AM  ED to Hosp-Admission (Discharged) on 9/17/2023    ED to Hosp-Admission (Discharged) on 9/17/2023      Detailed Report    Note shared with patient  Care Timeline    09/17   Admitted from ED 2034 09/18   Left heart cath      Transferred to [facility]) 1916 09/19   CORONARY ARTERY BYPASS GRAFT (CABG)   09/21   Transferred out of [facility] Intensive [facility]) 2113   10/03   Discharged 1733        Acute/Chronic illness      Treatments      Other       Provider, please specify diagnosis or diagnoses associated with above clinical findings.   [   ] Acute blood loss anemia    [   ] Acute blood loss anemia expected post-operatively    [   ] Iron deficiency anemia    [   ] Chronic blood loss anemia     [   ] Pernicious anemia    [   ] Precipitous drop in Hematocrit   [   ] Anemia of chronic kidney disease (please specify stage): _______________   [   ] Anemia due to neoplastic disease   [   ] Anemia due to antineoplastic chemotherapy   [   ] Anemia due to chronic disease (please  specify): _________________   [   ] Anemia, unspecified    [   ] Other Hematological Diagnosis (please specify): _________________   [   ] Clinically Undetermined     Present on admission (POA) status:   [   ] Yes (Y)   [   ] No (N)   [   ] Documentation insufficient to determine if condition is POA (U)   [  ] Clinically Undetermined (W)          Please document in your progress notes daily for the duration of treatment, until resolved, and include in your discharge summary.    Form No. [id number]

## 2023-10-09 ENCOUNTER — ANTI-COAG VISIT (OUTPATIENT)
Dept: CARDIOLOGY | Facility: CLINIC | Age: 43
End: 2023-10-09
Payer: MEDICAID

## 2023-10-09 DIAGNOSIS — Z95.2 S/P AVR (AORTIC VALVE REPLACEMENT): ICD-10-CM

## 2023-10-09 DIAGNOSIS — Z95.1 S/P CABG X 5: ICD-10-CM

## 2023-10-09 DIAGNOSIS — Z79.01 LONG TERM (CURRENT) USE OF ANTICOAGULANTS: Primary | ICD-10-CM

## 2023-10-09 LAB — INR PPP: 3.9 (ref 2.5–3)

## 2023-10-09 PROCEDURE — 85610 PROTHROMBIN TIME: CPT | Mod: PBBFAC

## 2023-10-09 PROCEDURE — 93793 ANTICOAG MGMT PT WARFARIN: CPT | Mod: ,,,

## 2023-10-09 PROCEDURE — 99999PBSHW POCT INR: Mod: PBBFAC,,,

## 2023-10-09 PROCEDURE — 93793 PR ANTICOAGULANT MGMT FOR PT TAKING WARFARIN: ICD-10-PCS | Mod: ,,,

## 2023-10-09 PROCEDURE — 99999PBSHW POCT INR: ICD-10-PCS | Mod: PBBFAC,,,

## 2023-10-09 NOTE — PROGRESS NOTES
INR has climbed to 3.9.  Patient reports he did have broccoli last week and would like to d/c for the future.  No bleeding issues reported.  Confirms previous warfarin dose instructions.  Bleeding precautions re-introduced -- ED for any abnormal bleeding issues.  Will send to PharmD for further dosing instructions.

## 2023-10-09 NOTE — PROGRESS NOTES
INR not at goal. Medications, chart, and patient findings reviewed. See calendar for adjustments to dose and follow up plan.  INR has risen despite lowering dose.  We will hold dose today and lower weekly dose.  Prefer to repeat INR at the end of the week.  Pt will go to lab on Friday.

## 2023-10-10 ENCOUNTER — PATIENT OUTREACH (OUTPATIENT)
Dept: ADMINISTRATIVE | Facility: HOSPITAL | Age: 43
End: 2023-10-10
Payer: COMMERCIAL

## 2023-10-10 NOTE — PROGRESS NOTES
Called patient to confirm hospital follow up scheduled on 10/11/2023.   Patient is confirmed. Encouraged patient to bring all medications and BP cuff to follow up visit.

## 2023-10-11 ENCOUNTER — OFFICE VISIT (OUTPATIENT)
Dept: INTERNAL MEDICINE | Facility: CLINIC | Age: 43
End: 2023-10-11
Payer: COMMERCIAL

## 2023-10-11 VITALS
SYSTOLIC BLOOD PRESSURE: 120 MMHG | TEMPERATURE: 98 F | BODY MASS INDEX: 25.76 KG/M2 | OXYGEN SATURATION: 98 % | DIASTOLIC BLOOD PRESSURE: 78 MMHG | WEIGHT: 170 LBS | HEIGHT: 68 IN | HEART RATE: 89 BPM

## 2023-10-11 DIAGNOSIS — I10 PRIMARY HYPERTENSION: ICD-10-CM

## 2023-10-11 DIAGNOSIS — D64.9 ANEMIA, UNSPECIFIED TYPE: ICD-10-CM

## 2023-10-11 DIAGNOSIS — I21.4 NSTEMI (NON-ST ELEVATED MYOCARDIAL INFARCTION): ICD-10-CM

## 2023-10-11 DIAGNOSIS — L24.9 IRRITANT CONTACT DERMATITIS, UNSPECIFIED TRIGGER: ICD-10-CM

## 2023-10-11 DIAGNOSIS — G47.00 INSOMNIA, UNSPECIFIED TYPE: ICD-10-CM

## 2023-10-11 DIAGNOSIS — Z95.2 S/P AVR (AORTIC VALVE REPLACEMENT): ICD-10-CM

## 2023-10-11 DIAGNOSIS — Z79.01 CURRENT USE OF LONG TERM ANTICOAGULATION: ICD-10-CM

## 2023-10-11 DIAGNOSIS — Z95.1 S/P CABG X 5: ICD-10-CM

## 2023-10-11 DIAGNOSIS — I25.10 CAD, MULTIPLE VESSEL: ICD-10-CM

## 2023-10-11 DIAGNOSIS — I35.1 NONRHEUMATIC AORTIC VALVE INSUFFICIENCY: ICD-10-CM

## 2023-10-11 DIAGNOSIS — F17.210 TOBACCO SMOKER, 1 PACK OF CIGARETTES OR LESS PER DAY: ICD-10-CM

## 2023-10-11 DIAGNOSIS — Z09 HOSPITAL DISCHARGE FOLLOW-UP: Primary | ICD-10-CM

## 2023-10-11 PROCEDURE — 99999 PR PBB SHADOW E&M-EST. PATIENT-LVL V: CPT | Mod: PBBFAC,,,

## 2023-10-11 PROCEDURE — 1159F PR MEDICATION LIST DOCUMENTED IN MEDICAL RECORD: ICD-10-PCS | Mod: CPTII,S$PBB,,

## 2023-10-11 PROCEDURE — 99214 PR OFFICE/OUTPT VISIT, EST, LEVL IV, 30-39 MIN: ICD-10-PCS | Mod: S$PBB,,,

## 2023-10-11 PROCEDURE — 3044F PR MOST RECENT HEMOGLOBIN A1C LEVEL <7.0%: ICD-10-PCS | Mod: CPTII,S$PBB,,

## 2023-10-11 PROCEDURE — 1111F DSCHRG MED/CURRENT MED MERGE: CPT | Mod: CPTII,S$PBB,,

## 2023-10-11 PROCEDURE — 1111F PR DISCHARGE MEDS RECONCILED W/ CURRENT OUTPATIENT MED LIST: ICD-10-PCS | Mod: CPTII,S$PBB,,

## 2023-10-11 PROCEDURE — 99999 PR PBB SHADOW E&M-EST. PATIENT-LVL V: ICD-10-PCS | Mod: PBBFAC,,,

## 2023-10-11 PROCEDURE — 3074F PR MOST RECENT SYSTOLIC BLOOD PRESSURE < 130 MM HG: ICD-10-PCS | Mod: CPTII,S$PBB,,

## 2023-10-11 PROCEDURE — 1160F PR REVIEW ALL MEDS BY PRESCRIBER/CLIN PHARMACIST DOCUMENTED: ICD-10-PCS | Mod: CPTII,S$PBB,,

## 2023-10-11 PROCEDURE — 1159F MED LIST DOCD IN RCRD: CPT | Mod: CPTII,S$PBB,,

## 2023-10-11 PROCEDURE — 3044F HG A1C LEVEL LT 7.0%: CPT | Mod: CPTII,S$PBB,,

## 2023-10-11 PROCEDURE — 3078F PR MOST RECENT DIASTOLIC BLOOD PRESSURE < 80 MM HG: ICD-10-PCS | Mod: CPTII,S$PBB,,

## 2023-10-11 PROCEDURE — 3078F DIAST BP <80 MM HG: CPT | Mod: CPTII,S$PBB,,

## 2023-10-11 PROCEDURE — 3074F SYST BP LT 130 MM HG: CPT | Mod: CPTII,S$PBB,,

## 2023-10-11 PROCEDURE — 99214 OFFICE O/P EST MOD 30 MIN: CPT | Mod: S$PBB,,,

## 2023-10-11 PROCEDURE — 1160F RVW MEDS BY RX/DR IN RCRD: CPT | Mod: CPTII,S$PBB,,

## 2023-10-11 RX ORDER — TRIAMCINOLONE ACETONIDE 1 MG/G
CREAM TOPICAL 2 TIMES DAILY
Qty: 80 G | Refills: 3 | Status: SHIPPED | OUTPATIENT
Start: 2023-10-11

## 2023-10-11 RX ORDER — TRAZODONE HYDROCHLORIDE 50 MG/1
50 TABLET ORAL NIGHTLY
Qty: 30 TABLET | Refills: 11 | Status: SHIPPED | OUTPATIENT
Start: 2023-10-11 | End: 2024-10-10

## 2023-10-11 NOTE — PROGRESS NOTES
Mikie Alcazar  10/11/2023  [id number]    No, Primary Doctor  Patient Care Team:  No, Primary Doctor as PCP - General          Visit Type:a scheduled visit following a recent hospitalization    Chief Complaint:  Chief Complaint   Patient presents with    Hospital Follow Up        History of Present Illness:    Mikie Alcazar is a 43 y.o. male with a PMH  has no past medical history on file.  Presented to the ER for evaluation of chest pain which started earlier today shortly after patient began doing yd work around the house.  Patient describes the pain as a sharp stabbing sensation that radiated to his left arm.  Patient states symptoms have been going on for few months intermittently.  Denies any previous cardiac history or any medication treatment to help resolve symptoms prior to arrival to our facility.  Associated symptoms include nausea and sweating.  Patient does report chronic cough which is likely secondary to his 1 pack per day smoking of cigarettes.  Patient states that he did take 2 aspirin prior to arrival common but is unsure of the dosing of the medication.  Denies any palpitations, shortness of breath, headache, lightheadedness, dizziness, fever, aches, chills, sweats, abdominal pain common bladder/bowel complaints, or any other symptoms at this time.  ER workup mostly unremarkable with the exception to elevated troponin of 0.60.  Chest x-ray negative for acute cardiopulmonary findings.  EKG revealed normal sinus rhythm with a ventricular rate of 98 beats per minute and a QT/QTC of 390/497.  Patient received 162 mg aspirin and 0.4 mg sublingual nitro in ED which resolved symptoms.  Cardiology consulted.  Dr. Campos recommended initiation of heparin drip, beta blocker, statin, and Imdur with plans for left heart catheterization tomorrow morning.  Hospital consulted to admit patient to hospital.  Patient is in agreement with treatment plan.  Patient will be admitted under inpatient status.    The  patient was worked up with an echocardiogram that shows ejection fraction of 45% and moderate aortic regurgitation.  Cardiac catheterization shows severe coronary artery disease with critical left main stenosis and severe aortic regurgitation.  The patient is currently  in the ICU and is pain-free. He underwent 5 vessel CABG on 09/19. He was D/C home on 10/03    Transitional Care Note    Family and/or Caretaker present at visit?  No.  Diagnostic tests reviewed/disposition: I have reviewed all completed as well as pending diagnostic tests at the time of discharge.  Disease/illness education:   Home health/community services discussion/referrals: Patient does not have home health established from hospital visit.  They do not need home health.  If needed, we will set up home health for the patient.   Establishment or re-establishment of referral orders for community resources: No other necessary community resources.   Discussion with other health care providers: No discussion with other health care providers necessary.     He is currently not doing Physical therapy   States that while in the hospital was having trouble getting HH approved  Not doing cardiac rehab   He was able to walk 30 mins this week   Did PT in the hospital     Having problems with sleeping  Previously on Xanax  Tried OTC Meds without relief     History:  Past Medical History:   Diagnosis Date    Hypertension 9/18/2023     Past Surgical History:   Procedure Laterality Date    ANGIOGRAPHY OF INTERNAL MAMMARY VESSEL Left 9/18/2023    Procedure: Angiogram Internal Mammary;  Surgeon: Chelsea Morales MD;  Location: [facility] CATH LAB;  Service: Cardiology;  Laterality: Left;    AORTIC VALVE REPLACEMENT N/A 9/19/2023    Procedure: REPLACEMENT-VALVE-AORTIC;  Surgeon: Nishant Douglas MD;  Location: [facility] OR;  Service: Cardiothoracic;  Laterality: N/A;  19 mm MECHANICAL AORTIC VALVE    ARTERIOGRAPHY OF AORTIC ROOT N/A 9/18/2023    Procedure: ARTERIOGRAM, AORTIC  ROOT;  Surgeon: Chelsea Morales MD;  Location: [facility] CATH LAB;  Service: Cardiology;  Laterality: N/A;    ARTERIOGRAPHY OF SUBCLAVIAN ARTERY Left 9/18/2023    Procedure: ARTERIOGRAM, SUBCLAVIAN;  Surgeon: Chelsea Morales MD;  Location: [facility] CATH LAB;  Service: Cardiology;  Laterality: Left;    CORONARY ARTERY BYPASS GRAFT (CABG) N/A 9/19/2023    Procedure: CORONARY ARTERY BYPASS GRAFT (CABG);  Surgeon: Nishant Douglas MD;  Location: [facility] OR;  Service: Cardiothoracic;  Laterality: N/A;  5-VESSEL WITH EPI-AORTIC ULTRASOUND    ECHOCARDIOGRAM,TRANSESOPHAGEAL N/A 9/19/2023    Procedure: ECHOCARDIOGRAM,TRANSESOPHAGEAL;  Surgeon: Nishant Douglas MD;  Location: [facility] OR;  Service: Cardiothoracic;  Laterality: N/A;    ENDOSCOPIC HARVEST OF VEIN Left 9/19/2023    Procedure: SURGICAL PROCUREMENT, VEIN, ENDOSCOPIC;  Surgeon: Nishant Douglas MD;  Location: [facility] OR;  Service: Cardiothoracic;  Laterality: Left;    INJECTION OF ANESTHETIC AGENT AROUND MULTIPLE INTERCOSTAL NERVES N/A 9/19/2023    Procedure: BLOCK, NERVE, INTERCOSTAL, 2 OR MORE;  Surgeon: Nishant Douglas MD;  Location: [facility] OR;  Service: Cardiothoracic;  Laterality: N/A;  PARA-STERNAL NERVE BLOCK    LEFT HEART CATHETERIZATION Left 9/18/2023    Procedure: Left heart cath;  Surgeon: Chelsea Morales MD;  Location: [facility] CATH LAB;  Service: Cardiology;  Laterality: Left;     Family History   Problem Relation Age of Onset    Strabismus Mother     Hypertension Father     Macular degeneration Father     Cataracts Maternal Aunt     Cataracts Maternal Uncle      Social History     Socioeconomic History    Marital status: Single   Tobacco Use    Smoking status: Every Day     Current packs/day: 1.00     Types: Cigarettes    Smokeless tobacco: Never   Substance and Sexual Activity    Alcohol use: Yes     Comment: rarely    Drug use: No     Patient Active Problem List   Diagnosis    NSTEMI (non-ST elevated myocardial infarction)    Hypertension    CAD, multiple vessel     Tobacco smoker, 1 pack of cigarettes or less per day    Nonrheumatic aortic valve insufficiency    S/P CABG x 5    S/P AVR (aortic valve replacement)     Review of patient's allergies indicates:  No Known Allergies    The following were reviewed at this visit: active problem list, medication list, allergies, family history, social history, and health maintenance.    Medications:  Current Outpatient Medications on File Prior to Visit   Medication Sig Dispense Refill    acetaminophen (TYLENOL) 325 MG tablet Take 2 tablets (650 mg total) by mouth every 6 (six) hours as needed for Pain. 20 tablet 0    ascorbic acid, vitamin C, (VITAMIN C) 500 MG tablet Take 1 tablet (500 mg total) by mouth 2 (two) times daily. 60 tablet 0    aspirin (ECOTRIN) 81 MG EC tablet Take 1 tablet (81 mg total) by mouth once daily. 360 tablet 0    atorvastatin (LIPITOR) 80 MG tablet Take 1 tablet (80 mg total) by mouth once daily. 90 tablet 3    cyanocobalamin (VITAMIN B-12) 1000 MCG tablet Take 1 tablet (1,000 mcg total) by mouth once daily. 30 tablet 0    docusate sodium (COLACE) 100 MG capsule Take 1 capsule (100 mg total) by mouth 2 (two) times daily. 60 capsule 0    ferrous sulfate 325 (65 FE) MG EC tablet Take 1 tablet (325 mg total) by mouth once daily. 30 tablet 0    folic acid (FOLVITE) 1 MG tablet Take 1 tablet (1 mg total) by mouth once daily. 30 tablet 0    metoprolol tartrate (LOPRESSOR) 50 MG tablet Take 1 tablet (50 mg total) by mouth 2 (two) times daily. 180 tablet 3    polyethylene glycol (GLYCOLAX) 17 gram/dose powder Take 17 g by mouth once daily. 510 g 0    warfarin (COUMADIN) 4 MG tablet Take 1 tablet (4 mg total) by mouth Daily. 30 tablet 11     No current facility-administered medications on file prior to visit.       Medications have been reviewed and reconciled with patient at this visit.  Barriers to medications reviewed with patient.    Adverse reactions to current medications reviewed with patient..    Over the  counter medications reviewed and reconciled with patient.    Exam:  Wt Readings from Last 3 Encounters:   10/02/23 79.8 kg (175 lb 14.8 oz)   11/13/15 91.6 kg (202 lb)     Temp Readings from Last 3 Encounters:   10/03/23 98.8 °F (37.1 °C) (Oral)   11/13/15 98 °F (36.7 °C) (Oral)     BP Readings from Last 3 Encounters:   10/03/23 116/70   11/13/15 (!) 138/97     Pulse Readings from Last 3 Encounters:   10/03/23 105   11/13/15 92     There is no height or weight on file to calculate BMI.      Review of Systems   Respiratory:  Negative for shortness of breath.    Cardiovascular:  Negative for chest pain.   Psychiatric/Behavioral:  The patient is nervous/anxious and has insomnia.      Physical Exam  Vitals and nursing note reviewed.   Constitutional:       General: He is not in acute distress.     Appearance: He is well-developed. He is not diaphoretic.   HENT:      Head: Normocephalic and atraumatic.      Right Ear: External ear normal.      Left Ear: External ear normal.   Eyes:      General:         Right eye: No discharge.         Left eye: No discharge.      Conjunctiva/sclera: Conjunctivae normal.      Pupils: Pupils are equal, round, and reactive to light.   Cardiovascular:      Rate and Rhythm: Normal rate and regular rhythm.      Heart sounds: Normal heart sounds. No murmur heard.  Pulmonary:      Effort: Pulmonary effort is normal. No respiratory distress.      Breath sounds: Normal breath sounds. No wheezing.   Musculoskeletal:         General: Tenderness present.   Skin:     Coloration: Skin is pale.      Findings: Laceration present.      Comments: Surgical incision mid sternal. Negative for s/s of infection during the visit    Neurological:      Mental Status: He is alert and oriented to person, place, and time.   Psychiatric:         Behavior: Behavior normal.         Thought Content: Thought content normal.         Judgment: Judgment normal.         Laboratory Reviewed ({Yes)  Lab Results   Component  Value Date    WBC 8.05 10/03/2023    HGB 8.6 (L) 10/03/2023    HCT 27.1 (L) 10/03/2023     (H) 10/03/2023    CHOL 150 09/17/2023    TRIG 145 09/17/2023    HDL 37 (L) 09/17/2023    ALT 32 09/22/2023    ALT 32 09/22/2023    AST 48 (H) 09/22/2023    AST 48 (H) 09/22/2023     10/03/2023    K 3.9 10/03/2023     10/03/2023    CREATININE 0.9 10/03/2023    BUN 12 10/03/2023    CO2 22 (L) 10/03/2023    INR 3.9 (A) 10/09/2023    HGBA1C 5.3 09/17/2023       Mikie was seen today for hospital follow up.    Diagnoses and all orders for this visit:    Hospital discharge follow-up  -     CBC Auto Differential; Future    NSTEMI (non-ST elevated myocardial infarction)  -     CBC Auto Differential; Future    Primary hypertension  -     CBC Auto Differential; Future    CAD, multiple vessel  -     CBC Auto Differential; Future    Nonrheumatic aortic valve insufficiency  -     CBC Auto Differential; Future    S/P CABG x 5  -     CBC Auto Differential; Future    S/P AVR (aortic valve replacement)  -     CBC Auto Differential; Future    Tobacco smoker, 1 pack of cigarettes or less per day  -     CBC Auto Differential; Future    Current use of long term anticoagulation  -     CBC Auto Differential; Future    Irritant contact dermatitis, unspecified trigger  -     triamcinolone acetonide 0.1% (KENALOG) 0.1 % cream; Apply topically 2 (two) times daily.  -     CBC Auto Differential; Future    Anemia, unspecified type  -     CBC Auto Differential; Future    Insomnia, unspecified type  -     traZODone (DESYREL) 50 MG tablet; Take 1 tablet (50 mg total) by mouth every evening.      Pt states he is not smoking.he stopped  Not interested in smoking cessation at this time     Repeat CBC on Friday     Start on trazodone for sleep    Will schedule an establish care appointment with Dr. Morales at later time     Care Plan/Goals: Reviewed    Goals    None         Follow up: No follow-ups on file.    After visit summary was printed and  given to patient upon discharge today.  Patient goals and care plan are included in After Visit Summary.

## 2023-10-13 ENCOUNTER — LAB VISIT (OUTPATIENT)
Dept: LAB | Facility: HOSPITAL | Age: 43
End: 2023-10-13
Attending: INTERNAL MEDICINE
Payer: MEDICAID

## 2023-10-13 ENCOUNTER — ANTI-COAG VISIT (OUTPATIENT)
Dept: CARDIOLOGY | Facility: CLINIC | Age: 43
End: 2023-10-13
Payer: COMMERCIAL

## 2023-10-13 DIAGNOSIS — I21.4 NSTEMI (NON-ST ELEVATED MYOCARDIAL INFARCTION): ICD-10-CM

## 2023-10-13 DIAGNOSIS — I25.10 CAD, MULTIPLE VESSEL: ICD-10-CM

## 2023-10-13 DIAGNOSIS — Z79.01 CURRENT USE OF LONG TERM ANTICOAGULATION: ICD-10-CM

## 2023-10-13 DIAGNOSIS — D64.9 ANEMIA, UNSPECIFIED TYPE: ICD-10-CM

## 2023-10-13 DIAGNOSIS — Z95.1 S/P CABG X 5: ICD-10-CM

## 2023-10-13 DIAGNOSIS — F17.210 TOBACCO SMOKER, 1 PACK OF CIGARETTES OR LESS PER DAY: ICD-10-CM

## 2023-10-13 DIAGNOSIS — Z09 HOSPITAL DISCHARGE FOLLOW-UP: ICD-10-CM

## 2023-10-13 DIAGNOSIS — I10 PRIMARY HYPERTENSION: ICD-10-CM

## 2023-10-13 DIAGNOSIS — I35.1 NONRHEUMATIC AORTIC VALVE INSUFFICIENCY: ICD-10-CM

## 2023-10-13 DIAGNOSIS — Z79.01 LONG TERM (CURRENT) USE OF ANTICOAGULANTS: ICD-10-CM

## 2023-10-13 DIAGNOSIS — L24.9 IRRITANT CONTACT DERMATITIS, UNSPECIFIED TRIGGER: ICD-10-CM

## 2023-10-13 DIAGNOSIS — Z95.2 S/P AVR (AORTIC VALVE REPLACEMENT): Primary | ICD-10-CM

## 2023-10-13 DIAGNOSIS — Z95.2 S/P AVR (AORTIC VALVE REPLACEMENT): ICD-10-CM

## 2023-10-13 LAB
BASOPHILS # BLD AUTO: 0.06 K/UL (ref 0–0.2)
BASOPHILS NFR BLD: 0.9 % (ref 0–1.9)
DIFFERENTIAL METHOD: ABNORMAL
EOSINOPHIL # BLD AUTO: 0.3 K/UL (ref 0–0.5)
EOSINOPHIL NFR BLD: 3.9 % (ref 0–8)
ERYTHROCYTE [DISTWIDTH] IN BLOOD BY AUTOMATED COUNT: 15.5 % (ref 11.5–14.5)
HCT VFR BLD AUTO: 32.4 % (ref 40–54)
HGB BLD-MCNC: 10.3 G/DL (ref 14–18)
IMM GRANULOCYTES # BLD AUTO: 0.02 K/UL (ref 0–0.04)
IMM GRANULOCYTES NFR BLD AUTO: 0.3 % (ref 0–0.5)
INR PPP: 2.5 (ref 0.8–1.2)
LYMPHOCYTES # BLD AUTO: 1.7 K/UL (ref 1–4.8)
LYMPHOCYTES NFR BLD: 27.5 % (ref 18–48)
MCH RBC QN AUTO: 28.5 PG (ref 27–31)
MCHC RBC AUTO-ENTMCNC: 31.8 G/DL (ref 32–36)
MCV RBC AUTO: 90 FL (ref 82–98)
MONOCYTES # BLD AUTO: 0.8 K/UL (ref 0.3–1)
MONOCYTES NFR BLD: 12.2 % (ref 4–15)
NEUTROPHILS # BLD AUTO: 3.5 K/UL (ref 1.8–7.7)
NEUTROPHILS NFR BLD: 55.2 % (ref 38–73)
NRBC BLD-RTO: 0 /100 WBC
PLATELET # BLD AUTO: 503 K/UL (ref 150–450)
PMV BLD AUTO: 8.5 FL (ref 9.2–12.9)
PROTHROMBIN TIME: 25.1 SEC (ref 9–12.5)
RBC # BLD AUTO: 3.62 M/UL (ref 4.6–6.2)
WBC # BLD AUTO: 6.33 K/UL (ref 3.9–12.7)

## 2023-10-13 PROCEDURE — 85610 PROTHROMBIN TIME: CPT | Performed by: INTERNAL MEDICINE

## 2023-10-13 PROCEDURE — 93793 PR ANTICOAGULANT MGMT FOR PT TAKING WARFARIN: ICD-10-PCS | Mod: ,,,

## 2023-10-13 PROCEDURE — 85025 COMPLETE CBC W/AUTO DIFF WBC: CPT

## 2023-10-13 PROCEDURE — 93793 ANTICOAG MGMT PT WARFARIN: CPT | Mod: ,,,

## 2023-10-13 PROCEDURE — 36415 COLL VENOUS BLD VENIPUNCTURE: CPT | Performed by: INTERNAL MEDICINE

## 2023-10-13 NOTE — PROGRESS NOTES
INR is back in range after elevation this week.  We will continue dosing plan per calendar as dose was held and lowered this week, INR should drift upward.  Recheck next Wednesday with other appointment at [facility].

## 2023-10-13 NOTE — PROGRESS NOTES
Patient's INR is therapeutic at 2.5-lower end.  Lab collected.  Patient reports he followed previous instructions.  Patient reports no changes.  Sent to PharmD for dosing/instructions.

## 2023-10-18 ENCOUNTER — OFFICE VISIT (OUTPATIENT)
Dept: CARDIOLOGY | Facility: CLINIC | Age: 43
End: 2023-10-18
Payer: COMMERCIAL

## 2023-10-18 ENCOUNTER — ANTI-COAG VISIT (OUTPATIENT)
Dept: CARDIOLOGY | Facility: CLINIC | Age: 43
End: 2023-10-18
Payer: COMMERCIAL

## 2023-10-18 ENCOUNTER — OFFICE VISIT (OUTPATIENT)
Dept: CARDIOTHORACIC SURGERY | Facility: CLINIC | Age: 43
End: 2023-10-18
Payer: COMMERCIAL

## 2023-10-18 VITALS
SYSTOLIC BLOOD PRESSURE: 112 MMHG | OXYGEN SATURATION: 99 % | HEIGHT: 68 IN | BODY MASS INDEX: 26.3 KG/M2 | HEART RATE: 93 BPM | WEIGHT: 173.5 LBS | DIASTOLIC BLOOD PRESSURE: 74 MMHG

## 2023-10-18 VITALS
DIASTOLIC BLOOD PRESSURE: 74 MMHG | WEIGHT: 173.5 LBS | BODY MASS INDEX: 26.3 KG/M2 | SYSTOLIC BLOOD PRESSURE: 112 MMHG | HEIGHT: 68 IN | HEART RATE: 93 BPM

## 2023-10-18 DIAGNOSIS — Z95.1 S/P CABG X 5: ICD-10-CM

## 2023-10-18 DIAGNOSIS — Z95.2 S/P AVR (AORTIC VALVE REPLACEMENT): ICD-10-CM

## 2023-10-18 DIAGNOSIS — I25.10 CAD, MULTIPLE VESSEL: Primary | ICD-10-CM

## 2023-10-18 DIAGNOSIS — Z95.2 S/P AVR (AORTIC VALVE REPLACEMENT): Primary | ICD-10-CM

## 2023-10-18 DIAGNOSIS — Z79.01 LONG TERM (CURRENT) USE OF ANTICOAGULANTS: Primary | ICD-10-CM

## 2023-10-18 LAB — INR PPP: 3.3 (ref 2.5–3)

## 2023-10-18 PROCEDURE — 99213 OFFICE O/P EST LOW 20 MIN: CPT | Mod: PBBFAC,27 | Performed by: PHYSICIAN ASSISTANT

## 2023-10-18 PROCEDURE — 3078F DIAST BP <80 MM HG: CPT | Mod: CPTII,,, | Performed by: THORACIC SURGERY (CARDIOTHORACIC VASCULAR SURGERY)

## 2023-10-18 PROCEDURE — 1160F PR REVIEW ALL MEDS BY PRESCRIBER/CLIN PHARMACIST DOCUMENTED: ICD-10-PCS | Mod: CPTII,,, | Performed by: THORACIC SURGERY (CARDIOTHORACIC VASCULAR SURGERY)

## 2023-10-18 PROCEDURE — 85610 PROTHROMBIN TIME: CPT | Mod: QW,S$GLB,, | Performed by: INTERNAL MEDICINE

## 2023-10-18 PROCEDURE — 3044F PR MOST RECENT HEMOGLOBIN A1C LEVEL <7.0%: ICD-10-PCS | Mod: CPTII,,, | Performed by: THORACIC SURGERY (CARDIOTHORACIC VASCULAR SURGERY)

## 2023-10-18 PROCEDURE — 3074F PR MOST RECENT SYSTOLIC BLOOD PRESSURE < 130 MM HG: ICD-10-PCS | Mod: CPTII,,, | Performed by: THORACIC SURGERY (CARDIOTHORACIC VASCULAR SURGERY)

## 2023-10-18 PROCEDURE — 3008F PR BODY MASS INDEX (BMI) DOCUMENTED: ICD-10-PCS | Mod: CPTII,S$GLB,, | Performed by: PHYSICIAN ASSISTANT

## 2023-10-18 PROCEDURE — 1159F PR MEDICATION LIST DOCUMENTED IN MEDICAL RECORD: ICD-10-PCS | Mod: CPTII,,, | Performed by: THORACIC SURGERY (CARDIOTHORACIC VASCULAR SURGERY)

## 2023-10-18 PROCEDURE — 93793 ANTICOAG MGMT PT WARFARIN: CPT | Mod: S$GLB,,,

## 2023-10-18 PROCEDURE — 3074F SYST BP LT 130 MM HG: CPT | Mod: CPTII,S$GLB,, | Performed by: PHYSICIAN ASSISTANT

## 2023-10-18 PROCEDURE — 85610 POCT INR: ICD-10-PCS | Mod: QW,S$GLB,, | Performed by: INTERNAL MEDICINE

## 2023-10-18 PROCEDURE — 1159F MED LIST DOCD IN RCRD: CPT | Mod: CPTII,S$GLB,, | Performed by: PHYSICIAN ASSISTANT

## 2023-10-18 PROCEDURE — 3008F BODY MASS INDEX DOCD: CPT | Mod: CPTII,S$GLB,, | Performed by: PHYSICIAN ASSISTANT

## 2023-10-18 PROCEDURE — 1159F MED LIST DOCD IN RCRD: CPT | Mod: CPTII,,, | Performed by: THORACIC SURGERY (CARDIOTHORACIC VASCULAR SURGERY)

## 2023-10-18 PROCEDURE — 85610 PROTHROMBIN TIME: CPT | Mod: PBBFAC

## 2023-10-18 PROCEDURE — 3074F SYST BP LT 130 MM HG: CPT | Mod: CPTII,,, | Performed by: THORACIC SURGERY (CARDIOTHORACIC VASCULAR SURGERY)

## 2023-10-18 PROCEDURE — 99204 PR OFFICE/OUTPT VISIT, NEW, LEVL IV, 45-59 MIN: ICD-10-PCS | Mod: S$GLB,,, | Performed by: PHYSICIAN ASSISTANT

## 2023-10-18 PROCEDURE — 1160F RVW MEDS BY RX/DR IN RCRD: CPT | Mod: CPTII,,, | Performed by: THORACIC SURGERY (CARDIOTHORACIC VASCULAR SURGERY)

## 2023-10-18 PROCEDURE — 3044F HG A1C LEVEL LT 7.0%: CPT | Mod: CPTII,,, | Performed by: THORACIC SURGERY (CARDIOTHORACIC VASCULAR SURGERY)

## 2023-10-18 PROCEDURE — 3044F PR MOST RECENT HEMOGLOBIN A1C LEVEL <7.0%: ICD-10-PCS | Mod: CPTII,S$GLB,, | Performed by: PHYSICIAN ASSISTANT

## 2023-10-18 PROCEDURE — 1111F PR DISCHARGE MEDS RECONCILED W/ CURRENT OUTPATIENT MED LIST: ICD-10-PCS | Mod: CPTII,S$GLB,, | Performed by: PHYSICIAN ASSISTANT

## 2023-10-18 PROCEDURE — 3078F PR MOST RECENT DIASTOLIC BLOOD PRESSURE < 80 MM HG: ICD-10-PCS | Mod: CPTII,S$GLB,, | Performed by: PHYSICIAN ASSISTANT

## 2023-10-18 PROCEDURE — 99024 POSTOP FOLLOW-UP VISIT: CPT | Mod: ,,, | Performed by: THORACIC SURGERY (CARDIOTHORACIC VASCULAR SURGERY)

## 2023-10-18 PROCEDURE — 99024 PR POST-OP FOLLOW-UP VISIT: ICD-10-PCS | Mod: ,,, | Performed by: THORACIC SURGERY (CARDIOTHORACIC VASCULAR SURGERY)

## 2023-10-18 PROCEDURE — 93793 PR ANTICOAGULANT MGMT FOR PT TAKING WARFARIN: ICD-10-PCS | Mod: S$GLB,,,

## 2023-10-18 PROCEDURE — 99204 OFFICE O/P NEW MOD 45 MIN: CPT | Mod: S$GLB,,, | Performed by: PHYSICIAN ASSISTANT

## 2023-10-18 PROCEDURE — 1111F DSCHRG MED/CURRENT MED MERGE: CPT | Mod: CPTII,S$GLB,, | Performed by: PHYSICIAN ASSISTANT

## 2023-10-18 PROCEDURE — 99214 OFFICE O/P EST MOD 30 MIN: CPT | Mod: PBBFAC | Performed by: THORACIC SURGERY (CARDIOTHORACIC VASCULAR SURGERY)

## 2023-10-18 PROCEDURE — 3074F PR MOST RECENT SYSTOLIC BLOOD PRESSURE < 130 MM HG: ICD-10-PCS | Mod: CPTII,S$GLB,, | Performed by: PHYSICIAN ASSISTANT

## 2023-10-18 PROCEDURE — 3044F HG A1C LEVEL LT 7.0%: CPT | Mod: CPTII,S$GLB,, | Performed by: PHYSICIAN ASSISTANT

## 2023-10-18 PROCEDURE — 1160F PR REVIEW ALL MEDS BY PRESCRIBER/CLIN PHARMACIST DOCUMENTED: ICD-10-PCS | Mod: CPTII,S$GLB,, | Performed by: PHYSICIAN ASSISTANT

## 2023-10-18 PROCEDURE — 99999 PR PBB SHADOW E&M-EST. PATIENT-LVL IV: ICD-10-PCS | Mod: PBBFAC,,, | Performed by: THORACIC SURGERY (CARDIOTHORACIC VASCULAR SURGERY)

## 2023-10-18 PROCEDURE — 1159F PR MEDICATION LIST DOCUMENTED IN MEDICAL RECORD: ICD-10-PCS | Mod: CPTII,S$GLB,, | Performed by: PHYSICIAN ASSISTANT

## 2023-10-18 PROCEDURE — 3078F PR MOST RECENT DIASTOLIC BLOOD PRESSURE < 80 MM HG: ICD-10-PCS | Mod: CPTII,,, | Performed by: THORACIC SURGERY (CARDIOTHORACIC VASCULAR SURGERY)

## 2023-10-18 PROCEDURE — 99999 PR PBB SHADOW E&M-EST. PATIENT-LVL IV: CPT | Mod: PBBFAC,,, | Performed by: THORACIC SURGERY (CARDIOTHORACIC VASCULAR SURGERY)

## 2023-10-18 PROCEDURE — 1160F RVW MEDS BY RX/DR IN RCRD: CPT | Mod: CPTII,S$GLB,, | Performed by: PHYSICIAN ASSISTANT

## 2023-10-18 PROCEDURE — 3078F DIAST BP <80 MM HG: CPT | Mod: CPTII,S$GLB,, | Performed by: PHYSICIAN ASSISTANT

## 2023-10-18 PROCEDURE — 99999 PR PBB SHADOW E&M-EST. PATIENT-LVL III: CPT | Mod: PBBFAC,,, | Performed by: PHYSICIAN ASSISTANT

## 2023-10-18 PROCEDURE — 99999 PR PBB SHADOW E&M-EST. PATIENT-LVL III: ICD-10-PCS | Mod: PBBFAC,,, | Performed by: PHYSICIAN ASSISTANT

## 2023-10-18 NOTE — PROGRESS NOTES
[facility] Provider Note (Initial Clinic) Consult Note    Date of original referral: ?, no dc summary    Age: 43 y.o.  Gender: male  Ethnicity: Not  or /a   Number of admissions for CHF within the preceding year: 1   Duration of CHF:   Type of Congestive Heart Failure: Systolic   Etiology: Ischemic   Social history:   Enrolled in Infusion suite: no    Diagnostic Labs:   EKG - 09/20/2023  CXR - 09/21/2023  ECHO - 09/18/2023  Stress test -   Stress echo -   Pharmacologic stress -   Cardiac catheterization - 09/18/2023   Cardiac MRI -     Lab Results   Component Value Date     10/03/2023     10/02/2023    K 3.9 10/03/2023    K 4.2 10/02/2023     10/03/2023     10/02/2023    CO2 22 (L) 10/03/2023    CO2 22 (L) 10/02/2023     (H) 10/03/2023    GLU 95 10/02/2023    BUN 12 10/03/2023    BUN 12 10/02/2023    CREATININE 0.9 10/03/2023    CREATININE 0.8 10/02/2023    CALCIUM 8.9 10/03/2023    CALCIUM 8.6 (L) 10/02/2023    PROT 6.0 09/22/2023    PROT 6.0 09/22/2023    ALBUMIN 3.5 09/22/2023    ALBUMIN 3.5 09/22/2023    BILITOT 0.7 09/22/2023    BILITOT 0.7 09/22/2023    ALKPHOS 147 (H) 09/22/2023    ALKPHOS 147 (H) 09/22/2023    AST 48 (H) 09/22/2023    AST 48 (H) 09/22/2023    ALT 32 09/22/2023    ALT 32 09/22/2023    ANIONGAP 12 10/03/2023    ANIONGAP 8 10/02/2023       Lab Results   Component Value Date    WBC 6.33 10/13/2023    WBC 8.05 10/03/2023    RBC 3.62 (L) 10/13/2023    RBC 2.92 (L) 10/03/2023    HGB 10.3 (L) 10/13/2023    HGB 8.6 (L) 10/03/2023    HCT 32.4 (L) 10/13/2023    HCT 27.1 (L) 10/03/2023    MCV 90 10/13/2023    MCV 93 10/03/2023    MCH 28.5 10/13/2023    MCH 29.5 10/03/2023    MCHC 31.8 (L) 10/13/2023    MCHC 31.7 (L) 10/03/2023    RDW 15.5 (H) 10/13/2023    RDW 17.2 (H) 10/03/2023     (H) 10/13/2023     (H) 10/03/2023    MPV 8.5 (L) 10/13/2023    MPV 8.4 (L) 10/03/2023    IMMGR 0.3 10/13/2023    IMMGR 0.2 10/03/2023    IGABS 0.02 10/13/2023    IGABS  0.02 10/03/2023    LYMPH 1.7 10/13/2023    LYMPH 27.5 10/13/2023    MONO 0.8 10/13/2023    MONO 12.2 10/13/2023    EOS 0.3 10/13/2023    EOS 0.2 10/03/2023    BASO 0.06 10/13/2023    BASO 0.04 10/03/2023    NRBC 0 10/13/2023    NRBC 0 10/03/2023    GRAN 3.5 10/13/2023    GRAN 55.2 10/13/2023    EOSINOPHIL 3.9 10/13/2023    EOSINOPHIL 1.9 10/03/2023    BASOPHIL 0.9 10/13/2023    BASOPHIL 0.5 10/03/2023    PLTEST Appears normal 09/23/2023    PLTEST Decreased (A) 09/20/2023    ANISO Slight 09/20/2023       Lab Results   Component Value Date     (H) 09/18/2023    MG 2.5 09/26/2023    MG 2.4 09/26/2023    TROPONINI 0.708 (H) 09/18/2023    TROPONINI 0.709 (H) 09/17/2023    HGBA1C 5.3 09/17/2023       Lab Results   Component Value Date    CHOL 150 09/17/2023    TRIG 145 09/17/2023    HDL 37 (L) 09/17/2023    LDLCALC 84.0 09/17/2023    CHOLHDL 24.7 09/17/2023    TOTALCHOLEST 4.1 09/17/2023    NONHDLCHOL 113 09/17/2023    COLORU Colorless (A) 09/18/2023    APPEARANCEUA Clear 09/18/2023    PHUR 6.0 09/18/2023    SPECGRAV >1.030 (A) 09/18/2023    PROTEINUA Trace (A) 09/18/2023    GLUCUA Trace (A) 09/18/2023    KETONESU Negative 09/18/2023    BILIRUBINUA Negative 09/18/2023    OCCULTUA Negative 09/18/2023    NITRITE Negative 09/18/2023    LEUKOCYTESUR Negative 09/18/2023       List all implanted cardiac devices:     Cardiomems: no    Current Outpatient Medications on File Prior to Visit   Medication Sig Dispense Refill    acetaminophen (TYLENOL) 325 MG tablet Take 2 tablets (650 mg total) by mouth every 6 (six) hours as needed for Pain. (Patient not taking: Reported on 10/11/2023) 20 tablet 0    ascorbic acid, vitamin C, (VITAMIN C) 500 MG tablet Take 1 tablet (500 mg total) by mouth 2 (two) times daily. (Patient not taking: Reported on 10/11/2023) 60 tablet 0    aspirin (ECOTRIN) 81 MG EC tablet Take 1 tablet (81 mg total) by mouth once daily. 360 tablet 0    atorvastatin (LIPITOR) 80 MG tablet Take 1 tablet (80 mg  total) by mouth once daily. 90 tablet 3    cyanocobalamin (VITAMIN B-12) 1000 MCG tablet Take 1 tablet (1,000 mcg total) by mouth once daily. 30 tablet 0    docusate sodium (COLACE) 100 MG capsule Take 1 capsule (100 mg total) by mouth 2 (two) times daily. (Patient not taking: Reported on 10/11/2023) 60 capsule 0    ferrous sulfate 325 (65 FE) MG EC tablet Take 1 tablet (325 mg total) by mouth once daily. 30 tablet 0    folic acid (FOLVITE) 1 MG tablet Take 1 tablet (1 mg total) by mouth once daily. 30 tablet 0    metoprolol tartrate (LOPRESSOR) 50 MG tablet Take 1 tablet (50 mg total) by mouth 2 (two) times daily. 180 tablet 3    polyethylene glycol (GLYCOLAX) 17 gram/dose powder Take 17 g by mouth once daily. (Patient not taking: Reported on 10/11/2023) 510 g 0    traZODone (DESYREL) 50 MG tablet Take 1 tablet (50 mg total) by mouth every evening. 30 tablet 11    triamcinolone acetonide 0.1% (KENALOG) 0.1 % cream Apply topically 2 (two) times daily. 80 g 3    warfarin (COUMADIN) 4 MG tablet Take 1 tablet (4 mg total) by mouth Daily. 30 tablet 11     No current facility-administered medications on file prior to visit.         HPI:     Other information felt pertinent to HPI    Cardiology consulted to assist with management. Pt seen and examined today, resting in bed mother at bedside. He reports his pain started 6+ months ago and worsening in the last few weeks happening daily with associated SOB, dizziness radiating to left arm. He reports his pain is sharp and pressure-like at times. Pt reports daily use a marijuana, h/o cocaine and other drugs 5-10 years ago. Drinks occasionally. Echo pending cont hep gtt     09/19/2023. Admitted for NSTEMI/ACS. Echo showed RWMA  The cath done yesterday showed   Severe lmca disease with timi1 flow in lad   Lcx ostial 50%   Rca prox 80%   Ef 50%   Severe AI  09/19/23 s/p CABG x5 and aortic valve replacement with a 19 mm [place] mechanical valve by Dr. Douglas.  In ICU and  intubated. Om epi and Vasopressin gtt.     9/20/23  Pt seen and examined today, POD 1 CABGx5 pt still intubated on exam this am, weaning epi. Labs reviewed, H/H 6.0 and 17.1. Plans to extubated today     9/21/23 Pt seen and examined today extubated feels ok chest soreness, chest tubes removed. Labs reviewed, chart reviewed.     9/22/23 Pt seen and examined today, sitting up in bedside chair. Feels good. Walked with PT/OT. Labs reviewed, chart reviewed     09/23 ambulating well. No chest pain dyspnea, the lab reviewed.      09/24. On coumadin rx and heparin bridge. Non chest pain dyspnea, the lab reviewed. VSS     9/25/23-Patient seen and examined today, resting in bed. No AEON. Pain controlled. Working with PT/OT. Labs stable. INR 1.2, on heparin bridge.     9/26/23-Patient seen and examined today, sitting up in bedside chair. Feels ok. More fatigued/weak today. Pain controlled. Labs stable. INR 1.2, remains on heparin bridge.     9/27/23-Patient seen and examined today, resting in bed. Feeling better today. Worked with PT/OT earlier. No AEON. Labs reviewed, INR 1.6.      9/28/23-Patient seen and examined today, resting in bed. Ambulating well. Pain controlled. INR 2.3.      9/29/23-Patient seen and examined today, lying in bed. Feels ok. Does admit to some fatigue. Pain controlled. Ambulating well with PT/OT. Labs reviewed/stable. INR 1.8.     9/30/23- Pt has no complaints. Continue beta blockers and ambulation.      10/1/23- POD # 12, CABG x5. With AVR, pt currently bridging with heparin and coumadin.      10/2/23 POD 13, CABGx5 and AVR, awaiting therapeutic INR. Recovering well. Labs reviewed chart reviewed     10/3/23 POD 14, CABGx5 and AVR, awaiting DC today with HH, INR 2.5     No dc summary, this is the last cardiology progress note.    Saw CTS today, on lopressor BID    Coumadin for AVR    No CV issues, no SOB, no LE edema, not on any diuretics      PHYSICAL: There were no vitals filed for this visit.   Wt  Readings from Last 3 Encounters:   10/11/23 77.1 kg (169 lb 15.6 oz)   10/02/23 79.8 kg (175 lb 14.8 oz)   11/13/15 91.6 kg (202 lb)       JVD: no,    Heart rhythm: regular  Cardiac murmur: No    S3: no  S4: no  Lungs: clear  Liver span: 10 cm:   Hepatojugular reflux: no  Edema: no,       ASSESSMENT: acute systolic HF    PLAN:      Patient Instructions:   Instruct the patient to notify this clinic if HH, a physician or an advanced care provider wants to change medication one of their HF medications   Activity and Diet restrictions:   Recommend 2-3 gram sodium restriction and 1500cc- 2000cc fluid restriction.  Encourage physical activity with graded exercise program.  Requested patient to weigh themselves daily, and to notify us if their weight increases by more than 3 lbs in 1 day or 5 lbs in 3 days.    Assigned dry weight on home scale: 91 kg  Medication changes (include current dose and changed dose)  Continue GDMT with BB, no ARNi with borderline BP  Continue asa/statin for CAD  HF education done  RTC in 2 months  Euvolemic on exam, no diuretics needed  Smoking cessation   Coumadin levels per coumadin clinic

## 2023-10-18 NOTE — PROGRESS NOTES
INR not at goal. Medications, chart, and patient findings reviewed. See calendar for adjustments to dose and follow up plan.  INR is just above goal, but not a large jump like last week.  We will have him eat greens just today (1/2 cup broccoli) then lower weekly dose just a bit.  Repeat INR in 1 week.

## 2023-10-18 NOTE — PROGRESS NOTES
INR has climbed back above goal at 3.3.  Patient reports no bleeding issues.  Reports prescribed trazodone on Wednesday, 10/11/2023.  No other changes reported.  Current warfarin dose confirmed.  Will send to PharmD for review and dosing.

## 2023-10-18 NOTE — PROGRESS NOTES
"Subjective:      Patient ID: Mikie Alcazar is a 43 y.o. male.    Chief Complaint: No chief complaint on file.    HPI:   The patient is status post coronary artery bypass grafting x5 and aortic valve replacement with a mechanical aortic valve.  Overall the patient is doing well.  She is able to achieve activities of daily living.  He denies any ankle swelling, orthopnea or PND.  His INR has been therapeutic.  Review of patient's allergies indicates:  No Known Allergies         Objective:   /74   Pulse 93   Ht 5' 8" (1.727 m)   Wt 78.7 kg (173 lb 8 oz)   SpO2 99%   BMI 26.38 kg/m²     X-Ray Chest 1 View  Narrative: EXAMINATION:  XR CHEST 1 VIEW    CLINICAL HISTORY:  PICC placement;    TECHNIQUE:  Single frontal view of the chest was performed.    COMPARISON:  09/21/2023    FINDINGS:  Left-sided PICC line tip overlies the SVC in good position.  In comparison to the prior study, there is no adverse interval changes  Impression: In comparison to the prior study, there is no adverse interval changes    Electronically signed by: Elliott España MD  Date:    09/21/2023  Time:    10:10  X-Ray Chest AP Portable  Narrative: EXAMINATION:  XR CHEST AP PORTABLE    CLINICAL HISTORY:  Post-op;    TECHNIQUE:  Single frontal view of the chest was performed.    COMPARISON:  09/20/2023    FINDINGS:  In comparison to the prior study, there is no adverse interval changes  Impression: In comparison to the prior study, there is no adverse interval changes    Electronically signed by: Elliott España MD  Date:    09/21/2023  Time:    06:57         Physical Exam  Constitutional:       Appearance: Normal appearance.   HENT:      Head: Normocephalic and atraumatic.      Nose: Nose normal.   Cardiovascular:      Rate and Rhythm: Normal rate and regular rhythm.      Heart sounds: Normal heart sounds.   Pulmonary:      Effort: Pulmonary effort is normal.      Breath sounds: Normal breath sounds.   Abdominal:      General: Abdomen is " flat. Bowel sounds are normal.      Palpations: Abdomen is soft.   Musculoskeletal:      Right lower leg: No edema.      Left lower leg: No edema.   Skin:     General: Skin is warm and dry.      Comments: Incisions are healing well.   Neurological:      Mental Status: He is alert and oriented to person, place, and time.         Assessment:     1. S/P AVR (aortic valve replacement)    2. S/P CABG x 5          Plan   The patient is status post coronary artery bypass grafting x5 and aortic valve replacement with a 19 mm Saint Dany mechanical valve.  Overall the patient is doing well.  Continue aspirin  Continue Coumadin.  [facility].    Continue metoprolol.    Continue atorvastatin.  Return to clinic in 2 weeks.          Nishatn Douglas [facility]

## 2023-10-19 NOTE — ASSESSMENT & PLAN NOTE
09/20/2023  The patient is postop day 1 status post coronary artery bypass grafting x5 and aortic valve replacement with a 19 mm Saint Dany mechanical valve.  Overall the patient is doing well.      Neuro:  Patient is awake appropriate and follows commands.  Patient is currently sedated with Precedex.  Will wean Precedex to off.   Cardiac:  Patient is hemodynamically stable.  With excellent cardiac output and cardiac index.  Patient is on low-dose vaso active meds.  Wean drips to off.  Will start metoprolol once drips are off.  Respiratory:  Patient is being weaned to extubation.  Patient is an active smoker Start nebs and Mucomyst.  Continue pulmonary toileting.  Continue incentive spirometer.    GI:  Patient is currently NPO.  Will start p.o. intake once extubated.    Renal:  Patient has good urine output and creatinine is 1.2.  Will start Lasix for diuresis.  Endocrine:  Patient required insulin drip for glucose controlled.  Will convert to sliding scale.    Id:  Patient had a T-max of 102°.  Most likely due to stress of surgery.  Will continue to follow.  White count is 9.  Patient is on manny op antibiotics.  Heme: Hematocrit is 17.  Patient is being transfused.  Platelet count is 74.  No evidence of active bleeding.  Will follow platelet count.  Will start patient on Coumadin anticoagulation for mechanical valve.  Activities:  Patient is currently in bed.  Will advance activities as tolerated once extubated.  Line tubes and drains:  Patient has an ETT, right IJ Huntington and Cordis, right groin A-line, chest tubes, Ugalde catheter, pacer wires, and saphenectomy site DARON.    09/21/2023     The patient is postop day 2 status post coronary artery bypass grafting x5 and aortic valve replacement with a 19 mm Saint Dany mechanical valve.  Overall the patient is doing well.    Neuro:  Patient is awake alert and oriented x3.  Pain is well controlled with current pain management.  Cardiac:  The patient is off all drips.   Patient is hemodynamically stable.  Continue metoprolol.  Respiratory:  Patient was extubated yesterday.  Continue pulmonary toileting.  Continue incentive spirometer.   GI:  Patient is tolerating p.o. intake.  Patient had episodes of nausea yesterday which have abated.  Advance patient's diet as tolerated.    Renal:  Patient has good urine output.  Creatinine is 0.9.  Continue Lasix for diuresis.  Endocrine:  Glucose is controlled with sliding scale insulin.    Id:  T-max is 101.8 patient is currently afebrile.  White count is 17.  Most likely due to stress of surgery.  Continue to follow.  Heme:  Hematocrit is 24.5.  Platelet count is 89.  INR is 1.5.  Patient is started on Coumadin for anticoagulation of mechanical valve.  Activities:  Patient will be out of bed to chair.  Advance activities as tolerated.  Line tubes and drains:  Patient has pacer wires, right IJ Cordis and Ugalde catheter.  Will place PICC line and discontinue Cordis.    09/22/2023   The patient is postop day 3 status post coronary artery bypass grafting x5 and aortic valve replacement with a 19 mm Saint Dany mechanical valve.  Overall the patient is doing well.  Patient has been transferred to telemetry.    Neuro:  Patient is awake alert and oriented x3.  Pain is well controlled with current pain regimen.    Cardiac:  Patient is hemodynamically stable.  Continue metoprolol.    Respiratory:  Continue pulmonary toileting.  Continue incentive spirometer.    GI:  Patient is tolerating p.o. intake.  And tolerating a regular diet.    Renal:  Patient has good urine output.  Creatinine is 0.9.  Continue Lasix.    Endocrine:  Glucose is well controlled.    Id:  Patient is afebrile.  White count is 16.  Continue to follow.    Heme:  Hematocrit is 24 and platelet count is 114.  INR is 3.4.  Patient is currently on Coumadin for anticoagulation.  Activities:  Patient is out of bed and ambulating in the hallways.  Continue to advance as tolerated.  Line tubes  and drains:  Patient has a PICC line and pacer wires.    09/23/2023   The patient is postop day 4 status post coronary artery bypass grafting x5 and aortic valve replacement with a 19 mm Saint Dany mechanical valve.  Overall the patient is doing well.  Anticipate discharge to home with home health in the next 48-72 hours.    Neuro:  Patient is awake alert and oriented x3 pain is well controlled with pain management.  Neuro exam is nonfocal.    Cardiac:  Patient is hemodynamically stable.  Continue metoprolol.    Respiratory:  Continue pulmonary toileting.  Continue incentive spirometer.  GI patient is tolerating p.o. intake.  Will increase bowel regimen for bowel movements.  Renal: Patient has good urine output.  Creatinine is 0.9.  Patient is still appears fluid overloaded.  Continue Lasix.  Endocrine: Glucose is well controlled.    Id:  Patient is afebrile.  White count is down to 12.  Continue to follow.    Heme:  Hematocrit is 27.  Platelet count is 186 and INR is 1.9.  INR goal is between 2.5 and 3.  Continue Coumadin.    Activities:  Patient is out of bed and ambulating in the hallways.  Advance as tolerated.  Line tubes and drains:  Patient has a PICC line.      09/24/2023   The patient is postop day 5 status post coronary artery bypass grafting x5 and aortic valve replacement with a 19 mm Saint Dany mechanical valve.  Overall the patient is doing well.  Anticipate discharge home in the next 24-48 hours.    Neuro:  Patient is awake alert and oriented x3 pain is well controlled with current pain management.  Neuro exam is nonfocal.    Cardiac:  Patient is hemodynamically stable.  Continue metoprolol  Respiratory:  Continue pulmonary toileting.  Continue incentive spirometer.  Patient continues to have sputum production.  GI:  Patient is tolerating a regular diet.  Patient has had bowel movements.  Renal: Patient has good urine output.  Creatinine is stable.  Patient is appears to be euvolemic.  Will  discontinue Lasix.    Endocrine:  Glucose is well controlled.  Id: Patient is afebrile white count normal.  Heme:  Hematocrit is 27 and platelet count is 229.  INR is subtherapeutic 1.5.  Continue Coumadin.  Goal is a INR level of 2.5-3.  Activities: Patient is out of bed and ambulating in the hallways.  Advance as tolerated.    Line tubes and drains:  Patient has a PICC line.    09/25/2023   The patient is postop day 6 status post coronary artery bypass grafting x5 and aortic valve replacement with a 19 mm Saint Dany mechanical valve.  Overall the patient is doing well.  Patient is awaiting attainment of a therapeutic INR.  Anticipate discharge in the next 48-72 hours.    Neuro:  Patient is awake alert and oriented x3.  Pain is well controlled current pain management.  Neuro exam is nonfocal.    Cardiac:  Patient is stable.  Continue metoprolol.    Respiratory:  Patient has good sats on room air.  Continue pulmonary toileting.  Continue incentive spirometer.    GI:  Patient is tolerating a regular diet having bowel movements.    Renal:  Patient has good urine output.    Endocrine:  Glucose is well controlled.    Id:  Patient is afebrile white count is normal.    Heme:  Hematocrit is 27 and platelet count is 298.  INR is now normal at 1.2.  Patient is on bridging heparin.  Will titrate heparin to an APTT greater between 40 and 50.  Therapeutic goal of INR is 2.5-3.  Activities: Patient is out of bed and ambulating in the hallways.    Line tubes and drains:  Patient has a PICC line.    09/26/2023   The patient is postop day 7 status post coronary artery bypass grafting x5 and aortic valve replacement with a 19 mm Saint Dany mechanical valve.  Overall the patient is doing well.  Patient's INR is still subtherapeutic.  Patient is on heparin in the interim.  Anticipate discharge in the next 48-72 hours.  Neuro:  Patient is awake alert and oriented x3.  Pain is well controlled with current pain management.  Neuro exam is  nonfocal.    Cardiac:  Patient remains stable.  Continue metoprolol.    Respiratory: Patient has good sats on room air.  Continue pulmonary toileting.  Continue incentive spirometer.    GI:  Patient is tolerating a regular diet and having bowel movements.    Renal:  Patient has good urine output.  Endocrine:  Glucose is well controlled.    Id:  Patient's white count is normal.    Heme: Hematocrit is 28 and INR is 1.2 patient is on a heparin drip.  Coumadin dose has been increased.  INR goal is 2.5-3.  Activities:  Patient is out of bed and ambulating in the hallways.    Line tubes and drains:  Patient has a PICC line.    09/27/2023   The patient is postop [id number] post coronary artery bypass grafting x5 and aortic valve replacement with a 19 mm Saint Dany mechanical valve.  Overall the patient is doing well.  Patient is awaiting INR being therapeutic.  Anticipate discharge in the next 48-72 hours.  Neuro:  Patient is awake alert and oriented x3.  Pain is well controlled with current pain management.  Neuro exam is nonfocal.    Cardiac:  Patient's blood pressure is trending higher.  Will increase metoprolol to 50 mg b.i.d..  Respiratory: Patient has good sats on room air.  Continue pulmonary toileting.  Continue incentive spirometer.  GI:  Patient is tolerating a regular diet and having bowel movements.    Renal:  Patient has good urine output.    Endocrine:  Glucose is well controlled.  Id:  Patient is afebrile white count is normal.    Heme:  Hematocrit is 28.6 and platelet count is 428.  INR is 1.6.  A PTT is therapeutic at 40.  INR goal is 2.5-3.  Activities:  Patient is out of bed and ambulating in the hallways.  Advance as tolerated.    Line tubes and drains:  Patient has a PICC line.    09/28/2023   The patient is postop day 9 status post coronary artery bypass grafting x5 and aortic valve replacement with a 19 mm Saint Dany mechanical valve.  Overall the patient is doing well.  INR is 2.3 today.  Anticipate  discharge that is 4872 hours.    Neuro:  Patient is awake alert and oriented x3.  Pain is well controlled.  Neuro exam is nonfocal.    Cardiac:  Patient is hemodynamically stable.  Continue metoprolol 50 mg b.i.d..    Respiratory: Patient has good sats on room air.  Continue pulmonary toileting.  Continue incentive spirometer.    GI:  Patient is tolerating a regular diet and having bowel movements.    Renal: Patient has good urine output.    Endocrine:  Glucose is well controlled.    Id:  Patient is afebrile white count is 8   Heme:  Hematocrit is 28.  INR is 2.3.  Platelet count is 452.  Continue Coumadin.  INR goal is 2.5-3.0.  Patient is currently on bridging heparin.  Activities:  Patient is out of bed and ambulating.  Advance as tolerated.    Line tubes and drains:  Patient has a PICC line.    09/29/2023   Patient is postop day 10 status post coronary artery bypass grafting x5 and aortic valve replacement with a 19 mm Saint Dany mechanical valve.  Overall patient is doing well.  Patient awaits therapeutic INR prior to discharge.    Neuro:  Patient is awake alert and oriented x3.  Pain is well controlled.  Neuro exam is nonfocal.    Cardiac: Patient is stable.  Continue metoprolol.    Respiratory:  Patient has good sats on room air.  Continue pulmonary toileting.  Continue incentive spirometer.    GI:  Patient is tolerating a regular diet and having bowel movements.    Renal:  Patient has good urine output.  Creatinine is normal.    Endocrine: Glucose is well controlled.  Id:  Patient is afebrile.  White count is 10.3.  Heme:  Hematocrit is 28.8 and platelet count is 563.  INR is 1.8.  Patient needs INR goal of 2.5-3.  Continue Coumadin.  Activities:  Patient is out of bed and ambulating.  Advance as tolerated.  Line tubes and drains:  Patient has a PICC line.    09/30/2023   Patient is postop day 11 status post coronary bypass grafting x5 and aortic valve replacement with a 19 mm Saint Dany mechanical valve.   Overall the patient is doing well patient is awaiting a therapy INR prior to discharge.    Neuro:  Patient is awake alert and oriented x3.  Pain is well controlled.  Neuro exam is nonfocal.    Cardiac:  Continue metoprolol.    Respiratory:  Patient is on room air continue pulmonary toileting.    GI:  Patient is having bowel movements and a regular diet.    Renal:  Patient has good urine output.    Endocrine: Glucose is well controlled  Id white count is normal.    :  INR is 1.7 today continue Coumadin dosing.  Patient is on bridging heparin.    Activities:  Patient is out of bed and ambulating in the hallways.  Line tubes and drains:  Patient has a PICC line    10/1/2023   The patient is postop day 12 status post coronary artery bypass grafting x5 and aortic valve replacement with a 19 mm Saint Dany mechanical valve.  Overall patient is doing well.  Patient is awaiting therapeutic INR prior to discharge.    Neuro:  Patient is awake alert oriented x3.  Pain is well controlled.  Neuro exam is nonfocal.    Cardiac:  Patient is stable.  Continue metoprolol.    Respiratory:  Patient is on room air continue pulmonary toileting.    GI:  Patient is having a bowel movement and tolerating a regular diet.    Renal:  Patient has good urine output.    Endocrine:  Glucose is well controlled.  Id:  Patient is afebrile and white count is normal.    Heme:  INR is 1.  Nine today.  Continue on Coumadin.  INR goal is 2.5-3.  Continue Coumadin.    Line tubes and drains:  Patient has a PICC line.    10/02/2023   Patient is postop day 13 status post coronary artery bypass grafting x5 and aortic valve replacement with a 19 mm Saint Dany mechanical valve.  Overall the patient is doing well.  Patient's INR is 2.2 today.  Anticipate discharge in the next 24 hours.    Neuro:  Patient is awake alert and oriented x3 pain is well controlled.  Neuro exam is nonfocal.    Cardiac:  Patient is stable.  Continue metoprolol.    Respiratory:  Patient is  on room air.  Continue pulmonary toileting.    GI:  Patient is tolerating a regular diet and having bowel movements.    Renal:  Patient has a good urine output.    Endocrine:  Patient's glucose is well controlled.  Id:  Patient is afebrile white count is normal.    Heme:  INR is 2.2.  Continue Coumadin.    10/03/2023   The patient is postop day 14 status post coronary artery bypass grafting x5 and aortic valve replacement with a 19 mm Saint Dany mechanical valve.  Overall the patient is doing well.  INR is 2.5 today.  Patient will be discharged to home with home health.  Patient will have [facility].  Neuro:  Patient is awake alert and oriented x3.  Neuro exam is nonfocal.    Cardiac:  Patient is stable.  Continue metoprolol.  Respiratory:  Patient is tolerating room air.  GI:  Patient is tolerating a diet and having bowel movements.    Renal:  Patient has good urine output.    Endocrine:  Glucose is well controlled.  Id:  Patient is afebrile white is normal.  Heme:  INR is 2.5.  Will continue Coumadin as outpatient.  Line tubes and drains:  Patient has a PICC line which will be discontinued prior to discharge.

## 2023-10-19 NOTE — DISCHARGE SUMMARY
[facility] - Telemetry ([facility])  Cardiothoracic Surgery  Discharge Summary      Patient Name: Mikie Alcazar  MRN: [id number]  Admission Date: 9/17/2023  Hospital Length of Stay: 16 days  Discharge Date and Time: 10/3/2023  5:33 PM  Attending Physician: No att. providers found   Discharging Provider: Nishant Douglas MD  Primary Care Provider: Anila Morales MD    HPI:   The patient is a 43-year-old male tobacco abuser who presented to the ER and was worked up for chest pain.  Patient has been having chest discomfort and low exercise levels for the past few months.  Patient denies any past medical history.  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  The patient does not have details of the procedure.      The patient was worked up with an echocardiogram that shows ejection fraction of 45% and moderate aortic regurgitation.  Cardiac catheterization shows severe coronary artery disease with critical left main stenosis and severe aortic regurgitation.  The patient is currently  in the ICU and is pain-free.      Procedure(s) (LRB):  CORONARY ARTERY BYPASS GRAFT (CABG) (N/A)  REPLACEMENT-VALVE-AORTIC (N/A)  SURGICAL PROCUREMENT, VEIN, ENDOSCOPIC (Left)  BLOCK, NERVE, INTERCOSTAL, 2 OR MORE (N/A)  ECHOCARDIOGRAM,TRANSESOPHAGEAL (N/A)      Indwelling Lines/Drains at time of discharge:   Lines/Drains/Airways     None               Hospital Course: 09/21/2023   The patient is postop day 2 status post coronary artery bypass grafting x5 and aortic valve replacement with a 19 mm Saint Dany mechanical valve.    09/22/2023   The patient is postop day 3 status post coronary artery bypass grafting x5 and aortic valve replacement with a 19 mm Saint Dany mechanical valve.    09/23/2023   The patient is postop day 4 status post coronary artery bypass grafting x5 and aortic valve replacement with a 19 mm Saint Dany mechanical valve.    09/24/2023   The patient  is postop day 5 status post coronary artery bypass grafting x5 and aortic valve replacement with a 19 mm Saint Dany mechanical valve.    09/25/2023   The patient is postop day 6 status post coronary artery bypass grafting x5 and aortic valve replacement with Saint Dany 19 mm mechanical valve.    09/26/2023   The patient is postop day 7 status post coronary artery bypass grafting x5 and aortic valve replacement with a 19 mm Saint Dany mechanical valve.    09/27/2023   The patient is postop day 8 status post coronary artery bypass grafting x5 and aortic valve replacement with a 19 mm Saint Dany mechanical valve.    09/28/2023   The patient is postop day 9 status post coronary artery bypass grafting x5 and aortic valve replacement with a 19 mm Saint Dany mechanical valve.    09/29/2023   The patient is postop day 10 status post coronary artery bypass grafting x5 and aortic valve replacement with a 19 mm Saint Dany mechanical valve.    09/30/2023   The patient is postop day 11 status post coronary bypass grafting x5 and aortic valve replacement with a 19 mm Saint Dany mechanical valve.    10/01/2023   Patient is postop day 12 status post coronary artery bypass grafting x5 and aortic valve replacement with a 19 mm Saint Dany mechanical valve    10/02/2023   The patient is postop day 13 status post coronary artery bypass grafting x5 and aortic valve replacement with a 19 mm Saint Dany mechanical valve.    10/03/2023   The patient is postop day 14 status post coronary artery bypass grafting x5 and aortic valve replacement with a 19 mm Saint Dany mechanical valve.      Goals of Care Treatment Preferences:  Code Status: Full Code      Consults (From admission, onward)        Status Ordering Provider     Consult Case Management/Social Work  Once        Provider:  (Not yet assigned)    Completed JUAREZ HARVEY     Inpatient consult to Registered Dietitian/Nutritionist  Once        Provider:  (Not yet assigned)    Completed  JUAREZ HARVEY     Inpatient consult to Critical Care Medicine  Once        Provider:  Elliott Wright MD    Completed GRISEL WIN     Inpatient consult to Registered Dietitian/Nutritionist  Once        Provider:  (Not yet assigned)    Completed NEYMAR PÉREZ     Inpatient consult to Social Work/Case Management  Once        Provider:  (Not yet assigned)    Completed NEYMAR PÉREZ     Inpatient consult to Cardiology  Once        Provider:  Fahad Albarran MD    Completed NEYMAR PÉREZ          Significant Diagnostic Studies: N/A    Pending Diagnostic Studies:     Procedure Component Value Units Date/Time    CBC auto differential [[id number]] Collected: 09/29/23 0532    Order Status: Sent Lab Status: In process Updated: 09/29/23 0540    Specimen: Blood     CBC auto differential [[id number]] Collected: 09/27/23 0512    Order Status: Sent Lab Status: In process Updated: 09/27/23 0513    Specimen: Blood           Cardiac/Vascular  S/P CABG x 5  09/20/2023  The patient is postop day 1 status post coronary artery bypass grafting x5 and aortic valve replacement with a 19 mm Saint Dany mechanical valve.  Overall the patient is doing well.      Neuro:  Patient is awake appropriate and follows commands.  Patient is currently sedated with Precedex.  Will wean Precedex to off.   Cardiac:  Patient is hemodynamically stable.  With excellent cardiac output and cardiac index.  Patient is on low-dose vaso active meds.  Wean drips to off.  Will start metoprolol once drips are off.  Respiratory:  Patient is being weaned to extubation.  Patient is an active smoker Start nebs and Mucomyst.  Continue pulmonary toileting.  Continue incentive spirometer.    GI:  Patient is currently NPO.  Will start p.o. intake once extubated.    Renal:  Patient has good urine output and creatinine is 1.2.  Will start Lasix for diuresis.  Endocrine:  Patient required insulin drip for glucose controlled.  Will convert to sliding scale.     Id:  Patient had a T-max of 102°.  Most likely due to stress of surgery.  Will continue to follow.  White count is 9.  Patient is on manny op antibiotics.  Heme: Hematocrit is 17.  Patient is being transfused.  Platelet count is 74.  No evidence of active bleeding.  Will follow platelet count.  Will start patient on Coumadin anticoagulation for mechanical valve.  Activities:  Patient is currently in bed.  Will advance activities as tolerated once extubated.  Line tubes and drains:  Patient has an ETT, right IJ [place] and Cordis, right groin A-line, chest tubes, Ugalde catheter, pacer wires, and saphenectomy site DARON.    09/21/2023     The patient is postop day 2 status post coronary artery bypass grafting x5 and aortic valve replacement with a 19 mm Saint Dany mechanical valve.  Overall the patient is doing well.    Neuro:  Patient is awake alert and oriented x3.  Pain is well controlled with current pain management.  Cardiac:  The patient is off all drips.  Patient is hemodynamically stable.  Continue metoprolol.  Respiratory:  Patient was extubated yesterday.  Continue pulmonary toileting.  Continue incentive spirometer.   GI:  Patient is tolerating p.o. intake.  Patient had episodes of nausea yesterday which have abated.  Advance patient's diet as tolerated.    Renal:  Patient has good urine output.  Creatinine is 0.9.  Continue Lasix for diuresis.  Endocrine:  Glucose is controlled with sliding scale insulin.    Id:  T-max is 101.8 patient is currently afebrile.  White count is 17.  Most likely due to stress of surgery.  Continue to follow.  Heme:  Hematocrit is 24.5.  Platelet count is 89.  INR is 1.5.  Patient is started on Coumadin for anticoagulation of mechanical valve.  Activities:  Patient will be out of bed to chair.  Advance activities as tolerated.  Line tubes and drains:  Patient has pacer wires, right IJ Cordis and Ugalde catheter.  Will place PICC line and discontinue Cordis.    09/22/2023   The patient  is postop day 3 status post coronary artery bypass grafting x5 and aortic valve replacement with a 19 mm [place] mechanical valve.  Overall the patient is doing well.  Patient has been transferred to telemetry.    Neuro:  Patient is awake alert and oriented x3.  Pain is well controlled with current pain regimen.    Cardiac:  Patient is hemodynamically stable.  Continue metoprolol.    Respiratory:  Continue pulmonary toileting.  Continue incentive spirometer.    GI:  Patient is tolerating p.o. intake.  And tolerating a regular diet.    Renal:  Patient has good urine output.  Creatinine is 0.9.  Continue Lasix.    Endocrine:  Glucose is well controlled.    Id:  Patient is afebrile.  White count is 16.  Continue to follow.    Heme:  Hematocrit is 24 and platelet count is 114.  INR is 3.4.  Patient is currently on Coumadin for anticoagulation.  Activities:  Patient is out of bed and ambulating in the hallways.  Continue to advance as tolerated.  Line tubes and drains:  Patient has a PICC line and pacer wires.    09/23/2023   The patient is postop day 4 status post coronary artery bypass grafting x5 and aortic valve replacement with a 19 mm Saint Dany mechanical valve.  Overall the patient is doing well.  Anticipate discharge to home with home health in the next 48-72 hours.    Neuro:  Patient is awake alert and oriented x3 pain is well controlled with pain management.  Neuro exam is nonfocal.    Cardiac:  Patient is hemodynamically stable.  Continue metoprolol.    Respiratory:  Continue pulmonary toileting.  Continue incentive spirometer.  GI patient is tolerating p.o. intake.  Will increase bowel regimen for bowel movements.  Renal: Patient has good urine output.  Creatinine is 0.9.  Patient is still appears fluid overloaded.  Continue Lasix.  Endocrine: Glucose is well controlled.    Id:  Patient is afebrile.  White count is down to 12.  Continue to follow.    Heme:  Hematocrit is 27.  Platelet count is 186 and INR  is 1.9.  INR goal is between 2.5 and 3.  Continue Coumadin.    Activities:  Patient is out of bed and ambulating in the hallways.  Advance as tolerated.  Line tubes and drains:  Patient has a PICC line.      09/24/2023   The patient is postop day 5 status post coronary artery bypass grafting x5 and aortic valve replacement with a 19 mm Saint Dany mechanical valve.  Overall the patient is doing well.  Anticipate discharge home in the next 24-48 hours.    Neuro:  Patient is awake alert and oriented x3 pain is well controlled with current pain management.  Neuro exam is nonfocal.    Cardiac:  Patient is hemodynamically stable.  Continue metoprolol  Respiratory:  Continue pulmonary toileting.  Continue incentive spirometer.  Patient continues to have sputum production.  GI:  Patient is tolerating a regular diet.  Patient has had bowel movements.  Renal: Patient has good urine output.  Creatinine is stable.  Patient is appears to be euvolemic.  Will discontinue Lasix.    Endocrine:  Glucose is well controlled.  Id: Patient is afebrile white count normal.  Heme:  Hematocrit is 27 and platelet count is 229.  INR is subtherapeutic 1.5.  Continue Coumadin.  Goal is a INR level of 2.5-3.  Activities: Patient is out of bed and ambulating in the hallways.  Advance as tolerated.    Line tubes and drains:  Patient has a PICC line.    09/25/2023   The patient is postop day 6 status post coronary artery bypass grafting x5 and aortic valve replacement with a 19 mm Saint Dany mechanical valve.  Overall the patient is doing well.  Patient is awaiting attainment of a therapeutic INR.  Anticipate discharge in the next 48-72 hours.    Neuro:  Patient is awake alert and oriented x3.  Pain is well controlled current pain management.  Neuro exam is nonfocal.    Cardiac:  Patient is stable.  Continue metoprolol.    Respiratory:  Patient has good sats on room air.  Continue pulmonary toileting.  Continue incentive spirometer.    GI:  Patient  is tolerating a regular diet having bowel movements.    Renal:  Patient has good urine output.    Endocrine:  Glucose is well controlled.    Id:  Patient is afebrile white count is normal.    Heme:  Hematocrit is 27 and platelet count is 298.  INR is now normal at 1.2.  Patient is on bridging heparin.  Will titrate heparin to an APTT greater between 40 and 50.  Therapeutic goal of INR is 2.5-3.  Activities: Patient is out of bed and ambulating in the hallways.    Line tubes and drains:  Patient has a PICC line.    09/26/2023   The patient is postop day 7 status post coronary artery bypass grafting x5 and aortic valve replacement with a 19 mm Saint Dany mechanical valve.  Overall the patient is doing well.  Patient's INR is still subtherapeutic.  Patient is on heparin in the interim.  Anticipate discharge in the next 48-72 hours.  Neuro:  Patient is awake alert and oriented x3.  Pain is well controlled with current pain management.  Neuro exam is nonfocal.    Cardiac:  Patient remains stable.  Continue metoprolol.    Respiratory: Patient has good sats on room air.  Continue pulmonary toileting.  Continue incentive spirometer.    GI:  Patient is tolerating a regular diet and having bowel movements.    Renal:  Patient has good urine output.  Endocrine:  Glucose is well controlled.    Id:  Patient's white count is normal.    Heme: Hematocrit is 28 and INR is 1.2 patient is on a heparin drip.  Coumadin dose has been increased.  INR goal is 2.5-3.  Activities:  Patient is out of bed and ambulating in the hallways.    Line tubes and drains:  Patient has a PICC line.    09/27/2023   The patient is postop [id number] post coronary artery bypass grafting x5 and aortic valve replacement with a 19 mm Saint Dany mechanical valve.  Overall the patient is doing well.  Patient is awaiting INR being therapeutic.  Anticipate discharge in the next 48-72 hours.  Neuro:  Patient is awake alert and oriented x3.  Pain is well controlled  with current pain management.  Neuro exam is nonfocal.    Cardiac:  Patient's blood pressure is trending higher.  Will increase metoprolol to 50 mg b.i.d..  Respiratory: Patient has good sats on room air.  Continue pulmonary toileting.  Continue incentive spirometer.  GI:  Patient is tolerating a regular diet and having bowel movements.    Renal:  Patient has good urine output.    Endocrine:  Glucose is well controlled.  Id:  Patient is afebrile white count is normal.    Heme:  Hematocrit is 28.6 and platelet count is 428.  INR is 1.6.  A PTT is therapeutic at 40.  INR goal is 2.5-3.  Activities:  Patient is out of bed and ambulating in the hallways.  Advance as tolerated.    Line tubes and drains:  Patient has a PICC line.    09/28/2023   The patient is postop day 9 status post coronary artery bypass grafting x5 and aortic valve replacement with a 19 mm Saint Dany mechanical valve.  Overall the patient is doing well.  INR is 2.3 today.  Anticipate discharge that is 4872 hours.    Neuro:  Patient is awake alert and oriented x3.  Pain is well controlled.  Neuro exam is nonfocal.    Cardiac:  Patient is hemodynamically stable.  Continue metoprolol 50 mg b.i.d..    Respiratory: Patient has good sats on room air.  Continue pulmonary toileting.  Continue incentive spirometer.    GI:  Patient is tolerating a regular diet and having bowel movements.    Renal: Patient has good urine output.    Endocrine:  Glucose is well controlled.    Id:  Patient is afebrile white count is 8   Heme:  Hematocrit is 28.  INR is 2.3.  Platelet count is 452.  Continue Coumadin.  INR goal is 2.5-3.0.  Patient is currently on bridging heparin.  Activities:  Patient is out of bed and ambulating.  Advance as tolerated.    Line tubes and drains:  Patient has a PICC line.    09/29/2023   Patient is postop day 10 status post coronary artery bypass grafting x5 and aortic valve replacement with a 19 mm Saint Dany mechanical valve.  Overall patient is  doing well.  Patient awaits therapeutic INR prior to discharge.    Neuro:  Patient is awake alert and oriented x3.  Pain is well controlled.  Neuro exam is nonfocal.    Cardiac: Patient is stable.  Continue metoprolol.    Respiratory:  Patient has good sats on room air.  Continue pulmonary toileting.  Continue incentive spirometer.    GI:  Patient is tolerating a regular diet and having bowel movements.    Renal:  Patient has good urine output.  Creatinine is normal.    Endocrine: Glucose is well controlled.  Id:  Patient is afebrile.  White count is 10.3.  Heme:  Hematocrit is 28.8 and platelet count is 563.  INR is 1.8.  Patient needs INR goal of 2.5-3.  Continue Coumadin.  Activities:  Patient is out of bed and ambulating.  Advance as tolerated.  Line tubes and drains:  Patient has a PICC line.    09/30/2023   Patient is postop day 11 status post coronary bypass grafting x5 and aortic valve replacement with a 19 mm Saint Dany mechanical valve.  Overall the patient is doing well patient is awaiting a therapy INR prior to discharge.    Neuro:  Patient is awake alert and oriented x3.  Pain is well controlled.  Neuro exam is nonfocal.    Cardiac:  Continue metoprolol.    Respiratory:  Patient is on room air continue pulmonary toileting.    GI:  Patient is having bowel movements and a regular diet.    Renal:  Patient has good urine output.    Endocrine: Glucose is well controlled  Id white count is normal.    :  INR is 1.7 today continue Coumadin dosing.  Patient is on bridging heparin.    Activities:  Patient is out of bed and ambulating in the hallways.  Line tubes and drains:  Patient has a PICC line    10/1/2023   The patient is postop day 12 status post coronary artery bypass grafting x5 and aortic valve replacement with a 19 mm Saint Dany mechanical valve.  Overall patient is doing well.  Patient is awaiting therapeutic INR prior to discharge.    Neuro:  Patient is awake alert oriented x3.  Pain is well  controlled.  Neuro exam is nonfocal.    Cardiac:  Patient is stable.  Continue metoprolol.    Respiratory:  Patient is on room air continue pulmonary toileting.    GI:  Patient is having a bowel movement and tolerating a regular diet.    Renal:  Patient has good urine output.    Endocrine:  Glucose is well controlled.  Id:  Patient is afebrile and white count is normal.    Heme:  INR is 1.  Nine today.  Continue on Coumadin.  INR goal is 2.5-3.  Continue Coumadin.    Line tubes and drains:  Patient has a PICC line.    10/02/2023   Patient is postop day 13 status post coronary artery bypass grafting x5 and aortic valve replacement with a 19 mm Saint Dany mechanical valve.  Overall the patient is doing well.  Patient's INR is 2.2 today.  Anticipate discharge in the next 24 hours.    Neuro:  Patient is awake alert and oriented x3 pain is well controlled.  Neuro exam is nonfocal.    Cardiac:  Patient is stable.  Continue metoprolol.    Respiratory:  Patient is on room air.  Continue pulmonary toileting.    GI:  Patient is tolerating a regular diet and having bowel movements.    Renal:  Patient has a good urine output.    Endocrine:  Patient's glucose is well controlled.  Id:  Patient is afebrile white count is normal.    Heme:  INR is 2.2.  Continue Coumadin.    10/03/2023   The patient is postop day 14 status post coronary artery bypass grafting x5 and aortic valve replacement with a 19 mm Saint Dany mechanical valve.  Overall the patient is doing well.  INR is 2.5 today.  Patient will be discharged to home with home health.  Patient will have outpatient follow-up with [facility].  Neuro:  Patient is awake alert and oriented x3.  Neuro exam is nonfocal.    Cardiac:  Patient is stable.  Continue metoprolol.  Respiratory:  Patient is tolerating room air.  GI:  Patient is tolerating a diet and having bowel movements.    Renal:  Patient has good urine output.    Endocrine:  Glucose is well controlled.  Id:  Patient is  afebrile white is normal.  Heme:  INR is 2.5.  Will continue Coumadin as outpatient.  Line tubes and drains:  Patient has a PICC line which will be discontinued prior to discharge.          Final Active Diagnoses:    Diagnosis Date Noted POA    PRINCIPAL PROBLEM:  NSTEMI (non-ST elevated myocardial infarction) [I21.4] 09/17/2023 Yes    S/P CABG x 5 [Z95.1] 09/20/2023 Not Applicable    S/P AVR (aortic valve replacement) [Z95.2] 09/20/2023 Not Applicable    Hypertension [I10] 09/18/2023 Yes    CAD, multiple vessel [I25.10] 09/18/2023 Yes    Tobacco smoker, 1 pack of cigarettes or less per day [F17.210] 09/18/2023 Yes    Nonrheumatic aortic valve insufficiency [I35.1] 09/18/2023 Yes      Problems Resolved During this Admission:      Discharged Condition: good    Disposition: Home or Self Care    Follow Up:   Follow-up Information     No, Primary Doctor Follow up in 1 week(s).           Chelsea Morales MD Follow up in 2 week(s).    Specialties: Interventional Cardiology, Cardiology  Contact information:  [address]  [place] [address]  [phone]             Nishant Douglas MD Follow up on 10/18/2023.    Specialty: Cardiothoracic Surgery  Contact information:  [id number] [place] [address] [address] [place] [address]  [phone]                       Patient Instructions:      ACCEPT - Ambulatory referral/consult to Heart Failure [facility]   Standing Status: Future   Referral Priority: Routine Referral Type: Consultation   Referral Reason: Specialty Services Required   Requested Specialty: Cardiology   Number of Visits Requested: 1     Ambulatory referral/consult to Home Health   Standing Status: Future   Referral Priority: Routine Referral Type: Home Health Care   Referral Reason: Specialty Services Required   Requested Specialty: Home Health Services   Number of Visits Requested: 1     Ambulatory referral/consult to Congestive Heart Failure Clinic   Standing Status: Future   Referral  Priority: Routine Referral Type: Consultation   Referral Reason: Specialty Services Required   Requested Specialty: Cardiology   Number of Visits Requested: 1     Ambulatory referral/consult to Anticoagulation Monitoring (Renewal)   Standing Status: Future   Referral Priority: Routine Referral Type: Consultation   Referral Reason: Specialty Services Required   Requested Specialty: Cardiology   Number of Visits Requested: 1     Sponge bath only until clinic visit     Keep surgical extremity elevated     Ice to affected area     Lifting restrictions     No driving until:     Notify your health care provider if you experience any of the following:  temperature >100.4     Notify your health care provider if you experience any of the following:  persistent nausea and vomiting or diarrhea     Notify your health care provider if you experience any of the following:  severe uncontrolled pain     Notify your health care provider if you experience any of the following:  redness, tenderness, or signs of infection (pain, swelling, redness, odor or green/yellow discharge around incision site)     Notify your health care provider if you experience any of the following:  difficulty breathing or increased cough     Notify your health care provider if you experience any of the following:  severe persistent headache     Notify your health care provider if you experience any of the following:  worsening rash     Notify your health care provider if you experience any of the following:  persistent dizziness, light-headedness, or visual disturbances     Notify your health care provider if you experience any of the following:  increased confusion or weakness     Activity as tolerated     Shower on day dressing removed (No bath)     Medications:  Reconciled Home Medications:      Medication List      START taking these medications    acetaminophen 325 MG tablet  Commonly known as: TYLENOL  Take 2 tablets (650 mg total) by mouth every 6  (six) hours as needed for Pain.     ascorbic acid (vitamin C) 500 MG tablet  Commonly known as: VITAMIN C  Take 1 tablet (500 mg total) by mouth 2 (two) times daily.     aspirin 81 MG EC tablet  Commonly known as: ECOTRIN  Take 1 tablet (81 mg total) by mouth once daily.     atorvastatin 80 MG tablet  Commonly known as: LIPITOR  Take 1 tablet (80 mg total) by mouth once daily.     cyanocobalamin 1000 MCG tablet  Commonly known as: VITAMIN B-12  Take 1 tablet (1,000 mcg total) by mouth once daily.     docusate sodium 100 MG capsule  Commonly known as: COLACE  Take 1 capsule (100 mg total) by mouth 2 (two) times daily.     ferrous sulfate 325 (65 FE) MG EC tablet  Take 1 tablet (325 mg total) by mouth once daily.     folic acid 1 MG tablet  Commonly known as: FOLVITE  Take 1 tablet (1 mg total) by mouth once daily.     metoprolol tartrate 50 MG tablet  Commonly known as: LOPRESSOR  Take 1 tablet (50 mg total) by mouth 2 (two) times daily.     polyethylene glycol 17 gram/dose powder  Commonly known as: GLYCOLAX  Take 17 g by mouth once daily.     warfarin 4 MG tablet  Commonly known as: COUMADIN  Take 1 tablet (4 mg total) by mouth Daily.        ASK your doctor about these medications    traMADoL 50 mg tablet  Commonly known as: ULTRAM  Take 1 tablet (50 mg total) by mouth every 6 (six) hours as needed for Pain.  Ask about: Should I take this medication?          Time spent on the discharge of patient: 45 minutes    Nishant Douglas MD  Cardiothoracic Surgery  [facility])

## 2023-10-25 ENCOUNTER — ANTI-COAG VISIT (OUTPATIENT)
Dept: CARDIOLOGY | Facility: CLINIC | Age: 43
End: 2023-10-25
Payer: COMMERCIAL

## 2023-10-25 DIAGNOSIS — Z95.2 S/P AVR (AORTIC VALVE REPLACEMENT): ICD-10-CM

## 2023-10-25 DIAGNOSIS — Z79.01 LONG TERM (CURRENT) USE OF ANTICOAGULANTS: Primary | ICD-10-CM

## 2023-10-25 DIAGNOSIS — Z95.1 S/P CABG X 5: ICD-10-CM

## 2023-10-25 LAB — INR PPP: 3.6 (ref 2–3)

## 2023-10-25 PROCEDURE — 85610 PROTHROMBIN TIME: CPT | Mod: PBBFAC

## 2023-10-25 PROCEDURE — 85610 PROTHROMBIN TIME: CPT | Mod: QW,S$GLB,, | Performed by: INTERNAL MEDICINE

## 2023-10-25 PROCEDURE — 85610 POCT INR: ICD-10-PCS | Mod: QW,S$GLB,, | Performed by: INTERNAL MEDICINE

## 2023-10-25 PROCEDURE — 93793 ANTICOAG MGMT PT WARFARIN: CPT | Mod: S$GLB,,,

## 2023-10-25 PROCEDURE — 93793 PR ANTICOAGULANT MGMT FOR PT TAKING WARFARIN: ICD-10-PCS | Mod: S$GLB,,,

## 2023-10-25 NOTE — PROGRESS NOTES
INR has climbed to 3.6 - above goal.  Previous lowered warfarin dose was verified and followed.  No bleeding issues or change in medications reported.  Bleeding precaution remains in place.  Instructions given:  Hold dose today, then will lower dose again to 2 mg every Mon, Wed, Fri; and 4 mg on all other days.  Continue to monitor closely and recheck in 1 week.  ED for any abnormal bleeding.  Dose calendar given and reviewed with patient.  Patient confirms understanding of all instructions given.

## 2023-11-01 ENCOUNTER — ANTI-COAG VISIT (OUTPATIENT)
Dept: CARDIOLOGY | Facility: CLINIC | Age: 43
End: 2023-11-01
Payer: COMMERCIAL

## 2023-11-01 DIAGNOSIS — Z95.1 S/P CABG X 5: ICD-10-CM

## 2023-11-01 DIAGNOSIS — Z79.01 LONG TERM (CURRENT) USE OF ANTICOAGULANTS: Primary | ICD-10-CM

## 2023-11-01 DIAGNOSIS — Z95.2 S/P AVR (AORTIC VALVE REPLACEMENT): ICD-10-CM

## 2023-11-01 LAB — INR PPP: 2.3 (ref 2.5–3)

## 2023-11-01 PROCEDURE — 93793 ANTICOAG MGMT PT WARFARIN: CPT | Mod: S$GLB,,,

## 2023-11-01 PROCEDURE — 85610 POCT INR: ICD-10-PCS | Mod: QW,S$GLB,, | Performed by: INTERNAL MEDICINE

## 2023-11-01 PROCEDURE — 93793 PR ANTICOAGULANT MGMT FOR PT TAKING WARFARIN: ICD-10-PCS | Mod: S$GLB,,,

## 2023-11-01 PROCEDURE — 85610 PROTHROMBIN TIME: CPT | Mod: QW,S$GLB,, | Performed by: INTERNAL MEDICINE

## 2023-11-01 NOTE — PROGRESS NOTES
INR is just under goal after several elevated result..  We will re-challenge this newer dose and give the INR a little more time to settle.  Repeat INR late next week.

## 2023-11-01 NOTE — PROGRESS NOTES
INR is slightly below goal at 2.3.  Previous warfarin instructions were verified and followed.  No changes in diet or s/s reported.  Will send to PharmD for further dosing instructions.

## 2023-11-10 ENCOUNTER — ANTI-COAG VISIT (OUTPATIENT)
Dept: CARDIOLOGY | Facility: CLINIC | Age: 43
End: 2023-11-10
Payer: COMMERCIAL

## 2023-11-10 DIAGNOSIS — Z95.1 S/P CABG X 5: ICD-10-CM

## 2023-11-10 DIAGNOSIS — Z79.01 LONG TERM (CURRENT) USE OF ANTICOAGULANTS: Primary | ICD-10-CM

## 2023-11-10 DIAGNOSIS — Z95.2 S/P AVR (AORTIC VALVE REPLACEMENT): ICD-10-CM

## 2023-11-10 LAB — INR PPP: 2.6 (ref 2.5–3)

## 2023-11-10 PROCEDURE — 93793 PR ANTICOAGULANT MGMT FOR PT TAKING WARFARIN: ICD-10-PCS | Mod: S$GLB,,,

## 2023-11-10 PROCEDURE — 85610 PROTHROMBIN TIME: CPT | Mod: QW,S$GLB,, | Performed by: INTERNAL MEDICINE

## 2023-11-10 PROCEDURE — 93793 ANTICOAG MGMT PT WARFARIN: CPT | Mod: S$GLB,,,

## 2023-11-10 PROCEDURE — 85610 POCT INR: ICD-10-PCS | Mod: QW,S$GLB,, | Performed by: INTERNAL MEDICINE

## 2023-11-10 NOTE — PROGRESS NOTES
INR is therapeutic at 2.6.  Patient confirms current warfarin dose.  Remains with no leafy greens.  No change in dose - will continue 2 mg every Mon, Wed, Fri; and 4 mg on all other days as directed.  Will recheck INR in 1 week.  Dose calendar given - confirms understanding.

## 2023-11-17 ENCOUNTER — ANTI-COAG VISIT (OUTPATIENT)
Dept: CARDIOLOGY | Facility: CLINIC | Age: 43
End: 2023-11-17
Payer: COMMERCIAL

## 2023-11-17 DIAGNOSIS — Z95.2 S/P AVR (AORTIC VALVE REPLACEMENT): ICD-10-CM

## 2023-11-17 DIAGNOSIS — Z95.1 S/P CABG X 5: ICD-10-CM

## 2023-11-17 DIAGNOSIS — Z79.01 LONG TERM (CURRENT) USE OF ANTICOAGULANTS: Primary | ICD-10-CM

## 2023-11-17 LAB — INR PPP: 1.9 (ref 2.5–3)

## 2023-11-17 PROCEDURE — 93793 ANTICOAG MGMT PT WARFARIN: CPT | Mod: S$GLB,,,

## 2023-11-17 PROCEDURE — 85610 PROTHROMBIN TIME: CPT | Mod: QW,S$GLB,, | Performed by: INTERNAL MEDICINE

## 2023-11-17 PROCEDURE — 85610 POCT INR: ICD-10-PCS | Mod: QW,S$GLB,, | Performed by: INTERNAL MEDICINE

## 2023-11-17 PROCEDURE — 93793 PR ANTICOAGULANT MGMT FOR PT TAKING WARFARIN: ICD-10-PCS | Mod: S$GLB,,,

## 2023-11-17 NOTE — PROGRESS NOTES
INR not at goal. Medications, chart, and patient findings reviewed. See calendar for adjustments to dose and follow up plan.  Pt was toxic on higher warfarin doses, we will boost today and resume.  Avoid all Vit K.  Repeat INR in 1 week.  ER with any s/s of clotting or stroke.

## 2023-11-17 NOTE — PROGRESS NOTES
INR has dropped to 1.9 - subtherapeutic.  Patient reports no missed doses or greens intake.  Confirms current warfarin dose.  No s/s reported.  Will send to PharmD for review and dosing.

## 2023-11-24 ENCOUNTER — ANTI-COAG VISIT (OUTPATIENT)
Dept: CARDIOLOGY | Facility: CLINIC | Age: 43
End: 2023-11-24
Payer: COMMERCIAL

## 2023-11-24 ENCOUNTER — LAB VISIT (OUTPATIENT)
Dept: LAB | Facility: HOSPITAL | Age: 43
End: 2023-11-24
Attending: INTERNAL MEDICINE
Payer: COMMERCIAL

## 2023-11-24 DIAGNOSIS — Z95.2 S/P AVR (AORTIC VALVE REPLACEMENT): ICD-10-CM

## 2023-11-24 DIAGNOSIS — Z95.2 S/P AVR (AORTIC VALVE REPLACEMENT): Primary | ICD-10-CM

## 2023-11-24 DIAGNOSIS — Z95.1 S/P CABG X 5: ICD-10-CM

## 2023-11-24 DIAGNOSIS — Z79.01 LONG TERM (CURRENT) USE OF ANTICOAGULANTS: ICD-10-CM

## 2023-11-24 LAB
INR PPP: 2.1 (ref 0.8–1.2)
PROTHROMBIN TIME: 20.9 SEC (ref 9–12.5)

## 2023-11-24 PROCEDURE — 93793 ANTICOAG MGMT PT WARFARIN: CPT | Mod: S$GLB,,,

## 2023-11-24 PROCEDURE — 36415 COLL VENOUS BLD VENIPUNCTURE: CPT | Performed by: INTERNAL MEDICINE

## 2023-11-24 PROCEDURE — 93793 PR ANTICOAGULANT MGMT FOR PT TAKING WARFARIN: ICD-10-PCS | Mod: S$GLB,,,

## 2023-11-24 PROCEDURE — 85610 PROTHROMBIN TIME: CPT | Performed by: INTERNAL MEDICINE

## 2023-11-24 NOTE — PROGRESS NOTES
Patient's INR is sub-therapeutic at 2.1.  Lab collected.  Patient contacted.  Patient reports he followed previous instructions.  Patient reports no changes.  Advised patient of increased risk of clotting; signs/symptoms of clotting and need to go to ED if he experiences any.  Patient reports he is not having any sign/symptoms of clotting.  Sent to PharmD for dosing/instructions.

## 2023-11-24 NOTE — PROGRESS NOTES
INR is improving.  Increase weekly dose to 4 mg QD x 2 mg on M/W.  Repeat INR in 1 week.  This was  previous dose for that produced higher than goal results.  Needs close follow up.

## 2023-11-28 ENCOUNTER — TELEPHONE (OUTPATIENT)
Dept: CARDIAC REHAB | Facility: HOSPITAL | Age: 43
End: 2023-11-28
Payer: COMMERCIAL

## 2023-11-28 NOTE — TELEPHONE ENCOUNTER
Called pt to inform him of cardiac rehab waitlist. Pt requested to be referred to [facility]. Referral to be sent to [facility].

## 2023-11-29 DIAGNOSIS — Z95.1 S/P CABG X 5: Primary | ICD-10-CM

## 2023-11-29 DIAGNOSIS — R07.9 CHEST PAIN, UNSPECIFIED TYPE: ICD-10-CM

## 2023-12-01 ENCOUNTER — LAB VISIT (OUTPATIENT)
Dept: LAB | Facility: HOSPITAL | Age: 43
End: 2023-12-01
Attending: INTERNAL MEDICINE
Payer: COMMERCIAL

## 2023-12-01 ENCOUNTER — ANTI-COAG VISIT (OUTPATIENT)
Dept: CARDIOLOGY | Facility: CLINIC | Age: 43
End: 2023-12-01
Payer: COMMERCIAL

## 2023-12-01 DIAGNOSIS — Z95.1 S/P CABG X 5: ICD-10-CM

## 2023-12-01 DIAGNOSIS — Z79.01 LONG TERM (CURRENT) USE OF ANTICOAGULANTS: ICD-10-CM

## 2023-12-01 DIAGNOSIS — Z95.2 S/P AVR (AORTIC VALVE REPLACEMENT): ICD-10-CM

## 2023-12-01 DIAGNOSIS — Z95.2 S/P AVR (AORTIC VALVE REPLACEMENT): Primary | ICD-10-CM

## 2023-12-01 LAB
INR PPP: 1.9 (ref 0.8–1.2)
PROTHROMBIN TIME: 18.8 SEC (ref 9–12.5)

## 2023-12-01 PROCEDURE — 36415 COLL VENOUS BLD VENIPUNCTURE: CPT | Performed by: INTERNAL MEDICINE

## 2023-12-01 PROCEDURE — 93793 ANTICOAG MGMT PT WARFARIN: CPT | Mod: S$GLB,,,

## 2023-12-01 PROCEDURE — 85610 PROTHROMBIN TIME: CPT | Performed by: INTERNAL MEDICINE

## 2023-12-01 PROCEDURE — 93793 PR ANTICOAGULANT MGMT FOR PT TAKING WARFARIN: ICD-10-PCS | Mod: S$GLB,,,

## 2023-12-01 NOTE — PROGRESS NOTES
INR not at goal-1.9. Medications, chart, and patient findings reviewed. See calendar for adjustments to dose and follow up plan.  INR has decreased.  He denies any missed dose or dietary Vit K intake.  We will boost aggressively and increase dose to 4 mg QD.  Repeat INR on Monday.  ER with any s/s of stroke or clotting

## 2023-12-04 ENCOUNTER — ANTI-COAG VISIT (OUTPATIENT)
Dept: CARDIOLOGY | Facility: CLINIC | Age: 43
End: 2023-12-04
Payer: COMMERCIAL

## 2023-12-04 ENCOUNTER — HOSPITAL ENCOUNTER (OUTPATIENT)
Dept: CARDIOLOGY | Facility: HOSPITAL | Age: 43
Discharge: HOME | End: 2023-12-04
Attending: THORACIC SURGERY (CARDIOTHORACIC VASCULAR SURGERY)
Payer: COMMERCIAL

## 2023-12-04 DIAGNOSIS — R07.9 CHEST PAIN, UNSPECIFIED TYPE: ICD-10-CM

## 2023-12-04 DIAGNOSIS — Z79.01 LONG TERM (CURRENT) USE OF ANTICOAGULANTS: Primary | ICD-10-CM

## 2023-12-04 DIAGNOSIS — Z95.1 S/P CABG X 5: ICD-10-CM

## 2023-12-04 DIAGNOSIS — Z95.2 S/P AVR (AORTIC VALVE REPLACEMENT): ICD-10-CM

## 2023-12-04 LAB
CV STRESS BASE HR: 82 BPM
DIASTOLIC BLOOD PRESSURE: 75 MMHG
INR PPP: 3.1 (ref 2–3)
OHS CV CPX 85 PERCENT MAX PREDICTED HEART RATE MALE: 149
OHS CV CPX MAX PREDICTED HEART RATE: 175
OHS CV CPX PATIENT IS FEMALE: 0
OHS CV CPX PATIENT IS MALE: 1
OHS CV CPX PEAK DIASTOLIC BLOOD PRESSURE: 75 MMHG
OHS CV CPX PEAK HEAR RATE: 121 BPM
OHS CV CPX PEAK RATE PRESSURE PRODUCT: NORMAL
OHS CV CPX PEAK SYSTOLIC BLOOD PRESSURE: 155 MMHG
OHS CV CPX PERCENT MAX PREDICTED HEART RATE ACHIEVED: 69
OHS CV CPX RATE PRESSURE PRODUCT PRESENTING: NORMAL
STRESS ECHO POST EXERCISE DUR MIN: 9 MINUTES
STRESS ECHO POST EXERCISE DUR SEC: 40 SECONDS
STRESS ST DEPRESSION: 1.5 MM
SYSTOLIC BLOOD PRESSURE: 125 MMHG

## 2023-12-04 PROCEDURE — 93793 PR ANTICOAGULANT MGMT FOR PT TAKING WARFARIN: ICD-10-PCS | Mod: S$GLB,,,

## 2023-12-04 PROCEDURE — 93016 CV STRESS TEST SUPVJ ONLY: CPT | Mod: ,,, | Performed by: INTERNAL MEDICINE

## 2023-12-04 PROCEDURE — 85610 PROTHROMBIN TIME: CPT | Mod: QW,S$GLB,, | Performed by: INTERNAL MEDICINE

## 2023-12-04 PROCEDURE — 93016 EXERCISE STRESS - EKG (CUPID ONLY): ICD-10-PCS | Mod: ,,, | Performed by: INTERNAL MEDICINE

## 2023-12-04 PROCEDURE — 85610 POCT INR: ICD-10-PCS | Mod: QW,S$GLB,, | Performed by: INTERNAL MEDICINE

## 2023-12-04 PROCEDURE — 93018 EXERCISE STRESS - EKG (CUPID ONLY): ICD-10-PCS | Mod: ,,, | Performed by: INTERNAL MEDICINE

## 2023-12-04 PROCEDURE — 93793 ANTICOAG MGMT PT WARFARIN: CPT | Mod: S$GLB,,,

## 2023-12-04 PROCEDURE — 93018 CV STRESS TEST I&R ONLY: CPT | Mod: ,,, | Performed by: INTERNAL MEDICINE

## 2023-12-04 PROCEDURE — 93017 CV STRESS TEST TRACING ONLY: CPT

## 2023-12-04 NOTE — PROGRESS NOTES
INR is just above goal at 3.1.  Previous 2 days boost and increased dose was verified and followed for a subtherapeutic INR.  No other changes or bleeding issues reported.  We will continue with new dose of 4 mg daily - hopefully, to see a therapeutic INR on next visit.  Will recheck INR in 1 week.  Dose calendar given - patient confirms understanding.

## 2023-12-07 ENCOUNTER — OFFICE VISIT (OUTPATIENT)
Dept: INTERNAL MEDICINE | Facility: CLINIC | Age: 43
End: 2023-12-07
Payer: COMMERCIAL

## 2023-12-07 VITALS
HEART RATE: 63 BPM | HEIGHT: 68 IN | RESPIRATION RATE: 16 BRPM | BODY MASS INDEX: 27.67 KG/M2 | SYSTOLIC BLOOD PRESSURE: 110 MMHG | DIASTOLIC BLOOD PRESSURE: 70 MMHG | OXYGEN SATURATION: 96 % | TEMPERATURE: 97 F | WEIGHT: 182.56 LBS

## 2023-12-07 DIAGNOSIS — Z95.2 S/P AVR (AORTIC VALVE REPLACEMENT): ICD-10-CM

## 2023-12-07 DIAGNOSIS — I25.10 CAD, MULTIPLE VESSEL: ICD-10-CM

## 2023-12-07 DIAGNOSIS — I21.4 NSTEMI (NON-ST ELEVATED MYOCARDIAL INFARCTION): Primary | ICD-10-CM

## 2023-12-07 DIAGNOSIS — Z95.1 S/P CABG X 5: ICD-10-CM

## 2023-12-07 PROCEDURE — 3078F PR MOST RECENT DIASTOLIC BLOOD PRESSURE < 80 MM HG: ICD-10-PCS | Mod: CPTII,S$GLB,, | Performed by: FAMILY MEDICINE

## 2023-12-07 PROCEDURE — 3044F HG A1C LEVEL LT 7.0%: CPT | Mod: CPTII,S$GLB,, | Performed by: FAMILY MEDICINE

## 2023-12-07 PROCEDURE — 1159F PR MEDICATION LIST DOCUMENTED IN MEDICAL RECORD: ICD-10-PCS | Mod: CPTII,S$GLB,, | Performed by: FAMILY MEDICINE

## 2023-12-07 PROCEDURE — 99999 PR PBB SHADOW E&M-EST. PATIENT-LVL IV: CPT | Mod: PBBFAC,,, | Performed by: FAMILY MEDICINE

## 2023-12-07 PROCEDURE — 3008F BODY MASS INDEX DOCD: CPT | Mod: CPTII,S$GLB,, | Performed by: FAMILY MEDICINE

## 2023-12-07 PROCEDURE — 3074F SYST BP LT 130 MM HG: CPT | Mod: CPTII,S$GLB,, | Performed by: FAMILY MEDICINE

## 2023-12-07 PROCEDURE — 99999 PR PBB SHADOW E&M-EST. PATIENT-LVL IV: ICD-10-PCS | Mod: PBBFAC,,, | Performed by: FAMILY MEDICINE

## 2023-12-07 PROCEDURE — 3078F DIAST BP <80 MM HG: CPT | Mod: CPTII,S$GLB,, | Performed by: FAMILY MEDICINE

## 2023-12-07 PROCEDURE — 3044F PR MOST RECENT HEMOGLOBIN A1C LEVEL <7.0%: ICD-10-PCS | Mod: CPTII,S$GLB,, | Performed by: FAMILY MEDICINE

## 2023-12-07 PROCEDURE — 3008F PR BODY MASS INDEX (BMI) DOCUMENTED: ICD-10-PCS | Mod: CPTII,S$GLB,, | Performed by: FAMILY MEDICINE

## 2023-12-07 PROCEDURE — 99213 PR OFFICE/OUTPT VISIT, EST, LEVL III, 20-29 MIN: ICD-10-PCS | Mod: S$GLB,,, | Performed by: FAMILY MEDICINE

## 2023-12-07 PROCEDURE — 3074F PR MOST RECENT SYSTOLIC BLOOD PRESSURE < 130 MM HG: ICD-10-PCS | Mod: CPTII,S$GLB,, | Performed by: FAMILY MEDICINE

## 2023-12-07 PROCEDURE — 99213 OFFICE O/P EST LOW 20 MIN: CPT | Mod: S$GLB,,, | Performed by: FAMILY MEDICINE

## 2023-12-07 PROCEDURE — 1159F MED LIST DOCD IN RCRD: CPT | Mod: CPTII,S$GLB,, | Performed by: FAMILY MEDICINE

## 2023-12-07 NOTE — PROGRESS NOTES
Mikie Alcazar  12/07/2023  [id number]    Anila Morales MD  Patient Care Team:  Anila Morales MD as PCP - General (Family Medicine)          Visit Type:a scheduled routine follow-up visit    Chief Complaint:  Chief Complaint   Patient presents with    Establish Care       History of Present Illness:  43 year old  ECA  Saw RODNEY for TCC.    5 vessel CABG/CAD/MI  AVR  ASA, Lopressor Liptior, and COumadin  EF 50%.  Low BP so not on ACe/ARB/ARNi. Saw Cards in CHF clinic  Referred to Cardiac rehab    Started Trazodone for sleep. He was on xanax, by another provider, who passed away.  He was given Trazodone. Said not taking. Not stressed. I told him it was for sleep.   He said the reason he can't sleep is his mind would race.    Post op Anemia.Slowing improving on recheck a month ago.   Need to montor.    Last LDL 84    Dictation #1  MRN:[id number]  CSN:[id number]       History:  Past Medical History:   Diagnosis Date    Hypertension 9/18/2023     Past Surgical History:   Procedure Laterality Date    ANGIOGRAPHY OF INTERNAL MAMMARY VESSEL Left 9/18/2023    Procedure: Angiogram Internal Mammary;  Surgeon: Chelsea Morales MD;  Location: [facility] CATH LAB;  Service: Cardiology;  Laterality: Left;    AORTIC VALVE REPLACEMENT N/A 9/19/2023    Procedure: REPLACEMENT-VALVE-AORTIC;  Surgeon: Nishant Douglas MD;  Location: [facility] OR;  Service: Cardiothoracic;  Laterality: N/A;  19 mm MECHANICAL AORTIC VALVE    ARTERIOGRAPHY OF AORTIC ROOT N/A 9/18/2023    Procedure: ARTERIOGRAM, AORTIC ROOT;  Surgeon: Chelsea Morales MD;  Location: [facility] CATH LAB;  Service: Cardiology;  Laterality: N/A;    ARTERIOGRAPHY OF SUBCLAVIAN ARTERY Left 9/18/2023    Procedure: ARTERIOGRAM, SUBCLAVIAN;  Surgeon: Chelsea Morales MD;  Location: [facility] CATH LAB;  Service: Cardiology;  Laterality: Left;    CORONARY ARTERY BYPASS GRAFT (CABG) N/A 9/19/2023    Procedure: CORONARY ARTERY BYPASS GRAFT (CABG);  Surgeon: Nishant Douglas MD;  Location: [facility] OR;   Service: Cardiothoracic;  Laterality: N/A;  5-VESSEL WITH EPI-AORTIC ULTRASOUND    ECHOCARDIOGRAM,TRANSESOPHAGEAL N/A 9/19/2023    Procedure: ECHOCARDIOGRAM,TRANSESOPHAGEAL;  Surgeon: Nishant Douglas MD;  Location: [facility] OR;  Service: Cardiothoracic;  Laterality: N/A;    ENDOSCOPIC HARVEST OF VEIN Left 9/19/2023    Procedure: SURGICAL PROCUREMENT, VEIN, ENDOSCOPIC;  Surgeon: Nishant Douglas MD;  Location: [facility] OR;  Service: Cardiothoracic;  Laterality: Left;    INJECTION OF ANESTHETIC AGENT AROUND MULTIPLE INTERCOSTAL NERVES N/A 9/19/2023    Procedure: BLOCK, NERVE, INTERCOSTAL, 2 OR MORE;  Surgeon: Nishant Douglas MD;  Location: [facility] OR;  Service: Cardiothoracic;  Laterality: N/A;  PARA-STERNAL NERVE BLOCK    LEFT HEART CATHETERIZATION Left 9/18/2023    Procedure: Left heart cath;  Surgeon: Chelsea Morales MD;  Location: [facility] CATH LAB;  Service: Cardiology;  Laterality: Left;     Family History   Problem Relation Age of Onset    Strabismus Mother     Hypertension Father     Macular degeneration Father     Cataracts Maternal Aunt     Cataracts Maternal Uncle      Social History     Socioeconomic History    Marital status: Single   Tobacco Use    Smoking status: Former     Current packs/day: 1.00     Types: Cigarettes    Smokeless tobacco: Never   Substance and Sexual Activity    Alcohol use: Yes     Comment: rarely    Drug use: No     Patient Active Problem List   Diagnosis    NSTEMI (non-ST elevated myocardial infarction)    Hypertension    CAD, multiple vessel    Tobacco smoker, 1 pack of cigarettes or less per day    Nonrheumatic aortic valve insufficiency    S/P CABG x 5    S/P AVR (aortic valve replacement)     Review of patient's allergies indicates:  No Known Allergies    The following were reviewed at this visit: active problem list, medication list, allergies, family history, social history, and health maintenance.    Medications:  Current Outpatient Medications on File Prior to Visit   Medication Sig  Dispense Refill    acetaminophen (TYLENOL) 325 MG tablet Take 2 tablets (650 mg total) by mouth every 6 (six) hours as needed for Pain. 20 tablet 0    aspirin (ECOTRIN) 81 MG EC tablet Take 1 tablet (81 mg total) by mouth once daily. 360 tablet 0    atorvastatin (LIPITOR) 80 MG tablet Take 1 tablet (80 mg total) by mouth once daily. 90 tablet 3    folic acid (FOLVITE) 1 MG tablet Take 1 tablet (1 mg total) by mouth once daily. 30 tablet 0    metoprolol tartrate (LOPRESSOR) 50 MG tablet Take 1 tablet (50 mg total) by mouth 2 (two) times daily. 180 tablet 3    traZODone (DESYREL) 50 MG tablet Take 1 tablet (50 mg total) by mouth every evening. 30 tablet 11    triamcinolone acetonide 0.1% (KENALOG) 0.1 % cream Apply topically 2 (two) times daily. 80 g 3    warfarin (COUMADIN) 4 MG tablet Take 1 tablet (4 mg total) by mouth Daily. 30 tablet 11     No current facility-administered medications on file prior to visit.       Medications have been reviewed and reconciled with patient at this visit.  Barriers to medications reviewed with patient.    Adverse reactions to current medications reviewed with patient..    Over the counter medications reviewed and reconciled with patient.    Exam:  Wt Readings from Last 3 Encounters:   12/07/23 82.8 kg (182 lb 8.7 oz)   10/18/23 78.7 kg (173 lb 8 oz)   10/18/23 78.7 kg (173 lb 8 oz)     Temp Readings from Last 3 Encounters:   12/07/23 97.1 °F (36.2 °C) (Tympanic)   10/11/23 98.1 °F (36.7 °C) (Tympanic)   10/03/23 98.8 °F (37.1 °C) (Oral)     BP Readings from Last 3 Encounters:   12/07/23 110/70   10/18/23 112/74   10/18/23 112/74     Pulse Readings from Last 3 Encounters:   12/07/23 63   10/18/23 93   10/18/23 93     Body mass index is 27.76 kg/m².      Review of Systems   Constitutional: Negative.  Negative for chills and fever.   HENT: Negative.  Negative for congestion, sinus pain and sore throat.    Eyes:  Negative for blurred vision and double vision.   Respiratory:  Negative  for cough, sputum production, shortness of breath and wheezing.    Cardiovascular:  Negative for chest pain, palpitations and leg swelling.   Gastrointestinal:  Negative for abdominal pain, constipation, diarrhea, heartburn, nausea and vomiting.   Genitourinary: Negative.    Musculoskeletal: Negative.    Skin: Negative.  Negative for rash.   Neurological: Negative.    Endo/Heme/Allergies: Negative.  Negative for polydipsia. Does not bruise/bleed easily.   Psychiatric/Behavioral:  Negative for depression and substance abuse.      Physical Exam  Nursing note reviewed.   Pulmonary:      Effort: Pulmonary effort is normal. No respiratory distress.   Neurological:      Mental Status: He is alert and oriented to person, place, and time.   Psychiatric:         Mood and Affect: Mood normal.         Behavior: Behavior normal.         Thought Content: Thought content normal.         Judgment: Judgment normal.         Laboratory Reviewed ({Yes)  Lab Results   Component Value Date    WBC 6.33 10/13/2023    HGB 10.3 (L) 10/13/2023    HCT 32.4 (L) 10/13/2023     (H) 10/13/2023    CHOL 150 09/17/2023    TRIG 145 09/17/2023    HDL 37 (L) 09/17/2023    ALT 32 09/22/2023    ALT 32 09/22/2023    AST 48 (H) 09/22/2023    AST 48 (H) 09/22/2023     10/03/2023    K 3.9 10/03/2023     10/03/2023    CREATININE 0.9 10/03/2023    BUN 12 10/03/2023    CO2 22 (L) 10/03/2023    INR 3.1 (A) 12/04/2023    HGBA1C 5.3 09/17/2023       Mikie was seen today for establish care.    Diagnoses and all orders for this visit:    NSTEMI (non-ST elevated myocardial infarction)    CAD, multiple vessel    S/P AVR (aortic valve replacement)    S/P CABG x 5    BB, Asa, coumadin, Statin  Recheck 6 months, for CBC and Lipids  Goals discussed    No Benzo for sleep. Can use trazodone, and increase dose if needed      Colon will be deferred for a year post op- due to not being able to stop OAC at this time.        Care Plan/Goals: Reviewed    Goals     None         Follow up: No follow-ups on file.    After visit summary was printed and given to patient upon discharge today.  Patient goals and care plan are included in After Visit Summary.

## 2023-12-11 ENCOUNTER — ANTI-COAG VISIT (OUTPATIENT)
Dept: CARDIOLOGY | Facility: CLINIC | Age: 43
End: 2023-12-11
Payer: COMMERCIAL

## 2023-12-11 DIAGNOSIS — Z95.2 S/P AVR (AORTIC VALVE REPLACEMENT): ICD-10-CM

## 2023-12-11 DIAGNOSIS — Z95.1 S/P CABG X 5: ICD-10-CM

## 2023-12-11 DIAGNOSIS — Z79.01 LONG TERM (CURRENT) USE OF ANTICOAGULANTS: Primary | ICD-10-CM

## 2023-12-11 LAB — INR PPP: 2.8 (ref 2–3)

## 2023-12-11 PROCEDURE — 85610 PROTHROMBIN TIME: CPT | Mod: QW,S$GLB,, | Performed by: INTERNAL MEDICINE

## 2023-12-11 PROCEDURE — 93793 ANTICOAG MGMT PT WARFARIN: CPT | Mod: S$GLB,,,

## 2023-12-11 PROCEDURE — 93793 PR ANTICOAGULANT MGMT FOR PT TAKING WARFARIN: ICD-10-PCS | Mod: S$GLB,,,

## 2023-12-11 PROCEDURE — 85610 POCT INR: ICD-10-PCS | Mod: QW,S$GLB,, | Performed by: INTERNAL MEDICINE

## 2023-12-11 NOTE — PROGRESS NOTES
INR is therapeutic at 2.8.  Patient reports warfarin 4 mg daily taken as directed.  No dietary changes reported.  Instructions given to continue same dose of warfarin.  Will recheck next week with Cardio appointment.  Dose calendar given - patient confirms understanding.

## 2023-12-18 PROBLEM — I21.4 NSTEMI (NON-ST ELEVATED MYOCARDIAL INFARCTION): Status: RESOLVED | Noted: 2023-09-17 | Resolved: 2023-12-18

## 2023-12-22 ENCOUNTER — ANTI-COAG VISIT (OUTPATIENT)
Dept: CARDIOLOGY | Facility: CLINIC | Age: 43
End: 2023-12-22
Payer: COMMERCIAL

## 2023-12-22 ENCOUNTER — OFFICE VISIT (OUTPATIENT)
Dept: CARDIOLOGY | Facility: CLINIC | Age: 43
End: 2023-12-22
Payer: MEDICAID

## 2023-12-22 VITALS
HEIGHT: 68 IN | WEIGHT: 184.5 LBS | DIASTOLIC BLOOD PRESSURE: 80 MMHG | OXYGEN SATURATION: 97 % | HEART RATE: 77 BPM | SYSTOLIC BLOOD PRESSURE: 130 MMHG | BODY MASS INDEX: 27.96 KG/M2

## 2023-12-22 DIAGNOSIS — I10 PRIMARY HYPERTENSION: Primary | ICD-10-CM

## 2023-12-22 DIAGNOSIS — I35.1 NONRHEUMATIC AORTIC VALVE INSUFFICIENCY: ICD-10-CM

## 2023-12-22 DIAGNOSIS — Z95.1 S/P CABG X 5: ICD-10-CM

## 2023-12-22 DIAGNOSIS — Z79.01 LONG TERM (CURRENT) USE OF ANTICOAGULANTS: Primary | ICD-10-CM

## 2023-12-22 DIAGNOSIS — I25.10 CAD, MULTIPLE VESSEL: ICD-10-CM

## 2023-12-22 DIAGNOSIS — E78.00 PURE HYPERCHOLESTEROLEMIA: ICD-10-CM

## 2023-12-22 DIAGNOSIS — Z95.2 S/P AVR (AORTIC VALVE REPLACEMENT): ICD-10-CM

## 2023-12-22 LAB — INR PPP: 2.2 (ref 2.5–3)

## 2023-12-22 PROCEDURE — 99999 PR PBB SHADOW E&M-EST. PATIENT-LVL IV: CPT | Mod: PBBFAC,,, | Performed by: INTERNAL MEDICINE

## 2023-12-22 PROCEDURE — 3044F HG A1C LEVEL LT 7.0%: CPT | Mod: CPTII,,, | Performed by: INTERNAL MEDICINE

## 2023-12-22 PROCEDURE — 99214 OFFICE O/P EST MOD 30 MIN: CPT | Mod: PBBFAC | Performed by: INTERNAL MEDICINE

## 2023-12-22 PROCEDURE — 99214 PR OFFICE/OUTPT VISIT, EST, LEVL IV, 30-39 MIN: ICD-10-PCS | Mod: S$PBB,,, | Performed by: INTERNAL MEDICINE

## 2023-12-22 PROCEDURE — 1159F MED LIST DOCD IN RCRD: CPT | Mod: CPTII,,, | Performed by: INTERNAL MEDICINE

## 2023-12-22 PROCEDURE — 3079F DIAST BP 80-89 MM HG: CPT | Mod: CPTII,,, | Performed by: INTERNAL MEDICINE

## 2023-12-22 PROCEDURE — 99999 PR PBB SHADOW E&M-EST. PATIENT-LVL IV: ICD-10-PCS | Mod: PBBFAC,,, | Performed by: INTERNAL MEDICINE

## 2023-12-22 PROCEDURE — 1159F PR MEDICATION LIST DOCUMENTED IN MEDICAL RECORD: ICD-10-PCS | Mod: CPTII,,, | Performed by: INTERNAL MEDICINE

## 2023-12-22 PROCEDURE — 3044F PR MOST RECENT HEMOGLOBIN A1C LEVEL <7.0%: ICD-10-PCS | Mod: CPTII,,, | Performed by: INTERNAL MEDICINE

## 2023-12-22 PROCEDURE — 99214 OFFICE O/P EST MOD 30 MIN: CPT | Mod: S$PBB,,, | Performed by: INTERNAL MEDICINE

## 2023-12-22 PROCEDURE — 3008F PR BODY MASS INDEX (BMI) DOCUMENTED: ICD-10-PCS | Mod: CPTII,,, | Performed by: INTERNAL MEDICINE

## 2023-12-22 PROCEDURE — 1160F PR REVIEW ALL MEDS BY PRESCRIBER/CLIN PHARMACIST DOCUMENTED: ICD-10-PCS | Mod: CPTII,,, | Performed by: INTERNAL MEDICINE

## 2023-12-22 PROCEDURE — 85610 POCT INR: ICD-10-PCS | Mod: QW,S$GLB,, | Performed by: INTERNAL MEDICINE

## 2023-12-22 PROCEDURE — 3079F PR MOST RECENT DIASTOLIC BLOOD PRESSURE 80-89 MM HG: ICD-10-PCS | Mod: CPTII,,, | Performed by: INTERNAL MEDICINE

## 2023-12-22 PROCEDURE — 3075F PR MOST RECENT SYSTOLIC BLOOD PRESS GE 130-139MM HG: ICD-10-PCS | Mod: CPTII,,, | Performed by: INTERNAL MEDICINE

## 2023-12-22 PROCEDURE — 85610 PROTHROMBIN TIME: CPT | Mod: QW,S$GLB,, | Performed by: INTERNAL MEDICINE

## 2023-12-22 PROCEDURE — 1160F RVW MEDS BY RX/DR IN RCRD: CPT | Mod: CPTII,,, | Performed by: INTERNAL MEDICINE

## 2023-12-22 PROCEDURE — 3075F SYST BP GE 130 - 139MM HG: CPT | Mod: CPTII,,, | Performed by: INTERNAL MEDICINE

## 2023-12-22 PROCEDURE — 3008F BODY MASS INDEX DOCD: CPT | Mod: CPTII,,, | Performed by: INTERNAL MEDICINE

## 2023-12-22 NOTE — PROGRESS NOTES
"INR is below goal at 2.2.  Patient reports no missed doses or dietary changes.  Confirms current warfarin dose.  No s/s reported.  Reports, he will be having cabbage on New Years ("small serving").  Instructions given:  Will boost today's dose to 6 mg, then re-challenge current dose of 4 mg daily.  Recheck INR in 2 weeks.  Dose calendar given and reviewed with patient - confirms understanding.    "

## 2023-12-22 NOTE — PROGRESS NOTES
Subjective:   Patient ID:  Mikie Alcazar is a 43 y.o. male who presents for follow-up of No chief complaint on file.  Pt is s/p CABG- 5V and AVR ( mechanical) 9-23  Patient denies CP, angina or anginal equivalent.  Pt active without sx    Hypertension  This is a chronic problem. The current episode started more than 1 year ago. The problem has been gradually improving since onset. The problem is controlled. Pertinent negatives include no chest pain, palpitations or shortness of breath. Past treatments include beta blockers. The current treatment provides moderate improvement. There are no compliance problems.    Hyperlipidemia  This is a chronic problem. The current episode started more than 1 year ago. The problem is controlled. Pertinent negatives include no chest pain or shortness of breath. Current antihyperlipidemic treatment includes statins. The current treatment provides moderate improvement of lipids.   Coronary Artery Disease  Presents for follow-up visit. Pertinent negatives include no chest pain, chest pressure, chest tightness, dizziness, leg swelling, muscle weakness, palpitations, shortness of breath or weight gain. Risk factors include hyperlipidemia. The symptoms have been stable. Compliance with diet is good. Compliance with exercise is good. Compliance with medications is good.       Review of Systems   Constitutional: Negative. Negative for weight gain.   HENT: Negative.     Eyes: Negative.    Cardiovascular: Negative.  Negative for chest pain, leg swelling and palpitations.   Respiratory: Negative.  Negative for chest tightness and shortness of breath.    Endocrine: Negative.    Hematologic/Lymphatic: Negative.    Skin: Negative.    Musculoskeletal:  Negative for muscle weakness.   Gastrointestinal: Negative.    Genitourinary: Negative.    Neurological: Negative.  Negative for dizziness.   Psychiatric/Behavioral: Negative.     Allergic/Immunologic: Negative.    All other systems reviewed and  are negative.    Family History   Problem Relation Age of Onset    Strabismus Mother     Hypertension Father     Macular degeneration Father     Cataracts Maternal Aunt     Cataracts Maternal Uncle      Past Medical History:   Diagnosis Date    Hypertension 9/18/2023     Social History     Socioeconomic History    Marital status: Single   Tobacco Use    Smoking status: Former     Current packs/day: 1.00     Types: Cigarettes    Smokeless tobacco: Never   Substance and Sexual Activity    Alcohol use: Yes     Comment: rarely    Drug use: No     Current Outpatient Medications on File Prior to Visit   Medication Sig Dispense Refill    acetaminophen (TYLENOL) 325 MG tablet Take 2 tablets (650 mg total) by mouth every 6 (six) hours as needed for Pain. 20 tablet 0    aspirin (ECOTRIN) 81 MG EC tablet Take 1 tablet (81 mg total) by mouth once daily. 360 tablet 0    atorvastatin (LIPITOR) 80 MG tablet Take 1 tablet (80 mg total) by mouth once daily. 90 tablet 3    folic acid (FOLVITE) 1 MG tablet Take 1 tablet (1 mg total) by mouth once daily. 30 tablet 0    metoprolol tartrate (LOPRESSOR) 50 MG tablet Take 1 tablet (50 mg total) by mouth 2 (two) times daily. 180 tablet 3    traZODone (DESYREL) 50 MG tablet Take 1 tablet (50 mg total) by mouth every evening. 30 tablet 11    triamcinolone acetonide 0.1% (KENALOG) 0.1 % cream Apply topically 2 (two) times daily. 80 g 3    warfarin (COUMADIN) 4 MG tablet Take 1 tablet (4 mg total) by mouth Daily. 30 tablet 11     No current facility-administered medications on file prior to visit.     Review of patient's allergies indicates:  No Known Allergies    Objective:     Physical Exam  Vitals and nursing note reviewed.   Constitutional:       Appearance: He is well-developed.   HENT:      Head: Normocephalic and atraumatic.   Eyes:      Conjunctiva/sclera: Conjunctivae normal.      Pupils: Pupils are equal, round, and reactive to light.   Cardiovascular:      Rate and Rhythm: Normal  rate and regular rhythm.      Pulses: Intact distal pulses.      Heart sounds: Normal heart sounds.   Pulmonary:      Effort: Pulmonary effort is normal.      Breath sounds: Normal breath sounds.   Abdominal:      General: Bowel sounds are normal.      Palpations: Abdomen is soft.   Musculoskeletal:      Cervical back: Normal range of motion and neck supple.   Skin:     General: Skin is warm and dry.   Neurological:      Mental Status: He is alert and oriented to person, place, and time.         Assessment:     1. Primary hypertension    2. CAD, multiple vessel    3. Nonrheumatic aortic valve insufficiency    4. S/P CABG x 5    5. S/P AVR (aortic valve replacement)        Plan:     Primary hypertension    CAD, multiple vessel    Nonrheumatic aortic valve insufficiency    S/P CABG x 5    S/P AVR (aortic valve replacement)      Echo  Continue statin-HLP  Continue metoprolol-HTN  Continue asa- CAD/CABG  Continue coumadin- AVR

## 2023-12-26 ENCOUNTER — HOSPITAL ENCOUNTER (OUTPATIENT)
Dept: CARDIOLOGY | Facility: HOSPITAL | Age: 43
Discharge: HOME | End: 2023-12-26
Attending: INTERNAL MEDICINE
Payer: COMMERCIAL

## 2023-12-26 VITALS
DIASTOLIC BLOOD PRESSURE: 80 MMHG | SYSTOLIC BLOOD PRESSURE: 130 MMHG | BODY MASS INDEX: 27.89 KG/M2 | HEIGHT: 68 IN | WEIGHT: 184 LBS

## 2023-12-26 DIAGNOSIS — Z95.2 S/P AVR (AORTIC VALVE REPLACEMENT): ICD-10-CM

## 2023-12-26 LAB
AV INDEX (PROSTH): 0.54
AV MEAN GRADIENT: 8 MMHG
AV PEAK GRADIENT: 17 MMHG
AV REGURGITATION PRESSURE HALF TIME: 821.08 MS
AV VALVE AREA BY VELOCITY RATIO: 1.35 CM²
AV VALVE AREA: 1.35 CM²
AV VELOCITY RATIO: 0.54
BSA FOR ECHO PROCEDURE: 2 M2
CV ECHO LV RWT: 0.61 CM
DOP CALC AO PEAK VEL: 2.07 M/S
DOP CALC AO VTI: 44.7 CM
DOP CALC LVOT AREA: 2.5 CM2
DOP CALC LVOT DIAMETER: 1.78 CM
DOP CALC LVOT PEAK VEL: 1.12 M/S
DOP CALC LVOT STROKE VOLUME: 60.44 CM3
DOP CALC RVOT PEAK VEL: 1.05 M/S
DOP CALC RVOT VTI: 23.6 CM
DOP CALCLVOT PEAK VEL VTI: 24.3 CM
E WAVE DECELERATION TIME: 272.5 MSEC
E/A RATIO: 1.28
E/E' RATIO: 19.69 M/S
ECHO LV POSTERIOR WALL: 1.25 CM (ref 0.6–1.1)
FRACTIONAL SHORTENING: 26 % (ref 28–44)
INTERVENTRICULAR SEPTUM: 0.86 CM (ref 0.6–1.1)
IVC DIAMETER: 1.99 CM
IVRT: 88.49 MSEC
LA MAJOR: 5.33 CM
LA MINOR: 5.37 CM
LA WIDTH: 3.3 CM
LEFT ATRIUM SIZE: 3.31 CM
LEFT ATRIUM VOLUME INDEX MOD: 22.9 ML/M2
LEFT ATRIUM VOLUME INDEX: 25.2 ML/M2
LEFT ATRIUM VOLUME MOD: 45.19 CM3
LEFT ATRIUM VOLUME: 49.67 CM3
LEFT INTERNAL DIMENSION IN SYSTOLE: 3.03 CM (ref 2.1–4)
LEFT VENTRICLE DIASTOLIC VOLUME INDEX: 38.07 ML/M2
LEFT VENTRICLE DIASTOLIC VOLUME: 75 ML
LEFT VENTRICLE MASS INDEX: 73 G/M2
LEFT VENTRICLE SYSTOLIC VOLUME INDEX: 18.2 ML/M2
LEFT VENTRICLE SYSTOLIC VOLUME: 35.9 ML
LEFT VENTRICULAR INTERNAL DIMENSION IN DIASTOLE: 4.12 CM (ref 3.5–6)
LEFT VENTRICULAR MASS: 143.6 G
LV LATERAL E/E' RATIO: 16 M/S
LV SEPTAL E/E' RATIO: 25.6 M/S
LVOT MG: 2.52 MMHG
LVOT MV: 0.73 CM/S
MV PEAK A VEL: 1 M/S
MV PEAK E VEL: 1.28 M/S
MV STENOSIS PRESSURE HALF TIME: 79.02 MS
MV VALVE AREA P 1/2 METHOD: 2.78 CM2
PISA AR MAX VEL: 4.3 M/S
PISA TR MAX VEL: 2.43 M/S
PULM VEIN S/D RATIO: 0.87
PV MEAN GRADIENT: 2 MMHG
PV MV: 0.8 M/S
PV PEAK D VEL: 0.71 M/S
PV PEAK GRADIENT: 6 MMHG
PV PEAK S VEL: 0.62 M/S
PV PEAK VELOCITY: 1.23 M/S
RA MAJOR: 4.77 CM
RA PRESSURE ESTIMATED: 3 MMHG
RA WIDTH: 4.08 CM
RIGHT VENTRICULAR END-DIASTOLIC DIMENSION: 3.63 CM
RV TB RVSP: 5 MMHG
SINUS: 2.77 CM
STJ: 2.92 CM
TDI LATERAL: 0.08 M/S
TDI SEPTAL: 0.05 M/S
TDI: 0.07 M/S
TR MAX PG: 24 MMHG
TRICUSPID ANNULAR PLANE SYSTOLIC EXCURSION: 1.21 CM
TV REST PULMONARY ARTERY PRESSURE: 27 MMHG
Z-SCORE OF LEFT VENTRICULAR DIMENSION IN END DIASTOLE: -3.18
Z-SCORE OF LEFT VENTRICULAR DIMENSION IN END SYSTOLE: -1.09

## 2023-12-26 PROCEDURE — 93306 TTE W/DOPPLER COMPLETE: CPT

## 2023-12-26 PROCEDURE — 93306 TTE W/DOPPLER COMPLETE: CPT | Mod: 26,,, | Performed by: INTERNAL MEDICINE

## 2023-12-26 PROCEDURE — 93306 ECHO (CUPID ONLY): ICD-10-PCS | Mod: 26,,, | Performed by: INTERNAL MEDICINE

## 2023-12-27 ENCOUNTER — TELEPHONE (OUTPATIENT)
Dept: CARDIOLOGY | Facility: CLINIC | Age: 43
End: 2023-12-27
Payer: COMMERCIAL

## 2023-12-27 NOTE — TELEPHONE ENCOUNTER
Spoke with pt and discussed echo results. Pt verbalized understanding and will call back with any further questions or concerns.     ----- Message from Shilo Davenport MD sent at 12/27/2023  6:47 AM CST -----  Echo shows normal systolic function, nml AVR

## 2024-01-05 ENCOUNTER — ANTI-COAG VISIT (OUTPATIENT)
Dept: CARDIOLOGY | Facility: CLINIC | Age: 44
End: 2024-01-05
Payer: MEDICAID

## 2024-01-05 DIAGNOSIS — Z79.01 LONG TERM (CURRENT) USE OF ANTICOAGULANTS: Primary | ICD-10-CM

## 2024-01-05 DIAGNOSIS — Z95.2 S/P AVR (AORTIC VALVE REPLACEMENT): ICD-10-CM

## 2024-01-05 DIAGNOSIS — Z95.1 S/P CABG X 5: ICD-10-CM

## 2024-01-05 LAB — INR PPP: 4.1 (ref 2.5–3)

## 2024-01-05 PROCEDURE — 93793 ANTICOAG MGMT PT WARFARIN: CPT | Mod: S$GLB,,,

## 2024-01-05 PROCEDURE — 85610 PROTHROMBIN TIME: CPT | Mod: PBBFAC

## 2024-01-05 PROCEDURE — 85610 PROTHROMBIN TIME: CPT | Mod: QW,S$GLB,, | Performed by: INTERNAL MEDICINE

## 2024-01-05 NOTE — PROGRESS NOTES
"INR is supratherapeutic at 4.1.  Patient reports "two alcohol shots" recently.  Previous warfarin instructions were verified and followed.  No bleeding issues reported.  Warfarin/alcohol interactions discussed.  Instructions given to hold warfarin dose today, then resume current dose of 4 mg daily.  Will recheck INR in 2 weeks.  Bleeding precautions given - ED for any issues.  Dose calendar given and reviewed with patient.  Patient confirms understanding of instructions given.     "

## 2024-01-19 ENCOUNTER — ANTI-COAG VISIT (OUTPATIENT)
Dept: CARDIOLOGY | Facility: CLINIC | Age: 44
End: 2024-01-19
Payer: COMMERCIAL

## 2024-01-19 DIAGNOSIS — Z79.01 LONG TERM (CURRENT) USE OF ANTICOAGULANTS: Primary | ICD-10-CM

## 2024-01-19 DIAGNOSIS — Z95.1 S/P CABG X 5: ICD-10-CM

## 2024-01-19 DIAGNOSIS — Z95.2 S/P AVR (AORTIC VALVE REPLACEMENT): ICD-10-CM

## 2024-01-19 LAB — INR PPP: 2.3 (ref 2.5–3)

## 2024-01-19 PROCEDURE — 85610 PROTHROMBIN TIME: CPT | Mod: QW,S$GLB,, | Performed by: INTERNAL MEDICINE

## 2024-01-19 PROCEDURE — 93793 ANTICOAG MGMT PT WARFARIN: CPT | Mod: S$GLB,,,

## 2024-01-19 NOTE — PROGRESS NOTES
INR is slightly below goal at 2.3.  Patient did hold warfarin dose for 1 day for previous elevated INR as instructed.  No s/s reported.  Will maintain current dose of 4 mg daily.  Recheck INR on 2/06/2024 (2.5 weeks).  Patient confirms understanding.

## 2024-01-23 ENCOUNTER — TELEPHONE (OUTPATIENT)
Dept: OPHTHALMOLOGY | Facility: CLINIC | Age: 44
End: 2024-01-23
Payer: COMMERCIAL

## 2024-01-23 NOTE — TELEPHONE ENCOUNTER
Called at 7:43Am to advise patient that I will send the message to central scheduling and someone will call her back to schedule appt by the end of the business day.     SRS

## 2024-01-23 NOTE — TELEPHONE ENCOUNTER
----- Message from Yoli Brown sent at 1/22/2024  5:17 PM CST -----  Contact: mother  Type:  Appointment Request    Caller is requesting a sooner appointment.  Caller declined first available appointment listed below.  Caller will not accept being placed on the waitlist and is requesting a message be sent to doctor.  Name of Caller:mother  When is the first available appointment?books are closed   Symptoms:yearly   Would the patient rather a call back or a response via [facility]? call  Best Call Back Number:[phone]  Additional Information:

## 2024-01-26 ENCOUNTER — TELEPHONE (OUTPATIENT)
Dept: OPHTHALMOLOGY | Facility: CLINIC | Age: 44
End: 2024-01-26
Payer: COMMERCIAL

## 2024-01-26 NOTE — TELEPHONE ENCOUNTER
----- Message from Bren Borrego sent at 1/23/2024  7:44 AM CST -----  Contact: mother  Please call to schedule patient, I did not have any available appts based on the insurance restriction   Thank you ,   Lorri   ----- Message -----  From: Yoli Brown  Sent: 1/22/2024   5:18 PM CST  To: [facility] Staff    Type:  Appointment Request    Caller is requesting a sooner appointment.  Caller declined first available appointment listed below.  Caller will not accept being placed on the waitlist and is requesting a message be sent to doctor.  Name of Caller:mother  When is the first available appointment?books are closed   Symptoms:yearly   Would the patient rather a call back or a response via [facility]? call  Best Call Back Number:[phone]  Additional Information:

## 2024-02-06 ENCOUNTER — ANTI-COAG VISIT (OUTPATIENT)
Dept: CARDIOLOGY | Facility: CLINIC | Age: 44
End: 2024-02-06
Payer: COMMERCIAL

## 2024-02-06 DIAGNOSIS — Z95.2 S/P AVR (AORTIC VALVE REPLACEMENT): ICD-10-CM

## 2024-02-06 DIAGNOSIS — Z79.01 LONG TERM (CURRENT) USE OF ANTICOAGULANTS: Primary | ICD-10-CM

## 2024-02-06 DIAGNOSIS — Z95.1 S/P CABG X 5: ICD-10-CM

## 2024-02-06 LAB — INR PPP: 3.1 (ref 2.5–3)

## 2024-02-06 PROCEDURE — 85610 PROTHROMBIN TIME: CPT | Mod: QW,S$GLB,, | Performed by: INTERNAL MEDICINE

## 2024-02-06 PROCEDURE — 93793 ANTICOAG MGMT PT WARFARIN: CPT | Mod: S$GLB,,,

## 2024-02-06 NOTE — PROGRESS NOTES
INR is just above goal at 3.1.  Previous warfarin instructions were verified and followed.  No bleeding issues or changes reported.  Patient reports he will be having a small serving of broccoli today as normal.  We will continue same dose of warfarin - 4 mg daily.  Advised to eat a small serving of broccoli and to keep diet consistent.  Will recheck INR in 3 weeks.  Dose calendar given - confirms understanding.

## 2024-02-10 ENCOUNTER — HOSPITAL ENCOUNTER (EMERGENCY)
Facility: HOSPITAL | Age: 44
Discharge: HOME | End: 2024-02-10
Attending: FAMILY MEDICINE
Payer: COMMERCIAL

## 2024-02-10 VITALS
BODY MASS INDEX: 27.14 KG/M2 | WEIGHT: 178.44 LBS | DIASTOLIC BLOOD PRESSURE: 84 MMHG | RESPIRATION RATE: 18 BRPM | OXYGEN SATURATION: 98 % | SYSTOLIC BLOOD PRESSURE: 176 MMHG | TEMPERATURE: 98 F | HEART RATE: 59 BPM

## 2024-02-10 DIAGNOSIS — F32.A DEPRESSION, UNSPECIFIED DEPRESSION TYPE: Primary | ICD-10-CM

## 2024-02-10 LAB
ALBUMIN SERPL BCP-MCNC: 3.9 G/DL (ref 3.5–5.2)
ALP SERPL-CCNC: 72 U/L (ref 55–135)
ALT SERPL W/O P-5'-P-CCNC: 15 U/L (ref 10–44)
AMPHET+METHAMPHET UR QL: NEGATIVE
ANION GAP SERPL CALC-SCNC: 10 MMOL/L (ref 8–16)
APAP SERPL-MCNC: <3 UG/ML (ref 10–20)
AST SERPL-CCNC: 14 U/L (ref 10–40)
BACTERIA #/AREA URNS HPF: ABNORMAL /HPF
BARBITURATES UR QL SCN>200 NG/ML: NEGATIVE
BASOPHILS # BLD AUTO: 0.07 K/UL (ref 0–0.2)
BASOPHILS NFR BLD: 0.8 % (ref 0–1.9)
BENZODIAZ UR QL SCN>200 NG/ML: NEGATIVE
BILIRUB SERPL-MCNC: 0.4 MG/DL (ref 0.1–1)
BILIRUB UR QL STRIP: NEGATIVE
BUN SERPL-MCNC: 13 MG/DL (ref 6–20)
BZE UR QL SCN: NEGATIVE
CALCIUM SERPL-MCNC: 9 MG/DL (ref 8.7–10.5)
CANNABINOIDS UR QL SCN: ABNORMAL
CHLORIDE SERPL-SCNC: 106 MMOL/L (ref 95–110)
CLARITY UR: CLEAR
CO2 SERPL-SCNC: 21 MMOL/L (ref 23–29)
COLOR UR: YELLOW
CREAT SERPL-MCNC: 1.1 MG/DL (ref 0.5–1.4)
CREAT UR-MCNC: 125.3 MG/DL (ref 23–375)
DIFFERENTIAL METHOD BLD: ABNORMAL
EOSINOPHIL # BLD AUTO: 0.1 K/UL (ref 0–0.5)
EOSINOPHIL NFR BLD: 1.3 % (ref 0–8)
ERYTHROCYTE [DISTWIDTH] IN BLOOD BY AUTOMATED COUNT: 15.2 % (ref 11.5–14.5)
EST. GFR  (NO RACE VARIABLE): >60 ML/MIN/1.73 M^2
ETHANOL SERPL-MCNC: <10 MG/DL
GLUCOSE SERPL-MCNC: 112 MG/DL (ref 70–110)
GLUCOSE UR QL STRIP: NEGATIVE
HCT VFR BLD AUTO: 41.3 % (ref 40–54)
HGB BLD-MCNC: 14.2 G/DL (ref 14–18)
HGB UR QL STRIP: ABNORMAL
HYALINE CASTS #/AREA URNS LPF: 11 /LPF
IMM GRANULOCYTES # BLD AUTO: 0.02 K/UL (ref 0–0.04)
IMM GRANULOCYTES NFR BLD AUTO: 0.2 % (ref 0–0.5)
KETONES UR QL STRIP: NEGATIVE
LEUKOCYTE ESTERASE UR QL STRIP: NEGATIVE
LYMPHOCYTES # BLD AUTO: 1.9 K/UL (ref 1–4.8)
LYMPHOCYTES NFR BLD: 20.5 % (ref 18–48)
MCH RBC QN AUTO: 28.3 PG (ref 27–31)
MCHC RBC AUTO-ENTMCNC: 34.4 G/DL (ref 32–36)
MCV RBC AUTO: 82 FL (ref 82–98)
METHADONE UR QL SCN>300 NG/ML: NEGATIVE
MICROSCOPIC COMMENT: ABNORMAL
MONOCYTES # BLD AUTO: 0.7 K/UL (ref 0.3–1)
MONOCYTES NFR BLD: 8.1 % (ref 4–15)
NEUTROPHILS # BLD AUTO: 6.2 K/UL (ref 1.8–7.7)
NEUTROPHILS NFR BLD: 69.1 % (ref 38–73)
NITRITE UR QL STRIP: NEGATIVE
NRBC BLD-RTO: 0 /100 WBC
OPIATES UR QL SCN: NEGATIVE
PCP UR QL SCN>25 NG/ML: NEGATIVE
PH UR STRIP: 6 [PH] (ref 5–8)
PLATELET # BLD AUTO: 262 K/UL (ref 150–450)
PMV BLD AUTO: 8.8 FL (ref 9.2–12.9)
POTASSIUM SERPL-SCNC: 3.8 MMOL/L (ref 3.5–5.1)
PROT SERPL-MCNC: 7.3 G/DL (ref 6–8.4)
PROT UR QL STRIP: ABNORMAL
RBC # BLD AUTO: 5.01 M/UL (ref 4.6–6.2)
RBC #/AREA URNS HPF: 5 /HPF (ref 0–4)
SODIUM SERPL-SCNC: 137 MMOL/L (ref 136–145)
SP GR UR STRIP: 1.02 (ref 1–1.03)
TOXICOLOGY INFORMATION: ABNORMAL
TSH SERPL DL<=0.005 MIU/L-ACNC: 3.36 UIU/ML (ref 0.4–4)
URN SPEC COLLECT METH UR: ABNORMAL
UROBILINOGEN UR STRIP-ACNC: NEGATIVE EU/DL
WBC # BLD AUTO: 9.02 K/UL (ref 3.9–12.7)
WBC #/AREA URNS HPF: 0 /HPF (ref 0–5)

## 2024-02-10 PROCEDURE — 80143 DRUG ASSAY ACETAMINOPHEN: CPT | Performed by: FAMILY MEDICINE

## 2024-02-10 PROCEDURE — 82077 ASSAY SPEC XCP UR&BREATH IA: CPT | Performed by: FAMILY MEDICINE

## 2024-02-10 PROCEDURE — 81000 URINALYSIS NONAUTO W/SCOPE: CPT | Mod: 59 | Performed by: FAMILY MEDICINE

## 2024-02-10 PROCEDURE — 80053 COMPREHEN METABOLIC PANEL: CPT | Performed by: FAMILY MEDICINE

## 2024-02-10 PROCEDURE — 85025 COMPLETE CBC W/AUTO DIFF WBC: CPT | Performed by: FAMILY MEDICINE

## 2024-02-10 PROCEDURE — 99283 EMERGENCY DEPT VISIT LOW MDM: CPT

## 2024-02-10 PROCEDURE — G0425 INPT/ED TELECONSULT30: HCPCS | Mod: GT,,, | Performed by: PSYCHIATRY & NEUROLOGY

## 2024-02-10 PROCEDURE — 80307 DRUG TEST PRSMV CHEM ANLYZR: CPT | Performed by: FAMILY MEDICINE

## 2024-02-10 PROCEDURE — 84443 ASSAY THYROID STIM HORMONE: CPT | Performed by: FAMILY MEDICINE

## 2024-02-10 RX ORDER — CLONIDINE HYDROCHLORIDE 0.2 MG/1
0.2 TABLET ORAL
Status: DISCONTINUED | OUTPATIENT
Start: 2024-02-10 | End: 2024-02-11 | Stop reason: HOSPADM

## 2024-02-11 NOTE — ED PROVIDER NOTES
SCRIBE #1 NOTE: I, Me-Laura Grayson, am scribing for, and in the presence of, Vera Santos MD. I have scribed the entire note.       History     Chief Complaint   Patient presents with    Psychiatric Evaluation     Pt states he's seeing and hearing things wants to know if the things are real; states he can hear his mom telling him things but she's saying she's not; wants us to confirm for him. denies SI/HI     Review of patient's allergies indicates:  No Known Allergies      History of Present Illness     HPI    2/10/2024, 10:57 PM  History obtained from the patient      History of Present Illness: Mikie Alcazar is a 43 y.o. male patient with a PMHx of HTN who presents to the Emergency Department for a psychiatric evaluation. Pt states that he is seeing and hearing things. Pt states that he remembers a conversation with his mother differently than she does and wants to get checked. Symptoms are constant and moderate in severity. No mitigating or exacerbating factors reported. Associated symptoms include AH and VH. Patient denies any SI, HI, and all other sxs at this time. No prior Tx reported. No further complaints or concerns at this time.       Arrival mode: Personal vehicle     PCP: Anila Morales MD        Past Medical History:  Past Medical History:   Diagnosis Date    Hypertension 9/18/2023       Past Surgical History:  Past Surgical History:   Procedure Laterality Date    ANGIOGRAPHY OF INTERNAL MAMMARY VESSEL Left 9/18/2023    Procedure: Angiogram Internal Mammary;  Surgeon: Chelsea Morales MD;  Location: [facility] CATH LAB;  Service: Cardiology;  Laterality: Left;    AORTIC VALVE REPLACEMENT N/A 9/19/2023    Procedure: REPLACEMENT-VALVE-AORTIC;  Surgeon: Nishant Douglas MD;  Location: [facility] OR;  Service: Cardiothoracic;  Laterality: N/A;  19 mm MECHANICAL AORTIC VALVE    ARTERIOGRAPHY OF AORTIC ROOT N/A 9/18/2023    Procedure: ARTERIOGRAM, AORTIC ROOT;  Surgeon: Chelsea Morales MD;  Location: [facility]  CATH LAB;  Service: Cardiology;  Laterality: N/A;    ARTERIOGRAPHY OF SUBCLAVIAN ARTERY Left 9/18/2023    Procedure: ARTERIOGRAM, SUBCLAVIAN;  Surgeon: Chelsea Morales MD;  Location: [facility] CATH LAB;  Service: Cardiology;  Laterality: Left;    CORONARY ARTERY BYPASS GRAFT (CABG) N/A 9/19/2023    Procedure: CORONARY ARTERY BYPASS GRAFT (CABG);  Surgeon: Nishant Douglas MD;  Location: [facility] OR;  Service: Cardiothoracic;  Laterality: N/A;  5-VESSEL WITH EPI-AORTIC ULTRASOUND    ECHOCARDIOGRAM,TRANSESOPHAGEAL N/A 9/19/2023    Procedure: ECHOCARDIOGRAM,TRANSESOPHAGEAL;  Surgeon: Nishant Douglas MD;  Location: [facility] OR;  Service: Cardiothoracic;  Laterality: N/A;    ENDOSCOPIC HARVEST OF VEIN Left 9/19/2023    Procedure: SURGICAL PROCUREMENT, VEIN, ENDOSCOPIC;  Surgeon: Nishant Douglas MD;  Location: [facility] OR;  Service: Cardiothoracic;  Laterality: Left;    INJECTION OF ANESTHETIC AGENT AROUND MULTIPLE INTERCOSTAL NERVES N/A 9/19/2023    Procedure: BLOCK, NERVE, INTERCOSTAL, 2 OR MORE;  Surgeon: Nishant Douglas MD;  Location: [facility] OR;  Service: Cardiothoracic;  Laterality: N/A;  PARA-STERNAL NERVE BLOCK    LEFT HEART CATHETERIZATION Left 9/18/2023    Procedure: Left heart cath;  Surgeon: Chelsea Morales MD;  Location: [facility] CATH LAB;  Service: Cardiology;  Laterality: Left;         Family History:  Family History   Problem Relation Age of Onset    Strabismus Mother     Hypertension Father     Macular degeneration Father     Cataracts Maternal Aunt     Cataracts Maternal Uncle        Social History:  Social History     Tobacco Use    Smoking status: Former     Current packs/day: 1.00     Types: Cigarettes    Smokeless tobacco: Never   Substance and Sexual Activity    Alcohol use: Yes     Comment: rarely    Drug use: No    Sexual activity: Not on file        Review of Systems     Review of Systems   Constitutional:  Negative for fever.   HENT:  Negative for sore throat.    Respiratory:  Negative for shortness of breath.     Cardiovascular:  Negative for chest pain.   Gastrointestinal:  Negative for nausea.   Genitourinary:  Negative for dysuria.   Musculoskeletal:  Negative for back pain.   Skin:  Negative for rash.   Neurological:  Negative for weakness.   Hematological:  Does not bruise/bleed easily.   Psychiatric/Behavioral:  Positive for hallucinations (AH, VH). Negative for suicidal ideas.         (-) HI     All other systems reviewed and are negative.     Physical Exam     Initial Vitals [02/10/24 2146]   BP Pulse Resp Temp SpO2   (!) 199/104 87 18 98.4 °F (36.9 °C) 98 %      MAP       --          Physical Exam  Nursing Notes and Vital Signs Reviewed.  Constitutional: Patient is in no acute distress. Well-developed and well-nourished.  Head: Atraumatic. Normocephalic.  Eyes: PERRL. EOM intact. Conjunctivae are not pale. No scleral icterus.  ENT: Mucous membranes are moist. Oropharynx is clear and symmetric.    Neck: Supple. Full ROM. No lymphadenopathy.  Cardiovascular: Regular rate. Regular rhythm. No murmurs, rubs, or gallops. Distal pulses are 2+ and symmetric.  Pulmonary/Chest: No respiratory distress. Clear to auscultation bilaterally. No wheezing or rales.  Abdominal: Soft and non-distended.  There is no tenderness.  No rebound, guarding, or rigidity. Good bowel sounds.  Genitourinary: No CVA tenderness  Musculoskeletal: Moves all extremities. No obvious deformities. No edema. No calf tenderness.  Skin: Warm and dry.  Neurological:  Alert, awake, and appropriate. Normal speech. No acute focal neurological deficits are appreciated.  Psychiatric: Normal affect. Good eye contact. Appropriate in content.     ED Course   Procedures  ED Vital Signs:  Vitals:    02/10/24 2146 02/10/24 2300   BP: (!) 199/104 (!) 176/84   Pulse: 87 (!) 59   Resp: 18 18   Temp: 98.4 °F (36.9 °C) 98 °F (36.7 °C)   TempSrc: Oral Oral   SpO2: 98% 98%   Weight: 81 kg (178 lb 7.4 oz)        Abnormal Lab Results:  Labs Reviewed   CBC W/ AUTO  DIFFERENTIAL - Abnormal; Notable for the following components:       Result Value    RDW 15.2 (*)     MPV 8.8 (*)     All other components within normal limits   COMPREHENSIVE METABOLIC PANEL - Abnormal; Notable for the following components:    CO2 21 (*)     Glucose 112 (*)     All other components within normal limits   URINALYSIS, REFLEX TO URINE CULTURE - Abnormal; Notable for the following components:    Protein, UA 1+ (*)     Occult Blood UA 1+ (*)     All other components within normal limits    Narrative:     Specimen Source->Urine   DRUG SCREEN PANEL, URINE EMERGENCY - Abnormal; Notable for the following components:    THC Presumptive Positive (*)     All other components within normal limits    Narrative:     Specimen Source->Urine   ACETAMINOPHEN LEVEL - Abnormal; Notable for the following components:    Acetaminophen (Tylenol), Serum <3.0 (*)     All other components within normal limits   URINALYSIS MICROSCOPIC - Abnormal; Notable for the following components:    RBC, UA 5 (*)     Hyaline Casts, UA 11 (*)     All other components within normal limits    Narrative:     Specimen Source->Urine   TSH   ALCOHOL,MEDICAL (ETHANOL)        All Lab Results:  Results for orders placed or performed during the hospital encounter of 02/10/24   CBC auto differential   Result Value Ref Range    WBC 9.02 3.90 - 12.70 K/uL    RBC 5.01 4.60 - 6.20 M/uL    Hemoglobin 14.2 14.0 - 18.0 g/dL    Hematocrit 41.3 40.0 - 54.0 %    MCV 82 82 - 98 fL    MCH 28.3 27.0 - 31.0 pg    MCHC 34.4 32.0 - 36.0 g/dL    RDW 15.2 (H) 11.5 - 14.5 %    Platelets 262 150 - 450 K/uL    MPV 8.8 (L) 9.2 - 12.9 fL    Immature Granulocytes 0.2 0.0 - 0.5 %    Gran # (ANC) 6.2 1.8 - 7.7 K/uL    Immature Grans (Abs) 0.02 0.00 - 0.04 K/uL    Lymph # 1.9 1.0 - 4.8 K/uL    Mono # 0.7 0.3 - 1.0 K/uL    Eos # 0.1 0.0 - 0.5 K/uL    Baso # 0.07 0.00 - 0.20 K/uL    nRBC 0 0 /100 WBC    Gran % 69.1 38.0 - 73.0 %    Lymph % 20.5 18.0 - 48.0 %    Mono % 8.1 4.0 -  15.0 %    Eosinophil % 1.3 0.0 - 8.0 %    Basophil % 0.8 0.0 - 1.9 %    Differential Method Automated    Comprehensive metabolic panel   Result Value Ref Range    Sodium 137 136 - 145 mmol/L    Potassium 3.8 3.5 - 5.1 mmol/L    Chloride 106 95 - 110 mmol/L    CO2 21 (L) 23 - 29 mmol/L    Glucose 112 (H) 70 - 110 mg/dL    BUN 13 6 - 20 mg/dL    Creatinine 1.1 0.5 - 1.4 mg/dL    Calcium 9.0 8.7 - 10.5 mg/dL    Total Protein 7.3 6.0 - 8.4 g/dL    Albumin 3.9 3.5 - 5.2 g/dL    Total Bilirubin 0.4 0.1 - 1.0 mg/dL    Alkaline Phosphatase 72 55 - 135 U/L    AST 14 10 - 40 U/L    ALT 15 10 - 44 U/L    eGFR >60 >60 mL/min/1.73 m^2    Anion Gap 10 8 - 16 mmol/L   TSH   Result Value Ref Range    TSH 3.361 0.400 - 4.000 uIU/mL   Urinalysis, Reflex to Urine Culture Urine, Clean Catch    Specimen: Urine   Result Value Ref Range    Specimen UA Urine, Clean Catch     Color, UA Yellow Yellow, Straw, Peggy    Appearance, UA Clear Clear    pH, UA 6.0 5.0 - 8.0    Specific Gravity, UA 1.020 1.005 - 1.030    Protein, UA 1+ (A) Negative    Glucose, UA Negative Negative    Ketones, UA Negative Negative    Bilirubin (UA) Negative Negative    Occult Blood UA 1+ (A) Negative    Nitrite, UA Negative Negative    Urobilinogen, UA Negative <2.0 EU/dL    Leukocytes, UA Negative Negative   Drug screen panel, emergency   Result Value Ref Range    Benzodiazepines Negative Negative    Methadone metabolites Negative Negative    Cocaine (Metab.) Negative Negative    Opiate Scrn, Ur Negative Negative    Barbiturate Screen, Ur Negative Negative    Amphetamine Screen, Ur Negative Negative    THC Presumptive Positive (A) Negative    Phencyclidine Negative Negative    Creatinine, Urine 125.3 23.0 - 375.0 mg/dL    Toxicology Information SEE COMMENT    Ethanol   Result Value Ref Range    Alcohol, Serum <10 <10 mg/dL   Acetaminophen level   Result Value Ref Range    Acetaminophen (Tylenol), Serum <3.0 (L) 10.0 - 20.0 ug/mL   Urinalysis Microscopic   Result  Value Ref Range    RBC, UA 5 (H) 0 - 4 /hpf    WBC, UA 0 0 - 5 /hpf    Bacteria Rare None-Occ /hpf    Hyaline Casts, UA 11 (A) 0-1/lpf /lpf    Microscopic Comment SEE COMMENT          Imaging Results:  Imaging Results    None                 The Emergency Provider reviewed the vital signs and test results, which are outlined above.     ED Discussion     11:15 PM: Discussed pt's case with Dr. Allen DO (Psychiatry) who states that pt does not meet PEC criteria as pt is not a danger to self, others, or gravely disabled and can be discharged.    11:19 PM: Reassessed pt at this time. Discussed with pt all pertinent ED information and results. Discussed pt dx and plan of tx. Gave pt all f/u and return to the ED instructions. All questions and concerns were addressed at this time. Pt expresses understanding of information and instructions, and is comfortable with plan to discharge. Pt is stable for discharge.    I discussed with patient and/or family/caretaker that evaluation in the ED does not suggest any emergent or life threatening medical conditions requiring immediate intervention beyond what was provided in the ED, and I believe patient is safe for discharge.  Regardless, an unremarkable evaluation in the ED does not preclude the development or presence of a serious of life threatening condition. As such, patient was instructed to return immediately for any worsening or change in current symptoms.         Medical Decision Making  Amount and/or Complexity of Data Reviewed  Labs: ordered. Decision-making details documented in ED Course.    Risk  Prescription drug management.                ED Medication(s):  Medications - No data to display      Discharge Medication List as of 2/10/2024 11:14 PM           Follow-up Information       Schedule an appointment as soon as possible for a visit  with Anila Morales MD.    Specialty: Family Medicine  Why: As needed  Contact information:  [address] [place] [address]  [facility] [address]  [phone]                                 Scribe Attestation:   Scribe #1: I performed the above scribed service and the documentation accurately describes the services I performed. I attest to the accuracy of the note.     Attending:   Physician Attestation Statement for Scribe #1: I, Vera Santos MD, personally performed the services described in this documentation, as scribed by Karine Grayson, in my presence, and it is both accurate and complete.           Clinical Impression       ICD-10-CM ICD-9-CM   1. Depression, unspecified depression type  F32.A 311       Disposition:   Disposition: Discharged  Condition: Stable         Vera Santos MD  02/11/24 [id number]

## 2024-02-11 NOTE — CONSULTS
[facility]  Psychiatry  Telepsychiatry Consult Note    Please see previous notes:    Patient agreeable to consultation via telepsychiatry.    Tele-Consultation from Psychiatry started: 2/10/2024 at 10:00 PM  The chief complaint leading to psychiatric consultation is: Hallucinations   This consultation was requested by Dr. Santos, the Emergency Department attending physician.  The location of the consulting psychiatrist is [place], [place].  The patient location is  [facility] EMERGENCY DEPARTMENT   The patient arrived at the ED at: 9:00 PM    Also present with the patient at the time of the consultation: Nursing staff    Patient Identification:   Mikie Alcazar is a 43 y.o. male.    Patient information was obtained from patient.  Patient presented voluntarily to the Emergency Department by private vehicle.    Inpatient consult to Telemedicine - Psychiatry (Now & Every 24 Hours)  Consult performed by: Mac Villa DO  Consult ordered by: Vera Santos MD        Teleconsult Time Documentation  Subjective:     History of Present Illness:  Per ED MD:     Psychiatric Evaluation        Pt states he's seeing and hearing things wants to know if the things are real; states he can hear his mom telling him things but she's saying she's not; wants us to confirm for him. denies SI/HI       On Interview:  Patient seen through teleconference tonight on my approach. He reports that he has been having issues with irritability. He reports that four months ago he had a heart attack requiring bypass x 5 and a mechanical heart valve.     He decided to come to the hospital because he got into an argument with his mother because he had remembered a conversation that they had differently than she remembered it. His mother has told him that he has been experiencing the world differently than reality. He reports that this issues has been going on for the past few weeks.    He denies any SI/HI or any paranoid  "thoughts that anyone wants to hurt him.     Psychiatric History:   Previous Psychiatric Hospitalizations: No   Previous Medication Trials: Yes   Previous Suicide Attempts: no   History of Violence: No  History of Depression: Yes  History of [place]: No  History of Auditory/Visual Hallucination Unclear   History of Delusions: No  Outpatient psychiatrist (current & past): No    Substance Abuse History:  Tobacco:No, quit this year   Alcohol: No  Illicit Substances:Yes, Marijuana  Detox/Rehab: Yes    Legal History: Past charges/incarcerations: Yes     Family Psychiatric History: Unknown      Social History:  Developmental/Childhood:Achieved all developmental milestones timely  Education: Some college   Employment Status/Finances:Unemployed   Relationship Status/Sexual Orientation: Single   Children: 1  Housing Status: Home with mother    history:  NO  Access to gun: NO  Orthodox:Non-practicing  Recreational activities: Web [employer] games   Patient was born and raised in [place], [place]    Psychiatric Mental Status Exam:  Arousal: alert  Sensorium/Orientation: oriented to grossly intact  Behavior/Cooperation: normal, cooperative   Speech: normal tone, normal rate, normal pitch, normal volume  Language: grossly intact  Mood: " Irritable "   Affect: appropriate  Thought Process: Linear  Thought Content:   Auditory hallucinations: Unclear  Visual hallucinations: Unclear   Paranoia: NO  Delusions:  NO  Suicidal ideation: NO  Homicidal ideation: NO  Attention/Concentration:  intact  Memory:    Recent:  Intact   Remote: Intact  Fund of Knowledge: Aware of current events   Abstract reasoning: proverbs were abstract, similarities were abstract  Insight: intact  Judgment: behavior is adequate to circumstances      Past Medical History:   Past Medical History:   Diagnosis Date    Hypertension 9/18/2023      Laboratory Data:   Labs Reviewed   CBC W/ AUTO DIFFERENTIAL - Abnormal; Notable for the following components:       " Result Value    RDW 15.2 (*)     MPV 8.8 (*)     All other components within normal limits   COMPREHENSIVE METABOLIC PANEL - Abnormal; Notable for the following components:    CO2 21 (*)     Glucose 112 (*)     All other components within normal limits   URINALYSIS, REFLEX TO URINE CULTURE - Abnormal; Notable for the following components:    Protein, UA 1+ (*)     Occult Blood UA 1+ (*)     All other components within normal limits    Narrative:     Specimen Source->Urine   DRUG SCREEN PANEL, URINE EMERGENCY - Abnormal; Notable for the following components:    THC Presumptive Positive (*)     All other components within normal limits    Narrative:     Specimen Source->Urine   URINALYSIS MICROSCOPIC - Abnormal; Notable for the following components:    RBC, UA 5 (*)     Hyaline Casts, UA 11 (*)     All other components within normal limits    Narrative:     Specimen Source->Urine   TSH   ALCOHOL,MEDICAL (ETHANOL)   ACETAMINOPHEN LEVEL       Neurological History:  Seizures: Unclear   Head trauma: No    Allergies:   Review of patient's allergies indicates:  No Known Allergies    Medications in ER: Medications - No data to display    Medications at home:     No new subjective & objective note has been filed under this hospital service since the last note was generated.      Assessment - Diagnosis - Goals:     Diagnosis/Impression: Patient is a 43 year old man with a past psychiatric history of depression who presented to the ED voluntarily due to concerns that he has been having perceptual disturbances. He reports that him and his mother have been arguing because he has been remembering their conversations differently than she does. He denies any SI/HI or any paranoid thoughts that his mother or anyone else wants to hurt him. He feels comfortable being discharged home with outpatient follow up.    Rec: Patient does not meet criteria for PEC as the patient is not a danger to self, others, or gravely disabled. Patient is  clear from a psychiatric standpoint and can be discharged once medically stable.      Time with patient: 20 minutes       More than 50% of the time was spent counseling/coordinating care    Consulting clinician was informed of the encounter and consult note.    Consultation ended: 2/10/2024 at 10:20 PM    Mac Villa, DO   Psychiatry  [facility]

## 2024-02-11 NOTE — ED NOTES
Pt wanded by security. Pt now in chair, in paper scrubs, yellow socks on, room and cabinets secured per hospital protocol, belongings secured, pt directly monitored by sitter

## 2024-02-27 ENCOUNTER — ANTI-COAG VISIT (OUTPATIENT)
Dept: CARDIOLOGY | Facility: CLINIC | Age: 44
End: 2024-02-27
Payer: COMMERCIAL

## 2024-02-27 DIAGNOSIS — Z95.1 S/P CABG X 5: ICD-10-CM

## 2024-02-27 DIAGNOSIS — Z95.2 S/P AVR (AORTIC VALVE REPLACEMENT): ICD-10-CM

## 2024-02-27 DIAGNOSIS — Z79.01 LONG TERM (CURRENT) USE OF ANTICOAGULANTS: Primary | ICD-10-CM

## 2024-02-27 LAB — INR PPP: 2.6 (ref 2.5–3)

## 2024-02-27 PROCEDURE — 93793 ANTICOAG MGMT PT WARFARIN: CPT | Mod: S$GLB,,,

## 2024-02-27 PROCEDURE — 85610 PROTHROMBIN TIME: CPT | Mod: QW,S$GLB,, | Performed by: INTERNAL MEDICINE

## 2024-02-27 NOTE — PROGRESS NOTES
INR: 2.6 - at goal.  Patient reports no recent changes.  Confirms current warfarin dose - continue 4 mg daily.  Follow up in 3 weeks.  Dose calendar given - confirms understanding.

## 2024-03-19 ENCOUNTER — ANTI-COAG VISIT (OUTPATIENT)
Dept: CARDIOLOGY | Facility: CLINIC | Age: 44
End: 2024-03-19
Payer: COMMERCIAL

## 2024-03-19 DIAGNOSIS — Z79.01 LONG TERM (CURRENT) USE OF ANTICOAGULANTS: Primary | ICD-10-CM

## 2024-03-19 DIAGNOSIS — Z95.2 S/P AVR (AORTIC VALVE REPLACEMENT): ICD-10-CM

## 2024-03-19 DIAGNOSIS — Z95.1 S/P CABG X 5: ICD-10-CM

## 2024-03-19 LAB — INR PPP: 2.4 (ref 2.5–3)

## 2024-03-19 PROCEDURE — 85610 PROTHROMBIN TIME: CPT | Mod: QW,S$GLB,, | Performed by: INTERNAL MEDICINE

## 2024-03-19 PROCEDURE — 93793 ANTICOAG MGMT PT WARFARIN: CPT | Mod: S$GLB,,,

## 2024-03-19 NOTE — PROGRESS NOTES
INR is just below goal at 2.4.  Patient confirms warfarin 4 mg every day.  No dietary changes reported or s/s.  Instructions given to continue same dose of warfarin.  Will recheck INR in 3 weeks.  Dose calendar given - confirms understanding.

## 2024-04-09 ENCOUNTER — ANTI-COAG VISIT (OUTPATIENT)
Dept: CARDIOLOGY | Facility: CLINIC | Age: 44
End: 2024-04-09
Payer: COMMERCIAL

## 2024-04-09 DIAGNOSIS — Z79.01 LONG TERM (CURRENT) USE OF ANTICOAGULANTS: Primary | ICD-10-CM

## 2024-04-09 DIAGNOSIS — Z95.2 S/P AVR (AORTIC VALVE REPLACEMENT): ICD-10-CM

## 2024-04-09 DIAGNOSIS — Z95.1 S/P CABG X 5: ICD-10-CM

## 2024-04-09 LAB — INR PPP: 2 (ref 2.5–3)

## 2024-04-09 PROCEDURE — 85610 PROTHROMBIN TIME: CPT | Mod: QW,S$GLB,, | Performed by: INTERNAL MEDICINE

## 2024-04-09 PROCEDURE — 93793 ANTICOAG MGMT PT WARFARIN: CPT | Mod: S$GLB,,,

## 2024-04-09 NOTE — PROGRESS NOTES
INR has dropped to 2.0 - subtherapeutic.  Patient reports a recent consumption of broccoli stir-king.  No s/s reported.  Confirms warfarin 4 mg every day taken as directed.  Instructions given:  Will boost today's dose to 6 mg, then re-challenge current dose of 4 mg every day.  Advised on the importance of keeping diet consistent.  ER for any s/s of clotting/stroke.  Will recheck INR in 2 weeks.  Dose calendar given and reviewed with patient.  Patient verbalizes understanding of all instructions given.

## 2024-04-23 ENCOUNTER — ANTI-COAG VISIT (OUTPATIENT)
Dept: CARDIOLOGY | Facility: CLINIC | Age: 44
End: 2024-04-23
Payer: COMMERCIAL

## 2024-04-23 DIAGNOSIS — Z95.1 S/P CABG X 5: ICD-10-CM

## 2024-04-23 DIAGNOSIS — Z79.01 LONG TERM (CURRENT) USE OF ANTICOAGULANTS: Primary | ICD-10-CM

## 2024-04-23 DIAGNOSIS — Z95.2 S/P AVR (AORTIC VALVE REPLACEMENT): ICD-10-CM

## 2024-04-23 LAB — INR PPP: 2.3 (ref 2–3)

## 2024-04-23 PROCEDURE — 85610 PROTHROMBIN TIME: CPT | Mod: QW,S$GLB,, | Performed by: INTERNAL MEDICINE

## 2024-04-23 PROCEDURE — 93793 ANTICOAG MGMT PT WARFARIN: CPT | Mod: S$GLB,,,

## 2024-04-23 NOTE — PROGRESS NOTES
INR has improved slightly to 2.3 - remains subtherapeutic.  Previous warfarin instructions were verified and followed.  No s/s reported.  Instructions given:  Will increase overall dose to 6 mg every Tuesday and 4 mg all other days.  Recheck INR in 2 weeks.  Dose calendar given and reviewed with patient - confirms understanding.

## 2024-05-08 ENCOUNTER — ANTI-COAG VISIT (OUTPATIENT)
Dept: CARDIOLOGY | Facility: CLINIC | Age: 44
End: 2024-05-08
Payer: COMMERCIAL

## 2024-05-08 DIAGNOSIS — Z79.01 LONG TERM (CURRENT) USE OF ANTICOAGULANTS: Primary | ICD-10-CM

## 2024-05-08 DIAGNOSIS — Z95.1 S/P CABG X 5: ICD-10-CM

## 2024-05-08 DIAGNOSIS — Z95.2 S/P AVR (AORTIC VALVE REPLACEMENT): ICD-10-CM

## 2024-05-08 LAB
CTP QC/QA: YES
INR PPP: 5.5 (ref 2.5–3)

## 2024-05-08 PROCEDURE — 85610 PROTHROMBIN TIME: CPT | Mod: QW,S$GLB,, | Performed by: INTERNAL MEDICINE

## 2024-05-08 PROCEDURE — 93793 ANTICOAG MGMT PT WARFARIN: CPT | Mod: S$GLB,,,

## 2024-05-08 NOTE — PROGRESS NOTES
INR has jumped to 5.5.  No bleeding issues reported.  Warfarin instructions were verified and followed.  Patient reports Tramadol was taken recently and Etoh last weekend.  Bleeding precautions in place - ED for any abnormal bleeding given.  Will send to PharmD for review and dosing.

## 2024-05-08 NOTE — PROGRESS NOTES
INR not at goal. Medications, chart, and patient findings reviewed. See calendar for adjustments to dose and follow up plan.  DDI with tramadol potentially influencing and ETOH intake.  Hold dose today and re-challenge new dose.

## 2024-05-15 ENCOUNTER — ANTI-COAG VISIT (OUTPATIENT)
Dept: CARDIOLOGY | Facility: CLINIC | Age: 44
End: 2024-05-15
Payer: COMMERCIAL

## 2024-05-15 DIAGNOSIS — Z79.01 LONG TERM (CURRENT) USE OF ANTICOAGULANTS: Primary | ICD-10-CM

## 2024-05-15 DIAGNOSIS — Z95.1 S/P CABG X 5: ICD-10-CM

## 2024-05-15 DIAGNOSIS — Z95.2 S/P AVR (AORTIC VALVE REPLACEMENT): ICD-10-CM

## 2024-05-15 LAB
CTP QC/QA: YES
INR PPP: 3.3 (ref 2.5–3)

## 2024-05-15 PROCEDURE — 93793 ANTICOAG MGMT PT WARFARIN: CPT | Mod: S$GLB,,,

## 2024-05-15 PROCEDURE — 85610 PROTHROMBIN TIME: CPT | Mod: QW,S$GLB,, | Performed by: INTERNAL MEDICINE

## 2024-05-15 NOTE — PROGRESS NOTES
INR is slightly above goal at 3.3. - has improved.  Previous warfarin instructions were verified and followed.  No bleeding issues reported.  Bleeding precaution remains in place.  No Etoh reported.  Will lower today's dose to 2 mg, then re-challenge dose again with 6 mg every Tuesday and 4 mg all other days.  Recheck INR in 2 weeks.  Dose calendar given and reviewed with patient - confirms understanding.

## 2024-05-20 ENCOUNTER — TELEPHONE (OUTPATIENT)
Dept: OPHTHALMOLOGY | Facility: CLINIC | Age: 44
End: 2024-05-20
Payer: COMMERCIAL

## 2024-05-20 NOTE — TELEPHONE ENCOUNTER
Called pt at 9:12Am informed her we are at the [facility] and she can come here or any location to  the rx for glasses. I sent the email to Mary Owens to print it for him as well.Informed patient that he can print the prescription from [facility]      ----- Message from Arlette Ocasio sent at 5/20/2024  8:41 AM CDT -----  Contact: [phone]  Calling regarding prescription for eye glasses, mother is requesting script be printed for  this morning  . please call back this morning at [phone] . Thanksdj

## 2024-05-29 ENCOUNTER — ANTI-COAG VISIT (OUTPATIENT)
Dept: CARDIOLOGY | Facility: CLINIC | Age: 44
End: 2024-05-29
Payer: COMMERCIAL

## 2024-05-29 DIAGNOSIS — Z95.2 S/P AVR (AORTIC VALVE REPLACEMENT): ICD-10-CM

## 2024-05-29 DIAGNOSIS — Z79.01 LONG TERM (CURRENT) USE OF ANTICOAGULANTS: Primary | ICD-10-CM

## 2024-05-29 DIAGNOSIS — Z95.1 S/P CABG X 5: ICD-10-CM

## 2024-05-29 LAB
CTP QC/QA: YES
INR PPP: 2.9 (ref 2.5–3)

## 2024-05-29 PROCEDURE — 85610 PROTHROMBIN TIME: CPT | Mod: QW,S$GLB,, | Performed by: INTERNAL MEDICINE

## 2024-05-29 PROCEDURE — 93793 ANTICOAG MGMT PT WARFARIN: CPT | Mod: S$GLB,,,

## 2024-05-29 NOTE — PROGRESS NOTES
INR is in therapeutic goal range at 2.9.  Previous warfarin instructions were verified and followed.  No other changes reported.  Instructions given to continue 6 mg every Tuesday and 4 mg all other days.  Will recheck INR in 2 weeks.  Dose calendar given - confirms understanding.

## 2024-06-07 ENCOUNTER — LAB VISIT (OUTPATIENT)
Dept: LAB | Facility: HOSPITAL | Age: 44
End: 2024-06-07
Attending: INTERNAL MEDICINE
Payer: COMMERCIAL

## 2024-06-07 ENCOUNTER — OFFICE VISIT (OUTPATIENT)
Dept: INTERNAL MEDICINE | Facility: CLINIC | Age: 44
End: 2024-06-07
Payer: COMMERCIAL

## 2024-06-07 VITALS
OXYGEN SATURATION: 96 % | HEIGHT: 68 IN | SYSTOLIC BLOOD PRESSURE: 122 MMHG | DIASTOLIC BLOOD PRESSURE: 80 MMHG | WEIGHT: 181.44 LBS | BODY MASS INDEX: 27.5 KG/M2 | HEART RATE: 86 BPM | TEMPERATURE: 98 F

## 2024-06-07 DIAGNOSIS — I21.4 NSTEMI (NON-ST ELEVATED MYOCARDIAL INFARCTION): ICD-10-CM

## 2024-06-07 DIAGNOSIS — Z95.2 S/P AVR (AORTIC VALVE REPLACEMENT): ICD-10-CM

## 2024-06-07 DIAGNOSIS — Z12.11 COLON CANCER SCREENING: ICD-10-CM

## 2024-06-07 DIAGNOSIS — Z95.1 S/P CABG X 5: ICD-10-CM

## 2024-06-07 DIAGNOSIS — I25.10 CORONARY ARTERY DISEASE, UNSPECIFIED VESSEL OR LESION TYPE, UNSPECIFIED WHETHER ANGINA PRESENT, UNSPECIFIED WHETHER NATIVE OR TRANSPLANTED HEART: ICD-10-CM

## 2024-06-07 DIAGNOSIS — Z00.00 ANNUAL PHYSICAL EXAM: Primary | ICD-10-CM

## 2024-06-07 DIAGNOSIS — E78.00 PURE HYPERCHOLESTEROLEMIA: ICD-10-CM

## 2024-06-07 DIAGNOSIS — I10 PRIMARY HYPERTENSION: ICD-10-CM

## 2024-06-07 DIAGNOSIS — F41.9 ANXIETY: ICD-10-CM

## 2024-06-07 DIAGNOSIS — G47.00 INSOMNIA, UNSPECIFIED TYPE: ICD-10-CM

## 2024-06-07 DIAGNOSIS — Z00.00 ANNUAL PHYSICAL EXAM: ICD-10-CM

## 2024-06-07 LAB
ALBUMIN SERPL BCP-MCNC: 4.2 G/DL (ref 3.5–5.2)
ALBUMIN SERPL BCP-MCNC: 4.2 G/DL (ref 3.5–5.2)
ALP SERPL-CCNC: 61 U/L (ref 55–135)
ALP SERPL-CCNC: 61 U/L (ref 55–135)
ALT SERPL W/O P-5'-P-CCNC: 31 U/L (ref 10–44)
ALT SERPL W/O P-5'-P-CCNC: 31 U/L (ref 10–44)
ANION GAP SERPL CALC-SCNC: 8 MMOL/L (ref 8–16)
AST SERPL-CCNC: 27 U/L (ref 10–40)
AST SERPL-CCNC: 27 U/L (ref 10–40)
BASOPHILS # BLD AUTO: 0.04 K/UL (ref 0–0.2)
BASOPHILS NFR BLD: 0.6 % (ref 0–1.9)
BILIRUB DIRECT SERPL-MCNC: 0.2 MG/DL (ref 0.1–0.3)
BILIRUB SERPL-MCNC: 0.4 MG/DL (ref 0.1–1)
BILIRUB SERPL-MCNC: 0.4 MG/DL (ref 0.1–1)
BUN SERPL-MCNC: 10 MG/DL (ref 6–20)
CALCIUM SERPL-MCNC: 9.8 MG/DL (ref 8.7–10.5)
CHLORIDE SERPL-SCNC: 106 MMOL/L (ref 95–110)
CHOLEST SERPL-MCNC: 121 MG/DL (ref 120–199)
CHOLEST/HDLC SERPL: 3.8 {RATIO} (ref 2–5)
CO2 SERPL-SCNC: 26 MMOL/L (ref 23–29)
COMPLEXED PSA SERPL-MCNC: 1.3 NG/ML (ref 0–4)
CREAT SERPL-MCNC: 1 MG/DL (ref 0.5–1.4)
DIFFERENTIAL METHOD BLD: ABNORMAL
EOSINOPHIL # BLD AUTO: 0.1 K/UL (ref 0–0.5)
EOSINOPHIL NFR BLD: 1.7 % (ref 0–8)
ERYTHROCYTE [DISTWIDTH] IN BLOOD BY AUTOMATED COUNT: 12.4 % (ref 11.5–14.5)
EST. GFR  (NO RACE VARIABLE): >60 ML/MIN/1.73 M^2
GLUCOSE SERPL-MCNC: 96 MG/DL (ref 70–110)
HCT VFR BLD AUTO: 44.6 % (ref 40–54)
HDLC SERPL-MCNC: 32 MG/DL (ref 40–75)
HDLC SERPL: 26.4 % (ref 20–50)
HGB BLD-MCNC: 15.1 G/DL (ref 14–18)
IMM GRANULOCYTES # BLD AUTO: 0.02 K/UL (ref 0–0.04)
IMM GRANULOCYTES NFR BLD AUTO: 0.3 % (ref 0–0.5)
LDLC SERPL CALC-MCNC: 71.2 MG/DL (ref 63–159)
LYMPHOCYTES # BLD AUTO: 1.7 K/UL (ref 1–4.8)
LYMPHOCYTES NFR BLD: 24.5 % (ref 18–48)
MCH RBC QN AUTO: 31.3 PG (ref 27–31)
MCHC RBC AUTO-ENTMCNC: 33.9 G/DL (ref 32–36)
MCV RBC AUTO: 93 FL (ref 82–98)
MONOCYTES # BLD AUTO: 0.6 K/UL (ref 0.3–1)
MONOCYTES NFR BLD: 8.1 % (ref 4–15)
NEUTROPHILS # BLD AUTO: 4.6 K/UL (ref 1.8–7.7)
NEUTROPHILS NFR BLD: 64.8 % (ref 38–73)
NONHDLC SERPL-MCNC: 89 MG/DL
NRBC BLD-RTO: 0 /100 WBC
PLATELET # BLD AUTO: 328 K/UL (ref 150–450)
PMV BLD AUTO: 10.1 FL (ref 9.2–12.9)
POTASSIUM SERPL-SCNC: 4.6 MMOL/L (ref 3.5–5.1)
PROT SERPL-MCNC: 7.4 G/DL (ref 6–8.4)
PROT SERPL-MCNC: 7.4 G/DL (ref 6–8.4)
RBC # BLD AUTO: 4.82 M/UL (ref 4.6–6.2)
SODIUM SERPL-SCNC: 140 MMOL/L (ref 136–145)
TRIGL SERPL-MCNC: 89 MG/DL (ref 30–150)
WBC # BLD AUTO: 7.02 K/UL (ref 3.9–12.7)

## 2024-06-07 PROCEDURE — 3079F DIAST BP 80-89 MM HG: CPT | Mod: CPTII,S$GLB,,

## 2024-06-07 PROCEDURE — 3074F SYST BP LT 130 MM HG: CPT | Mod: CPTII,S$GLB,,

## 2024-06-07 PROCEDURE — 84153 ASSAY OF PSA TOTAL: CPT

## 2024-06-07 PROCEDURE — 99396 PREV VISIT EST AGE 40-64: CPT | Mod: S$GLB,,,

## 2024-06-07 PROCEDURE — 80053 COMPREHEN METABOLIC PANEL: CPT

## 2024-06-07 PROCEDURE — 80076 HEPATIC FUNCTION PANEL: CPT | Performed by: INTERNAL MEDICINE

## 2024-06-07 PROCEDURE — 1159F MED LIST DOCD IN RCRD: CPT | Mod: CPTII,S$GLB,,

## 2024-06-07 PROCEDURE — 3008F BODY MASS INDEX DOCD: CPT | Mod: CPTII,S$GLB,,

## 2024-06-07 PROCEDURE — 80061 LIPID PANEL: CPT | Performed by: INTERNAL MEDICINE

## 2024-06-07 PROCEDURE — 99999 PR PBB SHADOW E&M-EST. PATIENT-LVL IV: CPT | Mod: PBBFAC,,,

## 2024-06-07 PROCEDURE — 36415 COLL VENOUS BLD VENIPUNCTURE: CPT

## 2024-06-07 PROCEDURE — 85025 COMPLETE CBC W/AUTO DIFF WBC: CPT

## 2024-06-07 RX ORDER — TRAZODONE HYDROCHLORIDE 100 MG/1
100 TABLET ORAL NIGHTLY
Qty: 90 TABLET | Refills: 3 | Status: SHIPPED | OUTPATIENT
Start: 2024-06-07 | End: 2025-06-07

## 2024-06-07 NOTE — PROGRESS NOTES
Mikie Alcazar  06/07/2024  [id number]    Anila Morales MD  Patient Care Team:  Anila Morales MD as PCP - General (Family Medicine)          Visit Type:a scheduled routine follow-up visit    Chief Complaint:  Chief Complaint   Patient presents with    Follow-up        History of Present Illness:    5 vessel CABG/CAD/MI  AVR  ASA, Lopressor Liptior, and COumadin  EF 50%.  Low BP so not on ACe/ARB/ARNi. Saw Cards in CHF clinic  Referred to Cardiac rehab    Health Maintenance Due   Topic Date Due    TETANUS VACCINE  Never done    Colorectal Cancer Screening  Never done    COVID-19 Vaccine (4 - 2023-24 season) 09/01/2023      Trazodone is not helping with sleep  Previously prescribed Xanax for sleep/anxiety       History:  Past Medical History:   Diagnosis Date    Hypertension 9/18/2023     Past Surgical History:   Procedure Laterality Date    ANGIOGRAPHY OF INTERNAL MAMMARY VESSEL Left 9/18/2023    Procedure: Angiogram Internal Mammary;  Surgeon: Chelsea Morales MD;  Location: [facility] CATH LAB;  Service: Cardiology;  Laterality: Left;    AORTIC VALVE REPLACEMENT N/A 9/19/2023    Procedure: REPLACEMENT-VALVE-AORTIC;  Surgeon: Nishant Douglas MD;  Location: [facility] OR;  Service: Cardiothoracic;  Laterality: N/A;  19 mm MECHANICAL AORTIC VALVE    ARTERIOGRAPHY OF AORTIC ROOT N/A 9/18/2023    Procedure: ARTERIOGRAM, AORTIC ROOT;  Surgeon: Chelsea Morales MD;  Location: [facility] CATH LAB;  Service: Cardiology;  Laterality: N/A;    ARTERIOGRAPHY OF SUBCLAVIAN ARTERY Left 9/18/2023    Procedure: ARTERIOGRAM, SUBCLAVIAN;  Surgeon: Chelsea Morales MD;  Location: [facility] CATH LAB;  Service: Cardiology;  Laterality: Left;    CORONARY ARTERY BYPASS GRAFT (CABG) N/A 9/19/2023    Procedure: CORONARY ARTERY BYPASS GRAFT (CABG);  Surgeon: Nishant Douglas MD;  Location: [facility] OR;  Service: Cardiothoracic;  Laterality: N/A;  5-VESSEL WITH EPI-AORTIC ULTRASOUND    ECHOCARDIOGRAM,TRANSESOPHAGEAL N/A 9/19/2023    Procedure:  ECHOCARDIOGRAM,TRANSESOPHAGEAL;  Surgeon: Nishant Douglas MD;  Location: [facility] OR;  Service: Cardiothoracic;  Laterality: N/A;    ENDOSCOPIC HARVEST OF VEIN Left 9/19/2023    Procedure: SURGICAL PROCUREMENT, VEIN, ENDOSCOPIC;  Surgeon: Nishant Douglas MD;  Location: [facility] OR;  Service: Cardiothoracic;  Laterality: Left;    INJECTION OF ANESTHETIC AGENT AROUND MULTIPLE INTERCOSTAL NERVES N/A 9/19/2023    Procedure: BLOCK, NERVE, INTERCOSTAL, 2 OR MORE;  Surgeon: Nishant Douglas MD;  Location: [facility] OR;  Service: Cardiothoracic;  Laterality: N/A;  PARA-STERNAL NERVE BLOCK    LEFT HEART CATHETERIZATION Left 9/18/2023    Procedure: Left heart cath;  Surgeon: Chelsea Morales MD;  Location: [facility] CATH LAB;  Service: Cardiology;  Laterality: Left;     Family History   Problem Relation Name Age of Onset    Strabismus Mother      Hypertension Father      Macular degeneration Father      Cataracts Maternal Aunt      Cataracts Maternal Uncle       Social History     Socioeconomic History    Marital status: Single   Tobacco Use    Smoking status: Former     Current packs/day: 1.00     Types: Cigarettes    Smokeless tobacco: Never   Substance and Sexual Activity    Alcohol use: Yes     Comment: rarely    Drug use: No     Patient Active Problem List   Diagnosis    Hypertension    CAD, multiple vessel    Tobacco smoker, 1 pack of cigarettes or less per day    Nonrheumatic aortic valve insufficiency    S/P CABG x 5    S/P AVR (aortic valve replacement)     Review of patient's allergies indicates:  No Known Allergies    The following were reviewed at this visit: active problem list, medication list, allergies, family history, social history, and health maintenance.    Medications:  Current Outpatient Medications on File Prior to Visit   Medication Sig Dispense Refill    acetaminophen (TYLENOL) 325 MG tablet Take 2 tablets (650 mg total) by mouth every 6 (six) hours as needed for Pain. 20 tablet 0    aspirin (ECOTRIN) 81 MG EC  tablet Take 1 tablet (81 mg total) by mouth once daily. 360 tablet 0    atorvastatin (LIPITOR) 80 MG tablet Take 1 tablet (80 mg total) by mouth once daily. 90 tablet 3    folic acid (FOLVITE) 1 MG tablet Take 1 tablet (1 mg total) by mouth once daily. 30 tablet 0    metoprolol tartrate (LOPRESSOR) 50 MG tablet Take 1 tablet (50 mg total) by mouth 2 (two) times daily. 180 tablet 3    traZODone (DESYREL) 50 MG tablet Take 1 tablet (50 mg total) by mouth every evening. 30 tablet 11    triamcinolone acetonide 0.1% (KENALOG) 0.1 % cream Apply topically 2 (two) times daily. 80 g 3    warfarin (COUMADIN) 4 MG tablet Take 1 tablet (4 mg total) by mouth Daily. 30 tablet 11     No current facility-administered medications on file prior to visit.       Medications have been reviewed and reconciled with patient at this visit.  Barriers to medications reviewed with patient.    Adverse reactions to current medications reviewed with patient..    Over the counter medications reviewed and reconciled with patient.    Exam:  Wt Readings from Last 3 Encounters:   02/10/24 81 kg (178 lb 7.4 oz)   12/26/23 83.5 kg (184 lb)   12/22/23 83.7 kg (184 lb 8.4 oz)     Temp Readings from Last 3 Encounters:   02/10/24 98 °F (36.7 °C) (Oral)   12/07/23 97.1 °F (36.2 °C) (Tympanic)   10/11/23 98.1 °F (36.7 °C) (Tympanic)     BP Readings from Last 3 Encounters:   02/10/24 (!) 176/84   12/26/23 130/80   12/22/23 130/80     Pulse Readings from Last 3 Encounters:   02/10/24 (!) 59   12/22/23 77   12/07/23 63     There is no height or weight on file to calculate BMI.      Review of Systems   Respiratory:  Negative for shortness of breath.    Cardiovascular:  Negative for chest pain and palpitations.     Physical Exam  Nursing note reviewed.   HENT:      Head: Normocephalic and atraumatic.   Cardiovascular:      Rate and Rhythm: Normal rate and regular rhythm.      Heart sounds: Murmur heard.   Pulmonary:      Effort: Pulmonary effort is normal. No  respiratory distress.      Breath sounds: Normal breath sounds.   Skin:     General: Skin is dry.   Neurological:      Mental Status: He is alert and oriented to person, place, and time.   Psychiatric:         Mood and Affect: Mood normal.         Behavior: Behavior normal.         Thought Content: Thought content normal.         Judgment: Judgment normal.         Laboratory Reviewed ({Yes)  Lab Results   Component Value Date    WBC 9.02 02/10/2024    HGB 14.2 02/10/2024    HCT 41.3 02/10/2024     02/10/2024    CHOL 150 09/17/2023    TRIG 145 09/17/2023    HDL 37 (L) 09/17/2023    ALT 15 02/10/2024    AST 14 02/10/2024     02/10/2024    K 3.8 02/10/2024     02/10/2024    CREATININE 1.1 02/10/2024    BUN 13 02/10/2024    CO2 21 (L) 02/10/2024    TSH 3.361 02/10/2024    INR 2.9 05/29/2024    HGBA1C 5.3 09/17/2023       Mikie was seen today for follow-up.    Diagnoses and all orders for this visit:    Annual physical exam  -     COMPREHENSIVE METABOLIC PANEL; Future  -     CBC Auto Differential; Future  -     PSA, SCREENING; Future    Primary hypertension  -     COMPREHENSIVE METABOLIC PANEL; Future  -     CBC Auto Differential; Future    S/P CABG x 5  -     COMPREHENSIVE METABOLIC PANEL; Future  -     CBC Auto Differential; Future    S/P AVR (aortic valve replacement)  -     COMPREHENSIVE METABOLIC PANEL; Future  -     CBC Auto Differential; Future    Coronary artery disease, unspecified vessel or lesion type, unspecified whether angina present, unspecified whether native or transplanted heart  -     COMPREHENSIVE METABOLIC PANEL; Future  -     CBC Auto Differential; Future    NSTEMI (non-ST elevated myocardial infarction)  -     COMPREHENSIVE METABOLIC PANEL; Future  -     CBC Auto Differential; Future    Anxiety  -     Ambulatory referral/consult to Psychiatry; Future    Insomnia, unspecified type  -     traZODone (DESYREL) 100 MG tablet; Take 1 tablet (100 mg total) by mouth every  evening.    Colon cancer screening  -     Cologuard Screening (Multitarget Stool DNA); Future  -     Cologuard Screening (Multitarget Stool DNA)      Pt has a 6 month follow up appt scheduled with Dr. Davenport next month  Labs today      Follow up exam with Dr. Morales in 6 months     Previously on Xanax  Interested in resuming medication   Dr. Morales informed that she will not prescribe Xanax for sleep/anxiety  He is not interested in trying additional medication   Will refer to psych      Trazodone dose increased     Cologuard     Care Plan/Goals: Reviewed    Goals    None         Follow up: No follow-ups on file.    After visit summary was printed and given to patient upon discharge today.  Patient goals and care plan are included in After Visit Summary.

## 2024-06-10 ENCOUNTER — PATIENT MESSAGE (OUTPATIENT)
Dept: CARDIOLOGY | Facility: CLINIC | Age: 44
End: 2024-06-10
Payer: MEDICAID

## 2024-06-11 ENCOUNTER — PATIENT MESSAGE (OUTPATIENT)
Dept: INTERNAL MEDICINE | Facility: CLINIC | Age: 44
End: 2024-06-11
Payer: MEDICAID

## 2024-06-19 ENCOUNTER — ANTI-COAG VISIT (OUTPATIENT)
Dept: CARDIOLOGY | Facility: CLINIC | Age: 44
End: 2024-06-19
Payer: COMMERCIAL

## 2024-06-19 DIAGNOSIS — Z95.2 S/P AVR (AORTIC VALVE REPLACEMENT): ICD-10-CM

## 2024-06-19 DIAGNOSIS — Z95.1 S/P CABG X 5: ICD-10-CM

## 2024-06-19 DIAGNOSIS — Z79.01 LONG TERM (CURRENT) USE OF ANTICOAGULANTS: Primary | ICD-10-CM

## 2024-06-19 LAB
CTP QC/QA: YES
INR PPP: 2.7 (ref 2.5–3)

## 2024-06-19 PROCEDURE — 93793 ANTICOAG MGMT PT WARFARIN: CPT | Mod: S$GLB,,,

## 2024-06-19 PROCEDURE — 85610 PROTHROMBIN TIME: CPT | Mod: QW,S$GLB,, | Performed by: INTERNAL MEDICINE

## 2024-06-19 NOTE — PROGRESS NOTES
INR is in therapeutic goal range at 2.7.  Patient reports no recent changes.  States current warfarin dose and followed - continue 6 mg every Tuesday and 4 mg all other days.  Follow up in 1 month for an INR recheck.  Dose calendar given - confirms understanding.

## 2024-07-01 ENCOUNTER — OFFICE VISIT (OUTPATIENT)
Dept: CARDIOLOGY | Facility: CLINIC | Age: 44
End: 2024-07-01
Payer: COMMERCIAL

## 2024-07-01 VITALS
HEART RATE: 87 BPM | WEIGHT: 179.56 LBS | DIASTOLIC BLOOD PRESSURE: 80 MMHG | BODY MASS INDEX: 27.21 KG/M2 | HEIGHT: 68 IN | SYSTOLIC BLOOD PRESSURE: 138 MMHG | OXYGEN SATURATION: 98 % | RESPIRATION RATE: 16 BRPM

## 2024-07-01 DIAGNOSIS — F17.210 TOBACCO SMOKER, 1 PACK OF CIGARETTES OR LESS PER DAY: ICD-10-CM

## 2024-07-01 DIAGNOSIS — Z95.1 S/P CABG X 5: ICD-10-CM

## 2024-07-01 DIAGNOSIS — R00.2 PALPITATIONS: ICD-10-CM

## 2024-07-01 DIAGNOSIS — I25.10 CAD, MULTIPLE VESSEL: Primary | ICD-10-CM

## 2024-07-01 DIAGNOSIS — I10 PRIMARY HYPERTENSION: ICD-10-CM

## 2024-07-01 DIAGNOSIS — R00.2 PALPITATIONS: Primary | ICD-10-CM

## 2024-07-01 DIAGNOSIS — I35.1 NONRHEUMATIC AORTIC VALVE INSUFFICIENCY: ICD-10-CM

## 2024-07-01 DIAGNOSIS — Z95.2 S/P AVR (AORTIC VALVE REPLACEMENT): ICD-10-CM

## 2024-07-01 LAB
OHS QRS DURATION: 84 MS
OHS QTC CALCULATION: 442 MS

## 2024-07-01 PROCEDURE — 99214 OFFICE O/P EST MOD 30 MIN: CPT | Mod: S$GLB,,, | Performed by: INTERNAL MEDICINE

## 2024-07-01 PROCEDURE — 3075F SYST BP GE 130 - 139MM HG: CPT | Mod: CPTII,S$GLB,, | Performed by: INTERNAL MEDICINE

## 2024-07-01 PROCEDURE — 93005 ELECTROCARDIOGRAM TRACING: CPT

## 2024-07-01 PROCEDURE — 3079F DIAST BP 80-89 MM HG: CPT | Mod: CPTII,S$GLB,, | Performed by: INTERNAL MEDICINE

## 2024-07-01 PROCEDURE — 3008F BODY MASS INDEX DOCD: CPT | Mod: CPTII,S$GLB,, | Performed by: INTERNAL MEDICINE

## 2024-07-01 PROCEDURE — 1160F RVW MEDS BY RX/DR IN RCRD: CPT | Mod: CPTII,S$GLB,, | Performed by: INTERNAL MEDICINE

## 2024-07-01 PROCEDURE — 93010 ELECTROCARDIOGRAM REPORT: CPT | Mod: S$GLB,,, | Performed by: INTERNAL MEDICINE

## 2024-07-01 PROCEDURE — 99999 PR PBB SHADOW E&M-EST. PATIENT-LVL IV: CPT | Mod: PBBFAC,,, | Performed by: INTERNAL MEDICINE

## 2024-07-01 PROCEDURE — 1159F MED LIST DOCD IN RCRD: CPT | Mod: CPTII,S$GLB,, | Performed by: INTERNAL MEDICINE

## 2024-07-01 NOTE — PROGRESS NOTES
Subjective:   Patient ID:  Mikie Alcazar is a 44 y.o. male who presents for follow-up of No chief complaint on file.  CABG- 5V and AVR ( mechanical) 9-23   Echo nml AVR  Some dizziness bending down  Patient denies CP, angina or anginal equivalent.  CBC and BMP nml. EKG (-)  Hypertension  This is a chronic problem. The current episode started more than 1 year ago. The problem has been gradually improving since onset. The problem is controlled. Pertinent negatives include no chest pain, palpitations or shortness of breath. Past treatments include beta blockers. The current treatment provides moderate improvement. There are no compliance problems.    Hyperlipidemia  This is a chronic problem. The current episode started more than 1 year ago. The problem is controlled. Pertinent negatives include no chest pain or shortness of breath. Current antihyperlipidemic treatment includes statins. The current treatment provides moderate improvement of lipids.   Coronary Artery Disease  Presents for follow-up visit. Pertinent negatives include no chest pain, chest pressure, chest tightness, dizziness, leg swelling, muscle weakness, palpitations, shortness of breath or weight gain. The symptoms have been stable. Compliance with diet is good. Compliance with exercise is good. Compliance with medications is good.       Review of Systems   Constitutional: Negative. Negative for weight gain.   HENT: Negative.     Eyes: Negative.    Cardiovascular: Negative.  Negative for chest pain, leg swelling and palpitations.   Respiratory: Negative.  Negative for chest tightness and shortness of breath.    Endocrine: Negative.    Hematologic/Lymphatic: Negative.    Skin: Negative.    Musculoskeletal:  Negative for muscle weakness.   Gastrointestinal: Negative.    Genitourinary: Negative.    Neurological: Negative.  Negative for dizziness.   Psychiatric/Behavioral: Negative.     Allergic/Immunologic: Negative.    All other systems reviewed and  are negative.    Family History   Problem Relation Name Age of Onset    Strabismus Mother      Hypertension Father      Macular degeneration Father      Cataracts Maternal Aunt      Cataracts Maternal Uncle       Past Medical History:   Diagnosis Date    Hypertension 9/18/2023     Social History     Socioeconomic History    Marital status: Single   Tobacco Use    Smoking status: Former     Current packs/day: 1.00     Types: Cigarettes    Smokeless tobacco: Never   Substance and Sexual Activity    Alcohol use: Yes     Comment: rarely    Drug use: No     Current Outpatient Medications on File Prior to Visit   Medication Sig Dispense Refill    acetaminophen (TYLENOL) 325 MG tablet Take 2 tablets (650 mg total) by mouth every 6 (six) hours as needed for Pain. 20 tablet 0    aspirin (ECOTRIN) 81 MG EC tablet Take 1 tablet (81 mg total) by mouth once daily. 360 tablet 0    atorvastatin (LIPITOR) 80 MG tablet Take 1 tablet (80 mg total) by mouth once daily. 90 tablet 3    metoprolol tartrate (LOPRESSOR) 50 MG tablet Take 1 tablet (50 mg total) by mouth 2 (two) times daily. 180 tablet 3    traZODone (DESYREL) 100 MG tablet Take 1 tablet (100 mg total) by mouth every evening. 90 tablet 3    triamcinolone acetonide 0.1% (KENALOG) 0.1 % cream Apply topically 2 (two) times daily. 80 g 3    warfarin (COUMADIN) 4 MG tablet Take 1 tablet (4 mg total) by mouth Daily. (Patient taking differently: Take 4 mg by mouth Daily. Except one and one half tablets (6 mg) every Tuesday as directed by the CC.) 30 tablet 11    folic acid (FOLVITE) 1 MG tablet Take 1 tablet (1 mg total) by mouth once daily. 30 tablet 0     No current facility-administered medications on file prior to visit.     Review of patient's allergies indicates:  No Known Allergies    Objective:     Physical Exam  Vitals and nursing note reviewed.   Constitutional:       Appearance: He is well-developed.   HENT:      Head: Normocephalic and atraumatic.   Eyes:       Conjunctiva/sclera: Conjunctivae normal.      Pupils: Pupils are equal, round, and reactive to light.   Cardiovascular:      Rate and Rhythm: Normal rate and regular rhythm.      Pulses: Intact distal pulses.      Heart sounds: Normal heart sounds.   Pulmonary:      Effort: Pulmonary effort is normal.      Breath sounds: Normal breath sounds.   Abdominal:      General: Bowel sounds are normal.      Palpations: Abdomen is soft.   Musculoskeletal:      Cervical back: Normal range of motion and neck supple.   Skin:     General: Skin is warm and dry.   Neurological:      Mental Status: He is alert and oriented to person, place, and time.         Assessment:     1. CAD, multiple vessel    2. Primary hypertension    3. Nonrheumatic aortic valve insufficiency    4. S/P AVR (aortic valve replacement)    5. S/P CABG x 5    6. Tobacco smoker, 1 pack of cigarettes or less per day        Plan:     CAD, multiple vessel    Primary hypertension    Nonrheumatic aortic valve insufficiency    S/P AVR (aortic valve replacement)    S/P CABG x 5    Tobacco smoker, 1 pack of cigarettes or less per day    Continue statin-HLP  Continue metoprolol-HTN  Continue asa- CAD/CABG  Continue coumadin- AVR

## 2024-07-16 ENCOUNTER — ANTI-COAG VISIT (OUTPATIENT)
Dept: CARDIOLOGY | Facility: CLINIC | Age: 44
End: 2024-07-16
Payer: COMMERCIAL

## 2024-07-16 DIAGNOSIS — Z95.2 S/P AVR (AORTIC VALVE REPLACEMENT): ICD-10-CM

## 2024-07-16 DIAGNOSIS — Z79.01 LONG TERM (CURRENT) USE OF ANTICOAGULANTS: Primary | ICD-10-CM

## 2024-07-16 DIAGNOSIS — Z95.1 S/P CABG X 5: ICD-10-CM

## 2024-07-16 LAB
CTP QC/QA: YES
INR PPP: 2.8 (ref 2.5–3)

## 2024-07-16 PROCEDURE — 93793 ANTICOAG MGMT PT WARFARIN: CPT | Mod: S$GLB,,,

## 2024-07-16 PROCEDURE — 85610 PROTHROMBIN TIME: CPT | Mod: QW,S$GLB,, | Performed by: INTERNAL MEDICINE

## 2024-07-16 NOTE — PROGRESS NOTES
INR is in therapeutic goal range at 2.8.  Patient reports no recent changes.  Confirms current warfarin dose - continue 6 mg every Tuesday and 4 mg all other days.  Follow up in 1 month for an INR recheck.  Dose calendar given - confirms understanding.

## 2024-08-13 ENCOUNTER — ANTI-COAG VISIT (OUTPATIENT)
Dept: CARDIOLOGY | Facility: CLINIC | Age: 44
End: 2024-08-13
Payer: COMMERCIAL

## 2024-08-13 DIAGNOSIS — Z95.1 S/P CABG X 5: ICD-10-CM

## 2024-08-13 DIAGNOSIS — Z95.2 S/P AVR (AORTIC VALVE REPLACEMENT): ICD-10-CM

## 2024-08-13 DIAGNOSIS — Z79.01 LONG TERM (CURRENT) USE OF ANTICOAGULANTS: Primary | ICD-10-CM

## 2024-08-13 LAB
CTP QC/QA: YES
INR PPP: 2.2 (ref 2.5–3)

## 2024-08-13 PROCEDURE — 85610 PROTHROMBIN TIME: CPT | Mod: QW,S$GLB,, | Performed by: INTERNAL MEDICINE

## 2024-08-13 PROCEDURE — 85610 PROTHROMBIN TIME: CPT | Mod: PBBFAC

## 2024-08-13 PROCEDURE — 93793 ANTICOAG MGMT PT WARFARIN: CPT | Mod: S$GLB,,,

## 2024-08-13 NOTE — PROGRESS NOTES
INR is subtherapeutic at 2.2.   Patient reports a recent missed dose - 4 mg.  No s/s reported.  Instructions given:  Will boost today's dose to 8 mg, then resume current dose of 6 mg every Tuesday and 4 mg all other days.  Advised on the importance of taking warfarin as directed to prevent risk factors.  INR recheck on 9/09/2024.  Dose calendar given and reviewed with patient - confirms understanding.

## 2024-09-03 ENCOUNTER — PATIENT MESSAGE (OUTPATIENT)
Dept: PSYCHIATRY | Facility: CLINIC | Age: 44
End: 2024-09-03
Payer: MEDICAID

## 2024-09-03 ENCOUNTER — TELEPHONE (OUTPATIENT)
Dept: PSYCHIATRY | Facility: CLINIC | Age: 44
End: 2024-09-03
Payer: MEDICAID

## 2024-09-09 ENCOUNTER — OFFICE VISIT (OUTPATIENT)
Dept: OPHTHALMOLOGY | Facility: CLINIC | Age: 44
End: 2024-09-09
Payer: COMMERCIAL

## 2024-09-09 ENCOUNTER — PATIENT MESSAGE (OUTPATIENT)
Dept: OPHTHALMOLOGY | Facility: CLINIC | Age: 44
End: 2024-09-09

## 2024-09-09 ENCOUNTER — ANTI-COAG VISIT (OUTPATIENT)
Dept: CARDIOLOGY | Facility: CLINIC | Age: 44
End: 2024-09-09
Payer: COMMERCIAL

## 2024-09-09 DIAGNOSIS — Z95.2 S/P AVR (AORTIC VALVE REPLACEMENT): ICD-10-CM

## 2024-09-09 DIAGNOSIS — Z46.0 ENCOUNTER FOR FITTING OR ADJUSTMENT OF SPECTACLES OR CONTACT LENSES: ICD-10-CM

## 2024-09-09 DIAGNOSIS — Z01.00 ENCOUNTER FOR EYE EXAM: Primary | ICD-10-CM

## 2024-09-09 DIAGNOSIS — Z95.1 S/P CABG X 5: ICD-10-CM

## 2024-09-09 DIAGNOSIS — H52.7 REFRACTIVE ERRORS: ICD-10-CM

## 2024-09-09 DIAGNOSIS — Z79.01 LONG TERM (CURRENT) USE OF ANTICOAGULANTS: Primary | ICD-10-CM

## 2024-09-09 LAB
CTP QC/QA: YES
INR PPP: 2.4 (ref 2.5–3)

## 2024-09-09 PROCEDURE — 99999 PR PBB SHADOW E&M-EST. PATIENT-LVL II: CPT | Mod: PBBFAC,,, | Performed by: OPTOMETRIST

## 2024-09-09 PROCEDURE — 92015 DETERMINE REFRACTIVE STATE: CPT | Mod: ,,, | Performed by: OPTOMETRIST

## 2024-09-09 PROCEDURE — 92014 COMPRE OPH EXAM EST PT 1/>: CPT | Mod: ,,, | Performed by: OPTOMETRIST

## 2024-09-09 PROCEDURE — 92310 CONTACT LENS FITTING OU: CPT | Mod: CSM,,, | Performed by: OPTOMETRIST

## 2024-09-09 NOTE — PROGRESS NOTES
SUBJECTIVE  Mikie Alcazar is 44 y.o. male  Corrected distance visual acuity was 20/25 -2 in the right eye and 20/40 -1 in the left eye. Corrected near visual acuity was J4 in the right eye and J2 in the left eye.   Chief Complaint   Patient presents with    Hypertensive Eye Exam    Eye Exam          HPI    Decrease near visual acuity  Last eye exam 09/06/2023 [facility].  Update glasses and contact lenses RX.  Last dilation 09/02/2022  Last edited by Kristina Mendoza MA on 9/9/2024  2:36 PM.         Assessment /Plan :  1. Encounter for eye exam  No pathology.    2. Encounter for fitting or adjustment of spectacles or contact lenses  No changes CL Rx  Dispense Final Rx for contacts.    3. Refractive errors  Dispense Final Rx for glasses.  RTC 1 year  Discussed above and answered questions.

## 2024-09-09 NOTE — PROGRESS NOTES
INR has improved to 2.4 - just below goal.  Patient states previous warfarin instructions and followed.  No dietary changes or s/s reported.  Will re-challenge current dose of 6 mg every Tuesday and 4 mg all other days.  INR recheck in 2 weeks.  Dose calendar given and reviewed with patient - confirms understanding.

## 2024-09-12 ENCOUNTER — TELEPHONE (OUTPATIENT)
Dept: PSYCHIATRY | Facility: CLINIC | Age: 44
End: 2024-09-12
Payer: MEDICAID

## 2024-09-13 ENCOUNTER — TELEPHONE (OUTPATIENT)
Dept: PSYCHIATRY | Facility: CLINIC | Age: 44
End: 2024-09-13
Payer: MEDICAID

## 2024-09-23 ENCOUNTER — ANTI-COAG VISIT (OUTPATIENT)
Dept: CARDIOLOGY | Facility: CLINIC | Age: 44
End: 2024-09-23
Payer: COMMERCIAL

## 2024-09-23 DIAGNOSIS — Z79.01 LONG TERM (CURRENT) USE OF ANTICOAGULANTS: Primary | ICD-10-CM

## 2024-09-23 DIAGNOSIS — Z95.1 S/P CABG X 5: ICD-10-CM

## 2024-09-23 DIAGNOSIS — Z95.2 S/P AVR (AORTIC VALVE REPLACEMENT): ICD-10-CM

## 2024-09-23 LAB
CTP QC/QA: YES
INR PPP: 2.8 (ref 2.5–3)

## 2024-09-23 PROCEDURE — 85610 PROTHROMBIN TIME: CPT | Mod: QW,S$GLB,, | Performed by: INTERNAL MEDICINE

## 2024-09-23 PROCEDURE — 93793 ANTICOAG MGMT PT WARFARIN: CPT | Mod: S$GLB,,,

## 2024-09-23 RX ORDER — METOPROLOL TARTRATE 50 MG/1
50 TABLET ORAL 2 TIMES DAILY
Qty: 180 TABLET | Refills: 3 | Status: CANCELLED | OUTPATIENT
Start: 2024-09-23 | End: 2025-09-23

## 2024-09-23 RX ORDER — WARFARIN 4 MG/1
4 TABLET ORAL DAILY
Qty: 40 TABLET | Refills: 11 | Status: SHIPPED | OUTPATIENT
Start: 2024-09-23 | End: 2025-09-23

## 2024-09-23 RX ORDER — WARFARIN 4 MG/1
4 TABLET ORAL DAILY
Qty: 30 TABLET | Refills: 11 | Status: CANCELLED | OUTPATIENT
Start: 2024-09-23 | End: 2025-09-23

## 2024-09-23 RX ORDER — ATORVASTATIN CALCIUM 80 MG/1
80 TABLET, FILM COATED ORAL DAILY
Qty: 90 TABLET | Refills: 3 | Status: CANCELLED | OUTPATIENT
Start: 2024-09-23 | End: 2025-09-23

## 2024-09-23 NOTE — PROGRESS NOTES
INR is in therapeutic goal range at 2.8.  Patient states current warfarin dose and followed.  No other changes reported.  Patient is requesting a refill - will send to PharmD.  INR recheck in 3 weeks.

## 2024-10-01 RX ORDER — METOPROLOL TARTRATE 50 MG/1
50 TABLET ORAL 2 TIMES DAILY
Qty: 180 TABLET | Refills: 0 | Status: SHIPPED | OUTPATIENT
Start: 2024-10-01

## 2024-10-01 RX ORDER — ATORVASTATIN CALCIUM 80 MG/1
80 TABLET, FILM COATED ORAL DAILY
Qty: 90 TABLET | Refills: 0 | Status: SHIPPED | OUTPATIENT
Start: 2024-10-01

## 2024-10-01 NOTE — TELEPHONE ENCOUNTER
Refill Routing Note   Medication(s) are not appropriate for processing by [facility] for the following reason(s):        ED/Hospital Visit since last OV with provider  Responsible provider unclear    [facility] action(s):  Defer        Medication Therapy Plan: Last ordered:10.03.23 by NAEL Dillard MD. Recent OV but no curent order by PCP      Appointments  past 12m or future 3m with PCP    Date Provider   Last Visit   Visit date not found Anila Morales MD   Next Visit   12/4/2024 Anila Morales MD   ED visits in past 90 days: 0        Note composed:10:29 PM 09/30/2024

## 2024-10-01 NOTE — TELEPHONE ENCOUNTER
No care due was identified.  [facility] Embedded Care Due Messages. Reference number: [id number].   9/30/2024 9:58:45 PM CDT

## 2024-10-16 ENCOUNTER — ANTI-COAG VISIT (OUTPATIENT)
Dept: CARDIOLOGY | Facility: CLINIC | Age: 44
End: 2024-10-16
Payer: COMMERCIAL

## 2024-10-16 DIAGNOSIS — Z95.2 S/P AVR (AORTIC VALVE REPLACEMENT): ICD-10-CM

## 2024-10-16 DIAGNOSIS — Z95.1 S/P CABG X 5: ICD-10-CM

## 2024-10-16 DIAGNOSIS — Z79.01 LONG TERM (CURRENT) USE OF ANTICOAGULANTS: Primary | ICD-10-CM

## 2024-10-16 LAB
CTP QC/QA: YES
INR PPP: 3.1 (ref 2.5–3)

## 2024-10-16 PROCEDURE — 85610 PROTHROMBIN TIME: CPT | Mod: QW,S$GLB,, | Performed by: INTERNAL MEDICINE

## 2024-10-16 PROCEDURE — 93793 ANTICOAG MGMT PT WARFARIN: CPT | Mod: S$GLB,,,

## 2024-10-16 NOTE — PROGRESS NOTES
INR is just above goal at 3.1.  Patient states current warfarin dose and followed.  No bleeding issues reported.  Instructions given to continue 6 mg every Tuesday and 4 mg all other days - hopefully, to return back at goal.  INR recheck in 1.5 weeks - [facility].  Dose calendar given - confirms understanding.

## 2024-10-28 ENCOUNTER — OFFICE VISIT (OUTPATIENT)
Dept: PSYCHIATRY | Facility: CLINIC | Age: 44
End: 2024-10-28
Payer: COMMERCIAL

## 2024-10-28 ENCOUNTER — TELEPHONE (OUTPATIENT)
Dept: CARDIOLOGY | Facility: CLINIC | Age: 44
End: 2024-10-28

## 2024-10-28 ENCOUNTER — ANTI-COAG VISIT (OUTPATIENT)
Dept: CARDIOLOGY | Facility: CLINIC | Age: 44
End: 2024-10-28
Payer: COMMERCIAL

## 2024-10-28 VITALS
HEART RATE: 71 BPM | BODY MASS INDEX: 26.65 KG/M2 | SYSTOLIC BLOOD PRESSURE: 123 MMHG | DIASTOLIC BLOOD PRESSURE: 82 MMHG | WEIGHT: 175.25 LBS

## 2024-10-28 DIAGNOSIS — F32.A DEPRESSION, UNSPECIFIED DEPRESSION TYPE: ICD-10-CM

## 2024-10-28 DIAGNOSIS — F41.9 ANXIETY: ICD-10-CM

## 2024-10-28 DIAGNOSIS — F12.90 CANNABIS USE DISORDER: ICD-10-CM

## 2024-10-28 DIAGNOSIS — F41.1 GAD (GENERALIZED ANXIETY DISORDER): Primary | ICD-10-CM

## 2024-10-28 DIAGNOSIS — Z95.1 S/P CABG X 5: ICD-10-CM

## 2024-10-28 DIAGNOSIS — Z95.2 S/P AVR (AORTIC VALVE REPLACEMENT): ICD-10-CM

## 2024-10-28 DIAGNOSIS — F06.4 ANXIETY DISORDER DUE TO MEDICAL CONDITION: ICD-10-CM

## 2024-10-28 DIAGNOSIS — G47.00 INSOMNIA, UNSPECIFIED TYPE: ICD-10-CM

## 2024-10-28 DIAGNOSIS — Z79.01 LONG TERM (CURRENT) USE OF ANTICOAGULANTS: Primary | ICD-10-CM

## 2024-10-28 LAB
CTP QC/QA: YES
INR PPP: 4.2 (ref 2.5–3)

## 2024-10-28 PROCEDURE — 93793 ANTICOAG MGMT PT WARFARIN: CPT | Mod: S$GLB,,,

## 2024-10-28 PROCEDURE — 1159F MED LIST DOCD IN RCRD: CPT | Mod: CPTII,S$GLB,, | Performed by: PSYCHIATRY & NEUROLOGY

## 2024-10-28 PROCEDURE — 3008F BODY MASS INDEX DOCD: CPT | Mod: CPTII,S$GLB,, | Performed by: PSYCHIATRY & NEUROLOGY

## 2024-10-28 PROCEDURE — 99999 PR PBB SHADOW E&M-EST. PATIENT-LVL III: CPT | Mod: PBBFAC,,, | Performed by: PSYCHIATRY & NEUROLOGY

## 2024-10-28 PROCEDURE — 90833 PSYTX W PT W E/M 30 MIN: CPT | Mod: S$GLB,,, | Performed by: PSYCHIATRY & NEUROLOGY

## 2024-10-28 PROCEDURE — 3074F SYST BP LT 130 MM HG: CPT | Mod: CPTII,S$GLB,, | Performed by: PSYCHIATRY & NEUROLOGY

## 2024-10-28 PROCEDURE — 85610 PROTHROMBIN TIME: CPT | Mod: QW,S$GLB,, | Performed by: INTERNAL MEDICINE

## 2024-10-28 PROCEDURE — 3079F DIAST BP 80-89 MM HG: CPT | Mod: CPTII,S$GLB,, | Performed by: PSYCHIATRY & NEUROLOGY

## 2024-10-28 PROCEDURE — 1160F RVW MEDS BY RX/DR IN RCRD: CPT | Mod: CPTII,S$GLB,, | Performed by: PSYCHIATRY & NEUROLOGY

## 2024-10-28 PROCEDURE — 99205 OFFICE O/P NEW HI 60 MIN: CPT | Mod: S$GLB,,, | Performed by: PSYCHIATRY & NEUROLOGY

## 2024-10-28 RX ORDER — BUSPIRONE HYDROCHLORIDE 10 MG/1
10 TABLET ORAL 3 TIMES DAILY
Qty: 90 TABLET | Refills: 1 | Status: SHIPPED | OUTPATIENT
Start: 2024-10-28 | End: 2024-12-27

## 2024-10-30 ENCOUNTER — TELEPHONE (OUTPATIENT)
Dept: PSYCHIATRY | Facility: CLINIC | Age: 44
End: 2024-10-30
Payer: MEDICAID

## 2024-11-04 ENCOUNTER — ANTI-COAG VISIT (OUTPATIENT)
Dept: CARDIOLOGY | Facility: CLINIC | Age: 44
End: 2024-11-04
Payer: COMMERCIAL

## 2024-11-04 DIAGNOSIS — Z95.2 S/P AVR (AORTIC VALVE REPLACEMENT): ICD-10-CM

## 2024-11-04 DIAGNOSIS — Z95.1 S/P CABG X 5: ICD-10-CM

## 2024-11-04 DIAGNOSIS — Z79.01 LONG TERM (CURRENT) USE OF ANTICOAGULANTS: Primary | ICD-10-CM

## 2024-11-04 LAB
CTP QC/QA: YES
INR PPP: 3.4 (ref 2.5–3)

## 2024-11-04 PROCEDURE — 93793 ANTICOAG MGMT PT WARFARIN: CPT | Mod: S$GLB,,,

## 2024-11-04 PROCEDURE — 85610 PROTHROMBIN TIME: CPT | Mod: QW,S$GLB,, | Performed by: INTERNAL MEDICINE

## 2024-11-04 NOTE — PROGRESS NOTES
INR has improved to 3.4 - above therapeutic goal.  Patient states previous warfarin instructions and followed as directed.  Reports ASA has been d/c.  No bleeding issues or dark tarry stools reported.  Instructions given:  Will lower today's dose to 2 mg, then resume 6 mg every Tuesday and 4 mg all other days.  Bleeding precaution remains in place.  INR recheck in 1 week.  Dose calendar given and reviewed with patient.  Confirms understanding.

## 2024-11-11 ENCOUNTER — ANTI-COAG VISIT (OUTPATIENT)
Dept: CARDIOLOGY | Facility: CLINIC | Age: 44
End: 2024-11-11
Payer: COMMERCIAL

## 2024-11-11 DIAGNOSIS — Z95.2 S/P AVR (AORTIC VALVE REPLACEMENT): ICD-10-CM

## 2024-11-11 DIAGNOSIS — Z79.01 LONG TERM (CURRENT) USE OF ANTICOAGULANTS: Primary | ICD-10-CM

## 2024-11-11 DIAGNOSIS — Z95.1 S/P CABG X 5: ICD-10-CM

## 2024-11-11 LAB
CTP QC/QA: YES
INR PPP: 3.4 (ref 2.5–3)

## 2024-11-11 PROCEDURE — 85610 PROTHROMBIN TIME: CPT | Mod: QW,S$GLB,, | Performed by: INTERNAL MEDICINE

## 2024-11-11 PROCEDURE — 93793 ANTICOAG MGMT PT WARFARIN: CPT | Mod: S$GLB,,,

## 2024-11-11 NOTE — PROGRESS NOTES
INR remains above therapeutic goal at 3.4.  Previous instructions were verified and followed.  No bleeding issues reported.  Bleeding precaution remains in place.  Reports 162 mg of ASA was d/c 2 weeks ago - remains on 81 mg as directed by physician.  Instructions given:  Will lower today's dose to 2 mg, then decrease overall dose to 4 mg every day.  Recheck INR next week.  Dose calendar given and reviewed with patient.  Confirms understanding.

## 2024-11-12 ENCOUNTER — TELEPHONE (OUTPATIENT)
Dept: PSYCHIATRY | Facility: CLINIC | Age: 44
End: 2024-11-12
Payer: MEDICAID

## 2024-11-18 ENCOUNTER — ANTI-COAG VISIT (OUTPATIENT)
Dept: CARDIOLOGY | Facility: CLINIC | Age: 44
End: 2024-11-18
Payer: COMMERCIAL

## 2024-11-18 DIAGNOSIS — Z95.1 S/P CABG X 5: ICD-10-CM

## 2024-11-18 DIAGNOSIS — Z95.2 S/P AVR (AORTIC VALVE REPLACEMENT): ICD-10-CM

## 2024-11-18 DIAGNOSIS — Z79.01 LONG TERM (CURRENT) USE OF ANTICOAGULANTS: Primary | ICD-10-CM

## 2024-11-18 LAB
CTP QC/QA: YES
INR PPP: 3.6 (ref 2.5–3)

## 2024-11-18 PROCEDURE — 93793 ANTICOAG MGMT PT WARFARIN: CPT | Mod: S$GLB,,,

## 2024-11-18 PROCEDURE — 85610 PROTHROMBIN TIME: CPT | Mod: QW,S$GLB,, | Performed by: INTERNAL MEDICINE

## 2024-11-18 NOTE — PROGRESS NOTES
INR: 3.6 - remains above goal despite a decrease in dose given on last visit.  Verified last instructions and followed.  No bleeding issues or changes in medication reported.  Bleeding precaution remains in place.  Will send to PharmD for dosing.

## 2024-11-18 NOTE — PROGRESS NOTES
INR not at goal. Medications, chart, and patient findings reviewed. See calendar for adjustments to dose and follow up plan.  Hold dose today as INR has actually increased on a lowered dose from last week.  We will laos lower his weekly dose once more to 2 mg on Mondays and 4 mg on all other days.  Repeat INR in 1.5 weeks.  ER with any s/s of bleeding.

## 2024-12-04 ENCOUNTER — ANTI-COAG VISIT (OUTPATIENT)
Dept: CARDIOLOGY | Facility: CLINIC | Age: 44
End: 2024-12-04
Payer: COMMERCIAL

## 2024-12-04 ENCOUNTER — OFFICE VISIT (OUTPATIENT)
Dept: INTERNAL MEDICINE | Facility: CLINIC | Age: 44
End: 2024-12-04
Payer: MEDICAID

## 2024-12-04 VITALS
HEART RATE: 110 BPM | WEIGHT: 165.38 LBS | TEMPERATURE: 97 F | OXYGEN SATURATION: 98 % | DIASTOLIC BLOOD PRESSURE: 70 MMHG | BODY MASS INDEX: 25.14 KG/M2 | SYSTOLIC BLOOD PRESSURE: 120 MMHG | RESPIRATION RATE: 16 BRPM

## 2024-12-04 DIAGNOSIS — Z95.1 S/P CABG X 5: ICD-10-CM

## 2024-12-04 DIAGNOSIS — Z95.2 S/P AVR (AORTIC VALVE REPLACEMENT): ICD-10-CM

## 2024-12-04 DIAGNOSIS — I35.1 NONRHEUMATIC AORTIC VALVE INSUFFICIENCY: ICD-10-CM

## 2024-12-04 DIAGNOSIS — Z79.01 LONG TERM (CURRENT) USE OF ANTICOAGULANTS: Primary | ICD-10-CM

## 2024-12-04 DIAGNOSIS — I25.10 CAD, MULTIPLE VESSEL: Primary | ICD-10-CM

## 2024-12-04 LAB
CTP QC/QA: YES
INR PPP: 2.7 (ref 2.5–3)

## 2024-12-04 PROCEDURE — 99213 OFFICE O/P EST LOW 20 MIN: CPT | Mod: PBBFAC | Performed by: FAMILY MEDICINE

## 2024-12-04 PROCEDURE — 85610 PROTHROMBIN TIME: CPT | Mod: QW,S$GLB,, | Performed by: INTERNAL MEDICINE

## 2024-12-04 PROCEDURE — 99999 PR PBB SHADOW E&M-EST. PATIENT-LVL III: CPT | Mod: PBBFAC,,, | Performed by: FAMILY MEDICINE

## 2024-12-04 RX ORDER — ATORVASTATIN CALCIUM 80 MG/1
80 TABLET, FILM COATED ORAL DAILY
Qty: 90 TABLET | Refills: 1 | Status: SHIPPED | OUTPATIENT
Start: 2024-12-04

## 2024-12-04 RX ORDER — METOPROLOL TARTRATE 50 MG/1
50 TABLET ORAL 2 TIMES DAILY
Qty: 180 TABLET | Refills: 1 | Status: SHIPPED | OUTPATIENT
Start: 2024-12-04

## 2024-12-04 NOTE — PROGRESS NOTES
Mikie Alcazar  12/04/2024  [id number]    Anila Morales MD  Patient Care Team:  Anila Morales MD as PCP - General (Family Medicine)          Visit Type:a scheduled routine follow-up visit    Chief Complaint:  Chief Complaint   Patient presents with    Follow-up       History of Present Illness:    History of Present Illness    CHIEF COMPLAINT:  Mr. Alcazar presents today for a six-month follow-up.    CARDIOVASCULAR HISTORY:  He has a history of five-vessel CABG following myocardial infarction (MI) and aortic valve repair. He is currently on Coumadin therapy. His last cardiology visit was with Dr. Davenport. He reports low blood pressure. His most recent echocardiogram in December 2023 showed an EF of 50%. A follow-up visit with cardiology is scheduled for January.    MENTAL HEALTH:  He was referred to Psychiatry for evaluation of anxiety and request for Xanax. He attended a psychiatric consultation where a recommendation was made to start BuSpar, but he is not currently taking this medication. He spent time today in visit, discussing his psych appt, and that is is not happy with the buspar and desires Xanax prescription that Dr. Garcia had given in past.     LABORATORY RESULTS:  Last labs were performed in June. CBC showed improvement with resolution of postoperative anemia. Complete metabolic panel was normal, with liver and kidney function tests remaining within normal limits. Last lipid profile was performed six months ago.    MEDICATIONS:  He remains on high-dose Lipitor and Coumadin therapy.    PREVENTIVE CARE:  He is due for colorectal screening. Cologuard was ordered but has not been completed.            The following were reviewed at this visit: active problem list, medication list, allergies, family history, social history, and health maintenance.  History:  Past Medical History:   Diagnosis Date    Hypertension 9/18/2023     Past Surgical History:   Procedure Laterality Date    ANGIOGRAPHY OF INTERNAL  MAMMARY VESSEL Left 9/18/2023    Procedure: Angiogram Internal Mammary;  Surgeon: Chelsea Morales MD;  Location: [facility] CATH LAB;  Service: Cardiology;  Laterality: Left;    AORTIC VALVE REPLACEMENT N/A 9/19/2023    Procedure: REPLACEMENT-VALVE-AORTIC;  Surgeon: Nishant Douglas MD;  Location: [facility] OR;  Service: Cardiothoracic;  Laterality: N/A;  19 mm MECHANICAL AORTIC VALVE    ARTERIOGRAPHY OF AORTIC ROOT N/A 9/18/2023    Procedure: ARTERIOGRAM, AORTIC ROOT;  Surgeon: Chelsea Mroales MD;  Location: [facility] CATH LAB;  Service: Cardiology;  Laterality: N/A;    ARTERIOGRAPHY OF SUBCLAVIAN ARTERY Left 9/18/2023    Procedure: ARTERIOGRAM, SUBCLAVIAN;  Surgeon: Chelsea Morales MD;  Location: [facility] CATH LAB;  Service: Cardiology;  Laterality: Left;    CORONARY ARTERY BYPASS GRAFT (CABG) N/A 9/19/2023    Procedure: CORONARY ARTERY BYPASS GRAFT (CABG);  Surgeon: Nishant Douglas MD;  Location: [facility] OR;  Service: Cardiothoracic;  Laterality: N/A;  5-VESSEL WITH EPI-AORTIC ULTRASOUND    ECHOCARDIOGRAM,TRANSESOPHAGEAL N/A 9/19/2023    Procedure: ECHOCARDIOGRAM,TRANSESOPHAGEAL;  Surgeon: Nishant Douglas MD;  Location: [facility] OR;  Service: Cardiothoracic;  Laterality: N/A;    ENDOSCOPIC HARVEST OF VEIN Left 9/19/2023    Procedure: SURGICAL PROCUREMENT, VEIN, ENDOSCOPIC;  Surgeon: Nishant Douglas MD;  Location: [facility] OR;  Service: Cardiothoracic;  Laterality: Left;    INJECTION OF ANESTHETIC AGENT AROUND MULTIPLE INTERCOSTAL NERVES N/A 9/19/2023    Procedure: BLOCK, NERVE, INTERCOSTAL, 2 OR MORE;  Surgeon: Nishant Douglas MD;  Location: [facility] OR;  Service: Cardiothoracic;  Laterality: N/A;  PARA-STERNAL NERVE BLOCK    LEFT HEART CATHETERIZATION Left 9/18/2023    Procedure: Left heart cath;  Surgeon: Chelsea Morales MD;  Location: [facility] CATH LAB;  Service: Cardiology;  Laterality: Left;     Patient Active Problem List   Diagnosis    Hypertension    CAD, multiple vessel    Tobacco smoker, 1 pack of cigarettes or less per day     Nonrheumatic aortic valve insufficiency    S/P CABG x 5    S/P AVR (aortic valve replacement)    NIRMALA (generalized anxiety disorder)    Anxiety disorder due to medical condition    Depression    Insomnia    Cannabis use disorder       Medications:  Current Outpatient Medications on File Prior to Visit   Medication Sig Dispense Refill    acetaminophen (TYLENOL) 325 MG tablet Take 2 tablets (650 mg total) by mouth every 6 (six) hours as needed for Pain. 20 tablet 0    aspirin (ECOTRIN) 81 MG EC tablet Take 1 tablet (81 mg total) by mouth once daily. 360 tablet 0    folic acid (FOLVITE) 1 MG tablet Take 1 tablet (1 mg total) by mouth once daily. 30 tablet 0    traZODone (DESYREL) 100 MG tablet Take 1 tablet (100 mg total) by mouth every evening. 90 tablet 3    warfarin (COUMADIN) 4 MG tablet Take 1 tablet (4 mg total) by mouth Daily. Except one and one half tablets (6 mg) every Tuesday as directed by the CC. (Patient taking differently: Take 4 mg by mouth Daily. Except one half tablet (2 mg) every Monday as directed by the CC.) 40 tablet 11    [DISCONTINUED] atorvastatin (LIPITOR) 80 MG tablet Take 1 tablet (80 mg total) by mouth once daily. 90 tablet 0    [DISCONTINUED] metoprolol tartrate (LOPRESSOR) 50 MG tablet Take 1 tablet (50 mg total) by mouth 2 (two) times daily. 180 tablet 0    busPIRone (BUSPAR) 10 MG tablet Take 1 tablet (10 mg total) by mouth 3 (three) times daily. (Patient not taking: Reported on 12/4/2024) 90 tablet 1    [DISCONTINUED] triamcinolone acetonide 0.1% (KENALOG) 0.1 % cream Apply topically 2 (two) times daily. (Patient not taking: Reported on 12/4/2024) 80 g 3     No current facility-administered medications on file prior to visit.       Medications have been reviewed and reconciled with patient at this visit.    Exam:  Wt Readings from Last 3 Encounters:   12/04/24 75 kg (165 lb 5.5 oz)   10/28/24 79.5 kg (175 lb 4.3 oz)   07/01/24 81.4 kg (179 lb 9 oz)     Temp Readings from Last 3  Encounters:   12/04/24 96.6 °F (35.9 °C) (Tympanic)   06/07/24 97.8 °F (36.6 °C) (Tympanic)   02/10/24 98 °F (36.7 °C) (Oral)     BP Readings from Last 3 Encounters:   12/04/24 120/70   10/28/24 123/82   07/01/24 138/80     Pulse Readings from Last 3 Encounters:   12/04/24 110   10/28/24 71   07/01/24 87     Body mass index is 25.14 kg/m².      Review of Systems   Constitutional: Negative.  Negative for chills and fever.   HENT: Negative.  Negative for congestion, sinus pain and sore throat.    Eyes:  Negative for blurred vision and double vision.   Respiratory:  Negative for cough, sputum production, shortness of breath and wheezing.    Cardiovascular:  Negative for chest pain, palpitations and leg swelling.   Gastrointestinal:  Negative for abdominal pain, constipation, diarrhea, heartburn, nausea and vomiting.   Genitourinary: Negative.    Musculoskeletal: Negative.    Skin: Negative.  Negative for rash.   Neurological: Negative.    Endo/Heme/Allergies: Negative.  Negative for polydipsia. Does not bruise/bleed easily.   Psychiatric/Behavioral:  Negative for depression and substance abuse. The patient is nervous/anxious.      Physical Exam  Nursing note reviewed.   Cardiovascular:      Rate and Rhythm: Normal rate and regular rhythm.   Pulmonary:      Effort: Pulmonary effort is normal. No respiratory distress.   Neurological:      Mental Status: He is alert and oriented to person, place, and time.   Psychiatric:         Mood and Affect: Mood normal.         Behavior: Behavior normal.         Thought Content: Thought content normal.         Judgment: Judgment normal.       Physical Exam             Laboratory Reviewed ({Yes)    Lab Results   Component Value Date    WBC 7.02 06/07/2024    HGB 15.1 06/07/2024    HCT 44.6 06/07/2024     06/07/2024    CHOL 121 06/07/2024    TRIG 89 06/07/2024    HDL 32 (L) 06/07/2024    ALT 31 06/07/2024    ALT 31 06/07/2024    AST 27 06/07/2024    AST 27 06/07/2024      06/07/2024    K 4.6 06/07/2024     06/07/2024    CREATININE 1.0 06/07/2024    BUN 10 06/07/2024    CO2 26 06/07/2024    TSH 3.361 02/10/2024    PSA 1.3 06/07/2024    INR 2.7 12/04/2024    HGBA1C 5.3 09/17/2023       Assessment & Plan    I25.2 Old myocardial infarction    Z95.1 Presence of aortocoronary bypass graft    Z95.4 Presence of other heart-valve replacement    I35.8 Other nonrheumatic aortic valve disorders        Reviewed patient's cardiac history including 5-vessel CABG after MI, coronary disease, and aortic valve repair    Noted last cardiology visit with Dr. Davenport and upcoming follow up in January- due for stress test and Echo to review valve.     Assessed blood pressure remains low, precluding use of ACE inhibitor, ARB, or ARNI, he remains on beta blocker.    Considered last echocardiogram results from December 2023 showing EF of 50%    Evaluated lipid management with LDL at 71, slightly above goal of <70    Acknowledged resolution of postoperative anemia and normal liver and kidney function    CARDIOVASCULAR DISEASE MANAGEMENT:  - Continued Coumadin for anticoagulation.  - Continued high-dose Lipitor for lipid management.    LABS:  - CBC, CMP, and lipid profile ordered.    COLORECTAL CANCER SCREENING:  - Cologuard ordered for colorectal screening.     Referral sources for Medicaid and mental health externally for second opinion on his anxiety and use of Xanax as treatment. Defers SSRi. Does not want Buspar.      Mikie was seen today for follow-up.    Diagnoses and all orders for this visit:    CAD, multiple vessel  -     CBC Auto Differential; Future  -     Comprehensive Metabolic Panel; Future  -     Lipid Panel; Future    Nonrheumatic aortic valve insufficiency  -     CBC Auto Differential; Future  -     Comprehensive Metabolic Panel; Future  -     Lipid Panel; Future    S/P CABG x 5  -     CBC Auto Differential; Future  -     Comprehensive Metabolic Panel; Future  -     Lipid Panel;  Future    S/P AVR (aortic valve replacement)  -     CBC Auto Differential; Future  -     Comprehensive Metabolic Panel; Future  -     Lipid Panel; Future    Other orders  -     metoprolol tartrate (LOPRESSOR) 50 MG tablet; Take 1 tablet (50 mg total) by mouth 2 (two) times daily.  -     atorvastatin (LIPITOR) 80 MG tablet; Take 1 tablet (80 mg total) by mouth once daily.                Care Plan/Goals: Reviewed     Follow up: Follow up in about 6 months (around 6/4/2025).    After visit summary was printed and given to patient upon discharge today.  Patient goals and care plan are included in After Visit Summary.    This note was generated with the assistance of ambient listening technology. Verbal consent was obtained by the patient and accompanying visitor(s) for the recording of patient appointment to facilitate this note. I attest to having reviewed and edited the generated note for accuracy, though some syntax or spelling errors may persist. Please contact the author of this note for any clarification.

## 2024-12-04 NOTE — PROGRESS NOTES
INR is in therapeutic goal range at 2.7.  Patient states previous warfarin instructions and followed.  No other changes reported.  Will continue 2 mg every Monday and 4 mg all other days.  Recheck INR in 2 weeks.  Dose calendar given and reviewed with patient - confirms understanding.

## 2024-12-17 ENCOUNTER — LAB VISIT (OUTPATIENT)
Dept: LAB | Facility: HOSPITAL | Age: 44
End: 2024-12-17
Attending: FAMILY MEDICINE
Payer: COMMERCIAL

## 2024-12-17 DIAGNOSIS — Z95.1 S/P CABG X 5: ICD-10-CM

## 2024-12-17 DIAGNOSIS — I35.1 NONRHEUMATIC AORTIC VALVE INSUFFICIENCY: ICD-10-CM

## 2024-12-17 DIAGNOSIS — Z95.2 S/P AVR (AORTIC VALVE REPLACEMENT): ICD-10-CM

## 2024-12-17 DIAGNOSIS — I25.10 CAD, MULTIPLE VESSEL: ICD-10-CM

## 2024-12-17 LAB
ALBUMIN SERPL BCP-MCNC: 3.6 G/DL (ref 3.5–5.2)
ALP SERPL-CCNC: 78 U/L (ref 40–150)
ALT SERPL W/O P-5'-P-CCNC: 33 U/L (ref 10–44)
ANION GAP SERPL CALC-SCNC: 7 MMOL/L (ref 8–16)
AST SERPL-CCNC: 30 U/L (ref 10–40)
BASOPHILS # BLD AUTO: 0.04 K/UL (ref 0–0.2)
BASOPHILS NFR BLD: 0.5 % (ref 0–1.9)
BILIRUB SERPL-MCNC: 0.5 MG/DL (ref 0.1–1)
BUN SERPL-MCNC: 6 MG/DL (ref 6–20)
CALCIUM SERPL-MCNC: 9 MG/DL (ref 8.7–10.5)
CHLORIDE SERPL-SCNC: 107 MMOL/L (ref 95–110)
CHOLEST SERPL-MCNC: 113 MG/DL (ref 120–199)
CHOLEST/HDLC SERPL: 3.4 {RATIO} (ref 2–5)
CO2 SERPL-SCNC: 26 MMOL/L (ref 23–29)
CREAT SERPL-MCNC: 0.8 MG/DL (ref 0.5–1.4)
DIFFERENTIAL METHOD BLD: ABNORMAL
EOSINOPHIL # BLD AUTO: 0.3 K/UL (ref 0–0.5)
EOSINOPHIL NFR BLD: 3.8 % (ref 0–8)
ERYTHROCYTE [DISTWIDTH] IN BLOOD BY AUTOMATED COUNT: 13.2 % (ref 11.5–14.5)
EST. GFR  (NO RACE VARIABLE): >60 ML/MIN/1.73 M^2
GLUCOSE SERPL-MCNC: 101 MG/DL (ref 70–110)
HCT VFR BLD AUTO: 48.5 % (ref 40–54)
HDLC SERPL-MCNC: 33 MG/DL (ref 40–75)
HDLC SERPL: 29.2 % (ref 20–50)
HGB BLD-MCNC: 15.8 G/DL (ref 14–18)
IMM GRANULOCYTES # BLD AUTO: 0.01 K/UL (ref 0–0.04)
IMM GRANULOCYTES NFR BLD AUTO: 0.1 % (ref 0–0.5)
LDLC SERPL CALC-MCNC: 53 MG/DL (ref 63–159)
LYMPHOCYTES # BLD AUTO: 2.8 K/UL (ref 1–4.8)
LYMPHOCYTES NFR BLD: 35.6 % (ref 18–48)
MCH RBC QN AUTO: 31.2 PG (ref 27–31)
MCHC RBC AUTO-ENTMCNC: 32.6 G/DL (ref 32–36)
MCV RBC AUTO: 96 FL (ref 82–98)
MONOCYTES # BLD AUTO: 0.7 K/UL (ref 0.3–1)
MONOCYTES NFR BLD: 9.2 % (ref 4–15)
NEUTROPHILS # BLD AUTO: 3.9 K/UL (ref 1.8–7.7)
NEUTROPHILS NFR BLD: 50.8 % (ref 38–73)
NONHDLC SERPL-MCNC: 80 MG/DL
NRBC BLD-RTO: 0 /100 WBC
PLATELET # BLD AUTO: 345 K/UL (ref 150–450)
PMV BLD AUTO: 10.4 FL (ref 9.2–12.9)
POTASSIUM SERPL-SCNC: 4.1 MMOL/L (ref 3.5–5.1)
PROT SERPL-MCNC: 7 G/DL (ref 6–8.4)
RBC # BLD AUTO: 5.06 M/UL (ref 4.6–6.2)
SODIUM SERPL-SCNC: 140 MMOL/L (ref 136–145)
TRIGL SERPL-MCNC: 135 MG/DL (ref 30–150)
WBC # BLD AUTO: 7.73 K/UL (ref 3.9–12.7)

## 2024-12-17 PROCEDURE — 80061 LIPID PANEL: CPT | Performed by: FAMILY MEDICINE

## 2024-12-17 PROCEDURE — 80053 COMPREHEN METABOLIC PANEL: CPT | Performed by: FAMILY MEDICINE

## 2024-12-17 PROCEDURE — 36415 COLL VENOUS BLD VENIPUNCTURE: CPT | Performed by: FAMILY MEDICINE

## 2024-12-17 PROCEDURE — 85025 COMPLETE CBC W/AUTO DIFF WBC: CPT | Performed by: FAMILY MEDICINE

## 2024-12-18 ENCOUNTER — ANTI-COAG VISIT (OUTPATIENT)
Dept: CARDIOLOGY | Facility: CLINIC | Age: 44
End: 2024-12-18
Payer: COMMERCIAL

## 2024-12-18 DIAGNOSIS — Z79.01 LONG TERM (CURRENT) USE OF ANTICOAGULANTS: Primary | ICD-10-CM

## 2024-12-18 DIAGNOSIS — Z95.1 S/P CABG X 5: ICD-10-CM

## 2024-12-18 DIAGNOSIS — Z95.2 S/P AVR (AORTIC VALVE REPLACEMENT): ICD-10-CM

## 2024-12-18 LAB
CTP QC/QA: YES
INR PPP: 3.5 (ref 2.5–3)

## 2024-12-18 PROCEDURE — 85610 PROTHROMBIN TIME: CPT | Mod: QW,S$GLB,, | Performed by: INTERNAL MEDICINE

## 2024-12-18 PROCEDURE — 93793 ANTICOAG MGMT PT WARFARIN: CPT | Mod: S$GLB,,,

## 2024-12-18 NOTE — PROGRESS NOTES
INR not at goal. Medications, chart, and patient findings reviewed. See calendar for adjustments to dose and follow up plan.  INR is over goal, we have lowered his dose significantly in the past 3-4 weeks.  He is reporting a different dose than prescribed - 4 mg QD.  We will hold for elevation today, then repeat in about 3 weeks.  I feel that he is follows dosing directions, he will be in goal range.  If still elevated, we will lower dose at that time.

## 2024-12-18 NOTE — PROGRESS NOTES
INR has climbed back over therapeutic goal at 3.5.  Patient reports no changes.  States previous warfarin dose and followed.  No bleeding issues reported.  Will send to PharmD for review and dosing.

## 2025-01-06 ENCOUNTER — ANTI-COAG VISIT (OUTPATIENT)
Dept: CARDIOLOGY | Facility: CLINIC | Age: 45
End: 2025-01-06
Payer: COMMERCIAL

## 2025-01-06 ENCOUNTER — OFFICE VISIT (OUTPATIENT)
Dept: CARDIOLOGY | Facility: CLINIC | Age: 45
End: 2025-01-06
Payer: COMMERCIAL

## 2025-01-06 VITALS
SYSTOLIC BLOOD PRESSURE: 90 MMHG | WEIGHT: 163.94 LBS | OXYGEN SATURATION: 98 % | BODY MASS INDEX: 24.92 KG/M2 | HEART RATE: 88 BPM | DIASTOLIC BLOOD PRESSURE: 60 MMHG

## 2025-01-06 DIAGNOSIS — I10 PRIMARY HYPERTENSION: ICD-10-CM

## 2025-01-06 DIAGNOSIS — Z79.01 LONG TERM (CURRENT) USE OF ANTICOAGULANTS: Primary | ICD-10-CM

## 2025-01-06 DIAGNOSIS — Z95.2 S/P AVR (AORTIC VALVE REPLACEMENT): ICD-10-CM

## 2025-01-06 DIAGNOSIS — F17.210 TOBACCO SMOKER, 1 PACK OF CIGARETTES OR LESS PER DAY: ICD-10-CM

## 2025-01-06 DIAGNOSIS — Z95.1 S/P CABG X 5: ICD-10-CM

## 2025-01-06 DIAGNOSIS — I25.10 CAD, MULTIPLE VESSEL: ICD-10-CM

## 2025-01-06 DIAGNOSIS — R42 DIZZINESS: ICD-10-CM

## 2025-01-06 DIAGNOSIS — I35.1 NONRHEUMATIC AORTIC VALVE INSUFFICIENCY: ICD-10-CM

## 2025-01-06 DIAGNOSIS — Z95.1 S/P CABG X 5: Primary | ICD-10-CM

## 2025-01-06 LAB
CTP QC/QA: YES
INR PPP: 2.5 (ref 2.5–3)

## 2025-01-06 PROCEDURE — 3078F DIAST BP <80 MM HG: CPT | Mod: CPTII,S$GLB,, | Performed by: INTERNAL MEDICINE

## 2025-01-06 PROCEDURE — 3074F SYST BP LT 130 MM HG: CPT | Mod: CPTII,S$GLB,, | Performed by: INTERNAL MEDICINE

## 2025-01-06 PROCEDURE — 1160F RVW MEDS BY RX/DR IN RCRD: CPT | Mod: CPTII,S$GLB,, | Performed by: INTERNAL MEDICINE

## 2025-01-06 PROCEDURE — 3008F BODY MASS INDEX DOCD: CPT | Mod: CPTII,S$GLB,, | Performed by: INTERNAL MEDICINE

## 2025-01-06 PROCEDURE — 99214 OFFICE O/P EST MOD 30 MIN: CPT | Mod: S$GLB,,, | Performed by: INTERNAL MEDICINE

## 2025-01-06 PROCEDURE — 1159F MED LIST DOCD IN RCRD: CPT | Mod: CPTII,S$GLB,, | Performed by: INTERNAL MEDICINE

## 2025-01-06 PROCEDURE — 85610 PROTHROMBIN TIME: CPT | Mod: QW,S$GLB,, | Performed by: INTERNAL MEDICINE

## 2025-01-06 PROCEDURE — 99999 PR PBB SHADOW E&M-EST. PATIENT-LVL III: CPT | Mod: PBBFAC,,, | Performed by: INTERNAL MEDICINE

## 2025-01-06 RX ORDER — METOPROLOL TARTRATE 50 MG/1
50 TABLET ORAL DAILY
Start: 2025-01-06 | End: 2025-01-07 | Stop reason: SDUPTHER

## 2025-01-06 NOTE — PROGRESS NOTES
INR is therapeutic at 2.5.  Previous warfarin instructions were verified and followed.  No other changes reported.  Instructions given to continue 2 mg every Monday and 4 mg all other days.  INR recheck in 3 weeks.  Dose calendar given - confirms understanding.

## 2025-01-06 NOTE — PROGRESS NOTES
Subjective:   Patient ID:  Mikie Alcazar is a 44 y.o. male who presents for follow-up of No chief complaint on file.  CABG- 5V and AVR ( mechanical) 9-23   Echo nml AVR  Pt with dizziness x few months  Hypertension  This is a chronic problem. The current episode started more than 1 year ago. The problem has been gradually improving since onset. The problem is controlled. Pertinent negatives include no chest pain, palpitations or shortness of breath. Past treatments include beta blockers. The current treatment provides moderate improvement. There are no compliance problems.    Hyperlipidemia  This is a chronic problem. The current episode started more than 1 year ago. The problem is controlled. Pertinent negatives include no chest pain or shortness of breath. Current antihyperlipidemic treatment includes statins. The current treatment provides moderate improvement of lipids.   Coronary Artery Disease  Presents for follow-up visit. Pertinent negatives include no chest pain, chest pressure, chest tightness, dizziness, leg swelling, muscle weakness, palpitations, shortness of breath or weight gain. The symptoms have been stable. Compliance with diet is good. Compliance with exercise is good. Compliance with medications is good.       Review of Systems   Constitutional: Negative. Negative for weight gain.   HENT: Negative.     Eyes: Negative.    Cardiovascular: Negative.  Negative for chest pain, leg swelling and palpitations.   Respiratory: Negative.  Negative for chest tightness and shortness of breath.    Endocrine: Negative.    Hematologic/Lymphatic: Negative.    Skin: Negative.    Musculoskeletal:  Negative for muscle weakness.   Gastrointestinal: Negative.    Genitourinary: Negative.    Neurological: Negative.  Negative for dizziness.   Psychiatric/Behavioral: Negative.     Allergic/Immunologic: Negative.    All other systems reviewed and are negative.    Family History   Problem Relation Name Age of Onset     Strabismus Mother      Hypertension Father      Macular degeneration Father      Glaucoma Maternal Aunt      Cataracts Maternal Aunt      Glaucoma Maternal Uncle      Cataracts Maternal Uncle       Past Medical History:   Diagnosis Date    Hypertension 9/18/2023     Social History     Socioeconomic History    Marital status: Single   Tobacco Use    Smoking status: Some Days     Current packs/day: 1.00     Types: Cigarettes    Smokeless tobacco: Never   Substance and Sexual Activity    Alcohol use: Yes     Comment: rarely    Drug use: No     Current Outpatient Medications on File Prior to Visit   Medication Sig Dispense Refill    acetaminophen (TYLENOL) 325 MG tablet Take 2 tablets (650 mg total) by mouth every 6 (six) hours as needed for Pain. 20 tablet 0    aspirin (ECOTRIN) 81 MG EC tablet Take 1 tablet (81 mg total) by mouth once daily. 360 tablet 0    atorvastatin (LIPITOR) 80 MG tablet Take 1 tablet (80 mg total) by mouth once daily. 90 tablet 1    folic acid (FOLVITE) 1 MG tablet Take 1 tablet (1 mg total) by mouth once daily. 30 tablet 0    metoprolol tartrate (LOPRESSOR) 50 MG tablet Take 1 tablet (50 mg total) by mouth 2 (two) times daily. 180 tablet 1    traZODone (DESYREL) 100 MG tablet Take 1 tablet (100 mg total) by mouth every evening. 90 tablet 3    warfarin (COUMADIN) 4 MG tablet Take 1 tablet (4 mg total) by mouth Daily. Except one and one half tablets (6 mg) every Tuesday as directed by the CC. 40 tablet 11    busPIRone (BUSPAR) 10 MG tablet Take 1 tablet (10 mg total) by mouth 3 (three) times daily. 90 tablet 1     No current facility-administered medications on file prior to visit.     Review of patient's allergies indicates:  No Known Allergies    Objective:     Physical Exam  Vitals and nursing note reviewed.   Constitutional:       Appearance: He is well-developed.   HENT:      Head: Normocephalic and atraumatic.   Eyes:      Conjunctiva/sclera: Conjunctivae normal.      Pupils: Pupils are  equal, round, and reactive to light.   Cardiovascular:      Rate and Rhythm: Normal rate and regular rhythm.      Pulses: Intact distal pulses.      Heart sounds: Normal heart sounds.   Pulmonary:      Effort: Pulmonary effort is normal.      Breath sounds: Normal breath sounds.   Abdominal:      General: Bowel sounds are normal.      Palpations: Abdomen is soft.   Musculoskeletal:      Cervical back: Normal range of motion and neck supple.   Skin:     General: Skin is warm and dry.   Neurological:      Mental Status: He is alert and oriented to person, place, and time.         Assessment:     1. S/P CABG x 5    2. S/P AVR (aortic valve replacement)    3. CAD, multiple vessel    4. Primary hypertension    5. Nonrheumatic aortic valve insufficiency    6. Tobacco smoker, 1 pack of cigarettes or less per day        Plan:     S/P CABG x 5    S/P AVR (aortic valve replacement)    CAD, multiple vessel    Primary hypertension    Nonrheumatic aortic valve insufficiency    Tobacco smoker, 1 pack of cigarettes or less per day        Continue statin-HLP  Continue metoprolol-HTN  Continue asa- CAD/CABG  Continue coumadin- AVR  Decrease  metoprolol q day  Smoking cessation

## 2025-01-27 ENCOUNTER — ANTI-COAG VISIT (OUTPATIENT)
Dept: CARDIOLOGY | Facility: CLINIC | Age: 45
End: 2025-01-27
Payer: COMMERCIAL

## 2025-01-27 ENCOUNTER — HOSPITAL ENCOUNTER (OUTPATIENT)
Dept: CARDIOLOGY | Facility: HOSPITAL | Age: 45
Discharge: HOME | End: 2025-01-27
Attending: INTERNAL MEDICINE
Payer: COMMERCIAL

## 2025-01-27 VITALS
WEIGHT: 163 LBS | HEIGHT: 68 IN | BODY MASS INDEX: 24.71 KG/M2 | DIASTOLIC BLOOD PRESSURE: 60 MMHG | SYSTOLIC BLOOD PRESSURE: 90 MMHG

## 2025-01-27 DIAGNOSIS — Z95.2 S/P AVR (AORTIC VALVE REPLACEMENT): ICD-10-CM

## 2025-01-27 DIAGNOSIS — Z95.1 S/P CABG X 5: ICD-10-CM

## 2025-01-27 DIAGNOSIS — Z79.01 LONG TERM (CURRENT) USE OF ANTICOAGULANTS: Primary | ICD-10-CM

## 2025-01-27 DIAGNOSIS — R42 DIZZINESS: ICD-10-CM

## 2025-01-27 LAB
AORTIC ROOT ANNULUS: 2.5 CM
ASCENDING AORTA: 2.71 CM
AV INDEX (PROSTH): 0.49
AV MEAN GRADIENT: 9 MMHG
AV PEAK GRADIENT: 19 MMHG
AV REGURGITATION PRESSURE HALF TIME: 1029 MS
AV VALVE AREA BY VELOCITY RATIO: 1.7 CM²
AV VALVE AREA: 1.8 CM²
AV VELOCITY RATIO: 0.45
BSA FOR ECHO PROCEDURE: 1.88 M2
CTP QC/QA: YES
CV ECHO LV RWT: 0.72 CM
DOP CALC AO PEAK VEL: 2.2 M/S
DOP CALC AO VTI: 44.1 CM
DOP CALC LVOT AREA: 3.8 CM2
DOP CALC LVOT DIAMETER: 2.2 CM
DOP CALC LVOT PEAK VEL: 1 M/S
DOP CALC LVOT STROKE VOLUME: 81.3 CM3
DOP CALC RVOT PEAK VEL: 0.99 M/S
DOP CALC RVOT VTI: 22.4 CM
DOP CALCLVOT PEAK VEL VTI: 21.4 CM
E WAVE DECELERATION TIME: 225 MSEC
E/A RATIO: 0.94
E/E' RATIO: 14 M/S
ECHO LV POSTERIOR WALL: 1.4 CM (ref 0.6–1.1)
FRACTIONAL SHORTENING: 23.1 % (ref 28–44)
INR PPP: 3.2 (ref 2.5–3)
INTERVENTRICULAR SEPTUM: 0.9 CM (ref 0.6–1.1)
IVC DIAMETER: 1.3 CM
IVRT: 86 MSEC
LA MAJOR: 5.1 CM
LA MINOR: 5 CM
LA WIDTH: 3.7 CM
LEFT ATRIUM AREA SYSTOLIC (APICAL 2 CHAMBER): 15.97 CM2
LEFT ATRIUM AREA SYSTOLIC (APICAL 4 CHAMBER): 15.49 CM2
LEFT ATRIUM SIZE: 3.1 CM
LEFT ATRIUM VOLUME INDEX MOD: 21 ML/M2
LEFT ATRIUM VOLUME INDEX: 26 ML/M2
LEFT ATRIUM VOLUME MOD: 40 ML
LEFT ATRIUM VOLUME: 49 CM3
LEFT INTERNAL DIMENSION IN SYSTOLE: 3 CM (ref 2.1–4)
LEFT VENTRICLE DIASTOLIC VOLUME INDEX: 35.87 ML/M2
LEFT VENTRICLE DIASTOLIC VOLUME: 67.08 ML
LEFT VENTRICLE END SYSTOLIC VOLUME APICAL 2 CHAMBER: 41.01 ML
LEFT VENTRICLE END SYSTOLIC VOLUME APICAL 4 CHAMBER: 39.35 ML
LEFT VENTRICLE MASS INDEX: 80 G/M2
LEFT VENTRICLE SYSTOLIC VOLUME INDEX: 18.1 ML/M2
LEFT VENTRICLE SYSTOLIC VOLUME: 33.79 ML
LEFT VENTRICULAR INTERNAL DIMENSION IN DIASTOLE: 3.9 CM (ref 3.5–6)
LEFT VENTRICULAR MASS: 149.5 G
LV LATERAL E/E' RATIO: 13 M/S
LV SEPTAL E/E' RATIO: 14.6 M/S
LVED V (TEICH): 67.08 ML
LVES V (TEICH): 33.79 ML
LVOT MG: 2.2 MMHG
LVOT MV: 0.69 CM/S
MV PEAK A VEL: 1.24 M/S
MV PEAK E VEL: 1.17 M/S
MV STENOSIS PRESSURE HALF TIME: 65.33 MS
MV VALVE AREA P 1/2 METHOD: 3.37 CM2
OHS CV RV/LV RATIO: 0.79 CM
PISA AR MAX VEL: 5.01 M/S
PISA TR MAX VEL: 2.6 M/S
PV MEAN GRADIENT: 2 MMHG
PV MV: 0.87 M/S
PV PEAK GRADIENT: 7 MMHG
PV PEAK VELOCITY: 1.29 M/S
RA MAJOR: 4.16 CM
RA PRESSURE ESTIMATED: 3 MMHG
RA WIDTH: 3.87 CM
RIGHT VENTRICLE DIASTOLIC BASEL DIMENSION: 3.1 CM
RIGHT VENTRICULAR END-DIASTOLIC DIMENSION: 3.12 CM
RV TB RVSP: 6 MMHG
SINUS: 2.22 CM
STJ: 2.21 CM
TDI LATERAL: 0.09 M/S
TDI SEPTAL: 0.08 M/S
TDI: 0.09 M/S
TR MAX PG: 27 MMHG
TV REST PULMONARY ARTERY PRESSURE: 30 MMHG
Z-SCORE OF LEFT VENTRICULAR DIMENSION IN END DIASTOLE: -2.9
Z-SCORE OF LEFT VENTRICULAR DIMENSION IN END SYSTOLE: -0.55

## 2025-01-27 PROCEDURE — 85610 PROTHROMBIN TIME: CPT | Mod: QW,S$GLB,, | Performed by: INTERNAL MEDICINE

## 2025-01-27 PROCEDURE — 93306 TTE W/DOPPLER COMPLETE: CPT | Mod: 26,,, | Performed by: INTERNAL MEDICINE

## 2025-01-27 PROCEDURE — 93793 ANTICOAG MGMT PT WARFARIN: CPT | Mod: S$GLB,,,

## 2025-01-27 PROCEDURE — 93306 TTE W/DOPPLER COMPLETE: CPT

## 2025-01-27 NOTE — PROGRESS NOTES
INR is slightly above therapeutic goal at 3.2.  Patient reports no medication/dietary changes or bleeding issues.  Patient is requesting a small serving of greens today - broccoli.  Confirms current warfarin dose and followed.  Instructions given:  Eat a small serving of greens (1/2 cup) today.  Continue warfarin 2 mg every Monday and 4 mg all other days.  May need a dose decrease, pending next INR.  INR recheck in 3 weeks.  Dose calendar given.  ED for any abnormal bleeding issues.  Patient voices understanding.

## 2025-01-28 ENCOUNTER — PATIENT MESSAGE (OUTPATIENT)
Dept: CARDIOLOGY | Facility: CLINIC | Age: 45
End: 2025-01-28
Payer: COMMERCIAL

## 2025-02-18 ENCOUNTER — ANTI-COAG VISIT (OUTPATIENT)
Dept: CARDIOLOGY | Facility: CLINIC | Age: 45
End: 2025-02-18
Payer: COMMERCIAL

## 2025-02-18 DIAGNOSIS — Z95.1 S/P CABG X 5: ICD-10-CM

## 2025-02-18 DIAGNOSIS — Z95.2 S/P AVR (AORTIC VALVE REPLACEMENT): ICD-10-CM

## 2025-02-18 DIAGNOSIS — G47.00 INSOMNIA, UNSPECIFIED TYPE: ICD-10-CM

## 2025-02-18 DIAGNOSIS — Z79.01 LONG TERM (CURRENT) USE OF ANTICOAGULANTS: Primary | ICD-10-CM

## 2025-02-18 LAB
CTP QC/QA: YES
INR PPP: 1.6 (ref 2–3)

## 2025-02-18 RX ORDER — TRAZODONE HYDROCHLORIDE 100 MG/1
100 TABLET ORAL NIGHTLY
Qty: 90 TABLET | Refills: 3 | Status: SHIPPED | OUTPATIENT
Start: 2025-02-18 | End: 2026-02-18

## 2025-02-18 NOTE — PROGRESS NOTES
INR is below therapeutic goal at 1.6.  Patient reports a possibility of a missed dose.  Denies any signs/symptoms.  Confirms current warfarin dose.  Instructions given:  Will boost today's dose to 6 mg, then re-challenge current dose of 2 mg every Monday and 4 mg all other days.  Advised on a pill organizer to prevent future risk factors.  INR recheck in 2 weeks.  Dose calendar given and reviewed with patient.  ED for any s/s of clotting/stroke.  Patient voices understanding.

## 2025-02-18 NOTE — TELEPHONE ENCOUNTER
Refill Routing Note   Medication(s) are not appropriate for processing by [facility] for the following reason(s):        Non-participating provider  No active prescription written by PCP    [facility] action(s):  Route             Appointments  past 12m or future 3m with PCP    Date Provider   Last Visit   6/7/2024 Ranjana Orta NP   Next Visit   Visit date not found Ranjana Orta NP   ED visits in past 90 days: 0        Note composed:12:11 PM 02/18/2025

## 2025-03-07 ENCOUNTER — ANTI-COAG VISIT (OUTPATIENT)
Dept: CARDIOLOGY | Facility: CLINIC | Age: 45
End: 2025-03-07
Payer: COMMERCIAL

## 2025-03-07 DIAGNOSIS — Z95.1 S/P CABG X 5: ICD-10-CM

## 2025-03-07 DIAGNOSIS — Z95.2 S/P AVR (AORTIC VALVE REPLACEMENT): ICD-10-CM

## 2025-03-07 DIAGNOSIS — Z79.01 LONG TERM (CURRENT) USE OF ANTICOAGULANTS: Primary | ICD-10-CM

## 2025-03-07 LAB
CTP QC/QA: YES
INR PPP: 3.9 (ref 2.5–3)

## 2025-03-07 NOTE — PROGRESS NOTES
INR is above therapeutic goal at 3.9.  Patient reports he is smoking more cigarettes.  Denies bleeding issues.  States current warfarin dose and followed.  Instructions given to hold warfarin dose today, then will decrease dose to 2 mg every Mon/Fri; and 4 mg all other days.  INR recheck in 2 week.  Dose calendar given and reviewed with patient.  ED for any abnormal bleeding issues.  Patient voices understanding.

## 2025-03-21 ENCOUNTER — ANTI-COAG VISIT (OUTPATIENT)
Dept: CARDIOLOGY | Facility: CLINIC | Age: 45
End: 2025-03-21
Payer: COMMERCIAL

## 2025-03-21 DIAGNOSIS — Z95.2 S/P AVR (AORTIC VALVE REPLACEMENT): ICD-10-CM

## 2025-03-21 DIAGNOSIS — Z95.1 S/P CABG X 5: ICD-10-CM

## 2025-03-21 DIAGNOSIS — Z79.01 LONG TERM (CURRENT) USE OF ANTICOAGULANTS: Primary | ICD-10-CM

## 2025-03-21 LAB
CTP QC/QA: YES
INR PPP: 2.7 (ref 2.5–3)

## 2025-03-21 NOTE — PROGRESS NOTES
INR is in therapeutic goal range at 2.7.  Previous warfarin instructions were verified and followed.  No other changes reported.  Will continue with new dose of 2 mg every Mon/Fri and 4 mg all other days.  INR recheck in 2 weeks.  Dose calendar given - confirms understanding.

## 2025-04-04 ENCOUNTER — ANTI-COAG VISIT (OUTPATIENT)
Dept: CARDIOLOGY | Facility: CLINIC | Age: 45
End: 2025-04-04
Payer: COMMERCIAL

## 2025-04-04 DIAGNOSIS — Z95.1 S/P CABG X 5: ICD-10-CM

## 2025-04-04 DIAGNOSIS — Z95.2 S/P AVR (AORTIC VALVE REPLACEMENT): ICD-10-CM

## 2025-04-04 DIAGNOSIS — Z79.01 LONG TERM (CURRENT) USE OF ANTICOAGULANTS: Primary | ICD-10-CM

## 2025-04-04 LAB
CTP QC/QA: YES
INR PPP: 1.9 (ref 2.5–3)

## 2025-04-04 NOTE — PROGRESS NOTES
INR is below therapeutic goal at 1.9.  Recently missed dose reported.  No dietary changes or s/s reported.  Will boost dose to 4 mg today, then resume current dose of 2 mg every Mon/Fri; and 4 mg all other days.  Advised on the importance of taking warfarin as directed to prevent future risk factors.  Dose calendar given and reviewed with patient.  Confirms understanding.

## 2025-04-21 ENCOUNTER — ANTI-COAG VISIT (OUTPATIENT)
Dept: CARDIOLOGY | Facility: CLINIC | Age: 45
End: 2025-04-21
Payer: COMMERCIAL

## 2025-04-21 DIAGNOSIS — Z95.2 S/P AVR (AORTIC VALVE REPLACEMENT): ICD-10-CM

## 2025-04-21 DIAGNOSIS — Z79.01 LONG TERM (CURRENT) USE OF ANTICOAGULANTS: Primary | ICD-10-CM

## 2025-04-21 DIAGNOSIS — Z95.1 S/P CABG X 5: ICD-10-CM

## 2025-04-21 LAB
CTP QC/QA: YES
INR PPP: 2 (ref 2.5–3)

## 2025-04-21 PROCEDURE — 93793 ANTICOAG MGMT PT WARFARIN: CPT | Mod: S$GLB,,,

## 2025-04-21 PROCEDURE — 85610 PROTHROMBIN TIME: CPT | Mod: QW,S$GLB,, | Performed by: INTERNAL MEDICINE

## 2025-04-21 NOTE — PROGRESS NOTES
"INR is subtherapeutic at 2.0.  Patient reports a "possible missed dose or taking late".  No s/s reported.  Will boost dose again to 4 mg today, then resume current dose of 2 mg every Mon/Fri and 4 mg all other days.  INR recheck in 2 weeks.  Dose calendar given and reviewed with patient - confirms understanding.     "

## 2025-05-05 ENCOUNTER — ANTI-COAG VISIT (OUTPATIENT)
Dept: CARDIOLOGY | Facility: CLINIC | Age: 45
End: 2025-05-05
Payer: COMMERCIAL

## 2025-05-05 DIAGNOSIS — Z79.01 LONG TERM (CURRENT) USE OF ANTICOAGULANTS: Primary | ICD-10-CM

## 2025-05-05 DIAGNOSIS — Z95.1 S/P CABG X 5: ICD-10-CM

## 2025-05-05 DIAGNOSIS — Z95.2 S/P AVR (AORTIC VALVE REPLACEMENT): ICD-10-CM

## 2025-05-05 LAB
CTP QC/QA: YES
INR PPP: 2.4 (ref 2.5–3)

## 2025-05-05 PROCEDURE — 85610 PROTHROMBIN TIME: CPT | Mod: QW,S$GLB,, | Performed by: INTERNAL MEDICINE

## 2025-05-05 PROCEDURE — 93793 ANTICOAG MGMT PT WARFARIN: CPT | Mod: S$GLB,,,

## 2025-05-05 NOTE — PROGRESS NOTES
INR is just below goal at 2.4.  Previous boost was verified and followed.  No s/s reported.  Will attempt another boost of 4 mg today, then re-challenge current dose one more time with 2 mg every Mon, Fri; and 4 mg all other days.  Continue with close follow up and recheck in 1 week.  Will need to increase dose on next visit, if below therapeutic goal.  Dose calendar given and reviewed with patient - confirms understanding.

## 2025-05-14 ENCOUNTER — ANTI-COAG VISIT (OUTPATIENT)
Dept: CARDIOLOGY | Facility: CLINIC | Age: 45
End: 2025-05-14
Payer: COMMERCIAL

## 2025-05-14 DIAGNOSIS — Z95.1 S/P CABG X 5: ICD-10-CM

## 2025-05-14 DIAGNOSIS — Z95.2 S/P AVR (AORTIC VALVE REPLACEMENT): ICD-10-CM

## 2025-05-14 DIAGNOSIS — Z79.01 LONG TERM (CURRENT) USE OF ANTICOAGULANTS: Primary | ICD-10-CM

## 2025-05-14 LAB
CTP QC/QA: YES
INR PPP: 3 (ref 2.5–3)

## 2025-05-14 PROCEDURE — 93793 ANTICOAG MGMT PT WARFARIN: CPT | Mod: S$GLB,,,

## 2025-05-14 NOTE — PROGRESS NOTES
Chart reviewed, agree with LPN plan.  We will plan to bridge with [facility] for dental procedure.  Need date from pt.

## 2025-05-14 NOTE — PROGRESS NOTES
INR is therapeutic at 3.0.  Previous warfarin instructions were verified and followed.  Patient reports an impending dental procedure - will needs Cardio clearance and date.  Will continue warfarin 2 mg every Mon, Fri; and 4 mg all other days.  INR recheck in 2 weeks.  Patient will contact the CC with tooth extraction date.  Dose calendar given - confirms understanding.

## 2025-05-28 ENCOUNTER — ANTI-COAG VISIT (OUTPATIENT)
Dept: CARDIOLOGY | Facility: CLINIC | Age: 45
End: 2025-05-28
Payer: COMMERCIAL

## 2025-05-28 DIAGNOSIS — Z95.1 S/P CABG X 5: ICD-10-CM

## 2025-05-28 DIAGNOSIS — Z95.2 S/P AVR (AORTIC VALVE REPLACEMENT): ICD-10-CM

## 2025-05-28 DIAGNOSIS — Z79.01 LONG TERM (CURRENT) USE OF ANTICOAGULANTS: Primary | ICD-10-CM

## 2025-05-28 LAB
CTP QC/QA: YES
INR PPP: 1.5 (ref 2.5–3)

## 2025-05-28 PROCEDURE — 93793 ANTICOAG MGMT PT WARFARIN: CPT | Mod: S$GLB,,,

## 2025-05-28 PROCEDURE — 85610 PROTHROMBIN TIME: CPT | Mod: QW,S$GLB,, | Performed by: INTERNAL MEDICINE

## 2025-05-28 NOTE — PROGRESS NOTES
INR is below therapeutic goal at 1.5.  Patient reports a missed dose - 2 mg.  No s/s reported.  Reiterated on the importance of taking warfarin as directed to prevent future risk factors.  Will boost today's dose to 6 mg (8.3%), then resume current dose of 2 mg every Mon/Fri and 4 mg all other days.  INR recheck in 2 weeks.  Dose calendar given and reviewed with patient.  ED for any s/s of clotting/stroke.  Patient confirms understanding.

## 2025-06-11 ENCOUNTER — ANTI-COAG VISIT (OUTPATIENT)
Dept: CARDIOLOGY | Facility: CLINIC | Age: 45
End: 2025-06-11
Payer: COMMERCIAL

## 2025-06-11 DIAGNOSIS — Z95.1 S/P CABG X 5: ICD-10-CM

## 2025-06-11 DIAGNOSIS — Z95.2 S/P AVR (AORTIC VALVE REPLACEMENT): ICD-10-CM

## 2025-06-11 DIAGNOSIS — Z79.01 LONG TERM (CURRENT) USE OF ANTICOAGULANTS: Primary | ICD-10-CM

## 2025-06-11 LAB
CTP QC/QA: YES
INR PPP: 2.6 (ref 2.5–3)

## 2025-06-11 PROCEDURE — 93793 ANTICOAG MGMT PT WARFARIN: CPT | Mod: S$GLB,,,

## 2025-06-11 NOTE — PROGRESS NOTES
INR is therapeutic at 2.6.  Previous warfarin instructions were verified and followed.  No other changes reported.  An impending dental procedure is being planned - no date.  Patient has an appointment with Cardiology on 7/07.  Continue warfarin 2 mg every Mon, Fri; and 4 mg all other days.  INR recheck in 3 weeks, pending clearance for dental procedure.  Dose calendar given.  Patient confirms understanding.

## 2025-07-02 ENCOUNTER — ANTI-COAG VISIT (OUTPATIENT)
Dept: CARDIOLOGY | Facility: CLINIC | Age: 45
End: 2025-07-02
Payer: COMMERCIAL

## 2025-07-02 DIAGNOSIS — Z79.01 LONG TERM (CURRENT) USE OF ANTICOAGULANTS: Primary | ICD-10-CM

## 2025-07-02 DIAGNOSIS — Z95.1 S/P CABG X 5: ICD-10-CM

## 2025-07-02 DIAGNOSIS — Z95.2 S/P AVR (AORTIC VALVE REPLACEMENT): ICD-10-CM

## 2025-07-02 LAB
CTP QC/QA: YES
INR PPP: 2.1 (ref 2.5–3)

## 2025-07-02 PROCEDURE — 85610 PROTHROMBIN TIME: CPT | Mod: QW,S$GLB,, | Performed by: INTERNAL MEDICINE

## 2025-07-02 PROCEDURE — 93793 ANTICOAG MGMT PT WARFARIN: CPT | Mod: S$GLB,,,

## 2025-07-02 NOTE — PROGRESS NOTES
INR is subtherapeutic at 2.1.  Patient reports a recent missed dose.  Reiterated on the importance of taking warfarin as directed to prevent risk factors.  Will boost dose to 6 mg today, then re-challenge current dose of 2 mg every Mon, Fri; and 4 mg all other days.  INR recheck in 2 weeks.  Dose calendar given - confirms understanding.

## 2025-07-06 RX ORDER — ATORVASTATIN CALCIUM 80 MG/1
80 TABLET, FILM COATED ORAL DAILY
Qty: 90 TABLET | Refills: 1 | Status: CANCELLED | OUTPATIENT
Start: 2025-07-06

## 2025-07-06 NOTE — TELEPHONE ENCOUNTER
No care due was identified.  [facility] Embedded Care Due Messages. Reference number: [id number].   7/06/2025 2:55:16 PM CDT

## 2025-07-07 ENCOUNTER — OFFICE VISIT (OUTPATIENT)
Dept: CARDIOLOGY | Facility: CLINIC | Age: 45
End: 2025-07-07
Payer: COMMERCIAL

## 2025-07-07 VITALS
HEIGHT: 68 IN | OXYGEN SATURATION: 98 % | HEART RATE: 78 BPM | WEIGHT: 162.81 LBS | BODY MASS INDEX: 24.68 KG/M2 | SYSTOLIC BLOOD PRESSURE: 120 MMHG | DIASTOLIC BLOOD PRESSURE: 70 MMHG

## 2025-07-07 DIAGNOSIS — Z95.1 S/P CABG X 5: ICD-10-CM

## 2025-07-07 DIAGNOSIS — Z95.2 S/P AVR (AORTIC VALVE REPLACEMENT): ICD-10-CM

## 2025-07-07 DIAGNOSIS — I10 PRIMARY HYPERTENSION: ICD-10-CM

## 2025-07-07 DIAGNOSIS — I25.10 CAD, MULTIPLE VESSEL: Primary | ICD-10-CM

## 2025-07-07 DIAGNOSIS — F17.210 TOBACCO SMOKER, 1 PACK OF CIGARETTES OR LESS PER DAY: ICD-10-CM

## 2025-07-07 PROCEDURE — 3078F DIAST BP <80 MM HG: CPT | Mod: CPTII,S$GLB,,

## 2025-07-07 PROCEDURE — 99214 OFFICE O/P EST MOD 30 MIN: CPT | Mod: S$GLB,,,

## 2025-07-07 PROCEDURE — 99999 PR PBB SHADOW E&M-EST. PATIENT-LVL III: CPT | Mod: PBBFAC,,,

## 2025-07-07 PROCEDURE — 3008F BODY MASS INDEX DOCD: CPT | Mod: CPTII,S$GLB,,

## 2025-07-07 PROCEDURE — 1160F RVW MEDS BY RX/DR IN RCRD: CPT | Mod: CPTII,S$GLB,,

## 2025-07-07 PROCEDURE — 1159F MED LIST DOCD IN RCRD: CPT | Mod: CPTII,S$GLB,,

## 2025-07-07 PROCEDURE — 3074F SYST BP LT 130 MM HG: CPT | Mod: CPTII,S$GLB,,

## 2025-07-07 RX ORDER — ATORVASTATIN CALCIUM 80 MG/1
80 TABLET, FILM COATED ORAL DAILY
Qty: 90 TABLET | Refills: 1 | Status: CANCELLED | OUTPATIENT
Start: 2025-07-07

## 2025-07-07 RX ORDER — METOPROLOL TARTRATE 50 MG/1
50 TABLET ORAL DAILY
Qty: 90 TABLET | Refills: 3 | Status: SHIPPED | OUTPATIENT
Start: 2025-07-07

## 2025-07-07 RX ORDER — ATORVASTATIN CALCIUM 80 MG/1
80 TABLET, FILM COATED ORAL DAILY
Qty: 90 TABLET | Refills: 1 | Status: SHIPPED | OUTPATIENT
Start: 2025-07-07

## 2025-07-07 NOTE — PROGRESS NOTES
Subjective:   Patient ID:  Mikie Alcazar is a 45 y.o. male who presents for evaluation of chronic CV conditions. His current medical conditions include HTN, CAD s/p CABG, S/p aortic valve replacement, cigarette smoker    1/6/25  CABG- 5V and AVR ( mechanical) 9-23   Echo nml AVR  Pt with dizziness x few months    7/7/25  Patient presents for follow up of CV conditions. He reports that he is overall doing well. At his last visit, his metoprolol was decreased to once a day for some dizziness and fatigue. He feels better on this. He has noticed once or twice in the past month that his heart rate was elevated at night. He reports that it could have been stress but he has also increased his caffeine intake recently. This occurred rarely and is not happening on a daily or even weekly basis. He is staying active and does a lot of walking. He is asymptomatic while active. He unfortunately does continue to smoke about a pack every 3 days.    He denies all other symptoms including chest pain, dyspnea, pre-syncope, syncope, orthopnea, PND and leg swelling.     Past Medical History:   Diagnosis Date    Hypertension 9/18/2023       Past Surgical History:   Procedure Laterality Date    ANGIOGRAPHY OF INTERNAL MAMMARY VESSEL Left 9/18/2023    Procedure: Angiogram Internal Mammary;  Surgeon: Chelsea Morales MD;  Location: [facility] CATH LAB;  Service: Cardiology;  Laterality: Left;    AORTIC VALVE REPLACEMENT N/A 9/19/2023    Procedure: REPLACEMENT-VALVE-AORTIC;  Surgeon: Nishant Douglas MD;  Location: [facility] OR;  Service: Cardiothoracic;  Laterality: N/A;  19 mm MECHANICAL AORTIC VALVE    ARTERIOGRAPHY OF AORTIC ROOT N/A 9/18/2023    Procedure: ARTERIOGRAM, AORTIC ROOT;  Surgeon: Chelsea Morales MD;  Location: [facility] CATH LAB;  Service: Cardiology;  Laterality: N/A;    ARTERIOGRAPHY OF SUBCLAVIAN ARTERY Left 9/18/2023    Procedure: ARTERIOGRAM, SUBCLAVIAN;  Surgeon: Chelsea Morales MD;  Location: [facility] CATH LAB;  Service: Cardiology;   Laterality: Left;    CORONARY ARTERY BYPASS GRAFT (CABG) N/A 9/19/2023    Procedure: CORONARY ARTERY BYPASS GRAFT (CABG);  Surgeon: Nishant Douglas MD;  Location: [facility] OR;  Service: Cardiothoracic;  Laterality: N/A;  5-VESSEL WITH EPI-AORTIC ULTRASOUND    ECHOCARDIOGRAM,TRANSESOPHAGEAL N/A 9/19/2023    Procedure: ECHOCARDIOGRAM,TRANSESOPHAGEAL;  Surgeon: Nishant Douglas MD;  Location: [facility] OR;  Service: Cardiothoracic;  Laterality: N/A;    ENDOSCOPIC HARVEST OF VEIN Left 9/19/2023    Procedure: SURGICAL PROCUREMENT, VEIN, ENDOSCOPIC;  Surgeon: Nishant Douglas MD;  Location: [facility] OR;  Service: Cardiothoracic;  Laterality: Left;    INJECTION OF ANESTHETIC AGENT AROUND MULTIPLE INTERCOSTAL NERVES N/A 9/19/2023    Procedure: BLOCK, NERVE, INTERCOSTAL, 2 OR MORE;  Surgeon: Nishant Douglas MD;  Location: [facility] OR;  Service: Cardiothoracic;  Laterality: N/A;  PARA-STERNAL NERVE BLOCK    LEFT HEART CATHETERIZATION Left 9/18/2023    Procedure: Left heart cath;  Surgeon: Chelsea Morales MD;  Location: [facility] CATH LAB;  Service: Cardiology;  Laterality: Left;       Social History[1]    Family History   Problem Relation Name Age of Onset    Strabismus Mother      Hypertension Father      Macular degeneration Father      Glaucoma Maternal Aunt      Cataracts Maternal Aunt      Glaucoma Maternal Uncle      Cataracts Maternal Uncle         Wt Readings from Last 3 Encounters:   07/07/25 73.9 kg (162 lb 13 oz)   01/27/25 73.9 kg (163 lb)   01/06/25 74.3 kg (163 lb 14.6 oz)     Temp Readings from Last 3 Encounters:   12/04/24 96.6 °F (35.9 °C) (Tympanic)   06/07/24 97.8 °F (36.6 °C) (Tympanic)   02/10/24 98 °F (36.7 °C) (Oral)     BP Readings from Last 3 Encounters:   07/07/25 120/70   01/27/25 90/60   01/06/25 90/60     Pulse Readings from Last 3 Encounters:   07/07/25 78   01/06/25 88   12/04/24 110       Medications Ordered Prior to Encounter[2]    No cardiac monitor results found for the past 12 months    Results for  orders placed during the hospital encounter of 01/27/25    Echo    Interpretation Summary    Left Ventricle: The left ventricle is normal in size. Ventricular mass is normal. Normal wall thickness. There is concentric remodeling. Septal motion is consistent with post-operative status. There is normal systolic function with a visually estimated ejection fraction of 55 - 60%. There is normal diastolic function.    Right Ventricle: Normal right ventricular cavity size. Wall thickness is normal. Systolic function is normal.    Aortic Valve: There is a mechanical valve in the aortic position that is well-seated. There is mild stenosis. Aortic valve area by VTI is 1.8 cm². Aortic valve peak velocity is 2.2 m/s. Mean gradient is 9 mmHg. The dimensionless index is 0.49. There is mild aortic regurgitation.    Mitral Valve: There is moderate mitral annular calcification present. There is mild regurgitation.    Tricuspid Valve: There is mild regurgitation.    Pulmonary Artery: The estimated pulmonary artery systolic pressure is 30 mmHg.    IVC/SVC: Normal venous pressure at 3 mmHg.    Results for orders placed during the hospital encounter of 12/04/23    Exercise Stress - EKG    Interpretation Summary    The ECG portion of the study is positive for ischemia.    The patient reported no chest pain during the stress test.    The exercise capacity was average.    The patient exercised for 9 minutes 40 seconds on a modified Terrence protocol, achieving a peak heart rate of 121 bpm, which is 69 % of the age predicted maximum heart rate. Their exercise capacity was average. The test was stopped because the patient requested it and they experienced fatigue.    Unable to meet target HR due to patient request to stop treadmill. No chest pain pre, during, or post stress test. Returned to baseline by the end of recovery.        Results for orders placed or performed in visit on 07/01/24   SCHEDULED EKG 12-LEAD (to Muse)    Collection Time:  07/01/24 10:57 AM   Result Value Ref Range    QRS Duration 84 ms    [facility] QTC Calculation 442 ms    Narrative    Test Reason : R00.2,    Vent. Rate : 094 BPM     Atrial Rate : 094 BPM     P-R Int : 172 ms          QRS Dur : 084 ms      QT Int : 354 ms       P-R-T Axes : 083 084 104 degrees     QTc Int : 442 ms    Normal sinus rhythm  Possible Left atrial enlargement  Septal infarct (cited on or before 17-SEP-2023)  Abnormal ECG  When compared with ECG of 04-DEC-2023 13:40,  No significant change was found  Confirmed by MD SHAINA, MI ([id number]) on 7/1/2024 9:45:27 PM    Referred By:             Confirmed By:MI MERRITT MD         Review of Systems   Constitutional: Negative.   HENT: Negative.     Eyes: Negative.    Cardiovascular:  Positive for palpitations (rare). Negative for chest pain, dyspnea on exertion, leg swelling, near-syncope, orthopnea, paroxysmal nocturnal dyspnea and syncope.   Respiratory: Negative.     Skin: Negative.    Musculoskeletal: Negative.    Gastrointestinal: Negative.    Genitourinary: Negative.    Neurological: Negative.    Psychiatric/Behavioral: Negative.           Physical Exam  Vitals and nursing note reviewed.   Constitutional:       Appearance: Normal appearance.   HENT:      Head: Normocephalic and atraumatic.   Eyes:      General:         Right eye: No discharge.         Left eye: No discharge.      Pupils: Pupils are equal, round, and reactive to light.   Cardiovascular:      Rate and Rhythm: Normal rate and regular rhythm.      Heart sounds: S1 normal and S2 normal. No murmur heard.     No friction rub.   Pulmonary:      Effort: Pulmonary effort is normal. No respiratory distress.      Breath sounds: Normal breath sounds. No rales.   Abdominal:      Palpations: Abdomen is soft.      Tenderness: There is no abdominal tenderness.   Musculoskeletal:      Cervical back: Neck supple.      Right lower leg: No edema.      Left lower leg: No edema.   Skin:     General: Skin is warm and dry.    Neurological:      General: No focal deficit present.      Mental Status: He is alert and oriented to person, place, and time.   Psychiatric:         Mood and Affect: Mood normal.         Behavior: Behavior normal.         Thought Content: Thought content normal.         Lab Results   Component Value Date    CHOL 113 (L) 12/17/2024    CHOL 121 06/07/2024    CHOL 150 09/17/2023     Lab Results   Component Value Date    HDL 33 (L) 12/17/2024    HDL 32 (L) 06/07/2024    HDL 37 (L) 09/17/2023     Lab Results   Component Value Date    LDLCALC 53.0 (L) 12/17/2024    LDLCALC 71.2 06/07/2024    LDLCALC 84.0 09/17/2023     Lab Results   Component Value Date    TRIG 135 12/17/2024    TRIG 89 06/07/2024    TRIG 145 09/17/2023     Lab Results   Component Value Date    CHOLHDL 29.2 12/17/2024    CHOLHDL 26.4 06/07/2024    CHOLHDL 24.7 09/17/2023       Chemistry        Component Value Date/Time     12/17/2024 1221    K 4.1 12/17/2024 1221     12/17/2024 1221    CO2 26 12/17/2024 1221    BUN 6 12/17/2024 1221    CREATININE 0.8 12/17/2024 1221     12/17/2024 1221        Component Value Date/Time    CALCIUM 9.0 12/17/2024 1221    ALKPHOS 78 12/17/2024 1221    AST 30 12/17/2024 1221    ALT 33 12/17/2024 1221    BILITOT 0.5 12/17/2024 1221          Lab Results   Component Value Date    TSH 3.361 02/10/2024     Lab Results   Component Value Date    INR 2.1 (A) 07/02/2025    INR 2.6 06/11/2025    INR 1.5 (A) 05/28/2025     Lab Results   Component Value Date    WBC 7.73 12/17/2024    HGB 15.8 12/17/2024    HCT 48.5 12/17/2024    MCV 96 12/17/2024     12/17/2024       Assessment:      1. CAD, multiple vessel    2. S/P CABG x 5    3. S/P AVR (aortic valve replacement)    4. Primary hypertension    5. Tobacco smoker, 1 pack of cigarettes or less per day        Plan:   CAD, multiple vessel    S/P CABG x 5    S/P AVR (aortic valve replacement)    Primary hypertension    Tobacco smoker, 1 pack of cigarettes or less  per day      If palpitations occur more frequently, patient encouraged to contact cardiology  Continue statin-HLP  Continue metoprolol-HTN  Continue asa- CAD/CABG  Continue coumadin- AVR  Smoking cessation  Low fat, low salt diet, exercise as tolerated  [facility]             [1]   Social History  Tobacco Use    Smoking status: Some Days     Current packs/day: 1.00     Types: Cigarettes    Smokeless tobacco: Never   Substance Use Topics    Alcohol use: Yes     Comment: rarely    Drug use: No   [2]   Current Outpatient Medications on File Prior to Visit   Medication Sig Dispense Refill    acetaminophen (TYLENOL) 325 MG tablet Take 2 tablets (650 mg total) by mouth every 6 (six) hours as needed for Pain. 20 tablet 0    amoxicillin (AMOXIL) 500 MG Tab Take 1 tablet by mouth every 8 hours until gone (Patient not taking: Reported on 7/7/2025) 21 tablet 0    aspirin (ECOTRIN) 81 MG EC tablet Take 1 tablet (81 mg total) by mouth once daily. 360 tablet 0    atorvastatin (LIPITOR) 80 MG tablet Take 1 tablet (80 mg total) by mouth once daily. 90 tablet 1    busPIRone (BUSPAR) 10 MG tablet Take 1 tablet (10 mg total) by mouth 3 (three) times daily. 90 tablet 1    folic acid (FOLVITE) 1 MG tablet Take 1 tablet (1 mg total) by mouth once daily. 30 tablet 0    ibuprofen (ADVIL,MOTRIN) 600 MG tablet Take 1 tablet by mouth every 4-6 hours as needed for pain 18 tablet 0    metoprolol tartrate (LOPRESSOR) 50 MG tablet Take 1 tablet (50 mg total) by mouth once daily. 90 tablet 3    traZODone (DESYREL) 100 MG tablet Take 1 tablet (100 mg total) by mouth every evening. 90 tablet 3    warfarin (COUMADIN) 4 MG tablet Take 1 tablet (4 mg total) by mouth Daily. Except one and one half tablets (6 mg) every Tuesday as directed by the CC. (Patient taking differently: Take 4 mg by mouth Daily. Except one half tablet (2 mg) every Monday and Friday as directed by the CC.) 40 tablet 11      No current facility-administered medications on file prior to visit.

## 2025-07-07 NOTE — TELEPHONE ENCOUNTER
No care due was identified.  Health [facility] Embedded Care Due Messages. Reference number: [id number].   7/07/2025 4:02:09 PM CDT

## 2025-07-16 ENCOUNTER — ANTI-COAG VISIT (OUTPATIENT)
Dept: CARDIOLOGY | Facility: CLINIC | Age: 45
End: 2025-07-16
Payer: COMMERCIAL

## 2025-07-16 DIAGNOSIS — Z79.01 LONG TERM (CURRENT) USE OF ANTICOAGULANTS: Primary | ICD-10-CM

## 2025-07-16 DIAGNOSIS — Z95.2 S/P AVR (AORTIC VALVE REPLACEMENT): ICD-10-CM

## 2025-07-16 DIAGNOSIS — Z95.1 S/P CABG X 5: ICD-10-CM

## 2025-07-16 LAB
CTP QC/QA: YES
INR PPP: 1.5 (ref 2.5–3)

## 2025-07-16 PROCEDURE — 85610 PROTHROMBIN TIME: CPT | Mod: QW,S$GLB,, | Performed by: INTERNAL MEDICINE

## 2025-07-16 PROCEDURE — 93793 ANTICOAG MGMT PT WARFARIN: CPT | Mod: S$GLB,,,

## 2025-07-16 NOTE — PROGRESS NOTES
Chart reviewed, agree with LPN plan.  Will attempt to have pt return in 1 week for closer follow up, which he initially refused.

## 2025-07-16 NOTE — PROGRESS NOTES
INR has fallen to 1.5 - subtherapeutic.  Patient reports 2 missed doses.  Patient continues to miss doses of medication.  Reports pill organizer/pill box is not being used as advised on previous visits.  Advised to set an alarm to take medication.  Will boost today's dose to 6 mg, then resume current dose of 2 mg every Monday, Friday; and 4 mg all other days.  ER for any s/s of clotting/stroke.  INR recheck in 2 weeks.  Dose calendar given and reviewed with patient.  Confirms understanding.

## 2025-07-24 ENCOUNTER — OFFICE VISIT (OUTPATIENT)
Dept: PSYCHIATRY | Facility: CLINIC | Age: 45
End: 2025-07-24
Payer: COMMERCIAL

## 2025-07-24 VITALS
WEIGHT: 160.06 LBS | DIASTOLIC BLOOD PRESSURE: 82 MMHG | HEART RATE: 80 BPM | BODY MASS INDEX: 24.34 KG/M2 | SYSTOLIC BLOOD PRESSURE: 134 MMHG

## 2025-07-24 DIAGNOSIS — F39 MOOD DISORDER: Primary | ICD-10-CM

## 2025-07-24 DIAGNOSIS — F12.90 CANNABIS USE DISORDER: ICD-10-CM

## 2025-07-24 DIAGNOSIS — F06.4 ANXIETY DISORDER DUE TO MEDICAL CONDITION: ICD-10-CM

## 2025-07-24 DIAGNOSIS — G47.00 INSOMNIA, UNSPECIFIED TYPE: ICD-10-CM

## 2025-07-24 DIAGNOSIS — F41.1 GAD (GENERALIZED ANXIETY DISORDER): ICD-10-CM

## 2025-07-24 PROCEDURE — 3075F SYST BP GE 130 - 139MM HG: CPT | Mod: CPTII,S$GLB,, | Performed by: PSYCHIATRY & NEUROLOGY

## 2025-07-24 PROCEDURE — 1160F RVW MEDS BY RX/DR IN RCRD: CPT | Mod: CPTII,S$GLB,, | Performed by: PSYCHIATRY & NEUROLOGY

## 2025-07-24 PROCEDURE — G2211 COMPLEX E/M VISIT ADD ON: HCPCS | Mod: S$GLB,,, | Performed by: PSYCHIATRY & NEUROLOGY

## 2025-07-24 PROCEDURE — 99215 OFFICE O/P EST HI 40 MIN: CPT | Mod: S$GLB,,, | Performed by: PSYCHIATRY & NEUROLOGY

## 2025-07-24 PROCEDURE — 99999 PR PBB SHADOW E&M-EST. PATIENT-LVL III: CPT | Mod: PBBFAC,,, | Performed by: PSYCHIATRY & NEUROLOGY

## 2025-07-24 PROCEDURE — 1159F MED LIST DOCD IN RCRD: CPT | Mod: CPTII,S$GLB,, | Performed by: PSYCHIATRY & NEUROLOGY

## 2025-07-24 PROCEDURE — 3079F DIAST BP 80-89 MM HG: CPT | Mod: CPTII,S$GLB,, | Performed by: PSYCHIATRY & NEUROLOGY

## 2025-07-24 PROCEDURE — 3008F BODY MASS INDEX DOCD: CPT | Mod: CPTII,S$GLB,, | Performed by: PSYCHIATRY & NEUROLOGY

## 2025-07-24 RX ORDER — OXCARBAZEPINE 150 MG/1
150 TABLET, FILM COATED ORAL 2 TIMES DAILY
Qty: 60 TABLET | Refills: 0 | Status: SHIPPED | OUTPATIENT
Start: 2025-07-24 | End: 2025-08-23

## 2025-07-24 RX ORDER — TRAZODONE HYDROCHLORIDE 100 MG/1
100 TABLET ORAL NIGHTLY
Qty: 90 TABLET | Refills: 0 | Status: SHIPPED | OUTPATIENT
Start: 2025-07-24 | End: 2025-10-22

## 2025-07-24 RX ORDER — BUSPIRONE HYDROCHLORIDE 10 MG/1
10 TABLET ORAL 2 TIMES DAILY
Qty: 60 TABLET | Refills: 0 | Status: SHIPPED | OUTPATIENT
Start: 2025-07-24 | End: 2025-08-23

## 2025-07-24 NOTE — PATIENT INSTRUCTIONS

## 2025-07-24 NOTE — PROGRESS NOTES
Outpatient Psychiatry Follow-Up Visit     7/24/2025      Clinical Status of Patient:  Outpatient (Ambulatory)    Chief Complaint:  Mikie Alcazar is a 45 y.o. male who presents today for Medication Management follow-up Visit.      Preferred Name: Mikie    FIRST VISIT: This is a 44 year old SWM who is currently unemployed and lives with his mom. He had a multiple cardiac issues that required emergency surgery and treatment. He has been stressed out due to his cardiac issues, finances, and lack of a job and living place. He reports that he was in treatment since age 13. He reports having issues with anti depressants. He reports that he and his family have side effects on AD. He alleges that he was with Dr Garcia who had prescribed him Xanax before the doctor passed away.   Pt reports having erratic lifestyle with unpredictable hours of going to bed and wake up time. He also reports having erratic eating schedule too. Pt is sleeping well with trazodone when he uses it early in the night. However, he has issues when he takes it later on the night.   Pt reports using alcohol socially. He does use MJ more frequently and has got into legal trouble due to. He denies other drug use.   He is minimally depressed today; his anxiety is fair; he denies any SI/HI. No psychosis today. However, he did report psychosis when he went to ER in Feb 2024.       Discussed with pt his illness issues especially erratic lifestyle. He agrees to work on his sleep wake schedule and his eating schedule.  He minimizes his issues with MJ and alcohol use. He reports that he is able to stop it when necessary  He does not want to take ANY AD. He reports that he does not want the kind of side effects he and his family had on these medications. He is not able to give specific data about the exact symptoms from the AD. He reports that his cousin attempted suicide after he was given an AD.  Discussed with pt that we will try to work with him as much as  possible.   He agrees to take Buspar 10 mg po tid  He agrees to take Melatonin 5-10 mg around 6 - 7 pm and go to bed around 10-11 pm.   We discussed the issues with Xanax and it's abuse potential. Discussed that he will develop tolerance and will continue to need more medications all the time.   He will work on sobriety  He agrees with an IOP if they accept his insurance  He agrees to go to [place] [facility] to explore potential jobs or job training  Counseling done about getting back to work and how it will require pt to work towards it.   Pt agrees with the plan.   Will send a case management consult.     CURRENT PRESENTATION:     Problems with sleep.  My friends dosed me with a psychedelic (mescaline) and he was running around in [place] and police chasing him. He recalls what was going on. He does not know who did it. This was a long time ago but he is thinking about that event a lot.  He had just broken up with his GF and friends sided against him at that time.    He feels like some one is trying to monitor his phone lately. He reports that it is happening more recently. He had a paranoid episode when snow fell -- saw foot prints of deer and misinterpreted it as of humans  Reports his mood goes up and down. His GF has noticed mood swings    He had Bypass surgery in Sept 2023.     New Issues    New relationship: it is going on for 6 months. He is worried that he will disappoint her    Sleep: Goes to bed around 9:30 pm and up between 4;30 to 8 am. Nocturia x 1. Has bad nights for few nights every 2 weeks. May not sleep a whole night too    Appetite: it is alright  at present   Energy: fluctuates.   Side effects: denies    Pt remembers taking Viibryd in 2011 -- it did not agree with him as he was crying in his sleep  Trazodone -- takes it when he has issues with sleep     Alcohol: very rarely  MJ: a few times a week.          6/7/2024     1:08 PM 10/11/2023     1:53 PM   Depression Patient Health Questionnaire   Over the last  "two weeks how often have you been bothered by little interest or pleasure in doing things Not at all Not at all   Over the last two weeks how often have you been bothered by feeling down, depressed or hopeless Not at all Not at all   PHQ-2 Total Score 0 0     0-4 = No intervention  5 to 9 = Mild  10 to 14 = Moderate  15 to 19 = Moderately severe  =20 = Severe    Review of Systems   PSYCHIATRIC: Pertinant items are noted in the narrative.    Past Medical, Family and Social History: The patient's past medical, family and social history have been reviewed and updated as appropriate within the electronic medical record - see encounter notes.    Laboratory Data  Anti-coag visit on 07/16/2025   Component Date Value Ref Range Status    INR 07/16/2025 1.5 (A)  2.5 - 3.0 Final     Acceptable 07/16/2025 Yes   Final   Anti-coag visit on 07/02/2025   Component Date Value Ref Range Status    INR 07/02/2025 2.1 (A)  2.5 - 3.0 Final     Acceptable 07/02/2025 Yes   Final       Medications  Encounter Medications[1]        Risk Parameters:  Patient reports no suicidal ideation  Patient reports no homicidal ideation  Patient reports no self-injurious behavior  Patient reports no violent behavior    Exam (detailed: at least 9 elements; comprehensive: all 15 elements)   Constitutional  Vitals:  Most recent vital signs were reviewed.   Last 3 sets of Vitals        1/27/2025     1:30 PM 7/7/2025     2:04 PM 7/24/2025     1:46 PM   Vitals - 1 value per visit   SYSTOLIC 90 120 134   DIASTOLIC 60 70 82   Pulse  78 80   SPO2  98 %    Weight (lb) 163 162.81 160.05   Weight (kg) 73.936 73.85 72.6   Height 5' 8" (1.727 m) 5' 8" (1.727 m)    BMI (Calculated) 24.8 24.8           General:  unremarkable, age appropriate, casually dressed, neatly groomed     Musculoskeletal  Muscle Strength/Tone:  not examined   Gait & Station:  non-ataxic     Psychiatric  Appearance: casually dressed & groomed;   Behavior: calm, "   Cooperation: cooperative with assessment  Speech: normal rate, volume, tone  Thought Process: linear, goal-directed  Thought Content: No suicidal or homicidal ideation; no delusions  Affect: normal range  Mood: anxious, labile  Perceptions: No auditory or visual hallucinations; paranoia +  Level of Consciousness: alert throughout interview  Insight: fair  Cognition: Oriented to person, place, time, & situation  Memory: no apparent deficits to general clinical interview;  Attention/Concentration: no apparent deficits to general clinical interview;  Fund of Knowledge: average by vocabulary/education      Assessment and Diagnosis   Status/Progress: Based on the examination today, the patient's problem(s) is/are inadequately controlled.  New problems have been presented today.   Co-morbidities are complicating management of the primary condition.       Encounter Diagnoses   Name Primary?    Mood disorder Yes    NIRMALA (generalized anxiety disorder)     Anxiety disorder due to medical condition     Insomnia, unspecified type     Cannabis use disorder        General Impression & Treatment Plan:     Pt returned after 9 months. He is in a new relationship. Both of them have noticed that he has mood swings, he is impulsive, has erratic sleep with multiple nights not sleeping or sleeping few hours. Pt denies using any alcohol. He reports using some MJ few times a week.  He reports taking trazodone a few times every 2 weeks. He reports his mood swing cycles are about every 2 weeks.  Pt denies SI/HI  He has had two paranoid events; he has hx of psychosis in Feb 2024. Psychosis symptoms are not enduring.   He has some paranoia.  Pt is motivated to get help    PLAN:    (1) reviewed with pt that he may have Bipolar disorder or cyclothymic disorder; However, we will call it a Mood disorder for now. Pt agrees  Agrees with a mood stabilizer.  Start Trileptal 150 mg po bid  Reviewed side effects  Last labs done Feb 2024   (2) Continue  p r n trazodone 100 mg for sleep  (3) buspar 10 mg po bid for anxiety  (4) Monitor psychosis.  (5) discussed sobriety  (6) Check B12, folic acid, CBC and CMP.   (7) Advised pt to inform his MD monitoring INR about med changes  (8) Consider Latuda or Abilify augmentation in future as needed  (9) counseling done    Return to Clinic: 2 weeks    Medication List with Changes/Refills   New Medications    OXCARBAZEPINE (TRILEPTAL) 150 MG TAB    Take 1 tablet (150 mg total) by mouth 2 (two) times daily.   Current Medications    ACETAMINOPHEN (TYLENOL) 325 MG TABLET    Take 2 tablets (650 mg total) by mouth every 6 (six) hours as needed for Pain.    AMOXICILLIN (AMOXIL) 500 MG TAB    Take 1 tablet by mouth every 8 hours until gone    ASPIRIN (ECOTRIN) 81 MG EC TABLET    Take 1 tablet (81 mg total) by mouth once daily.    ATORVASTATIN (LIPITOR) 80 MG TABLET    Take 1 tablet (80 mg total) by mouth once daily.    FOLIC ACID (FOLVITE) 1 MG TABLET    Take 1 tablet (1 mg total) by mouth once daily.    IBUPROFEN (ADVIL,MOTRIN) 600 MG TABLET    Take 1 tablet by mouth every 4-6 hours as needed for pain    METOPROLOL TARTRATE (LOPRESSOR) 50 MG TABLET    Take 1 tablet (50 mg total) by mouth once daily.    WARFARIN (COUMADIN) 4 MG TABLET    Take 1 tablet (4 mg total) by mouth Daily. Except one and one half tablets (6 mg) every Tuesday as directed by the CC.   Changed and/or Refilled Medications    Modified Medication Previous Medication    BUSPIRONE (BUSPAR) 10 MG TABLET busPIRone (BUSPAR) 10 MG tablet       Take 1 tablet (10 mg total) by mouth 2 (two) times daily.    Take 1 tablet (10 mg total) by mouth 3 (three) times daily.    TRAZODONE (DESYREL) 100 MG TABLET traZODone (DESYREL) 100 MG tablet       Take 1 tablet (100 mg total) by mouth every evening.    Take 1 tablet (100 mg total) by mouth every evening.          Time spent with pt including note preparation: 48 minutes. This includes face to face time and non-face to face time  preparing to see the patient (eg, review of tests), obtaining and/or reviewing separately obtained history, documenting clinical information in the electronic or other health record, independently interpreting results and communicating results to the patient/family/caregiver, or care coordinator.         Quang Perea MD  Psychiatry         [1]   Outpatient Encounter Medications as of 7/24/2025   Medication Sig Dispense Refill    acetaminophen (TYLENOL) 325 MG tablet Take 2 tablets (650 mg total) by mouth every 6 (six) hours as needed for Pain. 20 tablet 0    amoxicillin (AMOXIL) 500 MG Tab Take 1 tablet by mouth every 8 hours until gone (Patient not taking: Reported on 7/7/2025) 21 tablet 0    aspirin (ECOTRIN) 81 MG EC tablet Take 1 tablet (81 mg total) by mouth once daily. 360 tablet 0    atorvastatin (LIPITOR) 80 MG tablet Take 1 tablet (80 mg total) by mouth once daily. 90 tablet 1    busPIRone (BUSPAR) 10 MG tablet Take 1 tablet (10 mg total) by mouth 2 (two) times daily. 60 tablet 0    folic acid (FOLVITE) 1 MG tablet Take 1 tablet (1 mg total) by mouth once daily. 30 tablet 0    ibuprofen (ADVIL,MOTRIN) 600 MG tablet Take 1 tablet by mouth every 4-6 hours as needed for pain 18 tablet 0    metoprolol tartrate (LOPRESSOR) 50 MG tablet Take 1 tablet (50 mg total) by mouth once daily. 90 tablet 3    OXcarbazepine (TRILEPTAL) 150 MG Tab Take 1 tablet (150 mg total) by mouth 2 (two) times daily. 60 tablet 0    traZODone (DESYREL) 100 MG tablet Take 1 tablet (100 mg total) by mouth every evening. 90 tablet 0    warfarin (COUMADIN) 4 MG tablet Take 1 tablet (4 mg total) by mouth Daily. Except one and one half tablets (6 mg) every Tuesday as directed by the CC. (Patient taking differently: Take 4 mg by mouth Daily. Except one half tablet (2 mg) every Monday and Friday as directed by the CC.) 40 tablet 11    [DISCONTINUED] busPIRone (BUSPAR) 10 MG tablet Take 1 tablet (10 mg total) by mouth 3 (three) times daily.  90 tablet 1    [DISCONTINUED] traZODone (DESYREL) 100 MG tablet Take 1 tablet (100 mg total) by mouth every evening. 90 tablet 3     No facility-administered encounter medications on file as of 7/24/2025.

## 2025-07-30 ENCOUNTER — ANTI-COAG VISIT (OUTPATIENT)
Dept: CARDIOLOGY | Facility: CLINIC | Age: 45
End: 2025-07-30
Payer: COMMERCIAL

## 2025-07-30 DIAGNOSIS — Z95.2 S/P AVR (AORTIC VALVE REPLACEMENT): ICD-10-CM

## 2025-07-30 DIAGNOSIS — Z95.1 S/P CABG X 5: ICD-10-CM

## 2025-07-30 DIAGNOSIS — Z79.01 LONG TERM (CURRENT) USE OF ANTICOAGULANTS: Primary | ICD-10-CM

## 2025-07-30 LAB
CTP QC/QA: YES
INR PPP: 2.9 (ref 2.5–3)

## 2025-07-30 PROCEDURE — 85610 PROTHROMBIN TIME: CPT | Mod: QW,S$GLB,, | Performed by: INTERNAL MEDICINE

## 2025-07-30 PROCEDURE — 93793 ANTICOAG MGMT PT WARFARIN: CPT | Mod: S$GLB,,,

## 2025-07-30 NOTE — PROGRESS NOTES
INR is therapeutic at 2.9.  Previous warfarin instructions were verified and followed.  No other changes reported.  Continue 2 mg every Mon, Fri; and 4 mg all other days.  INR recheck in 2 weeks.  Advised to take warfarin as directed.  Dose calendar given and reviewed with patient.  Confirms understanding.

## 2025-08-07 ENCOUNTER — OFFICE VISIT (OUTPATIENT)
Dept: PSYCHIATRY | Facility: CLINIC | Age: 45
End: 2025-08-07
Payer: COMMERCIAL

## 2025-08-07 DIAGNOSIS — F06.4 ANXIETY DISORDER DUE TO MEDICAL CONDITION: ICD-10-CM

## 2025-08-07 DIAGNOSIS — F41.1 GAD (GENERALIZED ANXIETY DISORDER): ICD-10-CM

## 2025-08-07 DIAGNOSIS — G47.00 INSOMNIA, UNSPECIFIED TYPE: ICD-10-CM

## 2025-08-07 DIAGNOSIS — F39 MOOD DISORDER: Primary | ICD-10-CM

## 2025-08-07 DIAGNOSIS — F12.90 CANNABIS USE DISORDER: ICD-10-CM

## 2025-08-07 PROCEDURE — 99214 OFFICE O/P EST MOD 30 MIN: CPT | Mod: S$GLB,,, | Performed by: PSYCHIATRY & NEUROLOGY

## 2025-08-07 PROCEDURE — 99999 PR PBB SHADOW E&M-EST. PATIENT-LVL II: CPT | Mod: PBBFAC,,, | Performed by: PSYCHIATRY & NEUROLOGY

## 2025-08-07 PROCEDURE — 1160F RVW MEDS BY RX/DR IN RCRD: CPT | Mod: CPTII,S$GLB,, | Performed by: PSYCHIATRY & NEUROLOGY

## 2025-08-07 PROCEDURE — 1159F MED LIST DOCD IN RCRD: CPT | Mod: CPTII,S$GLB,, | Performed by: PSYCHIATRY & NEUROLOGY

## 2025-08-07 RX ORDER — BUSPIRONE HYDROCHLORIDE 10 MG/1
20 TABLET ORAL 2 TIMES DAILY
Qty: 120 TABLET | Refills: 1 | Status: SHIPPED | OUTPATIENT
Start: 2025-08-07 | End: 2025-10-06

## 2025-08-07 RX ORDER — OXCARBAZEPINE 150 MG/1
TABLET, FILM COATED ORAL
Qty: 90 TABLET | Refills: 1 | Status: SHIPPED | OUTPATIENT
Start: 2025-08-07 | End: 2025-10-06

## 2025-08-07 NOTE — PROGRESS NOTES
Outpatient Psychiatry Follow-Up Visit     8/7/2025      Clinical Status of Patient:  Outpatient (Ambulatory)    Chief Complaint:  Mikie Alcazar is a 45 y.o. male who presents today for Medication Management follow-up Visit.      Preferred Name: Mikie    FIRST VISIT: This is a 44 year old SWM who is currently unemployed and lives with his mom. He had a multiple cardiac issues that required emergency surgery and treatment. He has been stressed out due to his cardiac issues, finances, and lack of a job and living place. He reports that he was in treatment since age 13. He reports having issues with anti depressants. He reports that he and his family have side effects on AD. He alleges that he was with Dr Garcia who had prescribed him Xanax before the doctor passed away.   Pt reports having erratic lifestyle with unpredictable hours of going to bed and wake up time. He also reports having erratic eating schedule too. Pt is sleeping well with trazodone when he uses it early in the night. However, he has issues when he takes it later on the night.   Pt reports using alcohol socially. He does use MJ more frequently and has got into legal trouble due to. He denies other drug use.   He is minimally depressed today; his anxiety is fair; he denies any SI/HI. No psychosis today. However, he did report psychosis when he went to ER in Feb 2024.     reatment Plan/Recommendations:   Discussed with pt his illness issues especially erratic lifestyle. He agrees to work on his sleep wake schedule and his eating schedule.  He minimizes his issues with MJ and alcohol use. He reports that he is able to stop it when necessary  He does not want to take ANY AD. He reports that he does not want the kind of side effects he and his family had on these medications. He is not able to give specific data about the exact symptoms from the AD. He reports that his cousin attempted suicide after he was given an AD.  Discussed with pt that we will try  to work with him as much as possible.   He agrees to take Buspar 10 mg po tid  He agrees to take Melatonin 5-10 mg around 6 - 7 pm and go to bed around 10-11 pm.   We discussed the issues with Xanax and it's abuse potential. Discussed that he will develop tolerance and will continue to need more medications all the time.   He will work on sobriety  He agrees with an IOP if they accept his insurance  He agrees to go to [place] [facility] to explore potential jobs or job training  Counseling done about getting back to work and how it will require pt to work towards it.   Pt agrees with the plan.   Will send a case management consult.        LAST VISIT: Pt returned after 9 months. He is in a new relationship. Both of them have noticed that he has mood swings, he is impulsive, has erratic sleep with multiple nights not sleeping or sleeping few hours. Pt denies using any alcohol. He reports using some MJ few times a week.  He reports taking trazodone a few times every 2 weeks. He reports his mood swing cycles are about every 2 weeks.  Pt denies SI/HI  He has had two paranoid events; he has hx of psychosis in Feb 2024. Psychosis symptoms are not enduring.   He has some paranoia.  Pt is motivated to get help     PLAN:     (1) reviewed with pt that he may have Bipolar disorder or cyclothymic disorder; However, we will call it a Mood disorder for now. Pt agrees  Agrees with a mood stabilizer.  Start Trileptal 150 mg po bid  Reviewed side effects  Last labs done Feb 2024   (2) Continue p r n trazodone 100 mg for sleep  (3) buspar 10 mg po bid for anxiety  (4) Monitor psychosis.  (5) discussed sobriety  (6) Check B12, folic acid, CBC and CMP.   (7) Advised pt to inform his MD monitoring INR about med changes  (8) Consider Latuda or Abilify augmentation in future as needed  (9) counseling done       CURRENT PRESENTATION:     Mood is better; less irritable       Sleep: getting more tired. Difficulty staying awake. Could not stay awake  "last night  Goes to bed around 10:30 pm -- wakes up around 8 am  No naps.   Likes trazodone    Appetite: eating a lot. Eats breakfast  off and on   Energy: OK  Side effects: denies         6/7/2024     1:08 PM 10/11/2023     1:53 PM   Depression Patient Health Questionnaire   Over the last two weeks how often have you been bothered by little interest or pleasure in doing things Not at all Not at all   Over the last two weeks how often have you been bothered by feeling down, depressed or hopeless Not at all Not at all   PHQ-2 Total Score 0 0     0-4 = No intervention  5 to 9 = Mild  10 to 14 = Moderate  15 to 19 = Moderately severe  =20 = Severe    Review of Systems   PSYCHIATRIC: Pertinant items are noted in the narrative.    Past Medical, Family and Social History: The patient's past medical, family and social history have been reviewed and updated as appropriate within the electronic medical record - see encounter notes.    Laboratory Data  Anti-coag visit on 07/30/2025   Component Date Value Ref Range Status    INR 07/30/2025 2.9  2.5 - 3.0 Final     Acceptable 07/30/2025 Yes   Final   Anti-coag visit on 07/16/2025   Component Date Value Ref Range Status    INR 07/16/2025 1.5 (A)  2.5 - 3.0 Final     Acceptable 07/16/2025 Yes   Final       Medications  Encounter Medications[1]        Risk Parameters:  Patient reports no suicidal ideation  Patient reports no homicidal ideation  Patient reports no self-injurious behavior  Patient reports no violent behavior    Exam (detailed: at least 9 elements; comprehensive: all 15 elements)   Constitutional  Vitals:  Most recent vital signs were reviewed.   Last 3 sets of Vitals        1/27/2025     1:30 PM 7/7/2025     2:04 PM 7/24/2025     1:46 PM   Vitals - 1 value per visit   SYSTOLIC 90 120 134   DIASTOLIC 60 70 82   Pulse  78 80   SPO2  98 %    Weight (lb) 163 162.81 160.05   Weight (kg) 73.936 73.85 72.6   Height 5' 8" (1.727 m) 5' 8" (1.727 " m)    BMI (Calculated) 24.8 24.8           General:  unremarkable, age appropriate, casually dressed     Musculoskeletal  Muscle Strength/Tone:  not examined   Gait & Station:  non-ataxic     Psychiatric  Appearance: casually dressed & groomed;   Behavior: calm,   Cooperation: cooperative with assessment  Speech: normal rate, volume, tone  Thought Process: linear, goal-directed  Thought Content: No suicidal or homicidal ideation; no delusions  Affect: normal range  Mood: anxious, sad   Perceptions: No auditory or visual hallucinations  Level of Consciousness: alert throughout interview  Insight: fair  Cognition: Oriented to person, place, time, & situation  Memory: no apparent deficits to general clinical interview  Attention/Concentration: no apparent deficits to general clinical interview  Fund of Knowledge: average by vocabulary/education      Assessment and Diagnosis   Status/Progress: Based on the examination today, the patient's problem(s) is/are resolving.  New problems have not been presented today.   Co-morbidities are complicating management of the primary condition.       Encounter Diagnoses   Name Primary?    NIRMALA (generalized anxiety disorder)     Anxiety disorder due to medical condition     Mood disorder Yes    Cannabis use disorder     Insomnia, unspecified type        General Impression & Treatment Plan:     Pt feels that the medications are working for him. He is less irritable; less mood swings; sleeping better  Denies any conflicts with anyone  Has decreased MJ use  Denies SI  Spends time reading book; likes to listen to podcast  Occasionally gets bored.     PLAN:     (1) Increase buspar to 20 mg bid  (2) Increase Trileptal to 150 mg am and 300 mg po qhs   (3) structure time: Books on tapes; pod cast; video games etc   (4) advised pt to get the Labs done.   (5) monitor side effects  (6) Counseling done     Return to Clinic: 1 month    Medication List with Changes/Refills   Current Medications     ACETAMINOPHEN (TYLENOL) 325 MG TABLET    Take 2 tablets (650 mg total) by mouth every 6 (six) hours as needed for Pain.    AMOXICILLIN (AMOXIL) 500 MG TAB    Take 1 tablet by mouth every 8 hours until gone    ASPIRIN (ECOTRIN) 81 MG EC TABLET    Take 1 tablet (81 mg total) by mouth once daily.    ATORVASTATIN (LIPITOR) 80 MG TABLET    Take 1 tablet (80 mg total) by mouth once daily.    FOLIC ACID (FOLVITE) 1 MG TABLET    Take 1 tablet (1 mg total) by mouth once daily.    IBUPROFEN (ADVIL,MOTRIN) 600 MG TABLET    Take 1 tablet by mouth every 4-6 hours as needed for pain    METOPROLOL TARTRATE (LOPRESSOR) 50 MG TABLET    Take 1 tablet (50 mg total) by mouth once daily.    TRAZODONE (DESYREL) 100 MG TABLET    Take 1 tablet (100 mg total) by mouth every evening.    WARFARIN (COUMADIN) 4 MG TABLET    Take 1 tablet (4 mg total) by mouth Daily. Except one and one half tablets (6 mg) every Tuesday as directed by the CC.   Changed and/or Refilled Medications    Modified Medication Previous Medication    BUSPIRONE (BUSPAR) 10 MG TABLET busPIRone (BUSPAR) 10 MG tablet       Take 2 tablets (20 mg total) by mouth 2 (two) times daily.    Take 1 tablet (10 mg total) by mouth 2 (two) times daily.    OXCARBAZEPINE (TRILEPTAL) 150 MG TAB OXcarbazepine (TRILEPTAL) 150 MG Tab       Take 1 tablet (150 mg total) by mouth once daily AND 2 tablets (300 mg total) nightly.    Take 1 tablet (150 mg total) by mouth 2 (two) times daily.          Time spent with pt including note preparation: 34 minutes. This includes face to face time and non-face to face time preparing to see the patient (eg, review of tests), obtaining and/or reviewing separately obtained history, documenting clinical information in the electronic or other health record, independently interpreting results and communicating results to the patient/family/caregiver, or care coordinator.         Quang Perea MD  Psychiatry         [1]   Outpatient Encounter Medications as of  8/7/2025   Medication Sig Dispense Refill    acetaminophen (TYLENOL) 325 MG tablet Take 2 tablets (650 mg total) by mouth every 6 (six) hours as needed for Pain. 20 tablet 0    amoxicillin (AMOXIL) 500 MG Tab Take 1 tablet by mouth every 8 hours until gone (Patient not taking: Reported on 7/7/2025) 21 tablet 0    aspirin (ECOTRIN) 81 MG EC tablet Take 1 tablet (81 mg total) by mouth once daily. 360 tablet 0    atorvastatin (LIPITOR) 80 MG tablet Take 1 tablet (80 mg total) by mouth once daily. 90 tablet 1    busPIRone (BUSPAR) 10 MG tablet Take 2 tablets (20 mg total) by mouth 2 (two) times daily. 120 tablet 1    folic acid (FOLVITE) 1 MG tablet Take 1 tablet (1 mg total) by mouth once daily. 30 tablet 0    ibuprofen (ADVIL,MOTRIN) 600 MG tablet Take 1 tablet by mouth every 4-6 hours as needed for pain 18 tablet 0    metoprolol tartrate (LOPRESSOR) 50 MG tablet Take 1 tablet (50 mg total) by mouth once daily. 90 tablet 3    OXcarbazepine (TRILEPTAL) 150 MG Tab Take 1 tablet (150 mg total) by mouth once daily AND 2 tablets (300 mg total) nightly. 90 tablet 1    traZODone (DESYREL) 100 MG tablet Take 1 tablet (100 mg total) by mouth every evening. 90 tablet 0    warfarin (COUMADIN) 4 MG tablet Take 1 tablet (4 mg total) by mouth Daily. Except one and one half tablets (6 mg) every Tuesday as directed by the CC. (Patient taking differently: Take 4 mg by mouth Daily. Except one half tablet (2 mg) every Monday and Friday as directed by the CC.) 40 tablet 11    [DISCONTINUED] busPIRone (BUSPAR) 10 MG tablet Take 1 tablet (10 mg total) by mouth 2 (two) times daily. 60 tablet 0    [DISCONTINUED] OXcarbazepine (TRILEPTAL) 150 MG Tab Take 1 tablet (150 mg total) by mouth 2 (two) times daily. 60 tablet 0     No facility-administered encounter medications on file as of 8/7/2025.

## 2025-08-07 NOTE — PATIENT INSTRUCTIONS

## 2025-08-13 ENCOUNTER — ANTI-COAG VISIT (OUTPATIENT)
Dept: CARDIOLOGY | Facility: CLINIC | Age: 45
End: 2025-08-13
Payer: COMMERCIAL

## 2025-08-13 DIAGNOSIS — Z95.1 S/P CABG X 5: ICD-10-CM

## 2025-08-13 DIAGNOSIS — Z95.2 S/P AVR (AORTIC VALVE REPLACEMENT): ICD-10-CM

## 2025-08-13 DIAGNOSIS — Z79.01 LONG TERM (CURRENT) USE OF ANTICOAGULANTS: Primary | ICD-10-CM

## 2025-08-13 LAB
CTP QC/QA: YES
INR PPP: 2.3 (ref 2.5–3)

## 2025-08-13 PROCEDURE — 85610 PROTHROMBIN TIME: CPT | Mod: QW,S$GLB,, | Performed by: INTERNAL MEDICINE

## 2025-08-13 PROCEDURE — 93793 ANTICOAG MGMT PT WARFARIN: CPT | Mod: S$GLB,,,

## 2025-09-03 ENCOUNTER — ANTI-COAG VISIT (OUTPATIENT)
Dept: CARDIOLOGY | Facility: CLINIC | Age: 45
End: 2025-09-03
Payer: COMMERCIAL

## 2025-09-03 DIAGNOSIS — Z95.1 S/P CABG X 5: ICD-10-CM

## 2025-09-03 DIAGNOSIS — Z95.2 S/P AVR (AORTIC VALVE REPLACEMENT): ICD-10-CM

## 2025-09-03 DIAGNOSIS — Z79.01 LONG TERM (CURRENT) USE OF ANTICOAGULANTS: Primary | ICD-10-CM

## 2025-09-03 LAB
CTP QC/QA: YES
INR PPP: 2.6 (ref 2.5–3)

## 2025-09-03 PROCEDURE — 85610 PROTHROMBIN TIME: CPT | Mod: QW,S$GLB,, | Performed by: INTERNAL MEDICINE

## 2025-09-03 PROCEDURE — 93793 ANTICOAG MGMT PT WARFARIN: CPT | Mod: S$GLB,,,

## (undated) DEVICE — SOL NORMAL USPCA 0.9%

## (undated) DEVICE — SET PERFUSION

## (undated) DEVICE — BAND TR COMP DEVICE REG 24CM

## (undated) DEVICE — CLIP STEALTH LATIS 1/4 FORCE 6

## (undated) DEVICE — PACK OPEN HEART BR

## (undated) DEVICE — SUT SILK 1 BLK BRAID SA87G

## (undated) DEVICE — OMNIPAQUE 300MG 150ML VIAL

## (undated) DEVICE — SUT PLEDGET LG SOFT

## (undated) DEVICE — Device

## (undated) DEVICE — SET PNEUMOCLEAR HEAT HUM SE HF

## (undated) DEVICE — SPONGE DERMACEA GAUZE 4X4

## (undated) DEVICE — SYR 20ML BLUE LUER LOCK

## (undated) DEVICE — KIT SURGIFLO HEMOSTATIC MATRIX

## (undated) DEVICE — SENSORS CDI

## (undated) DEVICE — SOL ISOLYTE S PH 7.4 1000ML

## (undated) DEVICE — KIT GLIDESHEATH SLEND 6FR 10CM

## (undated) DEVICE — CATH INFANT VENT 13FRMALLEABLE

## (undated) DEVICE — SUT VICRYL 2-0 36 CT-1

## (undated) DEVICE — SYS VEIN HARVESTING

## (undated) DEVICE — COVER OVERHEAD SURG LT BLUE

## (undated) DEVICE — ORGNZR TUBING CLR W/CLIPS

## (undated) DEVICE — GUIDEWIRE EMERALD .035IN 260CM

## (undated) DEVICE — BLANKET HYPOTHERMIA 25X64IN

## (undated) DEVICE — CONNECTOR 6 IN 1 Y TUBING STRL

## (undated) DEVICE — SOL NACL IRR 1000ML BTL

## (undated) DEVICE — CATH JL4 5FR

## (undated) DEVICE — GLUE BIOGLUE SYR PRE-FILL 5ML

## (undated) DEVICE — SUT ETHIBOND XTRA 5-0 RB-1

## (undated) DEVICE — ELECTRODE REM PLYHSV RETURN 9

## (undated) DEVICE — SUT PROLENE 4-0 RB-1 BL MO

## (undated) DEVICE — DRAIN CHANNEL ROUND 19FR

## (undated) DEVICE — CATH INFANT VENT 10 FRENCHMALL

## (undated) DEVICE — BLADE SHORT MED 18.5 X 9

## (undated) DEVICE — DRESSING ANTIMICROBIAL 1 INCH

## (undated) DEVICE — TUBING MEDI-VAC 20FT .25IN

## (undated) DEVICE — SET DECANTER MEDICHOICE

## (undated) DEVICE — NDL SURG MAYO CATGUT

## (undated) DEVICE — CONNECTOR 3/8X3/8 STERILE

## (undated) DEVICE — CANNULA AG CARDPLG 14G FLANGE

## (undated) DEVICE — ANGIOTOUCH KIT

## (undated) DEVICE — DRESSING MEPORE ISLAND 31/2X4

## (undated) DEVICE — PACK HEART CATH BR

## (undated) DEVICE — SUT MONOCRYL 4-0 PS-1 UND

## (undated) DEVICE — KIT PROBE COVER WITH GEL

## (undated) DEVICE — DRESSING MEPORE 3.6 X 10

## (undated) DEVICE — PUNCH AORTIC SHORT 4.4MM

## (undated) DEVICE — SET CARDIOPLEGIA DEL

## (undated) DEVICE — SUT PERMA HAND SILK BLK 0-0

## (undated) DEVICE — SET SUCTION ANTICOAGULANT

## (undated) DEVICE — EVACUATOR WOUND BULB 100CC

## (undated) DEVICE — SOL IRRI STRL WATER 1000ML

## (undated) DEVICE — SUT SILK 2-0 SH 18IN BLACK

## (undated) DEVICE — DRAPE ANGIO BRACH 38X44IN

## (undated) DEVICE — SUT SILK 1 6-30 LABYRINTH

## (undated) DEVICE — COUNTER BDL BLADEGUARD LI DBL

## (undated) DEVICE — NDL SAFETY 22G X 1.5 ECLIPSE

## (undated) DEVICE — APPLIER LIGACLIP SM 9.38IN

## (undated) DEVICE — BLADE SURG CARBON STEEL SZ11

## (undated) DEVICE — CATH PIG145 INFINITI 5X110CM

## (undated) DEVICE — CANNULA VENOUS MC2X 29FR

## (undated) DEVICE — KIT SITE-RITE NDL GUIDE 21G

## (undated) DEVICE — GLOVE BIOGEL PI MICRO SZ 6

## (undated) DEVICE — CANNULA VSL W/DUCKBILL

## (undated) DEVICE — CANNULA 21FR 7MM SOFT FLOW EXT

## (undated) DEVICE — CONTAINER SPECIMEN OR STER 4OZ

## (undated) DEVICE — SUT VICRYL 1 OB 36 CTX

## (undated) DEVICE — TOWEL OR DISP STRL BLUE 4/PK

## (undated) DEVICE — CATH OPTITORQUE TIGER 5F 100CM

## (undated) DEVICE — GOWN NONREINF SET-IN SLV 2XL

## (undated) DEVICE — TAPE SILK 3IN

## (undated) DEVICE — SUT 7/0 24IN PROLENE BL MO

## (undated) DEVICE — POWDER ARISTA AH 3G

## (undated) DEVICE — DRAPE SLUSH WARMER WITH DISC

## (undated) DEVICE — BANDAGE ACE DOUBLE STER 6IN

## (undated) DEVICE — SPONGE LAP 18X18 PREWASHED

## (undated) DEVICE — KIT PREVENA PLUS

## (undated) DEVICE — SIZER GRAFT VASCULAR 24-38MM

## (undated) DEVICE — GOWN POLY REINF X-LONG XL

## (undated) DEVICE — TIP YANKAUERS BULB NO VENT

## (undated) DEVICE — VENTILATOR TRANSPORT CIRCUIT

## (undated) DEVICE — SUT PROLENE 6-0 C-1 30IN BL

## (undated) DEVICE — CANNULA PERF RCSP 15FR SINUS

## (undated) DEVICE — DRAIN CHEST DRY SUCTION

## (undated) DEVICE — BOWL CELL SAVER 5 225ML

## (undated) DEVICE — ELECTRODE BLADE E-Z CLEAN 4IN

## (undated) DEVICE — DRAIN CHAN RND HUBLS 8MM 24FR

## (undated) DEVICE — SUMP PERICARDIAL WS

## (undated) DEVICE — KIT MANIFOLD LOW PRESS TUBING

## (undated) DEVICE — APPLIER CLIP LIAGCLIP 9.375IN

## (undated) DEVICE — SUT MAXON GRN 0 CLSR 27IN

## (undated) DEVICE — SUT 8/0 24IN PROLENE BL MON

## (undated) DEVICE — NDL BOX COUNTER

## (undated) DEVICE — COVER MAYO STND XL 30X57IN

## (undated) DEVICE — SPONGE GAUZE 4X4 12 PLY STRL

## (undated) DEVICE — THERMOWRAP CARDIAC

## (undated) DEVICE — INSERT STEALTH SURGICAL CLAMP

## (undated) DEVICE — BLOWER MISTER

## (undated) DEVICE — CANNULA IMA 1MM

## (undated) DEVICE — SYR 30CC LUER LOCK

## (undated) DEVICE — DRESSING MEPORE ADH 3.5X12

## (undated) DEVICE — DRESSING AQUACEL FOAM ADH 8X7

## (undated) DEVICE — TRAY CATH FOL SIL TEMP 10 16FR

## (undated) DEVICE — SUT STEEL 7 MONO B&S18 CCS

## (undated) DEVICE — DRAPE THREE-QUARTER 53X77IN

## (undated) DEVICE — RETRACTOR OCTOBASE INSERT HOLD

## (undated) DEVICE — PACK SPY-PHI DRUG DRAPE

## (undated) DEVICE — GLOVE BIOGEL 7.5

## (undated) DEVICE — BLADE SCALP OPHTL BEVEL STR

## (undated) DEVICE — CELL SAVER 4/CASE

## (undated) DEVICE — BLADE AVG MEDIUM 25 X 9

## (undated) DEVICE — CATH URETHRAL RED RUBBER 18FR

## (undated) DEVICE — KIT INTRODUCER STIFFEN MICRO

## (undated) DEVICE — TUBING CONNECTION 5MMX 15 18IN

## (undated) DEVICE — STOPCOCK DISCOFIX 3 WAY

## (undated) DEVICE — ADAPTER DLP Y PERF 3.5 &10IN

## (undated) DEVICE — CATH INFINITI MULTIPAK JR4 5FR

## (undated) DEVICE — SUT SILK 2-0 STRANDS 30IN

## (undated) DEVICE — CABLE PACING WIRE EPICARD 12FT

## (undated) DEVICE — SUT PROLENE 4-0 SH BLU 36IN

## (undated) DEVICE — GUIDEWIRE WHOLEY HI TORQ 175CM

## (undated) DEVICE — CONNECTOR STRAIGHT 1/4X1/4IN

## (undated) DEVICE — BLADE KNIFE UNITOME 4.0MM

## (undated) DEVICE — GOWN POLY REINF BRTH SLV XL

## (undated) DEVICE — CATH JR4 5FR

## (undated) DEVICE — SPONGE COTTON TRAY 4X4IN

## (undated) DEVICE — PERFUSION FX X-COATED